# Patient Record
Sex: MALE | Race: WHITE | NOT HISPANIC OR LATINO | Employment: UNEMPLOYED | URBAN - METROPOLITAN AREA
[De-identification: names, ages, dates, MRNs, and addresses within clinical notes are randomized per-mention and may not be internally consistent; named-entity substitution may affect disease eponyms.]

---

## 2017-04-04 ENCOUNTER — TELEPHONE (OUTPATIENT)
Dept: INTERNAL MEDICINE | Facility: CLINIC | Age: 52
End: 2017-04-04

## 2017-04-04 ENCOUNTER — OFFICE VISIT (OUTPATIENT)
Dept: INTERNAL MEDICINE | Facility: CLINIC | Age: 52
End: 2017-04-04

## 2017-04-04 VITALS
RESPIRATION RATE: 20 BRPM | BODY MASS INDEX: 35.45 KG/M2 | DIASTOLIC BLOOD PRESSURE: 78 MMHG | TEMPERATURE: 97.2 F | HEART RATE: 80 BPM | SYSTOLIC BLOOD PRESSURE: 130 MMHG | WEIGHT: 206.5 LBS

## 2017-04-04 DIAGNOSIS — IMO0002 UNCONTROLLED TYPE 2 DIABETES MELLITUS WITH OTHER SPECIFIED COMPLICATION, WITH LONG-TERM CURRENT USE OF INSULIN: Chronic | ICD-10-CM

## 2017-04-04 DIAGNOSIS — I10 BENIGN ESSENTIAL HYPERTENSION: Chronic | ICD-10-CM

## 2017-04-04 DIAGNOSIS — K21.9 GASTROESOPHAGEAL REFLUX DISEASE WITHOUT ESOPHAGITIS: Chronic | ICD-10-CM

## 2017-04-04 DIAGNOSIS — Z11.59 NEED FOR HEPATITIS C SCREENING TEST: ICD-10-CM

## 2017-04-04 DIAGNOSIS — G62.9 PERIPHERAL POLYNEUROPATHY: Chronic | ICD-10-CM

## 2017-04-04 DIAGNOSIS — E78.5 HYPERLIPIDEMIA, UNSPECIFIED HYPERLIPIDEMIA TYPE: Primary | Chronic | ICD-10-CM

## 2017-04-04 LAB
ALBUMIN SERPL-MCNC: 4.2 G/DL (ref 3.2–4.8)
ALBUMIN/GLOB SERPL: 1.6 G/DL (ref 1.5–2.5)
ALP SERPL-CCNC: 88 U/L (ref 25–100)
ALT SERPL W P-5'-P-CCNC: 39 U/L (ref 7–40)
ANION GAP SERPL CALCULATED.3IONS-SCNC: 11 MMOL/L (ref 3–11)
ARTICHOKE IGE QN: 95 MG/DL (ref 0–130)
AST SERPL-CCNC: 37 U/L (ref 0–33)
BILIRUB SERPL-MCNC: 0.4 MG/DL (ref 0.3–1.2)
BUN BLD-MCNC: 14 MG/DL (ref 9–23)
BUN/CREAT SERPL: 14 (ref 7–25)
CALCIUM SPEC-SCNC: 9.9 MG/DL (ref 8.7–10.4)
CHLORIDE SERPL-SCNC: 93 MMOL/L (ref 99–109)
CHOLEST SERPL-MCNC: 148 MG/DL (ref 0–200)
CO2 SERPL-SCNC: 30 MMOL/L (ref 20–31)
CREAT BLD-MCNC: 1 MG/DL (ref 0.6–1.3)
DEPRECATED RDW RBC AUTO: 44.2 FL (ref 37–54)
ERYTHROCYTE [DISTWIDTH] IN BLOOD BY AUTOMATED COUNT: 14 % (ref 11.3–14.5)
EXPIRATION DATE: NORMAL
GFR SERPL CREATININE-BSD FRML MDRD: 78 ML/MIN/1.73
GLOBULIN UR ELPH-MCNC: 2.6 GM/DL
GLUCOSE BLD-MCNC: 414 MG/DL (ref 70–100)
HBA1C MFR BLD: 13.2 %
HCT VFR BLD AUTO: 46.2 % (ref 38.9–50.9)
HCV AB SER DONR QL: NORMAL
HDLC SERPL-MCNC: 39 MG/DL (ref 40–60)
HGB BLD-MCNC: 14.9 G/DL (ref 13.1–17.5)
Lab: NORMAL
MCH RBC QN AUTO: 28 PG (ref 27–31)
MCHC RBC AUTO-ENTMCNC: 32.3 G/DL (ref 32–36)
MCV RBC AUTO: 86.7 FL (ref 80–99)
PLATELET # BLD AUTO: 240 10*3/MM3 (ref 150–450)
PMV BLD AUTO: 12.2 FL (ref 6–12)
POTASSIUM BLD-SCNC: 4 MMOL/L (ref 3.5–5.5)
PROT SERPL-MCNC: 6.8 G/DL (ref 5.7–8.2)
RBC # BLD AUTO: 5.33 10*6/MM3 (ref 4.2–5.76)
SODIUM BLD-SCNC: 134 MMOL/L (ref 132–146)
TRIGL SERPL-MCNC: 215 MG/DL (ref 0–150)
WBC NRBC COR # BLD: 6.8 10*3/MM3 (ref 3.5–10.8)

## 2017-04-04 PROCEDURE — 99214 OFFICE O/P EST MOD 30 MIN: CPT | Performed by: INTERNAL MEDICINE

## 2017-04-04 PROCEDURE — 86803 HEPATITIS C AB TEST: CPT | Performed by: INTERNAL MEDICINE

## 2017-04-04 PROCEDURE — 85027 COMPLETE CBC AUTOMATED: CPT | Performed by: INTERNAL MEDICINE

## 2017-04-04 PROCEDURE — 80061 LIPID PANEL: CPT | Performed by: INTERNAL MEDICINE

## 2017-04-04 PROCEDURE — 80053 COMPREHEN METABOLIC PANEL: CPT | Performed by: INTERNAL MEDICINE

## 2017-04-04 PROCEDURE — 36415 COLL VENOUS BLD VENIPUNCTURE: CPT | Performed by: INTERNAL MEDICINE

## 2017-04-04 PROCEDURE — 83036 HEMOGLOBIN GLYCOSYLATED A1C: CPT | Performed by: INTERNAL MEDICINE

## 2017-04-04 RX ORDER — ALBUTEROL SULFATE 90 UG/1
1-2 AEROSOL, METERED RESPIRATORY (INHALATION) EVERY 6 HOURS PRN
Qty: 1 INHALER | Refills: 6 | Status: SHIPPED | OUTPATIENT
Start: 2017-04-04 | End: 2017-04-06 | Stop reason: RX

## 2017-04-04 RX ORDER — SIMVASTATIN 20 MG
20 TABLET ORAL DAILY
Qty: 30 TABLET | Refills: 5 | Status: SHIPPED | OUTPATIENT
Start: 2017-04-04 | End: 2021-01-22 | Stop reason: HOSPADM

## 2017-04-04 RX ORDER — METOPROLOL SUCCINATE 50 MG/1
50 TABLET, EXTENDED RELEASE ORAL DAILY
Qty: 30 TABLET | Refills: 5 | Status: SHIPPED | OUTPATIENT
Start: 2017-04-04

## 2017-04-04 RX ORDER — INSULIN LISPRO 100 [IU]/ML
INJECTION, SUSPENSION SUBCUTANEOUS
Qty: 15 PEN | Refills: 5 | Status: SHIPPED | OUTPATIENT
Start: 2017-04-04

## 2017-04-04 RX ORDER — OMEPRAZOLE 40 MG/1
40 CAPSULE, DELAYED RELEASE ORAL 2 TIMES DAILY
Qty: 60 CAPSULE | Refills: 5
Start: 2017-04-04 | End: 2017-10-30 | Stop reason: SDUPTHER

## 2017-04-04 RX ORDER — ROPINIROLE 0.25 MG/1
0.25 TABLET, FILM COATED ORAL NIGHTLY
Qty: 30 TABLET | Refills: 5 | Status: SHIPPED | OUTPATIENT
Start: 2017-04-04

## 2017-04-04 RX ORDER — ACETAMINOPHEN 160 MG
2000 TABLET,DISINTEGRATING ORAL DAILY
Qty: 90 CAPSULE | Refills: 2 | Status: SHIPPED | OUTPATIENT
Start: 2017-04-04

## 2017-04-04 RX ORDER — FLUTICASONE PROPIONATE 50 MCG
2 SPRAY, SUSPENSION (ML) NASAL DAILY
Qty: 1 EACH | Refills: 5 | Status: SHIPPED | OUTPATIENT
Start: 2017-04-04 | End: 2017-05-04

## 2017-04-06 RX ORDER — ALBUTEROL SULFATE 90 UG/1
2 AEROSOL, METERED RESPIRATORY (INHALATION) EVERY 4 HOURS PRN
Qty: 1 INHALER | Refills: 5 | Status: SHIPPED | OUTPATIENT
Start: 2017-04-06

## 2017-06-20 ENCOUNTER — TELEPHONE (OUTPATIENT)
Dept: INTERNAL MEDICINE | Facility: CLINIC | Age: 52
End: 2017-06-20

## 2017-07-05 ENCOUNTER — HOSPITAL ENCOUNTER (OUTPATIENT)
Dept: GENERAL RADIOLOGY | Facility: HOSPITAL | Age: 52
Discharge: HOME | End: 2017-07-05
Attending: INTERNAL MEDICINE | Admitting: INTERNAL MEDICINE

## 2017-07-05 ENCOUNTER — OFFICE VISIT (OUTPATIENT)
Dept: INTERNAL MEDICINE | Facility: CLINIC | Age: 52
End: 2017-07-05

## 2017-07-05 VITALS
WEIGHT: 204.8 LBS | DIASTOLIC BLOOD PRESSURE: 82 MMHG | RESPIRATION RATE: 16 BRPM | SYSTOLIC BLOOD PRESSURE: 122 MMHG | BODY MASS INDEX: 35.15 KG/M2 | HEART RATE: 68 BPM

## 2017-07-05 DIAGNOSIS — G89.29 CHRONIC LOW BACK PAIN, UNSPECIFIED BACK PAIN LATERALITY, WITH SCIATICA PRESENCE UNSPECIFIED: ICD-10-CM

## 2017-07-05 DIAGNOSIS — E78.5 HYPERLIPIDEMIA, UNSPECIFIED HYPERLIPIDEMIA TYPE: Chronic | ICD-10-CM

## 2017-07-05 DIAGNOSIS — K59.09 CHRONIC CONSTIPATION: ICD-10-CM

## 2017-07-05 DIAGNOSIS — M54.5 CHRONIC LOW BACK PAIN, UNSPECIFIED BACK PAIN LATERALITY, WITH SCIATICA PRESENCE UNSPECIFIED: ICD-10-CM

## 2017-07-05 DIAGNOSIS — K21.9 GASTROESOPHAGEAL REFLUX DISEASE WITHOUT ESOPHAGITIS: Chronic | ICD-10-CM

## 2017-07-05 DIAGNOSIS — G62.9 PERIPHERAL POLYNEUROPATHY: Chronic | ICD-10-CM

## 2017-07-05 DIAGNOSIS — G47.30 SLEEP APNEA, UNSPECIFIED TYPE: Chronic | ICD-10-CM

## 2017-07-05 DIAGNOSIS — I10 BENIGN ESSENTIAL HYPERTENSION: Primary | Chronic | ICD-10-CM

## 2017-07-05 DIAGNOSIS — IMO0002 UNCONTROLLED TYPE 2 DIABETES MELLITUS WITH OTHER SPECIFIED COMPLICATION, WITH LONG-TERM CURRENT USE OF INSULIN: Chronic | ICD-10-CM

## 2017-07-05 DIAGNOSIS — G25.81 RLS (RESTLESS LEGS SYNDROME): ICD-10-CM

## 2017-07-05 PROBLEM — Z11.59 NEED FOR HEPATITIS C SCREENING TEST: Status: RESOLVED | Noted: 2017-04-04 | Resolved: 2017-07-05

## 2017-07-05 PROBLEM — M54.50 CHRONIC LOW BACK PAIN: Status: ACTIVE | Noted: 2017-07-05

## 2017-07-05 LAB
ALBUMIN SERPL-MCNC: 3.9 G/DL (ref 3.2–4.8)
ALBUMIN/GLOB SERPL: 1.7 G/DL (ref 1.5–2.5)
ALP SERPL-CCNC: 72 U/L (ref 25–100)
ALT SERPL W P-5'-P-CCNC: 20 U/L (ref 7–40)
ANION GAP SERPL CALCULATED.3IONS-SCNC: 7 MMOL/L (ref 3–11)
AST SERPL-CCNC: 23 U/L (ref 0–33)
BILIRUB SERPL-MCNC: 0.4 MG/DL (ref 0.3–1.2)
BUN BLD-MCNC: 18 MG/DL (ref 9–23)
BUN/CREAT SERPL: 18 (ref 7–25)
CALCIUM SPEC-SCNC: 9.2 MG/DL (ref 8.7–10.4)
CHLORIDE SERPL-SCNC: 103 MMOL/L (ref 99–109)
CO2 SERPL-SCNC: 30 MMOL/L (ref 20–31)
CREAT BLD-MCNC: 1 MG/DL (ref 0.6–1.3)
DEPRECATED RDW RBC AUTO: 42.2 FL (ref 37–54)
ERYTHROCYTE [DISTWIDTH] IN BLOOD BY AUTOMATED COUNT: 13.6 % (ref 11.3–14.5)
EXPIRATION DATE: NORMAL
GFR SERPL CREATININE-BSD FRML MDRD: 78 ML/MIN/1.73
GLOBULIN UR ELPH-MCNC: 2.3 GM/DL
GLUCOSE BLD-MCNC: 234 MG/DL (ref 70–100)
HBA1C MFR BLD: 12 %
HCT VFR BLD AUTO: 44.8 % (ref 38.9–50.9)
HGB BLD-MCNC: 14.5 G/DL (ref 13.1–17.5)
Lab: NORMAL
MCH RBC QN AUTO: 27.6 PG (ref 27–31)
MCHC RBC AUTO-ENTMCNC: 32.4 G/DL (ref 32–36)
MCV RBC AUTO: 85.2 FL (ref 80–99)
PLATELET # BLD AUTO: 261 10*3/MM3 (ref 150–450)
PMV BLD AUTO: 11.6 FL (ref 6–12)
POTASSIUM BLD-SCNC: 4.5 MMOL/L (ref 3.5–5.5)
PROT SERPL-MCNC: 6.2 G/DL (ref 5.7–8.2)
RBC # BLD AUTO: 5.26 10*6/MM3 (ref 4.2–5.76)
SODIUM BLD-SCNC: 140 MMOL/L (ref 132–146)
WBC NRBC COR # BLD: 8.98 10*3/MM3 (ref 3.5–10.8)

## 2017-07-05 PROCEDURE — 83036 HEMOGLOBIN GLYCOSYLATED A1C: CPT | Performed by: INTERNAL MEDICINE

## 2017-07-05 PROCEDURE — 72100 X-RAY EXAM L-S SPINE 2/3 VWS: CPT

## 2017-07-05 PROCEDURE — 99214 OFFICE O/P EST MOD 30 MIN: CPT | Performed by: INTERNAL MEDICINE

## 2017-07-05 PROCEDURE — 80053 COMPREHEN METABOLIC PANEL: CPT | Performed by: INTERNAL MEDICINE

## 2017-07-05 PROCEDURE — 85027 COMPLETE CBC AUTOMATED: CPT | Performed by: INTERNAL MEDICINE

## 2017-07-05 RX ORDER — PREGABALIN 225 MG/1
CAPSULE ORAL
Refills: 5 | COMMUNITY
Start: 2017-07-02

## 2017-07-05 RX ORDER — LIDOCAINE 50 MG/G
OINTMENT TOPICAL
Refills: 0 | COMMUNITY
Start: 2017-05-04

## 2017-07-06 ENCOUNTER — TELEPHONE (OUTPATIENT)
Dept: INTERNAL MEDICINE | Facility: CLINIC | Age: 52
End: 2017-07-06

## 2017-07-11 ENCOUNTER — TELEPHONE (OUTPATIENT)
Dept: INTERNAL MEDICINE | Facility: CLINIC | Age: 52
End: 2017-07-11

## 2017-07-11 DIAGNOSIS — M54.50 CHRONIC LOW BACK PAIN WITHOUT SCIATICA, UNSPECIFIED BACK PAIN LATERALITY: Primary | Chronic | ICD-10-CM

## 2017-07-11 DIAGNOSIS — G89.29 CHRONIC LOW BACK PAIN WITHOUT SCIATICA, UNSPECIFIED BACK PAIN LATERALITY: Primary | Chronic | ICD-10-CM

## 2017-07-11 DIAGNOSIS — IMO0002 UNCONTROLLED TYPE 2 DIABETES MELLITUS WITH OTHER SPECIFIED COMPLICATION, WITH LONG-TERM CURRENT USE OF INSULIN: Chronic | ICD-10-CM

## 2017-07-13 ENCOUNTER — TELEPHONE (OUTPATIENT)
Dept: INTERNAL MEDICINE | Facility: CLINIC | Age: 52
End: 2017-07-13

## 2017-07-18 ENCOUNTER — TELEPHONE (OUTPATIENT)
Dept: INTERNAL MEDICINE | Facility: CLINIC | Age: 52
End: 2017-07-18

## 2017-07-26 ENCOUNTER — TELEPHONE (OUTPATIENT)
Dept: INTERNAL MEDICINE | Facility: CLINIC | Age: 52
End: 2017-07-26

## 2017-09-05 RX ORDER — OMEPRAZOLE 40 MG/1
CAPSULE, DELAYED RELEASE ORAL
Qty: 60 CAPSULE | Refills: 0 | Status: SHIPPED | OUTPATIENT
Start: 2017-09-05 | End: 2017-10-05 | Stop reason: SDUPTHER

## 2017-10-05 ENCOUNTER — OFFICE VISIT (OUTPATIENT)
Dept: INTERNAL MEDICINE | Facility: CLINIC | Age: 52
End: 2017-10-05

## 2017-10-05 VITALS
TEMPERATURE: 97.9 F | HEART RATE: 104 BPM | RESPIRATION RATE: 20 BRPM | SYSTOLIC BLOOD PRESSURE: 124 MMHG | BODY MASS INDEX: 34.5 KG/M2 | WEIGHT: 201 LBS | OXYGEN SATURATION: 95 % | DIASTOLIC BLOOD PRESSURE: 76 MMHG

## 2017-10-05 DIAGNOSIS — Z23 NEED FOR PROPHYLACTIC VACCINATION AGAINST STREPTOCOCCUS PNEUMONIAE (PNEUMOCOCCUS): ICD-10-CM

## 2017-10-05 DIAGNOSIS — E78.5 HYPERLIPIDEMIA, UNSPECIFIED HYPERLIPIDEMIA TYPE: Chronic | ICD-10-CM

## 2017-10-05 DIAGNOSIS — R00.2 PALPITATIONS: ICD-10-CM

## 2017-10-05 DIAGNOSIS — E11.65 UNCONTROLLED TYPE 2 DIABETES MELLITUS WITH DIABETIC NEPHROPATHY, UNSPECIFIED LONG TERM INSULIN USE STATUS: Primary | ICD-10-CM

## 2017-10-05 DIAGNOSIS — Z12.5 PROSTATE CANCER SCREENING: ICD-10-CM

## 2017-10-05 DIAGNOSIS — Z91.09 ENVIRONMENTAL ALLERGIES: ICD-10-CM

## 2017-10-05 DIAGNOSIS — E11.21 UNCONTROLLED TYPE 2 DIABETES MELLITUS WITH DIABETIC NEPHROPATHY, UNSPECIFIED LONG TERM INSULIN USE STATUS: Primary | ICD-10-CM

## 2017-10-05 DIAGNOSIS — IMO0002 UNCONTROLLED TYPE 2 DIABETES MELLITUS WITH OTHER SPECIFIED COMPLICATION, WITH LONG-TERM CURRENT USE OF INSULIN: Chronic | ICD-10-CM

## 2017-10-05 LAB
ALBUMIN SERPL-MCNC: 4.2 G/DL (ref 3.2–4.8)
ALBUMIN/GLOB SERPL: 1.6 G/DL (ref 1.5–2.5)
ALP SERPL-CCNC: 78 U/L (ref 25–100)
ALT SERPL W P-5'-P-CCNC: 33 U/L (ref 7–40)
ANION GAP SERPL CALCULATED.3IONS-SCNC: 10 MMOL/L (ref 3–11)
ARTICHOKE IGE QN: 78 MG/DL (ref 0–130)
AST SERPL-CCNC: 36 U/L (ref 0–33)
BILIRUB SERPL-MCNC: 0.6 MG/DL (ref 0.3–1.2)
BUN BLD-MCNC: 10 MG/DL (ref 9–23)
BUN/CREAT SERPL: 10 (ref 7–25)
CALCIUM SPEC-SCNC: 9.2 MG/DL (ref 8.7–10.4)
CHLORIDE SERPL-SCNC: 98 MMOL/L (ref 99–109)
CHOLEST SERPL-MCNC: 149 MG/DL (ref 0–200)
CO2 SERPL-SCNC: 27 MMOL/L (ref 20–31)
CREAT BLD-MCNC: 1 MG/DL (ref 0.6–1.3)
DEPRECATED RDW RBC AUTO: 42 FL (ref 37–54)
ERYTHROCYTE [DISTWIDTH] IN BLOOD BY AUTOMATED COUNT: 13.8 % (ref 11.3–14.5)
EXPIRATION DATE: NORMAL
GFR SERPL CREATININE-BSD FRML MDRD: 78 ML/MIN/1.73
GLOBULIN UR ELPH-MCNC: 2.6 GM/DL
GLUCOSE BLD-MCNC: 215 MG/DL (ref 70–100)
HBA1C MFR BLD: 13 %
HCT VFR BLD AUTO: 44.5 % (ref 38.9–50.9)
HDLC SERPL-MCNC: 43 MG/DL (ref 40–60)
HGB BLD-MCNC: 14.7 G/DL (ref 13.1–17.5)
Lab: NORMAL
MCH RBC QN AUTO: 27.9 PG (ref 27–31)
MCHC RBC AUTO-ENTMCNC: 33 G/DL (ref 32–36)
MCV RBC AUTO: 84.4 FL (ref 80–99)
PLATELET # BLD AUTO: 244 10*3/MM3 (ref 150–450)
PMV BLD AUTO: 12.4 FL (ref 6–12)
POTASSIUM BLD-SCNC: 4 MMOL/L (ref 3.5–5.5)
PROT SERPL-MCNC: 6.8 G/DL (ref 5.7–8.2)
PSA SERPL-MCNC: 0.23 NG/ML (ref 0–4)
RBC # BLD AUTO: 5.27 10*6/MM3 (ref 4.2–5.76)
SODIUM BLD-SCNC: 135 MMOL/L (ref 132–146)
T4 FREE SERPL-MCNC: 0.98 NG/DL (ref 0.89–1.76)
TRIGL SERPL-MCNC: 214 MG/DL (ref 0–150)
TSH SERPL DL<=0.05 MIU/L-ACNC: 0.72 MIU/ML (ref 0.35–5.35)
WBC NRBC COR # BLD: 13.33 10*3/MM3 (ref 3.5–10.8)

## 2017-10-05 PROCEDURE — 84439 ASSAY OF FREE THYROXINE: CPT | Performed by: PHYSICIAN ASSISTANT

## 2017-10-05 PROCEDURE — 80061 LIPID PANEL: CPT | Performed by: PHYSICIAN ASSISTANT

## 2017-10-05 PROCEDURE — 83036 HEMOGLOBIN GLYCOSYLATED A1C: CPT | Performed by: PHYSICIAN ASSISTANT

## 2017-10-05 PROCEDURE — 99214 OFFICE O/P EST MOD 30 MIN: CPT | Performed by: PHYSICIAN ASSISTANT

## 2017-10-05 PROCEDURE — 84153 ASSAY OF PSA TOTAL: CPT | Performed by: PHYSICIAN ASSISTANT

## 2017-10-05 PROCEDURE — 90732 PPSV23 VACC 2 YRS+ SUBQ/IM: CPT | Performed by: PHYSICIAN ASSISTANT

## 2017-10-05 PROCEDURE — 90471 IMMUNIZATION ADMIN: CPT | Performed by: PHYSICIAN ASSISTANT

## 2017-10-05 PROCEDURE — 90686 IIV4 VACC NO PRSV 0.5 ML IM: CPT | Performed by: PHYSICIAN ASSISTANT

## 2017-10-05 PROCEDURE — 80050 GENERAL HEALTH PANEL: CPT | Performed by: PHYSICIAN ASSISTANT

## 2017-10-05 PROCEDURE — 90472 IMMUNIZATION ADMIN EACH ADD: CPT | Performed by: PHYSICIAN ASSISTANT

## 2017-10-05 RX ORDER — BLOOD-GLUCOSE METER
1 KIT MISCELLANEOUS 3 TIMES DAILY
Qty: 1 EACH | Refills: 0 | Status: SHIPPED | OUTPATIENT
Start: 2017-10-05

## 2017-10-05 RX ORDER — FLUTICASONE PROPIONATE 50 MCG
2 SPRAY, SUSPENSION (ML) NASAL DAILY
Qty: 16 G | Refills: 2 | Status: SHIPPED | OUTPATIENT
Start: 2017-10-05

## 2017-10-05 RX ORDER — LAMOTRIGINE 200 MG/1
200 TABLET ORAL DAILY
COMMUNITY
Start: 2017-09-28 | End: 2021-01-22 | Stop reason: HOSPADM

## 2017-10-12 ENCOUNTER — TELEPHONE (OUTPATIENT)
Dept: INTERNAL MEDICINE | Facility: CLINIC | Age: 52
End: 2017-10-12

## 2017-10-18 ENCOUNTER — TELEPHONE (OUTPATIENT)
Dept: INTERNAL MEDICINE | Facility: CLINIC | Age: 52
End: 2017-10-18

## 2017-10-23 ENCOUNTER — TELEPHONE (OUTPATIENT)
Dept: INTERNAL MEDICINE | Facility: CLINIC | Age: 52
End: 2017-10-23

## 2017-10-30 RX ORDER — OMEPRAZOLE 40 MG/1
40 CAPSULE, DELAYED RELEASE ORAL 2 TIMES DAILY
Qty: 60 CAPSULE | Refills: 2
Start: 2017-10-30

## 2017-12-01 DIAGNOSIS — E11.21 UNCONTROLLED TYPE 2 DIABETES MELLITUS WITH DIABETIC NEPHROPATHY, UNSPECIFIED LONG TERM INSULIN USE STATUS: ICD-10-CM

## 2017-12-01 DIAGNOSIS — E11.65 UNCONTROLLED TYPE 2 DIABETES MELLITUS WITH DIABETIC NEPHROPATHY, UNSPECIFIED LONG TERM INSULIN USE STATUS: ICD-10-CM

## 2017-12-04 RX ORDER — BLOOD-GLUCOSE METER
KIT MISCELLANEOUS
Refills: 0 | OUTPATIENT
Start: 2017-12-04

## 2019-06-07 ENCOUNTER — HOSPITAL ENCOUNTER (EMERGENCY)
Facility: HOSPITAL | Age: 54
Discharge: HOME | End: 2019-06-07
Attending: EMERGENCY MEDICINE | Admitting: EMERGENCY MEDICINE

## 2019-06-07 ENCOUNTER — APPOINTMENT (OUTPATIENT)
Dept: GENERAL RADIOLOGY | Facility: HOSPITAL | Age: 54
End: 2019-06-07

## 2019-06-07 VITALS
SYSTOLIC BLOOD PRESSURE: 131 MMHG | OXYGEN SATURATION: 93 % | DIASTOLIC BLOOD PRESSURE: 80 MMHG | RESPIRATION RATE: 18 BRPM | BODY MASS INDEX: 37.56 KG/M2 | TEMPERATURE: 99 F | HEART RATE: 95 BPM | HEIGHT: 64 IN | WEIGHT: 220 LBS

## 2019-06-07 DIAGNOSIS — S93.402A SPRAIN OF LEFT ANKLE, UNSPECIFIED LIGAMENT, INITIAL ENCOUNTER: Primary | ICD-10-CM
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
## 2019-06-07 PROCEDURE — 99283 EMERGENCY DEPT VISIT LOW MDM: CPT

## 2019-06-07 PROCEDURE — 73610 X-RAY EXAM OF ANKLE: CPT

## 2020-11-04 RX ORDER — SODIUM, POTASSIUM,MAG SULFATES 17.5-3.13G
2 SOLUTION, RECONSTITUTED, ORAL ORAL TAKE AS DIRECTED
Qty: 354 ML | Refills: 0 | Status: SHIPPED | OUTPATIENT
Start: 2020-11-04 | End: 2021-01-10 | Stop reason: HOSPADM

## 2020-11-09 ENCOUNTER — APPOINTMENT (OUTPATIENT)
Dept: PREADMISSION TESTING | Facility: HOSPITAL | Age: 55
End: 2020-11-09

## 2021-01-05 ENCOUNTER — HOSPITAL ENCOUNTER (INPATIENT)
Facility: HOSPITAL | Age: 56
LOS: 5 days | Discharge: HOME | End: 2021-01-10
Attending: EMERGENCY MEDICINE | Admitting: HOSPITALIST

## 2021-01-05 ENCOUNTER — APPOINTMENT (OUTPATIENT)
Dept: CT IMAGING | Facility: HOSPITAL | Age: 56
End: 2021-01-05

## 2021-01-05 ENCOUNTER — APPOINTMENT (OUTPATIENT)
Dept: GENERAL RADIOLOGY | Facility: HOSPITAL | Age: 56
End: 2021-01-05

## 2021-01-05 DIAGNOSIS — E11.65 POORLY CONTROLLED DIABETES MELLITUS (HCC): ICD-10-CM

## 2021-01-05 DIAGNOSIS — K81.9 CHOLECYSTITIS: Primary | ICD-10-CM

## 2021-01-05 DIAGNOSIS — E80.6 HYPERBILIRUBINEMIA: ICD-10-CM

## 2021-01-05 DIAGNOSIS — E86.0 DEHYDRATION: ICD-10-CM

## 2021-01-05 DIAGNOSIS — K80.00 ACUTE CHOLECYSTITIS DUE TO BILIARY CALCULUS: ICD-10-CM

## 2021-01-05 DIAGNOSIS — Z90.49 STATUS POST CHOLECYSTECTOMY: ICD-10-CM

## 2021-01-05 PROBLEM — E11.10 DIABETIC KETOACIDOSIS ASSOCIATED WITH TYPE 2 DIABETES MELLITUS (HCC): Status: ACTIVE | Noted: 2021-01-05

## 2021-01-05 PROBLEM — S91.309A MULTIPLE OPEN WOUNDS OF FOOT: Status: ACTIVE | Noted: 2021-01-05

## 2021-01-05 PROBLEM — M54.9 CHRONIC BACK PAIN: Status: ACTIVE | Noted: 2017-07-05

## 2021-01-05 LAB
ALBUMIN SERPL-MCNC: 3.6 G/DL (ref 3.5–5.2)
ALBUMIN/GLOB SERPL: 0.9 G/DL
ALP SERPL-CCNC: 236 U/L (ref 39–117)
ALT SERPL W P-5'-P-CCNC: 77 U/L (ref 1–41)
AMYLASE SERPL-CCNC: 9 U/L (ref 28–100)
ANION GAP SERPL CALCULATED.3IONS-SCNC: 18 MMOL/L (ref 5–15)
AST SERPL-CCNC: 97 U/L (ref 1–40)
ATMOSPHERIC PRESS: ABNORMAL MM[HG]
B PARAPERT DNA SPEC QL NAA+PROBE: NOT DETECTED
B PERT DNA SPEC QL NAA+PROBE: NOT DETECTED
B-OH-BUTYR SERPL-SCNC: 3.05 MMOL/L (ref 0.02–0.27)
BASE EXCESS BLDV CALC-SCNC: 0.9 MMOL/L (ref -2–2)
BASOPHILS # BLD AUTO: 0.06 10*3/MM3 (ref 0–0.2)
BASOPHILS NFR BLD AUTO: 0.7 % (ref 0–1.5)
BDY SITE: ABNORMAL
BILIRUB SERPL-MCNC: 1.7 MG/DL (ref 0–1.2)
BODY TEMPERATURE: 37 C
BUN SERPL-MCNC: 17 MG/DL (ref 6–20)
BUN/CREAT SERPL: 18.7 (ref 7–25)
C PNEUM DNA NPH QL NAA+NON-PROBE: NOT DETECTED
CALCIUM SPEC-SCNC: 9.7 MG/DL (ref 8.6–10.5)
CHLORIDE SERPL-SCNC: 89 MMOL/L (ref 98–107)
CO2 BLDA-SCNC: 28.3 MMOL/L (ref 22–33)
CO2 SERPL-SCNC: 23 MMOL/L (ref 22–29)
COHGB MFR BLD: 1.3 %
CREAT SERPL-MCNC: 0.91 MG/DL (ref 0.76–1.27)
D-LACTATE SERPL-SCNC: 2.2 MMOL/L (ref 0.5–2)
D-LACTATE SERPL-SCNC: 2.3 MMOL/L (ref 0.5–2)
DEPRECATED RDW RBC AUTO: 42.2 FL (ref 37–54)
EOSINOPHIL # BLD AUTO: 0.07 10*3/MM3 (ref 0–0.4)
EOSINOPHIL NFR BLD AUTO: 0.8 % (ref 0.3–6.2)
EPAP: 0
ERYTHROCYTE [DISTWIDTH] IN BLOOD BY AUTOMATED COUNT: 13.5 % (ref 12.3–15.4)
FLUAV H1 2009 PAND RNA NPH QL NAA+PROBE: NOT DETECTED
FLUAV H1 HA GENE NPH QL NAA+PROBE: NOT DETECTED
FLUAV H3 RNA NPH QL NAA+PROBE: NOT DETECTED
FLUAV RNA RESP QL NAA+PROBE: NOT DETECTED
FLUAV SUBTYP SPEC NAA+PROBE: NOT DETECTED
FLUBV RNA ISLT QL NAA+PROBE: NOT DETECTED
FLUBV RNA RESP QL NAA+PROBE: NOT DETECTED
GFR SERPL CREATININE-BSD FRML MDRD: 86 ML/MIN/1.73
GLOBULIN UR ELPH-MCNC: 3.8 GM/DL
GLUCOSE BLDC GLUCOMTR-MCNC: 319 MG/DL (ref 70–130)
GLUCOSE BLDC GLUCOMTR-MCNC: 324 MG/DL (ref 70–130)
GLUCOSE BLDC GLUCOMTR-MCNC: 333 MG/DL (ref 70–130)
GLUCOSE BLDC GLUCOMTR-MCNC: 346 MG/DL (ref 70–130)
GLUCOSE BLDC GLUCOMTR-MCNC: 401 MG/DL (ref 70–130)
GLUCOSE BLDC GLUCOMTR-MCNC: 541 MG/DL (ref 70–130)
GLUCOSE SERPL-MCNC: 532 MG/DL (ref 65–99)
HADV DNA SPEC NAA+PROBE: NOT DETECTED
HBA1C MFR BLD: 13 % (ref 4.8–5.6)
HCO3 BLDV-SCNC: 26.9 MMOL/L (ref 22–28)
HCOV 229E RNA SPEC QL NAA+PROBE: NOT DETECTED
HCOV HKU1 RNA SPEC QL NAA+PROBE: NOT DETECTED
HCOV NL63 RNA SPEC QL NAA+PROBE: NOT DETECTED
HCOV OC43 RNA SPEC QL NAA+PROBE: NOT DETECTED
HCT VFR BLD AUTO: 46.2 % (ref 37.5–51)
HGB BLD-MCNC: 14.6 G/DL (ref 13–17.7)
HGB BLDA-MCNC: 15.3 G/DL (ref 13.5–17.5)
HMPV RNA NPH QL NAA+NON-PROBE: NOT DETECTED
HOLD SPECIMEN: NORMAL
HOLD SPECIMEN: NORMAL
HPIV1 RNA SPEC QL NAA+PROBE: NOT DETECTED
HPIV2 RNA SPEC QL NAA+PROBE: NOT DETECTED
HPIV3 RNA NPH QL NAA+PROBE: NOT DETECTED
HPIV4 P GENE NPH QL NAA+PROBE: NOT DETECTED
IMM GRANULOCYTES # BLD AUTO: 0.09 10*3/MM3 (ref 0–0.05)
IMM GRANULOCYTES NFR BLD AUTO: 1 % (ref 0–0.5)
INHALED O2 CONCENTRATION: 21 %
IPAP: 0
LACTATE HOLD SPECIMEN: NORMAL
LIPASE SERPL-CCNC: 364 U/L (ref 13–60)
LYMPHOCYTES # BLD AUTO: 0.55 10*3/MM3 (ref 0.7–3.1)
LYMPHOCYTES NFR BLD AUTO: 6.3 % (ref 19.6–45.3)
M PNEUMO IGG SER IA-ACNC: NOT DETECTED
MAGNESIUM SERPL-MCNC: 2 MG/DL (ref 1.6–2.6)
MCH RBC QN AUTO: 26.6 PG (ref 26.6–33)
MCHC RBC AUTO-ENTMCNC: 31.6 G/DL (ref 31.5–35.7)
MCV RBC AUTO: 84.3 FL (ref 79–97)
METHGB BLD QL: 0.4 %
MODALITY: ABNORMAL
MONOCYTES # BLD AUTO: 0.57 10*3/MM3 (ref 0.1–0.9)
MONOCYTES NFR BLD AUTO: 6.5 % (ref 5–12)
NEUTROPHILS NFR BLD AUTO: 7.46 10*3/MM3 (ref 1.7–7)
NEUTROPHILS NFR BLD AUTO: 84.7 % (ref 42.7–76)
NOTE: ABNORMAL
NRBC BLD AUTO-RTO: 0 /100 WBC (ref 0–0.2)
NT-PROBNP SERPL-MCNC: 128.2 PG/ML (ref 0–900)
OSMOLALITY SERPL: 305 MOSM/KG (ref 275–295)
OXYHGB MFR BLDV: 26 %
PAW @ PEAK INSP FLOW SETTING VENT: 0 CMH2O
PCO2 BLDV: 46.9 MM HG (ref 41–51)
PH BLDV: 7.37 PH UNITS
PHOSPHATE SERPL-MCNC: 1.8 MG/DL (ref 2.5–4.5)
PHOSPHATE SERPL-MCNC: 2.5 MG/DL (ref 2.5–4.5)
PLATELET # BLD AUTO: 191 10*3/MM3 (ref 140–450)
PMV BLD AUTO: 12.2 FL (ref 6–12)
PO2 BLDV: 16.9 MM HG (ref 27–53)
POTASSIUM SERPL-SCNC: 4.1 MMOL/L (ref 3.5–5.2)
PROCALCITONIN SERPL-MCNC: 7.37 NG/ML (ref 0–0.25)
PROT SERPL-MCNC: 7.4 G/DL (ref 6–8.5)
RBC # BLD AUTO: 5.48 10*6/MM3 (ref 4.14–5.8)
RHINOVIRUS RNA SPEC NAA+PROBE: NOT DETECTED
RSV RNA NPH QL NAA+NON-PROBE: NOT DETECTED
SARS-COV-2 RNA NPH QL NAA+NON-PROBE: NOT DETECTED
SARS-COV-2 RNA RESP QL NAA+PROBE: NOT DETECTED
SODIUM SERPL-SCNC: 130 MMOL/L (ref 136–145)
TOTAL RATE: 0 BREATHS/MINUTE
TRIGL SERPL-MCNC: 294 MG/DL (ref 0–150)
TROPONIN T SERPL-MCNC: <0.01 NG/ML (ref 0–0.03)
WBC # BLD AUTO: 8.8 10*3/MM3 (ref 3.4–10.8)
WHOLE BLOOD HOLD SPECIMEN: NORMAL
WHOLE BLOOD HOLD SPECIMEN: NORMAL

## 2021-01-05 PROCEDURE — 83735 ASSAY OF MAGNESIUM: CPT | Performed by: NURSE PRACTITIONER

## 2021-01-05 PROCEDURE — 82150 ASSAY OF AMYLASE: CPT | Performed by: NURSE PRACTITIONER

## 2021-01-05 PROCEDURE — 84100 ASSAY OF PHOSPHORUS: CPT | Performed by: NURSE PRACTITIONER

## 2021-01-05 PROCEDURE — 83735 ASSAY OF MAGNESIUM: CPT | Performed by: PHYSICIAN ASSISTANT

## 2021-01-05 PROCEDURE — 83930 ASSAY OF BLOOD OSMOLALITY: CPT | Performed by: PHYSICIAN ASSISTANT

## 2021-01-05 PROCEDURE — 83880 ASSAY OF NATRIURETIC PEPTIDE: CPT | Performed by: NURSE PRACTITIONER

## 2021-01-05 PROCEDURE — 99285 EMERGENCY DEPT VISIT HI MDM: CPT

## 2021-01-05 PROCEDURE — 80053 COMPREHEN METABOLIC PANEL: CPT | Performed by: EMERGENCY MEDICINE

## 2021-01-05 PROCEDURE — 84145 PROCALCITONIN (PCT): CPT | Performed by: NURSE PRACTITIONER

## 2021-01-05 PROCEDURE — 87636 SARSCOV2 & INF A&B AMP PRB: CPT | Performed by: NURSE PRACTITIONER

## 2021-01-05 PROCEDURE — 82805 BLOOD GASES W/O2 SATURATION: CPT

## 2021-01-05 PROCEDURE — 82150 ASSAY OF AMYLASE: CPT | Performed by: PHYSICIAN ASSISTANT

## 2021-01-05 PROCEDURE — 86900 BLOOD TYPING SEROLOGIC ABO: CPT

## 2021-01-05 PROCEDURE — 84478 ASSAY OF TRIGLYCERIDES: CPT | Performed by: PHYSICIAN ASSISTANT

## 2021-01-05 PROCEDURE — 80061 LIPID PANEL: CPT | Performed by: NURSE PRACTITIONER

## 2021-01-05 PROCEDURE — 84100 ASSAY OF PHOSPHORUS: CPT | Performed by: PHYSICIAN ASSISTANT

## 2021-01-05 PROCEDURE — 74177 CT ABD & PELVIS W/CONTRAST: CPT

## 2021-01-05 PROCEDURE — 71045 X-RAY EXAM CHEST 1 VIEW: CPT

## 2021-01-05 PROCEDURE — 25010000002 IOPAMIDOL 61 % SOLUTION: Performed by: EMERGENCY MEDICINE

## 2021-01-05 PROCEDURE — 83690 ASSAY OF LIPASE: CPT | Performed by: EMERGENCY MEDICINE

## 2021-01-05 PROCEDURE — 83690 ASSAY OF LIPASE: CPT | Performed by: NURSE PRACTITIONER

## 2021-01-05 PROCEDURE — 85025 COMPLETE CBC W/AUTO DIFF WBC: CPT | Performed by: EMERGENCY MEDICINE

## 2021-01-05 PROCEDURE — 82820 HEMOGLOBIN-OXYGEN AFFINITY: CPT

## 2021-01-05 PROCEDURE — 83036 HEMOGLOBIN GLYCOSYLATED A1C: CPT | Performed by: NURSE PRACTITIONER

## 2021-01-05 PROCEDURE — 99223 1ST HOSP IP/OBS HIGH 75: CPT | Performed by: INTERNAL MEDICINE

## 2021-01-05 PROCEDURE — 93005 ELECTROCARDIOGRAM TRACING: CPT | Performed by: EMERGENCY MEDICINE

## 2021-01-05 PROCEDURE — 86901 BLOOD TYPING SEROLOGIC RH(D): CPT

## 2021-01-05 PROCEDURE — 25010000002 PIPERACILLIN SOD-TAZOBACTAM PER 1 G: Performed by: PHYSICIAN ASSISTANT

## 2021-01-05 PROCEDURE — 83605 ASSAY OF LACTIC ACID: CPT | Performed by: NURSE PRACTITIONER

## 2021-01-05 PROCEDURE — 82010 KETONE BODYS QUAN: CPT | Performed by: PHYSICIAN ASSISTANT

## 2021-01-05 PROCEDURE — 84484 ASSAY OF TROPONIN QUANT: CPT | Performed by: EMERGENCY MEDICINE

## 2021-01-05 PROCEDURE — 83605 ASSAY OF LACTIC ACID: CPT | Performed by: PHYSICIAN ASSISTANT

## 2021-01-05 PROCEDURE — 82962 GLUCOSE BLOOD TEST: CPT

## 2021-01-05 PROCEDURE — 0202U NFCT DS 22 TRGT SARS-COV-2: CPT | Performed by: PHYSICIAN ASSISTANT

## 2021-01-05 RX ORDER — SODIUM CHLORIDE AND POTASSIUM CHLORIDE 150; 450 MG/100ML; MG/100ML
250 INJECTION, SOLUTION INTRAVENOUS CONTINUOUS PRN
Status: DISCONTINUED | OUTPATIENT
Start: 2021-01-05 | End: 2021-01-06

## 2021-01-05 RX ORDER — SODIUM CHLORIDE AND POTASSIUM CHLORIDE 300; 900 MG/100ML; MG/100ML
250 INJECTION, SOLUTION INTRAVENOUS CONTINUOUS PRN
Status: DISCONTINUED | OUTPATIENT
Start: 2021-01-05 | End: 2021-01-06

## 2021-01-05 RX ORDER — SODIUM CHLORIDE 450 MG/100ML
250 INJECTION, SOLUTION INTRAVENOUS CONTINUOUS PRN
Status: DISCONTINUED | OUTPATIENT
Start: 2021-01-05 | End: 2021-01-06

## 2021-01-05 RX ORDER — DEXTROSE, SODIUM CHLORIDE, AND POTASSIUM CHLORIDE 5; .45; .15 G/100ML; G/100ML; G/100ML
150 INJECTION INTRAVENOUS CONTINUOUS PRN
Status: DISCONTINUED | OUTPATIENT
Start: 2021-01-05 | End: 2021-01-06

## 2021-01-05 RX ORDER — DEXTROSE AND SODIUM CHLORIDE 5; .45 G/100ML; G/100ML
150 INJECTION, SOLUTION INTRAVENOUS CONTINUOUS PRN
Status: DISCONTINUED | OUTPATIENT
Start: 2021-01-05 | End: 2021-01-06

## 2021-01-05 RX ORDER — DEXTROSE MONOHYDRATE 25 G/50ML
12.5 INJECTION, SOLUTION INTRAVENOUS AS NEEDED
Status: DISCONTINUED | OUTPATIENT
Start: 2021-01-05 | End: 2021-01-06

## 2021-01-05 RX ORDER — SODIUM CHLORIDE 0.9 % (FLUSH) 0.9 %
3 SYRINGE (ML) INJECTION EVERY 12 HOURS SCHEDULED
Status: DISCONTINUED | OUTPATIENT
Start: 2021-01-05 | End: 2021-01-06 | Stop reason: SDUPTHER

## 2021-01-05 RX ORDER — DEXTROSE, SODIUM CHLORIDE, AND POTASSIUM CHLORIDE 5; .45; .3 G/100ML; G/100ML; G/100ML
150 INJECTION INTRAVENOUS CONTINUOUS PRN
Status: DISCONTINUED | OUTPATIENT
Start: 2021-01-05 | End: 2021-01-06

## 2021-01-05 RX ORDER — POTASSIUM CHLORIDE, DEXTROSE MONOHYDRATE AND SODIUM CHLORIDE 300; 5; 900 MG/100ML; G/100ML; MG/100ML
150 INJECTION, SOLUTION INTRAVENOUS CONTINUOUS PRN
Status: DISCONTINUED | OUTPATIENT
Start: 2021-01-05 | End: 2021-01-06

## 2021-01-05 RX ORDER — DEXTROSE AND SODIUM CHLORIDE 5; .9 G/100ML; G/100ML
150 INJECTION, SOLUTION INTRAVENOUS CONTINUOUS PRN
Status: DISCONTINUED | OUTPATIENT
Start: 2021-01-05 | End: 2021-01-06

## 2021-01-05 RX ORDER — SODIUM CHLORIDE 9 MG/ML
250 INJECTION, SOLUTION INTRAVENOUS CONTINUOUS PRN
Status: DISCONTINUED | OUTPATIENT
Start: 2021-01-05 | End: 2021-01-06

## 2021-01-05 RX ORDER — SODIUM CHLORIDE AND POTASSIUM CHLORIDE 150; 900 MG/100ML; MG/100ML
250 INJECTION, SOLUTION INTRAVENOUS CONTINUOUS PRN
Status: DISCONTINUED | OUTPATIENT
Start: 2021-01-05 | End: 2021-01-06

## 2021-01-05 RX ORDER — SODIUM CHLORIDE 0.9 % (FLUSH) 0.9 %
30 SYRINGE (ML) INJECTION ONCE AS NEEDED
Status: DISCONTINUED | OUTPATIENT
Start: 2021-01-05 | End: 2021-01-10 | Stop reason: HOSPADM

## 2021-01-05 RX ORDER — DEXTROSE, SODIUM CHLORIDE, AND POTASSIUM CHLORIDE 5; .9; .15 G/100ML; G/100ML; G/100ML
150 INJECTION INTRAVENOUS CONTINUOUS PRN
Status: DISCONTINUED | OUTPATIENT
Start: 2021-01-05 | End: 2021-01-06

## 2021-01-05 RX ORDER — SODIUM CHLORIDE 0.9 % (FLUSH) 0.9 %
10 SYRINGE (ML) INJECTION AS NEEDED
Status: DISCONTINUED | OUTPATIENT
Start: 2021-01-05 | End: 2021-01-06 | Stop reason: SDUPTHER

## 2021-01-05 RX ORDER — SODIUM CHLORIDE 0.9 % (FLUSH) 0.9 %
10 SYRINGE (ML) INJECTION ONCE AS NEEDED
Status: DISCONTINUED | OUTPATIENT
Start: 2021-01-05 | End: 2021-01-06 | Stop reason: SDUPTHER

## 2021-01-05 RX ORDER — SODIUM CHLORIDE 9 MG/ML
10 INJECTION, SOLUTION INTRAVENOUS CONTINUOUS PRN
Status: DISCONTINUED | OUTPATIENT
Start: 2021-01-05 | End: 2021-01-06

## 2021-01-05 RX ORDER — SODIUM CHLORIDE 0.9 % (FLUSH) 0.9 %
10 SYRINGE (ML) INJECTION AS NEEDED
Status: DISCONTINUED | OUTPATIENT
Start: 2021-01-05 | End: 2021-01-06

## 2021-01-05 RX ADMIN — SODIUM CHLORIDE 1000 ML: 9 INJECTION, SOLUTION INTRAVENOUS at 17:51

## 2021-01-05 RX ADMIN — INSULIN HUMAN 4 UNITS/HR: 1 INJECTION, SOLUTION INTRAVENOUS at 21:58

## 2021-01-05 RX ADMIN — TAZOBACTAM SODIUM AND PIPERACILLIN SODIUM 3.38 G: 375; 3 INJECTION, SOLUTION INTRAVENOUS at 21:32

## 2021-01-05 RX ADMIN — SODIUM CHLORIDE 1000 ML: 9 INJECTION, SOLUTION INTRAVENOUS at 21:57

## 2021-01-05 RX ADMIN — IOPAMIDOL 100 ML: 612 INJECTION, SOLUTION INTRAVENOUS at 19:25

## 2021-01-05 RX ADMIN — INSULIN HUMAN 4 UNITS/HR: 1 INJECTION, SOLUTION INTRAVENOUS at 18:38

## 2021-01-05 RX ADMIN — SODIUM CHLORIDE, PRESERVATIVE FREE 3 ML: 5 INJECTION INTRAVENOUS at 21:56

## 2021-01-05 RX ADMIN — POTASSIUM CHLORIDE, DEXTROSE MONOHYDRATE AND SODIUM CHLORIDE 150 ML/HR: 150; 5; 450 INJECTION, SOLUTION INTRAVENOUS at 21:56

## 2021-01-06 ENCOUNTER — ANESTHESIA EVENT (OUTPATIENT)
Dept: PERIOP | Facility: HOSPITAL | Age: 56
End: 2021-01-06

## 2021-01-06 ENCOUNTER — ANESTHESIA (OUTPATIENT)
Dept: PERIOP | Facility: HOSPITAL | Age: 56
End: 2021-01-06

## 2021-01-06 ENCOUNTER — APPOINTMENT (OUTPATIENT)
Dept: GENERAL RADIOLOGY | Facility: HOSPITAL | Age: 56
End: 2021-01-06

## 2021-01-06 ENCOUNTER — APPOINTMENT (OUTPATIENT)
Dept: MRI IMAGING | Facility: HOSPITAL | Age: 56
End: 2021-01-06

## 2021-01-06 LAB
ABO GROUP BLD: NORMAL
ABO GROUP BLD: NORMAL
ALBUMIN SERPL-MCNC: 2.7 G/DL (ref 3.5–5.2)
ALBUMIN/GLOB SERPL: 0.9 G/DL
ALP SERPL-CCNC: 206 U/L (ref 39–117)
ALT SERPL W P-5'-P-CCNC: 84 U/L (ref 1–41)
AMYLASE SERPL-CCNC: 26 U/L (ref 28–100)
ANION GAP SERPL CALCULATED.3IONS-SCNC: 12 MMOL/L (ref 5–15)
ANION GAP SERPL CALCULATED.3IONS-SCNC: 12 MMOL/L (ref 5–15)
ANION GAP SERPL CALCULATED.3IONS-SCNC: 9 MMOL/L (ref 5–15)
AST SERPL-CCNC: 134 U/L (ref 1–40)
BASOPHILS # BLD MANUAL: 0 10*3/MM3 (ref 0–0.2)
BASOPHILS NFR BLD AUTO: 0 % (ref 0–1.5)
BILIRUB SERPL-MCNC: 2.7 MG/DL (ref 0–1.2)
BLD GP AB SCN SERPL QL: NEGATIVE
BUN SERPL-MCNC: 11 MG/DL (ref 6–20)
BUN SERPL-MCNC: 12 MG/DL (ref 6–20)
BUN SERPL-MCNC: 13 MG/DL (ref 6–20)
BUN/CREAT SERPL: 14 (ref 7–25)
BUN/CREAT SERPL: 14.7 (ref 7–25)
BUN/CREAT SERPL: 16.3 (ref 7–25)
CALCIUM SPEC-SCNC: 7.2 MG/DL (ref 8.6–10.5)
CALCIUM SPEC-SCNC: 7.8 MG/DL (ref 8.6–10.5)
CALCIUM SPEC-SCNC: 7.9 MG/DL (ref 8.6–10.5)
CHLORIDE SERPL-SCNC: 95 MMOL/L (ref 98–107)
CHLORIDE SERPL-SCNC: 95 MMOL/L (ref 98–107)
CHLORIDE SERPL-SCNC: 98 MMOL/L (ref 98–107)
CHOLEST SERPL-MCNC: 123 MG/DL (ref 0–200)
CO2 SERPL-SCNC: 24 MMOL/L (ref 22–29)
CO2 SERPL-SCNC: 24 MMOL/L (ref 22–29)
CO2 SERPL-SCNC: 25 MMOL/L (ref 22–29)
CREAT SERPL-MCNC: 0.75 MG/DL (ref 0.76–1.27)
CREAT SERPL-MCNC: 0.8 MG/DL (ref 0.76–1.27)
CREAT SERPL-MCNC: 0.86 MG/DL (ref 0.76–1.27)
D-LACTATE SERPL-SCNC: 2.7 MMOL/L (ref 0.5–2)
D-LACTATE SERPL-SCNC: 2.9 MMOL/L (ref 0.5–2)
DEPRECATED RDW RBC AUTO: 42 FL (ref 37–54)
EOSINOPHIL # BLD MANUAL: 0 10*3/MM3 (ref 0–0.4)
EOSINOPHIL NFR BLD MANUAL: 0 % (ref 0.3–6.2)
ERYTHROCYTE [DISTWIDTH] IN BLOOD BY AUTOMATED COUNT: 13.8 % (ref 12.3–15.4)
GFR SERPL CREATININE-BSD FRML MDRD: 100 ML/MIN/1.73
GFR SERPL CREATININE-BSD FRML MDRD: 107 ML/MIN/1.73
GFR SERPL CREATININE-BSD FRML MDRD: 92 ML/MIN/1.73
GLOBULIN UR ELPH-MCNC: 2.9 GM/DL
GLUCOSE BLDC GLUCOMTR-MCNC: 153 MG/DL (ref 70–130)
GLUCOSE BLDC GLUCOMTR-MCNC: 168 MG/DL (ref 70–130)
GLUCOSE BLDC GLUCOMTR-MCNC: 180 MG/DL (ref 70–130)
GLUCOSE BLDC GLUCOMTR-MCNC: 187 MG/DL (ref 70–130)
GLUCOSE BLDC GLUCOMTR-MCNC: 193 MG/DL (ref 70–130)
GLUCOSE BLDC GLUCOMTR-MCNC: 207 MG/DL (ref 70–130)
GLUCOSE BLDC GLUCOMTR-MCNC: 221 MG/DL (ref 70–130)
GLUCOSE BLDC GLUCOMTR-MCNC: 233 MG/DL (ref 70–130)
GLUCOSE BLDC GLUCOMTR-MCNC: 247 MG/DL (ref 70–130)
GLUCOSE BLDC GLUCOMTR-MCNC: 260 MG/DL (ref 70–130)
GLUCOSE BLDC GLUCOMTR-MCNC: 268 MG/DL (ref 70–130)
GLUCOSE BLDC GLUCOMTR-MCNC: 276 MG/DL (ref 70–130)
GLUCOSE BLDC GLUCOMTR-MCNC: 421 MG/DL (ref 70–130)
GLUCOSE BLDC GLUCOMTR-MCNC: 428 MG/DL (ref 70–130)
GLUCOSE BLDC GLUCOMTR-MCNC: 446 MG/DL (ref 70–130)
GLUCOSE SERPL-MCNC: 162 MG/DL (ref 65–99)
GLUCOSE SERPL-MCNC: 321 MG/DL (ref 65–99)
GLUCOSE SERPL-MCNC: 339 MG/DL (ref 65–99)
HCT VFR BLD AUTO: 35.6 % (ref 37.5–51)
HDLC SERPL-MCNC: 28 MG/DL (ref 40–60)
HGB BLD-MCNC: 11.5 G/DL (ref 13–17.7)
INR PPP: 1.14 (ref 0.85–1.16)
LDLC SERPL CALC-MCNC: 64 MG/DL (ref 0–100)
LDLC/HDLC SERPL: 2.09 {RATIO}
LIPASE SERPL-CCNC: 978 U/L (ref 13–60)
LYMPHOCYTES # BLD MANUAL: 0.97 10*3/MM3 (ref 0.7–3.1)
LYMPHOCYTES NFR BLD MANUAL: 13 % (ref 5–12)
LYMPHOCYTES NFR BLD MANUAL: 7 % (ref 19.6–45.3)
MAGNESIUM SERPL-MCNC: 1.6 MG/DL (ref 1.6–2.6)
MAGNESIUM SERPL-MCNC: 1.6 MG/DL (ref 1.6–2.6)
MAGNESIUM SERPL-MCNC: 1.9 MG/DL (ref 1.6–2.6)
MCH RBC QN AUTO: 26.8 PG (ref 26.6–33)
MCHC RBC AUTO-ENTMCNC: 32.3 G/DL (ref 31.5–35.7)
MCV RBC AUTO: 83 FL (ref 79–97)
MONOCYTES # BLD AUTO: 1.8 10*3/MM3 (ref 0.1–0.9)
NEUTROPHILS # BLD AUTO: 11.06 10*3/MM3 (ref 1.7–7)
NEUTROPHILS NFR BLD MANUAL: 67 % (ref 42.7–76)
NEUTS BAND NFR BLD MANUAL: 13 % (ref 0–5)
PHOSPHATE SERPL-MCNC: 1.9 MG/DL (ref 2.5–4.5)
PHOSPHATE SERPL-MCNC: 2 MG/DL (ref 2.5–4.5)
PHOSPHATE SERPL-MCNC: 2 MG/DL (ref 2.5–4.5)
PLAT MORPH BLD: NORMAL
PLATELET # BLD AUTO: 164 10*3/MM3 (ref 140–450)
PMV BLD AUTO: 12.2 FL (ref 6–12)
POTASSIUM SERPL-SCNC: 3.6 MMOL/L (ref 3.5–5.2)
POTASSIUM SERPL-SCNC: 3.8 MMOL/L (ref 3.5–5.2)
POTASSIUM SERPL-SCNC: 3.9 MMOL/L (ref 3.5–5.2)
PROT SERPL-MCNC: 5.6 G/DL (ref 6–8.5)
PROTHROMBIN TIME: 14.3 SECONDS (ref 11.5–14)
RBC # BLD AUTO: 4.29 10*6/MM3 (ref 4.14–5.8)
RBC MORPH BLD: NORMAL
RH BLD: POSITIVE
RH BLD: POSITIVE
SCAN SLIDE: NORMAL
SODIUM SERPL-SCNC: 131 MMOL/L (ref 136–145)
SODIUM SERPL-SCNC: 131 MMOL/L (ref 136–145)
SODIUM SERPL-SCNC: 132 MMOL/L (ref 136–145)
T&S EXPIRATION DATE: NORMAL
TRIGL SERPL-MCNC: 182 MG/DL (ref 0–150)
TSH SERPL DL<=0.05 MIU/L-ACNC: 1.84 UIU/ML (ref 0.27–4.2)
VLDLC SERPL-MCNC: 31 MG/DL (ref 5–40)
WBC # BLD AUTO: 13.83 10*3/MM3 (ref 3.4–10.8)
WBC MORPH BLD: NORMAL

## 2021-01-06 PROCEDURE — 63710000001 INSULIN LISPRO (HUMAN) PER 5 UNITS: Performed by: NURSE PRACTITIONER

## 2021-01-06 PROCEDURE — 25010000002 HYDROMORPHONE PER 4 MG: Performed by: INTERNAL MEDICINE

## 2021-01-06 PROCEDURE — 25010000002 SUCCINYLCHOLINE PER 20 MG: Performed by: NURSE ANESTHETIST, CERTIFIED REGISTERED

## 2021-01-06 PROCEDURE — 70551 MRI BRAIN STEM W/O DYE: CPT

## 2021-01-06 PROCEDURE — 86901 BLOOD TYPING SEROLOGIC RH(D): CPT | Performed by: NURSE PRACTITIONER

## 2021-01-06 PROCEDURE — 25010000003 LIDOCAINE 1 % SOLUTION: Performed by: NURSE ANESTHETIST, CERTIFIED REGISTERED

## 2021-01-06 PROCEDURE — 25010000003 MAGNESIUM SULFATE 4 GM/100ML SOLUTION: Performed by: NURSE PRACTITIONER

## 2021-01-06 PROCEDURE — 25010000002 HYDROMORPHONE PER 4 MG: Performed by: SURGERY

## 2021-01-06 PROCEDURE — 25010000002 PIPERACILLIN SOD-TAZOBACTAM PER 1 G: Performed by: SURGERY

## 2021-01-06 PROCEDURE — 63710000001 INSULIN REGULAR HUMAN PER 5 UNITS: Performed by: NURSE ANESTHETIST, CERTIFIED REGISTERED

## 2021-01-06 PROCEDURE — 76000 FLUOROSCOPY <1 HR PHYS/QHP: CPT

## 2021-01-06 PROCEDURE — 0FT44ZZ RESECTION OF GALLBLADDER, PERCUTANEOUS ENDOSCOPIC APPROACH: ICD-10-PCS | Performed by: SURGERY

## 2021-01-06 PROCEDURE — 82962 GLUCOSE BLOOD TEST: CPT

## 2021-01-06 PROCEDURE — 80050 GENERAL HEALTH PANEL: CPT | Performed by: NURSE PRACTITIONER

## 2021-01-06 PROCEDURE — 83735 ASSAY OF MAGNESIUM: CPT | Performed by: NURSE PRACTITIONER

## 2021-01-06 PROCEDURE — 63710000001 INSULIN REGULAR HUMAN PER 5 UNITS: Performed by: ANESTHESIOLOGY

## 2021-01-06 PROCEDURE — BF101ZZ FLUOROSCOPY OF BILE DUCTS USING LOW OSMOLAR CONTRAST: ICD-10-PCS | Performed by: SURGERY

## 2021-01-06 PROCEDURE — 25010000002 PIPERACILLIN SOD-TAZOBACTAM PER 1 G: Performed by: NURSE PRACTITIONER

## 2021-01-06 PROCEDURE — 25010000002 HYDROMORPHONE PER 4 MG: Performed by: NURSE ANESTHETIST, CERTIFIED REGISTERED

## 2021-01-06 PROCEDURE — 63710000001 INSULIN DETEMIR PER 5 UNITS: Performed by: NURSE PRACTITIONER

## 2021-01-06 PROCEDURE — 85610 PROTHROMBIN TIME: CPT | Performed by: NURSE PRACTITIONER

## 2021-01-06 PROCEDURE — 25010000002 PROPOFOL 10 MG/ML EMULSION: Performed by: NURSE ANESTHETIST, CERTIFIED REGISTERED

## 2021-01-06 PROCEDURE — 99233 SBSQ HOSP IP/OBS HIGH 50: CPT | Performed by: INTERNAL MEDICINE

## 2021-01-06 PROCEDURE — 80048 BASIC METABOLIC PNL TOTAL CA: CPT | Performed by: NURSE PRACTITIONER

## 2021-01-06 PROCEDURE — 84100 ASSAY OF PHOSPHORUS: CPT | Performed by: NURSE PRACTITIONER

## 2021-01-06 PROCEDURE — 25010000003 POTASSIUM CHLORIDE PER 2 MEQ: Performed by: NURSE PRACTITIONER

## 2021-01-06 PROCEDURE — 74181 MRI ABDOMEN W/O CONTRAST: CPT

## 2021-01-06 PROCEDURE — 25010000002 ONDANSETRON PER 1 MG: Performed by: NURSE ANESTHETIST, CERTIFIED REGISTERED

## 2021-01-06 PROCEDURE — 88304 TISSUE EXAM BY PATHOLOGIST: CPT | Performed by: SURGERY

## 2021-01-06 PROCEDURE — 86900 BLOOD TYPING SEROLOGIC ABO: CPT | Performed by: NURSE PRACTITIONER

## 2021-01-06 PROCEDURE — 25010000002 FENTANYL CITRATE (PF) 100 MCG/2ML SOLUTION: Performed by: NURSE ANESTHETIST, CERTIFIED REGISTERED

## 2021-01-06 PROCEDURE — 86850 RBC ANTIBODY SCREEN: CPT | Performed by: NURSE PRACTITIONER

## 2021-01-06 PROCEDURE — 63710000001 INSULIN DETEMIR PER 5 UNITS: Performed by: INTERNAL MEDICINE

## 2021-01-06 PROCEDURE — 83605 ASSAY OF LACTIC ACID: CPT | Performed by: NURSE PRACTITIONER

## 2021-01-06 PROCEDURE — 25010000002 IOPAMIDOL 61 % SOLUTION: Performed by: SURGERY

## 2021-01-06 DEVICE — LIGACLIP 10-M/L, 10MM ENDOSCOPIC ROTATING MULTIPLE CLIP APPLIERS
Type: IMPLANTABLE DEVICE | Site: ABDOMEN | Status: FUNCTIONAL
Brand: LIGACLIP

## 2021-01-06 RX ORDER — HYDROMORPHONE HYDROCHLORIDE 1 MG/ML
0.5 INJECTION, SOLUTION INTRAMUSCULAR; INTRAVENOUS; SUBCUTANEOUS
Status: DISCONTINUED | OUTPATIENT
Start: 2021-01-06 | End: 2021-01-06 | Stop reason: HOSPADM

## 2021-01-06 RX ORDER — CHOLECALCIFEROL (VITAMIN D3) 125 MCG
5 CAPSULE ORAL NIGHTLY PRN
Status: DISCONTINUED | OUTPATIENT
Start: 2021-01-06 | End: 2021-01-10 | Stop reason: HOSPADM

## 2021-01-06 RX ORDER — SODIUM CHLORIDE 9 MG/ML
INJECTION, SOLUTION INTRAVENOUS AS NEEDED
Status: DISCONTINUED | OUTPATIENT
Start: 2021-01-06 | End: 2021-01-06 | Stop reason: HOSPADM

## 2021-01-06 RX ORDER — BUPIVACAINE HYDROCHLORIDE AND EPINEPHRINE 2.5; 5 MG/ML; UG/ML
INJECTION, SOLUTION EPIDURAL; INFILTRATION; INTRACAUDAL; PERINEURAL AS NEEDED
Status: DISCONTINUED | OUTPATIENT
Start: 2021-01-06 | End: 2021-01-06 | Stop reason: HOSPADM

## 2021-01-06 RX ORDER — FENTANYL CITRATE 50 UG/ML
50 INJECTION, SOLUTION INTRAMUSCULAR; INTRAVENOUS
Status: DISCONTINUED | OUTPATIENT
Start: 2021-01-06 | End: 2021-01-06 | Stop reason: HOSPADM

## 2021-01-06 RX ORDER — ROCURONIUM BROMIDE 10 MG/ML
INJECTION, SOLUTION INTRAVENOUS AS NEEDED
Status: DISCONTINUED | OUTPATIENT
Start: 2021-01-06 | End: 2021-01-06 | Stop reason: SURG

## 2021-01-06 RX ORDER — SODIUM CHLORIDE 9 MG/ML
9 INJECTION, SOLUTION INTRAVENOUS CONTINUOUS PRN
Status: DISCONTINUED | OUTPATIENT
Start: 2021-01-06 | End: 2021-01-10 | Stop reason: HOSPADM

## 2021-01-06 RX ORDER — MAGNESIUM SULFATE HEPTAHYDRATE 40 MG/ML
2 INJECTION, SOLUTION INTRAVENOUS AS NEEDED
Status: DISCONTINUED | OUTPATIENT
Start: 2021-01-06 | End: 2021-01-10 | Stop reason: HOSPADM

## 2021-01-06 RX ORDER — ONDANSETRON 2 MG/ML
4 INJECTION INTRAMUSCULAR; INTRAVENOUS EVERY 6 HOURS PRN
Status: DISCONTINUED | OUTPATIENT
Start: 2021-01-06 | End: 2021-01-10 | Stop reason: HOSPADM

## 2021-01-06 RX ORDER — MELATONIN
2000 DAILY
Status: DISCONTINUED | OUTPATIENT
Start: 2021-01-06 | End: 2021-01-10 | Stop reason: HOSPADM

## 2021-01-06 RX ORDER — ONDANSETRON 4 MG/1
4 TABLET, FILM COATED ORAL EVERY 6 HOURS PRN
Status: DISCONTINUED | OUTPATIENT
Start: 2021-01-06 | End: 2021-01-10 | Stop reason: HOSPADM

## 2021-01-06 RX ORDER — FENTANYL CITRATE 50 UG/ML
INJECTION, SOLUTION INTRAMUSCULAR; INTRAVENOUS AS NEEDED
Status: DISCONTINUED | OUTPATIENT
Start: 2021-01-06 | End: 2021-01-06 | Stop reason: SURG

## 2021-01-06 RX ORDER — NICOTINE POLACRILEX 4 MG
15 LOZENGE BUCCAL
Status: DISCONTINUED | OUTPATIENT
Start: 2021-01-06 | End: 2021-01-10 | Stop reason: HOSPADM

## 2021-01-06 RX ORDER — OXYCODONE HYDROCHLORIDE AND ACETAMINOPHEN 5; 325 MG/1; MG/1
2 TABLET ORAL EVERY 4 HOURS PRN
Status: DISCONTINUED | OUTPATIENT
Start: 2021-01-06 | End: 2021-01-10 | Stop reason: HOSPADM

## 2021-01-06 RX ORDER — SODIUM CHLORIDE 9 MG/ML
150 INJECTION, SOLUTION INTRAVENOUS CONTINUOUS
Status: DISCONTINUED | OUTPATIENT
Start: 2021-01-06 | End: 2021-01-06

## 2021-01-06 RX ORDER — PROMETHAZINE HYDROCHLORIDE 25 MG/1
25 SUPPOSITORY RECTAL ONCE AS NEEDED
Status: DISCONTINUED | OUTPATIENT
Start: 2021-01-06 | End: 2021-01-06 | Stop reason: HOSPADM

## 2021-01-06 RX ORDER — METOPROLOL SUCCINATE 50 MG/1
50 TABLET, EXTENDED RELEASE ORAL DAILY
Status: DISCONTINUED | OUTPATIENT
Start: 2021-01-06 | End: 2021-01-10 | Stop reason: HOSPADM

## 2021-01-06 RX ORDER — SODIUM CHLORIDE 0.9 % (FLUSH) 0.9 %
10 SYRINGE (ML) INJECTION EVERY 12 HOURS SCHEDULED
Status: DISCONTINUED | OUTPATIENT
Start: 2021-01-06 | End: 2021-01-10 | Stop reason: HOSPADM

## 2021-01-06 RX ORDER — LORAZEPAM 2 MG/ML
0.5 INJECTION INTRAMUSCULAR ONCE
Status: DISCONTINUED | OUTPATIENT
Start: 2021-01-06 | End: 2021-01-10 | Stop reason: HOSPADM

## 2021-01-06 RX ORDER — ROPINIROLE 0.5 MG/1
0.25 TABLET, FILM COATED ORAL NIGHTLY
Status: DISCONTINUED | OUTPATIENT
Start: 2021-01-06 | End: 2021-01-10 | Stop reason: HOSPADM

## 2021-01-06 RX ORDER — SODIUM CHLORIDE 0.9 % (FLUSH) 0.9 %
3 SYRINGE (ML) INJECTION EVERY 12 HOURS SCHEDULED
Status: DISCONTINUED | OUTPATIENT
Start: 2021-01-06 | End: 2021-01-06 | Stop reason: HOSPADM

## 2021-01-06 RX ORDER — HYDROCODONE BITARTRATE AND ACETAMINOPHEN 5; 325 MG/1; MG/1
1 TABLET ORAL ONCE AS NEEDED
Status: DISCONTINUED | OUTPATIENT
Start: 2021-01-06 | End: 2021-01-06 | Stop reason: HOSPADM

## 2021-01-06 RX ORDER — PROPOFOL 10 MG/ML
VIAL (ML) INTRAVENOUS CONTINUOUS PRN
Status: DISCONTINUED | OUTPATIENT
Start: 2021-01-06 | End: 2021-01-06 | Stop reason: SURG

## 2021-01-06 RX ORDER — PROMETHAZINE HYDROCHLORIDE 25 MG/1
25 TABLET ORAL ONCE AS NEEDED
Status: DISCONTINUED | OUTPATIENT
Start: 2021-01-06 | End: 2021-01-06 | Stop reason: HOSPADM

## 2021-01-06 RX ORDER — SODIUM CHLORIDE 0.9 % (FLUSH) 0.9 %
3-10 SYRINGE (ML) INJECTION AS NEEDED
Status: DISCONTINUED | OUTPATIENT
Start: 2021-01-06 | End: 2021-01-06 | Stop reason: HOSPADM

## 2021-01-06 RX ORDER — FLUTICASONE PROPIONATE 50 MCG
2 SPRAY, SUSPENSION (ML) NASAL DAILY
Status: DISCONTINUED | OUTPATIENT
Start: 2021-01-06 | End: 2021-01-10 | Stop reason: HOSPADM

## 2021-01-06 RX ORDER — DOCUSATE SODIUM 100 MG/1
100 CAPSULE, LIQUID FILLED ORAL 2 TIMES DAILY
Status: DISCONTINUED | OUTPATIENT
Start: 2021-01-06 | End: 2021-01-10 | Stop reason: HOSPADM

## 2021-01-06 RX ORDER — ONDANSETRON 2 MG/ML
4 INJECTION INTRAMUSCULAR; INTRAVENOUS ONCE AS NEEDED
Status: DISCONTINUED | OUTPATIENT
Start: 2021-01-06 | End: 2021-01-06 | Stop reason: HOSPADM

## 2021-01-06 RX ORDER — BISACODYL 10 MG
10 SUPPOSITORY, RECTAL RECTAL DAILY PRN
Status: DISCONTINUED | OUTPATIENT
Start: 2021-01-06 | End: 2021-01-10 | Stop reason: HOSPADM

## 2021-01-06 RX ORDER — MAGNESIUM SULFATE HEPTAHYDRATE 40 MG/ML
4 INJECTION, SOLUTION INTRAVENOUS AS NEEDED
Status: DISCONTINUED | OUTPATIENT
Start: 2021-01-06 | End: 2021-01-10 | Stop reason: HOSPADM

## 2021-01-06 RX ORDER — DROPERIDOL 2.5 MG/ML
0.62 INJECTION, SOLUTION INTRAMUSCULAR; INTRAVENOUS ONCE AS NEEDED
Status: DISCONTINUED | OUTPATIENT
Start: 2021-01-06 | End: 2021-01-06 | Stop reason: HOSPADM

## 2021-01-06 RX ORDER — PANTOPRAZOLE SODIUM 40 MG/1
40 TABLET, DELAYED RELEASE ORAL
Status: DISCONTINUED | OUTPATIENT
Start: 2021-01-06 | End: 2021-01-10 | Stop reason: HOSPADM

## 2021-01-06 RX ORDER — MAGNESIUM HYDROXIDE 1200 MG/15ML
LIQUID ORAL AS NEEDED
Status: DISCONTINUED | OUTPATIENT
Start: 2021-01-06 | End: 2021-01-06 | Stop reason: HOSPADM

## 2021-01-06 RX ORDER — IPRATROPIUM BROMIDE AND ALBUTEROL SULFATE 2.5; .5 MG/3ML; MG/3ML
3 SOLUTION RESPIRATORY (INHALATION) ONCE AS NEEDED
Status: DISCONTINUED | OUTPATIENT
Start: 2021-01-06 | End: 2021-01-06 | Stop reason: HOSPADM

## 2021-01-06 RX ORDER — AMOXICILLIN 250 MG
2 CAPSULE ORAL 2 TIMES DAILY
Status: DISCONTINUED | OUTPATIENT
Start: 2021-01-06 | End: 2021-01-10 | Stop reason: HOSPADM

## 2021-01-06 RX ORDER — LIDOCAINE HYDROCHLORIDE 10 MG/ML
INJECTION, SOLUTION INFILTRATION; PERINEURAL AS NEEDED
Status: DISCONTINUED | OUTPATIENT
Start: 2021-01-06 | End: 2021-01-06 | Stop reason: SURG

## 2021-01-06 RX ORDER — ALBUTEROL SULFATE 2.5 MG/3ML
2.5 SOLUTION RESPIRATORY (INHALATION) EVERY 4 HOURS PRN
Status: DISCONTINUED | OUTPATIENT
Start: 2021-01-06 | End: 2021-01-10 | Stop reason: HOSPADM

## 2021-01-06 RX ORDER — AMITRIPTYLINE HYDROCHLORIDE 50 MG/1
25 TABLET, FILM COATED ORAL NIGHTLY
Status: DISCONTINUED | OUTPATIENT
Start: 2021-01-06 | End: 2021-01-10 | Stop reason: HOSPADM

## 2021-01-06 RX ORDER — SODIUM CHLORIDE 0.9 % (FLUSH) 0.9 %
10 SYRINGE (ML) INJECTION AS NEEDED
Status: DISCONTINUED | OUTPATIENT
Start: 2021-01-06 | End: 2021-01-10 | Stop reason: HOSPADM

## 2021-01-06 RX ORDER — HYDROMORPHONE HYDROCHLORIDE 1 MG/ML
0.5 INJECTION, SOLUTION INTRAMUSCULAR; INTRAVENOUS; SUBCUTANEOUS
Status: DISCONTINUED | OUTPATIENT
Start: 2021-01-06 | End: 2021-01-10 | Stop reason: HOSPADM

## 2021-01-06 RX ORDER — DROPERIDOL 2.5 MG/ML
0.62 INJECTION, SOLUTION INTRAMUSCULAR; INTRAVENOUS AS NEEDED
Status: DISCONTINUED | OUTPATIENT
Start: 2021-01-06 | End: 2021-01-06 | Stop reason: HOSPADM

## 2021-01-06 RX ORDER — POLYETHYLENE GLYCOL 3350 17 G/17G
17 POWDER, FOR SOLUTION ORAL DAILY
Status: DISCONTINUED | OUTPATIENT
Start: 2021-01-06 | End: 2021-01-10 | Stop reason: HOSPADM

## 2021-01-06 RX ORDER — PROPOFOL 10 MG/ML
VIAL (ML) INTRAVENOUS AS NEEDED
Status: DISCONTINUED | OUTPATIENT
Start: 2021-01-06 | End: 2021-01-06 | Stop reason: SURG

## 2021-01-06 RX ORDER — ONDANSETRON 2 MG/ML
INJECTION INTRAMUSCULAR; INTRAVENOUS AS NEEDED
Status: DISCONTINUED | OUTPATIENT
Start: 2021-01-06 | End: 2021-01-06 | Stop reason: SURG

## 2021-01-06 RX ORDER — DEXTROSE MONOHYDRATE 25 G/50ML
25 INJECTION, SOLUTION INTRAVENOUS
Status: DISCONTINUED | OUTPATIENT
Start: 2021-01-06 | End: 2021-01-10 | Stop reason: HOSPADM

## 2021-01-06 RX ORDER — NALOXONE HCL 0.4 MG/ML
0.4 VIAL (ML) INJECTION AS NEEDED
Status: DISCONTINUED | OUTPATIENT
Start: 2021-01-06 | End: 2021-01-06 | Stop reason: HOSPADM

## 2021-01-06 RX ORDER — SUCCINYLCHOLINE CHLORIDE 20 MG/ML
INJECTION INTRAMUSCULAR; INTRAVENOUS AS NEEDED
Status: DISCONTINUED | OUTPATIENT
Start: 2021-01-06 | End: 2021-01-06 | Stop reason: SURG

## 2021-01-06 RX ADMIN — FENTANYL CITRATE 50 MCG: 50 INJECTION, SOLUTION INTRAMUSCULAR; INTRAVENOUS at 17:01

## 2021-01-06 RX ADMIN — DOCUSATE SODIUM 100 MG: 100 CAPSULE ORAL at 20:20

## 2021-01-06 RX ADMIN — MAGNESIUM SULFATE HEPTAHYDRATE 4 G: 40 INJECTION, SOLUTION INTRAVENOUS at 05:02

## 2021-01-06 RX ADMIN — FENTANYL CITRATE 50 MCG: 50 INJECTION, SOLUTION INTRAMUSCULAR; INTRAVENOUS at 16:35

## 2021-01-06 RX ADMIN — METOPROLOL SUCCINATE 50 MG: 50 TABLET, EXTENDED RELEASE ORAL at 10:57

## 2021-01-06 RX ADMIN — SUCCINYLCHOLINE CHLORIDE 120 MG: 20 INJECTION, SOLUTION INTRAMUSCULAR; INTRAVENOUS; PARENTERAL at 16:35

## 2021-01-06 RX ADMIN — SODIUM CHLORIDE, PRESERVATIVE FREE 10 ML: 5 INJECTION INTRAVENOUS at 01:30

## 2021-01-06 RX ADMIN — SODIUM CHLORIDE 150 ML/HR: 9 INJECTION, SOLUTION INTRAVENOUS at 04:58

## 2021-01-06 RX ADMIN — HYDROMORPHONE HYDROCHLORIDE 0.5 MG: 1 INJECTION, SOLUTION INTRAMUSCULAR; INTRAVENOUS; SUBCUTANEOUS at 20:20

## 2021-01-06 RX ADMIN — POTASSIUM CHLORIDE AND SODIUM CHLORIDE 250 ML/HR: 450; 150 INJECTION, SOLUTION INTRAVENOUS at 01:19

## 2021-01-06 RX ADMIN — Medication 2000 UNITS: at 10:57

## 2021-01-06 RX ADMIN — INSULIN DETEMIR 5 UNITS: 100 INJECTION, SOLUTION SUBCUTANEOUS at 05:07

## 2021-01-06 RX ADMIN — ROCURONIUM BROMIDE 10 MG: 10 INJECTION INTRAVENOUS at 16:35

## 2021-01-06 RX ADMIN — Medication 5 MG: at 20:20

## 2021-01-06 RX ADMIN — AMITRIPTYLINE HYDROCHLORIDE 25 MG: 50 TABLET, FILM COATED ORAL at 01:07

## 2021-01-06 RX ADMIN — TAZOBACTAM SODIUM AND PIPERACILLIN SODIUM 3.38 G: 375; 3 INJECTION, SOLUTION INTRAVENOUS at 04:57

## 2021-01-06 RX ADMIN — HYDROMORPHONE HYDROCHLORIDE 0.5 MG: 1 INJECTION, SOLUTION INTRAMUSCULAR; INTRAVENOUS; SUBCUTANEOUS at 17:59

## 2021-01-06 RX ADMIN — INSULIN DETEMIR 10 UNITS: 100 INJECTION, SOLUTION SUBCUTANEOUS at 22:29

## 2021-01-06 RX ADMIN — OXYCODONE AND ACETAMINOPHEN 2 TABLET: 5; 325 TABLET ORAL at 20:20

## 2021-01-06 RX ADMIN — INSULIN HUMAN 5 UNITS: 100 INJECTION, SOLUTION PARENTERAL at 15:18

## 2021-01-06 RX ADMIN — ROPINIROLE HYDROCHLORIDE 0.25 MG: 0.5 TABLET, FILM COATED ORAL at 22:28

## 2021-01-06 RX ADMIN — INSULIN LISPRO 4 UNITS: 100 INJECTION, SOLUTION INTRAVENOUS; SUBCUTANEOUS at 06:44

## 2021-01-06 RX ADMIN — ROCURONIUM BROMIDE 30 MG: 10 INJECTION INTRAVENOUS at 16:44

## 2021-01-06 RX ADMIN — POTASSIUM PHOSPHATE, MONOBASIC AND POTASSIUM PHOSPHATE, DIBASIC 15 MMOL: 224; 236 INJECTION, SOLUTION, CONCENTRATE INTRAVENOUS at 13:33

## 2021-01-06 RX ADMIN — HYDROMORPHONE HYDROCHLORIDE 0.5 MG: 1 INJECTION, SOLUTION INTRAMUSCULAR; INTRAVENOUS; SUBCUTANEOUS at 18:09

## 2021-01-06 RX ADMIN — SODIUM CHLORIDE 1000 ML: 9 INJECTION, SOLUTION INTRAVENOUS at 01:08

## 2021-01-06 RX ADMIN — AMITRIPTYLINE HYDROCHLORIDE 25 MG: 50 TABLET, FILM COATED ORAL at 22:29

## 2021-01-06 RX ADMIN — PROPOFOL 25 MCG/KG/MIN: 10 INJECTION, EMULSION INTRAVENOUS at 16:48

## 2021-01-06 RX ADMIN — TAZOBACTAM SODIUM AND PIPERACILLIN SODIUM 3.38 G: 375; 3 INJECTION, SOLUTION INTRAVENOUS at 13:33

## 2021-01-06 RX ADMIN — ONDANSETRON 4 MG: 2 INJECTION INTRAMUSCULAR; INTRAVENOUS at 17:21

## 2021-01-06 RX ADMIN — LIDOCAINE HYDROCHLORIDE 50 MG: 10 INJECTION, SOLUTION INFILTRATION; PERINEURAL at 16:35

## 2021-01-06 RX ADMIN — ROPINIROLE HYDROCHLORIDE 0.25 MG: 0.5 TABLET, FILM COATED ORAL at 01:07

## 2021-01-06 RX ADMIN — INSULIN HUMAN 5 UNITS: 100 INJECTION, SOLUTION PARENTERAL at 16:42

## 2021-01-06 RX ADMIN — SODIUM CHLORIDE: 9 INJECTION, SOLUTION INTRAVENOUS at 16:29

## 2021-01-06 RX ADMIN — TAZOBACTAM SODIUM AND PIPERACILLIN SODIUM 3.38 G: 375; 3 INJECTION, SOLUTION INTRAVENOUS at 20:20

## 2021-01-06 RX ADMIN — SENNOSIDES AND DOCUSATE SODIUM 2 TABLET: 8.6; 5 TABLET ORAL at 20:20

## 2021-01-06 RX ADMIN — HYDROMORPHONE HYDROCHLORIDE 0.5 MG: 1 INJECTION, SOLUTION INTRAMUSCULAR; INTRAVENOUS; SUBCUTANEOUS at 01:33

## 2021-01-06 RX ADMIN — SUGAMMADEX 500 MG: 100 INJECTION, SOLUTION INTRAVENOUS at 17:29

## 2021-01-06 RX ADMIN — INSULIN HUMAN 5 UNITS: 100 INJECTION, SOLUTION PARENTERAL at 17:16

## 2021-01-06 RX ADMIN — PROPOFOL 150 MG: 10 INJECTION, EMULSION INTRAVENOUS at 16:35

## 2021-01-06 RX ADMIN — PANTOPRAZOLE SODIUM 40 MG: 40 TABLET, DELAYED RELEASE ORAL at 10:57

## 2021-01-07 LAB
ALBUMIN SERPL-MCNC: 2.3 G/DL (ref 3.5–5.2)
ALBUMIN/GLOB SERPL: 0.8 G/DL
ALP SERPL-CCNC: 268 U/L (ref 39–117)
ALT SERPL W P-5'-P-CCNC: 87 U/L (ref 1–41)
ANION GAP SERPL CALCULATED.3IONS-SCNC: 12 MMOL/L (ref 5–15)
AST SERPL-CCNC: 98 U/L (ref 1–40)
BASOPHILS # BLD AUTO: 0.06 10*3/MM3 (ref 0–0.2)
BASOPHILS NFR BLD AUTO: 0.5 % (ref 0–1.5)
BILIRUB SERPL-MCNC: 3.8 MG/DL (ref 0–1.2)
BUN SERPL-MCNC: 18 MG/DL (ref 6–20)
BUN/CREAT SERPL: 23.7 (ref 7–25)
CALCIUM SPEC-SCNC: 7.4 MG/DL (ref 8.6–10.5)
CHLORIDE SERPL-SCNC: 94 MMOL/L (ref 98–107)
CO2 SERPL-SCNC: 19 MMOL/L (ref 22–29)
CREAT SERPL-MCNC: 0.76 MG/DL (ref 0.76–1.27)
DEPRECATED RDW RBC AUTO: 45 FL (ref 37–54)
EOSINOPHIL # BLD AUTO: 0.01 10*3/MM3 (ref 0–0.4)
EOSINOPHIL NFR BLD AUTO: 0.1 % (ref 0.3–6.2)
ERYTHROCYTE [DISTWIDTH] IN BLOOD BY AUTOMATED COUNT: 14.6 % (ref 12.3–15.4)
GFR SERPL CREATININE-BSD FRML MDRD: 106 ML/MIN/1.73
GLOBULIN UR ELPH-MCNC: 3 GM/DL
GLUCOSE BLDC GLUCOMTR-MCNC: 173 MG/DL (ref 70–130)
GLUCOSE BLDC GLUCOMTR-MCNC: 176 MG/DL (ref 70–130)
GLUCOSE BLDC GLUCOMTR-MCNC: 180 MG/DL (ref 70–130)
GLUCOSE BLDC GLUCOMTR-MCNC: 185 MG/DL (ref 70–130)
GLUCOSE BLDC GLUCOMTR-MCNC: 193 MG/DL (ref 70–130)
GLUCOSE BLDC GLUCOMTR-MCNC: 229 MG/DL (ref 70–130)
GLUCOSE BLDC GLUCOMTR-MCNC: 45 MG/DL (ref 70–130)
GLUCOSE SERPL-MCNC: 197 MG/DL (ref 65–99)
HCT VFR BLD AUTO: 36.6 % (ref 37.5–51)
HGB BLD-MCNC: 11.6 G/DL (ref 13–17.7)
IMM GRANULOCYTES # BLD AUTO: 0.12 10*3/MM3 (ref 0–0.05)
IMM GRANULOCYTES NFR BLD AUTO: 0.9 % (ref 0–0.5)
LYMPHOCYTES # BLD AUTO: 0.78 10*3/MM3 (ref 0.7–3.1)
LYMPHOCYTES NFR BLD AUTO: 6 % (ref 19.6–45.3)
MCH RBC QN AUTO: 26.7 PG (ref 26.6–33)
MCHC RBC AUTO-ENTMCNC: 31.7 G/DL (ref 31.5–35.7)
MCV RBC AUTO: 84.1 FL (ref 79–97)
MONOCYTES # BLD AUTO: 1.06 10*3/MM3 (ref 0.1–0.9)
MONOCYTES NFR BLD AUTO: 8.2 % (ref 5–12)
NEUTROPHILS NFR BLD AUTO: 10.89 10*3/MM3 (ref 1.7–7)
NEUTROPHILS NFR BLD AUTO: 84.3 % (ref 42.7–76)
NRBC BLD AUTO-RTO: 0 /100 WBC (ref 0–0.2)
OSMOLALITY SERPL: 273 MOSM/KG (ref 275–295)
OSMOLALITY UR: 883 MOSM/KG (ref 300–1100)
PHOSPHATE SERPL-MCNC: 1.7 MG/DL (ref 2.5–4.5)
PHOSPHATE SERPL-MCNC: 2.3 MG/DL (ref 2.5–4.5)
PLATELET # BLD AUTO: 165 10*3/MM3 (ref 140–450)
PMV BLD AUTO: 12.7 FL (ref 6–12)
POTASSIUM SERPL-SCNC: 3.6 MMOL/L (ref 3.5–5.2)
PROT SERPL-MCNC: 5.3 G/DL (ref 6–8.5)
RBC # BLD AUTO: 4.35 10*6/MM3 (ref 4.14–5.8)
SODIUM SERPL-SCNC: 125 MMOL/L (ref 136–145)
SODIUM UR-SCNC: <20 MMOL/L
WBC # BLD AUTO: 12.92 10*3/MM3 (ref 3.4–10.8)

## 2021-01-07 PROCEDURE — G0108 DIAB MANAGE TRN  PER INDIV: HCPCS

## 2021-01-07 PROCEDURE — 63710000001 INSULIN LISPRO (HUMAN) PER 5 UNITS: Performed by: INTERNAL MEDICINE

## 2021-01-07 PROCEDURE — 25010000002 PIPERACILLIN SOD-TAZOBACTAM PER 1 G: Performed by: SURGERY

## 2021-01-07 PROCEDURE — 82962 GLUCOSE BLOOD TEST: CPT

## 2021-01-07 PROCEDURE — 83935 ASSAY OF URINE OSMOLALITY: CPT | Performed by: INTERNAL MEDICINE

## 2021-01-07 PROCEDURE — 85025 COMPLETE CBC W/AUTO DIFF WBC: CPT | Performed by: INTERNAL MEDICINE

## 2021-01-07 PROCEDURE — 80053 COMPREHEN METABOLIC PANEL: CPT | Performed by: SURGERY

## 2021-01-07 PROCEDURE — 84300 ASSAY OF URINE SODIUM: CPT | Performed by: INTERNAL MEDICINE

## 2021-01-07 PROCEDURE — 63710000001 INSULIN DETEMIR PER 5 UNITS: Performed by: INTERNAL MEDICINE

## 2021-01-07 PROCEDURE — 99233 SBSQ HOSP IP/OBS HIGH 50: CPT | Performed by: INTERNAL MEDICINE

## 2021-01-07 PROCEDURE — 83930 ASSAY OF BLOOD OSMOLALITY: CPT | Performed by: INTERNAL MEDICINE

## 2021-01-07 PROCEDURE — 84100 ASSAY OF PHOSPHORUS: CPT | Performed by: INTERNAL MEDICINE

## 2021-01-07 RX ORDER — SODIUM CHLORIDE 9 MG/ML
100 INJECTION, SOLUTION INTRAVENOUS CONTINUOUS
Status: ACTIVE | OUTPATIENT
Start: 2021-01-07 | End: 2021-01-09

## 2021-01-07 RX ADMIN — INSULIN LISPRO 2 UNITS: 100 INJECTION, SOLUTION INTRAVENOUS; SUBCUTANEOUS at 17:50

## 2021-01-07 RX ADMIN — METOPROLOL SUCCINATE 50 MG: 50 TABLET, EXTENDED RELEASE ORAL at 08:51

## 2021-01-07 RX ADMIN — SENNOSIDES AND DOCUSATE SODIUM 2 TABLET: 8.6; 5 TABLET ORAL at 08:50

## 2021-01-07 RX ADMIN — AMITRIPTYLINE HYDROCHLORIDE 25 MG: 50 TABLET, FILM COATED ORAL at 20:18

## 2021-01-07 RX ADMIN — INSULIN LISPRO 2 UNITS: 100 INJECTION, SOLUTION INTRAVENOUS; SUBCUTANEOUS at 08:50

## 2021-01-07 RX ADMIN — OXYCODONE AND ACETAMINOPHEN 2 TABLET: 5; 325 TABLET ORAL at 17:50

## 2021-01-07 RX ADMIN — POLYETHYLENE GLYCOL 3350 17 G: 17 POWDER, FOR SOLUTION ORAL at 08:50

## 2021-01-07 RX ADMIN — POTASSIUM PHOSPHATE, MONOBASIC AND POTASSIUM PHOSPHATE, DIBASIC 15 MMOL: 224; 236 INJECTION, SOLUTION, CONCENTRATE INTRAVENOUS at 08:49

## 2021-01-07 RX ADMIN — SODIUM CHLORIDE, PRESERVATIVE FREE 10 ML: 5 INJECTION INTRAVENOUS at 08:50

## 2021-01-07 RX ADMIN — TAZOBACTAM SODIUM AND PIPERACILLIN SODIUM 3.38 G: 375; 3 INJECTION, SOLUTION INTRAVENOUS at 03:56

## 2021-01-07 RX ADMIN — OXYCODONE AND ACETAMINOPHEN 2 TABLET: 5; 325 TABLET ORAL at 01:18

## 2021-01-07 RX ADMIN — POTASSIUM PHOSPHATE, MONOBASIC AND POTASSIUM PHOSPHATE, DIBASIC 30 MMOL: 224; 236 INJECTION, SOLUTION, CONCENTRATE INTRAVENOUS at 20:18

## 2021-01-07 RX ADMIN — INSULIN DETEMIR 10 UNITS: 100 INJECTION, SOLUTION SUBCUTANEOUS at 20:42

## 2021-01-07 RX ADMIN — TAZOBACTAM SODIUM AND PIPERACILLIN SODIUM 3.38 G: 375; 3 INJECTION, SOLUTION INTRAVENOUS at 20:42

## 2021-01-07 RX ADMIN — TAZOBACTAM SODIUM AND PIPERACILLIN SODIUM 3.38 G: 375; 3 INJECTION, SOLUTION INTRAVENOUS at 12:28

## 2021-01-07 RX ADMIN — PANTOPRAZOLE SODIUM 40 MG: 40 TABLET, DELAYED RELEASE ORAL at 08:52

## 2021-01-07 RX ADMIN — Medication 5 MG: at 20:18

## 2021-01-07 RX ADMIN — DOCUSATE SODIUM 100 MG: 100 CAPSULE ORAL at 20:17

## 2021-01-07 RX ADMIN — INSULIN LISPRO 2 UNITS: 100 INJECTION, SOLUTION INTRAVENOUS; SUBCUTANEOUS at 12:28

## 2021-01-07 RX ADMIN — INSULIN DETEMIR 10 UNITS: 100 INJECTION, SOLUTION SUBCUTANEOUS at 09:50

## 2021-01-07 RX ADMIN — SENNOSIDES AND DOCUSATE SODIUM 2 TABLET: 8.6; 5 TABLET ORAL at 20:18

## 2021-01-07 RX ADMIN — Medication 2000 UNITS: at 08:49

## 2021-01-07 RX ADMIN — DOCUSATE SODIUM 100 MG: 100 CAPSULE ORAL at 08:51

## 2021-01-07 RX ADMIN — PANTOPRAZOLE SODIUM 40 MG: 40 TABLET, DELAYED RELEASE ORAL at 17:50

## 2021-01-07 RX ADMIN — ROPINIROLE HYDROCHLORIDE 0.25 MG: 0.5 TABLET, FILM COATED ORAL at 20:18

## 2021-01-07 RX ADMIN — SODIUM CHLORIDE 100 ML/HR: 9 INJECTION, SOLUTION INTRAVENOUS at 20:42

## 2021-01-07 RX ADMIN — FLUTICASONE PROPIONATE 2 SPRAY: 50 SPRAY, METERED NASAL at 12:29

## 2021-01-08 PROBLEM — E83.39 HYPOPHOSPHATEMIA: Status: ACTIVE | Noted: 2021-01-08

## 2021-01-08 LAB
ALBUMIN SERPL-MCNC: 2.3 G/DL (ref 3.5–5.2)
ALBUMIN/GLOB SERPL: 0.7 G/DL
ALP SERPL-CCNC: 473 U/L (ref 39–117)
ALT SERPL W P-5'-P-CCNC: 79 U/L (ref 1–41)
ANION GAP SERPL CALCULATED.3IONS-SCNC: 12 MMOL/L (ref 5–15)
AST SERPL-CCNC: 67 U/L (ref 1–40)
BILIRUB CONJ SERPL-MCNC: 6 MG/DL (ref 0–0.3)
BILIRUB INDIRECT SERPL-MCNC: 0.5 MG/DL
BILIRUB SERPL-MCNC: 6.1 MG/DL (ref 0–1.2)
BILIRUB SERPL-MCNC: 6.5 MG/DL (ref 0–1.2)
BUN SERPL-MCNC: 15 MG/DL (ref 6–20)
BUN/CREAT SERPL: 21.4 (ref 7–25)
CALCIUM SPEC-SCNC: 7.7 MG/DL (ref 8.6–10.5)
CHLORIDE SERPL-SCNC: 94 MMOL/L (ref 98–107)
CO2 SERPL-SCNC: 23 MMOL/L (ref 22–29)
CREAT SERPL-MCNC: 0.7 MG/DL (ref 0.76–1.27)
CYTO UR: NORMAL
DEPRECATED RDW RBC AUTO: 47 FL (ref 37–54)
ERYTHROCYTE [DISTWIDTH] IN BLOOD BY AUTOMATED COUNT: 14.6 % (ref 12.3–15.4)
GFR SERPL CREATININE-BSD FRML MDRD: 116 ML/MIN/1.73
GLOBULIN UR ELPH-MCNC: 3.5 GM/DL
GLUCOSE BLDC GLUCOMTR-MCNC: 112 MG/DL (ref 70–130)
GLUCOSE BLDC GLUCOMTR-MCNC: 143 MG/DL (ref 70–130)
GLUCOSE BLDC GLUCOMTR-MCNC: 153 MG/DL (ref 70–130)
GLUCOSE BLDC GLUCOMTR-MCNC: 189 MG/DL (ref 70–130)
GLUCOSE SERPL-MCNC: 163 MG/DL (ref 65–99)
HAV IGM SERPL QL IA: NORMAL
HBV CORE IGM SERPL QL IA: NORMAL
HBV SURFACE AG SERPL QL IA: NORMAL
HCT VFR BLD AUTO: 37.4 % (ref 37.5–51)
HCV AB SER DONR QL: NORMAL
HGB BLD-MCNC: 11.5 G/DL (ref 13–17.7)
LAB AP CASE REPORT: NORMAL
LAB AP CLINICAL INFORMATION: NORMAL
MAGNESIUM SERPL-MCNC: 2.4 MG/DL (ref 1.6–2.6)
MCH RBC QN AUTO: 26.8 PG (ref 26.6–33)
MCHC RBC AUTO-ENTMCNC: 30.7 G/DL (ref 31.5–35.7)
MCV RBC AUTO: 87.2 FL (ref 79–97)
PATH REPORT.FINAL DX SPEC: NORMAL
PATH REPORT.GROSS SPEC: NORMAL
PHOSPHATE SERPL-MCNC: 1.2 MG/DL (ref 2.5–4.5)
PLATELET # BLD AUTO: 185 10*3/MM3 (ref 140–450)
PMV BLD AUTO: 12.2 FL (ref 6–12)
POTASSIUM SERPL-SCNC: 3.3 MMOL/L (ref 3.5–5.2)
PROT SERPL-MCNC: 5.8 G/DL (ref 6–8.5)
RBC # BLD AUTO: 4.29 10*6/MM3 (ref 4.14–5.8)
SODIUM SERPL-SCNC: 129 MMOL/L (ref 136–145)
WBC # BLD AUTO: 12.15 10*3/MM3 (ref 3.4–10.8)

## 2021-01-08 PROCEDURE — 83735 ASSAY OF MAGNESIUM: CPT | Performed by: HOSPITALIST

## 2021-01-08 PROCEDURE — 94660 CPAP INITIATION&MGMT: CPT

## 2021-01-08 PROCEDURE — 63710000001 INSULIN DETEMIR PER 5 UNITS: Performed by: INTERNAL MEDICINE

## 2021-01-08 PROCEDURE — 85027 COMPLETE CBC AUTOMATED: CPT | Performed by: SURGERY

## 2021-01-08 PROCEDURE — 63710000001 INSULIN LISPRO (HUMAN) PER 5 UNITS: Performed by: INTERNAL MEDICINE

## 2021-01-08 PROCEDURE — 25010000002 HEPARIN (PORCINE) PER 1000 UNITS: Performed by: HOSPITALIST

## 2021-01-08 PROCEDURE — 80074 ACUTE HEPATITIS PANEL: CPT | Performed by: SURGERY

## 2021-01-08 PROCEDURE — 94799 UNLISTED PULMONARY SVC/PX: CPT

## 2021-01-08 PROCEDURE — 82247 BILIRUBIN TOTAL: CPT | Performed by: SURGERY

## 2021-01-08 PROCEDURE — 25010000002 PIPERACILLIN SOD-TAZOBACTAM PER 1 G: Performed by: SURGERY

## 2021-01-08 PROCEDURE — 82962 GLUCOSE BLOOD TEST: CPT

## 2021-01-08 PROCEDURE — 82248 BILIRUBIN DIRECT: CPT | Performed by: SURGERY

## 2021-01-08 PROCEDURE — 84100 ASSAY OF PHOSPHORUS: CPT | Performed by: SURGERY

## 2021-01-08 PROCEDURE — 80053 COMPREHEN METABOLIC PANEL: CPT | Performed by: SURGERY

## 2021-01-08 PROCEDURE — 5A09357 ASSISTANCE WITH RESPIRATORY VENTILATION, LESS THAN 24 CONSECUTIVE HOURS, CONTINUOUS POSITIVE AIRWAY PRESSURE: ICD-10-PCS | Performed by: HOSPITALIST

## 2021-01-08 PROCEDURE — 25010000002 ONDANSETRON PER 1 MG: Performed by: SURGERY

## 2021-01-08 PROCEDURE — 99232 SBSQ HOSP IP/OBS MODERATE 35: CPT | Performed by: HOSPITALIST

## 2021-01-08 RX ORDER — OXYCODONE HYDROCHLORIDE AND ACETAMINOPHEN 5; 325 MG/1; MG/1
2 TABLET ORAL EVERY 4 HOURS PRN
Qty: 17 TABLET | Refills: 0 | Status: SHIPPED | OUTPATIENT
Start: 2021-01-08

## 2021-01-08 RX ORDER — HEPARIN SODIUM 5000 [USP'U]/ML
5000 INJECTION, SOLUTION INTRAVENOUS; SUBCUTANEOUS EVERY 12 HOURS SCHEDULED
Status: DISCONTINUED | OUTPATIENT
Start: 2021-01-08 | End: 2021-01-10 | Stop reason: HOSPADM

## 2021-01-08 RX ORDER — POTASSIUM CHLORIDE 750 MG/1
40 CAPSULE, EXTENDED RELEASE ORAL AS NEEDED
Status: DISCONTINUED | OUTPATIENT
Start: 2021-01-08 | End: 2021-01-10 | Stop reason: HOSPADM

## 2021-01-08 RX ORDER — POTASSIUM CHLORIDE 1.5 G/1.77G
40 POWDER, FOR SOLUTION ORAL AS NEEDED
Status: DISCONTINUED | OUTPATIENT
Start: 2021-01-08 | End: 2021-01-10 | Stop reason: HOSPADM

## 2021-01-08 RX ORDER — DOCUSATE SODIUM 100 MG/1
100 CAPSULE, LIQUID FILLED ORAL 2 TIMES DAILY
Qty: 20 CAPSULE | Refills: 0 | Status: SHIPPED | OUTPATIENT
Start: 2021-01-08

## 2021-01-08 RX ADMIN — ROPINIROLE HYDROCHLORIDE 0.25 MG: 0.5 TABLET, FILM COATED ORAL at 20:48

## 2021-01-08 RX ADMIN — Medication 5 MG: at 20:48

## 2021-01-08 RX ADMIN — POTASSIUM PHOSPHATE, MONOBASIC AND POTASSIUM PHOSPHATE, DIBASIC 45 MMOL: 224; 236 INJECTION, SOLUTION, CONCENTRATE INTRAVENOUS at 14:56

## 2021-01-08 RX ADMIN — INSULIN DETEMIR 10 UNITS: 100 INJECTION, SOLUTION SUBCUTANEOUS at 20:49

## 2021-01-08 RX ADMIN — DOCUSATE SODIUM 100 MG: 100 CAPSULE ORAL at 20:49

## 2021-01-08 RX ADMIN — Medication 2000 UNITS: at 09:05

## 2021-01-08 RX ADMIN — INSULIN DETEMIR 10 UNITS: 100 INJECTION, SOLUTION SUBCUTANEOUS at 09:06

## 2021-01-08 RX ADMIN — SODIUM CHLORIDE, PRESERVATIVE FREE 10 ML: 5 INJECTION INTRAVENOUS at 09:16

## 2021-01-08 RX ADMIN — TAZOBACTAM SODIUM AND PIPERACILLIN SODIUM 3.38 G: 375; 3 INJECTION, SOLUTION INTRAVENOUS at 04:15

## 2021-01-08 RX ADMIN — INSULIN LISPRO 2 UNITS: 100 INJECTION, SOLUTION INTRAVENOUS; SUBCUTANEOUS at 12:57

## 2021-01-08 RX ADMIN — POLYETHYLENE GLYCOL 3350 17 G: 17 POWDER, FOR SOLUTION ORAL at 09:04

## 2021-01-08 RX ADMIN — SENNOSIDES AND DOCUSATE SODIUM 2 TABLET: 8.6; 5 TABLET ORAL at 09:04

## 2021-01-08 RX ADMIN — PANTOPRAZOLE SODIUM 40 MG: 40 TABLET, DELAYED RELEASE ORAL at 09:05

## 2021-01-08 RX ADMIN — TAZOBACTAM SODIUM AND PIPERACILLIN SODIUM 3.38 G: 375; 3 INJECTION, SOLUTION INTRAVENOUS at 20:59

## 2021-01-08 RX ADMIN — ONDANSETRON 4 MG: 2 INJECTION INTRAMUSCULAR; INTRAVENOUS at 04:14

## 2021-01-08 RX ADMIN — POTASSIUM CHLORIDE 40 MEQ: 10 CAPSULE, COATED, EXTENDED RELEASE ORAL at 20:49

## 2021-01-08 RX ADMIN — AMITRIPTYLINE HYDROCHLORIDE 25 MG: 50 TABLET, FILM COATED ORAL at 20:49

## 2021-01-08 RX ADMIN — HEPARIN SODIUM 5000 UNITS: 5000 INJECTION INTRAVENOUS; SUBCUTANEOUS at 20:59

## 2021-01-08 RX ADMIN — TAZOBACTAM SODIUM AND PIPERACILLIN SODIUM 3.38 G: 375; 3 INJECTION, SOLUTION INTRAVENOUS at 14:42

## 2021-01-08 RX ADMIN — POTASSIUM CHLORIDE 40 MEQ: 10 CAPSULE, COATED, EXTENDED RELEASE ORAL at 14:47

## 2021-01-08 RX ADMIN — OXYCODONE AND ACETAMINOPHEN 2 TABLET: 5; 325 TABLET ORAL at 11:03

## 2021-01-08 RX ADMIN — SENNOSIDES AND DOCUSATE SODIUM 2 TABLET: 8.6; 5 TABLET ORAL at 20:48

## 2021-01-08 RX ADMIN — DOCUSATE SODIUM 100 MG: 100 CAPSULE ORAL at 09:05

## 2021-01-08 RX ADMIN — OXYCODONE AND ACETAMINOPHEN 2 TABLET: 5; 325 TABLET ORAL at 20:48

## 2021-01-08 RX ADMIN — METOPROLOL SUCCINATE 50 MG: 50 TABLET, EXTENDED RELEASE ORAL at 09:05

## 2021-01-08 RX ADMIN — SODIUM CHLORIDE 100 ML/HR: 9 INJECTION, SOLUTION INTRAVENOUS at 06:34

## 2021-01-09 ENCOUNTER — APPOINTMENT (OUTPATIENT)
Dept: GENERAL RADIOLOGY | Facility: HOSPITAL | Age: 56
End: 2021-01-09

## 2021-01-09 ENCOUNTER — ANESTHESIA (OUTPATIENT)
Dept: GASTROENTEROLOGY | Facility: HOSPITAL | Age: 56
End: 2021-01-09

## 2021-01-09 ENCOUNTER — ANESTHESIA EVENT (OUTPATIENT)
Dept: GASTROENTEROLOGY | Facility: HOSPITAL | Age: 56
End: 2021-01-09

## 2021-01-09 PROBLEM — E80.6 HYPERBILIRUBINEMIA: Status: ACTIVE | Noted: 2021-01-05

## 2021-01-09 PROBLEM — Z90.49 STATUS POST CHOLECYSTECTOMY: Status: ACTIVE | Noted: 2021-01-05

## 2021-01-09 PROBLEM — E16.2 HYPOGLYCEMIA: Status: ACTIVE | Noted: 2021-01-09

## 2021-01-09 LAB
ALBUMIN SERPL-MCNC: 1.9 G/DL (ref 3.5–5.2)
ALBUMIN/GLOB SERPL: 0.5 G/DL
ALP SERPL-CCNC: 651 U/L (ref 39–117)
ALT SERPL W P-5'-P-CCNC: 68 U/L (ref 1–41)
ANION GAP SERPL CALCULATED.3IONS-SCNC: 8 MMOL/L (ref 5–15)
AST SERPL-CCNC: 78 U/L (ref 1–40)
BILIRUB SERPL-MCNC: 7 MG/DL (ref 0–1.2)
BUN SERPL-MCNC: 9 MG/DL (ref 6–20)
BUN/CREAT SERPL: 21.4 (ref 7–25)
CALCIUM SPEC-SCNC: 7.3 MG/DL (ref 8.6–10.5)
CHLORIDE SERPL-SCNC: 98 MMOL/L (ref 98–107)
CO2 SERPL-SCNC: 23 MMOL/L (ref 22–29)
CREAT SERPL-MCNC: 0.42 MG/DL (ref 0.76–1.27)
DEPRECATED RDW RBC AUTO: 44.5 FL (ref 37–54)
ERYTHROCYTE [DISTWIDTH] IN BLOOD BY AUTOMATED COUNT: 14.5 % (ref 12.3–15.4)
GFR SERPL CREATININE-BSD FRML MDRD: >150 ML/MIN/1.73
GLOBULIN UR ELPH-MCNC: 3.5 GM/DL
GLUCOSE BLDC GLUCOMTR-MCNC: 109 MG/DL (ref 70–130)
GLUCOSE BLDC GLUCOMTR-MCNC: 110 MG/DL (ref 70–130)
GLUCOSE BLDC GLUCOMTR-MCNC: 131 MG/DL (ref 70–130)
GLUCOSE BLDC GLUCOMTR-MCNC: 139 MG/DL (ref 70–130)
GLUCOSE BLDC GLUCOMTR-MCNC: 255 MG/DL (ref 70–130)
GLUCOSE BLDC GLUCOMTR-MCNC: 48 MG/DL (ref 70–130)
GLUCOSE SERPL-MCNC: 116 MG/DL (ref 65–99)
HCT VFR BLD AUTO: 35.7 % (ref 37.5–51)
HGB BLD-MCNC: 11.4 G/DL (ref 13–17.7)
MCH RBC QN AUTO: 27 PG (ref 26.6–33)
MCHC RBC AUTO-ENTMCNC: 31.9 G/DL (ref 31.5–35.7)
MCV RBC AUTO: 84.4 FL (ref 79–97)
PHOSPHATE SERPL-MCNC: 1.8 MG/DL (ref 2.5–4.5)
PHOSPHATE SERPL-MCNC: 2.5 MG/DL (ref 2.5–4.5)
PLATELET # BLD AUTO: 233 10*3/MM3 (ref 140–450)
PMV BLD AUTO: 11.9 FL (ref 6–12)
POTASSIUM SERPL-SCNC: 4.6 MMOL/L (ref 3.5–5.2)
POTASSIUM SERPL-SCNC: 4.8 MMOL/L (ref 3.5–5.2)
PROT SERPL-MCNC: 5.4 G/DL (ref 6–8.5)
QT INTERVAL: 318 MS
QTC INTERVAL: 451 MS
RBC # BLD AUTO: 4.23 10*6/MM3 (ref 4.14–5.8)
SODIUM SERPL-SCNC: 129 MMOL/L (ref 136–145)
WBC # BLD AUTO: 9.67 10*3/MM3 (ref 3.4–10.8)

## 2021-01-09 PROCEDURE — 84132 ASSAY OF SERUM POTASSIUM: CPT | Performed by: HOSPITALIST

## 2021-01-09 PROCEDURE — 74330 X-RAY BILE/PANC ENDOSCOPY: CPT

## 2021-01-09 PROCEDURE — 99232 SBSQ HOSP IP/OBS MODERATE 35: CPT | Performed by: HOSPITALIST

## 2021-01-09 PROCEDURE — 25010000002 PIPERACILLIN SOD-TAZOBACTAM PER 1 G: Performed by: SURGERY

## 2021-01-09 PROCEDURE — 25010000002 ONDANSETRON PER 1 MG: Performed by: NURSE ANESTHETIST, CERTIFIED REGISTERED

## 2021-01-09 PROCEDURE — 43262 ENDO CHOLANGIOPANCREATOGRAPH: CPT | Performed by: INTERNAL MEDICINE

## 2021-01-09 PROCEDURE — 85027 COMPLETE CBC AUTOMATED: CPT | Performed by: SURGERY

## 2021-01-09 PROCEDURE — 25010000002 PIPERACILLIN SOD-TAZOBACTAM PER 1 G: Performed by: INTERNAL MEDICINE

## 2021-01-09 PROCEDURE — 84100 ASSAY OF PHOSPHORUS: CPT | Performed by: HOSPITALIST

## 2021-01-09 PROCEDURE — 94799 UNLISTED PULMONARY SVC/PX: CPT

## 2021-01-09 PROCEDURE — 63710000001 INSULIN LISPRO (HUMAN) PER 5 UNITS: Performed by: NURSE ANESTHETIST, CERTIFIED REGISTERED

## 2021-01-09 PROCEDURE — 80053 COMPREHEN METABOLIC PANEL: CPT | Performed by: SURGERY

## 2021-01-09 PROCEDURE — 94660 CPAP INITIATION&MGMT: CPT

## 2021-01-09 PROCEDURE — 25010000002 DEXAMETHASONE PER 1 MG: Performed by: NURSE ANESTHETIST, CERTIFIED REGISTERED

## 2021-01-09 PROCEDURE — 43264 ERCP REMOVE DUCT CALCULI: CPT | Performed by: INTERNAL MEDICINE

## 2021-01-09 PROCEDURE — 25010000002 NEOSTIGMINE 10 MG/10ML SOLUTION: Performed by: NURSE ANESTHETIST, CERTIFIED REGISTERED

## 2021-01-09 PROCEDURE — 82962 GLUCOSE BLOOD TEST: CPT

## 2021-01-09 PROCEDURE — 0F798ZZ DILATION OF COMMON BILE DUCT, VIA NATURAL OR ARTIFICIAL OPENING ENDOSCOPIC: ICD-10-PCS | Performed by: INTERNAL MEDICINE

## 2021-01-09 PROCEDURE — 25010000002 FENTANYL CITRATE (PF) 100 MCG/2ML SOLUTION: Performed by: NURSE ANESTHETIST, CERTIFIED REGISTERED

## 2021-01-09 PROCEDURE — 25010000003 LIDOCAINE 1 % SOLUTION: Performed by: NURSE ANESTHETIST, CERTIFIED REGISTERED

## 2021-01-09 PROCEDURE — 63710000001 INSULIN LISPRO (HUMAN) PER 5 UNITS: Performed by: INTERNAL MEDICINE

## 2021-01-09 PROCEDURE — 25010000002 HEPARIN (PORCINE) PER 1000 UNITS: Performed by: INTERNAL MEDICINE

## 2021-01-09 PROCEDURE — 63710000001 INSULIN DETEMIR PER 5 UNITS: Performed by: INTERNAL MEDICINE

## 2021-01-09 PROCEDURE — 84100 ASSAY OF PHOSPHORUS: CPT | Performed by: INTERNAL MEDICINE

## 2021-01-09 PROCEDURE — 0FC98ZZ EXTIRPATION OF MATTER FROM COMMON BILE DUCT, VIA NATURAL OR ARTIFICIAL OPENING ENDOSCOPIC: ICD-10-PCS | Performed by: INTERNAL MEDICINE

## 2021-01-09 PROCEDURE — 99223 1ST HOSP IP/OBS HIGH 75: CPT | Performed by: NURSE PRACTITIONER

## 2021-01-09 PROCEDURE — 25010000002 PROPOFOL 10 MG/ML EMULSION: Performed by: NURSE ANESTHETIST, CERTIFIED REGISTERED

## 2021-01-09 PROCEDURE — C1769 GUIDE WIRE: HCPCS | Performed by: INTERNAL MEDICINE

## 2021-01-09 RX ORDER — LIDOCAINE HYDROCHLORIDE 10 MG/ML
INJECTION, SOLUTION INFILTRATION; PERINEURAL AS NEEDED
Status: DISCONTINUED | OUTPATIENT
Start: 2021-01-09 | End: 2021-01-09 | Stop reason: SURG

## 2021-01-09 RX ORDER — GLYCOPYRROLATE 0.2 MG/ML
INJECTION INTRAMUSCULAR; INTRAVENOUS AS NEEDED
Status: DISCONTINUED | OUTPATIENT
Start: 2021-01-09 | End: 2021-01-09 | Stop reason: SURG

## 2021-01-09 RX ORDER — ONDANSETRON 2 MG/ML
4 INJECTION INTRAMUSCULAR; INTRAVENOUS ONCE AS NEEDED
Status: DISCONTINUED | OUTPATIENT
Start: 2021-01-09 | End: 2021-01-09 | Stop reason: HOSPADM

## 2021-01-09 RX ORDER — PROMETHAZINE HYDROCHLORIDE 25 MG/1
25 SUPPOSITORY RECTAL ONCE AS NEEDED
Status: DISCONTINUED | OUTPATIENT
Start: 2021-01-09 | End: 2021-01-09 | Stop reason: HOSPADM

## 2021-01-09 RX ORDER — PROPOFOL 10 MG/ML
VIAL (ML) INTRAVENOUS AS NEEDED
Status: DISCONTINUED | OUTPATIENT
Start: 2021-01-09 | End: 2021-01-09 | Stop reason: SURG

## 2021-01-09 RX ORDER — PROMETHAZINE HYDROCHLORIDE 25 MG/1
25 TABLET ORAL ONCE AS NEEDED
Status: DISCONTINUED | OUTPATIENT
Start: 2021-01-09 | End: 2021-01-09 | Stop reason: HOSPADM

## 2021-01-09 RX ORDER — ROCURONIUM BROMIDE 10 MG/ML
INJECTION, SOLUTION INTRAVENOUS AS NEEDED
Status: DISCONTINUED | OUTPATIENT
Start: 2021-01-09 | End: 2021-01-09 | Stop reason: SURG

## 2021-01-09 RX ORDER — FENTANYL CITRATE 50 UG/ML
50 INJECTION, SOLUTION INTRAMUSCULAR; INTRAVENOUS
Status: DISCONTINUED | OUTPATIENT
Start: 2021-01-09 | End: 2021-01-09 | Stop reason: HOSPADM

## 2021-01-09 RX ORDER — NEOSTIGMINE METHYLSULFATE 1 MG/ML
INJECTION, SOLUTION INTRAVENOUS AS NEEDED
Status: DISCONTINUED | OUTPATIENT
Start: 2021-01-09 | End: 2021-01-09 | Stop reason: SURG

## 2021-01-09 RX ORDER — ONDANSETRON 2 MG/ML
INJECTION INTRAMUSCULAR; INTRAVENOUS AS NEEDED
Status: DISCONTINUED | OUTPATIENT
Start: 2021-01-09 | End: 2021-01-09 | Stop reason: SURG

## 2021-01-09 RX ORDER — SODIUM CHLORIDE, SODIUM LACTATE, POTASSIUM CHLORIDE, CALCIUM CHLORIDE 600; 310; 30; 20 MG/100ML; MG/100ML; MG/100ML; MG/100ML
30 INJECTION, SOLUTION INTRAVENOUS CONTINUOUS PRN
Status: CANCELLED | OUTPATIENT
Start: 2021-01-09

## 2021-01-09 RX ORDER — DEXAMETHASONE SODIUM PHOSPHATE 4 MG/ML
INJECTION, SOLUTION INTRA-ARTICULAR; INTRALESIONAL; INTRAMUSCULAR; INTRAVENOUS; SOFT TISSUE AS NEEDED
Status: DISCONTINUED | OUTPATIENT
Start: 2021-01-09 | End: 2021-01-09 | Stop reason: SURG

## 2021-01-09 RX ADMIN — SODIUM CHLORIDE, PRESERVATIVE FREE 10 ML: 5 INJECTION INTRAVENOUS at 20:49

## 2021-01-09 RX ADMIN — PANTOPRAZOLE SODIUM 40 MG: 40 TABLET, DELAYED RELEASE ORAL at 08:14

## 2021-01-09 RX ADMIN — SODIUM CHLORIDE 100 ML/HR: 9 INJECTION, SOLUTION INTRAVENOUS at 05:50

## 2021-01-09 RX ADMIN — FENTANYL CITRATE 50 MCG: 0.05 INJECTION, SOLUTION INTRAMUSCULAR; INTRAVENOUS at 13:49

## 2021-01-09 RX ADMIN — FENTANYL CITRATE 50 MCG: 0.05 INJECTION, SOLUTION INTRAMUSCULAR; INTRAVENOUS at 14:10

## 2021-01-09 RX ADMIN — ONDANSETRON 4 MG: 2 INJECTION INTRAMUSCULAR; INTRAVENOUS at 12:26

## 2021-01-09 RX ADMIN — SENNOSIDES AND DOCUSATE SODIUM 2 TABLET: 8.6; 5 TABLET ORAL at 08:14

## 2021-01-09 RX ADMIN — LIDOCAINE HYDROCHLORIDE 50 MG: 10 INJECTION, SOLUTION INFILTRATION; PERINEURAL at 12:14

## 2021-01-09 RX ADMIN — NEOSTIGMINE 5 MG: 1 INJECTION INTRAVENOUS at 13:20

## 2021-01-09 RX ADMIN — DOCUSATE SODIUM 100 MG: 100 CAPSULE ORAL at 08:14

## 2021-01-09 RX ADMIN — ROCURONIUM BROMIDE 30 MG: 10 INJECTION INTRAVENOUS at 12:14

## 2021-01-09 RX ADMIN — SODIUM PHOSPHATE, MONOBASIC, MONOHYDRATE 15 MMOL: 276; 142 INJECTION, SOLUTION INTRAVENOUS at 10:33

## 2021-01-09 RX ADMIN — SODIUM CHLORIDE, PRESERVATIVE FREE 10 ML: 5 INJECTION INTRAVENOUS at 08:18

## 2021-01-09 RX ADMIN — Medication 5 MG: at 20:51

## 2021-01-09 RX ADMIN — SENNOSIDES AND DOCUSATE SODIUM 2 TABLET: 8.6; 5 TABLET ORAL at 20:49

## 2021-01-09 RX ADMIN — OXYCODONE AND ACETAMINOPHEN 2 TABLET: 5; 325 TABLET ORAL at 20:49

## 2021-01-09 RX ADMIN — GLYCOPYRROLATE 0.8 MG: 0.2 INJECTION INTRAMUSCULAR; INTRAVENOUS at 13:20

## 2021-01-09 RX ADMIN — METOPROLOL SUCCINATE 50 MG: 50 TABLET, EXTENDED RELEASE ORAL at 08:14

## 2021-01-09 RX ADMIN — TAZOBACTAM SODIUM AND PIPERACILLIN SODIUM 3.38 G: 375; 3 INJECTION, SOLUTION INTRAVENOUS at 19:56

## 2021-01-09 RX ADMIN — PANTOPRAZOLE SODIUM 40 MG: 40 TABLET, DELAYED RELEASE ORAL at 17:41

## 2021-01-09 RX ADMIN — TAZOBACTAM SODIUM AND PIPERACILLIN SODIUM 3.38 G: 375; 3 INJECTION, SOLUTION INTRAVENOUS at 03:14

## 2021-01-09 RX ADMIN — TAZOBACTAM SODIUM AND PIPERACILLIN SODIUM 3.38 G: 375; 3 INJECTION, SOLUTION INTRAVENOUS at 14:30

## 2021-01-09 RX ADMIN — POLYETHYLENE GLYCOL 3350 17 G: 17 POWDER, FOR SOLUTION ORAL at 08:14

## 2021-01-09 RX ADMIN — FLUTICASONE PROPIONATE 2 SPRAY: 50 SPRAY, METERED NASAL at 08:14

## 2021-01-09 RX ADMIN — EPHEDRINE SULFATE 10 MG: 50 INJECTION, SOLUTION INTRAVENOUS at 12:23

## 2021-01-09 RX ADMIN — OXYCODONE AND ACETAMINOPHEN 2 TABLET: 5; 325 TABLET ORAL at 08:14

## 2021-01-09 RX ADMIN — SODIUM CHLORIDE: 9 INJECTION, SOLUTION INTRAVENOUS at 12:09

## 2021-01-09 RX ADMIN — AMITRIPTYLINE HYDROCHLORIDE 25 MG: 50 TABLET, FILM COATED ORAL at 20:50

## 2021-01-09 RX ADMIN — PROPOFOL 150 MG: 10 INJECTION, EMULSION INTRAVENOUS at 12:14

## 2021-01-09 RX ADMIN — SODIUM CHLORIDE 100 ML/HR: 9 INJECTION, SOLUTION INTRAVENOUS at 18:28

## 2021-01-09 RX ADMIN — DOCUSATE SODIUM 100 MG: 100 CAPSULE ORAL at 20:50

## 2021-01-09 RX ADMIN — DEXAMETHASONE SODIUM PHOSPHATE 8 MG: 4 INJECTION, SOLUTION INTRAMUSCULAR; INTRAVENOUS at 12:26

## 2021-01-09 RX ADMIN — Medication 2000 UNITS: at 08:14

## 2021-01-09 RX ADMIN — HEPARIN SODIUM 5000 UNITS: 5000 INJECTION INTRAVENOUS; SUBCUTANEOUS at 20:50

## 2021-01-09 RX ADMIN — ROCURONIUM BROMIDE 10 MG: 10 INJECTION INTRAVENOUS at 13:10

## 2021-01-09 RX ADMIN — INSULIN DETEMIR 10 UNITS: 100 INJECTION, SOLUTION SUBCUTANEOUS at 10:33

## 2021-01-09 RX ADMIN — SODIUM CHLORIDE 1000 ML: 9 INJECTION, SOLUTION INTRAVENOUS at 21:44

## 2021-01-09 RX ADMIN — INSULIN LISPRO 4 UNITS: 100 INJECTION, SOLUTION INTRAVENOUS; SUBCUTANEOUS at 20:50

## 2021-01-09 RX ADMIN — ROPINIROLE HYDROCHLORIDE 0.25 MG: 0.5 TABLET, FILM COATED ORAL at 20:50

## 2021-01-10 VITALS
OXYGEN SATURATION: 96 % | HEIGHT: 64 IN | BODY MASS INDEX: 32.44 KG/M2 | RESPIRATION RATE: 18 BRPM | HEART RATE: 85 BPM | WEIGHT: 190 LBS | TEMPERATURE: 97.9 F | SYSTOLIC BLOOD PRESSURE: 142 MMHG | DIASTOLIC BLOOD PRESSURE: 85 MMHG

## 2021-01-10 LAB
ALBUMIN SERPL-MCNC: 2 G/DL (ref 3.5–5.2)
ALBUMIN/GLOB SERPL: 0.7 G/DL
ALP SERPL-CCNC: 762 U/L (ref 39–117)
ALT SERPL W P-5'-P-CCNC: 63 U/L (ref 1–41)
ANION GAP SERPL CALCULATED.3IONS-SCNC: 10 MMOL/L (ref 5–15)
AST SERPL-CCNC: 67 U/L (ref 1–40)
BILIRUB CONJ SERPL-MCNC: 6 MG/DL (ref 0–0.3)
BILIRUB SERPL-MCNC: 6.5 MG/DL (ref 0–1.2)
BUN SERPL-MCNC: 10 MG/DL (ref 6–20)
BUN/CREAT SERPL: 15.6 (ref 7–25)
CALCIUM SPEC-SCNC: 7.1 MG/DL (ref 8.6–10.5)
CHLORIDE SERPL-SCNC: 99 MMOL/L (ref 98–107)
CO2 SERPL-SCNC: 23 MMOL/L (ref 22–29)
CREAT SERPL-MCNC: 0.64 MG/DL (ref 0.76–1.27)
D-LACTATE SERPL-SCNC: 0.8 MMOL/L (ref 0.5–2)
DEPRECATED RDW RBC AUTO: 45.8 FL (ref 37–54)
ERYTHROCYTE [DISTWIDTH] IN BLOOD BY AUTOMATED COUNT: 14.5 % (ref 12.3–15.4)
GFR SERPL CREATININE-BSD FRML MDRD: 129 ML/MIN/1.73
GLOBULIN UR ELPH-MCNC: 2.8 GM/DL
GLUCOSE BLDC GLUCOMTR-MCNC: 181 MG/DL (ref 70–130)
GLUCOSE BLDC GLUCOMTR-MCNC: 184 MG/DL (ref 70–130)
GLUCOSE BLDC GLUCOMTR-MCNC: 213 MG/DL (ref 70–130)
GLUCOSE SERPL-MCNC: 206 MG/DL (ref 65–99)
HCT VFR BLD AUTO: 35.4 % (ref 37.5–51)
HGB BLD-MCNC: 11 G/DL (ref 13–17.7)
LIPASE SERPL-CCNC: 13 U/L (ref 13–60)
MCH RBC QN AUTO: 26.9 PG (ref 26.6–33)
MCHC RBC AUTO-ENTMCNC: 31.1 G/DL (ref 31.5–35.7)
MCV RBC AUTO: 86.6 FL (ref 79–97)
PHOSPHATE SERPL-MCNC: 2.4 MG/DL (ref 2.5–4.5)
PLATELET # BLD AUTO: 287 10*3/MM3 (ref 140–450)
PMV BLD AUTO: 11.6 FL (ref 6–12)
POTASSIUM SERPL-SCNC: 5.6 MMOL/L (ref 3.5–5.2)
PROT SERPL-MCNC: 4.8 G/DL (ref 6–8.5)
RBC # BLD AUTO: 4.09 10*6/MM3 (ref 4.14–5.8)
SODIUM SERPL-SCNC: 132 MMOL/L (ref 136–145)
WBC # BLD AUTO: 11.13 10*3/MM3 (ref 3.4–10.8)

## 2021-01-10 PROCEDURE — 25010000002 HEPARIN (PORCINE) PER 1000 UNITS: Performed by: INTERNAL MEDICINE

## 2021-01-10 PROCEDURE — 82248 BILIRUBIN DIRECT: CPT | Performed by: INTERNAL MEDICINE

## 2021-01-10 PROCEDURE — 84100 ASSAY OF PHOSPHORUS: CPT | Performed by: INTERNAL MEDICINE

## 2021-01-10 PROCEDURE — 63710000001 INSULIN LISPRO (HUMAN) PER 5 UNITS: Performed by: INTERNAL MEDICINE

## 2021-01-10 PROCEDURE — 83605 ASSAY OF LACTIC ACID: CPT | Performed by: HOSPITALIST

## 2021-01-10 PROCEDURE — 85027 COMPLETE CBC AUTOMATED: CPT | Performed by: INTERNAL MEDICINE

## 2021-01-10 PROCEDURE — 83690 ASSAY OF LIPASE: CPT | Performed by: INTERNAL MEDICINE

## 2021-01-10 PROCEDURE — 82962 GLUCOSE BLOOD TEST: CPT

## 2021-01-10 PROCEDURE — 99239 HOSP IP/OBS DSCHRG MGMT >30: CPT | Performed by: HOSPITALIST

## 2021-01-10 PROCEDURE — 99232 SBSQ HOSP IP/OBS MODERATE 35: CPT | Performed by: INTERNAL MEDICINE

## 2021-01-10 PROCEDURE — 25010000002 PIPERACILLIN SOD-TAZOBACTAM PER 1 G: Performed by: INTERNAL MEDICINE

## 2021-01-10 PROCEDURE — 80053 COMPREHEN METABOLIC PANEL: CPT | Performed by: INTERNAL MEDICINE

## 2021-01-10 RX ORDER — OXYCODONE HYDROCHLORIDE AND ACETAMINOPHEN 5; 325 MG/1; MG/1
2 TABLET ORAL EVERY 4 HOURS PRN
Qty: 12 TABLET | Refills: 0 | Status: SHIPPED | OUTPATIENT
Start: 2021-01-10 | End: 2021-01-22 | Stop reason: HOSPADM

## 2021-01-10 RX ORDER — DOCUSATE SODIUM 100 MG/1
100 CAPSULE, LIQUID FILLED ORAL 2 TIMES DAILY
Qty: 20 CAPSULE | Refills: 0 | Status: ON HOLD | OUTPATIENT
Start: 2021-01-10 | End: 2021-01-20 | Stop reason: SDUPTHER

## 2021-01-10 RX ADMIN — HEPARIN SODIUM 5000 UNITS: 5000 INJECTION INTRAVENOUS; SUBCUTANEOUS at 09:13

## 2021-01-10 RX ADMIN — Medication 2000 UNITS: at 09:13

## 2021-01-10 RX ADMIN — SODIUM PHOSPHATE, MONOBASIC, MONOHYDRATE 15 MMOL: 276; 142 INJECTION, SOLUTION INTRAVENOUS at 12:11

## 2021-01-10 RX ADMIN — SODIUM CHLORIDE, PRESERVATIVE FREE 10 ML: 5 INJECTION INTRAVENOUS at 09:14

## 2021-01-10 RX ADMIN — METOPROLOL SUCCINATE 50 MG: 50 TABLET, EXTENDED RELEASE ORAL at 09:13

## 2021-01-10 RX ADMIN — INSULIN LISPRO 2 UNITS: 100 INJECTION, SOLUTION INTRAVENOUS; SUBCUTANEOUS at 12:10

## 2021-01-10 RX ADMIN — PANTOPRAZOLE SODIUM 40 MG: 40 TABLET, DELAYED RELEASE ORAL at 09:13

## 2021-01-10 RX ADMIN — DOCUSATE SODIUM 100 MG: 100 CAPSULE ORAL at 09:13

## 2021-01-10 RX ADMIN — TAZOBACTAM SODIUM AND PIPERACILLIN SODIUM 3.38 G: 375; 3 INJECTION, SOLUTION INTRAVENOUS at 12:10

## 2021-01-10 RX ADMIN — FLUTICASONE PROPIONATE 2 SPRAY: 50 SPRAY, METERED NASAL at 09:13

## 2021-01-10 RX ADMIN — SENNOSIDES AND DOCUSATE SODIUM 2 TABLET: 8.6; 5 TABLET ORAL at 09:13

## 2021-01-10 RX ADMIN — POLYETHYLENE GLYCOL 3350 17 G: 17 POWDER, FOR SOLUTION ORAL at 09:13

## 2021-01-10 RX ADMIN — INSULIN LISPRO 2 UNITS: 100 INJECTION, SOLUTION INTRAVENOUS; SUBCUTANEOUS at 09:14

## 2021-01-10 RX ADMIN — SODIUM ZIRCONIUM CYCLOSILICATE 10 G: 10 POWDER, FOR SUSPENSION ORAL at 17:54

## 2021-01-10 RX ADMIN — OXYCODONE AND ACETAMINOPHEN 2 TABLET: 5; 325 TABLET ORAL at 09:13

## 2021-01-10 RX ADMIN — TAZOBACTAM SODIUM AND PIPERACILLIN SODIUM 3.38 G: 375; 3 INJECTION, SOLUTION INTRAVENOUS at 02:57

## 2021-01-11 ENCOUNTER — READMISSION MANAGEMENT (OUTPATIENT)
Dept: CALL CENTER | Facility: HOSPITAL | Age: 56
End: 2021-01-11

## 2021-01-13 ENCOUNTER — READMISSION MANAGEMENT (OUTPATIENT)
Dept: CALL CENTER | Facility: HOSPITAL | Age: 56
End: 2021-01-13

## 2021-01-20 ENCOUNTER — HOSPITAL ENCOUNTER (OUTPATIENT)
Facility: HOSPITAL | Age: 56
Setting detail: OBSERVATION
Discharge: HOME | End: 2021-01-22
Attending: EMERGENCY MEDICINE | Admitting: INTERNAL MEDICINE

## 2021-01-20 ENCOUNTER — APPOINTMENT (OUTPATIENT)
Dept: ULTRASOUND IMAGING | Facility: HOSPITAL | Age: 56
End: 2021-01-20

## 2021-01-20 ENCOUNTER — TRANSCRIBE ORDERS (OUTPATIENT)
Dept: DIABETES SERVICES | Facility: HOSPITAL | Age: 56
End: 2021-01-20

## 2021-01-20 ENCOUNTER — READMISSION MANAGEMENT (OUTPATIENT)
Dept: CALL CENTER | Facility: HOSPITAL | Age: 56
End: 2021-01-20

## 2021-01-20 DIAGNOSIS — E11.8 TYPE 2 DIABETES MELLITUS WITH UNSPECIFIED COMPLICATIONS (HCC): Primary | ICD-10-CM

## 2021-01-20 DIAGNOSIS — R17 JAUNDICE: Primary | ICD-10-CM

## 2021-01-20 DIAGNOSIS — E80.6 HYPERBILIRUBINEMIA: ICD-10-CM

## 2021-01-20 DIAGNOSIS — R73.9 HYPERGLYCEMIA: ICD-10-CM

## 2021-01-20 DIAGNOSIS — R17 SCLERAL ICTERUS: ICD-10-CM

## 2021-01-20 LAB
ALBUMIN SERPL-MCNC: 2.9 G/DL (ref 3.5–5.2)
ALBUMIN/GLOB SERPL: 0.8 G/DL
ALP SERPL-CCNC: 1112 U/L (ref 39–117)
ALT SERPL W P-5'-P-CCNC: 107 U/L (ref 1–41)
AMPHET+METHAMPHET UR QL: NEGATIVE
AMPHETAMINES UR QL: NEGATIVE
ANION GAP SERPL CALCULATED.3IONS-SCNC: 12 MMOL/L (ref 5–15)
APAP SERPL-MCNC: <5 MCG/ML (ref 0–30)
AST SERPL-CCNC: 81 U/L (ref 1–40)
BARBITURATES UR QL SCN: NEGATIVE
BASOPHILS # BLD AUTO: 0.17 10*3/MM3 (ref 0–0.2)
BASOPHILS NFR BLD AUTO: 2 % (ref 0–1.5)
BENZODIAZ UR QL SCN: NEGATIVE
BILIRUB SERPL-MCNC: 2.7 MG/DL (ref 0–1.2)
BILIRUB UR QL STRIP: NEGATIVE
BUN SERPL-MCNC: 8 MG/DL (ref 6–20)
BUN/CREAT SERPL: 12.9 (ref 7–25)
BUPRENORPHINE SERPL-MCNC: NEGATIVE NG/ML
CALCIUM SPEC-SCNC: 8.8 MG/DL (ref 8.6–10.5)
CANNABINOIDS SERPL QL: NEGATIVE
CHLORIDE SERPL-SCNC: 95 MMOL/L (ref 98–107)
CLARITY UR: CLEAR
CO2 SERPL-SCNC: 25 MMOL/L (ref 22–29)
COCAINE UR QL: NEGATIVE
COLOR UR: ABNORMAL
CREAT SERPL-MCNC: 0.62 MG/DL (ref 0.76–1.27)
DEPRECATED RDW RBC AUTO: 53.4 FL (ref 37–54)
EOSINOPHIL # BLD AUTO: 0.33 10*3/MM3 (ref 0–0.4)
EOSINOPHIL NFR BLD AUTO: 3.8 % (ref 0.3–6.2)
ERYTHROCYTE [DISTWIDTH] IN BLOOD BY AUTOMATED COUNT: 15.9 % (ref 12.3–15.4)
ETHANOL BLD-MCNC: <10 MG/DL (ref 0–10)
GFR SERPL CREATININE-BSD FRML MDRD: 134 ML/MIN/1.73
GLOBULIN UR ELPH-MCNC: 3.6 GM/DL
GLUCOSE BLDC GLUCOMTR-MCNC: 287 MG/DL (ref 70–130)
GLUCOSE BLDC GLUCOMTR-MCNC: 349 MG/DL (ref 70–130)
GLUCOSE SERPL-MCNC: 280 MG/DL (ref 65–99)
GLUCOSE UR STRIP-MCNC: ABNORMAL MG/DL
HCT VFR BLD AUTO: 38.1 % (ref 37.5–51)
HGB BLD-MCNC: 11.5 G/DL (ref 13–17.7)
HGB UR QL STRIP.AUTO: NEGATIVE
IMM GRANULOCYTES # BLD AUTO: 0.13 10*3/MM3 (ref 0–0.05)
IMM GRANULOCYTES NFR BLD AUTO: 1.5 % (ref 0–0.5)
INR PPP: 0.96 (ref 0.85–1.16)
KETONES UR QL STRIP: NEGATIVE
LEUKOCYTE ESTERASE UR QL STRIP.AUTO: NEGATIVE
LIPASE SERPL-CCNC: 24 U/L (ref 13–60)
LYMPHOCYTES # BLD AUTO: 2 10*3/MM3 (ref 0.7–3.1)
LYMPHOCYTES NFR BLD AUTO: 23 % (ref 19.6–45.3)
MCH RBC QN AUTO: 28 PG (ref 26.6–33)
MCHC RBC AUTO-ENTMCNC: 30.2 G/DL (ref 31.5–35.7)
MCV RBC AUTO: 92.7 FL (ref 79–97)
METHADONE UR QL SCN: NEGATIVE
MONOCYTES # BLD AUTO: 0.91 10*3/MM3 (ref 0.1–0.9)
MONOCYTES NFR BLD AUTO: 10.5 % (ref 5–12)
NEUTROPHILS NFR BLD AUTO: 5.15 10*3/MM3 (ref 1.7–7)
NEUTROPHILS NFR BLD AUTO: 59.2 % (ref 42.7–76)
NITRITE UR QL STRIP: NEGATIVE
NRBC BLD AUTO-RTO: 0 /100 WBC (ref 0–0.2)
OPIATES UR QL: NEGATIVE
OXYCODONE UR QL SCN: POSITIVE
PCP UR QL SCN: NEGATIVE
PH UR STRIP.AUTO: 5.5 [PH] (ref 5–8)
PLATELET # BLD AUTO: 457 10*3/MM3 (ref 140–450)
PMV BLD AUTO: 11.3 FL (ref 6–12)
POTASSIUM SERPL-SCNC: 4.2 MMOL/L (ref 3.5–5.2)
PROPOXYPH UR QL: NEGATIVE
PROT SERPL-MCNC: 6.5 G/DL (ref 6–8.5)
PROT UR QL STRIP: NEGATIVE
PROTHROMBIN TIME: 12.4 SECONDS (ref 11.5–14)
RBC # BLD AUTO: 4.11 10*6/MM3 (ref 4.14–5.8)
SALICYLATES SERPL-MCNC: <0.3 MG/DL
SARS-COV-2 RNA RESP QL NAA+PROBE: NOT DETECTED
SODIUM SERPL-SCNC: 132 MMOL/L (ref 136–145)
SP GR UR STRIP: 1.03 (ref 1–1.03)
TRICYCLICS UR QL SCN: POSITIVE
UROBILINOGEN UR QL STRIP: ABNORMAL
WBC # BLD AUTO: 8.69 10*3/MM3 (ref 3.4–10.8)

## 2021-01-20 PROCEDURE — 99220 PR INITIAL OBSERVATION CARE/DAY 70 MINUTES: CPT | Performed by: INTERNAL MEDICINE

## 2021-01-20 PROCEDURE — 80143 DRUG ASSAY ACETAMINOPHEN: CPT | Performed by: EMERGENCY MEDICINE

## 2021-01-20 PROCEDURE — 82077 ASSAY SPEC XCP UR&BREATH IA: CPT | Performed by: EMERGENCY MEDICINE

## 2021-01-20 PROCEDURE — 80179 DRUG ASSAY SALICYLATE: CPT | Performed by: EMERGENCY MEDICINE

## 2021-01-20 PROCEDURE — 63710000001 INSULIN LISPRO (HUMAN) PER 5 UNITS: Performed by: INTERNAL MEDICINE

## 2021-01-20 PROCEDURE — 63710000001 INSULIN DETEMIR PER 5 UNITS: Performed by: INTERNAL MEDICINE

## 2021-01-20 PROCEDURE — G0378 HOSPITAL OBSERVATION PER HR: HCPCS

## 2021-01-20 PROCEDURE — 80053 COMPREHEN METABOLIC PANEL: CPT | Performed by: EMERGENCY MEDICINE

## 2021-01-20 PROCEDURE — 83690 ASSAY OF LIPASE: CPT | Performed by: EMERGENCY MEDICINE

## 2021-01-20 PROCEDURE — 99214 OFFICE O/P EST MOD 30 MIN: CPT | Performed by: INTERNAL MEDICINE

## 2021-01-20 PROCEDURE — 80307 DRUG TEST PRSMV CHEM ANLYZR: CPT | Performed by: EMERGENCY MEDICINE

## 2021-01-20 PROCEDURE — 85025 COMPLETE CBC W/AUTO DIFF WBC: CPT | Performed by: EMERGENCY MEDICINE

## 2021-01-20 PROCEDURE — 99284 EMERGENCY DEPT VISIT MOD MDM: CPT

## 2021-01-20 PROCEDURE — 85610 PROTHROMBIN TIME: CPT | Performed by: EMERGENCY MEDICINE

## 2021-01-20 PROCEDURE — 82962 GLUCOSE BLOOD TEST: CPT

## 2021-01-20 PROCEDURE — C9803 HOPD COVID-19 SPEC COLLECT: HCPCS

## 2021-01-20 PROCEDURE — U0003 INFECTIOUS AGENT DETECTION BY NUCLEIC ACID (DNA OR RNA); SEVERE ACUTE RESPIRATORY SYNDROME CORONAVIRUS 2 (SARS-COV-2) (CORONAVIRUS DISEASE [COVID-19]), AMPLIFIED PROBE TECHNIQUE, MAKING USE OF HIGH THROUGHPUT TECHNOLOGIES AS DESCRIBED BY CMS-2020-01-R: HCPCS | Performed by: EMERGENCY MEDICINE

## 2021-01-20 PROCEDURE — 80306 DRUG TEST PRSMV INSTRMNT: CPT | Performed by: EMERGENCY MEDICINE

## 2021-01-20 PROCEDURE — 82977 ASSAY OF GGT: CPT | Performed by: INTERNAL MEDICINE

## 2021-01-20 PROCEDURE — 76705 ECHO EXAM OF ABDOMEN: CPT

## 2021-01-20 PROCEDURE — 81003 URINALYSIS AUTO W/O SCOPE: CPT | Performed by: EMERGENCY MEDICINE

## 2021-01-20 RX ORDER — DOCUSATE SODIUM 100 MG/1
100 CAPSULE, LIQUID FILLED ORAL 2 TIMES DAILY
Status: DISCONTINUED | OUTPATIENT
Start: 2021-01-20 | End: 2021-01-22 | Stop reason: HOSPADM

## 2021-01-20 RX ORDER — ROPINIROLE 0.5 MG/1
0.25 TABLET, FILM COATED ORAL NIGHTLY
Status: DISCONTINUED | OUTPATIENT
Start: 2021-01-20 | End: 2021-01-22 | Stop reason: HOSPADM

## 2021-01-20 RX ORDER — SODIUM CHLORIDE 0.9 % (FLUSH) 0.9 %
10 SYRINGE (ML) INJECTION AS NEEDED
Status: DISCONTINUED | OUTPATIENT
Start: 2021-01-20 | End: 2021-01-22 | Stop reason: HOSPADM

## 2021-01-20 RX ORDER — NICOTINE POLACRILEX 4 MG
15 LOZENGE BUCCAL
Status: DISCONTINUED | OUTPATIENT
Start: 2021-01-20 | End: 2021-01-22 | Stop reason: HOSPADM

## 2021-01-20 RX ORDER — DEXTROSE MONOHYDRATE 25 G/50ML
25 INJECTION, SOLUTION INTRAVENOUS
Status: DISCONTINUED | OUTPATIENT
Start: 2021-01-20 | End: 2021-01-22 | Stop reason: HOSPADM

## 2021-01-20 RX ORDER — SODIUM CHLORIDE 0.9 % (FLUSH) 0.9 %
10 SYRINGE (ML) INJECTION EVERY 12 HOURS SCHEDULED
Status: DISCONTINUED | OUTPATIENT
Start: 2021-01-20 | End: 2021-01-22 | Stop reason: HOSPADM

## 2021-01-20 RX ORDER — HYDROCODONE BITARTRATE AND ACETAMINOPHEN 5; 325 MG/1; MG/1
1 TABLET ORAL EVERY 12 HOURS PRN
Status: DISCONTINUED | OUTPATIENT
Start: 2021-01-20 | End: 2021-01-22 | Stop reason: HOSPADM

## 2021-01-20 RX ORDER — METOPROLOL SUCCINATE 50 MG/1
50 TABLET, EXTENDED RELEASE ORAL DAILY
Status: DISCONTINUED | OUTPATIENT
Start: 2021-01-21 | End: 2021-01-22 | Stop reason: HOSPADM

## 2021-01-20 RX ORDER — CYCLOBENZAPRINE HCL 10 MG
5 TABLET ORAL 3 TIMES DAILY PRN
Status: DISCONTINUED | OUTPATIENT
Start: 2021-01-20 | End: 2021-01-22 | Stop reason: HOSPADM

## 2021-01-20 RX ORDER — AMITRIPTYLINE HYDROCHLORIDE 25 MG/1
25 TABLET, FILM COATED ORAL NIGHTLY
Status: DISCONTINUED | OUTPATIENT
Start: 2021-01-20 | End: 2021-01-22 | Stop reason: HOSPADM

## 2021-01-20 RX ORDER — PREGABALIN 75 MG/1
225 CAPSULE ORAL EVERY 12 HOURS SCHEDULED
Status: DISCONTINUED | OUTPATIENT
Start: 2021-01-20 | End: 2021-01-22 | Stop reason: HOSPADM

## 2021-01-20 RX ORDER — MELATONIN
2000 DAILY
Status: DISCONTINUED | OUTPATIENT
Start: 2021-01-21 | End: 2021-01-22 | Stop reason: HOSPADM

## 2021-01-20 RX ORDER — PANTOPRAZOLE SODIUM 40 MG/1
40 TABLET, DELAYED RELEASE ORAL EVERY MORNING
Status: DISCONTINUED | OUTPATIENT
Start: 2021-01-21 | End: 2021-01-22 | Stop reason: HOSPADM

## 2021-01-20 RX ADMIN — PREGABALIN 225 MG: 75 CAPSULE ORAL at 20:36

## 2021-01-20 RX ADMIN — INSULIN LISPRO 10 UNITS: 100 INJECTION, SOLUTION INTRAVENOUS; SUBCUTANEOUS at 17:56

## 2021-01-20 RX ADMIN — DOCUSATE SODIUM 100 MG: 100 CAPSULE ORAL at 20:36

## 2021-01-20 RX ADMIN — SODIUM CHLORIDE, PRESERVATIVE FREE 10 ML: 5 INJECTION INTRAVENOUS at 20:37

## 2021-01-20 RX ADMIN — ROPINIROLE HYDROCHLORIDE 0.25 MG: 0.5 TABLET, FILM COATED ORAL at 20:37

## 2021-01-20 RX ADMIN — AMITRIPTYLINE HYDROCHLORIDE 25 MG: 25 TABLET, FILM COATED ORAL at 20:37

## 2021-01-20 RX ADMIN — INSULIN DETEMIR 50 UNITS: 100 INJECTION, SOLUTION SUBCUTANEOUS at 20:41

## 2021-01-20 RX ADMIN — SODIUM CHLORIDE 500 ML: 9 INJECTION, SOLUTION INTRAVENOUS at 12:34

## 2021-01-20 RX ADMIN — INSULIN LISPRO 8 UNITS: 100 INJECTION, SOLUTION INTRAVENOUS; SUBCUTANEOUS at 20:37

## 2021-01-21 ENCOUNTER — APPOINTMENT (OUTPATIENT)
Dept: CT IMAGING | Facility: HOSPITAL | Age: 56
End: 2021-01-21

## 2021-01-21 PROBLEM — E87.1 HYPONATREMIA: Status: ACTIVE | Noted: 2021-01-21

## 2021-01-21 PROBLEM — K75.89 CHOLESTATIC HEPATITIS: Status: ACTIVE | Noted: 2021-01-21

## 2021-01-21 LAB
ALBUMIN SERPL-MCNC: 3 G/DL (ref 3.5–5.2)
ALBUMIN/GLOB SERPL: 0.9 G/DL
ALP SERPL-CCNC: 1061 U/L (ref 39–117)
ALT SERPL W P-5'-P-CCNC: 88 U/L (ref 1–41)
ANION GAP SERPL CALCULATED.3IONS-SCNC: 8 MMOL/L (ref 5–15)
AST SERPL-CCNC: 65 U/L (ref 1–40)
BILIRUB SERPL-MCNC: 2.6 MG/DL (ref 0–1.2)
BUN SERPL-MCNC: 6 MG/DL (ref 6–20)
BUN/CREAT SERPL: 9.8 (ref 7–25)
CALCIUM SPEC-SCNC: 8.5 MG/DL (ref 8.6–10.5)
CHLORIDE SERPL-SCNC: 97 MMOL/L (ref 98–107)
CO2 SERPL-SCNC: 29 MMOL/L (ref 22–29)
CREAT SERPL-MCNC: 0.61 MG/DL (ref 0.76–1.27)
GFR SERPL CREATININE-BSD FRML MDRD: 136 ML/MIN/1.73
GGT SERPL-CCNC: 1093 U/L (ref 8–61)
GLOBULIN UR ELPH-MCNC: 3.2 GM/DL
GLUCOSE BLDC GLUCOMTR-MCNC: 126 MG/DL (ref 70–130)
GLUCOSE BLDC GLUCOMTR-MCNC: 151 MG/DL (ref 70–130)
GLUCOSE BLDC GLUCOMTR-MCNC: 190 MG/DL (ref 70–130)
GLUCOSE BLDC GLUCOMTR-MCNC: 88 MG/DL (ref 70–130)
GLUCOSE SERPL-MCNC: 93 MG/DL (ref 65–99)
POTASSIUM SERPL-SCNC: 4 MMOL/L (ref 3.5–5.2)
PROT SERPL-MCNC: 6.2 G/DL (ref 6–8.5)
SODIUM SERPL-SCNC: 134 MMOL/L (ref 136–145)

## 2021-01-21 PROCEDURE — 87070 CULTURE OTHR SPECIMN AEROBIC: CPT | Performed by: INTERNAL MEDICINE

## 2021-01-21 PROCEDURE — 82962 GLUCOSE BLOOD TEST: CPT

## 2021-01-21 PROCEDURE — 83516 IMMUNOASSAY NONANTIBODY: CPT | Performed by: INTERNAL MEDICINE

## 2021-01-21 PROCEDURE — 99152 MOD SED SAME PHYS/QHP 5/>YRS: CPT

## 2021-01-21 PROCEDURE — 87102 FUNGUS ISOLATION CULTURE: CPT | Performed by: INTERNAL MEDICINE

## 2021-01-21 PROCEDURE — 25010000002 MIDAZOLAM PER 1 MG: Performed by: RADIOLOGY

## 2021-01-21 PROCEDURE — 87206 SMEAR FLUORESCENT/ACID STAI: CPT | Performed by: INTERNAL MEDICINE

## 2021-01-21 PROCEDURE — 25010000002 FENTANYL CITRATE (PF) 100 MCG/2ML SOLUTION: Performed by: RADIOLOGY

## 2021-01-21 PROCEDURE — 63710000001 INSULIN LISPRO (HUMAN) PER 5 UNITS: Performed by: INTERNAL MEDICINE

## 2021-01-21 PROCEDURE — 87116 MYCOBACTERIA CULTURE: CPT | Performed by: INTERNAL MEDICINE

## 2021-01-21 PROCEDURE — 88307 TISSUE EXAM BY PATHOLOGIST: CPT | Performed by: INTERNAL MEDICINE

## 2021-01-21 PROCEDURE — 77012 CT SCAN FOR NEEDLE BIOPSY: CPT

## 2021-01-21 PROCEDURE — 25010000003 LIDOCAINE 1 % SOLUTION: Performed by: RADIOLOGY

## 2021-01-21 PROCEDURE — 87176 TISSUE HOMOGENIZATION CULTR: CPT | Performed by: INTERNAL MEDICINE

## 2021-01-21 PROCEDURE — 87075 CULTR BACTERIA EXCEPT BLOOD: CPT | Performed by: INTERNAL MEDICINE

## 2021-01-21 PROCEDURE — 86038 ANTINUCLEAR ANTIBODIES: CPT | Performed by: INTERNAL MEDICINE

## 2021-01-21 PROCEDURE — 80053 COMPREHEN METABOLIC PANEL: CPT | Performed by: INTERNAL MEDICINE

## 2021-01-21 PROCEDURE — G0378 HOSPITAL OBSERVATION PER HR: HCPCS

## 2021-01-21 PROCEDURE — 87205 SMEAR GRAM STAIN: CPT | Performed by: INTERNAL MEDICINE

## 2021-01-21 PROCEDURE — 84165 PROTEIN E-PHORESIS SERUM: CPT | Performed by: INTERNAL MEDICINE

## 2021-01-21 PROCEDURE — 63710000001 INSULIN DETEMIR PER 5 UNITS: Performed by: INTERNAL MEDICINE

## 2021-01-21 PROCEDURE — 99225 PR SBSQ OBSERVATION CARE/DAY 25 MINUTES: CPT | Performed by: FAMILY MEDICINE

## 2021-01-21 PROCEDURE — 82103 ALPHA-1-ANTITRYPSIN TOTAL: CPT | Performed by: INTERNAL MEDICINE

## 2021-01-21 PROCEDURE — 82104 ALPHA-1-ANTITRYPSIN PHENO: CPT | Performed by: INTERNAL MEDICINE

## 2021-01-21 PROCEDURE — 88313 SPECIAL STAINS GROUP 2: CPT | Performed by: INTERNAL MEDICINE

## 2021-01-21 PROCEDURE — 86376 MICROSOMAL ANTIBODY EACH: CPT | Performed by: INTERNAL MEDICINE

## 2021-01-21 RX ORDER — MIDAZOLAM HYDROCHLORIDE 1 MG/ML
INJECTION INTRAMUSCULAR; INTRAVENOUS
Status: COMPLETED | OUTPATIENT
Start: 2021-01-21 | End: 2021-01-21

## 2021-01-21 RX ORDER — SODIUM CHLORIDE 9 MG/ML
50 INJECTION, SOLUTION INTRAVENOUS CONTINUOUS
Status: DISCONTINUED | OUTPATIENT
Start: 2021-01-21 | End: 2021-01-22 | Stop reason: HOSPADM

## 2021-01-21 RX ORDER — LIDOCAINE HYDROCHLORIDE 10 MG/ML
20 INJECTION, SOLUTION INFILTRATION; PERINEURAL ONCE
Status: COMPLETED | OUTPATIENT
Start: 2021-01-21 | End: 2021-01-21

## 2021-01-21 RX ORDER — FENTANYL CITRATE 50 UG/ML
INJECTION, SOLUTION INTRAMUSCULAR; INTRAVENOUS
Status: COMPLETED | OUTPATIENT
Start: 2021-01-21 | End: 2021-01-21

## 2021-01-21 RX ORDER — FENTANYL CITRATE 50 UG/ML
INJECTION, SOLUTION INTRAMUSCULAR; INTRAVENOUS
Status: DISPENSED
Start: 2021-01-21 | End: 2021-01-21

## 2021-01-21 RX ORDER — MIDAZOLAM HYDROCHLORIDE 1 MG/ML
INJECTION INTRAMUSCULAR; INTRAVENOUS
Status: DISPENSED
Start: 2021-01-21 | End: 2021-01-21

## 2021-01-21 RX ADMIN — Medication 2000 UNITS: at 09:28

## 2021-01-21 RX ADMIN — METOPROLOL SUCCINATE 50 MG: 50 TABLET, EXTENDED RELEASE ORAL at 07:47

## 2021-01-21 RX ADMIN — SODIUM CHLORIDE, PRESERVATIVE FREE 10 ML: 5 INJECTION INTRAVENOUS at 20:46

## 2021-01-21 RX ADMIN — PREGABALIN 225 MG: 75 CAPSULE ORAL at 09:28

## 2021-01-21 RX ADMIN — FENTANYL CITRATE 50 MCG: 50 INJECTION, SOLUTION INTRAMUSCULAR; INTRAVENOUS at 08:43

## 2021-01-21 RX ADMIN — MIDAZOLAM HYDROCHLORIDE 1 MG: 1 INJECTION, SOLUTION INTRAMUSCULAR; INTRAVENOUS at 08:44

## 2021-01-21 RX ADMIN — INSULIN DETEMIR 50 UNITS: 100 INJECTION, SOLUTION SUBCUTANEOUS at 20:46

## 2021-01-21 RX ADMIN — INSULIN LISPRO 3 UNITS: 100 INJECTION, SOLUTION INTRAVENOUS; SUBCUTANEOUS at 16:53

## 2021-01-21 RX ADMIN — LIDOCAINE HYDROCHLORIDE 20 ML: 10 INJECTION, SOLUTION INFILTRATION; PERINEURAL at 08:50

## 2021-01-21 RX ADMIN — HYDROCODONE BITARTRATE AND ACETAMINOPHEN 1 TABLET: 5; 325 TABLET ORAL at 07:47

## 2021-01-21 RX ADMIN — FENTANYL CITRATE 50 MCG: 50 INJECTION, SOLUTION INTRAMUSCULAR; INTRAVENOUS at 08:35

## 2021-01-21 RX ADMIN — SODIUM CHLORIDE 50 ML/HR: 9 INJECTION, SOLUTION INTRAVENOUS at 10:07

## 2021-01-21 RX ADMIN — DOCUSATE SODIUM 100 MG: 100 CAPSULE ORAL at 20:45

## 2021-01-21 RX ADMIN — PREGABALIN 225 MG: 75 CAPSULE ORAL at 20:45

## 2021-01-21 RX ADMIN — ROPINIROLE HYDROCHLORIDE 0.25 MG: 0.5 TABLET, FILM COATED ORAL at 20:46

## 2021-01-21 RX ADMIN — AMITRIPTYLINE HYDROCHLORIDE 25 MG: 25 TABLET, FILM COATED ORAL at 20:45

## 2021-01-21 RX ADMIN — MIDAZOLAM HYDROCHLORIDE 2 MG: 1 INJECTION, SOLUTION INTRAMUSCULAR; INTRAVENOUS at 08:35

## 2021-01-21 RX ADMIN — PANTOPRAZOLE SODIUM 40 MG: 40 TABLET, DELAYED RELEASE ORAL at 06:01

## 2021-01-21 RX ADMIN — DOCUSATE SODIUM 100 MG: 100 CAPSULE ORAL at 09:28

## 2021-01-22 VITALS
BODY MASS INDEX: 32.27 KG/M2 | HEIGHT: 64 IN | OXYGEN SATURATION: 91 % | DIASTOLIC BLOOD PRESSURE: 90 MMHG | RESPIRATION RATE: 18 BRPM | HEART RATE: 87 BPM | WEIGHT: 189 LBS | TEMPERATURE: 97.9 F | SYSTOLIC BLOOD PRESSURE: 152 MMHG

## 2021-01-22 LAB
ACTIN IGG SERPL-ACNC: 9 UNITS (ref 0–19)
ALBUMIN SERPL ELPH-MCNC: 2.7 G/DL (ref 2.9–4.4)
ALBUMIN SERPL-MCNC: 2.8 G/DL (ref 3.5–5.2)
ALBUMIN/GLOB SERPL: 0.8 G/DL
ALBUMIN/GLOB SERPL: 0.8 {RATIO} (ref 0.7–1.7)
ALP SERPL-CCNC: 965 U/L (ref 39–117)
ALPHA1 GLOB SERPL ELPH-MCNC: 0.4 G/DL (ref 0–0.4)
ALPHA2 GLOB SERPL ELPH-MCNC: 1 G/DL (ref 0.4–1)
ALT SERPL W P-5'-P-CCNC: 71 U/L (ref 1–41)
ANA SER QL: NEGATIVE
ANION GAP SERPL CALCULATED.3IONS-SCNC: 9 MMOL/L (ref 5–15)
AST SERPL-CCNC: 51 U/L (ref 1–40)
B-GLOBULIN SERPL ELPH-MCNC: 1.2 G/DL (ref 0.7–1.3)
BASOPHILS # BLD AUTO: 0.14 10*3/MM3 (ref 0–0.2)
BASOPHILS NFR BLD AUTO: 1.9 % (ref 0–1.5)
BILIRUB SERPL-MCNC: 2.3 MG/DL (ref 0–1.2)
BUN SERPL-MCNC: 8 MG/DL (ref 6–20)
BUN/CREAT SERPL: 11.9 (ref 7–25)
CALCIUM SPEC-SCNC: 8.5 MG/DL (ref 8.6–10.5)
CHLORIDE SERPL-SCNC: 98 MMOL/L (ref 98–107)
CO2 SERPL-SCNC: 26 MMOL/L (ref 22–29)
CREAT SERPL-MCNC: 0.67 MG/DL (ref 0.76–1.27)
CYTO UR: NORMAL
DEPRECATED RDW RBC AUTO: 54 FL (ref 37–54)
EOSINOPHIL # BLD AUTO: 0.31 10*3/MM3 (ref 0–0.4)
EOSINOPHIL NFR BLD AUTO: 4.3 % (ref 0.3–6.2)
ERYTHROCYTE [DISTWIDTH] IN BLOOD BY AUTOMATED COUNT: 15.9 % (ref 12.3–15.4)
FERRITIN SERPL-MCNC: 330.3 NG/ML (ref 30–400)
FOLATE SERPL-MCNC: 14.1 NG/ML (ref 4.78–24.2)
GAMMA GLOB SERPL ELPH-MCNC: 0.7 G/DL (ref 0.4–1.8)
GFR SERPL CREATININE-BSD FRML MDRD: 122 ML/MIN/1.73
GLOBULIN SER CALC-MCNC: 3.3 G/DL (ref 2.2–3.9)
GLOBULIN UR ELPH-MCNC: 3.4 GM/DL
GLUCOSE BLDC GLUCOMTR-MCNC: 147 MG/DL (ref 70–130)
GLUCOSE BLDC GLUCOMTR-MCNC: 179 MG/DL (ref 70–130)
GLUCOSE BLDC GLUCOMTR-MCNC: 224 MG/DL (ref 70–130)
GLUCOSE SERPL-MCNC: 249 MG/DL (ref 65–99)
HCT VFR BLD AUTO: 36.1 % (ref 37.5–51)
HGB BLD-MCNC: 11 G/DL (ref 13–17.7)
IMM GRANULOCYTES # BLD AUTO: 0.07 10*3/MM3 (ref 0–0.05)
IMM GRANULOCYTES NFR BLD AUTO: 1 % (ref 0–0.5)
IRON 24H UR-MRATE: 49 MCG/DL (ref 59–158)
IRON SATN MFR SERPL: 17 % (ref 20–50)
LAB AP CASE REPORT: NORMAL
LAB AP CLINICAL INFORMATION: NORMAL
LAB AP DIAGNOSIS COMMENT: NORMAL
LABORATORY COMMENT REPORT: ABNORMAL
LKM-1 AB SER-ACNC: 1.4 UNITS (ref 0–20)
LYMPHOCYTES # BLD AUTO: 2.37 10*3/MM3 (ref 0.7–3.1)
LYMPHOCYTES NFR BLD AUTO: 32.5 % (ref 19.6–45.3)
M PROTEIN SERPL ELPH-MCNC: ABNORMAL G/DL
MCH RBC QN AUTO: 28.6 PG (ref 26.6–33)
MCHC RBC AUTO-ENTMCNC: 30.5 G/DL (ref 31.5–35.7)
MCV RBC AUTO: 94 FL (ref 79–97)
MITOCHONDRIA M2 IGG SER-ACNC: <20 UNITS (ref 0–20)
MONOCYTES # BLD AUTO: 0.83 10*3/MM3 (ref 0.1–0.9)
MONOCYTES NFR BLD AUTO: 11.4 % (ref 5–12)
NEUTROPHILS NFR BLD AUTO: 3.57 10*3/MM3 (ref 1.7–7)
NEUTROPHILS NFR BLD AUTO: 48.9 % (ref 42.7–76)
NRBC BLD AUTO-RTO: 0 /100 WBC (ref 0–0.2)
PATH REPORT.FINAL DX SPEC: NORMAL
PATH REPORT.GROSS SPEC: NORMAL
PLATELET # BLD AUTO: 465 10*3/MM3 (ref 140–450)
PMV BLD AUTO: 11.2 FL (ref 6–12)
POTASSIUM SERPL-SCNC: 4.2 MMOL/L (ref 3.5–5.2)
PROT PATTERN SERPL ELPH-IMP: ABNORMAL
PROT SERPL-MCNC: 6 G/DL (ref 6–8.5)
PROT SERPL-MCNC: 6.2 G/DL (ref 6–8.5)
RBC # BLD AUTO: 3.84 10*6/MM3 (ref 4.14–5.8)
RETICS # AUTO: 0.16 10*6/MM3 (ref 0.02–0.13)
RETICS/RBC NFR AUTO: 4.12 % (ref 0.7–1.9)
SODIUM SERPL-SCNC: 133 MMOL/L (ref 136–145)
TIBC SERPL-MCNC: 297 MCG/DL (ref 298–536)
TRANSFERRIN SERPL-MCNC: 199 MG/DL (ref 200–360)
VIT B12 BLD-MCNC: 736 PG/ML (ref 211–946)
WBC # BLD AUTO: 7.29 10*3/MM3 (ref 3.4–10.8)

## 2021-01-22 PROCEDURE — 82962 GLUCOSE BLOOD TEST: CPT

## 2021-01-22 PROCEDURE — 80053 COMPREHEN METABOLIC PANEL: CPT | Performed by: FAMILY MEDICINE

## 2021-01-22 PROCEDURE — 99217 PR OBSERVATION CARE DISCHARGE MANAGEMENT: CPT | Performed by: NURSE PRACTITIONER

## 2021-01-22 PROCEDURE — 85045 AUTOMATED RETICULOCYTE COUNT: CPT | Performed by: INTERNAL MEDICINE

## 2021-01-22 PROCEDURE — 84466 ASSAY OF TRANSFERRIN: CPT | Performed by: INTERNAL MEDICINE

## 2021-01-22 PROCEDURE — G0378 HOSPITAL OBSERVATION PER HR: HCPCS

## 2021-01-22 PROCEDURE — 82728 ASSAY OF FERRITIN: CPT | Performed by: INTERNAL MEDICINE

## 2021-01-22 PROCEDURE — 85025 COMPLETE CBC W/AUTO DIFF WBC: CPT | Performed by: FAMILY MEDICINE

## 2021-01-22 PROCEDURE — 83540 ASSAY OF IRON: CPT | Performed by: INTERNAL MEDICINE

## 2021-01-22 PROCEDURE — 82746 ASSAY OF FOLIC ACID SERUM: CPT | Performed by: INTERNAL MEDICINE

## 2021-01-22 PROCEDURE — 82607 VITAMIN B-12: CPT | Performed by: INTERNAL MEDICINE

## 2021-01-22 PROCEDURE — 63710000001 INSULIN LISPRO (HUMAN) PER 5 UNITS: Performed by: INTERNAL MEDICINE

## 2021-01-22 RX ADMIN — Medication 2000 UNITS: at 08:43

## 2021-01-22 RX ADMIN — INSULIN LISPRO 10 UNITS: 100 INJECTION, SOLUTION INTRAVENOUS; SUBCUTANEOUS at 12:14

## 2021-01-22 RX ADMIN — PREGABALIN 225 MG: 75 CAPSULE ORAL at 08:43

## 2021-01-22 RX ADMIN — INSULIN LISPRO 5 UNITS: 100 INJECTION, SOLUTION INTRAVENOUS; SUBCUTANEOUS at 08:43

## 2021-01-22 RX ADMIN — SODIUM CHLORIDE 50 ML/HR: 9 INJECTION, SOLUTION INTRAVENOUS at 06:17

## 2021-01-22 RX ADMIN — INSULIN LISPRO 10 UNITS: 100 INJECTION, SOLUTION INTRAVENOUS; SUBCUTANEOUS at 08:44

## 2021-01-22 RX ADMIN — PANTOPRAZOLE SODIUM 40 MG: 40 TABLET, DELAYED RELEASE ORAL at 06:15

## 2021-01-22 RX ADMIN — METOPROLOL SUCCINATE 50 MG: 50 TABLET, EXTENDED RELEASE ORAL at 08:43

## 2021-01-22 RX ADMIN — INSULIN LISPRO 3 UNITS: 100 INJECTION, SOLUTION INTRAVENOUS; SUBCUTANEOUS at 12:14

## 2021-01-22 RX ADMIN — INSULIN LISPRO 10 UNITS: 100 INJECTION, SOLUTION INTRAVENOUS; SUBCUTANEOUS at 17:11

## 2021-01-23 ENCOUNTER — READMISSION MANAGEMENT (OUTPATIENT)
Dept: CALL CENTER | Facility: HOSPITAL | Age: 56
End: 2021-01-23

## 2021-01-24 DIAGNOSIS — K83.1 CHOLESTASIS: Primary | ICD-10-CM

## 2021-01-24 LAB
BACTERIA SPEC AEROBE CULT: NORMAL
GRAM STN SPEC: NORMAL

## 2021-01-25 ENCOUNTER — READMISSION MANAGEMENT (OUTPATIENT)
Dept: CALL CENTER | Facility: HOSPITAL | Age: 56
End: 2021-01-25

## 2021-01-26 ENCOUNTER — READMISSION MANAGEMENT (OUTPATIENT)
Dept: CALL CENTER | Facility: HOSPITAL | Age: 56
End: 2021-01-26

## 2021-01-26 LAB
A1AT PHENOTYP SERPL IFE: ABNORMAL
A1AT SERPL-MCNC: 204 MG/DL (ref 101–187)
BACTERIA SPEC ANAEROBE CULT: NORMAL

## 2021-01-27 ENCOUNTER — READMISSION MANAGEMENT (OUTPATIENT)
Dept: CALL CENTER | Facility: HOSPITAL | Age: 56
End: 2021-01-27

## 2021-02-02 ENCOUNTER — READMISSION MANAGEMENT (OUTPATIENT)
Dept: CALL CENTER | Facility: HOSPITAL | Age: 56
End: 2021-02-02

## 2021-02-10 ENCOUNTER — HOSPITAL ENCOUNTER (OUTPATIENT)
Dept: DIABETES SERVICES | Facility: HOSPITAL | Age: 56
Setting detail: RECURRING SERIES
Discharge: HOME | End: 2021-02-10

## 2021-02-10 ENCOUNTER — READMISSION MANAGEMENT (OUTPATIENT)
Dept: CALL CENTER | Facility: HOSPITAL | Age: 56
End: 2021-02-10

## 2021-02-10 PROCEDURE — G0108 DIAB MANAGE TRN  PER INDIV: HCPCS

## 2021-02-17 ENCOUNTER — TELEPHONE (OUTPATIENT)
Dept: GASTROENTEROLOGY | Facility: CLINIC | Age: 56
End: 2021-02-17

## 2021-02-17 ENCOUNTER — READMISSION MANAGEMENT (OUTPATIENT)
Dept: CALL CENTER | Facility: HOSPITAL | Age: 56
End: 2021-02-17

## 2021-02-18 ENCOUNTER — EPISODE CHANGES (OUTPATIENT)
Dept: CASE MANAGEMENT | Facility: OTHER | Age: 56
End: 2021-02-18

## 2021-02-24 ENCOUNTER — HOSPITAL ENCOUNTER (OUTPATIENT)
Dept: DIABETES SERVICES | Facility: HOSPITAL | Age: 56
Setting detail: RECURRING SERIES
Discharge: HOME | End: 2021-02-24

## 2021-02-24 PROCEDURE — G0108 DIAB MANAGE TRN  PER INDIV: HCPCS

## 2021-03-04 LAB
FUNGUS WND CULT: NORMAL
MYCOBACTERIUM SPEC CULT: NORMAL
NIGHT BLUE STAIN TISS: NORMAL

## 2024-08-01 ENCOUNTER — APPOINTMENT (OUTPATIENT)
Dept: MRI IMAGING | Facility: HOSPITAL | Age: 59
DRG: 623 | End: 2024-08-01
Payer: COMMERCIAL

## 2024-08-01 ENCOUNTER — APPOINTMENT (OUTPATIENT)
Dept: GENERAL RADIOLOGY | Facility: HOSPITAL | Age: 59
DRG: 623 | End: 2024-08-01
Payer: COMMERCIAL

## 2024-08-01 ENCOUNTER — HOSPITAL ENCOUNTER (INPATIENT)
Facility: HOSPITAL | Age: 59
LOS: 5 days | Discharge: HOME HEALTH | DRG: 623 | End: 2024-08-06
Attending: EMERGENCY MEDICINE | Admitting: PEDIATRICS
Payer: COMMERCIAL

## 2024-08-01 DIAGNOSIS — M54.16 COMPRESSION OF LUMBAR NERVE ROOT: ICD-10-CM

## 2024-08-01 DIAGNOSIS — Z86.39 HISTORY OF DIABETES MELLITUS: ICD-10-CM

## 2024-08-01 DIAGNOSIS — E16.2 HYPOGLYCEMIA: ICD-10-CM

## 2024-08-01 DIAGNOSIS — F17.290 CIGAR SMOKER: Chronic | ICD-10-CM

## 2024-08-01 DIAGNOSIS — E83.39 HYPOPHOSPHATEMIA: ICD-10-CM

## 2024-08-01 DIAGNOSIS — S91.309A MULTIPLE OPEN WOUNDS OF FOOT: ICD-10-CM

## 2024-08-01 DIAGNOSIS — T14.8XXA WOUND INFECTION: ICD-10-CM

## 2024-08-01 DIAGNOSIS — K59.09 CHRONIC CONSTIPATION: ICD-10-CM

## 2024-08-01 DIAGNOSIS — R17 JAUNDICE: ICD-10-CM

## 2024-08-01 DIAGNOSIS — E66.9 OBESITY (BMI 30.0-34.9): ICD-10-CM

## 2024-08-01 DIAGNOSIS — R33.9 URINARY RETENTION: ICD-10-CM

## 2024-08-01 DIAGNOSIS — R73.9 HYPERGLYCEMIA: Primary | ICD-10-CM

## 2024-08-01 DIAGNOSIS — K75.89 CHOLESTATIC HEPATITIS: ICD-10-CM

## 2024-08-01 DIAGNOSIS — Z90.49 STATUS POST CHOLECYSTECTOMY: ICD-10-CM

## 2024-08-01 DIAGNOSIS — L03.90 CELLULITIS: ICD-10-CM

## 2024-08-01 DIAGNOSIS — E55.9 VITAMIN D DEFICIENCY: ICD-10-CM

## 2024-08-01 DIAGNOSIS — E80.6 HYPERBILIRUBINEMIA: ICD-10-CM

## 2024-08-01 DIAGNOSIS — K81.9 CHOLECYSTITIS: ICD-10-CM

## 2024-08-01 DIAGNOSIS — E87.1 HYPONATREMIA: ICD-10-CM

## 2024-08-01 DIAGNOSIS — L08.9 WOUND INFECTION: ICD-10-CM

## 2024-08-01 DIAGNOSIS — G62.9 PERIPHERAL POLYNEUROPATHY: Chronic | ICD-10-CM

## 2024-08-01 DIAGNOSIS — E11.65 TYPE 2 DIABETES MELLITUS WITH HYPERGLYCEMIA, WITH LONG-TERM CURRENT USE OF INSULIN: ICD-10-CM

## 2024-08-01 DIAGNOSIS — G25.81 RLS (RESTLESS LEGS SYNDROME): ICD-10-CM

## 2024-08-01 DIAGNOSIS — Z79.4 TYPE 2 DIABETES MELLITUS WITH HYPERGLYCEMIA, WITH LONG-TERM CURRENT USE OF INSULIN: ICD-10-CM

## 2024-08-01 DIAGNOSIS — S91.302A OPEN WOUND OF HEEL, LEFT, INITIAL ENCOUNTER: ICD-10-CM

## 2024-08-01 PROBLEM — N39.0 UTI (URINARY TRACT INFECTION): Status: ACTIVE | Noted: 2024-08-01

## 2024-08-01 LAB
ALBUMIN SERPL-MCNC: 3.1 G/DL (ref 3.5–5.2)
ALBUMIN/GLOB SERPL: 1.1 G/DL
ALP SERPL-CCNC: 195 U/L (ref 39–117)
ALT SERPL W P-5'-P-CCNC: 8 U/L (ref 1–41)
ANION GAP SERPL CALCULATED.3IONS-SCNC: 15 MMOL/L (ref 5–15)
AST SERPL-CCNC: 17 U/L (ref 1–40)
ATMOSPHERIC PRESS: ABNORMAL MM[HG]
BACTERIA UR QL AUTO: ABNORMAL /HPF
BASE EXCESS BLDV CALC-SCNC: 2.5 MMOL/L (ref -2–2)
BASOPHILS # BLD AUTO: 0.08 10*3/MM3 (ref 0–0.2)
BASOPHILS NFR BLD AUTO: 0.8 % (ref 0–1.5)
BDY SITE: ABNORMAL
BILIRUB SERPL-MCNC: 0.2 MG/DL (ref 0–1.2)
BILIRUB UR QL STRIP: NEGATIVE
BODY TEMPERATURE: 37
BUN SERPL-MCNC: 12 MG/DL (ref 8–23)
BUN/CREAT SERPL: 11.5 (ref 7–25)
CALCIUM SPEC-SCNC: 8.1 MG/DL (ref 8.6–10.5)
CHLORIDE SERPL-SCNC: 82 MMOL/L (ref 98–107)
CLARITY UR: CLEAR
CO2 BLDA-SCNC: 29.7 MMOL/L (ref 22–33)
CO2 SERPL-SCNC: 27 MMOL/L (ref 22–29)
COHGB MFR BLD: 1.5 %
COLOR UR: YELLOW
CREAT SERPL-MCNC: 1.04 MG/DL (ref 0.76–1.27)
CRP SERPL-MCNC: 3.08 MG/DL (ref 0–0.5)
D-LACTATE SERPL-SCNC: 2 MMOL/L (ref 0.5–2)
D-LACTATE SERPL-SCNC: 2.2 MMOL/L (ref 0.5–2)
DEPRECATED RDW RBC AUTO: 39.1 FL (ref 37–54)
EGFRCR SERPLBLD CKD-EPI 2021: 82.2 ML/MIN/1.73
EOSINOPHIL # BLD AUTO: 0.15 10*3/MM3 (ref 0–0.4)
EOSINOPHIL NFR BLD AUTO: 1.4 % (ref 0.3–6.2)
EPAP: 0
ERYTHROCYTE [DISTWIDTH] IN BLOOD BY AUTOMATED COUNT: 12.9 % (ref 12.3–15.4)
ERYTHROCYTE [SEDIMENTATION RATE] IN BLOOD: 84 MM/HR (ref 0–20)
GLOBULIN UR ELPH-MCNC: 2.9 GM/DL
GLUCOSE BLDC GLUCOMTR-MCNC: 296 MG/DL (ref 70–130)
GLUCOSE BLDC GLUCOMTR-MCNC: 370 MG/DL (ref 70–130)
GLUCOSE BLDC GLUCOMTR-MCNC: >599 MG/DL (ref 70–130)
GLUCOSE BLDC GLUCOMTR-MCNC: >599 MG/DL (ref 70–130)
GLUCOSE SERPL-MCNC: 680 MG/DL (ref 65–99)
GLUCOSE UR STRIP-MCNC: ABNORMAL MG/DL
HBA1C MFR BLD: 15.8 % (ref 4.8–5.6)
HCO3 BLDV-SCNC: 28.2 MMOL/L (ref 22–28)
HCT VFR BLD AUTO: 39 % (ref 37.5–51)
HGB BLD-MCNC: 12.4 G/DL (ref 13–17.7)
HGB BLDA-MCNC: 12.9 G/DL (ref 13.5–17.5)
HGB UR QL STRIP.AUTO: ABNORMAL
HYALINE CASTS UR QL AUTO: ABNORMAL /LPF
IMM GRANULOCYTES # BLD AUTO: 0.28 10*3/MM3 (ref 0–0.05)
IMM GRANULOCYTES NFR BLD AUTO: 2.7 % (ref 0–0.5)
INHALED O2 CONCENTRATION: 21 %
IPAP: 0
KETONES UR QL STRIP: ABNORMAL
LEUKOCYTE ESTERASE UR QL STRIP.AUTO: NEGATIVE
LYMPHOCYTES # BLD AUTO: 1.51 10*3/MM3 (ref 0.7–3.1)
LYMPHOCYTES NFR BLD AUTO: 14.3 % (ref 19.6–45.3)
MCH RBC QN AUTO: 26.3 PG (ref 26.6–33)
MCHC RBC AUTO-ENTMCNC: 31.8 G/DL (ref 31.5–35.7)
MCV RBC AUTO: 82.8 FL (ref 79–97)
METHGB BLD QL: 0.4 %
MODALITY: ABNORMAL
MONOCYTES # BLD AUTO: 0.72 10*3/MM3 (ref 0.1–0.9)
MONOCYTES NFR BLD AUTO: 6.8 % (ref 5–12)
NEUTROPHILS NFR BLD AUTO: 7.82 10*3/MM3 (ref 1.7–7)
NEUTROPHILS NFR BLD AUTO: 74 % (ref 42.7–76)
NITRITE UR QL STRIP: NEGATIVE
NRBC BLD AUTO-RTO: 0 /100 WBC (ref 0–0.2)
OXYHGB MFR BLDV: 51.7 %
PAW @ PEAK INSP FLOW SETTING VENT: 0 CMH2O
PCO2 BLDV: 47.4 MM HG (ref 41–51)
PH BLDV: 7.38 PH UNITS (ref 7.31–7.41)
PH UR STRIP.AUTO: 5.5 [PH] (ref 5–8)
PLATELET # BLD AUTO: 315 10*3/MM3 (ref 140–450)
PMV BLD AUTO: 10.6 FL (ref 6–12)
PO2 BLDV: 29.2 MM HG (ref 27–53)
POTASSIUM SERPL-SCNC: 4.5 MMOL/L (ref 3.5–5.2)
PROCALCITONIN SERPL-MCNC: 0.14 NG/ML (ref 0–0.25)
PROT SERPL-MCNC: 6 G/DL (ref 6–8.5)
PROT UR QL STRIP: NEGATIVE
RBC # BLD AUTO: 4.71 10*6/MM3 (ref 4.14–5.8)
RBC # UR STRIP: ABNORMAL /HPF
REF LAB TEST METHOD: ABNORMAL
SODIUM SERPL-SCNC: 124 MMOL/L (ref 136–145)
SP GR UR STRIP: 1.04 (ref 1–1.03)
SQUAMOUS #/AREA URNS HPF: ABNORMAL /HPF
TOTAL RATE: 0 BREATHS/MINUTE
UROBILINOGEN UR QL STRIP: ABNORMAL
WBC # UR STRIP: ABNORMAL /HPF
WBC NRBC COR # BLD AUTO: 10.56 10*3/MM3 (ref 3.4–10.8)

## 2024-08-01 PROCEDURE — 87040 BLOOD CULTURE FOR BACTERIA: CPT | Performed by: EMERGENCY MEDICINE

## 2024-08-01 PROCEDURE — 72100 X-RAY EXAM L-S SPINE 2/3 VWS: CPT

## 2024-08-01 PROCEDURE — 82948 REAGENT STRIP/BLOOD GLUCOSE: CPT

## 2024-08-01 PROCEDURE — 25010000002 MEROPENEM PER 100 MG: Performed by: INTERNAL MEDICINE

## 2024-08-01 PROCEDURE — 63710000001 INSULIN GLARGINE PER 5 UNITS: Performed by: INTERNAL MEDICINE

## 2024-08-01 PROCEDURE — 83036 HEMOGLOBIN GLYCOSYLATED A1C: CPT | Performed by: INTERNAL MEDICINE

## 2024-08-01 PROCEDURE — 25010000002 VANCOMYCIN HCL IN NACL 1.75-0.9 GM/500ML-% SOLUTION: Performed by: EMERGENCY MEDICINE

## 2024-08-01 PROCEDURE — 25810000003 SODIUM CHLORIDE 0.9 % SOLUTION: Performed by: EMERGENCY MEDICINE

## 2024-08-01 PROCEDURE — 86140 C-REACTIVE PROTEIN: CPT | Performed by: EMERGENCY MEDICINE

## 2024-08-01 PROCEDURE — 63710000001 INSULIN REGULAR HUMAN PER 5 UNITS: Performed by: EMERGENCY MEDICINE

## 2024-08-01 PROCEDURE — 84145 PROCALCITONIN (PCT): CPT | Performed by: EMERGENCY MEDICINE

## 2024-08-01 PROCEDURE — 99223 1ST HOSP IP/OBS HIGH 75: CPT | Performed by: INTERNAL MEDICINE

## 2024-08-01 PROCEDURE — 63710000001 INSULIN LISPRO (HUMAN) PER 5 UNITS: Performed by: INTERNAL MEDICINE

## 2024-08-01 PROCEDURE — 85025 COMPLETE CBC W/AUTO DIFF WBC: CPT | Performed by: EMERGENCY MEDICINE

## 2024-08-01 PROCEDURE — 36415 COLL VENOUS BLD VENIPUNCTURE: CPT

## 2024-08-01 PROCEDURE — 25010000002 ENOXAPARIN PER 10 MG: Performed by: INTERNAL MEDICINE

## 2024-08-01 PROCEDURE — 73502 X-RAY EXAM HIP UNI 2-3 VIEWS: CPT

## 2024-08-01 PROCEDURE — 99285 EMERGENCY DEPT VISIT HI MDM: CPT

## 2024-08-01 PROCEDURE — P9612 CATHETERIZE FOR URINE SPEC: HCPCS

## 2024-08-01 PROCEDURE — 80053 COMPREHEN METABOLIC PANEL: CPT | Performed by: EMERGENCY MEDICINE

## 2024-08-01 PROCEDURE — 73718 MRI LOWER EXTREMITY W/O DYE: CPT

## 2024-08-01 PROCEDURE — 85652 RBC SED RATE AUTOMATED: CPT | Performed by: EMERGENCY MEDICINE

## 2024-08-01 PROCEDURE — 81001 URINALYSIS AUTO W/SCOPE: CPT | Performed by: EMERGENCY MEDICINE

## 2024-08-01 PROCEDURE — 83605 ASSAY OF LACTIC ACID: CPT | Performed by: EMERGENCY MEDICINE

## 2024-08-01 PROCEDURE — 82805 BLOOD GASES W/O2 SATURATION: CPT

## 2024-08-01 RX ORDER — BISACODYL 10 MG
10 SUPPOSITORY, RECTAL RECTAL DAILY PRN
Status: DISCONTINUED | OUTPATIENT
Start: 2024-08-01 | End: 2024-08-06 | Stop reason: HOSPADM

## 2024-08-01 RX ORDER — INSULIN LISPRO 100 [IU]/ML
2-7 INJECTION, SOLUTION INTRAVENOUS; SUBCUTANEOUS
Status: DISCONTINUED | OUTPATIENT
Start: 2024-08-01 | End: 2024-08-06 | Stop reason: HOSPADM

## 2024-08-01 RX ORDER — SODIUM CHLORIDE 0.9 % (FLUSH) 0.9 %
10 SYRINGE (ML) INJECTION AS NEEDED
Status: DISCONTINUED | OUTPATIENT
Start: 2024-08-01 | End: 2024-08-06 | Stop reason: HOSPADM

## 2024-08-01 RX ORDER — ENOXAPARIN SODIUM 100 MG/ML
40 INJECTION SUBCUTANEOUS NIGHTLY
Status: DISCONTINUED | OUTPATIENT
Start: 2024-08-01 | End: 2024-08-06 | Stop reason: HOSPADM

## 2024-08-01 RX ORDER — PANTOPRAZOLE SODIUM 40 MG/1
40 TABLET, DELAYED RELEASE ORAL
Status: DISCONTINUED | OUTPATIENT
Start: 2024-08-02 | End: 2024-08-06 | Stop reason: HOSPADM

## 2024-08-01 RX ORDER — TAMSULOSIN HYDROCHLORIDE 0.4 MG/1
0.4 CAPSULE ORAL NIGHTLY
Status: DISCONTINUED | OUTPATIENT
Start: 2024-08-01 | End: 2024-08-06 | Stop reason: HOSPADM

## 2024-08-01 RX ORDER — IBUPROFEN 600 MG/1
1 TABLET ORAL
Status: DISCONTINUED | OUTPATIENT
Start: 2024-08-01 | End: 2024-08-06 | Stop reason: HOSPADM

## 2024-08-01 RX ORDER — VANCOMYCIN 1.75 GRAM/500 ML IN 0.9 % SODIUM CHLORIDE INTRAVENOUS
20 ONCE
Status: COMPLETED | OUTPATIENT
Start: 2024-08-01 | End: 2024-08-01

## 2024-08-01 RX ORDER — ACETAMINOPHEN 325 MG/1
650 TABLET ORAL EVERY 4 HOURS PRN
Status: DISCONTINUED | OUTPATIENT
Start: 2024-08-01 | End: 2024-08-06 | Stop reason: HOSPADM

## 2024-08-01 RX ORDER — ACETAMINOPHEN 160 MG/5ML
650 SOLUTION ORAL EVERY 4 HOURS PRN
Status: DISCONTINUED | OUTPATIENT
Start: 2024-08-01 | End: 2024-08-06 | Stop reason: HOSPADM

## 2024-08-01 RX ORDER — NICOTINE POLACRILEX 4 MG
15 LOZENGE BUCCAL
Status: DISCONTINUED | OUTPATIENT
Start: 2024-08-01 | End: 2024-08-06 | Stop reason: HOSPADM

## 2024-08-01 RX ORDER — ONDANSETRON 2 MG/ML
4 INJECTION INTRAMUSCULAR; INTRAVENOUS EVERY 6 HOURS PRN
Status: DISCONTINUED | OUTPATIENT
Start: 2024-08-01 | End: 2024-08-06 | Stop reason: HOSPADM

## 2024-08-01 RX ORDER — DEXTROSE MONOHYDRATE 25 G/50ML
25 INJECTION, SOLUTION INTRAVENOUS
Status: DISCONTINUED | OUTPATIENT
Start: 2024-08-01 | End: 2024-08-06 | Stop reason: HOSPADM

## 2024-08-01 RX ORDER — ROPINIROLE 0.5 MG/1
0.25 TABLET, FILM COATED ORAL NIGHTLY
Status: DISCONTINUED | OUTPATIENT
Start: 2024-08-01 | End: 2024-08-06 | Stop reason: HOSPADM

## 2024-08-01 RX ORDER — ACETAMINOPHEN 650 MG/1
650 SUPPOSITORY RECTAL EVERY 4 HOURS PRN
Status: DISCONTINUED | OUTPATIENT
Start: 2024-08-01 | End: 2024-08-06 | Stop reason: HOSPADM

## 2024-08-01 RX ORDER — AMOXICILLIN 250 MG
2 CAPSULE ORAL 2 TIMES DAILY PRN
Status: DISCONTINUED | OUTPATIENT
Start: 2024-08-01 | End: 2024-08-06 | Stop reason: HOSPADM

## 2024-08-01 RX ORDER — ONDANSETRON 4 MG/1
4 TABLET, ORALLY DISINTEGRATING ORAL EVERY 6 HOURS PRN
Status: DISCONTINUED | OUTPATIENT
Start: 2024-08-01 | End: 2024-08-06 | Stop reason: HOSPADM

## 2024-08-01 RX ORDER — ASPIRIN 81 MG/1
81 TABLET ORAL DAILY
Status: DISCONTINUED | OUTPATIENT
Start: 2024-08-02 | End: 2024-08-06 | Stop reason: HOSPADM

## 2024-08-01 RX ORDER — BISACODYL 5 MG/1
5 TABLET, DELAYED RELEASE ORAL DAILY PRN
Status: DISCONTINUED | OUTPATIENT
Start: 2024-08-01 | End: 2024-08-06 | Stop reason: HOSPADM

## 2024-08-01 RX ORDER — FINASTERIDE 5 MG/1
5 TABLET, FILM COATED ORAL NIGHTLY
Status: DISCONTINUED | OUTPATIENT
Start: 2024-08-01 | End: 2024-08-06 | Stop reason: HOSPADM

## 2024-08-01 RX ORDER — SODIUM CHLORIDE 9 MG/ML
75 INJECTION, SOLUTION INTRAVENOUS CONTINUOUS
Status: ACTIVE | OUTPATIENT
Start: 2024-08-01 | End: 2024-08-03

## 2024-08-01 RX ORDER — HYDROCODONE BITARTRATE AND ACETAMINOPHEN 5; 325 MG/1; MG/1
1 TABLET ORAL ONCE
Status: COMPLETED | OUTPATIENT
Start: 2024-08-01 | End: 2024-08-01

## 2024-08-01 RX ORDER — METOPROLOL SUCCINATE 50 MG/1
50 TABLET, EXTENDED RELEASE ORAL DAILY
Status: DISCONTINUED | OUTPATIENT
Start: 2024-08-02 | End: 2024-08-06 | Stop reason: HOSPADM

## 2024-08-01 RX ORDER — AMITRIPTYLINE HYDROCHLORIDE 25 MG/1
25 TABLET, FILM COATED ORAL NIGHTLY
Status: DISCONTINUED | OUTPATIENT
Start: 2024-08-01 | End: 2024-08-06 | Stop reason: HOSPADM

## 2024-08-01 RX ORDER — PREGABALIN 100 MG/1
100 CAPSULE ORAL DAILY
Status: DISCONTINUED | OUTPATIENT
Start: 2024-08-02 | End: 2024-08-06 | Stop reason: HOSPADM

## 2024-08-01 RX ORDER — LACTULOSE 10 G/15ML
30 SOLUTION ORAL ONCE
Status: COMPLETED | OUTPATIENT
Start: 2024-08-01 | End: 2024-08-01

## 2024-08-01 RX ORDER — OXYCODONE HYDROCHLORIDE AND ACETAMINOPHEN 5; 325 MG/1; MG/1
2 TABLET ORAL EVERY 4 HOURS PRN
Status: DISCONTINUED | OUTPATIENT
Start: 2024-08-01 | End: 2024-08-06 | Stop reason: HOSPADM

## 2024-08-01 RX ORDER — SODIUM CHLORIDE 9 MG/ML
40 INJECTION, SOLUTION INTRAVENOUS AS NEEDED
Status: DISCONTINUED | OUTPATIENT
Start: 2024-08-01 | End: 2024-08-06 | Stop reason: HOSPADM

## 2024-08-01 RX ORDER — POTASSIUM CHLORIDE 1.5 G/1.58G
40 POWDER, FOR SOLUTION ORAL ONCE
Status: COMPLETED | OUTPATIENT
Start: 2024-08-01 | End: 2024-08-01

## 2024-08-01 RX ORDER — ALBUTEROL SULFATE 90 UG/1
2 AEROSOL, METERED RESPIRATORY (INHALATION) EVERY 4 HOURS PRN
Status: DISCONTINUED | OUTPATIENT
Start: 2024-08-01 | End: 2024-08-06 | Stop reason: HOSPADM

## 2024-08-01 RX ORDER — POLYETHYLENE GLYCOL 3350 17 G/17G
17 POWDER, FOR SOLUTION ORAL DAILY PRN
Status: DISCONTINUED | OUTPATIENT
Start: 2024-08-01 | End: 2024-08-06 | Stop reason: HOSPADM

## 2024-08-01 RX ORDER — SODIUM CHLORIDE 0.9 % (FLUSH) 0.9 %
10 SYRINGE (ML) INJECTION EVERY 12 HOURS SCHEDULED
Status: DISCONTINUED | OUTPATIENT
Start: 2024-08-01 | End: 2024-08-06 | Stop reason: HOSPADM

## 2024-08-01 RX ORDER — BISACODYL 5 MG/1
20 TABLET, DELAYED RELEASE ORAL ONCE
Status: COMPLETED | OUTPATIENT
Start: 2024-08-01 | End: 2024-08-01

## 2024-08-01 RX ORDER — FLUTICASONE PROPIONATE 50 MCG
2 SPRAY, SUSPENSION (ML) NASAL DAILY
Status: DISCONTINUED | OUTPATIENT
Start: 2024-08-02 | End: 2024-08-06 | Stop reason: HOSPADM

## 2024-08-01 RX ADMIN — ROPINIROLE 0.25 MG: 0.5 TABLET, FILM COATED ORAL at 20:40

## 2024-08-01 RX ADMIN — INSULIN LISPRO 4 UNITS: 100 INJECTION, SOLUTION INTRAVENOUS; SUBCUTANEOUS at 20:39

## 2024-08-01 RX ADMIN — POTASSIUM CHLORIDE FOR ORAL SOLUTION 40 MEQ: 1.5 POWDER, FOR SOLUTION ORAL at 13:52

## 2024-08-01 RX ADMIN — INSULIN HUMAN 10 UNITS: 100 INJECTION, SOLUTION PARENTERAL at 13:49

## 2024-08-01 RX ADMIN — TAMSULOSIN HYDROCHLORIDE 0.4 MG: 0.4 CAPSULE ORAL at 20:35

## 2024-08-01 RX ADMIN — SENNOSIDES AND DOCUSATE SODIUM 2 TABLET: 50; 8.6 TABLET ORAL at 18:33

## 2024-08-01 RX ADMIN — SODIUM CHLORIDE 1000 ML: 9 INJECTION, SOLUTION INTRAVENOUS at 14:39

## 2024-08-01 RX ADMIN — ENOXAPARIN SODIUM 40 MG: 100 INJECTION SUBCUTANEOUS at 20:35

## 2024-08-01 RX ADMIN — INSULIN GLARGINE 25 UNITS: 100 INJECTION, SOLUTION SUBCUTANEOUS at 18:33

## 2024-08-01 RX ADMIN — LACTULOSE 30 G: 20 SOLUTION ORAL at 20:36

## 2024-08-01 RX ADMIN — HYDROCODONE BITARTRATE AND ACETAMINOPHEN 1 TABLET: 5; 325 TABLET ORAL at 12:59

## 2024-08-01 RX ADMIN — FINASTERIDE 5 MG: 5 TABLET, FILM COATED ORAL at 20:36

## 2024-08-01 RX ADMIN — Medication 1750 MG: at 21:51

## 2024-08-01 RX ADMIN — INSULIN LISPRO 6 UNITS: 100 INJECTION, SOLUTION INTRAVENOUS; SUBCUTANEOUS at 18:33

## 2024-08-01 RX ADMIN — BISACODYL 20 MG: 5 TABLET, COATED ORAL at 20:36

## 2024-08-01 RX ADMIN — AMITRIPTYLINE HYDROCHLORIDE 25 MG: 25 TABLET, FILM COATED ORAL at 20:35

## 2024-08-01 RX ADMIN — POLYETHYLENE GLYCOL 3350 17 G: 17 POWDER, FOR SOLUTION ORAL at 18:33

## 2024-08-01 RX ADMIN — SODIUM CHLORIDE 1000 ML: 9 INJECTION, SOLUTION INTRAVENOUS at 13:50

## 2024-08-01 RX ADMIN — BISACODYL 10 MG: 10 SUPPOSITORY RECTAL at 23:13

## 2024-08-01 RX ADMIN — MEROPENEM 1000 MG: 1 INJECTION, POWDER, FOR SOLUTION INTRAVENOUS at 18:34

## 2024-08-02 LAB
ALBUMIN SERPL-MCNC: 2.7 G/DL (ref 3.5–5.2)
ALBUMIN/GLOB SERPL: 0.8 G/DL
ALP SERPL-CCNC: 110 U/L (ref 39–117)
ALT SERPL W P-5'-P-CCNC: 6 U/L (ref 1–41)
ANION GAP SERPL CALCULATED.3IONS-SCNC: 11 MMOL/L (ref 5–15)
AST SERPL-CCNC: 13 U/L (ref 1–40)
BASOPHILS # BLD AUTO: 0.09 10*3/MM3 (ref 0–0.2)
BASOPHILS NFR BLD AUTO: 0.7 % (ref 0–1.5)
BILIRUB SERPL-MCNC: 0.2 MG/DL (ref 0–1.2)
BUN SERPL-MCNC: 7 MG/DL (ref 8–23)
BUN/CREAT SERPL: 11.5 (ref 7–25)
CALCIUM SPEC-SCNC: 8.1 MG/DL (ref 8.6–10.5)
CHLORIDE SERPL-SCNC: 98 MMOL/L (ref 98–107)
CK SERPL-CCNC: 75 U/L (ref 20–200)
CO2 SERPL-SCNC: 24 MMOL/L (ref 22–29)
CREAT SERPL-MCNC: 0.61 MG/DL (ref 0.76–1.27)
DEPRECATED RDW RBC AUTO: 36.7 FL (ref 37–54)
EGFRCR SERPLBLD CKD-EPI 2021: 110 ML/MIN/1.73
EOSINOPHIL # BLD AUTO: 0.12 10*3/MM3 (ref 0–0.4)
EOSINOPHIL NFR BLD AUTO: 1 % (ref 0.3–6.2)
ERYTHROCYTE [DISTWIDTH] IN BLOOD BY AUTOMATED COUNT: 12.9 % (ref 12.3–15.4)
GLOBULIN UR ELPH-MCNC: 3.3 GM/DL
GLUCOSE BLDC GLUCOMTR-MCNC: 243 MG/DL (ref 70–130)
GLUCOSE BLDC GLUCOMTR-MCNC: 274 MG/DL (ref 70–130)
GLUCOSE BLDC GLUCOMTR-MCNC: 316 MG/DL (ref 70–130)
GLUCOSE BLDC GLUCOMTR-MCNC: 387 MG/DL (ref 70–130)
GLUCOSE SERPL-MCNC: 243 MG/DL (ref 65–99)
HCT VFR BLD AUTO: 35.9 % (ref 37.5–51)
HGB BLD-MCNC: 12 G/DL (ref 13–17.7)
IMM GRANULOCYTES # BLD AUTO: 0.14 10*3/MM3 (ref 0–0.05)
IMM GRANULOCYTES NFR BLD AUTO: 1.1 % (ref 0–0.5)
LYMPHOCYTES # BLD AUTO: 2.16 10*3/MM3 (ref 0.7–3.1)
LYMPHOCYTES NFR BLD AUTO: 17.4 % (ref 19.6–45.3)
MAGNESIUM SERPL-MCNC: 1.6 MG/DL (ref 1.6–2.4)
MCH RBC QN AUTO: 26.7 PG (ref 26.6–33)
MCHC RBC AUTO-ENTMCNC: 33.4 G/DL (ref 31.5–35.7)
MCV RBC AUTO: 79.8 FL (ref 79–97)
MONOCYTES # BLD AUTO: 0.88 10*3/MM3 (ref 0.1–0.9)
MONOCYTES NFR BLD AUTO: 7.1 % (ref 5–12)
NEUTROPHILS NFR BLD AUTO: 72.7 % (ref 42.7–76)
NEUTROPHILS NFR BLD AUTO: 9.02 10*3/MM3 (ref 1.7–7)
NRBC BLD AUTO-RTO: 0 /100 WBC (ref 0–0.2)
PHOSPHATE SERPL-MCNC: 2.8 MG/DL (ref 2.5–4.5)
PLATELET # BLD AUTO: 296 10*3/MM3 (ref 140–450)
PMV BLD AUTO: 10.5 FL (ref 6–12)
POTASSIUM SERPL-SCNC: 3.8 MMOL/L (ref 3.5–5.2)
PROT SERPL-MCNC: 6 G/DL (ref 6–8.5)
RBC # BLD AUTO: 4.5 10*6/MM3 (ref 4.14–5.8)
SODIUM SERPL-SCNC: 133 MMOL/L (ref 136–145)
VANCOMYCIN SERPL-MCNC: 14.4 MCG/ML (ref 5–40)
WBC NRBC COR # BLD AUTO: 12.41 10*3/MM3 (ref 3.4–10.8)

## 2024-08-02 PROCEDURE — 97165 OT EVAL LOW COMPLEX 30 MIN: CPT

## 2024-08-02 PROCEDURE — 80202 ASSAY OF VANCOMYCIN: CPT

## 2024-08-02 PROCEDURE — 25010000002 VANCOMYCIN 1 G RECONSTITUTED SOLUTION 1 EACH VIAL

## 2024-08-02 PROCEDURE — 25010000002 ENOXAPARIN PER 10 MG: Performed by: INTERNAL MEDICINE

## 2024-08-02 PROCEDURE — 83735 ASSAY OF MAGNESIUM: CPT | Performed by: INTERNAL MEDICINE

## 2024-08-02 PROCEDURE — 63710000001 INSULIN LISPRO (HUMAN) PER 5 UNITS: Performed by: INTERNAL MEDICINE

## 2024-08-02 PROCEDURE — 99232 SBSQ HOSP IP/OBS MODERATE 35: CPT | Performed by: STUDENT IN AN ORGANIZED HEALTH CARE EDUCATION/TRAINING PROGRAM

## 2024-08-02 PROCEDURE — 82948 REAGENT STRIP/BLOOD GLUCOSE: CPT

## 2024-08-02 PROCEDURE — 63710000001 INSULIN GLARGINE PER 5 UNITS: Performed by: INTERNAL MEDICINE

## 2024-08-02 PROCEDURE — 84100 ASSAY OF PHOSPHORUS: CPT | Performed by: INTERNAL MEDICINE

## 2024-08-02 PROCEDURE — 25010000002 MEROPENEM PER 100 MG: Performed by: INTERNAL MEDICINE

## 2024-08-02 PROCEDURE — 85025 COMPLETE CBC W/AUTO DIFF WBC: CPT | Performed by: INTERNAL MEDICINE

## 2024-08-02 PROCEDURE — 25010000002 DAPTOMYCIN PER 1 MG: Performed by: INTERNAL MEDICINE

## 2024-08-02 PROCEDURE — 82550 ASSAY OF CK (CPK): CPT | Performed by: INTERNAL MEDICINE

## 2024-08-02 PROCEDURE — 97161 PT EVAL LOW COMPLEX 20 MIN: CPT

## 2024-08-02 PROCEDURE — 25810000003 SODIUM CHLORIDE 0.9 % SOLUTION: Performed by: INTERNAL MEDICINE

## 2024-08-02 PROCEDURE — 25810000003 SODIUM CHLORIDE 0.9 % SOLUTION 250 ML FLEX CONT

## 2024-08-02 PROCEDURE — 80053 COMPREHEN METABOLIC PANEL: CPT | Performed by: INTERNAL MEDICINE

## 2024-08-02 RX ADMIN — OXYCODONE HYDROCHLORIDE AND ACETAMINOPHEN 2 TABLET: 5; 325 TABLET ORAL at 20:07

## 2024-08-02 RX ADMIN — PANTOPRAZOLE SODIUM 40 MG: 40 TABLET, DELAYED RELEASE ORAL at 06:04

## 2024-08-02 RX ADMIN — PREGABALIN 100 MG: 100 CAPSULE ORAL at 09:03

## 2024-08-02 RX ADMIN — Medication 10 ML: at 09:00

## 2024-08-02 RX ADMIN — INSULIN LISPRO 5 UNITS: 100 INJECTION, SOLUTION INTRAVENOUS; SUBCUTANEOUS at 20:15

## 2024-08-02 RX ADMIN — COLLAGENASE SANTYL 1 APPLICATION: 250 OINTMENT TOPICAL at 17:41

## 2024-08-02 RX ADMIN — ENOXAPARIN SODIUM 40 MG: 100 INJECTION SUBCUTANEOUS at 20:06

## 2024-08-02 RX ADMIN — INSULIN GLARGINE 25 UNITS: 100 INJECTION, SOLUTION SUBCUTANEOUS at 20:06

## 2024-08-02 RX ADMIN — METOPROLOL SUCCINATE 50 MG: 25 TABLET, EXTENDED RELEASE ORAL at 09:03

## 2024-08-02 RX ADMIN — INSULIN GLARGINE 25 UNITS: 100 INJECTION, SOLUTION SUBCUTANEOUS at 09:01

## 2024-08-02 RX ADMIN — SODIUM CHLORIDE 75 ML/HR: 9 INJECTION, SOLUTION INTRAVENOUS at 17:41

## 2024-08-02 RX ADMIN — INSULIN LISPRO 3 UNITS: 100 INJECTION, SOLUTION INTRAVENOUS; SUBCUTANEOUS at 09:02

## 2024-08-02 RX ADMIN — Medication 10 ML: at 20:08

## 2024-08-02 RX ADMIN — ROPINIROLE 0.25 MG: 0.5 TABLET, FILM COATED ORAL at 20:07

## 2024-08-02 RX ADMIN — FINASTERIDE 5 MG: 5 TABLET, FILM COATED ORAL at 20:15

## 2024-08-02 RX ADMIN — MEROPENEM 1000 MG: 1 INJECTION, POWDER, FOR SOLUTION INTRAVENOUS at 04:37

## 2024-08-02 RX ADMIN — ASPIRIN 81 MG: 81 TABLET, COATED ORAL at 09:04

## 2024-08-02 RX ADMIN — INSULIN LISPRO 6 UNITS: 100 INJECTION, SOLUTION INTRAVENOUS; SUBCUTANEOUS at 17:41

## 2024-08-02 RX ADMIN — AMITRIPTYLINE HYDROCHLORIDE 25 MG: 25 TABLET, FILM COATED ORAL at 20:07

## 2024-08-02 RX ADMIN — SODIUM CHLORIDE 75 ML/HR: 9 INJECTION, SOLUTION INTRAVENOUS at 01:00

## 2024-08-02 RX ADMIN — OXYCODONE HYDROCHLORIDE AND ACETAMINOPHEN 2 TABLET: 5; 325 TABLET ORAL at 01:47

## 2024-08-02 RX ADMIN — MEROPENEM 1000 MG: 1 INJECTION, POWDER, FOR SOLUTION INTRAVENOUS at 20:08

## 2024-08-02 RX ADMIN — VANCOMYCIN HYDROCHLORIDE 1000 MG: 1 INJECTION, POWDER, LYOPHILIZED, FOR SOLUTION INTRAVENOUS at 09:00

## 2024-08-02 RX ADMIN — TAMSULOSIN HYDROCHLORIDE 0.4 MG: 0.4 CAPSULE ORAL at 20:07

## 2024-08-02 RX ADMIN — DAPTOMYCIN 300 MG: 500 INJECTION, POWDER, LYOPHILIZED, FOR SOLUTION INTRAVENOUS at 11:09

## 2024-08-02 RX ADMIN — MEROPENEM 1000 MG: 1 INJECTION, POWDER, FOR SOLUTION INTRAVENOUS at 12:53

## 2024-08-02 RX ADMIN — INSULIN LISPRO 4 UNITS: 100 INJECTION, SOLUTION INTRAVENOUS; SUBCUTANEOUS at 11:57

## 2024-08-02 RX ADMIN — POLYETHYLENE GLYCOL 3350 17 G: 17 POWDER, FOR SOLUTION ORAL at 12:54

## 2024-08-02 RX ADMIN — FLUTICASONE PROPIONATE 2 SPRAY: 50 SPRAY, METERED NASAL at 09:04

## 2024-08-02 RX ADMIN — SENNOSIDES AND DOCUSATE SODIUM 2 TABLET: 50; 8.6 TABLET ORAL at 12:54

## 2024-08-03 ENCOUNTER — ANESTHESIA (OUTPATIENT)
Dept: PERIOP | Facility: HOSPITAL | Age: 59
DRG: 623 | End: 2024-08-03
Payer: COMMERCIAL

## 2024-08-03 ENCOUNTER — ANESTHESIA EVENT (OUTPATIENT)
Dept: PERIOP | Facility: HOSPITAL | Age: 59
DRG: 623 | End: 2024-08-03
Payer: COMMERCIAL

## 2024-08-03 LAB
ANION GAP SERPL CALCULATED.3IONS-SCNC: 8 MMOL/L (ref 5–15)
BASOPHILS # BLD AUTO: 0.06 10*3/MM3 (ref 0–0.2)
BASOPHILS NFR BLD AUTO: 0.7 % (ref 0–1.5)
BUN SERPL-MCNC: 7 MG/DL (ref 8–23)
BUN/CREAT SERPL: 10.1 (ref 7–25)
CALCIUM SPEC-SCNC: 7.7 MG/DL (ref 8.6–10.5)
CHLORIDE SERPL-SCNC: 101 MMOL/L (ref 98–107)
CO2 SERPL-SCNC: 27 MMOL/L (ref 22–29)
CREAT SERPL-MCNC: 0.69 MG/DL (ref 0.76–1.27)
DEPRECATED RDW RBC AUTO: 40 FL (ref 37–54)
EGFRCR SERPLBLD CKD-EPI 2021: 105.9 ML/MIN/1.73
EOSINOPHIL # BLD AUTO: 0.24 10*3/MM3 (ref 0–0.4)
EOSINOPHIL NFR BLD AUTO: 2.8 % (ref 0.3–6.2)
ERYTHROCYTE [DISTWIDTH] IN BLOOD BY AUTOMATED COUNT: 13.3 % (ref 12.3–15.4)
GLUCOSE BLDC GLUCOMTR-MCNC: 263 MG/DL (ref 70–130)
GLUCOSE BLDC GLUCOMTR-MCNC: 298 MG/DL (ref 70–130)
GLUCOSE BLDC GLUCOMTR-MCNC: 357 MG/DL (ref 70–130)
GLUCOSE BLDC GLUCOMTR-MCNC: 425 MG/DL (ref 70–130)
GLUCOSE BLDC GLUCOMTR-MCNC: 474 MG/DL (ref 70–130)
GLUCOSE BLDC GLUCOMTR-MCNC: 474 MG/DL (ref 70–130)
GLUCOSE BLDC GLUCOMTR-MCNC: 501 MG/DL (ref 70–130)
GLUCOSE SERPL-MCNC: 296 MG/DL (ref 65–99)
HCT VFR BLD AUTO: 35.9 % (ref 37.5–51)
HGB BLD-MCNC: 11.4 G/DL (ref 13–17.7)
IMM GRANULOCYTES # BLD AUTO: 0.13 10*3/MM3 (ref 0–0.05)
IMM GRANULOCYTES NFR BLD AUTO: 1.5 % (ref 0–0.5)
LYMPHOCYTES # BLD AUTO: 1.51 10*3/MM3 (ref 0.7–3.1)
LYMPHOCYTES NFR BLD AUTO: 17.8 % (ref 19.6–45.3)
MCH RBC QN AUTO: 26.2 PG (ref 26.6–33)
MCHC RBC AUTO-ENTMCNC: 31.8 G/DL (ref 31.5–35.7)
MCV RBC AUTO: 82.5 FL (ref 79–97)
MONOCYTES # BLD AUTO: 0.46 10*3/MM3 (ref 0.1–0.9)
MONOCYTES NFR BLD AUTO: 5.4 % (ref 5–12)
NEUTROPHILS NFR BLD AUTO: 6.08 10*3/MM3 (ref 1.7–7)
NEUTROPHILS NFR BLD AUTO: 71.8 % (ref 42.7–76)
NRBC BLD AUTO-RTO: 0 /100 WBC (ref 0–0.2)
PLATELET # BLD AUTO: 281 10*3/MM3 (ref 140–450)
PMV BLD AUTO: 10.5 FL (ref 6–12)
POTASSIUM SERPL-SCNC: 3.8 MMOL/L (ref 3.5–5.2)
RBC # BLD AUTO: 4.35 10*6/MM3 (ref 4.14–5.8)
SODIUM SERPL-SCNC: 136 MMOL/L (ref 136–145)
WBC NRBC COR # BLD AUTO: 8.48 10*3/MM3 (ref 3.4–10.8)

## 2024-08-03 PROCEDURE — 87102 FUNGUS ISOLATION CULTURE: CPT | Performed by: ORTHOPAEDIC SURGERY

## 2024-08-03 PROCEDURE — 25010000002 ENOXAPARIN PER 10 MG: Performed by: ORTHOPAEDIC SURGERY

## 2024-08-03 PROCEDURE — 25010000002 FENTANYL CITRATE (PF) 100 MCG/2ML SOLUTION

## 2024-08-03 PROCEDURE — 87116 MYCOBACTERIA CULTURE: CPT | Performed by: ORTHOPAEDIC SURGERY

## 2024-08-03 PROCEDURE — 25010000002 MEROPENEM PER 100 MG: Performed by: INTERNAL MEDICINE

## 2024-08-03 PROCEDURE — 25010000002 DAPTOMYCIN PER 1 MG: Performed by: ORTHOPAEDIC SURGERY

## 2024-08-03 PROCEDURE — 87070 CULTURE OTHR SPECIMN AEROBIC: CPT | Performed by: PEDIATRICS

## 2024-08-03 PROCEDURE — 25010000002 DEXAMETHASONE PER 1 MG

## 2024-08-03 PROCEDURE — 99232 SBSQ HOSP IP/OBS MODERATE 35: CPT | Performed by: PEDIATRICS

## 2024-08-03 PROCEDURE — 25010000002 MEROPENEM PER 100 MG: Performed by: ORTHOPAEDIC SURGERY

## 2024-08-03 PROCEDURE — 0JBR0ZZ EXCISION OF LEFT FOOT SUBCUTANEOUS TISSUE AND FASCIA, OPEN APPROACH: ICD-10-PCS | Performed by: ORTHOPAEDIC SURGERY

## 2024-08-03 PROCEDURE — 85025 COMPLETE CBC W/AUTO DIFF WBC: CPT | Performed by: STUDENT IN AN ORGANIZED HEALTH CARE EDUCATION/TRAINING PROGRAM

## 2024-08-03 PROCEDURE — 25810000003 SODIUM CHLORIDE 0.9 % SOLUTION

## 2024-08-03 PROCEDURE — 87205 SMEAR GRAM STAIN: CPT | Performed by: PEDIATRICS

## 2024-08-03 PROCEDURE — 63710000001 INSULIN GLARGINE PER 5 UNITS: Performed by: ORTHOPAEDIC SURGERY

## 2024-08-03 PROCEDURE — 63710000001 INSULIN LISPRO (HUMAN) PER 5 UNITS: Performed by: ORTHOPAEDIC SURGERY

## 2024-08-03 PROCEDURE — 63710000001 INSULIN LISPRO (HUMAN) PER 5 UNITS: Performed by: PEDIATRICS

## 2024-08-03 PROCEDURE — 25010000002 ONDANSETRON PER 1 MG

## 2024-08-03 PROCEDURE — 25010000002 PHENYLEPHRINE 10 MG/ML SOLUTION

## 2024-08-03 PROCEDURE — 80048 BASIC METABOLIC PNL TOTAL CA: CPT

## 2024-08-03 PROCEDURE — 25010000002 PROPOFOL 10 MG/ML EMULSION

## 2024-08-03 PROCEDURE — 87206 SMEAR FLUORESCENT/ACID STAI: CPT | Performed by: ORTHOPAEDIC SURGERY

## 2024-08-03 PROCEDURE — 87075 CULTR BACTERIA EXCEPT BLOOD: CPT | Performed by: ORTHOPAEDIC SURGERY

## 2024-08-03 PROCEDURE — 82948 REAGENT STRIP/BLOOD GLUCOSE: CPT

## 2024-08-03 RX ORDER — PHENYLEPHRINE HYDROCHLORIDE 10 MG/ML
INJECTION INTRAVENOUS AS NEEDED
Status: DISCONTINUED | OUTPATIENT
Start: 2024-08-03 | End: 2024-08-03 | Stop reason: SURG

## 2024-08-03 RX ORDER — HYDRALAZINE HYDROCHLORIDE 20 MG/ML
5 INJECTION INTRAMUSCULAR; INTRAVENOUS
Status: CANCELLED | OUTPATIENT
Start: 2024-08-03

## 2024-08-03 RX ORDER — FENTANYL CITRATE 50 UG/ML
INJECTION, SOLUTION INTRAMUSCULAR; INTRAVENOUS AS NEEDED
Status: DISCONTINUED | OUTPATIENT
Start: 2024-08-03 | End: 2024-08-03 | Stop reason: SURG

## 2024-08-03 RX ORDER — LABETALOL HYDROCHLORIDE 5 MG/ML
5 INJECTION, SOLUTION INTRAVENOUS
Status: CANCELLED | OUTPATIENT
Start: 2024-08-03

## 2024-08-03 RX ORDER — LIDOCAINE HYDROCHLORIDE 10 MG/ML
INJECTION, SOLUTION EPIDURAL; INFILTRATION; INTRACAUDAL; PERINEURAL AS NEEDED
Status: DISCONTINUED | OUTPATIENT
Start: 2024-08-03 | End: 2024-08-03 | Stop reason: SURG

## 2024-08-03 RX ORDER — MEPERIDINE HYDROCHLORIDE 25 MG/ML
12.5 INJECTION INTRAMUSCULAR; INTRAVENOUS; SUBCUTANEOUS
Status: CANCELLED | OUTPATIENT
Start: 2024-08-03 | End: 2024-08-04

## 2024-08-03 RX ORDER — ONDANSETRON 2 MG/ML
INJECTION INTRAMUSCULAR; INTRAVENOUS AS NEEDED
Status: DISCONTINUED | OUTPATIENT
Start: 2024-08-03 | End: 2024-08-03 | Stop reason: SURG

## 2024-08-03 RX ORDER — PROPOFOL 10 MG/ML
VIAL (ML) INTRAVENOUS AS NEEDED
Status: DISCONTINUED | OUTPATIENT
Start: 2024-08-03 | End: 2024-08-03 | Stop reason: SURG

## 2024-08-03 RX ORDER — IPRATROPIUM BROMIDE AND ALBUTEROL SULFATE 2.5; .5 MG/3ML; MG/3ML
3 SOLUTION RESPIRATORY (INHALATION) ONCE AS NEEDED
Status: CANCELLED | OUTPATIENT
Start: 2024-08-03

## 2024-08-03 RX ORDER — PROMETHAZINE HYDROCHLORIDE 25 MG/1
25 SUPPOSITORY RECTAL ONCE AS NEEDED
Status: CANCELLED | OUTPATIENT
Start: 2024-08-03

## 2024-08-03 RX ORDER — MAGNESIUM HYDROXIDE 1200 MG/15ML
LIQUID ORAL AS NEEDED
Status: DISCONTINUED | OUTPATIENT
Start: 2024-08-03 | End: 2024-08-03 | Stop reason: HOSPADM

## 2024-08-03 RX ORDER — INSULIN LISPRO 100 [IU]/ML
10 INJECTION, SOLUTION INTRAVENOUS; SUBCUTANEOUS ONCE
Status: COMPLETED | OUTPATIENT
Start: 2024-08-03 | End: 2024-08-03

## 2024-08-03 RX ORDER — DEXAMETHASONE SODIUM PHOSPHATE 4 MG/ML
INJECTION, SOLUTION INTRA-ARTICULAR; INTRALESIONAL; INTRAMUSCULAR; INTRAVENOUS; SOFT TISSUE AS NEEDED
Status: DISCONTINUED | OUTPATIENT
Start: 2024-08-03 | End: 2024-08-03 | Stop reason: SURG

## 2024-08-03 RX ORDER — FENTANYL CITRATE 50 UG/ML
50 INJECTION, SOLUTION INTRAMUSCULAR; INTRAVENOUS
Status: CANCELLED | OUTPATIENT
Start: 2024-08-03

## 2024-08-03 RX ORDER — ONDANSETRON 2 MG/ML
4 INJECTION INTRAMUSCULAR; INTRAVENOUS ONCE AS NEEDED
Status: CANCELLED | OUTPATIENT
Start: 2024-08-03

## 2024-08-03 RX ORDER — DROPERIDOL 2.5 MG/ML
0.62 INJECTION, SOLUTION INTRAMUSCULAR; INTRAVENOUS ONCE AS NEEDED
Status: CANCELLED | OUTPATIENT
Start: 2024-08-03

## 2024-08-03 RX ORDER — PROMETHAZINE HYDROCHLORIDE 25 MG/1
25 TABLET ORAL ONCE AS NEEDED
Status: CANCELLED | OUTPATIENT
Start: 2024-08-03

## 2024-08-03 RX ORDER — HYDROMORPHONE HYDROCHLORIDE 1 MG/ML
0.5 INJECTION, SOLUTION INTRAMUSCULAR; INTRAVENOUS; SUBCUTANEOUS
Status: CANCELLED | OUTPATIENT
Start: 2024-08-03

## 2024-08-03 RX ORDER — SODIUM CHLORIDE 0.9 % (FLUSH) 0.9 %
3 SYRINGE (ML) INJECTION EVERY 12 HOURS SCHEDULED
Status: CANCELLED | OUTPATIENT
Start: 2024-08-03

## 2024-08-03 RX ORDER — SODIUM CHLORIDE 0.9 % (FLUSH) 0.9 %
3-10 SYRINGE (ML) INJECTION AS NEEDED
Status: CANCELLED | OUTPATIENT
Start: 2024-08-03

## 2024-08-03 RX ORDER — EPHEDRINE SULFATE 50 MG/ML
INJECTION INTRAVENOUS AS NEEDED
Status: DISCONTINUED | OUTPATIENT
Start: 2024-08-03 | End: 2024-08-03 | Stop reason: SURG

## 2024-08-03 RX ORDER — NALOXONE HCL 0.4 MG/ML
0.4 VIAL (ML) INJECTION AS NEEDED
Status: CANCELLED | OUTPATIENT
Start: 2024-08-03

## 2024-08-03 RX ORDER — SODIUM CHLORIDE 9 MG/ML
INJECTION, SOLUTION INTRAVENOUS CONTINUOUS PRN
Status: DISCONTINUED | OUTPATIENT
Start: 2024-08-03 | End: 2024-08-03 | Stop reason: SURG

## 2024-08-03 RX ORDER — SODIUM CHLORIDE 9 MG/ML
40 INJECTION, SOLUTION INTRAVENOUS AS NEEDED
Status: CANCELLED | OUTPATIENT
Start: 2024-08-03

## 2024-08-03 RX ORDER — HYDROCODONE BITARTRATE AND ACETAMINOPHEN 5; 325 MG/1; MG/1
1 TABLET ORAL ONCE AS NEEDED
Status: CANCELLED | OUTPATIENT
Start: 2024-08-03 | End: 2024-08-13

## 2024-08-03 RX ORDER — DROPERIDOL 2.5 MG/ML
0.62 INJECTION, SOLUTION INTRAMUSCULAR; INTRAVENOUS
Status: CANCELLED | OUTPATIENT
Start: 2024-08-03

## 2024-08-03 RX ADMIN — MEROPENEM 1000 MG: 1 INJECTION, POWDER, FOR SOLUTION INTRAVENOUS at 20:12

## 2024-08-03 RX ADMIN — ENOXAPARIN SODIUM 40 MG: 100 INJECTION SUBCUTANEOUS at 20:13

## 2024-08-03 RX ADMIN — PHENYLEPHRINE HYDROCHLORIDE 80 MCG: 10 INJECTION, SOLUTION INTRAVENOUS at 08:25

## 2024-08-03 RX ADMIN — INSULIN LISPRO 4 UNITS: 100 INJECTION, SOLUTION INTRAVENOUS; SUBCUTANEOUS at 09:53

## 2024-08-03 RX ADMIN — FENTANYL CITRATE 50 MCG: 50 INJECTION, SOLUTION INTRAMUSCULAR; INTRAVENOUS at 08:20

## 2024-08-03 RX ADMIN — BISACODYL 5 MG: 5 TABLET, COATED ORAL at 22:34

## 2024-08-03 RX ADMIN — EPHEDRINE SULFATE 10 MG: 50 INJECTION INTRAVENOUS at 08:14

## 2024-08-03 RX ADMIN — SODIUM CHLORIDE: 9 INJECTION, SOLUTION INTRAVENOUS at 08:00

## 2024-08-03 RX ADMIN — MEROPENEM 1000 MG: 1 INJECTION, POWDER, FOR SOLUTION INTRAVENOUS at 14:02

## 2024-08-03 RX ADMIN — INSULIN LISPRO 7 UNITS: 100 INJECTION, SOLUTION INTRAVENOUS; SUBCUTANEOUS at 16:35

## 2024-08-03 RX ADMIN — INSULIN GLARGINE 30 UNITS: 100 INJECTION, SOLUTION SUBCUTANEOUS at 20:14

## 2024-08-03 RX ADMIN — INSULIN GLARGINE 30 UNITS: 100 INJECTION, SOLUTION SUBCUTANEOUS at 09:00

## 2024-08-03 RX ADMIN — INSULIN LISPRO 7 UNITS: 100 INJECTION, SOLUTION INTRAVENOUS; SUBCUTANEOUS at 20:13

## 2024-08-03 RX ADMIN — EPHEDRINE SULFATE 5 MG: 50 INJECTION INTRAVENOUS at 08:11

## 2024-08-03 RX ADMIN — ROPINIROLE 0.25 MG: 0.5 TABLET, FILM COATED ORAL at 20:14

## 2024-08-03 RX ADMIN — PANTOPRAZOLE SODIUM 40 MG: 40 TABLET, DELAYED RELEASE ORAL at 09:52

## 2024-08-03 RX ADMIN — LIDOCAINE HYDROCHLORIDE 50 MG: 10 SOLUTION INTRAVENOUS at 08:08

## 2024-08-03 RX ADMIN — INSULIN LISPRO 4 UNITS: 100 INJECTION, SOLUTION INTRAVENOUS; SUBCUTANEOUS at 11:22

## 2024-08-03 RX ADMIN — OXYCODONE HYDROCHLORIDE AND ACETAMINOPHEN 2 TABLET: 5; 325 TABLET ORAL at 14:14

## 2024-08-03 RX ADMIN — ONDANSETRON 4 MG: 2 INJECTION INTRAMUSCULAR; INTRAVENOUS at 08:13

## 2024-08-03 RX ADMIN — AMITRIPTYLINE HYDROCHLORIDE 25 MG: 25 TABLET, FILM COATED ORAL at 20:13

## 2024-08-03 RX ADMIN — ASPIRIN 81 MG: 81 TABLET, COATED ORAL at 09:00

## 2024-08-03 RX ADMIN — FENTANYL CITRATE 50 MCG: 50 INJECTION, SOLUTION INTRAMUSCULAR; INTRAVENOUS at 08:25

## 2024-08-03 RX ADMIN — MEROPENEM 1000 MG: 1 INJECTION, POWDER, FOR SOLUTION INTRAVENOUS at 05:51

## 2024-08-03 RX ADMIN — METOPROLOL SUCCINATE 50 MG: 25 TABLET, EXTENDED RELEASE ORAL at 09:52

## 2024-08-03 RX ADMIN — DAPTOMYCIN 300 MG: 500 INJECTION, POWDER, LYOPHILIZED, FOR SOLUTION INTRAVENOUS at 11:22

## 2024-08-03 RX ADMIN — PHENYLEPHRINE HYDROCHLORIDE 80 MCG: 10 INJECTION, SOLUTION INTRAVENOUS at 08:17

## 2024-08-03 RX ADMIN — OXYCODONE HYDROCHLORIDE AND ACETAMINOPHEN 2 TABLET: 5; 325 TABLET ORAL at 23:15

## 2024-08-03 RX ADMIN — FINASTERIDE 5 MG: 5 TABLET, FILM COATED ORAL at 21:21

## 2024-08-03 RX ADMIN — PROPOFOL 200 MG: 10 INJECTION, EMULSION INTRAVENOUS at 08:08

## 2024-08-03 RX ADMIN — INSULIN LISPRO 10 UNITS: 100 INJECTION, SOLUTION INTRAVENOUS; SUBCUTANEOUS at 18:32

## 2024-08-03 RX ADMIN — Medication 10 ML: at 20:13

## 2024-08-03 RX ADMIN — PREGABALIN 100 MG: 100 CAPSULE ORAL at 09:52

## 2024-08-03 RX ADMIN — INSULIN LISPRO 10 UNITS: 100 INJECTION, SOLUTION INTRAVENOUS; SUBCUTANEOUS at 16:39

## 2024-08-03 RX ADMIN — TAMSULOSIN HYDROCHLORIDE 0.4 MG: 0.4 CAPSULE ORAL at 20:13

## 2024-08-03 RX ADMIN — DEXAMETHASONE SODIUM PHOSPHATE 4 MG: 4 INJECTION INTRA-ARTICULAR; INTRALESIONAL; INTRAMUSCULAR; INTRAVENOUS; SOFT TISSUE at 08:13

## 2024-08-04 LAB
GLUCOSE BLDC GLUCOMTR-MCNC: 184 MG/DL (ref 70–130)
GLUCOSE BLDC GLUCOMTR-MCNC: 220 MG/DL (ref 70–130)
GLUCOSE BLDC GLUCOMTR-MCNC: 266 MG/DL (ref 70–130)
GLUCOSE BLDC GLUCOMTR-MCNC: 276 MG/DL (ref 70–130)
GLUCOSE BLDC GLUCOMTR-MCNC: 316 MG/DL (ref 70–130)

## 2024-08-04 PROCEDURE — 63710000001 INSULIN LISPRO PROTAMINE-INSULIN LISPRO (75-25) 100 UNIT/ML SUSPENSION 10 ML VIAL: Performed by: PEDIATRICS

## 2024-08-04 PROCEDURE — 63710000001 INSULIN GLARGINE PER 5 UNITS: Performed by: PEDIATRICS

## 2024-08-04 PROCEDURE — 82948 REAGENT STRIP/BLOOD GLUCOSE: CPT

## 2024-08-04 PROCEDURE — 25010000002 MEROPENEM PER 100 MG: Performed by: ORTHOPAEDIC SURGERY

## 2024-08-04 PROCEDURE — 25010000002 DAPTOMYCIN PER 1 MG: Performed by: ORTHOPAEDIC SURGERY

## 2024-08-04 PROCEDURE — 63710000001 INSULIN LISPRO (HUMAN) PER 5 UNITS: Performed by: ORTHOPAEDIC SURGERY

## 2024-08-04 PROCEDURE — 99232 SBSQ HOSP IP/OBS MODERATE 35: CPT | Performed by: PEDIATRICS

## 2024-08-04 PROCEDURE — 25010000002 ENOXAPARIN PER 10 MG: Performed by: ORTHOPAEDIC SURGERY

## 2024-08-04 RX ADMIN — INSULIN LISPRO 50 UNITS: 100 INJECTION, SUSPENSION SUBCUTANEOUS at 17:51

## 2024-08-04 RX ADMIN — INSULIN GLARGINE 40 UNITS: 100 INJECTION, SOLUTION SUBCUTANEOUS at 22:02

## 2024-08-04 RX ADMIN — INSULIN LISPRO 4 UNITS: 100 INJECTION, SOLUTION INTRAVENOUS; SUBCUTANEOUS at 11:52

## 2024-08-04 RX ADMIN — ENOXAPARIN SODIUM 40 MG: 100 INJECTION SUBCUTANEOUS at 22:02

## 2024-08-04 RX ADMIN — OXYCODONE HYDROCHLORIDE AND ACETAMINOPHEN 2 TABLET: 5; 325 TABLET ORAL at 12:02

## 2024-08-04 RX ADMIN — MEROPENEM 1000 MG: 1 INJECTION, POWDER, FOR SOLUTION INTRAVENOUS at 22:03

## 2024-08-04 RX ADMIN — INSULIN LISPRO 3 UNITS: 100 INJECTION, SOLUTION INTRAVENOUS; SUBCUTANEOUS at 09:47

## 2024-08-04 RX ADMIN — FINASTERIDE 5 MG: 5 TABLET, FILM COATED ORAL at 22:01

## 2024-08-04 RX ADMIN — TAMSULOSIN HYDROCHLORIDE 0.4 MG: 0.4 CAPSULE ORAL at 22:01

## 2024-08-04 RX ADMIN — MEROPENEM 1000 MG: 1 INJECTION, POWDER, FOR SOLUTION INTRAVENOUS at 05:51

## 2024-08-04 RX ADMIN — ASPIRIN 81 MG: 81 TABLET, COATED ORAL at 09:47

## 2024-08-04 RX ADMIN — DAPTOMYCIN 300 MG: 500 INJECTION, POWDER, LYOPHILIZED, FOR SOLUTION INTRAVENOUS at 11:52

## 2024-08-04 RX ADMIN — PREGABALIN 100 MG: 100 CAPSULE ORAL at 09:47

## 2024-08-04 RX ADMIN — INSULIN LISPRO 4 UNITS: 100 INJECTION, SOLUTION INTRAVENOUS; SUBCUTANEOUS at 22:03

## 2024-08-04 RX ADMIN — METOPROLOL SUCCINATE 50 MG: 25 TABLET, EXTENDED RELEASE ORAL at 09:47

## 2024-08-04 RX ADMIN — INSULIN GLARGINE 40 UNITS: 100 INJECTION, SOLUTION SUBCUTANEOUS at 09:47

## 2024-08-04 RX ADMIN — ROPINIROLE 0.25 MG: 0.5 TABLET, FILM COATED ORAL at 22:01

## 2024-08-04 RX ADMIN — MEROPENEM 1000 MG: 1 INJECTION, POWDER, FOR SOLUTION INTRAVENOUS at 14:20

## 2024-08-04 RX ADMIN — PANTOPRAZOLE SODIUM 40 MG: 40 TABLET, DELAYED RELEASE ORAL at 05:51

## 2024-08-04 RX ADMIN — FLUTICASONE PROPIONATE 2 SPRAY: 50 SPRAY, METERED NASAL at 09:47

## 2024-08-04 RX ADMIN — Medication 10 ML: at 22:04

## 2024-08-04 RX ADMIN — INSULIN LISPRO 5 UNITS: 100 INJECTION, SOLUTION INTRAVENOUS; SUBCUTANEOUS at 17:51

## 2024-08-04 RX ADMIN — AMITRIPTYLINE HYDROCHLORIDE 25 MG: 25 TABLET, FILM COATED ORAL at 22:01

## 2024-08-05 LAB
ALBUMIN SERPL-MCNC: 2.9 G/DL (ref 3.5–5.2)
ALBUMIN/GLOB SERPL: 1 G/DL
ALP SERPL-CCNC: 102 U/L (ref 39–117)
ALT SERPL W P-5'-P-CCNC: 8 U/L (ref 1–41)
ANION GAP SERPL CALCULATED.3IONS-SCNC: 11 MMOL/L (ref 5–15)
AST SERPL-CCNC: 20 U/L (ref 1–40)
BILIRUB SERPL-MCNC: 0.2 MG/DL (ref 0–1.2)
BUN SERPL-MCNC: 13 MG/DL (ref 8–23)
BUN/CREAT SERPL: 14.8 (ref 7–25)
CALCIUM SPEC-SCNC: 8.6 MG/DL (ref 8.6–10.5)
CHLORIDE SERPL-SCNC: 98 MMOL/L (ref 98–107)
CK SERPL-CCNC: 47 U/L (ref 20–200)
CO2 SERPL-SCNC: 27 MMOL/L (ref 22–29)
CREAT SERPL-MCNC: 0.88 MG/DL (ref 0.76–1.27)
DEPRECATED RDW RBC AUTO: 40 FL (ref 37–54)
EGFRCR SERPLBLD CKD-EPI 2021: 98.4 ML/MIN/1.73
ERYTHROCYTE [DISTWIDTH] IN BLOOD BY AUTOMATED COUNT: 13.3 % (ref 12.3–15.4)
GLOBULIN UR ELPH-MCNC: 3 GM/DL
GLUCOSE BLDC GLUCOMTR-MCNC: 126 MG/DL (ref 70–130)
GLUCOSE BLDC GLUCOMTR-MCNC: 145 MG/DL (ref 70–130)
GLUCOSE BLDC GLUCOMTR-MCNC: 164 MG/DL (ref 70–130)
GLUCOSE SERPL-MCNC: 92 MG/DL (ref 65–99)
HCT VFR BLD AUTO: 37.4 % (ref 37.5–51)
HGB BLD-MCNC: 11.9 G/DL (ref 13–17.7)
MCH RBC QN AUTO: 26.4 PG (ref 26.6–33)
MCHC RBC AUTO-ENTMCNC: 31.8 G/DL (ref 31.5–35.7)
MCV RBC AUTO: 82.9 FL (ref 79–97)
PLATELET # BLD AUTO: 319 10*3/MM3 (ref 140–450)
PMV BLD AUTO: 10.8 FL (ref 6–12)
POTASSIUM SERPL-SCNC: 4.4 MMOL/L (ref 3.5–5.2)
PROT SERPL-MCNC: 5.9 G/DL (ref 6–8.5)
RBC # BLD AUTO: 4.51 10*6/MM3 (ref 4.14–5.8)
SODIUM SERPL-SCNC: 136 MMOL/L (ref 136–145)
WBC NRBC COR # BLD AUTO: 9.9 10*3/MM3 (ref 3.4–10.8)

## 2024-08-05 PROCEDURE — 97164 PT RE-EVAL EST PLAN CARE: CPT

## 2024-08-05 PROCEDURE — 97605 NEG PRS WND THER DME<=50SQCM: CPT

## 2024-08-05 PROCEDURE — 25010000002 ENOXAPARIN PER 10 MG: Performed by: ORTHOPAEDIC SURGERY

## 2024-08-05 PROCEDURE — 63710000001 INSULIN LISPRO (HUMAN) PER 5 UNITS: Performed by: ORTHOPAEDIC SURGERY

## 2024-08-05 PROCEDURE — 80053 COMPREHEN METABOLIC PANEL: CPT | Performed by: INTERNAL MEDICINE

## 2024-08-05 PROCEDURE — 25010000002 MEROPENEM PER 100 MG: Performed by: ORTHOPAEDIC SURGERY

## 2024-08-05 PROCEDURE — 85027 COMPLETE CBC AUTOMATED: CPT | Performed by: INTERNAL MEDICINE

## 2024-08-05 PROCEDURE — 97530 THERAPEUTIC ACTIVITIES: CPT

## 2024-08-05 PROCEDURE — 82550 ASSAY OF CK (CPK): CPT | Performed by: INTERNAL MEDICINE

## 2024-08-05 PROCEDURE — 99232 SBSQ HOSP IP/OBS MODERATE 35: CPT | Performed by: PEDIATRICS

## 2024-08-05 PROCEDURE — 25010000002 DAPTOMYCIN PER 1 MG: Performed by: ORTHOPAEDIC SURGERY

## 2024-08-05 PROCEDURE — 82948 REAGENT STRIP/BLOOD GLUCOSE: CPT

## 2024-08-05 RX ADMIN — TAMSULOSIN HYDROCHLORIDE 0.4 MG: 0.4 CAPSULE ORAL at 20:31

## 2024-08-05 RX ADMIN — AMITRIPTYLINE HYDROCHLORIDE 25 MG: 25 TABLET, FILM COATED ORAL at 20:31

## 2024-08-05 RX ADMIN — ENOXAPARIN SODIUM 40 MG: 100 INJECTION SUBCUTANEOUS at 20:30

## 2024-08-05 RX ADMIN — Medication 10 ML: at 08:23

## 2024-08-05 RX ADMIN — INSULIN LISPRO 50 UNITS: 100 INJECTION, SUSPENSION SUBCUTANEOUS at 10:45

## 2024-08-05 RX ADMIN — PANTOPRAZOLE SODIUM 40 MG: 40 TABLET, DELAYED RELEASE ORAL at 05:14

## 2024-08-05 RX ADMIN — FINASTERIDE 5 MG: 5 TABLET, FILM COATED ORAL at 21:21

## 2024-08-05 RX ADMIN — Medication 10 ML: at 20:31

## 2024-08-05 RX ADMIN — MEROPENEM 1000 MG: 1 INJECTION, POWDER, FOR SOLUTION INTRAVENOUS at 13:14

## 2024-08-05 RX ADMIN — MEROPENEM 1000 MG: 1 INJECTION, POWDER, FOR SOLUTION INTRAVENOUS at 05:13

## 2024-08-05 RX ADMIN — OXYCODONE HYDROCHLORIDE AND ACETAMINOPHEN 2 TABLET: 5; 325 TABLET ORAL at 21:21

## 2024-08-05 RX ADMIN — ROPINIROLE 0.25 MG: 0.5 TABLET, FILM COATED ORAL at 20:31

## 2024-08-05 RX ADMIN — METOPROLOL SUCCINATE 50 MG: 25 TABLET, EXTENDED RELEASE ORAL at 08:19

## 2024-08-05 RX ADMIN — PREGABALIN 100 MG: 100 CAPSULE ORAL at 08:19

## 2024-08-05 RX ADMIN — MEROPENEM 1000 MG: 1 INJECTION, POWDER, FOR SOLUTION INTRAVENOUS at 20:31

## 2024-08-05 RX ADMIN — INSULIN LISPRO 50 UNITS: 100 INJECTION, SUSPENSION SUBCUTANEOUS at 17:01

## 2024-08-05 RX ADMIN — FLUTICASONE PROPIONATE 2 SPRAY: 50 SPRAY, METERED NASAL at 08:20

## 2024-08-05 RX ADMIN — INSULIN LISPRO 2 UNITS: 100 INJECTION, SOLUTION INTRAVENOUS; SUBCUTANEOUS at 17:01

## 2024-08-05 RX ADMIN — ASPIRIN 81 MG: 81 TABLET, COATED ORAL at 08:19

## 2024-08-05 RX ADMIN — DAPTOMYCIN 300 MG: 500 INJECTION, POWDER, LYOPHILIZED, FOR SOLUTION INTRAVENOUS at 12:17

## 2024-08-06 ENCOUNTER — HOME HEALTH ADMISSION (OUTPATIENT)
Dept: HOME HEALTH SERVICES | Facility: HOME HEALTHCARE | Age: 59
End: 2024-08-06
Payer: COMMERCIAL

## 2024-08-06 ENCOUNTER — READMISSION MANAGEMENT (OUTPATIENT)
Dept: CALL CENTER | Facility: HOSPITAL | Age: 59
End: 2024-08-06
Payer: COMMERCIAL

## 2024-08-06 VITALS
HEIGHT: 64 IN | OXYGEN SATURATION: 95 % | DIASTOLIC BLOOD PRESSURE: 94 MMHG | TEMPERATURE: 97.8 F | SYSTOLIC BLOOD PRESSURE: 128 MMHG | RESPIRATION RATE: 16 BRPM | WEIGHT: 190 LBS | BODY MASS INDEX: 32.44 KG/M2 | HEART RATE: 103 BPM

## 2024-08-06 PROBLEM — E80.6 HYPERBILIRUBINEMIA: Status: RESOLVED | Noted: 2021-01-05 | Resolved: 2024-08-06

## 2024-08-06 PROBLEM — E83.39 HYPOPHOSPHATEMIA: Status: RESOLVED | Noted: 2021-01-08 | Resolved: 2024-08-06

## 2024-08-06 PROBLEM — E16.2 HYPOGLYCEMIA: Status: RESOLVED | Noted: 2021-01-09 | Resolved: 2024-08-06

## 2024-08-06 PROBLEM — E11.628 DIABETIC INFECTION OF LEFT FOOT: Status: ACTIVE | Noted: 2024-08-06

## 2024-08-06 PROBLEM — K81.9 CHOLECYSTITIS: Status: RESOLVED | Noted: 2021-01-05 | Resolved: 2024-08-06

## 2024-08-06 PROBLEM — L08.9 DIABETIC INFECTION OF LEFT FOOT: Status: ACTIVE | Noted: 2024-08-06

## 2024-08-06 PROBLEM — E87.1 HYPONATREMIA: Status: RESOLVED | Noted: 2021-01-21 | Resolved: 2024-08-06

## 2024-08-06 PROBLEM — R17 JAUNDICE: Status: RESOLVED | Noted: 2021-01-20 | Resolved: 2024-08-06

## 2024-08-06 LAB
ANION GAP SERPL CALCULATED.3IONS-SCNC: 7 MMOL/L (ref 5–15)
BACTERIA SPEC AEROBE CULT: NORMAL
BACTERIA SPEC AEROBE CULT: NORMAL
BASOPHILS # BLD AUTO: 0.11 10*3/MM3 (ref 0–0.2)
BASOPHILS NFR BLD AUTO: 0.9 % (ref 0–1.5)
BUN SERPL-MCNC: 14 MG/DL (ref 8–23)
BUN/CREAT SERPL: 16.3 (ref 7–25)
CALCIUM SPEC-SCNC: 8.2 MG/DL (ref 8.6–10.5)
CHLORIDE SERPL-SCNC: 100 MMOL/L (ref 98–107)
CO2 SERPL-SCNC: 29 MMOL/L (ref 22–29)
CREAT SERPL-MCNC: 0.86 MG/DL (ref 0.76–1.27)
DEPRECATED RDW RBC AUTO: 40.9 FL (ref 37–54)
EGFRCR SERPLBLD CKD-EPI 2021: 99.1 ML/MIN/1.73
EOSINOPHIL # BLD AUTO: 0.11 10*3/MM3 (ref 0–0.4)
EOSINOPHIL NFR BLD AUTO: 0.9 % (ref 0.3–6.2)
ERYTHROCYTE [DISTWIDTH] IN BLOOD BY AUTOMATED COUNT: 13.6 % (ref 12.3–15.4)
GLUCOSE BLDC GLUCOMTR-MCNC: 150 MG/DL (ref 70–130)
GLUCOSE BLDC GLUCOMTR-MCNC: 171 MG/DL (ref 70–130)
GLUCOSE SERPL-MCNC: 172 MG/DL (ref 65–99)
HCT VFR BLD AUTO: 34.5 % (ref 37.5–51)
HGB BLD-MCNC: 11 G/DL (ref 13–17.7)
IMM GRANULOCYTES # BLD AUTO: 0.19 10*3/MM3 (ref 0–0.05)
IMM GRANULOCYTES NFR BLD AUTO: 1.6 % (ref 0–0.5)
LYMPHOCYTES # BLD AUTO: 2.71 10*3/MM3 (ref 0.7–3.1)
LYMPHOCYTES NFR BLD AUTO: 22.9 % (ref 19.6–45.3)
MCH RBC QN AUTO: 26.3 PG (ref 26.6–33)
MCHC RBC AUTO-ENTMCNC: 31.9 G/DL (ref 31.5–35.7)
MCV RBC AUTO: 82.5 FL (ref 79–97)
MONOCYTES # BLD AUTO: 1 10*3/MM3 (ref 0.1–0.9)
MONOCYTES NFR BLD AUTO: 8.4 % (ref 5–12)
NEUTROPHILS NFR BLD AUTO: 65.3 % (ref 42.7–76)
NEUTROPHILS NFR BLD AUTO: 7.72 10*3/MM3 (ref 1.7–7)
NRBC BLD AUTO-RTO: 0 /100 WBC (ref 0–0.2)
PLATELET # BLD AUTO: 318 10*3/MM3 (ref 140–450)
PMV BLD AUTO: 10.6 FL (ref 6–12)
POTASSIUM SERPL-SCNC: 4.5 MMOL/L (ref 3.5–5.2)
RBC # BLD AUTO: 4.18 10*6/MM3 (ref 4.14–5.8)
SODIUM SERPL-SCNC: 136 MMOL/L (ref 136–145)
WBC NRBC COR # BLD AUTO: 11.84 10*3/MM3 (ref 3.4–10.8)

## 2024-08-06 PROCEDURE — 82948 REAGENT STRIP/BLOOD GLUCOSE: CPT

## 2024-08-06 PROCEDURE — 97597 DBRDMT OPN WND 1ST 20 CM/<: CPT

## 2024-08-06 PROCEDURE — 99239 HOSP IP/OBS DSCHRG MGMT >30: CPT | Performed by: PEDIATRICS

## 2024-08-06 PROCEDURE — 80048 BASIC METABOLIC PNL TOTAL CA: CPT | Performed by: PEDIATRICS

## 2024-08-06 PROCEDURE — 63710000001 INSULIN LISPRO (HUMAN) PER 5 UNITS: Performed by: ORTHOPAEDIC SURGERY

## 2024-08-06 PROCEDURE — 85025 COMPLETE CBC W/AUTO DIFF WBC: CPT | Performed by: PEDIATRICS

## 2024-08-06 PROCEDURE — 97605 NEG PRS WND THER DME<=50SQCM: CPT

## 2024-08-06 PROCEDURE — 25010000002 MEROPENEM PER 100 MG: Performed by: ORTHOPAEDIC SURGERY

## 2024-08-06 PROCEDURE — 25010000002 DAPTOMYCIN PER 1 MG: Performed by: ORTHOPAEDIC SURGERY

## 2024-08-06 RX ORDER — LANCETS 30 GAUGE
1 EACH MISCELLANEOUS 3 TIMES DAILY
Qty: 100 EACH | Refills: 0 | Status: SHIPPED | OUTPATIENT
Start: 2024-08-06

## 2024-08-06 RX ORDER — DIPHENHYDRAMINE HYDROCHLORIDE 25 MG/1
1 CAPSULE, LIQUID FILLED ORAL 3 TIMES DAILY
Qty: 1 EACH | Refills: 0 | Status: SHIPPED | OUTPATIENT
Start: 2024-08-06

## 2024-08-06 RX ORDER — AMITRIPTYLINE HYDROCHLORIDE 25 MG/1
25 TABLET, FILM COATED ORAL 2 TIMES DAILY
Start: 2024-08-06

## 2024-08-06 RX ORDER — PEN NEEDLE, DIABETIC 30 GX5/16"
1 NEEDLE, DISPOSABLE MISCELLANEOUS 2 TIMES DAILY
Qty: 100 EACH | Refills: 0 | Status: SHIPPED | OUTPATIENT
Start: 2024-08-06

## 2024-08-06 RX ORDER — FINASTERIDE 5 MG/1
5 TABLET, FILM COATED ORAL NIGHTLY
Qty: 30 TABLET | Refills: 0 | Status: SHIPPED | OUTPATIENT
Start: 2024-08-06

## 2024-08-06 RX ORDER — PREGABALIN 225 MG/1
225 CAPSULE ORAL 2 TIMES DAILY
Qty: 60 CAPSULE | Refills: 0 | Status: SHIPPED | OUTPATIENT
Start: 2024-08-06

## 2024-08-06 RX ORDER — TAMSULOSIN HYDROCHLORIDE 0.4 MG/1
0.4 CAPSULE ORAL NIGHTLY
Qty: 30 CAPSULE | Refills: 0 | Status: SHIPPED | OUTPATIENT
Start: 2024-08-06

## 2024-08-06 RX ORDER — AMOXICILLIN AND CLAVULANATE POTASSIUM 875; 125 MG/1; MG/1
1 TABLET, FILM COATED ORAL 2 TIMES DAILY
Qty: 28 TABLET | Refills: 0 | Status: SHIPPED | OUTPATIENT
Start: 2024-08-06 | End: 2024-08-20

## 2024-08-06 RX ORDER — ROPINIROLE 0.25 MG/1
0.25 TABLET, FILM COATED ORAL NIGHTLY
Qty: 30 TABLET | Refills: 0 | Status: SHIPPED | OUTPATIENT
Start: 2024-08-06

## 2024-08-06 RX ORDER — INSULIN ASPART 100 [IU]/ML
60 INJECTION, SUSPENSION SUBCUTANEOUS
Qty: 15 ML | Refills: 0 | Status: SHIPPED | OUTPATIENT
Start: 2024-08-06

## 2024-08-06 RX ORDER — OXYCODONE HYDROCHLORIDE AND ACETAMINOPHEN 5; 325 MG/1; MG/1
2 TABLET ORAL EVERY 4 HOURS PRN
Qty: 12 TABLET | Refills: 0 | Status: SHIPPED | OUTPATIENT
Start: 2024-08-06

## 2024-08-06 RX ORDER — DOXYCYCLINE HYCLATE 100 MG/1
100 CAPSULE ORAL 2 TIMES DAILY
Qty: 28 CAPSULE | Refills: 0 | Status: SHIPPED | OUTPATIENT
Start: 2024-08-06 | End: 2024-08-20

## 2024-08-06 RX ORDER — FLURBIPROFEN SODIUM 0.3 MG/ML
1 SOLUTION/ DROPS OPHTHALMIC 2 TIMES DAILY
Qty: 200 EACH | Refills: 12 | Status: SHIPPED | OUTPATIENT
Start: 2024-08-06

## 2024-08-06 RX ADMIN — INSULIN LISPRO 2 UNITS: 100 INJECTION, SOLUTION INTRAVENOUS; SUBCUTANEOUS at 12:10

## 2024-08-06 RX ADMIN — PREGABALIN 100 MG: 100 CAPSULE ORAL at 08:55

## 2024-08-06 RX ADMIN — PANTOPRAZOLE SODIUM 40 MG: 40 TABLET, DELAYED RELEASE ORAL at 05:35

## 2024-08-06 RX ADMIN — FLUTICASONE PROPIONATE 2 SPRAY: 50 SPRAY, METERED NASAL at 08:55

## 2024-08-06 RX ADMIN — ASPIRIN 81 MG: 81 TABLET, COATED ORAL at 08:55

## 2024-08-06 RX ADMIN — INSULIN LISPRO 2 UNITS: 100 INJECTION, SOLUTION INTRAVENOUS; SUBCUTANEOUS at 08:54

## 2024-08-06 RX ADMIN — INSULIN LISPRO 50 UNITS: 100 INJECTION, SUSPENSION SUBCUTANEOUS at 09:03

## 2024-08-06 RX ADMIN — METOPROLOL SUCCINATE 50 MG: 25 TABLET, EXTENDED RELEASE ORAL at 08:55

## 2024-08-06 RX ADMIN — MEROPENEM 1000 MG: 1 INJECTION, POWDER, FOR SOLUTION INTRAVENOUS at 05:34

## 2024-08-06 RX ADMIN — Medication 10 ML: at 08:56

## 2024-08-07 LAB
BACTERIA SPEC AEROBE CULT: NORMAL
GRAM STN SPEC: NORMAL

## 2024-08-08 LAB — BACTERIA SPEC ANAEROBE CULT: NORMAL

## 2024-08-09 ENCOUNTER — READMISSION MANAGEMENT (OUTPATIENT)
Dept: CALL CENTER | Facility: HOSPITAL | Age: 59
End: 2024-08-09
Payer: COMMERCIAL

## 2024-08-10 LAB
FUNGUS WND CULT: NORMAL
MYCOBACTERIUM SPEC CULT: NORMAL
NIGHT BLUE STAIN TISS: NORMAL

## 2024-08-13 ENCOUNTER — READMISSION MANAGEMENT (OUTPATIENT)
Dept: CALL CENTER | Facility: HOSPITAL | Age: 59
End: 2024-08-13
Payer: COMMERCIAL

## 2024-08-17 LAB
FUNGUS WND CULT: NORMAL
MYCOBACTERIUM SPEC CULT: NORMAL
NIGHT BLUE STAIN TISS: NORMAL

## 2024-08-24 LAB
FUNGUS WND CULT: NORMAL
MYCOBACTERIUM SPEC CULT: NORMAL
NIGHT BLUE STAIN TISS: NORMAL

## 2024-08-27 ENCOUNTER — READMISSION MANAGEMENT (OUTPATIENT)
Dept: CALL CENTER | Facility: HOSPITAL | Age: 59
End: 2024-08-27
Payer: COMMERCIAL

## 2024-08-28 ENCOUNTER — TRANSCRIBE ORDERS (OUTPATIENT)
Dept: HOME HEALTH SERVICES | Facility: HOME HEALTHCARE | Age: 59
End: 2024-08-28
Payer: COMMERCIAL

## 2024-08-28 ENCOUNTER — HOME HEALTH ADMISSION (OUTPATIENT)
Dept: HOME HEALTH SERVICES | Facility: HOME HEALTHCARE | Age: 59
End: 2024-08-28
Payer: COMMERCIAL

## 2024-08-28 DIAGNOSIS — M86.9 DIABETIC FOOT ULCER WITH OSTEOMYELITIS: Primary | ICD-10-CM

## 2024-08-28 DIAGNOSIS — E11.621 DIABETIC FOOT ULCER WITH OSTEOMYELITIS: Primary | ICD-10-CM

## 2024-08-28 DIAGNOSIS — E11.69 DIABETIC FOOT ULCER WITH OSTEOMYELITIS: Primary | ICD-10-CM

## 2024-08-28 DIAGNOSIS — L97.509 DIABETIC FOOT ULCER WITH OSTEOMYELITIS: Primary | ICD-10-CM

## 2024-08-31 LAB
FUNGUS WND CULT: NORMAL
MYCOBACTERIUM SPEC CULT: NORMAL
NIGHT BLUE STAIN TISS: NORMAL

## 2024-09-03 PROBLEM — R07.2 PRECORDIAL PAIN: Status: ACTIVE | Noted: 2024-09-03

## 2024-09-03 PROBLEM — Z72.0 TOBACCO USE: Status: ACTIVE | Noted: 2024-09-03

## 2024-09-07 LAB
FUNGUS WND CULT: NORMAL
MYCOBACTERIUM SPEC CULT: NORMAL
NIGHT BLUE STAIN TISS: NORMAL

## 2024-09-14 LAB
FUNGUS WND CULT: NORMAL
MYCOBACTERIUM SPEC CULT: NORMAL
NIGHT BLUE STAIN TISS: NORMAL

## 2024-09-25 ENCOUNTER — TRANSCRIBE ORDERS (OUTPATIENT)
Dept: PHYSICAL THERAPY | Facility: HOSPITAL | Age: 59
End: 2024-09-25
Payer: COMMERCIAL

## 2024-09-25 DIAGNOSIS — E11.621 DIABETIC ULCER OF LEFT HEEL ASSOCIATED WITH TYPE 2 DIABETES MELLITUS, WITH FAT LAYER EXPOSED: Primary | ICD-10-CM

## 2024-09-25 DIAGNOSIS — L97.422 DIABETIC ULCER OF LEFT HEEL ASSOCIATED WITH TYPE 2 DIABETES MELLITUS, WITH FAT LAYER EXPOSED: Primary | ICD-10-CM

## 2024-11-15 ENCOUNTER — HOSPITAL ENCOUNTER (INPATIENT)
Facility: HOSPITAL | Age: 59
LOS: 20 days | Discharge: NURSING, REHAB OR LONG-TERM CARE | End: 2024-12-05
Attending: EMERGENCY MEDICINE | Admitting: HOSPITALIST
Payer: COMMERCIAL

## 2024-11-15 ENCOUNTER — APPOINTMENT (OUTPATIENT)
Dept: GENERAL RADIOLOGY | Facility: HOSPITAL | Age: 59
End: 2024-11-15
Payer: COMMERCIAL

## 2024-11-15 ENCOUNTER — APPOINTMENT (OUTPATIENT)
Dept: CT IMAGING | Facility: HOSPITAL | Age: 59
End: 2024-11-15
Payer: COMMERCIAL

## 2024-11-15 DIAGNOSIS — Z91.09 ENVIRONMENTAL ALLERGIES: ICD-10-CM

## 2024-11-15 DIAGNOSIS — Z89.512 S/P BKA (BELOW KNEE AMPUTATION) UNILATERAL, LEFT: Primary | ICD-10-CM

## 2024-11-15 DIAGNOSIS — S91.302A NON HEALING LEFT HEEL WOUND: ICD-10-CM

## 2024-11-15 DIAGNOSIS — E11.10 DIABETIC KETOACIDOSIS WITHOUT COMA ASSOCIATED WITH TYPE 2 DIABETES MELLITUS: ICD-10-CM

## 2024-11-15 DIAGNOSIS — R13.12 OROPHARYNGEAL DYSPHAGIA: ICD-10-CM

## 2024-11-15 DIAGNOSIS — M86.172 ACUTE OSTEOMYELITIS OF LEFT FOOT: ICD-10-CM

## 2024-11-15 LAB
ALBUMIN SERPL-MCNC: 3.4 G/DL (ref 3.5–5.2)
ALBUMIN/GLOB SERPL: 1 G/DL
ALP SERPL-CCNC: 124 U/L (ref 39–117)
ALT SERPL W P-5'-P-CCNC: 14 U/L (ref 1–41)
AMPHET+METHAMPHET UR QL: NEGATIVE
AMPHETAMINES UR QL: NEGATIVE
ANION GAP SERPL CALCULATED.3IONS-SCNC: 22 MMOL/L (ref 5–15)
ANION GAP SERPL CALCULATED.3IONS-SCNC: 8 MMOL/L (ref 5–15)
ARTERIAL PATENCY WRIST A: ABNORMAL
AST SERPL-CCNC: 13 U/L (ref 1–40)
ATMOSPHERIC PRESS: ABNORMAL MM[HG]
B-OH-BUTYR SERPL-SCNC: 4.29 MMOL/L (ref 0.02–0.27)
BACTERIA UR QL AUTO: ABNORMAL /HPF
BARBITURATES UR QL SCN: NEGATIVE
BASE EXCESS BLDA CALC-SCNC: -6.4 MMOL/L (ref 0–2)
BASOPHILS # BLD AUTO: 0.09 10*3/MM3 (ref 0–0.2)
BASOPHILS NFR BLD AUTO: 0.7 % (ref 0–1.5)
BDY SITE: ABNORMAL
BENZODIAZ UR QL SCN: NEGATIVE
BILIRUB SERPL-MCNC: 0.4 MG/DL (ref 0–1.2)
BILIRUB UR QL STRIP: NEGATIVE
BODY TEMPERATURE: 37
BUN BLDA-MCNC: 26 MG/DL (ref 8–26)
BUN SERPL-MCNC: 17 MG/DL (ref 8–23)
BUN SERPL-MCNC: 17 MG/DL (ref 8–23)
BUN/CREAT SERPL: 19.5 (ref 7–25)
BUN/CREAT SERPL: 20.2 (ref 7–25)
BUPRENORPHINE SERPL-MCNC: NEGATIVE NG/ML
CA-I BLDA-SCNC: 1.22 MMOL/L (ref 1.2–1.32)
CALCIUM SPEC-SCNC: 8.8 MG/DL (ref 8.6–10.5)
CALCIUM SPEC-SCNC: 8.8 MG/DL (ref 8.6–10.5)
CANNABINOIDS SERPL QL: NEGATIVE
CHLORIDE BLDA-SCNC: 91 MMOL/L (ref 98–109)
CHLORIDE SERPL-SCNC: 103 MMOL/L (ref 98–107)
CHLORIDE SERPL-SCNC: 90 MMOL/L (ref 98–107)
CLARITY UR: CLEAR
CO2 BLDA-SCNC: 20 MMOL/L (ref 22–33)
CO2 BLDA-SCNC: 23 MMOL/L (ref 24–29)
CO2 SERPL-SCNC: 17 MMOL/L (ref 22–29)
CO2 SERPL-SCNC: 25 MMOL/L (ref 22–29)
COCAINE UR QL: NEGATIVE
COHGB MFR BLD: 1.5 % (ref 0–2)
COLOR UR: YELLOW
CREAT BLDA-MCNC: 0.8 MG/DL (ref 0.6–1.3)
CREAT SERPL-MCNC: 0.84 MG/DL (ref 0.76–1.27)
CREAT SERPL-MCNC: 0.87 MG/DL (ref 0.76–1.27)
D-LACTATE SERPL-SCNC: 1.9 MMOL/L (ref 0.5–2)
D-LACTATE SERPL-SCNC: 3.6 MMOL/L (ref 0.5–2)
DEPRECATED RDW RBC AUTO: 41.7 FL (ref 37–54)
EGFRCR SERPLBLD CKD-EPI 2021: 100.7 ML/MIN/1.73
EGFRCR SERPLBLD CKD-EPI 2021: 98.2 ML/MIN/1.73
EGFRCR SERPLBLD CKD-EPI 2021: 99.2 ML/MIN/1.73
EOSINOPHIL # BLD AUTO: 0.01 10*3/MM3 (ref 0–0.4)
EOSINOPHIL NFR BLD AUTO: 0.1 % (ref 0.3–6.2)
EPAP: 0
ERYTHROCYTE [DISTWIDTH] IN BLOOD BY AUTOMATED COUNT: 14.6 % (ref 12.3–15.4)
FENTANYL UR-MCNC: NEGATIVE NG/ML
GEN 5 1HR TROPONIN T REFLEX: 44 NG/L
GLOBULIN UR ELPH-MCNC: 3.3 GM/DL
GLUCOSE BLDC GLUCOMTR-MCNC: 140 MG/DL (ref 70–130)
GLUCOSE BLDC GLUCOMTR-MCNC: 142 MG/DL (ref 70–130)
GLUCOSE BLDC GLUCOMTR-MCNC: 156 MG/DL (ref 70–130)
GLUCOSE BLDC GLUCOMTR-MCNC: 173 MG/DL (ref 70–130)
GLUCOSE BLDC GLUCOMTR-MCNC: 183 MG/DL (ref 70–130)
GLUCOSE BLDC GLUCOMTR-MCNC: 222 MG/DL (ref 70–130)
GLUCOSE BLDC GLUCOMTR-MCNC: 245 MG/DL (ref 70–130)
GLUCOSE BLDC GLUCOMTR-MCNC: 272 MG/DL (ref 70–130)
GLUCOSE BLDC GLUCOMTR-MCNC: 299 MG/DL (ref 70–130)
GLUCOSE BLDC GLUCOMTR-MCNC: 342 MG/DL (ref 70–130)
GLUCOSE BLDC GLUCOMTR-MCNC: 434 MG/DL (ref 70–130)
GLUCOSE BLDC GLUCOMTR-MCNC: 563 MG/DL (ref 70–130)
GLUCOSE BLDC GLUCOMTR-MCNC: >599 MG/DL (ref 70–130)
GLUCOSE BLDC GLUCOMTR-MCNC: >599 MG/DL (ref 70–130)
GLUCOSE SERPL-MCNC: 183 MG/DL (ref 65–99)
GLUCOSE SERPL-MCNC: 567 MG/DL (ref 65–99)
GLUCOSE UR STRIP-MCNC: ABNORMAL MG/DL
HBA1C MFR BLD: 14.1 % (ref 4.8–5.6)
HCO3 BLDA-SCNC: 18.9 MMOL/L (ref 20–26)
HCT VFR BLD AUTO: 37.6 % (ref 37.5–51)
HCT VFR BLD CALC: 38.3 % (ref 38–51)
HCT VFR BLDA CALC: 40 % (ref 38–51)
HGB BLD-MCNC: 12.3 G/DL (ref 13–17.7)
HGB BLDA-MCNC: 12.5 G/DL (ref 13.5–17.5)
HGB BLDA-MCNC: 13.6 G/DL (ref 12–17)
HGB UR QL STRIP.AUTO: ABNORMAL
HOLD SPECIMEN: NORMAL
HOLD SPECIMEN: NORMAL
HYALINE CASTS UR QL AUTO: ABNORMAL /LPF
IMM GRANULOCYTES # BLD AUTO: 0.12 10*3/MM3 (ref 0–0.05)
IMM GRANULOCYTES NFR BLD AUTO: 0.9 % (ref 0–0.5)
INHALED O2 CONCENTRATION: 21 %
IPAP: 0
KETONES UR QL STRIP: ABNORMAL
LEUKOCYTE ESTERASE UR QL STRIP.AUTO: NEGATIVE
LYMPHOCYTES # BLD AUTO: 1.18 10*3/MM3 (ref 0.7–3.1)
LYMPHOCYTES NFR BLD AUTO: 9.1 % (ref 19.6–45.3)
MAGNESIUM SERPL-MCNC: 1.8 MG/DL (ref 1.6–2.4)
MAGNESIUM SERPL-MCNC: 1.8 MG/DL (ref 1.6–2.4)
MCH RBC QN AUTO: 25.7 PG (ref 26.6–33)
MCHC RBC AUTO-ENTMCNC: 32.7 G/DL (ref 31.5–35.7)
MCV RBC AUTO: 78.7 FL (ref 79–97)
METHADONE UR QL SCN: NEGATIVE
METHGB BLD QL: 0.3 % (ref 0–1.5)
MODALITY: ABNORMAL
MONOCYTES # BLD AUTO: 0.41 10*3/MM3 (ref 0.1–0.9)
MONOCYTES NFR BLD AUTO: 3.1 % (ref 5–12)
NEUTROPHILS NFR BLD AUTO: 11.22 10*3/MM3 (ref 1.7–7)
NEUTROPHILS NFR BLD AUTO: 86.1 % (ref 42.7–76)
NITRITE UR QL STRIP: NEGATIVE
NRBC BLD AUTO-RTO: 0 /100 WBC (ref 0–0.2)
OPIATES UR QL: NEGATIVE
OSMOLALITY SERPL: 317 MOSM/KG (ref 275–295)
OXYCODONE UR QL SCN: NEGATIVE
OXYHGB MFR BLDV: 95.3 % (ref 94–99)
PAW @ PEAK INSP FLOW SETTING VENT: 0 CMH2O
PCO2 BLDA: 36 MM HG (ref 35–45)
PCO2 TEMP ADJ BLD: 36 MM HG (ref 35–48)
PCP UR QL SCN: NEGATIVE
PH BLDA: 7.33 PH UNITS (ref 7.35–7.45)
PH UR STRIP.AUTO: 5.5 [PH] (ref 5–8)
PH, TEMP CORRECTED: 7.33 PH UNITS
PHOSPHATE SERPL-MCNC: 2.6 MG/DL (ref 2.5–4.5)
PHOSPHATE SERPL-MCNC: 5.2 MG/DL (ref 2.5–4.5)
PLATELET # BLD AUTO: 321 10*3/MM3 (ref 140–450)
PMV BLD AUTO: 10.7 FL (ref 6–12)
PO2 BLDA: 88.9 MM HG (ref 83–108)
PO2 TEMP ADJ BLD: 88.9 MM HG (ref 83–108)
POTASSIUM BLDA-SCNC: 5.2 MMOL/L (ref 3.5–4.9)
POTASSIUM SERPL-SCNC: 4.5 MMOL/L (ref 3.5–5.2)
POTASSIUM SERPL-SCNC: 4.5 MMOL/L (ref 3.5–5.2)
PROT SERPL-MCNC: 6.7 G/DL (ref 6–8.5)
PROT UR QL STRIP: ABNORMAL
QT INTERVAL: 318 MS
QTC INTERVAL: 447 MS
RBC # BLD AUTO: 4.78 10*6/MM3 (ref 4.14–5.8)
RBC # UR STRIP: ABNORMAL /HPF
REF LAB TEST METHOD: ABNORMAL
SODIUM BLD-SCNC: 127 MMOL/L (ref 138–146)
SODIUM SERPL-SCNC: 129 MMOL/L (ref 136–145)
SODIUM SERPL-SCNC: 136 MMOL/L (ref 136–145)
SP GR UR STRIP: 1.04 (ref 1–1.03)
SQUAMOUS #/AREA URNS HPF: ABNORMAL /HPF
TOTAL RATE: 0 BREATHS/MINUTE
TRICYCLICS UR QL SCN: NEGATIVE
TROPONIN T DELTA: 12 NG/L
TROPONIN T SERPL HS-MCNC: 32 NG/L
UROBILINOGEN UR QL STRIP: ABNORMAL
WBC # UR STRIP: ABNORMAL /HPF
WBC NRBC COR # BLD AUTO: 13.03 10*3/MM3 (ref 3.4–10.8)
WHOLE BLOOD HOLD COAG: NORMAL
WHOLE BLOOD HOLD SPECIMEN: NORMAL

## 2024-11-15 PROCEDURE — 83605 ASSAY OF LACTIC ACID: CPT | Performed by: EMERGENCY MEDICINE

## 2024-11-15 PROCEDURE — 71045 X-RAY EXAM CHEST 1 VIEW: CPT

## 2024-11-15 PROCEDURE — 85025 COMPLETE CBC W/AUTO DIFF WBC: CPT | Performed by: EMERGENCY MEDICINE

## 2024-11-15 PROCEDURE — 84484 ASSAY OF TROPONIN QUANT: CPT | Performed by: EMERGENCY MEDICINE

## 2024-11-15 PROCEDURE — 83050 HGB METHEMOGLOBIN QUAN: CPT

## 2024-11-15 PROCEDURE — 74018 RADEX ABDOMEN 1 VIEW: CPT

## 2024-11-15 PROCEDURE — 82010 KETONE BODYS QUAN: CPT | Performed by: EMERGENCY MEDICINE

## 2024-11-15 PROCEDURE — 81001 URINALYSIS AUTO W/SCOPE: CPT | Performed by: EMERGENCY MEDICINE

## 2024-11-15 PROCEDURE — 83930 ASSAY OF BLOOD OSMOLALITY: CPT | Performed by: EMERGENCY MEDICINE

## 2024-11-15 PROCEDURE — 93005 ELECTROCARDIOGRAM TRACING: CPT | Performed by: EMERGENCY MEDICINE

## 2024-11-15 PROCEDURE — 85014 HEMATOCRIT: CPT

## 2024-11-15 PROCEDURE — 25010000002 SODIUM CHLORIDE 0.9 % WITH KCL 20 MEQ 20-0.9 MEQ/L-% SOLUTION: Performed by: STUDENT IN AN ORGANIZED HEALTH CARE EDUCATION/TRAINING PROGRAM

## 2024-11-15 PROCEDURE — 83735 ASSAY OF MAGNESIUM: CPT | Performed by: STUDENT IN AN ORGANIZED HEALTH CARE EDUCATION/TRAINING PROGRAM

## 2024-11-15 PROCEDURE — 84100 ASSAY OF PHOSPHORUS: CPT | Performed by: STUDENT IN AN ORGANIZED HEALTH CARE EDUCATION/TRAINING PROGRAM

## 2024-11-15 PROCEDURE — 63710000001 INSULIN REGULAR HUMAN PER 5 UNITS: Performed by: EMERGENCY MEDICINE

## 2024-11-15 PROCEDURE — 80053 COMPREHEN METABOLIC PANEL: CPT | Performed by: EMERGENCY MEDICINE

## 2024-11-15 PROCEDURE — 25010000002 ENOXAPARIN PER 10 MG: Performed by: STUDENT IN AN ORGANIZED HEALTH CARE EDUCATION/TRAINING PROGRAM

## 2024-11-15 PROCEDURE — 36415 COLL VENOUS BLD VENIPUNCTURE: CPT

## 2024-11-15 PROCEDURE — 82375 ASSAY CARBOXYHB QUANT: CPT

## 2024-11-15 PROCEDURE — 36600 WITHDRAWAL OF ARTERIAL BLOOD: CPT

## 2024-11-15 PROCEDURE — 80047 BASIC METABLC PNL IONIZED CA: CPT

## 2024-11-15 PROCEDURE — 99285 EMERGENCY DEPT VISIT HI MDM: CPT

## 2024-11-15 PROCEDURE — 80307 DRUG TEST PRSMV CHEM ANLYZR: CPT | Performed by: STUDENT IN AN ORGANIZED HEALTH CARE EDUCATION/TRAINING PROGRAM

## 2024-11-15 PROCEDURE — 84100 ASSAY OF PHOSPHORUS: CPT | Performed by: EMERGENCY MEDICINE

## 2024-11-15 PROCEDURE — 70450 CT HEAD/BRAIN W/O DYE: CPT

## 2024-11-15 PROCEDURE — 82805 BLOOD GASES W/O2 SATURATION: CPT

## 2024-11-15 PROCEDURE — 82948 REAGENT STRIP/BLOOD GLUCOSE: CPT

## 2024-11-15 PROCEDURE — 84484 ASSAY OF TROPONIN QUANT: CPT | Performed by: STUDENT IN AN ORGANIZED HEALTH CARE EDUCATION/TRAINING PROGRAM

## 2024-11-15 PROCEDURE — 83735 ASSAY OF MAGNESIUM: CPT | Performed by: EMERGENCY MEDICINE

## 2024-11-15 PROCEDURE — 25810000003 SODIUM CHLORIDE 0.9 % SOLUTION: Performed by: EMERGENCY MEDICINE

## 2024-11-15 PROCEDURE — 99222 1ST HOSP IP/OBS MODERATE 55: CPT | Performed by: STUDENT IN AN ORGANIZED HEALTH CARE EDUCATION/TRAINING PROGRAM

## 2024-11-15 PROCEDURE — 83036 HEMOGLOBIN GLYCOSYLATED A1C: CPT | Performed by: EMERGENCY MEDICINE

## 2024-11-15 RX ORDER — ROPINIROLE 0.5 MG/1
0.25 TABLET, FILM COATED ORAL NIGHTLY
Status: DISCONTINUED | OUTPATIENT
Start: 2024-11-15 | End: 2024-12-05 | Stop reason: HOSPADM

## 2024-11-15 RX ORDER — ACETAMINOPHEN 160 MG/5ML
650 SOLUTION ORAL EVERY 4 HOURS PRN
Status: DISCONTINUED | OUTPATIENT
Start: 2024-11-15 | End: 2024-12-03

## 2024-11-15 RX ORDER — CYCLOBENZAPRINE HCL 5 MG
5 TABLET ORAL 3 TIMES DAILY PRN
COMMUNITY

## 2024-11-15 RX ORDER — BISACODYL 5 MG/1
5 TABLET, DELAYED RELEASE ORAL DAILY PRN
Status: DISCONTINUED | OUTPATIENT
Start: 2024-11-15 | End: 2024-12-05 | Stop reason: HOSPADM

## 2024-11-15 RX ORDER — SIMVASTATIN 20 MG
1 TABLET ORAL DAILY
COMMUNITY

## 2024-11-15 RX ORDER — POLYETHYLENE GLYCOL 3350 17 G/17G
17 POWDER, FOR SOLUTION ORAL DAILY PRN
Status: DISCONTINUED | OUTPATIENT
Start: 2024-11-15 | End: 2024-12-05 | Stop reason: HOSPADM

## 2024-11-15 RX ORDER — SODIUM CHLORIDE 0.9 % (FLUSH) 0.9 %
10 SYRINGE (ML) INJECTION AS NEEDED
Status: DISCONTINUED | OUTPATIENT
Start: 2024-11-15 | End: 2024-12-05 | Stop reason: HOSPADM

## 2024-11-15 RX ORDER — LEVOCETIRIZINE DIHYDROCHLORIDE 5 MG/1
1 TABLET, FILM COATED ORAL DAILY
COMMUNITY

## 2024-11-15 RX ORDER — FINASTERIDE 5 MG/1
5 TABLET, FILM COATED ORAL NIGHTLY
Status: DISCONTINUED | OUTPATIENT
Start: 2024-11-15 | End: 2024-12-05 | Stop reason: HOSPADM

## 2024-11-15 RX ORDER — DEXTROSE MONOHYDRATE, SODIUM CHLORIDE, AND POTASSIUM CHLORIDE 50; 2.98; 4.5 G/1000ML; G/1000ML; G/1000ML
125 INJECTION, SOLUTION INTRAVENOUS CONTINUOUS PRN
Status: DISCONTINUED | OUTPATIENT
Start: 2024-11-15 | End: 2024-11-16

## 2024-11-15 RX ORDER — SODIUM CHLORIDE 9 MG/ML
200 INJECTION, SOLUTION INTRAVENOUS CONTINUOUS PRN
Status: DISCONTINUED | OUTPATIENT
Start: 2024-11-15 | End: 2024-11-16

## 2024-11-15 RX ORDER — NICOTINE POLACRILEX 4 MG
15 LOZENGE BUCCAL
Status: DISCONTINUED | OUTPATIENT
Start: 2024-11-15 | End: 2024-11-16 | Stop reason: SDUPTHER

## 2024-11-15 RX ORDER — AMOXICILLIN 250 MG
2 CAPSULE ORAL 2 TIMES DAILY PRN
Status: DISCONTINUED | OUTPATIENT
Start: 2024-11-15 | End: 2024-12-05 | Stop reason: HOSPADM

## 2024-11-15 RX ORDER — DEXTROSE MONOHYDRATE AND SODIUM CHLORIDE 5; .9 G/100ML; G/100ML
125 INJECTION, SOLUTION INTRAVENOUS CONTINUOUS PRN
Status: DISCONTINUED | OUTPATIENT
Start: 2024-11-15 | End: 2024-11-16

## 2024-11-15 RX ORDER — SODIUM CHLORIDE AND POTASSIUM CHLORIDE 150; 450 MG/100ML; MG/100ML
200 INJECTION, SOLUTION INTRAVENOUS CONTINUOUS PRN
Status: DISCONTINUED | OUTPATIENT
Start: 2024-11-15 | End: 2024-11-16

## 2024-11-15 RX ORDER — BISACODYL 10 MG
10 SUPPOSITORY, RECTAL RECTAL DAILY PRN
Status: DISCONTINUED | OUTPATIENT
Start: 2024-11-15 | End: 2024-12-05 | Stop reason: HOSPADM

## 2024-11-15 RX ORDER — SODIUM CHLORIDE 9 MG/ML
40 INJECTION, SOLUTION INTRAVENOUS AS NEEDED
Status: DISCONTINUED | OUTPATIENT
Start: 2024-11-15 | End: 2024-11-21 | Stop reason: SDUPTHER

## 2024-11-15 RX ORDER — DEXTROSE MONOHYDRATE 25 G/50ML
10-50 INJECTION, SOLUTION INTRAVENOUS
Status: DISCONTINUED | OUTPATIENT
Start: 2024-11-15 | End: 2024-11-16 | Stop reason: SDUPTHER

## 2024-11-15 RX ORDER — SODIUM CHLORIDE 9 MG/ML
40 INJECTION, SOLUTION INTRAVENOUS AS NEEDED
Status: DISCONTINUED | OUTPATIENT
Start: 2024-11-15 | End: 2024-12-05 | Stop reason: HOSPADM

## 2024-11-15 RX ORDER — SODIUM CHLORIDE AND POTASSIUM CHLORIDE 300; 900 MG/100ML; MG/100ML
200 INJECTION, SOLUTION INTRAVENOUS CONTINUOUS PRN
Status: DISCONTINUED | OUTPATIENT
Start: 2024-11-15 | End: 2024-11-16

## 2024-11-15 RX ORDER — ASPIRIN 81 MG/1
81 TABLET ORAL DAILY
Status: DISCONTINUED | OUTPATIENT
Start: 2024-11-15 | End: 2024-12-05 | Stop reason: HOSPADM

## 2024-11-15 RX ORDER — SODIUM CHLORIDE 450 MG/100ML
200 INJECTION, SOLUTION INTRAVENOUS CONTINUOUS PRN
Status: DISCONTINUED | OUTPATIENT
Start: 2024-11-15 | End: 2024-11-16

## 2024-11-15 RX ORDER — DEXTROSE MONOHYDRATE AND SODIUM CHLORIDE 5; .45 G/100ML; G/100ML
125 INJECTION, SOLUTION INTRAVENOUS CONTINUOUS PRN
Status: DISCONTINUED | OUTPATIENT
Start: 2024-11-15 | End: 2024-11-16

## 2024-11-15 RX ORDER — NITROGLYCERIN 0.4 MG/1
0.4 TABLET SUBLINGUAL
Status: DISCONTINUED | OUTPATIENT
Start: 2024-11-15 | End: 2024-12-05 | Stop reason: HOSPADM

## 2024-11-15 RX ORDER — DEXTROSE MONOHYDRATE, SODIUM CHLORIDE, AND POTASSIUM CHLORIDE 50; 1.49; 9 G/1000ML; G/1000ML; G/1000ML
125 INJECTION, SOLUTION INTRAVENOUS CONTINUOUS PRN
Status: DISCONTINUED | OUTPATIENT
Start: 2024-11-15 | End: 2024-11-16

## 2024-11-15 RX ORDER — METOPROLOL SUCCINATE 50 MG/1
50 TABLET, EXTENDED RELEASE ORAL DAILY
Status: DISCONTINUED | OUTPATIENT
Start: 2024-11-15 | End: 2024-12-05 | Stop reason: HOSPADM

## 2024-11-15 RX ORDER — SODIUM CHLORIDE 0.9 % (FLUSH) 0.9 %
10 SYRINGE (ML) INJECTION EVERY 12 HOURS SCHEDULED
Status: DISCONTINUED | OUTPATIENT
Start: 2024-11-15 | End: 2024-12-05 | Stop reason: HOSPADM

## 2024-11-15 RX ORDER — ACETAMINOPHEN 325 MG/1
650 TABLET ORAL EVERY 4 HOURS PRN
Status: DISCONTINUED | OUTPATIENT
Start: 2024-11-15 | End: 2024-12-05 | Stop reason: HOSPADM

## 2024-11-15 RX ORDER — ACETAMINOPHEN 650 MG/1
650 SUPPOSITORY RECTAL EVERY 4 HOURS PRN
Status: DISCONTINUED | OUTPATIENT
Start: 2024-11-15 | End: 2024-12-03

## 2024-11-15 RX ORDER — DEXTROSE MONOHYDRATE, SODIUM CHLORIDE, AND POTASSIUM CHLORIDE 50; 1.49; 4.5 G/1000ML; G/1000ML; G/1000ML
125 INJECTION, SOLUTION INTRAVENOUS CONTINUOUS PRN
Status: DISCONTINUED | OUTPATIENT
Start: 2024-11-15 | End: 2024-11-16

## 2024-11-15 RX ORDER — SODIUM CHLORIDE 0.9 % (FLUSH) 0.9 %
10 SYRINGE (ML) INJECTION AS NEEDED
Status: DISCONTINUED | OUTPATIENT
Start: 2024-11-15 | End: 2024-11-21 | Stop reason: SDUPTHER

## 2024-11-15 RX ORDER — TAMSULOSIN HYDROCHLORIDE 0.4 MG/1
0.4 CAPSULE ORAL NIGHTLY
Status: DISCONTINUED | OUTPATIENT
Start: 2024-11-15 | End: 2024-11-18

## 2024-11-15 RX ORDER — ENOXAPARIN SODIUM 100 MG/ML
40 INJECTION SUBCUTANEOUS DAILY
Status: DISCONTINUED | OUTPATIENT
Start: 2024-11-15 | End: 2024-12-05 | Stop reason: HOSPADM

## 2024-11-15 RX ORDER — SODIUM CHLORIDE 0.9 % (FLUSH) 0.9 %
10 SYRINGE (ML) INJECTION EVERY 12 HOURS SCHEDULED
Status: DISCONTINUED | OUTPATIENT
Start: 2024-11-15 | End: 2024-11-26

## 2024-11-15 RX ORDER — DULOXETIN HYDROCHLORIDE 60 MG/1
60 CAPSULE, DELAYED RELEASE ORAL DAILY
COMMUNITY

## 2024-11-15 RX ORDER — IBUPROFEN 600 MG/1
1 TABLET ORAL
Status: DISCONTINUED | OUTPATIENT
Start: 2024-11-15 | End: 2024-11-16 | Stop reason: SDUPTHER

## 2024-11-15 RX ORDER — SODIUM CHLORIDE AND POTASSIUM CHLORIDE 150; 900 MG/100ML; MG/100ML
200 INJECTION, SOLUTION INTRAVENOUS CONTINUOUS PRN
Status: DISCONTINUED | OUTPATIENT
Start: 2024-11-15 | End: 2024-11-16

## 2024-11-15 RX ADMIN — INSULIN HUMAN 15 UNITS: 100 INJECTION, SOLUTION PARENTERAL at 10:22

## 2024-11-15 RX ADMIN — INSULIN HUMAN 2.7 UNITS/HR: 1 INJECTION, SOLUTION INTRAVENOUS at 16:06

## 2024-11-15 RX ADMIN — Medication 10 ML: at 22:19

## 2024-11-15 RX ADMIN — SODIUM CHLORIDE 1000 ML: 9 INJECTION, SOLUTION INTRAVENOUS at 10:22

## 2024-11-15 RX ADMIN — ACETAMINOPHEN 650 MG: 650 SUPPOSITORY RECTAL at 23:27

## 2024-11-15 RX ADMIN — Medication 10 ML: at 13:40

## 2024-11-15 RX ADMIN — DEXTROSE, SODIUM CHLORIDE, AND POTASSIUM CHLORIDE 125 ML/HR: 5; .9; .15 INJECTION INTRAVENOUS at 19:03

## 2024-11-15 RX ADMIN — POTASSIUM CHLORIDE AND SODIUM CHLORIDE 200 ML/HR: 900; 150 INJECTION, SOLUTION INTRAVENOUS at 16:04

## 2024-11-15 RX ADMIN — SODIUM CHLORIDE 1000 ML: 9 INJECTION, SOLUTION INTRAVENOUS at 11:09

## 2024-11-15 RX ADMIN — Medication 10 ML: at 13:41

## 2024-11-15 RX ADMIN — INSULIN HUMAN 2.4 UNITS/HR: 1 INJECTION, SOLUTION INTRAVENOUS at 15:03

## 2024-11-15 RX ADMIN — ENOXAPARIN SODIUM 40 MG: 100 INJECTION SUBCUTANEOUS at 13:39

## 2024-11-15 NOTE — NURSING NOTE
Wound, Ostomy and Continence (WOC) Note    Reason for WOC Consultation: Left heel and right plantar surface of foot    Patient confused, nonverbal.  Family/support person not present.     Patient seen by WOC in August 2024 for same wound.  Of most concern is the left heel wound which I was able to directly probe to bone.    Skin/Wound Assessment:     Wound Type: Pressure Injury Stage 4  Location: Left heel  Measurements: 3.5 x 4.5x0.9 cm  Wound Bed: exposed structure, red, and slough -able to probe bone through small opening at the proximal aspect of the wound.  Wound Edges: Irregular and Open  Periwound Skin: intact , scar tissue along achilles   Drainage Characteristics/Odor: orange and yellow  Drainage Amount: scant  Pain: No   Care provided: The wound was cleansed with ph balanced wipes. Therahoney sheet applied. Covered with Allevyn dressing.  Image:       Wound Type: Diabetic Ulcer - chronic  Location: right plantar surface  Measurements: decreased in size compared to August photo  Wound Bed: granulating and red  Wound Edges: Irregular and Open  Periwound Skin: callus, scab  Drainage Characteristics/Odor: none  Drainage Amount: none  Pain: No   Care provided: The wound was cleansed with ph balanced cleanser. Therahoney sheet applied. Allevyn heel dressing  Image:     Summary and Recommendation(s):     Left heel pressure injury worse when compared to August photo.  Now able to probe to bone.  MRI has been ordered by MD.  2.   Ulceration on the plantar surface of the right foot improving when compared to previous photo in August.  3.   Will treat both wounds with Thera honey sheet.  Will adjust orders pending outcome of left foot MRI.   -Patient is a high risk for pressure injury development and worsening of his current wounds.  Please apply waffle overlay mattress and elevate patient's heels as best possible including the use of Allevyn foam dressings.  Turn patient per policy  4.    "Refer to wound care instructions in the \"Orders\" tab  5.   Specialty support surface in place: Foam Mattress with Waffle Overlay         Pressure Injury Prevention Protocol (initiate for a Fabian Scale Score of <18):     Most recent Fabian Scale score:  Sensory Perception: 2-->very limited  Moisture: 3-->occasionally moist  Activity: 2-->chairfast  Mobility: 2-->very limited  Nutrition: 3-->adequate  Friction and Shear: 2-->potential problem  Fabian Score: 14 (11/15/24 1324)       -Apply Allevyn foam dressing to sacrum/coccyx and heels.     -Turn q 2 hr. using an offloading foam wedge/pillow.    -Apply moisture barrier cream to bottom BID & PRN, if incontinent.      Thank you for consulting the WOC Nurse.  WOC Team will continue to follow.  Please re consult if the wound(s) worsens.     Chris Dhaliwal RN, BSN, CCRN, CWOCN  Wound, Ostomy and Continence (WOC) Department  [facility]          "

## 2024-11-15 NOTE — PLAN OF CARE
Goal Outcome Evaluation:              Outcome Evaluation: Patient on RA. VSS. Disoriented x4. Keeps gaze to right side with occasional movements elsewhere- provider aware. Pt on insulin drip going at 3.2 units/hr currently. patient currently in bed resting with call light in reach.

## 2024-11-15 NOTE — H&P
"    [facility] Medicine Services  HISTORY AND PHYSICAL    Patient Name: Fracisco Mullen  : 1965  MRN: [id number]  Primary Care Physician: Brandy Roberson  Date of admission: 11/15/2024      Subjective   Subjective     Chief Complaint:  Fatigue, altered mental status    HPI:  Fracisco Mullen is a 59 y.o. male with a past medical history of hypertension, type 2 diabetes with insulin use, diabetic neuropathy, chronic diabetic foot wounds who presents via EMS from home for altered mental status and fatigue.    History limited as patient is unable to provide much history and no family or friends present at bedside.  Per chart review, wife called ambulance as patient was \"sluggish \"today.  On exam today, patient is oriented to name, date of birth, and location, but disoriented to situation.  Patient denies any acute complaints concerns at this time.  Patient states he takes insulin at home but is uncertain of his regimen.  Patient states that his wife helps him with his medications at home.    Lab work in the ED notable for pH of 7.328, glucose of 567, anion gap metabolic acidosis, lactate of 3.6, beta hydroxybutyrate of 4.29, leukocytosis, A1c of 14.1.  Patient received IV fluids in the ED.  Medicine was consulted for further management.    Of note, patient's last admission was in 2024 where he was found to have left foot cellulitis.  MRI at that time was obtained that was not consistent with osteomyelitis of the left ankle.  Patient received antibiotics at that time, discharged with outpatient follow-up.  Patient was also noted to have an A1c greater than 15 during that hospitalization.  Home medication includes NovoLog 70/30 60 units twice daily.  Uncertain of medication compliance. Patient denies alcohol, tobacco, or drug use.     Personal History     Past Medical History:   Diagnosis Date    Acute renal failure     Hx of    Obesity     Hx of    Sleep apnea     Type 2 " diabetes mellitus            Past Surgical History:   Procedure Laterality Date    BACK SURGERY      lower back    CHOLECYSTECTOMY WITH INTRAOPERATIVE CHOLANGIOGRAM N/A 1/6/2021    Procedure: CHOLECYSTECTOMY LAPAROSCOPIC INTRAOPERATIVE CHOLANGIOGRAM;  Surgeon: Juventino Hooker MD;  Location: [facility] OR;  Service: General;  Laterality: N/A;    ERCP N/A 1/9/2021    Procedure: ENDOSCOPIC RETROGRADE CHOLANGIOPANCREATOGRAPHY;  Surgeon: Jeremy Dowell MD;  Location: [facility] ENDOSCOPY;  Service: Gastroenterology;  Laterality: N/A;  with sphiincterotomy and balloon sweep with 9mm-12mm balloon    INCISION AND DRAINAGE FOOT Left 8/3/2024    Procedure: HEAL IRRIGATION AND DEBRIDEMENT LEFT;  Surgeon: Dru Gan Jr., MD;  Location:  [facility] OR;  Service: Orthopedics;  Laterality: Left;    PLACEMENT OF WOUND VAC Left 8/3/2024    Procedure: PLACEMENT OF WOUND VAC;  Surgeon: Dru Gan Jr., MD;  Location:  [facility] OR;  Service: Orthopedics;  Laterality: Left;       Family History: family history includes Cancer in his brother, maternal grandmother, mother, and another family member; Diabetes in an other family member; Heart attack in an other family member; Heart disease in his brother and father; Hyperlipidemia in his mother and another family member; Hypertension in his mother and another family member; Obesity in an other family member.     Social History:  reports that he has quit smoking. His smoking use included cigars. He has never used smokeless tobacco. He reports that he does not drink alcohol and does not use drugs.  Social History     Social History Narrative    Not on file       Medications:  Available home medication information reviewed.  Insulin Pen Needle, OneTouch Delica Plus [id number], OneTouch Verio Flex System, Vitamin D3, albuterol sulfate HFA, amitriptyline, aspirin, docusate sodium, finasteride, fluticasone, glucose blood, insulin aspart prot & aspart, metFORMIN, metoprolol succinate XL,  naloxone, omeprazole, oxyCODONE-acetaminophen, pregabalin, rOPINIRole, and tamsulosin    Allergies   Allergen Reactions    Sertraline Hcl Hives     Zoloft       Objective   Objective     Vital Signs:   Temp:  [98.9 °F (37.2 °C)] 98.9 °F (37.2 °C)  Heart Rate:  [118-125] 125  Resp:  [20] 20  BP: (115-143)/() 143/104       Physical Exam  Constitutional:       General: He is not in acute distress.  Eyes:      General: No scleral icterus.     Extraocular Movements: Extraocular movements intact.   Cardiovascular:      Rate and Rhythm: Tachycardia present.      Pulses: Normal pulses.   Pulmonary:      Effort: Pulmonary effort is normal. No respiratory distress.   Abdominal:      General: There is no distension.      Palpations: Abdomen is soft.      Tenderness: There is no abdominal tenderness. There is no guarding or rebound.   Musculoskeletal:      Right lower leg: No edema.      Left lower leg: No edema.   Skin:     General: Skin is warm.      Findings: Bruising present.      Comments: Left heel ulcer and right plantar foot ulcer   Neurological:      Mental Status: He is alert.      Comments: Moves all 4 extremities spontaneously   Psychiatric:      Comments: Oriented to name, date of birth, location, disoriented to situation            Result Review:  I have personally reviewed the results from the time of this admission to 11/15/2024 12:35 EST and agree with these findings:  [x]  Laboratory list / accordion  [x]  Microbiology  [x]  Radiology  []  EKG/Telemetry   []  Cardiology/Vascular   []  Pathology  [x]  Old records  []  Other:  Most notable findings include: Elevated lactate, anion gap metabolic acidosis, elevated glucose, elevated ketones        LAB RESULTS:      Lab 11/15/24  0948 11/15/24  0942   WBC  --  13.03*   HEMOGLOBIN  --  12.3*   HEMOGLOBIN, POC 13.6  --    HEMATOCRIT  --  37.6   HEMATOCRIT POC 40  --    PLATELETS  --  321   NEUTROS ABS  --  11.22*   IMMATURE GRANS (ABS)  --  0.12*   LYMPHS ABS   --  1.18   MONOS ABS  --  0.41   EOS ABS  --  0.01   MCV  --  78.7*   LACTATE  --  3.6*         Lab 11/15/24  1107 11/15/24  0948 11/15/24  0942   SODIUM 129*  --   --    POTASSIUM 4.5  --   --    CHLORIDE 90*  --   --    CO2 17.0*  --   --    ANION GAP 22.0*  --   --    BUN 17  --   --    CREATININE 0.87 0.80  --    EGFR 98.2 100.7  --    GLUCOSE 567*  --   --    CALCIUM 8.8  --   --    MAGNESIUM 1.8  --   --    PHOSPHORUS 5.2*  --   --    HEMOGLOBIN A1C  --   --  14.10*         Lab 11/15/24  1107   TOTAL PROTEIN 6.7   ALBUMIN 3.4*   GLOBULIN 3.3   ALT (SGPT) 14   AST (SGOT) 13   BILIRUBIN 0.4   ALK PHOS 124*         Lab 11/15/24  1107   HSTROP T 32*                 Lab 11/15/24  0954   PH, ARTERIAL 7.328*   PCO2, ARTERIAL 36.0   PO2 ART 88.9   FIO2 21   HCO3 ART 18.9*   BASE EXCESS ART -6.4*   CARBOXYHEMOGLOBIN 1.5     UA          8/1/2024    14:37 11/15/2024    11:42   Urinalysis   Squamous Epithelial Cells, UA 0-2  None Seen    Specific Gravity, UA 1.038  1.036    Ketones, UA 15 mg/dL (1+)  40 mg/dL (2+)    Blood, UA Trace  Small (1+)    Leukocytes, UA Negative  Negative    Nitrite, UA Negative  Negative    RBC, UA 0-2  3-5    WBC, UA 3-5  0-2    Bacteria, UA Trace  None Seen        Microbiology Results (last 10 days)       ** No results found for the last 240 hours. **            XR Chest 1 View    Result Date: 11/15/2024  XR CHEST 1 VW Date of Exam: 11/15/2024 9:31 AM EST Indication: Weak/Dizzy/AMS triage protocol Comparison: Chest radiograph 1/5/2021. Findings: Cardiomediastinal silhouette is unchanged. No focal consolidation or overt pulmonary edema. No pleural effusion or pneumothorax. Osseous structures are unchanged.     Impression: Impression: No evidence of acute cardiopulmonary disease. Electronically Signed: Jerad Mata MD  11/15/2024 9:48 AM EST  Workstation ID: [id number]         Assessment & Plan   Assessment & Plan       DKA (diabetic ketoacidosis)    Fracisco A Lady is a 59 y.o. male with a past  medical history of hypertension, type 2 diabetes with insulin use, diabetic neuropathy, chronic diabetic foot wounds who presents via EMS from home for altered mental status and fatigue.  Lab work consistent with diabetic ketoacidosis.  Obtaining MRI for left heel ulcer.    DKA in setting of uncontrolled IDDM c/b diabetic neuropathy and chronic foot wounds   -Home insulin includes NovoLog 70/30 60 units twice daily.  Uncertain of medication compliance  -pH 7.328, blood glucose 567, lactate 3.6, beta hydroxybutyrate 4.29, anion gap metabolic acidosis, A1c 14.1  -UA/CXR w/o signs of infection   -Received 2 L of IV fluids in the ED  PLAN  -DKA protocol including insulin drip, IV fluids, and electrolyte replacement per protocol  -DM educator consulted  -Holding home Lyrica while n.p.o.  -q4hr BMP, Mag, Phos     Chronic bilateral foot wounds  -Patient with chronic left heel wound and right plantar foot wound  -Admitted August 2024 for left foot cellulitis and discharged on oral antibiotics.  -MRI left foot obtained in August 2024 with no definite findings of osteomyelitis  -Repeat MRI left foot ordered   -Wound consulted  -Consider surgery/ID pending MRI results     Acute toxic metabolic encephalopathy  -CT head without acute abnormality  -UDS negative  -Likely secondary to DKA  -Continue to monitor    HTN  -Home medications include metoprolol succinate 50 mg daily    BPH  -Home medications include finasteride 5 mg nightly and tamsulosin 4 mg nightly    Mood disorder  -Home medication includes amitriptyline 25 mg twice daily    RLS  -Home medication includes ropinirole 0.25 mg nightly    VTE Prophylaxis:  Pharmacologic VTE prophylaxis orders are signed & held.            CODE STATUS:    Code Status and Medical Interventions: CPR (Attempt to Resuscitate); Full Support; Full code per last hosptial admission. Patient disoriented on exam with no family present at bedside. Only contact information is friend.   Ordered at:  11/15/24 1218     Level Of Support Discussed With:    Patient     Code Status (Patient has no pulse and is not breathing):    CPR (Attempt to Resuscitate)     Medical Interventions (Patient has pulse or is breathing):    Full Support     Comments:    Full code per last hosptial admission. Patient disoriented on exam with no family present at bedside. Only contact information is friend.       Expected Discharge   Expected discharge date/ time has not been documented.     Edmond Espinoza, DO  11/15/24

## 2024-11-15 NOTE — ED NOTES
Fracisco Mullen    Nursing Report ED to Floor:  Mental status: alert and oriented x4  Ambulatory status: unknown, has not ambulated here  Oxygen Therapy:  room air  Cardiac Rhythm: sinus tach  Admitted from: ED  Safety Concerns:  falls risk  Social Issues: n/a  ED Room #:  20    ED Nurse Phone Extension - [phone] or may call [phone].      HPI:   Chief Complaint   Patient presents with    Weakness - Generalized       Past Medical History:  Past Medical History:   Diagnosis Date    Acute renal failure     Hx of    Obesity     Hx of    Sleep apnea     Type 2 diabetes mellitus         Past Surgical History:  Past Surgical History:   Procedure Laterality Date    BACK SURGERY      lower back    CHOLECYSTECTOMY WITH INTRAOPERATIVE CHOLANGIOGRAM N/A 1/6/2021    Procedure: CHOLECYSTECTOMY LAPAROSCOPIC INTRAOPERATIVE CHOLANGIOGRAM;  Surgeon: Juventino Hooker MD;  Location: [facility] OR;  Service: General;  Laterality: N/A;    ERCP N/A 1/9/2021    Procedure: ENDOSCOPIC RETROGRADE CHOLANGIOPANCREATOGRAPHY;  Surgeon: Jeremy Dowell MD;  Location: [facility] ENDOSCOPY;  Service: Gastroenterology;  Laterality: N/A;  with sphiincterotomy and balloon sweep with 9mm-12mm balloon    INCISION AND DRAINAGE FOOT Left 8/3/2024    Procedure: HEAL IRRIGATION AND DEBRIDEMENT LEFT;  Surgeon: Dru Gan Jr., MD;  Location:  [facility] OR;  Service: Orthopedics;  Laterality: Left;    PLACEMENT OF WOUND VAC Left 8/3/2024    Procedure: PLACEMENT OF WOUND VAC;  Surgeon: Dru Gan Jr., MD;  Location:  [facility] OR;  Service: Orthopedics;  Laterality: Left;        Admitting Doctor:   Edmond Espinoza DO    Consulting Provider(s):  Consults       No orders found from 10/17/2024 to 11/16/2024.             Admitting Diagnosis:   The encounter diagnosis was Diabetic ketoacidosis without coma associated with type 2 diabetes mellitus.    Most Recent Vitals:   Vitals:    11/15/24 0922 11/15/24 1030 11/15/24 1115 11/15/24 1200   BP: 115/68 (!) 143/104     BP  "Location: Right arm      Patient Position: Lying      Pulse: 118 (!) 122 (!) 123 (!) 125   Resp: 20      Temp: 98.9 °F (37.2 °C)      TempSrc: Oral      SpO2: 97% 92% 96%    Weight: 85.7 kg (189 lb)      Height: 162.6 cm (64\")          Active LDAs/IV Access:   Lines, Drains & Airways       Active LDAs       Name Placement date Placement time Site Days    Peripheral IV 11/15/24 0921 Left;Posterior Hand 11/15/24  0921  Hand  less than 1    Peripheral IV 11/15/24 0940 Anterior;Right Forearm 11/15/24  0940  Forearm  less than 1                    Labs (abnormal labs have a star):   Labs Reviewed   COMPREHENSIVE METABOLIC PANEL - Abnormal; Notable for the following components:       Result Value    Glucose 567 (*)     Sodium 129 (*)     Chloride 90 (*)     CO2 17.0 (*)     Albumin 3.4 (*)     Alkaline Phosphatase 124 (*)     Anion Gap 22.0 (*)     All other components within normal limits    Narrative:     GFR Normal >60  Chronic Kidney Disease <60  Kidney Failure <15     CBC WITH AUTO DIFFERENTIAL - Abnormal; Notable for the following components:    WBC 13.03 (*)     Hemoglobin 12.3 (*)     MCV 78.7 (*)     MCH 25.7 (*)     Neutrophil % 86.1 (*)     Lymphocyte % 9.1 (*)     Monocyte % 3.1 (*)     Eosinophil % 0.1 (*)     Immature Grans % 0.9 (*)     Neutrophils, Absolute 11.22 (*)     Immature Grans, Absolute 0.12 (*)     All other components within normal limits   TROPONIN - Abnormal; Notable for the following components:    HS Troponin T 32 (*)     All other components within normal limits    Narrative:     High Sensitive Troponin T Reference Range:  <14.0 ng/L- Negative Female for AMI  <22.0 ng/L- Negative Male for AMI  >=14 - Abnormal Female indicating possible myocardial injury.  >=22 - Abnormal Male indicating possible myocardial injury.   Clinicians would have to utilize clinical acumen, EKG, Troponin, and serial changes to determine if it is an Acute Myocardial Infarction or myocardial injury due to an " underlying chronic condition.        LACTIC ACID, PLASMA - Abnormal; Notable for the following components:    Lactate 3.6 (*)     All other components within normal limits   PHOSPHORUS - Abnormal; Notable for the following components:    Phosphorus 5.2 (*)     All other components within normal limits   OSMOLALITY, SERUM - Abnormal; Notable for the following components:    Osmolality 317 (*)     All other components within normal limits   HEMOGLOBIN A1C - Abnormal; Notable for the following components:    Hemoglobin A1C 14.10 (*)     All other components within normal limits    Narrative:     Hemoglobin A1C Ranges:    Increased Risk for Diabetes  5.7% to 6.4%  Diabetes                     >= 6.5%  Diabetic Goal                < 7.0%   BLOOD GAS, ARTERIAL W/CO-OXIMETRY - Abnormal; Notable for the following components:    pH, Arterial 7.328 (*)     HCO3, Arterial 18.9 (*)     Base Excess, Arterial -6.4 (*)     Hemoglobin, Blood Gas 12.5 (*)     CO2 Content 20.0 (*)     All other components within normal limits   POCT CHEM 8 - Abnormal; Notable for the following components:    Glucose >599 (*)     Sodium 127 (*)     POC Potassium 5.2 (*)     Chloride 91 (*)     Total CO2 23 (*)     All other components within normal limits   POCT GLUCOSE FINGERSTICK - Abnormal; Notable for the following components:    Glucose 563 (*)     All other components within normal limits   MAGNESIUM - Normal   RAINBOW DRAW    Narrative:     The following orders were created for panel order [place] Draw.  Procedure                               Abnormality         Status                     ---------                               -----------         ------                     Green Top (Gel)[[id number]]                                  Final result               Lavender Top[[id number]]                                     Final result               Gold Top - [facility][[id number]]                                                              Rangel  Top[[id number]]                                         Final result               Light Blue Top[[id number]]                                   Final result                 Please view results for these tests on the individual orders.   BLOOD GAS, ARTERIAL   URINALYSIS W/ MICROSCOPIC IF INDICATED (NO CULTURE)   BETA HYDROXYBUTYRATE QUANTITATIVE   LACTIC ACID, REFLEX   HIGH SENSITIVITIY TROPONIN T 2HR   BASIC METABOLIC PANEL   MAGNESIUM   PHOSPHORUS   URINALYSIS, MICROSCOPIC ONLY   POCT GLUCOSE FINGERSTICK   CBC AND DIFFERENTIAL    Narrative:     The following orders were created for panel order CBC & Differential.  Procedure                               Abnormality         Status                     ---------                               -----------         ------                     CBC Auto Differential[[id number]]        Abnormal            Final result                 Please view results for these tests on the individual orders.   GREEN TOP   LAVENDER TOP   GRAY TOP   LIGHT BLUE TOP   KETONE BODIES SERUM    Narrative:     The following orders were created for panel order Ketone Bodies, Serum (Not performed at [place]).  Procedure                               Abnormality         Status                     ---------                               -----------         ------                     Beta Hydroxybutyrate Arvind...[[id number]]                      In process                   Please view results for these tests on the individual orders.       Meds Given in ED:   Medications   sodium chloride 0.9 % flush 10 mL (has no administration in time range)   sodium chloride 0.9 % infusion (has no administration in time range)   sodium chloride 0.9 % bolus 1,000 mL (0 mL Intravenous Stopped 11/15/24 1100)   insulin regular (humuLIN R,novoLIN R) injection 15 Units (15 Units Subcutaneous Given 11/15/24 1022)   sodium chloride 0.9 % bolus 1,000 mL (0 mL Intravenous Stopped 11/15/24 1206)     sodium chloride, 200 mL/hr          Last NIH score:                                                          Dysphagia screening results:        Waterflow Coma Scale:  No data recorded     CIWA:        Restraint Type:            Isolation Status:  No active isolations

## 2024-11-15 NOTE — CASE MANAGEMENT/SOCIAL WORK
Discharge Planning Assessment  [facility]     Patient Name: Fracisco Mullen  MRN: [id number]  Today's Date: 11/15/2024    Admit Date: 11/15/2024    Plan: Home   Discharge Needs Assessment       Row Name 11/15/24 1245       Living Environment    People in Home significant other;child(anai), dependent    Current Living Arrangements home    Primary Care Provided by self    Provides Primary Care For no one    Family Caregiver if Needed significant other    Family Caregiver Names Yael Cosme, girlfriend [phone]    Quality of Family Relationships helpful;involved;supportive    Able to Return to Prior Arrangements yes       Transition Planning    Patient/Family Anticipates Transition to home with family;home with help/services;inpatient rehabilitation facility    Patient/Family Anticipated Services at Transition        Discharge Needs Assessment    Equipment Currently Used at Home wheelchair  Dexcom    Concerns to be Addressed denies needs/concerns at this time    Discharge Coordination/Progress Lives in a house in [place] with his girlfriend, normally independent with ADLs.  Has a wheelchair and dexcom.  No history of home health. No advanced directives.                   Discharge Plan       Row Name 11/15/24 8862       Plan    Plan Home    Patient/Family in Agreement with Plan yes    Plan Comments I spoke with Mr Mullen's girlfriend Yael over the phone.  Mr Mullen resides in a house in [place] with Yael and their daughter, and is normally independent with ADLs.  He uses a wheelchair and a dexcom.  He has not had home health in the past.  He does not have an advanced directive.  His insurance is [facility], and there have not been any issues or lapses in coverage.  His insurance does help with prescription coverage.  His PCP is Brandy Roberson, and he gets his prescriptions filled at [facility] off of [address].  At this time there are no discharge needs.    Final  Discharge Disposition Code 01 - home or self-care                  Continued Care and Services - Admitted Since 11/15/2024    No active coordination exists for this encounter.          Demographic Summary    No documentation.                  Functional Status       Row Name 11/15/24 1243       Functional Status    Usual Activity Tolerance fair    Current Activity Tolerance fair       Functional Status, IADL    Medications assistive equipment and person    Meal Preparation assistive equipment and person    Housekeeping assistive equipment and person    Laundry assistive equipment and person    Shopping assistive equipment and person                   Psychosocial    No documentation.                  Abuse/Neglect    No documentation.                  Legal    No documentation.                  Substance Abuse    No documentation.                  Patient Forms    No documentation.                     Galilea Montemayor RN

## 2024-11-15 NOTE — ED PROVIDER NOTES
Subjective   History of Present Illness  Mr. Mullen presents by ambulance from home with decreased mental status.  His wife called EMS and advised that he was slow to respond.  They discovered his fingerstick blood sugar was too high to read.  He tells me he feels okay and denies any recent illnesses.  He admits he has not been compliant with his CPAP.  He tells me he has been taking his insulin.  Denies vomiting, fever, abdominal pain, or recent illness.  Was admitted here in August with hyperglycemia associated with cellulitis of his left foot.  He tells me his foot continues to improve.      Review of Systems    Past Medical History:   Diagnosis Date    Acute renal failure     Hx of    Obesity     Hx of    Sleep apnea     Type 2 diabetes mellitus        Allergies   Allergen Reactions    Sertraline Hcl Hives     Zoloft       Past Surgical History:   Procedure Laterality Date    BACK SURGERY      lower back    CHOLECYSTECTOMY WITH INTRAOPERATIVE CHOLANGIOGRAM N/A 1/6/2021    Procedure: CHOLECYSTECTOMY LAPAROSCOPIC INTRAOPERATIVE CHOLANGIOGRAM;  Surgeon: Juventino Hooker MD;  Location:  [facility];  Service: General;  Laterality: N/A;    ERCP N/A 1/9/2021    Procedure: ENDOSCOPIC RETROGRADE CHOLANGIOPANCREATOGRAPHY;  Surgeon: Jeremy Dowell MD;  Location:  [facility];  Service: Gastroenterology;  Laterality: N/A;  with sphiincterotomy and balloon sweep with 9mm-12mm balloon    INCISION AND DRAINAGE FOOT Left 8/3/2024    Procedure: HEAL IRRIGATION AND DEBRIDEMENT LEFT;  Surgeon: Dru Gan Jr., MD;  Location:  [facility];  Service: Orthopedics;  Laterality: Left;    PLACEMENT OF WOUND VAC Left 8/3/2024    Procedure: PLACEMENT OF WOUND VAC;  Surgeon: Dru Gan Jr., MD;  Location:  [facility];  Service: Orthopedics;  Laterality: Left;       Family History   Problem Relation Age of Onset    Diabetes Other     Hypertension Other     Cancer Other         Pancreatic cancer-first cousin    Heart attack Other          MI    Obesity Other     Hyperlipidemia Other         High blood cholesterol    Hyperlipidemia Mother     Hypertension Mother     Cancer Mother         lung cancer    Heart disease Father         MI at 73 yo    Cancer Brother         esophageal cancer with mets    Heart disease Brother         several MIs earlies in 30s    Cancer Maternal Grandmother        Social History     Socioeconomic History    Marital status:    Tobacco Use    Smoking status: Former     Types: Cigars    Smokeless tobacco: Never    Tobacco comments:     3 per month. Former cig smoker   Substance and Sexual Activity    Alcohol use: No     Comment: Past drank about a pint per month for 2-3 years    Drug use: No    Sexual activity: Defer           Objective   Physical Exam  Vitals and nursing note reviewed.   Constitutional:       General: He is not in acute distress.     Appearance: Normal appearance.      Comments: He is awake, he avoids eye contact, he answers some direct questions regarding his immediate symptoms but does not provide much history.  He is in no distress   HENT:      Head: Normocephalic and atraumatic.      Nose: Nose normal. No congestion or rhinorrhea.      Mouth/Throat:      Mouth: Mucous membranes are dry.   Eyes:      General: No scleral icterus.     Conjunctiva/sclera: Conjunctivae normal.   Neck:      Comments: No JVD   Cardiovascular:      Rate and Rhythm: Regular rhythm. Tachycardia present.      Heart sounds: No murmur heard.     No friction rub.   Pulmonary:      Effort: Pulmonary effort is normal.      Breath sounds: Normal breath sounds. No wheezing or rales.   Abdominal:      General: Bowel sounds are normal.      Palpations: Abdomen is soft.      Tenderness: There is no abdominal tenderness. There is no guarding or rebound.   Musculoskeletal:         General: No tenderness.      Cervical back: Normal range of motion and neck supple.      Right lower leg: No edema.      Left lower leg: No edema.    Skin:     General: Skin is warm and dry.      Coloration: Skin is not pale.      Findings: No erythema.      Comments: He has a chronic appearing ulcer over the left heel.  No significant erythema or drainage, does not appear infected.  He is wearing a wet sock over that foot.  He is missing his second toe on the right foot and has mud encrusted between the toes of his right foot.   Neurological:      General: No focal deficit present.      Mental Status: He is alert. He is disoriented.      Motor: No weakness.      Coordination: Coordination normal.   Psychiatric:         Mood and Affect: Mood normal.         Behavior: Behavior normal.         Procedures           ED Course  ED Course as of 11/15/24 1413   Fri Nov 15, 2024   0944 Ordered a liter of fluids and 15 units regular insulin. [DT]   1057 Has completed a liter of fluids.  Blood sugar too high to read.  Having difficulty getting labs, CMP is awaiting redraw.  Will order a second liter [DT]   1126 Fingerstick is 563. [DT]      ED Course User Index  [DT] Nic Hewitt MD                                                       Medical Decision Making  Please see course notes.  I ordered and interpreted labs.  Gave multiple rounds of IV fluids as well as insulin.  Reviewed prior records and had multiple reevaluations.    Problems Addressed:  Diabetic ketoacidosis without coma associated with type 2 diabetes mellitus: complicated acute illness or injury that poses a threat to life or bodily functions    Amount and/or Complexity of Data Reviewed  External Data Reviewed: notes.  Labs: ordered. Decision-making details documented in ED Course.  Radiology: ordered. Decision-making details documented in ED Course.  ECG/medicine tests: ordered. Decision-making details documented in ED Course.    Risk  OTC drugs.  Prescription drug management.  Decision regarding hospitalization.        Final diagnoses:   Diabetic ketoacidosis without coma associated with type 2  diabetes mellitus       ED Disposition  ED Disposition       ED Disposition   Decision to Admit    Condition   --    Comment   Level of Care: Telemetry [5]   Diagnosis: DKA (diabetic ketoacidosis) [151885]   Certification: I Certify That Inpatient Hospital Services Are Medically Necessary For Greater Than 2 Midnights                 No follow-up provider specified.       Medication List      No changes were made to your prescriptions during this visit.            Nic Hewitt MD  11/15/24 3959

## 2024-11-15 NOTE — CONSULTS
Diabetes Education    Patient Name:  Fracisco Mullen  YOB: 1965  MRN: [id number]  Admit Date:  11/15/2024      Diabetes Education consult noted. Chart review reveals pt was seen by outpatient education 2021, and during hospital stay in August 2024, at which time his A1c was 15.8. Noted current A1c 14.1. Home medications per PTA med list include novolog 70/30 60u BID and metformin 500 mg BID.     Attempted to see pt this afternoon, he was being attended to by staff. RN states pt disoriented and inappropriate for ed at this time, education deferred. We will continue to follow and attempt as appropriate.    Call [phone] with interval needs.     Thank you for this consult.      Electronically signed by:  So Camacho RN, [facility]  11/15/24 13:44 EST

## 2024-11-16 ENCOUNTER — APPOINTMENT (OUTPATIENT)
Dept: GENERAL RADIOLOGY | Facility: HOSPITAL | Age: 59
End: 2024-11-16
Payer: COMMERCIAL

## 2024-11-16 LAB
ANION GAP SERPL CALCULATED.3IONS-SCNC: 10 MMOL/L (ref 5–15)
ANION GAP SERPL CALCULATED.3IONS-SCNC: 8 MMOL/L (ref 5–15)
ANION GAP SERPL CALCULATED.3IONS-SCNC: 8 MMOL/L (ref 5–15)
BASOPHILS # BLD AUTO: 0.08 10*3/MM3 (ref 0–0.2)
BASOPHILS NFR BLD AUTO: 0.7 % (ref 0–1.5)
BUN SERPL-MCNC: 12 MG/DL (ref 8–23)
BUN SERPL-MCNC: 15 MG/DL (ref 8–23)
BUN SERPL-MCNC: 16 MG/DL (ref 8–23)
BUN/CREAT SERPL: 18.8 (ref 7–25)
BUN/CREAT SERPL: 20.3 (ref 7–25)
BUN/CREAT SERPL: 22.2 (ref 7–25)
CALCIUM SPEC-SCNC: 8 MG/DL (ref 8.6–10.5)
CALCIUM SPEC-SCNC: 8 MG/DL (ref 8.6–10.5)
CALCIUM SPEC-SCNC: 8.4 MG/DL (ref 8.6–10.5)
CHLORIDE SERPL-SCNC: 104 MMOL/L (ref 98–107)
CHLORIDE SERPL-SCNC: 105 MMOL/L (ref 98–107)
CHLORIDE SERPL-SCNC: 109 MMOL/L (ref 98–107)
CO2 SERPL-SCNC: 22 MMOL/L (ref 22–29)
CO2 SERPL-SCNC: 24 MMOL/L (ref 22–29)
CO2 SERPL-SCNC: 24 MMOL/L (ref 22–29)
CREAT SERPL-MCNC: 0.64 MG/DL (ref 0.76–1.27)
CREAT SERPL-MCNC: 0.72 MG/DL (ref 0.76–1.27)
CREAT SERPL-MCNC: 0.74 MG/DL (ref 0.76–1.27)
DEPRECATED RDW RBC AUTO: 44.5 FL (ref 37–54)
EGFRCR SERPLBLD CKD-EPI 2021: 103.1 ML/MIN/1.73
EGFRCR SERPLBLD CKD-EPI 2021: 103.9 ML/MIN/1.73
EGFRCR SERPLBLD CKD-EPI 2021: 107.7 ML/MIN/1.73
EOSINOPHIL # BLD AUTO: 0.07 10*3/MM3 (ref 0–0.4)
EOSINOPHIL NFR BLD AUTO: 0.6 % (ref 0.3–6.2)
ERYTHROCYTE [DISTWIDTH] IN BLOOD BY AUTOMATED COUNT: 15.6 % (ref 12.3–15.4)
GLUCOSE BLDC GLUCOMTR-MCNC: 110 MG/DL (ref 70–130)
GLUCOSE BLDC GLUCOMTR-MCNC: 116 MG/DL (ref 70–130)
GLUCOSE BLDC GLUCOMTR-MCNC: 126 MG/DL (ref 70–130)
GLUCOSE BLDC GLUCOMTR-MCNC: 135 MG/DL (ref 70–130)
GLUCOSE BLDC GLUCOMTR-MCNC: 140 MG/DL (ref 70–130)
GLUCOSE BLDC GLUCOMTR-MCNC: 153 MG/DL (ref 70–130)
GLUCOSE BLDC GLUCOMTR-MCNC: 155 MG/DL (ref 70–130)
GLUCOSE BLDC GLUCOMTR-MCNC: 161 MG/DL (ref 70–130)
GLUCOSE BLDC GLUCOMTR-MCNC: 168 MG/DL (ref 70–130)
GLUCOSE BLDC GLUCOMTR-MCNC: 184 MG/DL (ref 70–130)
GLUCOSE BLDC GLUCOMTR-MCNC: 201 MG/DL (ref 70–130)
GLUCOSE BLDC GLUCOMTR-MCNC: 234 MG/DL (ref 70–130)
GLUCOSE BLDC GLUCOMTR-MCNC: 279 MG/DL (ref 70–130)
GLUCOSE BLDC GLUCOMTR-MCNC: 351 MG/DL (ref 70–130)
GLUCOSE SERPL-MCNC: 120 MG/DL (ref 65–99)
GLUCOSE SERPL-MCNC: 154 MG/DL (ref 65–99)
GLUCOSE SERPL-MCNC: 171 MG/DL (ref 65–99)
HCT VFR BLD AUTO: 34.1 % (ref 37.5–51)
HGB BLD-MCNC: 11.2 G/DL (ref 13–17.7)
IMM GRANULOCYTES # BLD AUTO: 0.07 10*3/MM3 (ref 0–0.05)
IMM GRANULOCYTES NFR BLD AUTO: 0.6 % (ref 0–0.5)
LYMPHOCYTES # BLD AUTO: 3.19 10*3/MM3 (ref 0.7–3.1)
LYMPHOCYTES NFR BLD AUTO: 26.7 % (ref 19.6–45.3)
MAGNESIUM SERPL-MCNC: 1.6 MG/DL (ref 1.6–2.4)
MAGNESIUM SERPL-MCNC: 1.8 MG/DL (ref 1.6–2.4)
MAGNESIUM SERPL-MCNC: 1.9 MG/DL (ref 1.6–2.4)
MAGNESIUM SERPL-MCNC: 2 MG/DL (ref 1.6–2.4)
MCH RBC QN AUTO: 26.3 PG (ref 26.6–33)
MCHC RBC AUTO-ENTMCNC: 32.8 G/DL (ref 31.5–35.7)
MCV RBC AUTO: 80 FL (ref 79–97)
MONOCYTES # BLD AUTO: 1.22 10*3/MM3 (ref 0.1–0.9)
MONOCYTES NFR BLD AUTO: 10.2 % (ref 5–12)
NEUTROPHILS NFR BLD AUTO: 61.2 % (ref 42.7–76)
NEUTROPHILS NFR BLD AUTO: 7.3 10*3/MM3 (ref 1.7–7)
NRBC BLD AUTO-RTO: 0 /100 WBC (ref 0–0.2)
PHOSPHATE SERPL-MCNC: 2.4 MG/DL (ref 2.5–4.5)
PHOSPHATE SERPL-MCNC: 2.5 MG/DL (ref 2.5–4.5)
PHOSPHATE SERPL-MCNC: 2.7 MG/DL (ref 2.5–4.5)
PHOSPHATE SERPL-MCNC: 3.2 MG/DL (ref 2.5–4.5)
PLATELET # BLD AUTO: 283 10*3/MM3 (ref 140–450)
PMV BLD AUTO: 10.8 FL (ref 6–12)
POTASSIUM SERPL-SCNC: 3.7 MMOL/L (ref 3.5–5.2)
POTASSIUM SERPL-SCNC: 3.8 MMOL/L (ref 3.5–5.2)
POTASSIUM SERPL-SCNC: 4.1 MMOL/L (ref 3.5–5.2)
RBC # BLD AUTO: 4.26 10*6/MM3 (ref 4.14–5.8)
SODIUM SERPL-SCNC: 134 MMOL/L (ref 136–145)
SODIUM SERPL-SCNC: 137 MMOL/L (ref 136–145)
SODIUM SERPL-SCNC: 143 MMOL/L (ref 136–145)
WBC NRBC COR # BLD AUTO: 11.93 10*3/MM3 (ref 3.4–10.8)

## 2024-11-16 PROCEDURE — 63710000001 INSULIN GLARGINE PER 5 UNITS: Performed by: HOSPITALIST

## 2024-11-16 PROCEDURE — 85025 COMPLETE CBC W/AUTO DIFF WBC: CPT | Performed by: STUDENT IN AN ORGANIZED HEALTH CARE EDUCATION/TRAINING PROGRAM

## 2024-11-16 PROCEDURE — 92610 EVALUATE SWALLOWING FUNCTION: CPT

## 2024-11-16 PROCEDURE — 82948 REAGENT STRIP/BLOOD GLUCOSE: CPT

## 2024-11-16 PROCEDURE — 25010000002 ENOXAPARIN PER 10 MG: Performed by: STUDENT IN AN ORGANIZED HEALTH CARE EDUCATION/TRAINING PROGRAM

## 2024-11-16 PROCEDURE — 92611 MOTION FLUOROSCOPY/SWALLOW: CPT

## 2024-11-16 PROCEDURE — 99232 SBSQ HOSP IP/OBS MODERATE 35: CPT | Performed by: HOSPITALIST

## 2024-11-16 PROCEDURE — 83735 ASSAY OF MAGNESIUM: CPT | Performed by: STUDENT IN AN ORGANIZED HEALTH CARE EDUCATION/TRAINING PROGRAM

## 2024-11-16 PROCEDURE — 84100 ASSAY OF PHOSPHORUS: CPT | Performed by: STUDENT IN AN ORGANIZED HEALTH CARE EDUCATION/TRAINING PROGRAM

## 2024-11-16 PROCEDURE — 74230 X-RAY XM SWLNG FUNCJ C+: CPT

## 2024-11-16 PROCEDURE — 63710000001 INSULIN LISPRO (HUMAN) PER 5 UNITS: Performed by: HOSPITALIST

## 2024-11-16 PROCEDURE — 80048 BASIC METABOLIC PNL TOTAL CA: CPT | Performed by: STUDENT IN AN ORGANIZED HEALTH CARE EDUCATION/TRAINING PROGRAM

## 2024-11-16 RX ORDER — NICOTINE POLACRILEX 4 MG
15 LOZENGE BUCCAL
Status: DISCONTINUED | OUTPATIENT
Start: 2024-11-16 | End: 2024-12-05 | Stop reason: HOSPADM

## 2024-11-16 RX ORDER — IBUPROFEN 600 MG/1
1 TABLET ORAL
Status: DISCONTINUED | OUTPATIENT
Start: 2024-11-16 | End: 2024-11-16

## 2024-11-16 RX ORDER — NICOTINE POLACRILEX 4 MG
15 LOZENGE BUCCAL
Status: DISCONTINUED | OUTPATIENT
Start: 2024-11-16 | End: 2024-11-16

## 2024-11-16 RX ORDER — INSULIN LISPRO 100 [IU]/ML
2-9 INJECTION, SOLUTION INTRAVENOUS; SUBCUTANEOUS
Status: DISCONTINUED | OUTPATIENT
Start: 2024-11-17 | End: 2024-12-05 | Stop reason: HOSPADM

## 2024-11-16 RX ORDER — INSULIN LISPRO 100 [IU]/ML
1-200 INJECTION, SOLUTION INTRAVENOUS; SUBCUTANEOUS
Status: DISCONTINUED | OUTPATIENT
Start: 2024-11-16 | End: 2024-11-16

## 2024-11-16 RX ORDER — INSULIN LISPRO 100 [IU]/ML
2-9 INJECTION, SOLUTION INTRAVENOUS; SUBCUTANEOUS
Status: DISCONTINUED | OUTPATIENT
Start: 2024-11-16 | End: 2024-11-16

## 2024-11-16 RX ORDER — INSULIN LISPRO 100 [IU]/ML
1-200 INJECTION, SOLUTION INTRAVENOUS; SUBCUTANEOUS EVERY 6 HOURS SCHEDULED
Status: DISCONTINUED | OUTPATIENT
Start: 2024-11-16 | End: 2024-11-16

## 2024-11-16 RX ORDER — PREGABALIN 75 MG/1
225 CAPSULE ORAL 2 TIMES DAILY
Status: DISCONTINUED | OUTPATIENT
Start: 2024-11-16 | End: 2024-12-05 | Stop reason: HOSPADM

## 2024-11-16 RX ORDER — DEXTROSE MONOHYDRATE 25 G/50ML
10-50 INJECTION, SOLUTION INTRAVENOUS
Status: DISCONTINUED | OUTPATIENT
Start: 2024-11-16 | End: 2024-11-16

## 2024-11-16 RX ORDER — DEXTROSE MONOHYDRATE 25 G/50ML
25 INJECTION, SOLUTION INTRAVENOUS
Status: DISCONTINUED | OUTPATIENT
Start: 2024-11-16 | End: 2024-12-05 | Stop reason: HOSPADM

## 2024-11-16 RX ORDER — INSULIN LISPRO 100 [IU]/ML
5 INJECTION, SOLUTION INTRAVENOUS; SUBCUTANEOUS
Status: DISCONTINUED | OUTPATIENT
Start: 2024-11-17 | End: 2024-11-17

## 2024-11-16 RX ORDER — DEXTROSE MONOHYDRATE 25 G/50ML
25 INJECTION, SOLUTION INTRAVENOUS
Status: DISCONTINUED | OUTPATIENT
Start: 2024-11-16 | End: 2024-11-16

## 2024-11-16 RX ORDER — INSULIN LISPRO 100 [IU]/ML
1-200 INJECTION, SOLUTION INTRAVENOUS; SUBCUTANEOUS AS NEEDED
Status: DISCONTINUED | OUTPATIENT
Start: 2024-11-16 | End: 2024-11-16

## 2024-11-16 RX ORDER — INSULIN LISPRO 100 [IU]/ML
2-9 INJECTION, SOLUTION INTRAVENOUS; SUBCUTANEOUS
Status: DISCONTINUED | OUTPATIENT
Start: 2024-11-17 | End: 2024-11-16

## 2024-11-16 RX ORDER — IBUPROFEN 600 MG/1
1 TABLET ORAL
Status: DISCONTINUED | OUTPATIENT
Start: 2024-11-16 | End: 2024-12-05 | Stop reason: HOSPADM

## 2024-11-16 RX ADMIN — AMITRIPTYLINE HYDROCHLORIDE 25 MG: 25 TABLET, FILM COATED ORAL at 22:24

## 2024-11-16 RX ADMIN — ENOXAPARIN SODIUM 40 MG: 100 INJECTION SUBCUTANEOUS at 08:19

## 2024-11-16 RX ADMIN — DEXTROSE, SODIUM CHLORIDE, AND POTASSIUM CHLORIDE 125 ML/HR: 5; .9; .15 INJECTION INTRAVENOUS at 12:07

## 2024-11-16 RX ADMIN — TAMSULOSIN HYDROCHLORIDE 0.4 MG: 0.4 CAPSULE ORAL at 22:23

## 2024-11-16 RX ADMIN — INSULIN GLARGINE 12 UNITS: 100 INJECTION, SOLUTION SUBCUTANEOUS at 11:02

## 2024-11-16 RX ADMIN — BARIUM SULFATE 20 ML: 400 PASTE ORAL at 11:36

## 2024-11-16 RX ADMIN — Medication 10 ML: at 22:24

## 2024-11-16 RX ADMIN — INSULIN HUMAN 3.4 UNITS/HR: 1 INJECTION, SOLUTION INTRAVENOUS at 07:50

## 2024-11-16 RX ADMIN — POTASSIUM CHLORIDE, DEXTROSE MONOHYDRATE AND SODIUM CHLORIDE 125 ML/HR: 150; 5; 450 INJECTION, SOLUTION INTRAVENOUS at 00:05

## 2024-11-16 RX ADMIN — ROPINIROLE 0.25 MG: 0.5 TABLET, FILM COATED ORAL at 22:24

## 2024-11-16 RX ADMIN — FINASTERIDE 5 MG: 5 TABLET, FILM COATED ORAL at 22:24

## 2024-11-16 RX ADMIN — INSULIN LISPRO 4 UNITS: 100 INJECTION, SOLUTION INTRAVENOUS; SUBCUTANEOUS at 23:32

## 2024-11-16 RX ADMIN — PREGABALIN 225 MG: 75 CAPSULE ORAL at 23:33

## 2024-11-16 RX ADMIN — INSULIN LISPRO 8 UNITS: 100 INJECTION, SOLUTION INTRAVENOUS; SUBCUTANEOUS at 17:36

## 2024-11-16 RX ADMIN — BARIUM SULFATE 50 ML: 400 SUSPENSION ORAL at 11:36

## 2024-11-16 RX ADMIN — BARIUM SULFATE 100 ML: 0.81 POWDER, FOR SUSPENSION ORAL at 11:36

## 2024-11-16 NOTE — PLAN OF CARE
Problem: Adult Inpatient Plan of Care  Goal: Plan of Care Review  Outcome: Progressing  Goal: Patient-Specific Goal (Individualized)  Outcome: Progressing  Goal: Absence of Hospital-Acquired Illness or Injury  Outcome: Progressing  Intervention: Identify and Manage Fall Risk  Recent Flowsheet Documentation  Taken 11/16/2024 0600 by Mary Mauricio RN  Safety Promotion/Fall Prevention: safety round/check completed  Taken 11/16/2024 0400 by Mary Mauricio RN  Safety Promotion/Fall Prevention: safety round/check completed  Taken 11/16/2024 0200 by Mary Mauricio RN  Safety Promotion/Fall Prevention: safety round/check completed  Taken 11/16/2024 0000 by Mary Mauricio RN  Safety Promotion/Fall Prevention: safety round/check completed  Taken 11/15/2024 2200 by Mary Mauricio RN  Safety Promotion/Fall Prevention: safety round/check completed  Taken 11/15/2024 2000 by Mary Mauricio RN  Safety Promotion/Fall Prevention:   activity supervised   fall prevention program maintained   muscle strengthening facilitated   nonskid shoes/slippers when out of bed   room organization consistent   safety round/check completed  Intervention: Prevent Skin Injury  Recent Flowsheet Documentation  Taken 11/16/2024 0600 by Mary Mauricio RN  Body Position: position changed independently  Taken 11/16/2024 0200 by Mary Mauricio RN  Body Position: position changed independently  Taken 11/16/2024 0000 by Mary Mauricio RN  Body Position: position changed independently  Taken 11/15/2024 2200 by Mary Mauricio RN  Body Position: position changed independently  Taken 11/15/2024 2000 by Mary Mauricio RN  Body Position: position changed independently  Skin Protection:   incontinence pads utilized   silicone foam dressing in place  Intervention: Prevent and Manage VTE (Venous Thromboembolism) Risk  Recent Flowsheet Documentation  Taken 11/15/2024 2000 by Mary Mauricio RN  VTE Prevention/Management: (MAR) other  (see comments)  Goal: Optimal Comfort and Wellbeing  Outcome: Progressing  Goal: Readiness for Transition of Care  Outcome: Progressing     Problem: Skin Injury Risk Increased  Goal: Skin Health and Integrity  Outcome: Progressing  Intervention: Optimize Skin Protection  Recent Flowsheet Documentation  Taken 11/16/2024 0600 by Mary Mauricio RN  Head of Bed (HOB) Positioning: HOB elevated  Taken 11/16/2024 0200 by Mary Mauricio RN  Head of Bed (HOB) Positioning: HOB elevated  Taken 11/16/2024 0000 by Mary Mauricio RN  Head of Bed (HOB) Positioning: HOB elevated  Taken 11/15/2024 2200 by Mary Mauricio RN  Head of Bed (HOB) Positioning: HOB elevated  Taken 11/15/2024 2000 by Mary Mauricio RN  Pressure Reduction Techniques:   frequent weight shift encouraged   weight shift assistance provided  Head of Bed (HOB) Positioning: HOB elevated  Pressure Reduction Devices:   specialty bed utilized   foam padding utilized  Skin Protection:   incontinence pads utilized   silicone foam dressing in place     Problem: Comorbidity Management  Goal: Blood Pressure in Desired Range  Outcome: Progressing   Goal Outcome Evaluation:

## 2024-11-16 NOTE — MBS/VFSS/FEES
Acute Care - Speech Language Pathology   Swallow Re-Evaluation [facility]  Modified Barium Swallow Study (MBS)       Patient Name: Fracisco Mullen  : 1965  MRN: [id number]  Today's Date: 2024               Admit Date: 11/15/2024    Visit Dx:     ICD-10-CM ICD-9-CM   1. Diabetic ketoacidosis without coma associated with type 2 diabetes mellitus  E11.10 250.12   2. Oropharyngeal dysphagia  R13.12 787.22     Patient Active Problem List   Diagnosis    HTN (hypertension)    Type 2 diabetes mellitus, with long-term current use of insulin    Diabetic retinopathy    Obesity (BMI 30.0-34.9)    Peripheral neuropathy    Asthma    Causalgia of lower limb    Chronic constipation    Compression of lumbar nerve root    GERD (gastroesophageal reflux disease)    Hyperlipidemia    IBS (irritable bowel syndrome)    RLS (restless legs syndrome)    Sleep apnea    Vitamin D deficiency    Cigar smoker    Chronic back pain    Diabetic ketoacidosis associated with type 2 diabetes mellitus    Multiple open wounds of foot    Status post cholecystectomy    Cholestatic hepatitis    Diabetic infection of left foot    Precordial pain    Tobacco use    DKA (diabetic ketoacidosis)     Past Medical History:   Diagnosis Date    Acute renal failure     Hx of    Obesity     Hx of    Sleep apnea     Type 2 diabetes mellitus      Past Surgical History:   Procedure Laterality Date    BACK SURGERY      lower back    CHOLECYSTECTOMY WITH INTRAOPERATIVE CHOLANGIOGRAM N/A 2021    Procedure: CHOLECYSTECTOMY LAPAROSCOPIC INTRAOPERATIVE CHOLANGIOGRAM;  Surgeon: Juventino Hooker MD;  Location: [facility] OR;  Service: General;  Laterality: N/A;    ERCP N/A 2021    Procedure: ENDOSCOPIC RETROGRADE CHOLANGIOPANCREATOGRAPHY;  Surgeon: Jeremy Dowell MD;  Location: [facility] ENDOSCOPY;  Service: Gastroenterology;  Laterality: N/A;  with sphiincterotomy and balloon sweep with 9mm-12mm balloon    INCISION AND DRAINAGE FOOT Left 8/3/2024    Procedure:  HEAL IRRIGATION AND DEBRIDEMENT LEFT;  Surgeon: Dru Gan Jr., MD;  Location:  [facility] OR;  Service: Orthopedics;  Laterality: Left;    PLACEMENT OF WOUND VAC Left 8/3/2024    Procedure: PLACEMENT OF WOUND VAC;  Surgeon: Dru Gan Jr., MD;  Location:  [facility] OR;  Service: Orthopedics;  Laterality: Left;       SLP Recommendation and Plan  SLP Swallowing Diagnosis: mild, oral dysphagia, pharyngeal dysphagia (11/16/24 1123)  SLP Diet Recommendation: soft to chew textures, ground, thin liquids, other (see comments) (no straws) (11/16/24 1123)  Recommended Precautions and Strategies: upright posture during/after eating, small bites of food and sips of liquid, general aspiration precautions (11/16/24 1123)  SLP Rec. for Method of Medication Administration: meds whole, with puree, as tolerated (11/16/24 1123)     Monitor for Signs of Aspiration: yes, notify SLP if any concerns (11/16/24 1123)  Swallow Criteria for Skilled Therapeutic Interventions Met: demonstrates skilled criteria (11/16/24 1123)  Anticipated Discharge Disposition (SLP): home (11/16/24 1123)  Rehab Potential/Prognosis, Swallowing: good, to achieve stated therapy goals (11/16/24 1123)  Therapy Frequency (Swallow): 5 days per week (11/16/24 1123)  Predicted Duration Therapy Intervention (Days): 1 week (11/16/24 1123)  Oral Care Recommendations: Oral Care BID/PRN, Toothbrush (11/16/24 1123)                                        Progress: improving      SWALLOW EVALUATION (Last 72 Hours)       SLP Adult Swallow Evaluation       Row Name 11/16/24 1123 11/16/24 0905                Rehab Evaluation    Document Type re-evaluation  -MM evaluation  -MM       Subjective Information no complaints  -MM no complaints  -MM       Patient Observations alert;cooperative  -MM alert;cooperative;agree to therapy  -MM       Patient/Family/Caregiver Comments/Observations no family present  -MM no family present  -MM       Patient Effort good  -MM good  -MM        Oral Care -- swabbed with antiseptic solution  -MM          General Information    Patient Profile Reviewed yes  -MM yes  -MM       Pertinent History Of Current Problem See initial eval  -MM Pt is a 59 year old male who presented to the facility with c/o AMS. Pt diagnosed with DKA and metabolic encephalopathy.  -MM       Current Method of Nutrition NPO  -MM NPO  -MM       Precautions/Limitations, Vision WFL;for purposes of eval  -MM WFL;for purposes of eval  -MM       Precautions/Limitations, Hearing WFL;for purposes of eval  -MM WFL;for purposes of eval  -MM       Prior Level of Function-Communication unknown  -MM unknown  -MM       Prior Level of Function-Swallowing soft to chew;other (see comments)  per pt report  -MM soft to chew;other (see comments)  per pt report  -MM       Plans/Goals Discussed with patient;agreed upon  -MM patient;agreed upon  -MM       Barriers to Rehab none identified  -MM none identified  -MM       Patient's Goals for Discharge return home  -MM return home  -MM          Pain    Pretreatment Pain Rating 0/10 - no pain  -MM 0/10 - no pain  -MM       Posttreatment Pain Rating 0/10 - no pain  -MM 0/10 - no pain  -MM          Oral Motor Structure and Function    Dentition Assessment -- edentulous, does not have dentures  -MM       Secretion Management -- WNL/WFL  -MM       Mucosal Quality -- moist, healthy  -MM          Oral Musculature and Cranial Nerve Assessment    Oral Motor General Assessment -- WFL  -MM          General Eating/Swallowing Observations    Respiratory Support Currently in Use -- room air  -MM       Eating/Swallowing Skills -- fed by SLP;self-fed  -MM       Positioning During Eating -- upright in bed  -MM       Utensils Used -- spoon;straw;cup  -MM       Consistencies Trialed -- ice chips;thin liquids;nectar/syrup-thick liquids;pureed;soft to chew textures;chopped  -MM          Clinical Swallow Eval    Oral Prep Phase -- impaired  -MM       Oral Transit -- WFL  -MM        Oral Residue -- WFL  -MM       Pharyngeal Phase -- suspected pharyngeal impairment  -MM       Esophageal Phase -- unremarkable  -MM          Oral Prep Concerns    Oral Prep Concerns -- prolonged mastication;inefficient mastication  -MM       Prolonged Mastication -- mechanical soft  -MM       Inefficient Mastication -- mechanical soft  -MM          Pharyngeal Phase Concerns    Pharyngeal Phase Concerns -- cough  -MM       Cough -- thin  -MM       Pharyngeal Phase Concerns, Comment -- Pt with single swallows and +hyolaryngeal elevation. Pt demonstrated occasional cough following thin via rapid, consecutive straw sips. Pt with no overt s/sx aspiration with nectar or thin via small sips.  -MM          MBS/VFSS    Utensils Used spoon;cup;straw  -MM --       Consistencies Trialed thin liquids;nectar/syrup-thick liquids;pureed;soft to chew textures;chopped  -MM --          MBS/VFSS Interpretation    Oral Prep Phase impaired oral phase of swallowing  -MM --       Oral Transit Phase impaired  -MM --       Oral Residue WFL  -MM --          Oral Preparatory Phase    Oral Preparatory Phase prolonged manipulation  -MM --       Prolonged Manipulation mechanical soft  -MM --          Oral Transit Phase    Impaired Oral Transit Phase delayed initiation of bolus transit;premature spillage of liquids into pharynx  -MM --       Delayed Initiation of bolus transit all consistencies tested  -MM --       Premature Spillage of Liquids into Pharynx all consistencies tested  -MM --       Oral Transit Phase, Comment Pt with premature spillage of all consistencies to vallecular or pyriform sinuses prior to swallow initiation.  -MM --          Initiation of Pharyngeal Swallow    Initiation of Pharyngeal Swallow bolus in pyriform sinuses  -MM --       Pharyngeal Phase impaired pharyngeal phase of swallowing  -MM --       Penetration During the Swallow thin liquids  -MM --       Aspiration During the Swallow thin liquids  -MM --       Response  to Aspiration Yes  -MM --       Responded to aspiration with effective;cough  -MM --       Rosenbek's Scale thin:;6--->level 6  -MM --       Pharyngeal Residue no significant pharyngeal residue noted  -MM --       Attempted Compensatory Maneuvers bolus size  -MM --       Response to Attempted Compensatory Maneuvers prevented penetration  -MM --       Pharyngeal Phase, Comment Patient presents with mild oropharyngeal dysphagia characterized by reduced mastication, reduced bolus manipulation, and premature spillage with all consistencies. Pt with trace aspiration with thin liquids via large, consecutive straw sip only and no airway compromise with small cup sips.  -MM --          SLP Evaluation Clinical Impression    SLP Swallowing Diagnosis mild;oral dysphagia;pharyngeal dysphagia  -MM mild;oral dysphagia;suspected pharyngeal dysphagia  -MM       Functional Impact risk of aspiration/pneumonia;risk of malnutrition;risk of dehydration  -MM risk of aspiration/pneumonia;risk of malnutrition  -MM       Rehab Potential/Prognosis, Swallowing good, to achieve stated therapy goals  -MM good, to achieve stated therapy goals  -MM       Swallow Criteria for Skilled Therapeutic Interventions Met demonstrates skilled criteria  -MM demonstrates skilled criteria  -MM          Recommendations    Therapy Frequency (Swallow) 5 days per week  -MM 5 days per week  -MM       Predicted Duration Therapy Intervention (Days) 1 week  -MM 1 week  -MM       SLP Diet Recommendation soft to chew textures;ground;thin liquids;other (see comments)  no straws  -MM other (see comments)  sips of thin liquids, further recommendations pending MBS  -MM       Recommended Diagnostics -- VFSS (MBS)  -MM       Recommended Precautions and Strategies upright posture during/after eating;small bites of food and sips of liquid;general aspiration precautions  -MM upright posture during/after eating  -MM       Oral Care Recommendations Oral Care BID/PRN;Toothbrush   -MM Oral Care BID/PRN;Toothbrush  -MM       SLP Rec. for Method of Medication Administration meds whole;with puree;as tolerated  -MM meds whole;with puree;as tolerated  -MM       Monitor for Signs of Aspiration yes;notify SLP if any concerns  -MM yes;notify SLP if any concerns  -MM       Anticipated Discharge Disposition (SLP) home  -MM home  -MM                 User Key  (r) = Recorded By, (t) = Taken By, (c) = Cosigned By      Initials Name Effective Dates    MM Isabel Monae MS CCC-SLP 08/30/24 -                     EDUCATION  The patient has been educated in the following areas:   Dysphagia (Swallowing Impairment) Modified Diet Instruction.        SLP GOALS       Row Name 11/16/24 1123             (LTG) Patient will demonstrate functional swallow for    Diet Texture (Demonstrate functional swallow) soft to chew (chopped) textures  -MM      Liquid viscosity (Demonstrate functional swallow) thin liquids  -MM      [place] (Demonstrate functional swallow) independently (over 90% accuracy)  -MM      Time Frame (Demonstrate functional swallow) 1 week  -MM      Progress/Outcomes (Demonstrate functional swallow) new goal  -MM         (STG) Patient will tolerate trials of    Consistencies Trialed (Tolerate trials) soft to chew (ground) textures;thin liquids  -MM      Desired Outcome (Tolerate trials) without signs/symptoms of aspiration;with adequate oral prep/transit/clearance  -MM      Caroline (Tolerate trials) independently (over 90% accuracy)  -MM      Time Frame (Tolerate trials) 1 week  -MM      Progress/Outcomes (Tolerate trials) new goal  -MM         (STG) Patient will tolerate therapeutic trials of    Consistencies Trialed (Tolerate therapeutic trials) soft to chew (chopped) textures;thin liquids  -MM      Desired Outcome (Tolerate therapeutic trials) without signs/symptoms of aspiration;with adequate oral prep/transit/clearance  -MM      Caroline (Tolerate therapeutic trials) independently (over  90% accuracy)  -MM      Time Frame (Tolerate therapeutic trials) 1 week  -MM      Progress/Outcomes (Tolerate therapeutic trials) new goal  -MM         (STG) Pharyngeal Strengthening Exercise Goal 1 (SLP)    Activity (Pharyngeal Strengthening Goal 1, SLP) increase timing;increase anterior movement of the hyolaryngeal complex;increase squeeze/positive pressure generation  -MM      Increase Timing prepping - 3 second prep or suck swallow or 3-step swallow  -MM      Increase Anterior Movement of the Hyolaryngeal Complex chin tuck against resistance (CTAR)  -MM      Increase Squeeze/Positive Pressure Generation hard effortful swallow  -MM      [place]/Accuracy (Pharyngeal Strengthening Goal 1, SLP) with minimal cues (75-90% accuracy)  -MM      Time Frame (Pharyngeal Strengthening Goal 1, SLP) 1 week  -MM      Progress/Outcomes (Pharyngeal Strengthening Goal 1, SLP) new goal  -MM                User Key  (r) = Recorded By, (t) = Taken By, (c) = Cosigned By      Initials Name Provider Type    Isabel Zaldivar MS CCC-SLP Speech and Language Pathologist                         Time Calculation:    Time Calculation- SLP       Row Name 11/16/24 1222 11/16/24 1015          Time Calculation- SLP    SLP Start Time 1123  -MM 0905  -MM     SLP Received On 11/16/24  -MM 11/16/24  -MM        Untimed Charges    [id number] Eval Oral Pharyng Swallow Minutes -- 54  -MM     60509-[employer] Fluoro Eval Swallow Minutes 71  -MM --        Total Minutes    Untimed Charges Total Minutes 71  -MM 54  -MM      Total Minutes 71  -MM 54  -MM               User Key  (r) = Recorded By, (t) = Taken By, (c) = Cosigned By      Initials Name Provider Type    Isabel Zaldivar MS CCC-SLP Speech and Language Pathologist                    Therapy Charges for Today       Code Description Service Date Service Provider Modifiers Qty    03860212428 HC ST EVAL ORAL PHARYNG SWALLOW 4 11/16/2024 Isabel Monae MS CCC-SLP GN 1    [id number] HC ST MOTION  FLUORO EVAL SWALLOW 5 11/16/2024 Isabel Monae, MS CCC-SLP GN 1                 Isabel Monae, MS CCC-SLP  11/16/2024

## 2024-11-16 NOTE — PLAN OF CARE
Goal Outcome Evaluation:  Plan of Care Reviewed With: patient           Outcome Evaluation: VSS, patient remains on RA. Patient oriented to self and place. Patient transitioned off insulin drip, now getting subq insuline. Patient resting in bed, call light in reach.

## 2024-11-16 NOTE — PLAN OF CARE
Goal Outcome Evaluation:  Plan of Care Reviewed With: patient        Progress: improving       Anticipated Discharge Disposition (SLP): home          SLP Swallowing Diagnosis: mild, oral dysphagia, pharyngeal dysphagia (11/16/24 7871)

## 2024-11-16 NOTE — PROGRESS NOTES
[facility] Medicine Services  PROGRESS NOTE    Patient Name: Fracisco Mullen  : 1965  MRN: [id number]    Date of Admission: 11/15/2024  Primary Care Physician: Brandy Roberson    Subjective   Subjective     CC:  F/U DKA    HPI:  Seen this morning. No complaints this morning. Off insulin drip.      Objective   Objective     Vital Signs:   Temp:  [97.2 °F (36.2 °C)-100.4 °F (38 °C)] 98 °F (36.7 °C)  Heart Rate:  [] 95  Resp:  [18] 18  BP: (132-148)/(77-85) 135/82     Physical Exam:  Gen-no acute distress  HENT-NCAT, mucous membranes moist  CV-RRR, S1 S2 normal, no m/r/g  Resp-CTAB, no wheezes or rales  Abd-soft, NT, ND, +BS  Ext-no edema  Neuro-A&Ox3, some situational confusion but overall able to describe recent medical history/events, no focal deficits  Skin-left heel ulcer in dressing  Psych-appropriate mood      Results Reviewed:  LAB RESULTS:      Lab 24  0343 11/15/24  1532 11/15/24  1107 11/15/24  0948 11/15/24  0942   WBC 11.93*  --   --   --  13.03*   HEMOGLOBIN 11.2*  --   --   --  12.3*   HEMOGLOBIN, POC  --   --   --  13.6  --    HEMATOCRIT 34.1*  --   --   --  37.6   HEMATOCRIT POC  --   --   --  40  --    PLATELETS 283  --   --   --  321   NEUTROS ABS 7.30*  --   --   --  11.22*   IMMATURE GRANS (ABS) 0.07*  --   --   --  0.12*   LYMPHS ABS 3.19*  --   --   --  1.18   MONOS ABS 1.22*  --   --   --  0.41   EOS ABS 0.07  --   --   --  0.01   MCV 80.0  --   --   --  78.7*   LACTATE  --  1.9  --   --  3.6*   HSTROP T  --  44* 32*  --   --          Lab 24  1342 24  0846 24  0343 24  0045 11/15/24  1943 11/15/24  1107 11/15/24  0948 11/15/24  0942   SODIUM  --  134* 143 137 136 129*  --   --    POTASSIUM  --  3.7 4.1 3.8 4.5 4.5  --   --    CHLORIDE  --  104 109* 105 103 90*  --   --    CO2  --  22.0 24.0 24.0 25.0 17.0*  --   --    ANION GAP  --  8.0 10.0 8.0 8.0 22.0*  --   --    BUN  --  12 16 15 17 17  --   --    CREATININE  --   0.64* 0.72* 0.74* 0.84 0.87   < >  --    EGFR  --  107.7 103.9 103.1 99.2 98.2   < >  --    GLUCOSE  --  171* 120* 154* 183* 567*  --   --    CALCIUM  --  8.0* 8.4* 8.0* 8.8 8.8  --   --    MAGNESIUM 1.6 2.0 1.9 1.8 1.8 1.8   < >  --    PHOSPHORUS 2.5 2.4* 3.2 2.7 2.6 5.2*   < >  --    HEMOGLOBIN A1C  --   --   --   --   --   --   --  14.10*    < > = values in this interval not displayed.         Lab 11/15/24  1107   TOTAL PROTEIN 6.7   ALBUMIN 3.4*   GLOBULIN 3.3   ALT (SGPT) 14   AST (SGOT) 13   BILIRUBIN 0.4   ALK PHOS 124*         Lab 11/15/24  1532 11/15/24  1107   HSTROP T 44* 32*                 Lab 11/15/24  0954   PH, ARTERIAL 7.328*   PCO2, ARTERIAL 36.0   PO2 ART 88.9   FIO2 21   HCO3 ART 18.9*   BASE EXCESS ART -6.4*   CARBOXYHEMOGLOBIN 1.5     Brief Urine Lab Results  (Last result in the past 365 days)        Color   Clarity   Blood   Leuk Est   Nitrite   Protein   CREAT   Urine HCG        11/15/24 1142 Yellow   Clear   Small (1+)   Negative   Negative   100 mg/dL (2+)                   Microbiology Results Abnormal       None            FL Video Swallow With Speech Single Contrast    Result Date: 11/16/2024  FL VIDEO SWALLOW W SPEECH SINGLE-CONTRAST Date of Exam: 11/16/2024 11:18 AM EST Indication: dysphagia.   Comparison: None available. Technique:   The speech pathologist administered food and/or liquid mixed with barium to the patient with cine/video imaging.  Imaging assistance was provided to the speech pathologist and an image was saved. Fluoroscopic Time: 2 minutes 12 seconds Number of Images: 8 cine loops Findings: The patient was evaluated in the seated lateral position while taking a variety of consistencies by mouth under the direction of speech pathology. No aspiration was observed. Please see the speech pathologist's procedure report for full details and recommendations.     Impression: Impression: Fluoroscopy provided during modified barium swallow. Electronically Signed: Endy Shaw MD   11/16/2024 1:27 PM EST  Workstation ID: [id number]    XR Abdomen KUB    Result Date: 11/15/2024  XR ABDOMEN KUB Date of Exam: 11/15/2024 2:47 PM EST Indication: MRI clearance Comparison: None available. Findings: There is a nonobstructing bowel gas pattern. Postsurgical changes are noted from fusion at the lumbosacral junction. No metal devices to suggest contraindication for MRI     Impression: Impression: Unremarkable radiograph abdomen Electronically Signed: Kirby Donnelly MD  11/15/2024 3:14 PM EST  Workstation ID: [id number]    CT Head Without Contrast    Result Date: 11/15/2024  CT HEAD WO CONTRAST Date of Exam: 11/15/2024 2:21 PM EST Indication: Altered mental status. Comparison: MRI of the brain without contrast from 1/6/2021 Technique: Axial CT images were obtained of the head without contrast administration.  Automated exposure control and iterative construction methods were used. Findings: There is generalized parenchymal volume loss with sulcal widening and ventricular prominence, not significantly changed since the 2021 MR. There is no CT evidence of acute hemorrhage, infarct, mass, mass effect, or midline shift. The gray-white matter differentiation is preserved. . The sulci and ventricles are symmetric.  No extraaxial fluid collections. The globes and orbital contents are  normal and symmetric. The mastoid air cells and visualized paranasal sinuses are clear. No skull fractures or suspicious calvarial lesions.     Impression: Impression: No acute intracranial abnormality. There is generalized atrophy and ventricular prominence similar to the 2021 study. Electronically Signed: Tony Putnam DO  11/15/2024 2:37 PM EST  Workstation ID: [id number]    XR Chest 1 View    Result Date: 11/15/2024  XR CHEST 1 VW Date of Exam: 11/15/2024 9:31 AM EST Indication: Weak/Dizzy/AMS triage protocol Comparison: Chest radiograph 1/5/2021. Findings: Cardiomediastinal silhouette is unchanged. No focal consolidation or overt  pulmonary edema. No pleural effusion or pneumothorax. Osseous structures are unchanged.     Impression: Impression: No evidence of acute cardiopulmonary disease. Electronically Signed: Jerad Mata MD  11/15/2024 9:48 AM EST  Workstation ID: [id number]         Current medications:  Scheduled Meds:amitriptyline, 25 mg, Oral, BID  aspirin, 81 mg, Oral, Daily  enoxaparin, 40 mg, Subcutaneous, Daily  finasteride, 5 mg, Oral, Nightly  insulin glargine, 1-200 Units, Subcutaneous, Nightly - [employer]  insulin lispro, 1-200 Units, Subcutaneous, 4x Daily With Meals & Nightly  metoprolol succinate XL, 50 mg, Oral, Daily  pregabalin, 225 mg, Oral, BID  rOPINIRole, 0.25 mg, Oral, Nightly  sodium chloride, 10 mL, Intravenous, Q12H  sodium chloride, 10 mL, Intravenous, Q12H  tamsulosin, 0.4 mg, Oral, Nightly      Continuous Infusions:   PRN Meds:.  acetaminophen **OR** acetaminophen **OR** acetaminophen    senna-docusate sodium **AND** polyethylene glycol **AND** bisacodyl **AND** bisacodyl    Calcium Replacement - Follow Nurse / BPA Driven Protocol    dextrose    dextrose    glucagon (human recombinant)    insulin lispro    Magnesium Standard Dose Replacement - Follow Nurse / BPA Driven Protocol    nitroglycerin    Phosphorus Replacement - Follow Nurse / BPA Driven Protocol    Potassium Replacement - Follow Nurse / BPA Driven Protocol    sodium chloride    sodium chloride    sodium chloride    sodium chloride    sodium chloride    Assessment & Plan   Assessment & Plan     Active Hospital Problems    Diagnosis  POA    **DKA (diabetic ketoacidosis) [E11.10]  Yes      Resolved Hospital Problems   No resolved problems to display.        Brief Hospital Course to date:  Fracisco Mullen is a 59 y.o. male with past medical history of hypertension, type 2 diabetes with insulin use, diabetic neuropathy, and chronic diabetic foot wounds who presents via EMS from home due to altered mental status and fatigue. Lab work consistent with  diabetic ketoacidosis.     This patient's problems and plans were partially entered by my partner and updated as appropriate by me 11/16/24. Copied text in this note has been reviewed and is accurate as of today's date.      DKA in setting of uncontrolled IDDM complicated by diabetic neuropathy and chronic foot wounds   --Uncertain of medication compliance  --pH 7.328, blood glucose 567, lactate 3.6, beta hydroxybutyrate 4.29, anion gap metabolic acidosis, HbA1c 14.1%  --UA/CXR without signs of infection   --s/p DKA protocol including insulin drip, IV fluids, and electrolyte replacement per protocol  --Anion gap now closed, transition to basal-bolus regimen per [employer]  --DM educator consulted     Chronic bilateral foot wounds  --Patient with chronic left heel wound and right plantar foot wound  --Admitted August 2024 for left foot cellulitis and discharged on oral antibiotics and with home wound vac following debridement, MRI left foot obtained at that time with no definite findings of osteomyelitis (was seen by ID/Dr. Dominique and Orthopedics/Dr. Gan)  --Repeat MRI left foot ordered   --Wound care consulted  --Consider surgery/ID consults pending MRI results     Low grade fever  --Temp 100.4 last night  --Mild leukocytosis, improved from admission  --CXR and UA negative  --Await MRI left foot  --Defer ATBx for now unless fever spikes or other signs of infection     Acute toxic metabolic encephalopathy  --CT head without acute abnormality  --UDS negative  --Likely secondary to DKA  --Improved this morning     HTN  --Continue home Metoprolol     BPH  --Continue home Flomax, Proscar     Mood disorder  --Continue home Amitriptyline      RLS  --Continue home Requip    Expected Discharge Location and Transportation: home  Expected Discharge   Expected Discharge Date: 11/18/2024; Expected Discharge Time:      VTE Prophylaxis:  Pharmacologic VTE prophylaxis orders are present.         AM-PAC 6 Clicks Score (PT):  10 (11/16/24 0800)    CODE STATUS:   Code Status and Medical Interventions: CPR (Attempt to Resuscitate); Full Support; Full code per last hosptial admission. Patient disoriented on exam with no family present at bedside. Only contact information is friend.   Ordered at: 11/15/24 1218     Level Of Support Discussed With:    Patient     Code Status (Patient has no pulse and is not breathing):    CPR (Attempt to Resuscitate)     Medical Interventions (Patient has pulse or is breathing):    Full Support     Comments:    Full code per last hosptial admission. Patient disoriented on exam with no family present at bedside. Only contact information is friend.       Lisa Vallejo MD  11/16/24

## 2024-11-16 NOTE — PLAN OF CARE
Goal Outcome Evaluation:  Plan of Care Reviewed With: patient        Progress:  (eval)       Anticipated Discharge Disposition (SLP): home          SLP Swallowing Diagnosis: mild, oral dysphagia, suspected pharyngeal dysphagia (11/16/24 6210)

## 2024-11-16 NOTE — THERAPY EVALUATION
Acute Care - Speech Language Pathology   Swallow Initial Evaluation [facility]  Clinical Swallow Evaluation       Patient Name: Fracisco Mullen  : 1965  MRN: [id number]  Today's Date: 2024               Admit Date: 11/15/2024    Visit Dx:     ICD-10-CM ICD-9-CM   1. Diabetic ketoacidosis without coma associated with type 2 diabetes mellitus  E11.10 250.12   2. Dysphagia, unspecified type  R13.10 787.20     Patient Active Problem List   Diagnosis    HTN (hypertension)    Type 2 diabetes mellitus, with long-term current use of insulin    Diabetic retinopathy    Obesity (BMI 30.0-34.9)    Peripheral neuropathy    Asthma    Causalgia of lower limb    Chronic constipation    Compression of lumbar nerve root    GERD (gastroesophageal reflux disease)    Hyperlipidemia    IBS (irritable bowel syndrome)    RLS (restless legs syndrome)    Sleep apnea    Vitamin D deficiency    Cigar smoker    Chronic back pain    Diabetic ketoacidosis associated with type 2 diabetes mellitus    Multiple open wounds of foot    Status post cholecystectomy    Cholestatic hepatitis    Diabetic infection of left foot    Precordial pain    Tobacco use    DKA (diabetic ketoacidosis)     Past Medical History:   Diagnosis Date    Acute renal failure     Hx of    Obesity     Hx of    Sleep apnea     Type 2 diabetes mellitus      Past Surgical History:   Procedure Laterality Date    BACK SURGERY      lower back    CHOLECYSTECTOMY WITH INTRAOPERATIVE CHOLANGIOGRAM N/A 2021    Procedure: CHOLECYSTECTOMY LAPAROSCOPIC INTRAOPERATIVE CHOLANGIOGRAM;  Surgeon: Juventino Hooker MD;  Location: [facility] OR;  Service: General;  Laterality: N/A;    ERCP N/A 2021    Procedure: ENDOSCOPIC RETROGRADE CHOLANGIOPANCREATOGRAPHY;  Surgeon: Jeremy Dowell MD;  Location: [facility] ENDOSCOPY;  Service: Gastroenterology;  Laterality: N/A;  with sphiincterotomy and balloon sweep with 9mm-12mm balloon    INCISION AND DRAINAGE FOOT Left 8/3/2024     Procedure: HEAL IRRIGATION AND DEBRIDEMENT LEFT;  Surgeon: Dru Gna Jr., MD;  Location:  [facility] OR;  Service: Orthopedics;  Laterality: Left;    PLACEMENT OF WOUND VAC Left 8/3/2024    Procedure: PLACEMENT OF WOUND VAC;  Surgeon: Dru Gan Jr., MD;  Location:  [facility] OR;  Service: Orthopedics;  Laterality: Left;       SLP Recommendation and Plan  SLP Swallowing Diagnosis: mild, oral dysphagia, suspected pharyngeal dysphagia (11/16/24 0905)  SLP Diet Recommendation: other (see comments) (sips of thin liquids, further recommendations pending [facility] [place] [facility]) (11/16/24 0905)  Recommended Precautions and Strategies: upright posture during/after eating (11/16/24 0905)  SLP Rec. for Method of Medication Administration: meds whole, with puree, as tolerated (11/16/24 0905)     Monitor for Signs of Aspiration: yes, notify SLP if any concerns (11/16/24 0905)  Recommended Diagnostics: VFSS ([facility] [place] [facility]) (11/16/24 0905)  Swallow Criteria for Skilled Therapeutic Interventions Met: demonstrates skilled criteria (11/16/24 0905)  Anticipated Discharge Disposition (SLP): home (11/16/24 0905)  Rehab Potential/Prognosis, Swallowing: good, to achieve stated therapy goals (11/16/24 0905)  Therapy Frequency (Swallow): 5 days per week (11/16/24 0905)  Predicted Duration Therapy Intervention (Days): 1 week (11/16/24 0905)  Oral Care Recommendations: Oral Care BID/PRN, Toothbrush (11/16/24 0905)                                        Progress:  (eval)      SWALLOW EVALUATION (Last 72 Hours)       SLP Adult Swallow Evaluation       Row Name 11/16/24 0905                   Rehab Evaluation    Document Type evaluation  -MM        Subjective Information no complaints  -MM        Patient Observations alert;cooperative;agree to therapy  -MM        Patient/Family/Caregiver Comments/Observations no family present  -MM        Patient Effort good  -MM        Oral Care swabbed with antiseptic solution  -MM           General Information    Patient Profile  Reviewed yes  -MM        Pertinent History Of Current Problem Pt is a 59 year old male who presented to the facility with c/o AMS. Pt diagnosed with DKA and metabolic encephalopathy.  -MM        Current Method of Nutrition NPO  -MM        Precautions/Limitations, Vision WFL;for purposes of eval  -MM        Precautions/Limitations, Hearing WFL;for purposes of eval  -MM        Prior Level of Function-Communication unknown  -MM        Prior Level of Function-Swallowing soft to chew;other (see comments)  per pt report  -MM        Plans/Goals Discussed with patient;agreed upon  -MM        Barriers to Rehab none identified  -MM        Patient's Goals for Discharge return home  -MM           Pain    Pretreatment Pain Rating 0/10 - no pain  -MM        Posttreatment Pain Rating 0/10 - no pain  -MM           Oral Motor Structure and Function    Dentition Assessment edentulous, does not have dentures  -MM        Secretion Management WNL/WFL  -MM        Mucosal Quality moist, healthy  -MM           Oral Musculature and Cranial Nerve Assessment    Oral Motor General Assessment WFL  -MM           General Eating/Swallowing Observations    Respiratory Support Currently in Use room air  -MM        Eating/Swallowing Skills fed by SLP;self-fed  -MM        Positioning During Eating upright in bed  -MM        Utensils Used spoon;straw;cup  -MM        Consistencies Trialed ice chips;thin liquids;nectar/syrup-thick liquids;pureed;soft to chew textures;chopped  -MM           Clinical Swallow Eval    Oral Prep Phase impaired  -MM        Oral Transit WFL  -MM        Oral Residue WFL  -MM        Pharyngeal Phase suspected pharyngeal impairment  -MM        Esophageal Phase unremarkable  -MM           Oral Prep Concerns    Oral Prep Concerns prolonged mastication;inefficient mastication  -MM        Prolonged Mastication mechanical soft  -MM        Inefficient Mastication mechanical soft  -MM           Pharyngeal Phase Concerns    Pharyngeal  Phase Concerns cough  -MM        Cough thin  -MM        Pharyngeal Phase Concerns, Comment Pt with single swallows and +hyolaryngeal elevation. Pt demonstrated occasional cough following thin via rapid, consecutive straw sips. Pt with no overt s/sx aspiration with nectar or thin via small sips.  -MM           SLP Evaluation Clinical Impression    SLP Swallowing Diagnosis mild;oral dysphagia;suspected pharyngeal dysphagia  -MM        Functional Impact risk of aspiration/pneumonia;risk of malnutrition  -MM        Rehab Potential/Prognosis, Swallowing good, to achieve stated therapy goals  -MM        Swallow Criteria for Skilled Therapeutic Interventions Met demonstrates skilled criteria  -MM           Recommendations    Therapy Frequency (Swallow) 5 days per week  -MM        Predicted Duration Therapy Intervention (Days) 1 week  -MM        SLP Diet Recommendation other (see comments)  sips of thin liquids, further recommendations pending MBS  -MM        Recommended Diagnostics VFSS (MBS)  -MM        Recommended Precautions and Strategies upright posture during/after eating  -MM        Oral Care Recommendations Oral Care BID/PRN;Toothbrush  -MM        SLP Rec. for Method of Medication Administration meds whole;with puree;as tolerated  -MM        Monitor for Signs of Aspiration yes;notify SLP if any concerns  -MM        Anticipated Discharge Disposition (SLP) home  -MM                  User Key  (r) = Recorded By, (t) = Taken By, (c) = Cosigned By      Initials Name Effective Dates    MM Isabel Monae, MS CCC-SLP 08/30/24 -                     EDUCATION  The patient has been educated in the following areas:   Dysphagia (Swallowing Impairment).                Time Calculation:    Time Calculation- SLP       Row Name 11/16/24 1015             Time Calculation- SLP    SLP Start Time 0905  -MM      SLP Received On 11/16/24  -MM         Untimed Charges    [id number] Eval Oral Pharyng Swallow Minutes 54  -MM         Total  Minutes    Untimed Charges Total Minutes 54  -MM       Total Minutes 54  -MM                User Key  (r) = Recorded By, (t) = Taken By, (c) = Cosigned By      Initials Name Provider Type    Isabel Zaldivar MS CCC-SLP Speech and Language Pathologist                    Therapy Charges for Today       Code Description Service Date Service Provider Modifiers Qty    [id number]  ST EVAL ORAL PHARYNG SWALLOW 4 11/16/2024 Isabel Monae MS CCC-SLP GN 1                 Isabel Monae MS CCC-JULIO CÉSAR  11/16/2024

## 2024-11-17 LAB
ANION GAP SERPL CALCULATED.3IONS-SCNC: 11 MMOL/L (ref 5–15)
BUN SERPL-MCNC: 13 MG/DL (ref 8–23)
BUN/CREAT SERPL: 14.6 (ref 7–25)
CALCIUM SPEC-SCNC: 8.1 MG/DL (ref 8.6–10.5)
CHLORIDE SERPL-SCNC: 98 MMOL/L (ref 98–107)
CO2 SERPL-SCNC: 23 MMOL/L (ref 22–29)
CREAT SERPL-MCNC: 0.89 MG/DL (ref 0.76–1.27)
DEPRECATED RDW RBC AUTO: 44.2 FL (ref 37–54)
EGFRCR SERPLBLD CKD-EPI 2021: 97.5 ML/MIN/1.73
ERYTHROCYTE [DISTWIDTH] IN BLOOD BY AUTOMATED COUNT: 15.3 % (ref 12.3–15.4)
GLUCOSE BLDC GLUCOMTR-MCNC: 183 MG/DL (ref 70–130)
GLUCOSE BLDC GLUCOMTR-MCNC: 309 MG/DL (ref 70–130)
GLUCOSE BLDC GLUCOMTR-MCNC: 324 MG/DL (ref 70–130)
GLUCOSE BLDC GLUCOMTR-MCNC: 83 MG/DL (ref 70–130)
GLUCOSE SERPL-MCNC: 324 MG/DL (ref 65–99)
HCT VFR BLD AUTO: 33.7 % (ref 37.5–51)
HGB BLD-MCNC: 10.9 G/DL (ref 13–17.7)
MCH RBC QN AUTO: 25.6 PG (ref 26.6–33)
MCHC RBC AUTO-ENTMCNC: 32.3 G/DL (ref 31.5–35.7)
MCV RBC AUTO: 79.1 FL (ref 79–97)
PLATELET # BLD AUTO: 266 10*3/MM3 (ref 140–450)
PMV BLD AUTO: 10.8 FL (ref 6–12)
POTASSIUM SERPL-SCNC: 4.3 MMOL/L (ref 3.5–5.2)
RBC # BLD AUTO: 4.26 10*6/MM3 (ref 4.14–5.8)
SODIUM SERPL-SCNC: 132 MMOL/L (ref 136–145)
WBC NRBC COR # BLD AUTO: 9.52 10*3/MM3 (ref 3.4–10.8)

## 2024-11-17 PROCEDURE — 85027 COMPLETE CBC AUTOMATED: CPT | Performed by: HOSPITALIST

## 2024-11-17 PROCEDURE — 63710000001 INSULIN GLARGINE PER 5 UNITS: Performed by: HOSPITALIST

## 2024-11-17 PROCEDURE — 97530 THERAPEUTIC ACTIVITIES: CPT

## 2024-11-17 PROCEDURE — 99232 SBSQ HOSP IP/OBS MODERATE 35: CPT | Performed by: HOSPITALIST

## 2024-11-17 PROCEDURE — 63710000001 INSULIN LISPRO (HUMAN) PER 5 UNITS: Performed by: INTERNAL MEDICINE

## 2024-11-17 PROCEDURE — 97166 OT EVAL MOD COMPLEX 45 MIN: CPT

## 2024-11-17 PROCEDURE — 80048 BASIC METABOLIC PNL TOTAL CA: CPT | Performed by: HOSPITALIST

## 2024-11-17 PROCEDURE — 25810000003 SODIUM CHLORIDE 0.9 % SOLUTION: Performed by: NURSE PRACTITIONER

## 2024-11-17 PROCEDURE — 63710000001 INSULIN GLARGINE PER 5 UNITS: Performed by: INTERNAL MEDICINE

## 2024-11-17 PROCEDURE — 97162 PT EVAL MOD COMPLEX 30 MIN: CPT

## 2024-11-17 PROCEDURE — 97110 THERAPEUTIC EXERCISES: CPT

## 2024-11-17 PROCEDURE — 82948 REAGENT STRIP/BLOOD GLUCOSE: CPT

## 2024-11-17 PROCEDURE — 63710000001 INSULIN LISPRO (HUMAN) PER 5 UNITS: Performed by: HOSPITALIST

## 2024-11-17 PROCEDURE — 25010000002 ENOXAPARIN PER 10 MG: Performed by: STUDENT IN AN ORGANIZED HEALTH CARE EDUCATION/TRAINING PROGRAM

## 2024-11-17 RX ORDER — INSULIN LISPRO 100 [IU]/ML
7 INJECTION, SOLUTION INTRAVENOUS; SUBCUTANEOUS
Status: DISCONTINUED | OUTPATIENT
Start: 2024-11-17 | End: 2024-11-20

## 2024-11-17 RX ADMIN — Medication 10 ML: at 08:09

## 2024-11-17 RX ADMIN — INSULIN LISPRO 5 UNITS: 100 INJECTION, SOLUTION INTRAVENOUS; SUBCUTANEOUS at 08:08

## 2024-11-17 RX ADMIN — ENOXAPARIN SODIUM 40 MG: 100 INJECTION SUBCUTANEOUS at 08:07

## 2024-11-17 RX ADMIN — INSULIN LISPRO 2 UNITS: 100 INJECTION, SOLUTION INTRAVENOUS; SUBCUTANEOUS at 22:02

## 2024-11-17 RX ADMIN — Medication 10 ML: at 20:39

## 2024-11-17 RX ADMIN — ROPINIROLE 0.25 MG: 0.5 TABLET, FILM COATED ORAL at 20:37

## 2024-11-17 RX ADMIN — FINASTERIDE 5 MG: 5 TABLET, FILM COATED ORAL at 20:38

## 2024-11-17 RX ADMIN — INSULIN LISPRO 7 UNITS: 100 INJECTION, SOLUTION INTRAVENOUS; SUBCUTANEOUS at 11:50

## 2024-11-17 RX ADMIN — INSULIN LISPRO 5 UNITS: 100 INJECTION, SOLUTION INTRAVENOUS; SUBCUTANEOUS at 11:51

## 2024-11-17 RX ADMIN — PREGABALIN 225 MG: 75 CAPSULE ORAL at 08:07

## 2024-11-17 RX ADMIN — ASPIRIN 81 MG: 81 TABLET, COATED ORAL at 08:07

## 2024-11-17 RX ADMIN — SODIUM CHLORIDE 250 ML: 9 INJECTION, SOLUTION INTRAVENOUS at 04:23

## 2024-11-17 RX ADMIN — TAMSULOSIN HYDROCHLORIDE 0.4 MG: 0.4 CAPSULE ORAL at 20:38

## 2024-11-17 RX ADMIN — INSULIN LISPRO 7 UNITS: 100 INJECTION, SOLUTION INTRAVENOUS; SUBCUTANEOUS at 08:08

## 2024-11-17 RX ADMIN — AMITRIPTYLINE HYDROCHLORIDE 25 MG: 25 TABLET, FILM COATED ORAL at 20:37

## 2024-11-17 RX ADMIN — METOPROLOL SUCCINATE 50 MG: 50 TABLET, EXTENDED RELEASE ORAL at 08:07

## 2024-11-17 RX ADMIN — PREGABALIN 225 MG: 75 CAPSULE ORAL at 20:37

## 2024-11-17 RX ADMIN — INSULIN GLARGINE 15 UNITS: 100 INJECTION, SOLUTION SUBCUTANEOUS at 08:08

## 2024-11-17 RX ADMIN — INSULIN GLARGINE 10 UNITS: 100 INJECTION, SOLUTION SUBCUTANEOUS at 22:01

## 2024-11-17 RX ADMIN — AMITRIPTYLINE HYDROCHLORIDE 25 MG: 25 TABLET, FILM COATED ORAL at 08:07

## 2024-11-17 NOTE — PLAN OF CARE
Goal Outcome Evaluation:  Plan of Care Reviewed With: patient           Outcome Evaluation: VSS, patient remains on room air. Uneventful shift. Patient resting in bed, call light in reach. No complaints at this time.

## 2024-11-17 NOTE — PLAN OF CARE
Goal Outcome Evaluation:  Plan of Care Reviewed With: patient        Progress: no change  Outcome Evaluation: OT evaluation completed. The pt presents below his functional baseline with generalized weakness, decreased activity tolerance, and balance deficits. Pt performed supine to sit transfer with modA x1, requiring a consistent modA to maintain static sitting balance due to a posterior lean. Pt performed STS x1 from the EOB with maxA x1, BUE support. Pt demo'd a signficant posterior lean in standing, unable to safely attempt SPT to chair. Recommend a d/c to SNF for best outcome.    Anticipated Discharge Disposition (OT): skilled nursing facility

## 2024-11-17 NOTE — PLAN OF CARE
Problem: Adult Inpatient Plan of Care  Goal: Plan of Care Review  Outcome: Progressing  Goal: Patient-Specific Goal (Individualized)  Outcome: Progressing  Goal: Absence of Hospital-Acquired Illness or Injury  Outcome: Progressing  Intervention: Identify and Manage Fall Risk  Recent Flowsheet Documentation  Taken 11/17/2024 0200 by Mary Mauricio RN  Safety Promotion/Fall Prevention: safety round/check completed  Taken 11/17/2024 0000 by Mary Mauricio RN  Safety Promotion/Fall Prevention:   activity supervised   nonskid shoes/slippers when out of bed   room organization consistent   safety round/check completed   assistive device/personal items within reach  Taken 11/16/2024 2200 by Mary Mauricio RN  Safety Promotion/Fall Prevention:   activity supervised   nonskid shoes/slippers when out of bed   room organization consistent   safety round/check completed  Taken 11/16/2024 2000 by Mary Mauricio RN  Safety Promotion/Fall Prevention:   activity supervised   lighting adjusted   nonskid shoes/slippers when out of bed   room organization consistent   safety round/check completed  Intervention: Prevent Skin Injury  Recent Flowsheet Documentation  Taken 11/17/2024 0200 by Mary Mauricio RN  Body Position: position changed independently  Taken 11/17/2024 0000 by Mary Mauricio RN  Body Position: position changed independently  Taken 11/16/2024 2200 by Mary Mauricio RN  Body Position: position maintained  Taken 11/16/2024 2000 by Mary Mauricio RN  Body Position: position changed independently  Skin Protection:   incontinence pads utilized   silicone foam dressing in place  Intervention: Prevent and Manage VTE (Venous Thromboembolism) Risk  Recent Flowsheet Documentation  Taken 11/16/2024 2000 by Mary Mauricio RN  VTE Prevention/Management: (mar) other (see comments)  Goal: Optimal Comfort and Wellbeing  Outcome: Progressing  Goal: Readiness for Transition of Care  Outcome: Progressing      Problem: Skin Injury Risk Increased  Goal: Skin Health and Integrity  Outcome: Progressing  Intervention: Optimize Skin Protection  Recent Flowsheet Documentation  Taken 11/17/2024 0200 by Mary Mauricio RN  Head of Bed (HOB) Positioning: HOB elevated  Taken 11/17/2024 0000 by Mary Mauricio RN  Head of Bed ([facility]) Positioning: HOB elevated  Taken 11/16/2024 2200 by Mary Mauricio RN  Head of Bed ([facility]) Positioning: HOB elevated  Taken 11/16/2024 2000 by Mary Mauricio RN  Pressure Reduction Techniques:   frequent weight shift encouraged   weight shift assistance provided  Head of Bed (HOB) Positioning: HOB elevated  Pressure Reduction Devices:   foam padding utilized   pressure-redistributing mattress utilized  Skin Protection:   incontinence pads utilized   silicone foam dressing in place     Problem: Comorbidity Management  Goal: Blood Pressure in Desired Range  Outcome: Progressing     Problem: Fall Injury Risk  Goal: Absence of Fall and Fall-Related Injury  Outcome: Progressing  Intervention: Promote Injury-Free Environment  Recent Flowsheet Documentation  Taken 11/17/2024 0200 by Mary Mauricio RN  Safety Promotion/Fall Prevention: safety round/check completed  Taken 11/17/2024 0000 by Mary Mauricio RN  Safety Promotion/Fall Prevention:   activity supervised   nonskid shoes/slippers when out of bed   room organization consistent   safety round/check completed   assistive device/personal items within reach  Taken 11/16/2024 2200 by Mary Mauricio RN  Safety Promotion/Fall Prevention:   activity supervised   nonskid shoes/slippers when out of bed   room organization consistent   safety round/check completed  Taken 11/16/2024 2000 by Mary Mauricio RN  Safety Promotion/Fall Prevention:   activity supervised   lighting adjusted   nonskid shoes/slippers when out of bed   room organization consistent   safety round/check completed   Goal Outcome Evaluation:

## 2024-11-17 NOTE — THERAPY EVALUATION
Patient Name: Fracisco Mullen  : 1965    MRN: [id number]                              Today's Date: 2024       Admit Date: 11/15/2024    Visit Dx:     ICD-10-CM ICD-9-CM   1. Diabetic ketoacidosis without coma associated with type 2 diabetes mellitus  E11.10 250.12   2. Oropharyngeal dysphagia  R13.12 787.22     Patient Active Problem List   Diagnosis    HTN (hypertension)    Type 2 diabetes mellitus, with long-term current use of insulin    Diabetic retinopathy    Obesity (BMI 30.0-34.9)    Peripheral neuropathy    Asthma    Causalgia of lower limb    Chronic constipation    Compression of lumbar nerve root    GERD (gastroesophageal reflux disease)    Hyperlipidemia    IBS (irritable bowel syndrome)    RLS (restless legs syndrome)    Sleep apnea    Vitamin D deficiency    Cigar smoker    Chronic back pain    Diabetic ketoacidosis associated with type 2 diabetes mellitus    Multiple open wounds of foot    Status post cholecystectomy    Cholestatic hepatitis    Diabetic infection of left foot    Precordial pain    Tobacco use    DKA (diabetic ketoacidosis)     Past Medical History:   Diagnosis Date    Acute renal failure     Hx of    Obesity     Hx of    Sleep apnea     Type 2 diabetes mellitus      Past Surgical History:   Procedure Laterality Date    BACK SURGERY      lower back    CHOLECYSTECTOMY WITH INTRAOPERATIVE CHOLANGIOGRAM N/A 2021    Procedure: CHOLECYSTECTOMY LAPAROSCOPIC INTRAOPERATIVE CHOLANGIOGRAM;  Surgeon: Juventino Hooker MD;  Location: [facility] OR;  Service: General;  Laterality: N/A;    ERCP N/A 2021    Procedure: ENDOSCOPIC RETROGRADE CHOLANGIOPANCREATOGRAPHY;  Surgeon: Jeremy Dowell MD;  Location: [facility] ENDOSCOPY;  Service: Gastroenterology;  Laterality: N/A;  with sphiincterotomy and balloon sweep with 9mm-12mm balloon    INCISION AND DRAINAGE FOOT Left 8/3/2024    Procedure: HEAL IRRIGATION AND DEBRIDEMENT LEFT;  Surgeon: Dru Gan Jr., MD;  Location: [facility] OR;   Service: Orthopedics;  Laterality: Left;    PLACEMENT OF WOUND VAC Left 8/3/2024    Procedure: PLACEMENT OF WOUND VAC;  Surgeon: Dru Gan Jr., MD;  Location: [facility];  Service: Orthopedics;  Laterality: Left;      General Information       Row Name 11/17/24 1349          OT Time and Intention    Document Type evaluation  -KF     Mode of Treatment occupational therapy;individual therapy  -KF       Row Name 11/17/24 1340          General Information    Patient Profile Reviewed yes  -KF     Prior Level of Function mod assist:;transfer;w/c or scooter;bed mobility;ADL's  Pt states his wife assists him in SPT to WC. x1 recent fall out of bed.  -KF     Existing Precautions/Restrictions fall  -KF     Barriers to Rehab medically complex;previous functional deficit  -KF       Row Name 11/17/24 134          Living Environment    People in Home spouse;child(anai), adult  -KF       Row Name 11/17/24 1343          Home Main Entrance    Number of Stairs, Main Entrance none  -KF       Row Name 11/17/24 1349          Stairs Within Home, Primary    Number of Stairs, Within Home, Primary none  -KF       Row Name 11/17/24 1344          Cognition    Orientation Status (Cognition) oriented to;person;place;disoriented to;time  -KF       Row Name 11/17/24 1346          Safety Issues/Impairments Affecting Functional Mobility    Safety Issues Affecting Function (Mobility) awareness of need for assistance;insight into deficits/self-awareness;judgment;problem-solving;safety precaution awareness;safety precautions follow-through/compliance;sequencing abilities  -KF     Impairments Affecting Function (Mobility) balance;endurance/activity tolerance;postural/trunk control;strength;sensation/sensory awareness  -KF               User Key  (r) = Recorded By, (t) = Taken By, (c) = Cosigned By      Initials Name Provider Type    KF Jamilah Meyers OT Occupational Therapist                     Mobility/ADL's       Row Name 11/17/24 1077           Bed Mobility    Bed Mobility supine-sit;sit-supine  -     Supine-Sit [place] (Bed Mobility) moderate assist (50% patient effort);1 person assist;verbal cues;nonverbal cues (demo/gesture)  -KF     Sit-Supine [place] (Bed Mobility) moderate assist (50% patient effort);2 person assist;verbal cues;nonverbal cues (demo/gesture)  -     Assistive Device (Bed Mobility) bed rails;head of bed elevated;repositioning sheet  -     Comment, (Bed Mobility) Increased time and effort needed. Pt requiring a consistent modA to maintain static sitting balance due to a posterior lean. Pt unable to correct balance deficits max cueing.  -       Row Name 11/17/24 1350          Transfers    Transfers sit-stand transfer;stand-sit transfer;bed-chair transfer  -       Row Name 11/17/24 1350          Bed-Chair Transfer    Comment, (Bed-Chair Transfer) Pt would benefit from being moved to a lift room. RN notified.  -       Row Name 11/17/24 1350          Sit-Stand Transfer    Sit-Stand [place] (Transfers) maximum assist (25% patient effort);1 person assist;verbal cues;nonverbal cues (demo/gesture)  -     Assistive Device (Sit-Stand Transfers) other (see comments)  BUE support  -     Comment, (Sit-Stand Transfer) STS x1 from the EOB with maxA x1, BUE support. Pt demo's a significant posterior lean upon standing. Deferred additional mobility due to safety concerns.  -       Row Name 11/17/24 1350          Stand-Sit Transfer    Stand-Sit [place] (Transfers) maximum assist (25% patient effort);1 person assist;verbal cues;nonverbal cues (demo/gesture)  -     Assistive Device (Stand-Sit Transfers) other (see comments)  BUE support  -       Row Name 11/17/24 1350          Functional Mobility    Functional Mobility- Ind. Level unable to perform  -       Row Name 11/17/24 1350          Activities of Daily Living    BADL Assessment/Intervention upper body dressing;lower body dressing  -       Row Name  11/17/24 1350          Upper Body Dressing Assessment/Training    [place] Level (Upper Body Dressing) doff;don;pajama/robe;moderate assist (50% patient effort)  -KF     Position (Upper Body Dressing) supine  -KF       Row Name 11/17/24 [facility]          Lower Body Dressing Assessment/Training    [place] Level (Lower Body Dressing) don;socks;dependent (less than 25% patient effort)  -KF     Position (Lower Body Dressing) supine  -KF               User Key  (r) = Recorded By, (t) = Taken By, (c) = Cosigned By      Initials Name Provider Type    KF Jamilah Meyers OT Occupational Therapist                   Obj/Interventions       Row Name 11/17/24 135          Sensory Assessment (Somatosensory)    Sensory Assessment (Somatosensory) UE sensation intact  -KF     Sensory Assessment BLE neuropathy  -KF       Row Name 11/17/24 [facility]          Range of Motion Comprehensive    General Range of Motion bilateral upper extremity ROM WFL  -       Row Name 11/17/24 [facility]          Strength Comprehensive (MMT)    General Manual Muscle Testing (MMT) Assessment upper extremity strength deficits identified  -KF     Comment, General Manual Muscle Testing (MMT) Assessment BUE grossly 3+/5  -KF       Row Name 11/17/24 [facility]          Balance    Balance Assessment sitting static balance;sitting dynamic balance;sit to stand dynamic balance;standing static balance  -KF     Static Sitting Balance moderate assist;1-person assist  -KF     Dynamic Sitting Balance maximum assist;1-person assist  -KF     Position, Sitting Balance supported;sitting edge of bed  -KF     Sit to Stand Dynamic Balance maximum assist;1-person assist;verbal cues;non-verbal cues (demo/gesture)  -KF     Static Standing Balance maximum assist;1-person assist  -KF     Position/Device Used, Standing Balance supported  -KF     Balance Interventions sitting;standing;sit to stand;supported;static;dynamic;occupation based/functional task  -KF     Comment, Balance  significant posterior lean present in sitting and standing  -KF               User Key  (r) = Recorded By, (t) = Taken By, (c) = Cosigned By      Initials Name Provider Type    Jamilah Love OT Occupational Therapist                   Goals/Plan       Row Name 11/17/24 8543          Bed Mobility Goal 1 (OT)    Activity/Assistive Device (Bed Mobility Goal 1, OT) sit to supine/supine to sit  -KF     [place] Level/Cues Needed (Bed Mobility Goal 1, OT) standby assist  -KF     Time Frame (Bed Mobility Goal 1, OT) short term goal (STG);5 days  -KF     Progress/Outcomes (Bed Mobility Goal 1, OT) goal ongoing  -KF       Row Name 11/17/24 0002          Transfer Goal 1 (OT)    Activity/Assistive Device (Transfer Goal 1, OT) bed-to-chair/chair-to-bed;commode, bedside without drop arms;wheelchair transfer  -KF     [place] Level/Cues Needed (Transfer Goal 1, OT) minimum assist (75% or more patient effort)  -KF     Time Frame (Transfer Goal 1, OT) long term goal (LTG);10 days  -KF     Progress/Outcome (Transfer Goal 1, OT) goal ongoing  -KF       Row Name 11/17/24 3152          Toileting Goal 1 (OT)    Activity/Device (Toileting Goal 1, OT) adjust/manage clothing;perform perineal hygiene  -KF     [place] Level/Cues Needed (Toileting Goal 1, OT) standby assist  -KF     Time Frame (Toileting Goal 1, OT) long term goal (LTG);10 days  -KF     Progress/Outcome (Toileting Goal 1, OT) goal ongoing  -KF       Row Name 11/17/24 9711          Grooming Goal 1 (OT)    Activity/Device (Grooming Goal 1, OT) hair care;oral care;wash face, hands  -KF     [place] (Grooming Goal 1, OT) set-up required  -KF     Time Frame (Grooming Goal 1, OT) long term goal (LTG);10 days  -KF     Strategies/Barriers (Grooming Goal 1, OT) unsupported sitting  -KF     Progress/Outcome (Grooming Goal 1, OT) goal ongoing  -KF       Row Name 11/17/24 7403          Therapy Assessment/Plan (OT)    Planned Therapy Interventions (OT) activity  tolerance training;adaptive equipment training;BADL retraining;functional balance retraining;occupation/activity based interventions;patient/caregiver education/training;ROM/therapeutic exercise;strengthening exercise;transfer/mobility retraining  -KF               User Key  (r) = Recorded By, (t) = Taken By, (c) = Cosigned By      Initials Name Provider Type    Jamilah Love OT Occupational Therapist                   Clinical Impression       Row Name 11/17/24 [facility]          Pain Assessment    Pretreatment Pain Rating 0/10 - no pain  -KF     Posttreatment Pain Rating 0/10 - no pain  -KF       Row Name 11/17/24 [facility]          Plan of Care Review    Plan of Care Reviewed With patient  -KF     Progress no change  -KF     Outcome Evaluation OT evaluation completed. The pt presents below his functional baseline with generalized weakness, decreased activity tolerance, and balance deficits. Pt performed supine to sit transfer with modA x1, requiring a consistent modA to maintain static sitting balance due to a posterior lean. Pt performed STS x1 from the EOB with maxA x1, BUE support. Pt demo'd a signficant posterior lean in standing, unable to safely attempt SPT to chair. Recommend a d/c to SNF for best outcome.  -       Row Name 11/17/24 [facility]          Therapy Assessment/Plan (OT)    Patient/Family Therapy Goal Statement (OT) Restore PLOF  -KF     Rehab Potential (OT) fair  -KF     Criteria for Skilled Therapeutic Interventions Met (OT) yes;skilled treatment is necessary  -KF     Therapy Frequency (OT) daily  -KF     Predicted Duration of Therapy Intervention (OT) 10 days  -       Row Name 11/17/24 1357          Therapy Plan Review/Discharge Plan (OT)    Anticipated Discharge Disposition (OT) skilled nursing facility  -       Row Name 11/17/24 1351          Vital Signs    Pre Systolic BP Rehab 111  -KF     Pre Treatment Diastolic BP 80  -KF     Pretreatment Heart Rate (beats/min) 90  -KF     Pre SpO2 (%) 94   -KF     O2 Delivery Pre Treatment room air  -KF     Pre Patient Position Supine  -KF     Intra Patient Position Standing  -KF     Post Patient Position Supine  -KF       Row Name 11/17/24 1353          Positioning and Restraints    Pre-Treatment Position in bed  -KF     Post Treatment Position bed  -KF     In Bed notified nsg;supine;call light within reach;encouraged to call for assist;exit alarm on  -KF               User Key  (r) = Recorded By, (t) = Taken By, (c) = Cosigned By      Initials Name Provider Type    Jamilah Love OT Occupational Therapist                   Outcome Measures       Row Name 11/17/24 1356          How much help from another is currently needed...    Putting on and taking off regular lower body clothing? 1  -KF     Bathing (including washing, rinsing, and drying) 2  -KF     Toileting (which includes using toilet bed pan or urinal) 2  -KF     Putting on and taking off regular upper body clothing 3  -KF     Taking care of personal grooming (such as brushing teeth) 3  -KF     Eating meals 3  -KF     AM-PAC 6 Clicks Score (OT) 14  -KF       Row Name 11/17/24 0800          How much help from another person do you currently need...    Turning from your back to your side while in flat bed without using bedrails? 3  -MM     Moving from lying on back to sitting on the side of a flat bed without bedrails? 3  -MM     Moving to and from a bed to a chair (including a wheelchair)? 3  -MM     Standing up from a chair using your arms (e.g., wheelchair, bedside chair)? 2  -MM     Climbing 3-5 steps with a railing? 1  -MM     To walk in hospital room? 1  -MM     AM-PAC 6 Clicks Score (PT) 13  -MM       Row Name 11/17/24 1356          Functional Assessment    Outcome Measure Options AM-PAC 6 Clicks Daily Activity (OT)  -KF               User Key  (r) = Recorded By, (t) = Taken By, (c) = Cosigned By      Initials Name Provider Type    Jamilah Love OT Occupational Therapist    Zoraida Alejandre  JAIR, RN Registered Nurse                    Occupational Therapy Education       Title: PT OT SLP Therapies (In Progress)       Topic: Occupational Therapy (In Progress)       Point: ADL training (In Progress)       Description:   Instruct learner(s) on proper safety adaptation and remediation techniques during self care or transfers.   Instruct in proper use of assistive devices.                  Learning Progress Summary            Patient Acceptance, E,TB, NR by  at 11/17/2024 1308                      Point: Home exercise program (Not Started)       Description:   Instruct learner(s) on appropriate technique for monitoring, assisting and/or progressing therapeutic exercises/activities.                  Learner Progress:  Not documented in this visit.              Point: Precautions (In Progress)       Description:   Instruct learner(s) on prescribed precautions during self-care and functional transfers.                  Learning Progress Summary            Patient Acceptance, E,TB, NR by  at 11/17/2024 1308                      Point: Body mechanics (In Progress)       Description:   Instruct learner(s) on proper positioning and spine alignment during self-care, functional mobility activities and/or exercises.                  Learning Progress Summary            Patient Acceptance, E,TB, NR by  at 11/17/2024 1308                                      User Key       Initials Effective Dates Name Provider Type Discipline     08/09/23 -  Jamilah Meyers OT Occupational Therapist OT                  OT Recommendation and Plan  Planned Therapy Interventions (OT): activity tolerance training, adaptive equipment training, BADL retraining, functional balance retraining, occupation/activity based interventions, patient/caregiver education/training, ROM/therapeutic exercise, strengthening exercise, transfer/mobility retraining  Therapy Frequency (OT): daily  Plan of Care Review  Plan of Care Reviewed With:  patient  Progress: no change  Outcome Evaluation: OT evaluation completed. The pt presents below his functional baseline with generalized weakness, decreased activity tolerance, and balance deficits. Pt performed supine to sit transfer with modA x1, requiring a consistent modA to maintain static sitting balance due to a posterior lean. Pt performed STS x1 from the EOB with maxA x1, BUE support. Pt demo'd a signficant posterior lean in standing, unable to safely attempt SPT to chair. Recommend a d/c to SNF for best outcome.     Time Calculation:   Evaluation Complexity (OT)  Review Occupational Profile/Medical/Therapy History Complexity: expanded/moderate complexity  Assessment, Occupational Performance/Identification of Deficit Complexity: 3-5 performance deficits  Clinical Decision Making Complexity (OT): detailed assessment/moderate complexity  Overall Complexity of Evaluation (OT): moderate complexity     Time Calculation- OT       Row Name 11/17/24 1357             Time Calculation- OT    OT Start Time 1308  -KF      OT Received On 11/17/24  -KF      OT Goal Re-Cert Due Date 11/27/24  -KF         Untimed Charges    OT Eval/Re-eval Minutes 55  -KF         Total Minutes    Untimed Charges Total Minutes 55  -KF       Total Minutes 55  -KF                User Key  (r) = Recorded By, (t) = Taken By, (c) = Cosigned By      Initials Name Provider Type    KF Jamilah Meyers OT Occupational Therapist                  Therapy Charges for Today       Code Description Service Date Service Provider Modifiers Qty    [id number] HC OT EVAL MOD COMPLEXITY 4 11/17/2024 Jamilah Meyers OT GO 1                 Jamilah Meyers OT  11/17/2024

## 2024-11-17 NOTE — PROGRESS NOTES
[facility] Medicine Services  PROGRESS NOTE    Patient Name: Fracisco Mullen  : 1965  MRN: [id number]    Date of Admission: 11/15/2024  Primary Care Physician: Brandy Roberson    Subjective   Subjective     CC:  F/U DKA    HPI:  Seen this morning. No major complaints. MRI has not been done yet.       Objective   Objective     Vital Signs:   Temp:  [98 °F (36.7 °C)-98.3 °F (36.8 °C)] 98.2 °F (36.8 °C)  Heart Rate:  [] 87  Resp:  [16-18] 16  BP: ()/(58-92) 97/63     Physical Exam:  Gen-no acute distress  HENT-NCAT, mucous membranes moist  CV-RRR, S1 S2 normal, no m/r/g  Resp-CTAB, no wheezes or rales  Abd-soft, NT, ND, +BS  Ext-no edema  Neuro-A&Ox3, some situational confusion but overall able to describe recent medical history/events, no focal deficits  Skin-left heel ulcer in dressing  Psych-appropriate mood  No change from 24      Results Reviewed:  LAB RESULTS:      Lab 24  0629 24  0343 11/15/24  1532 11/15/24  1107 11/15/24  0948 11/15/24  0942   WBC 9.52 11.93*  --   --   --  13.03*   HEMOGLOBIN 10.9* 11.2*  --   --   --  12.3*   HEMOGLOBIN, POC  --   --   --   --  13.6  --    HEMATOCRIT 33.7* 34.1*  --   --   --  37.6   HEMATOCRIT POC  --   --   --   --  40  --    PLATELETS 266 283  --   --   --  321   NEUTROS ABS  --  7.30*  --   --   --  11.22*   IMMATURE GRANS (ABS)  --  0.07*  --   --   --  0.12*   LYMPHS ABS  --  3.19*  --   --   --  1.18   MONOS ABS  --  1.22*  --   --   --  0.41   EOS ABS  --  0.07  --   --   --  0.01   MCV 79.1 80.0  --   --   --  78.7*   LACTATE  --   --  1.9  --   --  3.6*   HSTROP T  --   --  44* 32*  --   --          Lab 24  0629 24  1342 24  0846 24  0343 24  0045 11/15/24  1943 11/15/24  0948 11/15/24  0942   SODIUM 132*  --  134* 143 137 136   < >  --    POTASSIUM 4.3  --  3.7 4.1 3.8 4.5   < >  --    CHLORIDE 98  --  104 109* 105 103   < >  --    CO2 23.0  --  22.0 24.0 24.0  25.0   < >  --    ANION GAP 11.0  --  8.0 10.0 8.0 8.0   < >  --    BUN 13  --  12 16 15 17   < >  --    CREATININE 0.89  --  0.64* 0.72* 0.74* 0.84   < >  --    EGFR 97.5  --  107.7 103.9 103.1 99.2   < >  --    GLUCOSE 324*  --  171* 120* 154* 183*   < >  --    CALCIUM 8.1*  --  8.0* 8.4* 8.0* 8.8   < >  --    MAGNESIUM  --  1.6 2.0 1.9 1.8 1.8   < >  --    PHOSPHORUS  --  2.5 2.4* 3.2 2.7 2.6   < >  --    HEMOGLOBIN A1C  --   --   --   --   --   --   --  14.10*    < > = values in this interval not displayed.         Lab 11/15/24  1107   TOTAL PROTEIN 6.7   ALBUMIN 3.4*   GLOBULIN 3.3   ALT (SGPT) 14   AST (SGOT) 13   BILIRUBIN 0.4   ALK PHOS 124*         Lab 11/15/24  1532 11/15/24  1107   HSTROP T 44* 32*                 Lab 11/15/24  0954   PH, ARTERIAL 7.328*   PCO2, ARTERIAL 36.0   PO2 ART 88.9   FIO2 21   HCO3 ART 18.9*   BASE EXCESS ART -6.4*   CARBOXYHEMOGLOBIN 1.5     Brief Urine Lab Results  (Last result in the past 365 days)        Color   Clarity   Blood   Leuk Est   Nitrite   Protein   CREAT   Urine HCG        11/15/24 1142 Yellow   Clear   Small (1+)   Negative   Negative   100 mg/dL (2+)                   Microbiology Results Abnormal       None            FL Video Swallow With Speech Single Contrast    Result Date: 11/16/2024  FL VIDEO SWALLOW W SPEECH SINGLE-CONTRAST Date of Exam: 11/16/2024 11:18 AM EST Indication: dysphagia.   Comparison: None available. Technique:   The speech pathologist administered food and/or liquid mixed with barium to the patient with cine/video imaging.  Imaging assistance was provided to the speech pathologist and an image was saved. Fluoroscopic Time: 2 minutes 12 seconds Number of Images: 8 cine loops Findings: The patient was evaluated in the seated lateral position while taking a variety of consistencies by mouth under the direction of speech pathology. No aspiration was observed. Please see the speech pathologist's procedure report for full details and  recommendations.     Impression: Impression: Fluoroscopy provided during modified barium swallow. Electronically Signed: Endy Shaw MD  11/16/2024 1:27 PM EST  Workstation ID: [id number]    XR Abdomen KUB    Result Date: 11/15/2024  XR ABDOMEN KUB Date of Exam: 11/15/2024 2:47 PM EST Indication: MRI clearance Comparison: None available. Findings: There is a nonobstructing bowel gas pattern. Postsurgical changes are noted from fusion at the lumbosacral junction. No metal devices to suggest contraindication for MRI     Impression: Impression: Unremarkable radiograph abdomen Electronically Signed: Kirby Donnelly MD  11/15/2024 3:14 PM EST  Workstation ID: [id number]    CT Head Without Contrast    Result Date: 11/15/2024  CT HEAD WO CONTRAST Date of Exam: 11/15/2024 2:21 PM EST Indication: Altered mental status. Comparison: MRI of the brain without contrast from 1/6/2021 Technique: Axial CT images were obtained of the head without contrast administration.  Automated exposure control and iterative construction methods were used. Findings: There is generalized parenchymal volume loss with sulcal widening and ventricular prominence, not significantly changed since the 2021 MR. There is no CT evidence of acute hemorrhage, infarct, mass, mass effect, or midline shift. The gray-white matter differentiation is preserved. . The sulci and ventricles are symmetric.  No extraaxial fluid collections. The globes and orbital contents are  normal and symmetric. The mastoid air cells and visualized paranasal sinuses are clear. No skull fractures or suspicious calvarial lesions.     Impression: Impression: No acute intracranial abnormality. There is generalized atrophy and ventricular prominence similar to the 2021 study. Electronically Signed: Tony Putnam DO  11/15/2024 2:37 PM EST  Workstation ID: [id number]         Current medications:  Scheduled Meds:amitriptyline, 25 mg, Oral, BID  aspirin, 81 mg, Oral, Daily  enoxaparin, 40 mg,  Subcutaneous, Daily  finasteride, 5 mg, Oral, Nightly  insulin glargine, 15 Units, Subcutaneous, Q12H  insulin lispro, 2-9 Units, Subcutaneous, 4x Daily AC & at Bedtime  Insulin Lispro, 7 Units, Subcutaneous, TID With Meals  metoprolol succinate XL, 50 mg, Oral, Daily  pregabalin, 225 mg, Oral, BID  rOPINIRole, 0.25 mg, Oral, Nightly  sodium chloride, 10 mL, Intravenous, Q12H  sodium chloride, 10 mL, Intravenous, Q12H  tamsulosin, 0.4 mg, Oral, Nightly      Continuous Infusions:   PRN Meds:.  acetaminophen **OR** acetaminophen **OR** acetaminophen    senna-docusate sodium **AND** polyethylene glycol **AND** bisacodyl **AND** bisacodyl    Calcium Replacement - Follow Nurse / BPA Driven Protocol    dextrose    dextrose    glucagon (human recombinant)    Magnesium Standard Dose Replacement - Follow Nurse / BPA Driven Protocol    nitroglycerin    Phosphorus Replacement - Follow Nurse / BPA Driven Protocol    Potassium Replacement - Follow Nurse / BPA Driven Protocol    sodium chloride    sodium chloride    sodium chloride    sodium chloride    sodium chloride    Assessment & Plan   Assessment & Plan     Active Hospital Problems    Diagnosis  POA    **DKA (diabetic ketoacidosis) [E11.10]  Yes      Resolved Hospital Problems   No resolved problems to display.        Brief Hospital Course to date:  Fracisco Mullen is a 59 y.o. male with past medical history of hypertension, type 2 diabetes with insulin use, diabetic neuropathy, and chronic diabetic foot wounds who presents via EMS from home due to altered mental status and fatigue. Lab work consistent with diabetic ketoacidosis.     This patient's problems and plans were partially entered by my partner and updated as appropriate by me 11/17/24. Copied text in this note has been reviewed and is accurate as of today's date.      DKA in setting of uncontrolled IDDM complicated by diabetic neuropathy and chronic foot wounds   --Uncertain of medication compliance  --pH 7.328,  blood glucose 567, lactate 3.6, beta hydroxybutyrate 4.29, anion gap metabolic acidosis, HbA1c 14.1%  --UA/CXR without signs of infection   --s/p DKA protocol including insulin drip, IV fluids, and electrolyte replacement per protocol  --Anion gap now closed, transitioned to basal-bolus regimen now  --Adjust as needed based on finger sticks  --DM educator consulted     Chronic bilateral foot wounds  --Patient with chronic left heel wound and right plantar foot wound  --Admitted August 2024 for left foot cellulitis and discharged on oral antibiotics and with home wound vac following debridement, MRI left foot obtained at that time with no definite findings of osteomyelitis (was seen by ID/Dr. Dominique and Orthopedics/Dr. Gan)  --Repeat MRI left foot ordered and pending  --Wound care consulted  --Consider surgery/ID consults pending MRI results     Low grade fever  --Temp 100.4 on 11/15/24 evening  --Mild leukocytosis, improved from admission  --CXR and UA negative  --Await MRI left foot  --Defer ATBx for now unless fever spikes or other signs of infection     Acute toxic metabolic encephalopathy  --CT head without acute abnormality  --UDS negative  --Likely secondary to DKA  --Improved now     HTN  --Continue home Metoprolol     BPH  --Continue home Flomax, Proscar     Mood disorder  --Continue home Amitriptyline      RLS  --Continue home Requip    Expected Discharge Location and Transportation: home  Expected Discharge   Expected Discharge Date: 11/19/2024; Expected Discharge Time:      VTE Prophylaxis:  Pharmacologic VTE prophylaxis orders are present.         AM-PAC 6 Clicks Score (PT): 13 (11/17/24 0800)    CODE STATUS:   Code Status and Medical Interventions: CPR (Attempt to Resuscitate); Full Support; Full code per last hosptial admission. Patient disoriented on exam with no family present at bedside. Only contact information is friend.   Ordered at: 11/15/24 1218     Level Of Support Discussed With:     Patient     Code Status (Patient has no pulse and is not breathing):    CPR (Attempt to Resuscitate)     Medical Interventions (Patient has pulse or is breathing):    Full Support     Comments:    Full code per last hosptial admission. Patient disoriented on exam with no family present at bedside. Only contact information is friend.       Lisa Vallejo MD  11/17/24

## 2024-11-18 ENCOUNTER — APPOINTMENT (OUTPATIENT)
Dept: MRI IMAGING | Facility: HOSPITAL | Age: 59
End: 2024-11-18
Payer: COMMERCIAL

## 2024-11-18 PROBLEM — S91.302A NON HEALING LEFT HEEL WOUND: Status: ACTIVE | Noted: 2024-11-18

## 2024-11-18 LAB
ANION GAP SERPL CALCULATED.3IONS-SCNC: 11 MMOL/L (ref 5–15)
BUN SERPL-MCNC: 22 MG/DL (ref 8–23)
BUN/CREAT SERPL: 25 (ref 7–25)
CALCIUM SPEC-SCNC: 8.2 MG/DL (ref 8.6–10.5)
CHLORIDE SERPL-SCNC: 99 MMOL/L (ref 98–107)
CO2 SERPL-SCNC: 25 MMOL/L (ref 22–29)
CREAT SERPL-MCNC: 0.88 MG/DL (ref 0.76–1.27)
DEPRECATED RDW RBC AUTO: 45 FL (ref 37–54)
EGFRCR SERPLBLD CKD-EPI 2021: 97.8 ML/MIN/1.73
ERYTHROCYTE [DISTWIDTH] IN BLOOD BY AUTOMATED COUNT: 15.3 % (ref 12.3–15.4)
GLUCOSE BLDC GLUCOMTR-MCNC: 114 MG/DL (ref 70–130)
GLUCOSE BLDC GLUCOMTR-MCNC: 129 MG/DL (ref 70–130)
GLUCOSE BLDC GLUCOMTR-MCNC: 196 MG/DL (ref 70–130)
GLUCOSE BLDC GLUCOMTR-MCNC: 241 MG/DL (ref 70–130)
GLUCOSE SERPL-MCNC: 270 MG/DL (ref 65–99)
HCT VFR BLD AUTO: 35.4 % (ref 37.5–51)
HGB BLD-MCNC: 11.2 G/DL (ref 13–17.7)
MCH RBC QN AUTO: 25.5 PG (ref 26.6–33)
MCHC RBC AUTO-ENTMCNC: 31.6 G/DL (ref 31.5–35.7)
MCV RBC AUTO: 80.6 FL (ref 79–97)
PLATELET # BLD AUTO: 242 10*3/MM3 (ref 140–450)
PMV BLD AUTO: 10.7 FL (ref 6–12)
POTASSIUM SERPL-SCNC: 4 MMOL/L (ref 3.5–5.2)
RBC # BLD AUTO: 4.39 10*6/MM3 (ref 4.14–5.8)
SODIUM SERPL-SCNC: 135 MMOL/L (ref 136–145)
WBC NRBC COR # BLD AUTO: 6.74 10*3/MM3 (ref 3.4–10.8)

## 2024-11-18 PROCEDURE — 82948 REAGENT STRIP/BLOOD GLUCOSE: CPT

## 2024-11-18 PROCEDURE — 85027 COMPLETE CBC AUTOMATED: CPT | Performed by: HOSPITALIST

## 2024-11-18 PROCEDURE — 92526 ORAL FUNCTION THERAPY: CPT

## 2024-11-18 PROCEDURE — 99232 SBSQ HOSP IP/OBS MODERATE 35: CPT | Performed by: HOSPITALIST

## 2024-11-18 PROCEDURE — 63710000001 INSULIN GLARGINE PER 5 UNITS: Performed by: HOSPITALIST

## 2024-11-18 PROCEDURE — 73718 MRI LOWER EXTREMITY W/O DYE: CPT

## 2024-11-18 PROCEDURE — 80048 BASIC METABOLIC PNL TOTAL CA: CPT | Performed by: HOSPITALIST

## 2024-11-18 PROCEDURE — 25010000002 ENOXAPARIN PER 10 MG: Performed by: STUDENT IN AN ORGANIZED HEALTH CARE EDUCATION/TRAINING PROGRAM

## 2024-11-18 PROCEDURE — 63710000001 INSULIN LISPRO (HUMAN) PER 5 UNITS: Performed by: HOSPITALIST

## 2024-11-18 RX ORDER — CALCIUM CARBONATE 500 MG/1
2 TABLET, CHEWABLE ORAL 3 TIMES DAILY PRN
Status: DISCONTINUED | OUTPATIENT
Start: 2024-11-18 | End: 2024-12-05 | Stop reason: HOSPADM

## 2024-11-18 RX ORDER — TAMSULOSIN HYDROCHLORIDE 0.4 MG/1
0.8 CAPSULE ORAL NIGHTLY
Status: DISCONTINUED | OUTPATIENT
Start: 2024-11-18 | End: 2024-12-05 | Stop reason: HOSPADM

## 2024-11-18 RX ADMIN — Medication 10 ML: at 20:39

## 2024-11-18 RX ADMIN — INSULIN GLARGINE 15 UNITS: 100 INJECTION, SOLUTION SUBCUTANEOUS at 08:19

## 2024-11-18 RX ADMIN — ASPIRIN 81 MG: 81 TABLET, COATED ORAL at 08:18

## 2024-11-18 RX ADMIN — PREGABALIN 225 MG: 75 CAPSULE ORAL at 08:18

## 2024-11-18 RX ADMIN — FINASTERIDE 5 MG: 5 TABLET, FILM COATED ORAL at 20:17

## 2024-11-18 RX ADMIN — INSULIN LISPRO 2 UNITS: 100 INJECTION, SOLUTION INTRAVENOUS; SUBCUTANEOUS at 11:33

## 2024-11-18 RX ADMIN — AMITRIPTYLINE HYDROCHLORIDE 25 MG: 25 TABLET, FILM COATED ORAL at 08:18

## 2024-11-18 RX ADMIN — CALCIUM CARBONATE (ANTACID) CHEW TAB 500 MG 2 TABLET: 500 CHEW TAB at 08:18

## 2024-11-18 RX ADMIN — INSULIN LISPRO 4 UNITS: 100 INJECTION, SOLUTION INTRAVENOUS; SUBCUTANEOUS at 08:18

## 2024-11-18 RX ADMIN — METOPROLOL SUCCINATE 50 MG: 50 TABLET, EXTENDED RELEASE ORAL at 08:18

## 2024-11-18 RX ADMIN — Medication 10 ML: at 08:19

## 2024-11-18 RX ADMIN — CALCIUM CARBONATE (ANTACID) CHEW TAB 500 MG 2 TABLET: 500 CHEW TAB at 22:00

## 2024-11-18 RX ADMIN — ROPINIROLE 0.25 MG: 0.5 TABLET, FILM COATED ORAL at 20:18

## 2024-11-18 RX ADMIN — INSULIN LISPRO 7 UNITS: 100 INJECTION, SOLUTION INTRAVENOUS; SUBCUTANEOUS at 08:19

## 2024-11-18 RX ADMIN — PREGABALIN 225 MG: 75 CAPSULE ORAL at 20:17

## 2024-11-18 RX ADMIN — INSULIN GLARGINE 10 UNITS: 100 INJECTION, SOLUTION SUBCUTANEOUS at 21:41

## 2024-11-18 RX ADMIN — INSULIN LISPRO 7 UNITS: 100 INJECTION, SOLUTION INTRAVENOUS; SUBCUTANEOUS at 11:33

## 2024-11-18 RX ADMIN — ENOXAPARIN SODIUM 40 MG: 100 INJECTION SUBCUTANEOUS at 08:18

## 2024-11-18 RX ADMIN — TAMSULOSIN HYDROCHLORIDE 0.8 MG: 0.4 CAPSULE ORAL at 20:17

## 2024-11-18 RX ADMIN — AMITRIPTYLINE HYDROCHLORIDE 25 MG: 25 TABLET, FILM COATED ORAL at 20:18

## 2024-11-18 RX ADMIN — ACETAMINOPHEN 650 MG: 325 TABLET ORAL at 11:33

## 2024-11-18 NOTE — PROGRESS NOTES
"          Clinical Nutrition Assessment     Patient Name: Fracisco Mullen  YOB: 1965  MRN: [id number]  Date of Encounter: 11/18/24 16:44 EST  Admission date: 11/15/2024  Reason for Visit: Identified at risk by screening criteria, MST score 2+    Assessment   Nutrition Assessment   Admission Diagnosis:  DKA (diabetic ketoacidosis) [E11.10]    Problem List:    DKA (diabetic ketoacidosis)    Non healing left heel wound      PMH:   He  has a past medical history of Acute renal failure, Obesity, Sleep apnea, and Type 2 diabetes mellitus.    PSH:  He  has a past surgical history that includes Back surgery; cholecystectomy with intraoperative cholangiogram (N/A, 1/6/2021); ERCP (N/A, 1/9/2021); Incision and drainage foot (Left, 8/3/2024); and PLACEMENT OF WOUND VAC (Left, 8/3/2024).    Applicable Nutrition History:   (11/18) SLP Diet Recommendation: soft to chew textures, ground, thin liquids, other (see comments) (no straw).      Anthropometrics     Height: Height: 162.6 cm (64\")  Last Filed Weight: Weight: 72.3 kg (159 lb 6.3 oz) (11/18/24 0321)  Method: Weight Method: Bed scale  BMI: BMI (Calculated): 27.3    UBW:     Weight       Weight (kg) Weight (lbs) Weight Method Visit Report   8/1/2024 86.183 kg  190 lb  Stated     11/15/2024 85.73 kg  189 lb  Stated      72.303 kg  159 lb 6.4 oz  Bed scale     11/18/2024 72.3 kg  159 lb 6.3 oz  Bed scale       Weight change: weight loss of 23 lbs (12.1%) over 3 month(s)    Significant?  Yes    Bed weight at time of visit: 167lb     Nutrition Focused Physical Exam    Date: 11/18    Patient meets criteria for malnutrition diagnosis, see MSA note.     Subjective   Reported/Observed/Food/Nutrition Related History:   11/18  Alert and oriented, able to contribute to intake and weight history.  Reports gradual weight loss over the past 3 months. He feels his appetite has decline a little. He thinks he feels a little down which has affected his appetite.  Has been disabled " "for about two years when \"his leg gave out\", he is not sure if he has a stroke.  He feels he is compliant with his insulin.  He does not know what insulin he takes, his wife is the one who knows the dose and frequency. He is not sure why his A1C is so high.      Current Nutrition Prescription   PO: Diet: Diabetic; Consistent Carbohydrate; No Straw; Texture: Soft to Chew (NDD 3); Soft to Chew: Ground Meat; Fluid Consistency: Thin (IDDSI 0)  Oral Nutrition Supplement:   Intake: 2 Days: 71% x 6 meals     Assessment & Plan   Nutrition Diagnosis   Date: 11/18  Updated:  Problem Malnutrition, acute severe   Etiology Intake < needs; ? Poor BG control    Signs/Symptoms <75% of EEN x>7d, significant weight loss of 12.1% x 3, moderate muscle wasting, and moderate subcutaneous fat loss   Status: New    Goal / Objectives:   Nutrition to support treatment and Continue positive trend      Nutrition Intervention      Follow treatment progress, Care plan reviewed, Interview for preferences    Nutrition POC:  Meets criteria for MSA/Severe/Acute. Showing significant weight loss related to poor BG control.    Patient showing good intake during admission. ONS not warranted at this time  ? DM education prior to discharge (in patient vs outpatient)      Monitoring/Evaluation:   Per protocol, I&O, PO intake, Pertinent labs, Symptoms    Lynda Pisano RD  Time Spent: 30min    "

## 2024-11-18 NOTE — NURSING NOTE
Patient did not void overnight, night shift nurse bladder scanned the patient for approx 600mL. An attempt to straight cath the patient was unsuccessful. This RN bladder scanned the patient for 632 mL this AM. Patient refused the allow RN to straight cath, patient is noted to be alert and oriented and understanding of the risks associated with not allowing the RN to straight cath. MD notified.

## 2024-11-18 NOTE — PLAN OF CARE
Goal Outcome Evaluation:  Plan of Care Reviewed With: patient        Progress: improving  Outcome Evaluation: PT eval completed. Presents w/ ESBL Kleb (contact), DKA, asthma, recent L Heel I&D (8-3-24), amp R 2nd toe 7/'23 & R foot I&D 9/'23 (wife to bring kush. offload shoes), chr. LBP/L hip pain, w/c dep. (non-amb), decr LE strength/endurance & impaired funct mobil below baseline. Rolled L w/ min A, to R w/ mod A using bedrail, scooted to HOB in L sidely using L bedrail & max A w/ draw sheet, & performed TE in sup & sidely,but decl.EOB or SPT d/t L Hip pain 9/10 w/ mvmt. Noted desat 93% on RA, HR 96, pale appearance (low HGB 10.9), & signif. fatigue. Will attempt SPT when offload shoes avail., & prog to w/c mobs.Recommend SNF at d/c.    Anticipated Discharge Disposition (PT): skilled nursing facility

## 2024-11-18 NOTE — THERAPY TREATMENT NOTE
Acute Care - Speech Language Pathology   Swallow Treatment Note [facility]     Patient Name: Fracisco Mullen  : 1965  MRN: [id number]  Today's Date: 2024               Admit Date: 11/15/2024    Visit Dx:     ICD-10-CM ICD-9-CM   1. Diabetic ketoacidosis without coma associated with type 2 diabetes mellitus  E11.10 250.12   2. Oropharyngeal dysphagia  R13.12 787.22     Patient Active Problem List   Diagnosis    HTN (hypertension)    Type 2 diabetes mellitus, with long-term current use of insulin    Diabetic retinopathy    Obesity (BMI 30.0-34.9)    Peripheral neuropathy    Asthma    Causalgia of lower limb    Chronic constipation    Compression of lumbar nerve root    GERD (gastroesophageal reflux disease)    Hyperlipidemia    IBS (irritable bowel syndrome)    RLS (restless legs syndrome)    Sleep apnea    Vitamin D deficiency    Cigar smoker    Chronic back pain    Diabetic ketoacidosis associated with type 2 diabetes mellitus    Multiple open wounds of foot    Status post cholecystectomy    Cholestatic hepatitis    Diabetic infection of left foot    Precordial pain    Tobacco use    DKA (diabetic ketoacidosis)    Non healing left heel wound     Past Medical History:   Diagnosis Date    Acute renal failure     Hx of    Obesity     Hx of    Sleep apnea     Type 2 diabetes mellitus      Past Surgical History:   Procedure Laterality Date    BACK SURGERY      lower back    CHOLECYSTECTOMY WITH INTRAOPERATIVE CHOLANGIOGRAM N/A 2021    Procedure: CHOLECYSTECTOMY LAPAROSCOPIC INTRAOPERATIVE CHOLANGIOGRAM;  Surgeon: Juventino Hooker MD;  Location: [facility] OR;  Service: General;  Laterality: N/A;    ERCP N/A 2021    Procedure: ENDOSCOPIC RETROGRADE CHOLANGIOPANCREATOGRAPHY;  Surgeon: Jeremy Dowell MD;  Location: [facility] ENDOSCOPY;  Service: Gastroenterology;  Laterality: N/A;  with sphiincterotomy and balloon sweep with 9mm-12mm balloon    INCISION AND DRAINAGE FOOT Left 8/3/2024    Procedure: HEAL  IRRIGATION AND DEBRIDEMENT LEFT;  Surgeon: Dru Gan Jr., MD;  Location:  [facility] OR;  Service: Orthopedics;  Laterality: Left;    PLACEMENT OF WOUND VAC Left 8/3/2024    Procedure: PLACEMENT OF WOUND VAC;  Surgeon: Dru Gan Jr., MD;  Location:  [facility] OR;  Service: Orthopedics;  Laterality: Left;       SLP Recommendation and Plan     SLP Diet Recommendation: soft to chew textures, ground, thin liquids, other (see comments) (no straw) (11/18/24 1435)  Recommended Precautions and Strategies: upright posture during/after eating, small bites of food and sips of liquid, general aspiration precautions, no straw (11/18/24 1435)  SLP Rec. for Method of Medication Administration: meds whole, with puree, as tolerated (11/18/24 1435)     Monitor for Signs of Aspiration: yes, notify SLP if any concerns (11/18/24 1435)        Anticipated Discharge Disposition (SLP): home (11/18/24 1435)     Therapy Frequency (Swallow): 5 days per week (11/18/24 1435)  Predicted Duration Therapy Intervention (Days): 1 week (11/18/24 1435)  Oral Care Recommendations: Oral Care BID/PRN, Toothbrush (11/18/24 1435)        Daily Summary of Progress (SLP): progress toward functional goals as expected (11/18/24 1435)               Treatment Assessment (SLP): toleration of diet, no clinical signs of, pharyngeal dysphagia (11/18/24 1435)  Treatment Assessment Comments (SLP): No overt s/s of aspiration w/ thin liquid trials. Reviewed MBS results & recommendations w/ pt, pt able to demonstrate understanding. Also reviewed & completed all exercises, provided handout and encouraged independent practice. SLP will cont to f/u for tx as appropriate (11/18/24 1435)  Plan for Continued Treatment (SLP): continue treatment per plan of care (11/18/24 1435)                SWALLOW EVALUATION (Last 72 Hours)       SLP Adult Swallow Evaluation       Row Name 11/18/24 1435 11/16/24 1121 11/16/24 0905             Rehab Evaluation    Document Type therapy  note (daily note)  - re-evaluation  - evaluation  -MM      Subjective Information -- no complaints  -MM no complaints  -MM      Patient Observations alert;cooperative  - alert;cooperative  - alert;cooperative;agree to therapy  -      Patient/Family/Caregiver Comments/Observations No family present  - no family present  -MM no family present  -MM      Patient Effort good  - good  -MM good  -MM      Symptoms Noted During/After Treatment none  - -- --      Oral Care -- -- swabbed with antiseptic solution  -MM         General Information    Patient Profile Reviewed -- yes  -MM yes  -MM      Pertinent History Of Current Problem -- See initial eval  -MM Pt is a 59 year old male who presented to the facility with c/o AMS. Pt diagnosed with DKA and metabolic encephalopathy.  -MM      Current Method of Nutrition -- NPO  -MM NPO  -MM      Precautions/Limitations, Vision -- WFL;for purposes of eval  -MM WFL;for purposes of eval  -MM      Precautions/Limitations, Hearing -- WFL;for purposes of eval  -MM WFL;for purposes of eval  -MM      Prior Level of Function-Communication -- unknown  -MM unknown  -MM      Prior Level of Function-Swallowing -- soft to chew;other (see comments)  per pt report  -MM soft to chew;other (see comments)  per pt report  -MM      Plans/Goals Discussed with -- patient;agreed upon  -MM patient;agreed upon  -      Barriers to Rehab -- none identified  -MM none identified  -MM      Patient's Goals for Discharge -- return home  -MM return home  -MM         Pain    Pretreatment Pain Rating -- 0/10 - no pain  -MM 0/10 - no pain  -MM      Posttreatment Pain Rating -- 0/10 - no pain  -MM 0/10 - no pain  -MM      Additional Documentation Pain Scale: FACES Pre/Post-Treatment (Group)  - -- --         Pain Scale: FACES Pre/Post-Treatment    Pain: FACES Scale, Pretreatment 0-->no hurt  - -- --      Posttreatment Pain Rating 0-->no hurt  - -- --         Oral Motor Structure and Function     Dentition Assessment -- -- edentulous, does not have dentures  -MM      Secretion Management -- -- WNL/WFL  -MM      Mucosal Quality -- -- moist, healthy  -MM         Oral Musculature and Cranial Nerve Assessment    Oral Motor General Assessment -- -- WFL  -MM         General Eating/Swallowing Observations    Respiratory Support Currently in Use -- -- room air  -MM      Eating/Swallowing Skills -- -- fed by SLP;self-fed  -MM      Positioning During Eating -- -- upright in bed  -MM      Utensils Used -- -- spoon;straw;cup  -MM      Consistencies Trialed -- -- ice chips;thin liquids;nectar/syrup-thick liquids;pureed;soft to chew textures;chopped  -MM         Clinical Swallow Eval    Oral Prep Phase -- -- impaired  -MM      Oral Transit -- -- WFL  -MM      Oral Residue -- -- WFL  -MM      Pharyngeal Phase -- -- suspected pharyngeal impairment  -MM      Esophageal Phase -- -- unremarkable  -MM         Oral Prep Concerns    Oral Prep Concerns -- -- prolonged mastication;inefficient mastication  -MM      Prolonged Mastication -- -- mechanical soft  -MM      Inefficient Mastication -- -- mechanical soft  -MM         Pharyngeal Phase Concerns    Pharyngeal Phase Concerns -- -- cough  -MM      Cough -- -- thin  -MM      Pharyngeal Phase Concerns, Comment -- -- Pt with single swallows and +hyolaryngeal elevation. Pt demonstrated occasional cough following thin via rapid, consecutive straw sips. Pt with no overt s/sx aspiration with nectar or thin via small sips.  -MM         MBS/VFSS    Utensils Used -- spoon;cup;straw  -MM --      Consistencies Trialed -- thin liquids;nectar/syrup-thick liquids;pureed;soft to chew textures;chopped  -MM --         MBS/VFSS Interpretation    Oral Prep Phase -- impaired oral phase of swallowing  -MM --      Oral Transit Phase -- impaired  -MM --      Oral Residue -- WFL  -MM --         Oral Preparatory Phase    Oral Preparatory Phase -- prolonged manipulation  -MM --      Prolonged Manipulation  -- mechanical soft  -MM --         Oral Transit Phase    Impaired Oral Transit Phase -- delayed initiation of bolus transit;premature spillage of liquids into pharynx  -MM --      Delayed Initiation of bolus transit -- all consistencies tested  -MM --      Premature Spillage of Liquids into Pharynx -- all consistencies tested  -MM --      Oral Transit Phase, Comment -- Pt with premature spillage of all consistencies to vallecular or pyriform sinuses prior to swallow initiation.  -MM --         Initiation of Pharyngeal Swallow    Initiation of Pharyngeal Swallow -- bolus in pyriform sinuses  -MM --      Pharyngeal Phase -- impaired pharyngeal phase of swallowing  -MM --      Penetration During the Swallow -- thin liquids  -MM --      Aspiration During the Swallow -- thin liquids  -MM --      Response to Aspiration -- Yes  -MM --      Responded to aspiration with -- effective;cough  -MM --      Rosenbek's Scale -- thin:;6--->level 6  -MM --      Pharyngeal Residue -- no significant pharyngeal residue noted  -MM --      Attempted Compensatory Maneuvers -- bolus size  -MM --      Response to Attempted Compensatory Maneuvers -- prevented penetration  -MM --      Pharyngeal Phase, Comment -- Patient presents with mild oropharyngeal dysphagia characterized by reduced mastication, reduced bolus manipulation, and premature spillage with all consistencies. Pt with trace aspiration with thin liquids via large, consecutive straw sip only and no airway compromise with small cup sips.  -MM --         SLP Evaluation Clinical Impression    SLP Swallowing Diagnosis -- mild;oral dysphagia;pharyngeal dysphagia  -MM mild;oral dysphagia;suspected pharyngeal dysphagia  -MM      Functional Impact -- risk of aspiration/pneumonia;risk of malnutrition;risk of dehydration  -MM risk of aspiration/pneumonia;risk of malnutrition  -MM      Rehab Potential/Prognosis, Swallowing -- good, to achieve stated therapy goals  -MM good, to achieve stated  therapy goals  -      Swallow Criteria for Skilled Therapeutic Interventions Met -- demonstrates skilled criteria  -MM demonstrates skilled criteria  -         SLP Treatment Clinical Impressions    Treatment Assessment (SLP) toleration of diet;no clinical signs of;pharyngeal dysphagia  - -- --      Treatment Assessment Comments (SLP) No overt s/s of aspiration w/ thin liquid trials. Reviewed [facility] results & recommendations w/ pt, pt able to demonstrate understanding. Also reviewed & completed all exercises, provided handout and encouraged independent practice. SLP will cont to f/u for tx as appropriate  - -- --      Daily Summary of Progress (SLP) progress toward functional goals as expected  - -- --      Plan for Continued Treatment (SLP) continue treatment per plan of care  - -- --      Care Plan Review care plan/treatment goals reviewed  - -- --         Recommendations    Therapy Frequency (Swallow) 5 days per week  - 5 days per week  -MM 5 days per week  -MM      Predicted Duration Therapy Intervention (Days) 1 week  - 1 week  -MM 1 week  -MM      SLP Diet Recommendation soft to chew textures;ground;thin liquids;other (see comments)  no straw  - soft to chew textures;ground;thin liquids;other (see comments)  no straws  -MM other (see comments)  sips of thin liquids, further recommendations pending [facility]  -MM      Recommended Diagnostics -- -- VFSS ([facility])  -      Recommended Precautions and Strategies upright posture during/after eating;small bites of food and sips of liquid;general aspiration precautions;no straw  - upright posture during/after eating;small bites of food and sips of liquid;general aspiration precautions  - upright posture during/after eating  -      Oral Care Recommendations Oral Care BID/PRN;Toothbrush  - Oral Care BID/PRN;Toothbrush  - Oral Care BID/PRN;Toothbrush  -MM      SLP Rec. for Method of Medication Administration meds whole;with puree;as tolerated  - meds  whole;with puree;as tolerated  -MM meds whole;with puree;as tolerated  -MM      Monitor for Signs of Aspiration yes;notify SLP if any concerns  - yes;notify SLP if any concerns  -MM yes;notify SLP if any concerns  -      Anticipated Discharge Disposition (SLP) home  - home  -MM home  -MM                User Key  (r) = Recorded By, (t) = Taken By, (c) = Cosigned By      Initials Name Effective Dates     Ely Brooks, MS [facility]-SLP 05/12/23 -     MM Isabel Monae MS CCC-SLP 08/30/24 -                     EDUCATION  The patient has been educated in the following areas:   Dysphagia (Swallowing Impairment) Oral Care/Hydration Modified Diet Instruction.        SLP GOALS       Row Name 11/18/24 1435 11/16/24 1123          (LTG) Patient will demonstrate functional swallow for    Diet Texture (Demonstrate functional swallow) soft to chew (chopped) textures  - soft to chew (chopped) textures  -     Liquid viscosity (Demonstrate functional swallow) thin liquids  - thin liquids  -     [place] (Demonstrate functional swallow) independently (over 90% accuracy)  - independently (over 90% accuracy)  -     Time Frame (Demonstrate functional swallow) 1 week  - 1 week  -MM     Progress/Outcomes (Demonstrate functional swallow) continuing progress toward goal  - new goal  -MM        (STG) Patient will tolerate trials of    Consistencies Trialed (Tolerate trials) soft to chew (ground) textures;thin liquids  - soft to chew (ground) textures;thin liquids  -MM     Desired Outcome (Tolerate trials) without signs/symptoms of aspiration;with adequate oral prep/transit/clearance  - without signs/symptoms of aspiration;with adequate oral prep/transit/clearance  -     [place] (Tolerate trials) independently (over 90% accuracy)  - independently (over 90% accuracy)  -MM     Time Frame (Tolerate trials) 1 week  - 1 week  -MM     Progress/Outcomes (Tolerate trials) new goal  - new goal  -      Comment (Tolerate trials) No overt s/s of aspiration w/ thin liquid trials, solids not trialed this date  - --        (STG) Patient will tolerate therapeutic trials of    Consistencies Trialed (Tolerate therapeutic trials) soft to chew (chopped) textures;thin liquids  - soft to chew (chopped) textures;thin liquids  -MM     Desired Outcome (Tolerate therapeutic trials) without signs/symptoms of aspiration;with adequate oral prep/transit/clearance  - without signs/symptoms of aspiration;with adequate oral prep/transit/clearance  -     [place] (Tolerate therapeutic trials) independently (over 90% accuracy)  - independently (over 90% accuracy)  -MM     Time Frame (Tolerate therapeutic trials) 1 week  - 1 week  -MM     Progress/Outcomes (Tolerate therapeutic trials) continuing progress toward goal  - new goal  -MM        (STG) Pharyngeal Strengthening Exercise Goal 1 (SLP)    Activity (Pharyngeal Strengthening Goal 1, SLP) increase timing;increase anterior movement of the hyolaryngeal complex;increase squeeze/positive pressure generation  - increase timing;increase anterior movement of the hyolaryngeal complex;increase squeeze/positive pressure generation  -     Increase Timing prepping - 3 second prep or suck swallow or 3-step swallow  - prepping - 3 second prep or suck swallow or 3-step swallow  -MM     Increase Anterior Movement of the Hyolaryngeal Complex chin tuck against resistance (CTAR)  - chin tuck against resistance (CTAR)  -MM     Increase Squeeze/Positive Pressure Generation hard effortful swallow  - hard effortful swallow  -MM     [place]/Accuracy (Pharyngeal Strengthening Goal 1, SLP) with minimal cues (75-90% accuracy)  - with minimal cues (75-90% accuracy)  -MM     Time Frame (Pharyngeal Strengthening Goal 1, SLP) 1 week  - 1 week  -MM     Progress/Outcomes (Pharyngeal Strengthening Goal 1, SLP) continuing progress toward goal  - new goal  -MM     Comment  (Pharyngeal Strengthening Goal 1, SLP) Reviewed/completed all exercises, provided handout and encouraged independent practice  - --               User Key  (r) = Recorded By, (t) = Taken By, (c) = Cosigned By      Initials Name Provider Type    Ely Cassidy MS CCC-SLP Speech and Language Pathologist    Isabel Zaldivar MS CCC-SLP Speech and Language Pathologist                         Time Calculation:    Time Calculation- SLP       Row Name 11/18/24 1554             Time Calculation- SLP    SLP Start Time 1435  -      SLP Received On 11/18/24  -         Untimed Charges    [id number] Treatment Swallow Minutes 50  -MH         Total Minutes    Untimed Charges Total Minutes 50  -MH       Total Minutes 50  -MH                User Key  (r) = Recorded By, (t) = Taken By, (c) = Cosigned By      Initials Name Provider Type    Ely Cassidy MS CCC-SLP Speech and Language Pathologist                    Therapy Charges for Today       Code Description Service Date Service Provider Modifiers Qty    [id number] HC ST TREATMENT SWALLOW 3 11/18/2024 Ely Brooks MS CCC-SLP GN 1                 Ely Brooks MS CCC-JULIO CÉSAR  11/18/2024

## 2024-11-18 NOTE — CASE MANAGEMENT/SOCIAL WORK
Continued Stay Note   [facility]     Patient Name: Fracisco Mullen  MRN: [id number]  Today's Date: 11/18/2024    Admit Date: 11/15/2024    Plan: Home vs Rehab   Discharge Plan       Row Name 11/18/24 1218       Plan    Plan Home vs Rehab    Patient/Family in Agreement with Plan yes    Plan Comments Spoke with patient at bedside to discuss discharge plans. Patient stated that he would be interested in rehab at discharge. Emailed therapy group to see if they could work with patient again in hopes of getting improved PT/OT notes. Once updated notes in Epic, will make referral with [facility]. CM will continue to follow and assist with discharge planning.    Final Discharge Disposition Code 01 - home or self-care                   Discharge Codes    No documentation.                 Expected Discharge Date and Time       Expected Discharge Date Expected Discharge Time    Nov 19, 2024               Kirby Riggs RN

## 2024-11-18 NOTE — PAYOR COMM NOTE
"ID: [id number]   : 1965    DKA (diabetic ketoacidosis) [E11.10]   Geovanna Ku MD (NPI: [id number])     SANDRA Boyer, RN  Utilization Review  Phone [phone]  Fax [phone]    [place] [address]       Fracisco Patterson \"Bill\" (59 y.o. Male)       Date of Birth   1965    Social Security Number       Address   [address] [address]    Home Phone   [phone]    MRN   [id number]       Latter day   None    Marital Status                               Admission Date   11/15/24    Admission Type   Emergency    Admitting Provider   Lisa Vallejo MD    Attending Provider   Lisa Vallejo MD    Department, Room/Bed   [facility] 4H, S474/1       Discharge Date       Discharge Disposition       Discharge Destination                                 Attending Provider: Lisa Vallejo MD    Allergies: Sertraline Hcl    Isolation: Contact   Infection: ESBL Klebsiella (24)   Code Status: CPR    Ht: 162.6 cm (64\")   Wt: 72.3 kg (159 lb 6.3 oz)    Admission Cmt: None   Principal Problem: DKA (diabetic ketoacidosis) [E11.10]                   Active Insurance as of 11/15/2024       Primary Coverage       Payor Plan Insurance Group Employer/Plan Group    [employer] [place]ICAID        Payor Plan Address Payor Plan Phone Number Payor Plan Fax Number Effective Dates    [address] [address] [phone]  4/10/2016 - None Entered    [facility] [id number]         Subscriber Name Subscriber Birth Date Member ID       FRACISCO PATTERSON 1965 [id number]                     Emergency Contacts        (Rel.) Home Phone Work Phone Mobile Phone    CosmeYael (Partner) -- -- [phone]              [place]: NPI [id number] Tax ID [id number]  Insurance Information                  [facility] MEDICAID Phone: [phone]    Subscriber: Fracisco Patterson JAIR Subscriber#: [id number]    " "Group#: -- Precert#: --    Authorization#: -- Effective Date: --             History & Physical        Edmond Espinoza DO at 11/15/24 1219              [facility] Medicine Services  HISTORY AND PHYSICAL    Patient Name: Fracisco Mullen  : 1965  MRN: [id number]  Primary Care Physician: Brandy Roberson  Date of admission: 11/15/2024      Subjective  Subjective     Chief Complaint:  Fatigue, altered mental status    HPI:  Fracisco Mullen is a 59 y.o. male with a past medical history of hypertension, type 2 diabetes with insulin use, diabetic neuropathy, chronic diabetic foot wounds who presents via EMS from home for altered mental status and fatigue.    History limited as patient is unable to provide much history and no family or friends present at bedside.  Per chart review, wife called ambulance as patient was \"sluggish \"today.  On exam today, patient is oriented to name, date of birth, and location, but disoriented to situation.  Patient denies any acute complaints concerns at this time.  Patient states he takes insulin at home but is uncertain of his regimen.  Patient states that his wife helps him with his medications at home.    Lab work in the ED notable for pH of 7.328, glucose of 567, anion gap metabolic acidosis, lactate of 3.6, beta hydroxybutyrate of 4.29, leukocytosis, A1c of 14.1.  Patient received IV fluids in the ED.  Medicine was consulted for further management.    Of note, patient's last admission was in 2024 where he was found to have left foot cellulitis.  MRI at that time was obtained that was not consistent with osteomyelitis of the left ankle.  Patient received antibiotics at that time, discharged with outpatient follow-up.  Patient was also noted to have an A1c greater than 15 during that hospitalization.  Home medication includes NovoLog 70/30 60 units twice daily.  Uncertain of medication compliance. Patient denies alcohol, tobacco, or drug use. "     Personal History     Past Medical History:   Diagnosis Date    Acute renal failure     Hx of    Obesity     Hx of    Sleep apnea     Type 2 diabetes mellitus            Past Surgical History:   Procedure Laterality Date    BACK SURGERY      lower back    CHOLECYSTECTOMY WITH INTRAOPERATIVE CHOLANGIOGRAM N/A 1/6/2021    Procedure: CHOLECYSTECTOMY LAPAROSCOPIC INTRAOPERATIVE CHOLANGIOGRAM;  Surgeon: Juventino Hooker MD;  Location: [facility] OR;  Service: General;  Laterality: N/A;    ERCP N/A 1/9/2021    Procedure: ENDOSCOPIC RETROGRADE CHOLANGIOPANCREATOGRAPHY;  Surgeon: Jeremy Dowlel MD;  Location: [facility] ENDOSCOPY;  Service: Gastroenterology;  Laterality: N/A;  with sphiincterotomy and balloon sweep with 9mm-12mm balloon    INCISION AND DRAINAGE FOOT Left 8/3/2024    Procedure: HEAL IRRIGATION AND DEBRIDEMENT LEFT;  Surgeon: Dru Gan Jr., MD;  Location:  [facility] OR;  Service: Orthopedics;  Laterality: Left;    PLACEMENT OF WOUND VAC Left 8/3/2024    Procedure: PLACEMENT OF WOUND VAC;  Surgeon: Dru Gan Jr., MD;  Location:  [facility] OR;  Service: Orthopedics;  Laterality: Left;       Family History: family history includes Cancer in his brother, maternal grandmother, mother, and another family member; Diabetes in an other family member; Heart attack in an other family member; Heart disease in his brother and father; Hyperlipidemia in his mother and another family member; Hypertension in his mother and another family member; Obesity in an other family member.     Social History:  reports that he has quit smoking. His smoking use included cigars. He has never used smokeless tobacco. He reports that he does not drink alcohol and does not use drugs.  Social History     Social History Narrative    Not on file       Medications:  Available home medication information reviewed.  Insulin Pen Needle, OneTouch Delica Plus [id number], OneTouch Verio Flex System, Vitamin D3, albuterol sulfate HFA,  amitriptyline, aspirin, docusate sodium, finasteride, fluticasone, glucose blood, insulin aspart prot & aspart, metFORMIN, metoprolol succinate XL, naloxone, omeprazole, oxyCODONE-acetaminophen, pregabalin, rOPINIRole, and tamsulosin    Allergies   Allergen Reactions    Sertraline Hcl Hives     Zoloft       Objective  Objective     Vital Signs:   Temp:  [98.9 °F (37.2 °C)] 98.9 °F (37.2 °C)  Heart Rate:  [118-125] 125  Resp:  [20] 20  BP: (115-143)/() 143/104       Physical Exam  Constitutional:       General: He is not in acute distress.  Eyes:      General: No scleral icterus.     Extraocular Movements: Extraocular movements intact.   Cardiovascular:      Rate and Rhythm: Tachycardia present.      Pulses: Normal pulses.   Pulmonary:      Effort: Pulmonary effort is normal. No respiratory distress.   Abdominal:      General: There is no distension.      Palpations: Abdomen is soft.      Tenderness: There is no abdominal tenderness. There is no guarding or rebound.   Musculoskeletal:      Right lower leg: No edema.      Left lower leg: No edema.   Skin:     General: Skin is warm.      Findings: Bruising present.      Comments: Left heel ulcer and right plantar foot ulcer   Neurological:      Mental Status: He is alert.      Comments: Moves all 4 extremities spontaneously   Psychiatric:      Comments: Oriented to name, date of birth, location, disoriented to situation            Result Review:  I have personally reviewed the results from the time of this admission to 11/15/2024 12:35 EST and agree with these findings:  [x]  Laboratory list / accordion  [x]  Microbiology  [x]  Radiology  []  EKG/Telemetry   []  Cardiology/Vascular   []  Pathology  [x]  Old records  []  Other:  Most notable findings include: Elevated lactate, anion gap metabolic acidosis, elevated glucose, elevated ketones        LAB RESULTS:      Lab 11/15/24  0948 11/15/24  0942   WBC  --  13.03*   HEMOGLOBIN  --  12.3*   HEMOGLOBIN, POC 13.6   --    HEMATOCRIT  --  37.6   HEMATOCRIT POC 40  --    PLATELETS  --  321   NEUTROS ABS  --  11.22*   IMMATURE GRANS (ABS)  --  0.12*   LYMPHS ABS  --  1.18   MONOS ABS  --  0.41   EOS ABS  --  0.01   MCV  --  78.7*   LACTATE  --  3.6*         Lab 11/15/24  1107 11/15/24  0948 11/15/24  0942   SODIUM 129*  --   --    POTASSIUM 4.5  --   --    CHLORIDE 90*  --   --    CO2 17.0*  --   --    ANION GAP 22.0*  --   --    BUN 17  --   --    CREATININE 0.87 0.80  --    EGFR 98.2 100.7  --    GLUCOSE 567*  --   --    CALCIUM 8.8  --   --    MAGNESIUM 1.8  --   --    PHOSPHORUS 5.2*  --   --    HEMOGLOBIN A1C  --   --  14.10*         Lab 11/15/24  1107   TOTAL PROTEIN 6.7   ALBUMIN 3.4*   GLOBULIN 3.3   ALT (SGPT) 14   AST (SGOT) 13   BILIRUBIN 0.4   ALK PHOS 124*         Lab 11/15/24  1107   HSTROP T 32*                 Lab 11/15/24  0954   PH, ARTERIAL 7.328*   PCO2, ARTERIAL 36.0   PO2 ART 88.9   FIO2 21   HCO3 ART 18.9*   BASE EXCESS ART -6.4*   CARBOXYHEMOGLOBIN 1.5     UA          8/1/2024    14:37 11/15/2024    11:42   Urinalysis   Squamous Epithelial Cells, UA 0-2  None Seen    Specific Gravity, UA 1.038  1.036    Ketones, UA 15 mg/dL (1+)  40 mg/dL (2+)    Blood, UA Trace  Small (1+)    Leukocytes, UA Negative  Negative    Nitrite, UA Negative  Negative    RBC, UA 0-2  3-5    WBC, UA 3-5  0-2    Bacteria, UA Trace  None Seen        Microbiology Results (last 10 days)       ** No results found for the last 240 hours. **            XR Chest 1 View    Result Date: 11/15/2024  XR CHEST 1 VW Date of Exam: 11/15/2024 9:31 AM EST Indication: Weak/Dizzy/AMS triage protocol Comparison: Chest radiograph 1/5/2021. Findings: Cardiomediastinal silhouette is unchanged. No focal consolidation or overt pulmonary edema. No pleural effusion or pneumothorax. Osseous structures are unchanged.     Impression: Impression: No evidence of acute cardiopulmonary disease. Electronically Signed: Jerad Mata MD  11/15/2024 9:48 AM EST   Workstation ID: [id number]         Assessment & Plan  Assessment & Plan       DKA (diabetic ketoacidosis)    Fracisco ADAM Lady is a 59 y.o. male with a past medical history of hypertension, type 2 diabetes with insulin use, diabetic neuropathy, chronic diabetic foot wounds who presents via EMS from home for altered mental status and fatigue.  Lab work consistent with diabetic ketoacidosis.  Obtaining MRI for left heel ulcer.    DKA in setting of uncontrolled IDDM c/b diabetic neuropathy and chronic foot wounds   -Home insulin includes NovoLog 70/30 60 units twice daily.  Uncertain of medication compliance  -pH 7.328, blood glucose 567, lactate 3.6, beta hydroxybutyrate 4.29, anion gap metabolic acidosis, A1c 14.1  -UA/CXR w/o signs of infection   -Received 2 L of IV fluids in the ED  PLAN  -DKA protocol including insulin drip, IV fluids, and electrolyte replacement per protocol  -DM educator consulted  -Holding home Lyrica while n.p.o.  -q4hr BMP, Mag, Phos     Chronic bilateral foot wounds  -Patient with chronic left heel wound and right plantar foot wound  -Admitted August 2024 for left foot cellulitis and discharged on oral antibiotics.  -MRI left foot obtained in August 2024 with no definite findings of osteomyelitis  -Repeat MRI left foot ordered   -Wound consulted  -Consider surgery/ID pending MRI results     Acute toxic metabolic encephalopathy  -CT head without acute abnormality  -UDS negative  -Likely secondary to DKA  -Continue to monitor    HTN  -Home medications include metoprolol succinate 50 mg daily    BPH  -Home medications include finasteride 5 mg nightly and tamsulosin 4 mg nightly    Mood disorder  -Home medication includes amitriptyline 25 mg twice daily    RLS  -Home medication includes ropinirole 0.25 mg nightly    VTE Prophylaxis:  Pharmacologic VTE prophylaxis orders are signed & held.            CODE STATUS:    Code Status and Medical Interventions: CPR (Attempt to Resuscitate); Full Support;  Full code per last hosptial admission. Patient disoriented on exam with no family present at bedside. Only contact information is friend.   Ordered at: 11/15/24 1218     Level Of Support Discussed With:    Patient     Code Status (Patient has no pulse and is not breathing):    CPR (Attempt to Resuscitate)     Medical Interventions (Patient has pulse or is breathing):    Full Support     Comments:    Full code per last hosptial admission. Patient disoriented on exam with no family present at bedside. Only contact information is friend.       Expected Discharge   Expected discharge date/ time has not been documented.     Edmond Espinoza DO  11/15/24      Electronically signed by Edmond Espinoza DO at 11/15/24 1522          Emergency Department Notes        Belén Parker, RN at 11/15/24 1210           Fracisco Mullen    Nursing Report ED to Floor:  Mental status: alert and oriented x4  Ambulatory status: unknown, has not ambulated here  Oxygen Therapy:  room air  Cardiac Rhythm: sinus tach  Admitted from: ED  Safety Concerns:  falls risk  Social Issues: n/a  ED Room #:  20    ED Nurse Phone Extension - [phone] or may call [phone].      HPI:   Chief Complaint   Patient presents with    Weakness - Generalized       Past Medical History:  Past Medical History:   Diagnosis Date    Acute renal failure     Hx of    Obesity     Hx of    Sleep apnea     Type 2 diabetes mellitus         Past Surgical History:  Past Surgical History:   Procedure Laterality Date    BACK SURGERY      lower back    CHOLECYSTECTOMY WITH INTRAOPERATIVE CHOLANGIOGRAM N/A 1/6/2021    Procedure: CHOLECYSTECTOMY LAPAROSCOPIC INTRAOPERATIVE CHOLANGIOGRAM;  Surgeon: Juventino Hooker MD;  Location: [facility] OR;  Service: General;  Laterality: N/A;    ERCP N/A 1/9/2021    Procedure: ENDOSCOPIC RETROGRADE CHOLANGIOPANCREATOGRAPHY;  Surgeon: Jeremy Dowell MD;  Location: [facility] ENDOSCOPY;  Service: Gastroenterology;  Laterality: N/A;  with sphiincterotomy and balloon sweep  "with 9mm-12mm balloon    INCISION AND DRAINAGE FOOT Left 8/3/2024    Procedure: HEAL IRRIGATION AND DEBRIDEMENT LEFT;  Surgeon: Dru Gan Jr., MD;  Location: [facility] OR;  Service: Orthopedics;  Laterality: Left;    PLACEMENT OF WOUND VAC Left 8/3/2024    Procedure: PLACEMENT OF WOUND VAC;  Surgeon: Dru Gan Jr., MD;  Location: [facility] OR;  Service: Orthopedics;  Laterality: Left;        Admitting Doctor:   Edmond Espinoza DO    Consulting Provider(s):  Consults       No orders found from 10/17/2024 to 11/16/2024.             Admitting Diagnosis:   The encounter diagnosis was Diabetic ketoacidosis without coma associated with type 2 diabetes mellitus.    Most Recent Vitals:   Vitals:    11/15/24 0922 11/15/24 1030 11/15/24 1115 11/15/24 1200   BP: 115/68 (!) 143/104     BP Location: Right arm      Patient Position: Lying      Pulse: 118 (!) 122 (!) 123 (!) 125   Resp: 20      Temp: 98.9 °F (37.2 °C)      TempSrc: Oral      SpO2: 97% 92% 96%    Weight: 85.7 kg (189 lb)      Height: 162.6 cm (64\")          Active LDAs/IV Access:   Lines, Drains & Airways       Active LDAs       Name Placement date Placement time Site Days    Peripheral IV 11/15/24 0921 Left;Posterior Hand 11/15/24  0921  Hand  less than 1    Peripheral IV 11/15/24 0940 Anterior;Right Forearm 11/15/24  0940  Forearm  less than 1                    Labs (abnormal labs have a star):   Labs Reviewed   COMPREHENSIVE METABOLIC PANEL - Abnormal; Notable for the following components:       Result Value    Glucose 567 (*)     Sodium 129 (*)     Chloride 90 (*)     CO2 17.0 (*)     Albumin 3.4 (*)     Alkaline Phosphatase 124 (*)     Anion Gap 22.0 (*)     All other components within normal limits    Narrative:     GFR Normal >60  Chronic Kidney Disease <60  Kidney Failure <15     CBC WITH AUTO DIFFERENTIAL - Abnormal; Notable for the following components:    WBC 13.03 (*)     Hemoglobin 12.3 (*)     MCV 78.7 (*)     MCH 25.7 (*)     Neutrophil % 86.1 " (*)     Lymphocyte % 9.1 (*)     Monocyte % 3.1 (*)     Eosinophil % 0.1 (*)     Immature Grans % 0.9 (*)     Neutrophils, Absolute 11.22 (*)     Immature Grans, Absolute 0.12 (*)     All other components within normal limits   TROPONIN - Abnormal; Notable for the following components:    HS Troponin T 32 (*)     All other components within normal limits    Narrative:     High Sensitive Troponin T Reference Range:  <14.0 ng/L- Negative Female for AMI  <22.0 ng/L- Negative Male for AMI  >=14 - Abnormal Female indicating possible myocardial injury.  >=22 - Abnormal Male indicating possible myocardial injury.   Clinicians would have to utilize clinical acumen, EKG, Troponin, and serial changes to determine if it is an Acute Myocardial Infarction or myocardial injury due to an underlying chronic condition.        LACTIC ACID, PLASMA - Abnormal; Notable for the following components:    Lactate 3.6 (*)     All other components within normal limits   PHOSPHORUS - Abnormal; Notable for the following components:    Phosphorus 5.2 (*)     All other components within normal limits   OSMOLALITY, SERUM - Abnormal; Notable for the following components:    Osmolality 317 (*)     All other components within normal limits   HEMOGLOBIN A1C - Abnormal; Notable for the following components:    Hemoglobin A1C 14.10 (*)     All other components within normal limits    Narrative:     Hemoglobin A1C Ranges:    Increased Risk for Diabetes  5.7% to 6.4%  Diabetes                     >= 6.5%  Diabetic Goal                < 7.0%   BLOOD GAS, ARTERIAL W/CO-OXIMETRY - Abnormal; Notable for the following components:    pH, Arterial 7.328 (*)     HCO3, Arterial 18.9 (*)     Base Excess, Arterial -6.4 (*)     Hemoglobin, Blood Gas 12.5 (*)     CO2 Content 20.0 (*)     All other components within normal limits   POCT CHEM 8 - Abnormal; Notable for the following components:    Glucose >599 (*)     Sodium 127 (*)     POC Potassium 5.2 (*)     Chloride  91 (*)     Total CO2 23 (*)     All other components within normal limits   POCT GLUCOSE FINGERSTICK - Abnormal; Notable for the following components:    Glucose 563 (*)     All other components within normal limits   MAGNESIUM - Normal   RAINBOW DRAW    Narrative:     The following orders were created for panel order [place] Draw.  Procedure                               Abnormality         Status                     ---------                               -----------         ------                     Green Top (Gel)[[id number]]                                  Final result               Lavender Top[[id number]]                                     Final result               Gold Top - SST[[id number]]                                                              Multani Top[[id number]]                                         Final result               Light Blue Top[[id number]]                                   Final result                 Please view results for these tests on the individual orders.   BLOOD GAS, ARTERIAL   URINALYSIS W/ MICROSCOPIC IF INDICATED (NO CULTURE)   BETA HYDROXYBUTYRATE QUANTITATIVE   LACTIC ACID, REFLEX   HIGH SENSITIVITIY TROPONIN T 2HR   BASIC METABOLIC PANEL   MAGNESIUM   PHOSPHORUS   URINALYSIS, MICROSCOPIC ONLY   POCT GLUCOSE FINGERSTICK   CBC AND DIFFERENTIAL    Narrative:     The following orders were created for panel order CBC & Differential.  Procedure                               Abnormality         Status                     ---------                               -----------         ------                     CBC Auto Differential[[id number]]        Abnormal            Final result                 Please view results for these tests on the individual orders.   GREEN TOP   LAVENDER TOP   GRAY TOP   LIGHT BLUE TOP   KETONE BODIES SERUM    Narrative:     The following orders were created for panel order Ketone Bodies, Serum (Not performed at [place]).  Procedure                                Abnormality         Status                     ---------                               -----------         ------                     Beta Hydroxybutyrate Arvind...[[id number]]                      In process                   Please view results for these tests on the individual orders.       Meds Given in ED:   Medications   sodium chloride 0.9 % flush 10 mL (has no administration in time range)   sodium chloride 0.9 % infusion (has no administration in time range)   sodium chloride 0.9 % bolus 1,000 mL (0 mL Intravenous Stopped 11/15/24 1100)   insulin regular (humuLIN R,novoLIN R) injection 15 Units (15 Units Subcutaneous Given 11/15/24 1022)   sodium chloride 0.9 % bolus 1,000 mL (0 mL Intravenous Stopped 11/15/24 1206)     sodium chloride, 200 mL/hr         Last NIH score:                                                          Dysphagia screening results:        Gerard Coma Scale:  No data recorded     CIWA:        Restraint Type:            Isolation Status:  No active isolations          Electronically signed by Belén Parker RN at 11/15/24 1211       Nic Hewitt MD at 11/15/24 0941          Subjective   History of Present Illness  Mr. Mullen presents by ambulance from home with decreased mental status.  His wife called EMS and advised that he was slow to respond.  They discovered his fingerstick blood sugar was too high to read.  He tells me he feels okay and denies any recent illnesses.  He admits he has not been compliant with his CPAP.  He tells me he has been taking his insulin.  Denies vomiting, fever, abdominal pain, or recent illness.  Was admitted here in August with hyperglycemia associated with cellulitis of his left foot.  He tells me his foot continues to improve.      Review of Systems    Past Medical History:   Diagnosis Date    Acute renal failure     Hx of    Obesity     Hx of    Sleep apnea     Type 2 diabetes mellitus        Allergies   Allergen Reactions    Sertraline Hcl Hives      Zoloft       Past Surgical History:   Procedure Laterality Date    BACK SURGERY      lower back    CHOLECYSTECTOMY WITH INTRAOPERATIVE CHOLANGIOGRAM N/A 1/6/2021    Procedure: CHOLECYSTECTOMY LAPAROSCOPIC INTRAOPERATIVE CHOLANGIOGRAM;  Surgeon: Juventino Hooker MD;  Location:  [facility] OR;  Service: General;  Laterality: N/A;    ERCP N/A 1/9/2021    Procedure: ENDOSCOPIC RETROGRADE CHOLANGIOPANCREATOGRAPHY;  Surgeon: Jeremy Dowell MD;  Location:  [facility]COPY;  Service: Gastroenterology;  Laterality: N/A;  with sphiincterotomy and balloon sweep with 9mm-12mm balloon    INCISION AND DRAINAGE FOOT Left 8/3/2024    Procedure: HEAL IRRIGATION AND DEBRIDEMENT LEFT;  Surgeon: Dru Gan Jr., MD;  Location:  [facility] OR;  Service: Orthopedics;  Laterality: Left;    PLACEMENT OF WOUND VAC Left 8/3/2024    Procedure: PLACEMENT OF WOUND VAC;  Surgeon: Dru Gan Jr., MD;  Location:  [facility] OR;  Service: Orthopedics;  Laterality: Left;       Family History   Problem Relation Age of Onset    Diabetes Other     Hypertension Other     Cancer Other         Pancreatic cancer-first cousin    Heart attack Other         MI    Obesity Other     Hyperlipidemia Other         High blood cholesterol    Hyperlipidemia Mother     Hypertension Mother     Cancer Mother         lung cancer    Heart disease Father         MI at 75 yo    Cancer Brother         esophageal cancer with mets    Heart disease Brother         several MIs earlies in 30s    Cancer Maternal Grandmother        Social History     Socioeconomic History    Marital status:    Tobacco Use    Smoking status: Former     Types: Cigars    Smokeless tobacco: Never    Tobacco comments:     3 per month. Former cig smoker   Substance and Sexual Activity    Alcohol use: No     Comment: Past drank about a pint per month for 2-3 years    Drug use: No    Sexual activity: Defer           Objective   Physical Exam  Vitals and nursing note reviewed.    Constitutional:       General: He is not in acute distress.     Appearance: Normal appearance.      Comments: He is awake, he avoids eye contact, he answers some direct questions regarding his immediate symptoms but does not provide much history.  He is in no distress   HENT:      Head: Normocephalic and atraumatic.      Nose: Nose normal. No congestion or rhinorrhea.      Mouth/Throat:      Mouth: Mucous membranes are dry.   Eyes:      General: No scleral icterus.     Conjunctiva/sclera: Conjunctivae normal.   Neck:      Comments: No JVD   Cardiovascular:      Rate and Rhythm: Regular rhythm. Tachycardia present.      Heart sounds: No murmur heard.     No friction rub.   Pulmonary:      Effort: Pulmonary effort is normal.      Breath sounds: Normal breath sounds. No wheezing or rales.   Abdominal:      General: Bowel sounds are normal.      Palpations: Abdomen is soft.      Tenderness: There is no abdominal tenderness. There is no guarding or rebound.   Musculoskeletal:         General: No tenderness.      Cervical back: Normal range of motion and neck supple.      Right lower leg: No edema.      Left lower leg: No edema.   Skin:     General: Skin is warm and dry.      Coloration: Skin is not pale.      Findings: No erythema.      Comments: He has a chronic appearing ulcer over the left heel.  No significant erythema or drainage, does not appear infected.  He is wearing a wet sock over that foot.  He is missing his second toe on the right foot and has mud encrusted between the toes of his right foot.   Neurological:      General: No focal deficit present.      Mental Status: He is alert. He is disoriented.      Motor: No weakness.      Coordination: Coordination normal.   Psychiatric:         Mood and Affect: Mood normal.         Behavior: Behavior normal.         Procedures          ED Course  ED Course as of 11/15/24 1413   Fri Nov 15, 2024   0944 Ordered a liter of fluids and 15 units regular insulin. [DT]    1057 Has completed a liter of fluids.  Blood sugar too high to read.  Having difficulty getting labs, CMP is awaiting redraw.  Will order a second liter [DT]   1126 Fingerstick is 563. [DT]      ED Course User Index  [DT] Nic Hewitt MD                                                       Medical Decision Making  Please see course notes.  I ordered and interpreted labs.  Gave multiple rounds of IV fluids as well as insulin.  Reviewed prior records and had multiple reevaluations.    Problems Addressed:  Diabetic ketoacidosis without coma associated with type 2 diabetes mellitus: complicated acute illness or injury that poses a threat to life or bodily functions    Amount and/or Complexity of Data Reviewed  External Data Reviewed: notes.  Labs: ordered. Decision-making details documented in ED Course.  Radiology: ordered. Decision-making details documented in ED Course.  ECG/medicine tests: ordered. Decision-making details documented in ED Course.    Risk  OTC drugs.  Prescription drug management.  Decision regarding hospitalization.        Final diagnoses:   Diabetic ketoacidosis without coma associated with type 2 diabetes mellitus       ED Disposition  ED Disposition       ED Disposition   Decision to Admit    Condition   --    Comment   Level of Care: Telemetry [5]   Diagnosis: DKA (diabetic ketoacidosis) [911059]   Certification: I Certify That Inpatient Hospital Services Are Medically Necessary For Greater Than 2 Midnights                 No follow-up provider specified.       Medication List      No changes were made to your prescriptions during this visit.            Nic Hewitt MD  11/15/24 1413      Electronically signed by Nic Hewitt MD at 11/15/24 1413       Lab Results (all)       Procedure Component Value Units Date/Time    Basic Metabolic Panel [[id number]]  (Abnormal) Collected: 11/18/24 0608    Specimen: Blood Updated: 11/18/24 0712     Glucose 270 mg/dL      BUN 22 mg/dL       Creatinine 0.88 mg/dL      Sodium 135 mmol/L      Potassium 4.0 mmol/L      Chloride 99 mmol/L      CO2 25.0 mmol/L      Calcium 8.2 mg/dL      BUN/Creatinine Ratio 25.0     Anion Gap 11.0 mmol/L      eGFR 97.8 mL/min/1.73     Narrative:      GFR Normal >60  Chronic Kidney Disease <60  Kidney Failure <15      POC Glucose Once [[id number]]  (Abnormal) Collected: 11/18/24 0703    Specimen: Blood Updated: 11/18/24 0704     Glucose 241 mg/dL     CBC (No Diff) [[id number]]  (Abnormal) Collected: 11/18/24 0608    Specimen: Blood Updated: 11/18/24 0638     WBC 6.74 10*3/mm3      RBC 4.39 10*6/mm3      Hemoglobin 11.2 g/dL      Hematocrit 35.4 %      MCV 80.6 fL      MCH 25.5 pg      MCHC 31.6 g/dL      RDW 15.3 %      RDW-SD 45.0 fl      MPV 10.7 fL      Platelets 242 10*3/mm3     POC Glucose Once [[id number]]  (Abnormal) Collected: 11/17/24 2034    Specimen: Blood Updated: 11/17/24 2036     Glucose 183 mg/dL     POC Glucose Once [[id number]]  (Normal) Collected: 11/17/24 1614    Specimen: Blood Updated: 11/17/24 1618     Glucose 83 mg/dL     POC Glucose Once [[id number]]  (Abnormal) Collected: 11/17/24 1133    Specimen: Blood Updated: 11/17/24 1136     Glucose 324 mg/dL     Basic Metabolic Panel [[id number]]  (Abnormal) Collected: 11/17/24 0629    Specimen: Blood Updated: 11/17/24 0724     Glucose 324 mg/dL      BUN 13 mg/dL      Creatinine 0.89 mg/dL      Sodium 132 mmol/L      Potassium 4.3 mmol/L      Chloride 98 mmol/L      CO2 23.0 mmol/L      Calcium 8.1 mg/dL      BUN/Creatinine Ratio 14.6     Anion Gap 11.0 mmol/L      eGFR 97.5 mL/min/1.73     Narrative:      GFR Normal >60  Chronic Kidney Disease <60  Kidney Failure <15      POC Glucose Once [[id number]]  (Abnormal) Collected: 11/17/24 0706    Specimen: Blood Updated: 11/17/24 0708     Glucose 309 mg/dL     CBC (No Diff) [[id number]]  (Abnormal) Collected: 11/17/24 0629    Specimen: Blood Updated: 11/17/24 0705     WBC 9.52 10*3/mm3      RBC 4.26 10*6/mm3       Hemoglobin 10.9 g/dL      Hematocrit 33.7 %      MCV 79.1 fL      MCH 25.6 pg      MCHC 32.3 g/dL      RDW 15.3 %      RDW-SD 44.2 fl      MPV 10.8 fL      Platelets 266 10*3/mm3     POC Glucose Once [[id number]]  (Abnormal) Collected: 11/16/24 2308    Specimen: Blood Updated: 11/16/24 2309     Glucose 234 mg/dL     POC Glucose Once [[id number]]  (Abnormal) Collected: 11/16/24 2026    Specimen: Blood Updated: 11/16/24 2028     Glucose 279 mg/dL     POC Glucose Once [[id number]]  (Abnormal) Collected: 11/16/24 1713    Specimen: Blood Updated: 11/16/24 1714     Glucose 351 mg/dL     Magnesium [[id number]]  (Normal) Collected: 11/16/24 1342    Specimen: Blood Updated: 11/16/24 1436     Magnesium 1.6 mg/dL     Phosphorus [[id number]]  (Normal) Collected: 11/16/24 1342    Specimen: Blood Updated: 11/16/24 1436     Phosphorus 2.5 mg/dL     POC Glucose Once [[id number]]  (Abnormal) Collected: 11/16/24 1147    Specimen: Blood Updated: 11/16/24 1147     Glucose 140 mg/dL     POC Glucose Once [[id number]]  (Normal) Collected: 11/16/24 1031    Specimen: Blood Updated: 11/16/24 1032     Glucose 126 mg/dL     Phosphorus [[id number]]  (Abnormal) Collected: 11/16/24 0846    Specimen: Blood Updated: 11/16/24 0935     Phosphorus 2.4 mg/dL     Magnesium [[id number]]  (Normal) Collected: 11/16/24 0846    Specimen: Blood Updated: 11/16/24 0928     Magnesium 2.0 mg/dL     Basic Metabolic Panel [[id number]]  (Abnormal) Collected: 11/16/24 0846    Specimen: Blood Updated: 11/16/24 0928     Glucose 171 mg/dL      BUN 12 mg/dL      Creatinine 0.64 mg/dL      Sodium 134 mmol/L      Potassium 3.7 mmol/L      Chloride 104 mmol/L      CO2 22.0 mmol/L      Calcium 8.0 mg/dL      BUN/Creatinine Ratio 18.8     Anion Gap 8.0 mmol/L      eGFR 107.7 mL/min/1.73     Narrative:      GFR Normal >60  Chronic Kidney Disease <60  Kidney Failure <15      POC Glucose Once [[id number]]  (Abnormal) Collected: 11/16/24 0849    Specimen: Blood Updated: 11/16/24 0850      Glucose 161 mg/dL     POC Glucose Once [[id number]]  (Abnormal) Collected: 11/16/24 0714    Specimen: Blood Updated: 11/16/24 0716     Glucose 201 mg/dL     POC Glucose Once [[id number]]  (Abnormal) Collected: 11/16/24 0640    Specimen: Blood Updated: 11/16/24 0644     Glucose 184 mg/dL     POC Glucose Once [[id number]]  (Abnormal) Collected: 11/16/24 0542    Specimen: Blood Updated: 11/16/24 0552     Glucose 153 mg/dL     Basic Metabolic Panel [[id number]]  (Abnormal) Collected: 11/16/24 0343    Specimen: Blood Updated: 11/16/24 0531     Glucose 120 mg/dL      BUN 16 mg/dL      Creatinine 0.72 mg/dL      Sodium 143 mmol/L      Potassium 4.1 mmol/L      Chloride 109 mmol/L      CO2 24.0 mmol/L      Calcium 8.4 mg/dL      BUN/Creatinine Ratio 22.2     Anion Gap 10.0 mmol/L      eGFR 103.9 mL/min/1.73     Narrative:      GFR Normal >60  Chronic Kidney Disease <60  Kidney Failure <15      Magnesium [[id number]]  (Normal) Collected: 11/16/24 0343    Specimen: Blood Updated: 11/16/24 0531     Magnesium 1.9 mg/dL     Phosphorus [[id number]]  (Normal) Collected: 11/16/24 0343    Specimen: Blood Updated: 11/16/24 0531     Phosphorus 3.2 mg/dL     CBC Auto Differential [[id number]]  (Abnormal) Collected: 11/16/24 0343    Specimen: Blood Updated: 11/16/24 0519     WBC 11.93 10*3/mm3      RBC 4.26 10*6/mm3      Hemoglobin 11.2 g/dL      Hematocrit 34.1 %      MCV 80.0 fL      MCH 26.3 pg      MCHC 32.8 g/dL      RDW 15.6 %      RDW-SD 44.5 fl      MPV 10.8 fL      Platelets 283 10*3/mm3      Neutrophil % 61.2 %      Lymphocyte % 26.7 %      Monocyte % 10.2 %      Eosinophil % 0.6 %      Basophil % 0.7 %      Immature Grans % 0.6 %      Neutrophils, Absolute 7.30 10*3/mm3      Lymphocytes, Absolute 3.19 10*3/mm3      Monocytes, Absolute 1.22 10*3/mm3      Eosinophils, Absolute 0.07 10*3/mm3      Basophils, Absolute 0.08 10*3/mm3      Immature Grans, Absolute 0.07 10*3/mm3      nRBC 0.0 /100 WBC     POC Glucose Once [[id number]]   (Normal) Collected: 11/16/24 0438    Specimen: Blood Updated: 11/16/24 0442     Glucose 116 mg/dL     POC Glucose Once [[id number]]  (Normal) Collected: 11/16/24 0418    Specimen: Blood Updated: 11/16/24 0428     Glucose 110 mg/dL     POC Glucose Once [[id number]]  (Abnormal) Collected: 11/16/24 0212    Specimen: Blood Updated: 11/16/24 0217     Glucose 155 mg/dL     Basic Metabolic Panel [[id number]]  (Abnormal) Collected: 11/16/24 0045    Specimen: Blood Updated: 11/16/24 0148     Glucose 154 mg/dL      BUN 15 mg/dL      Creatinine 0.74 mg/dL      Sodium 137 mmol/L      Potassium 3.8 mmol/L      Chloride 105 mmol/L      CO2 24.0 mmol/L      Calcium 8.0 mg/dL      BUN/Creatinine Ratio 20.3     Anion Gap 8.0 mmol/L      eGFR 103.1 mL/min/1.73     Narrative:      GFR Normal >60  Chronic Kidney Disease <60  Kidney Failure <15      Magnesium [[id number]]  (Normal) Collected: 11/16/24 0045    Specimen: Blood Updated: 11/16/24 0148     Magnesium 1.8 mg/dL     Phosphorus [[id number]]  (Normal) Collected: 11/16/24 0045    Specimen: Blood Updated: 11/16/24 0148     Phosphorus 2.7 mg/dL     POC Glucose Once [[id number]]  (Abnormal) Collected: 11/16/24 0105    Specimen: Blood Updated: 11/16/24 0106     Glucose 168 mg/dL     POC Glucose Once [[id number]]  (Abnormal) Collected: 11/16/24 0011    Specimen: Blood Updated: 11/16/24 0022     Glucose 135 mg/dL     POC Glucose Once [[id number]]  (Abnormal) Collected: 11/15/24 2309    Specimen: Blood Updated: 11/15/24 2311     Glucose 140 mg/dL     POC Glucose Once [[id number]]  (Abnormal) Collected: 11/15/24 2216    Specimen: Blood Updated: 11/15/24 2218     Glucose 142 mg/dL     POC Glucose Once [[id number]]  (Abnormal) Collected: 11/15/24 2104    Specimen: Blood Updated: 11/15/24 2110     Glucose 156 mg/dL     Phosphorus [[id number]]  (Normal) Collected: 11/15/24 1943    Specimen: Blood Updated: 11/15/24 2019     Phosphorus 2.6 mg/dL     Basic Metabolic Panel [[id number]]  (Abnormal) Collected:  11/15/24 1943    Specimen: Blood Updated: 11/15/24 2019     Glucose 183 mg/dL      BUN 17 mg/dL      Creatinine 0.84 mg/dL      Sodium 136 mmol/L      Potassium 4.5 mmol/L      Chloride 103 mmol/L      CO2 25.0 mmol/L      Calcium 8.8 mg/dL      BUN/Creatinine Ratio 20.2     Anion Gap 8.0 mmol/L      eGFR 99.2 mL/min/1.73     Narrative:      GFR Normal >60  Chronic Kidney Disease <60  Kidney Failure <15      Magnesium [[id number]]  (Normal) Collected: 11/15/24 1943    Specimen: Blood Updated: 11/15/24 2019     Magnesium 1.8 mg/dL     POC Glucose Once [[id number]]  (Abnormal) Collected: 11/15/24 2002    Specimen: Blood Updated: 11/15/24 2008     Glucose 173 mg/dL     POC Glucose Once [[id number]]  (Abnormal) Collected: 11/15/24 1902    Specimen: Blood Updated: 11/15/24 1903     Glucose 183 mg/dL     POC Glucose Once [[id number]]  (Abnormal) Collected: 11/15/24 1813    Specimen: Blood Updated: 11/15/24 1814     Glucose 222 mg/dL     POC Glucose Once [[id number]]  (Abnormal) Collected: 11/15/24 1702    Specimen: Blood Updated: 11/15/24 1703     Glucose 245 mg/dL     High Sensitivity Troponin T 2Hr [[id number]]  (Abnormal) Collected: 11/15/24 1532    Specimen: Blood Updated: 11/15/24 1616     HS Troponin T 44 ng/L      Troponin T Delta 12 ng/L     Narrative:      High Sensitive Troponin T Reference Range:  <14.0 ng/L- Negative Female for AMI  <22.0 ng/L- Negative Male for AMI  >=14 - Abnormal Female indicating possible myocardial injury.  >=22 - Abnormal Male indicating possible myocardial injury.   Clinicians would have to utilize clinical acumen, EKG, Troponin, and serial changes to determine if it is an Acute Myocardial Infarction or myocardial injury due to an underlying chronic condition.         STAT Lactic Acid, Reflex [[id number]]  (Normal) Collected: 11/15/24 1532    Specimen: Blood Updated: 11/15/24 1559     Lactate 1.9 mmol/L      Comment: Falsely depressed results may occur on samples drawn from patients receiving  N-Acetylcysteine (NAC) or Metamizole.       POC Glucose Once [[id number]]  (Abnormal) Collected: 11/15/24 1557    Specimen: Blood Updated: 11/15/24 1559     Glucose 272 mg/dL     POC Glucose Once [[id number]]  (Abnormal) Collected: 11/15/24 1459    Specimen: Blood Updated: 11/15/24 1506     Glucose 299 mg/dL     POC Glucose Once [[id number]]  (Abnormal) Collected: 11/15/24 0928    Specimen: Blood Updated: 11/15/24 1501     Glucose >599 mg/dL     Fentanyl, Urine - Urine, Clean Catch [[id number]]  (Normal) Collected: 11/15/24 1142    Specimen: Urine, Clean Catch Updated: 11/15/24 1407     Fentanyl, Urine Negative    Narrative:      Negative Threshold:      Fentanyl 5 ng/mL     The normal value for the drug tested is negative. This report includes final unconfirmed screening results to be used for medical treatment purposes only. Unconfirmed results must not be used for non-medical purposes such as employment or legal testing. Clinical consideration should be applied to any drug of abuse test, particularly when unconfirmed results are used.           Urine Drug Screen - Urine, Clean Catch [[id number]]  (Normal) Collected: 11/15/24 1142    Specimen: Urine, Clean Catch Updated: 11/15/24 1359     THC, Screen, Urine Negative     Phencyclidine (PCP), Urine Negative     Cocaine Screen, Urine Negative     Methamphetamine, Ur Negative     Opiate Screen Negative     Amphetamine Screen, Urine Negative     Benzodiazepine Screen, Urine Negative     Tricyclic Antidepressants Screen Negative     Methadone Screen, Urine Negative     Barbiturates Screen, Urine Negative     Oxycodone Screen, Urine Negative     Buprenorphine, Screen, Urine Negative    Narrative:      Cutoff For Drugs Screened:    Amphetamines               500 ng/ml  Barbiturates               200 ng/ml  Benzodiazepines            150 ng/ml  Cocaine                    150 ng/ml  Methadone                  200 ng/ml  Opiates                    100 ng/ml  Phencyclidine                25 ng/ml  THC                         50 ng/ml  Methamphetamine            500 ng/ml  Tricyclic Antidepressants  300 ng/ml  Oxycodone                  100 ng/ml  Buprenorphine               10 ng/ml    The normal value for all drugs tested is negative. This report includes unconfirmed screening results, with the cutoff values listed, to be used for medical treatment purposes only.  Unconfirmed results must not be used for non-medical purposes such as employment or legal testing.  Clinical consideration should be applied to any drug of abuse test, particularly when unconfirmed results are used.      POC Glucose Once [[id number]]  (Abnormal) Collected: 11/15/24 1317    Specimen: Blood Updated: 11/15/24 1319     Glucose 342 mg/dL     POC Glucose Once [[id number]]  (Abnormal) Collected: 11/15/24 1235    Specimen: Blood Updated: 11/15/24 1236     Glucose 434 mg/dL     Ketone Bodies, Serum (Not performed at [facility]) [[id number]]  (Abnormal) Collected: 11/15/24 1107    Specimen: Blood Updated: 11/15/24 1216    Narrative:      The following orders were created for panel order Ketone Bodies, Serum (Not performed at [facility]).  Procedure                               Abnormality         Status                     ---------                               -----------         ------                     Beta Hydroxybutyrate Arvind...[[id number]]  Abnormal            Final result                 Please view results for these tests on the individual orders.    Beta Hydroxybutyrate Quantitative [[id number]]  (Abnormal) Collected: 11/15/24 1107    Specimen: Blood Updated: 11/15/24 1216     Beta-Hydroxybutyrate Quant 4.294 mmol/L     Narrative:      In the assessment of possible diabetic ketoacidosis, the test should be interpreted along with other clinical and laboratory findings.  A level greater than 1 mmol/L should require further evaluation and levels of more than 3 mmol/L require immediate medical review.    Urinalysis With  Microscopic If Indicated (No Culture) - Urine, Clean Catch [[id number]]  (Abnormal) Collected: 11/15/24 1142    Specimen: Urine, Clean Catch Updated: 11/15/24 1214     Color, UA Yellow     Appearance, UA Clear     pH, UA 5.5     Specific Gravity, UA 1.036     Glucose, UA >=1000 mg/dL (3+)     Ketones, UA 40 mg/dL (2+)     Bilirubin, UA Negative     Blood, UA Small (1+)     Protein,  mg/dL (2+)     Leuk Esterase, UA Negative     Nitrite, UA Negative     Urobilinogen, UA 0.2 E.U./dL    Urinalysis, Microscopic Only - Urine, Clean Catch [[id number]]  (Abnormal) Collected: 11/15/24 1142    Specimen: Urine, Clean Catch Updated: 11/15/24 1214     RBC, UA 3-5 /HPF      WBC, UA 0-2 /HPF      Bacteria, UA None Seen /HPF      Squamous Epithelial Cells, UA None Seen /HPF      Hyaline Casts, UA None Seen /LPF      Methodology Automated Microscopy    Comprehensive Metabolic Panel [[id number]]  (Abnormal) Collected: 11/15/24 1107    Specimen: Blood Updated: 11/15/24 1205     Glucose 567 mg/dL      BUN 17 mg/dL      Creatinine 0.87 mg/dL      Sodium 129 mmol/L      Potassium 4.5 mmol/L      Chloride 90 mmol/L      CO2 17.0 mmol/L      Calcium 8.8 mg/dL      Total Protein 6.7 g/dL      Albumin 3.4 g/dL      ALT (SGPT) 14 U/L      AST (SGOT) 13 U/L      Alkaline Phosphatase 124 U/L      Total Bilirubin 0.4 mg/dL      Globulin 3.3 gm/dL      Comment: Calculated Result        A/G Ratio 1.0 g/dL      BUN/Creatinine Ratio 19.5     Anion Gap 22.0 mmol/L      eGFR 98.2 mL/min/1.73     Narrative:      GFR Normal >60  Chronic Kidney Disease <60  Kidney Failure <15      Magnesium [[id number]]  (Normal) Collected: 11/15/24 1107    Specimen: Blood Updated: 11/15/24 1203     Magnesium 1.8 mg/dL     Phosphorus [[id number]]  (Abnormal) Collected: 11/15/24 1107    Specimen: Blood Updated: 11/15/24 1203     Phosphorus 5.2 mg/dL     High Sensitivity Troponin T [[id number]]  (Abnormal) Collected: 11/15/24 1101    Specimen: Blood Updated: 11/15/24 6811      HS Troponin T 32 ng/L     Narrative:      High Sensitive Troponin T Reference Range:  <14.0 ng/L- Negative Female for AMI  <22.0 ng/L- Negative Male for AMI  >=14 - Abnormal Female indicating possible myocardial injury.  >=22 - Abnormal Male indicating possible myocardial injury.   Clinicians would have to utilize clinical acumen, EKG, Troponin, and serial changes to determine if it is an Acute Myocardial Infarction or myocardial injury due to an underlying chronic condition.         POC Glucose Once [[id number]]  (Abnormal) Collected: 11/15/24 1109    Specimen: Blood Updated: 11/15/24 1110     Glucose 563 mg/dL     Lactic Acid, Plasma [[id number]]  (Abnormal) Collected: 11/15/24 0942    Specimen: Blood Updated: 11/15/24 1039     Lactate 3.6 mmol/L      Comment: Falsely depressed results may occur on samples drawn from patients receiving N-Acetylcysteine (NAC) or Metamizole.       Osmolality, Serum [[id number]]  (Abnormal) Collected: 11/15/24 0942    Specimen: Blood Updated: 11/15/24 1036     Osmolality 317 mOsm/kg     Hemoglobin A1c [[id number]]  (Abnormal) Collected: 11/15/24 0942    Specimen: Blood Updated: 11/15/24 1021     Hemoglobin A1C 14.10 %     Narrative:      Hemoglobin A1C Ranges:    Increased Risk for Diabetes  5.7% to 6.4%  Diabetes                     >= 6.5%  Diabetic Goal                < 7.0%    POC CHEM 8 [[id number]]  (Abnormal) Collected: 11/15/24 0948    Specimen: Blood Updated: 11/15/24 1019     Glucose >599 mg/dL      BUN 26 mg/dL      Creatinine 0.80 mg/dL      Sodium 127 mmol/L      POC Potassium 5.2 mmol/L      Chloride 91 mmol/L      Total CO2 23 mmol/L      Hemoglobin 13.6 g/dL      Comment: Serial Number: [id number]:  [id number]        Hematocrit 40 %      Ionized Calcium 1.22 mmol/L      eGFR 100.7 mL/min/1.73     [facility] Draw [[id number]] Collected: 11/15/24 0942    Specimen: Blood Updated: 11/15/24 1000    Narrative:      The following orders were created for panel order [facility]  Draw.  Procedure                               Abnormality         Status                     ---------                               -----------         ------                     Green Top (Gel)[[id number]]                                  Final result               Lavender Top[[id number]]                                     Final result               Gold Top - SST[[id number]]                                                              Multani Top[[id number]]                                         Final result               Light Blue Top[[id number]]                                   Final result                 Please view results for these tests on the individual orders.    Green Top (Gel) [[id number]] Collected: 11/15/24 0942    Specimen: Blood Updated: 11/15/24 1000     Extra Tube Hold for add-ons.     Comment: Auto resulted.       Lavender Top [[id number]] Collected: 11/15/24 0942    Specimen: Blood Updated: 11/15/24 1000     Extra Tube hold for add-on     Comment: Auto resulted       Multani Top [[id number]] Collected: 11/15/24 0942    Specimen: Blood Updated: 11/15/24 1000     Extra Tube Hold for add-ons.     Comment: Auto resulted.       Light Blue Top [[id number]] Collected: 11/15/24 0942    Specimen: Blood Updated: 11/15/24 1000     Extra Tube Hold for add-ons.     Comment: Auto resulted       CBC & Differential [[id number]]  (Abnormal) Collected: 11/15/24 0942    Specimen: Blood Updated: 11/15/24 0955    Narrative:      The following orders were created for panel order CBC & Differential.  Procedure                               Abnormality         Status                     ---------                               -----------         ------                     CBC Auto Differential[[id number]]        Abnormal            Final result                 Please view results for these tests on the individual orders.    CBC Auto Differential [[id number]]  (Abnormal) Collected: 11/15/24 0942    Specimen: Blood Updated:  11/15/24 0955     WBC 13.03 10*3/mm3      RBC 4.78 10*6/mm3      Hemoglobin 12.3 g/dL      Hematocrit 37.6 %      MCV 78.7 fL      MCH 25.7 pg      MCHC 32.7 g/dL      RDW 14.6 %      RDW-SD 41.7 fl      MPV 10.7 fL      Platelets 321 10*3/mm3      Neutrophil % 86.1 %      Lymphocyte % 9.1 %      Monocyte % 3.1 %      Eosinophil % 0.1 %      Basophil % 0.7 %      Immature Grans % 0.9 %      Neutrophils, Absolute 11.22 10*3/mm3      Lymphocytes, Absolute 1.18 10*3/mm3      Monocytes, Absolute 0.41 10*3/mm3      Eosinophils, Absolute 0.01 10*3/mm3      Basophils, Absolute 0.09 10*3/mm3      Immature Grans, Absolute 0.12 10*3/mm3      nRBC 0.0 /100 WBC     Blood Gas, Arterial With Co-Ox [[id number]]  (Abnormal) Collected: 11/15/24 0954    Specimen: Arterial Blood Updated: 11/15/24 0954     Site Left Brachial     Roberto's Test N/A     pH, Arterial 7.328 pH units      Comment: 84 Value below reference range        pCO2, Arterial 36.0 mm Hg      pO2, Arterial 88.9 mm Hg      HCO3, Arterial 18.9 mmol/L      Base Excess, Arterial -6.4 mmol/L      Hemoglobin, Blood Gas 12.5 g/dL      Comment: 84 Value below reference range        Hematocrit, Blood Gas 38.3 %      Oxyhemoglobin 95.3 %      Methemoglobin 0.30 %      Carboxyhemoglobin 1.5 %      CO2 Content 20.0 mmol/L      Temperature 37.0     Barometric Pressure for Blood Gas --     Comment: N/A        Modality Room Air     FIO2 21 %      Rate 0 Breaths/minute      PIP 0 cmH2O      Comment: Meter: [id number]     :  [id number]        IPAP 0     EPAP 0     pH, Temp Corrected 7.328 pH Units      pCO2, Temperature Corrected 36.0 mm Hg      pO2, Temperature Corrected 88.9 mm Hg           Imaging Results (All)       Procedure Component Value Units Date/Time    FL Video Swallow With Speech Single Contrast [[id number]] Collected: 11/16/24 1325     Updated: 11/16/24 1330    Narrative:      FL VIDEO SWALLOW W SPEECH SINGLE-CONTRAST    Date of Exam: 11/16/2024 11:18 AM  EST    Indication: dysphagia.       Comparison: None available.    Technique:   The speech pathologist administered food and/or liquid mixed with barium to the patient with cine/video imaging.  Imaging assistance was provided to the speech pathologist and an image was saved.    Fluoroscopic Time: 2 minutes 12 seconds    Number of Images: 8 cine loops    Findings:  The patient was evaluated in the seated lateral position while taking a variety of consistencies by mouth under the direction of speech pathology. No aspiration was observed. Please see the speech pathologist's procedure report for full details and   recommendations.      Impression:      Impression:  Fluoroscopy provided during modified barium swallow.      Electronically Signed: Endy Shaw MD    11/16/2024 1:27 PM EST    Workstation ID: [id number]    XR Abdomen KUB [[id number]] Collected: 11/15/24 1511     Updated: 11/15/24 1517    Narrative:      XR ABDOMEN KUB    Date of Exam: 11/15/2024 2:47 PM EST    Indication: MRI clearance    Comparison: None available.    Findings:  There is a nonobstructing bowel gas pattern. Postsurgical changes are noted from fusion at the lumbosacral junction. No metal devices to suggest contraindication for MRI      Impression:      Impression:  Unremarkable radiograph abdomen      Electronically Signed: Kirby Donnelly MD    11/15/2024 3:14 PM EST    Workstation ID: [id number]    CT Head Without Contrast [[id number]] Collected: 11/15/24 1430     Updated: 11/15/24 1440    Narrative:      CT HEAD WO CONTRAST    Date of Exam: 11/15/2024 2:21 PM EST    Indication: Altered mental status.    Comparison: MRI of the brain without contrast from 1/6/2021    Technique: Axial CT images were obtained of the head without contrast administration.  Automated exposure control and iterative construction methods were used.      Findings:  There is generalized parenchymal volume loss with sulcal widening and ventricular prominence, not  significantly changed since the  MR.  There is no CT evidence of acute hemorrhage, infarct, mass, mass effect, or midline shift. The gray-white matter differentiation is preserved. . The sulci and ventricles are symmetric.  No extraaxial fluid collections. The globes and orbital contents are   normal and symmetric. The mastoid air cells and visualized paranasal sinuses are clear. No skull fractures or suspicious calvarial lesions.       Impression:      Impression:  No acute intracranial abnormality. There is generalized atrophy and ventricular prominence similar to the  study.        Electronically Signed: Tony Putnam DO    11/15/2024 2:37 PM EST    Workstation ID: [id number]    XR Chest 1 View [[id number]] Collected: 11/15/24 0947     Updated: 11/15/24 0951    Narrative:      XR CHEST 1 VW    Date of Exam: 11/15/2024 9:31 AM EST    Indication: Weak/Dizzy/AMS triage protocol    Comparison: Chest radiograph 2021.    Findings:  Cardiomediastinal silhouette is unchanged. No focal consolidation or overt pulmonary edema. No pleural effusion or pneumothorax. Osseous structures are unchanged.      Impression:      Impression:  No evidence of acute cardiopulmonary disease.         Electronically Signed: Jerad Mata MD    11/15/2024 9:48 AM EST    Workstation ID: [id number]             Physician Progress Notes (all)        Lisa Vallejo MD at [phone]              [facility]  PROGRESS NOTE    Patient Name: Fracisco Mullen  : 1965  MRN: [id number]    Date of Admission: 11/15/2024  Primary Care Physician: Brandy Roberson    Subjective   Subjective     CC:  F/U DKA    HPI:  Seen this morning. No major complaints. MRI has not been done yet.       Objective   Objective     Vital Signs:   Temp:  [98 °F (36.7 °C)-98.3 °F (36.8 °C)] 98.2 °F (36.8 °C)  Heart Rate:  [] 87  Resp:  [16-18] 16  BP: ()/(58-92) 97/63     Physical Exam:  Gen-no acute  distress  HENT-NCAT, mucous membranes moist  CV-RRR, S1 S2 normal, no m/r/g  Resp-CTAB, no wheezes or rales  Abd-soft, NT, ND, +BS  Ext-no edema  Neuro-A&Ox3, some situational confusion but overall able to describe recent medical history/events, no focal deficits  Skin-left heel ulcer in dressing  Psych-appropriate mood  No change from 11/16/24      Results Reviewed:  LAB RESULTS:      Lab 11/17/24  0629 11/16/24  0343 11/15/24  1532 11/15/24  1107 11/15/24  0948 11/15/24  0942   WBC 9.52 11.93*  --   --   --  13.03*   HEMOGLOBIN 10.9* 11.2*  --   --   --  12.3*   HEMOGLOBIN, POC  --   --   --   --  13.6  --    HEMATOCRIT 33.7* 34.1*  --   --   --  37.6   HEMATOCRIT POC  --   --   --   --  40  --    PLATELETS 266 283  --   --   --  321   NEUTROS ABS  --  7.30*  --   --   --  11.22*   IMMATURE GRANS (ABS)  --  0.07*  --   --   --  0.12*   LYMPHS ABS  --  3.19*  --   --   --  1.18   MONOS ABS  --  1.22*  --   --   --  0.41   EOS ABS  --  0.07  --   --   --  0.01   MCV 79.1 80.0  --   --   --  78.7*   LACTATE  --   --  1.9  --   --  3.6*   HSTROP T  --   --  44* 32*  --   --          Lab 11/17/24  0629 11/16/24  1342 11/16/24  0846 11/16/24  0343 11/16/24  0045 11/15/24  1943 11/15/24  0948 11/15/24  0942   SODIUM 132*  --  134* 143 137 136   < >  --    POTASSIUM 4.3  --  3.7 4.1 3.8 4.5   < >  --    CHLORIDE 98  --  104 109* 105 103   < >  --    CO2 23.0  --  22.0 24.0 24.0 25.0   < >  --    ANION GAP 11.0  --  8.0 10.0 8.0 8.0   < >  --    BUN 13  --  12 16 15 17   < >  --    CREATININE 0.89  --  0.64* 0.72* 0.74* 0.84   < >  --    EGFR 97.5  --  107.7 103.9 103.1 99.2   < >  --    GLUCOSE 324*  --  171* 120* 154* 183*   < >  --    CALCIUM 8.1*  --  8.0* 8.4* 8.0* 8.8   < >  --    MAGNESIUM  --  1.6 2.0 1.9 1.8 1.8   < >  --    PHOSPHORUS  --  2.5 2.4* 3.2 2.7 2.6   < >  --    HEMOGLOBIN A1C  --   --   --   --   --   --   --  14.10*    < > = values in this interval not displayed.         Lab 11/15/24  1107   TOTAL  PROTEIN 6.7   ALBUMIN 3.4*   GLOBULIN 3.3   ALT (SGPT) 14   AST (SGOT) 13   BILIRUBIN 0.4   ALK PHOS 124*         Lab 11/15/24  1532 11/15/24  1107   HSTROP T 44* 32*                 Lab 11/15/24  0954   PH, ARTERIAL 7.328*   PCO2, ARTERIAL 36.0   PO2 ART 88.9   FIO2 21   HCO3 ART 18.9*   BASE EXCESS ART -6.4*   CARBOXYHEMOGLOBIN 1.5     Brief Urine Lab Results  (Last result in the past 365 days)        Color   Clarity   Blood   Leuk Est   Nitrite   Protein   CREAT   Urine HCG        11/15/24 1142 Yellow   Clear   Small (1+)   Negative   Negative   100 mg/dL (2+)                   Microbiology Results Abnormal       None            FL Video Swallow With Speech Single Contrast    Result Date: 11/16/2024  FL VIDEO SWALLOW W SPEECH SINGLE-CONTRAST Date of Exam: 11/16/2024 11:18 AM EST Indication: dysphagia.   Comparison: None available. Technique:   The speech pathologist administered food and/or liquid mixed with barium to the patient with cine/video imaging.  Imaging assistance was provided to the speech pathologist and an image was saved. Fluoroscopic Time: 2 minutes 12 seconds Number of Images: 8 cine loops Findings: The patient was evaluated in the seated lateral position while taking a variety of consistencies by mouth under the direction of speech pathology. No aspiration was observed. Please see the speech pathologist's procedure report for full details and recommendations.     Impression: Impression: Fluoroscopy provided during modified barium swallow. Electronically Signed: Endy Shaw MD  11/16/2024 1:27 PM EST  Workstation ID: [id number]    XR Abdomen KUB    Result Date: 11/15/2024  XR ABDOMEN KUB Date of Exam: 11/15/2024 2:47 PM EST Indication: MRI clearance Comparison: None available. Findings: There is a nonobstructing bowel gas pattern. Postsurgical changes are noted from fusion at the lumbosacral junction. No metal devices to suggest contraindication for MRI     Impression: Impression: Unremarkable  radiograph abdomen Electronically Signed: Kirby Donnelly MD  11/15/2024 3:14 PM EST  Workstation ID: [id number]    CT Head Without Contrast    Result Date: 11/15/2024  CT HEAD WO CONTRAST Date of Exam: 11/15/2024 2:21 PM EST Indication: Altered mental status. Comparison: MRI of the brain without contrast from 1/6/2021 Technique: Axial CT images were obtained of the head without contrast administration.  Automated exposure control and iterative construction methods were used. Findings: There is generalized parenchymal volume loss with sulcal widening and ventricular prominence, not significantly changed since the 2021 MR. There is no CT evidence of acute hemorrhage, infarct, mass, mass effect, or midline shift. The gray-white matter differentiation is preserved. . The sulci and ventricles are symmetric.  No extraaxial fluid collections. The globes and orbital contents are  normal and symmetric. The mastoid air cells and visualized paranasal sinuses are clear. No skull fractures or suspicious calvarial lesions.     Impression: Impression: No acute intracranial abnormality. There is generalized atrophy and ventricular prominence similar to the 2021 study. Electronically Signed: Tony Putnam DO  11/15/2024 2:37 PM EST  Workstation ID: [id number]         Current medications:  Scheduled Meds:amitriptyline, 25 mg, Oral, BID  aspirin, 81 mg, Oral, Daily  enoxaparin, 40 mg, Subcutaneous, Daily  finasteride, 5 mg, Oral, Nightly  insulin glargine, 15 Units, Subcutaneous, Q12H  insulin lispro, 2-9 Units, Subcutaneous, 4x Daily AC & at Bedtime  Insulin Lispro, 7 Units, Subcutaneous, TID With Meals  metoprolol succinate XL, 50 mg, Oral, Daily  pregabalin, 225 mg, Oral, BID  rOPINIRole, 0.25 mg, Oral, Nightly  sodium chloride, 10 mL, Intravenous, Q12H  sodium chloride, 10 mL, Intravenous, Q12H  tamsulosin, 0.4 mg, Oral, Nightly      Continuous Infusions:   PRN Meds:.  acetaminophen **OR** acetaminophen **OR** acetaminophen     senna-docusate sodium **AND** polyethylene glycol **AND** bisacodyl **AND** bisacodyl    Calcium Replacement - Follow Nurse / BPA Driven Protocol    dextrose    dextrose    glucagon (human recombinant)    Magnesium Standard Dose Replacement - Follow Nurse / BPA Driven Protocol    nitroglycerin    Phosphorus Replacement - Follow Nurse / BPA Driven Protocol    Potassium Replacement - Follow Nurse / BPA Driven Protocol    sodium chloride    sodium chloride    sodium chloride    sodium chloride    sodium chloride    Assessment & Plan   Assessment & Plan     Active Hospital Problems    Diagnosis  POA    **DKA (diabetic ketoacidosis) [E11.10]  Yes      Resolved Hospital Problems   No resolved problems to display.        Brief Hospital Course to date:  Fracisco ADAM Lady is a 59 y.o. male with past medical history of hypertension, type 2 diabetes with insulin use, diabetic neuropathy, and chronic diabetic foot wounds who presents via EMS from home due to altered mental status and fatigue. Lab work consistent with diabetic ketoacidosis.     This patient's problems and plans were partially entered by my partner and updated as appropriate by me 11/17/24. Copied text in this note has been reviewed and is accurate as of today's date.      DKA in setting of uncontrolled IDDM complicated by diabetic neuropathy and chronic foot wounds   --Uncertain of medication compliance  --pH 7.328, blood glucose 567, lactate 3.6, beta hydroxybutyrate 4.29, anion gap metabolic acidosis, HbA1c 14.1%  --UA/CXR without signs of infection   --s/p DKA protocol including insulin drip, IV fluids, and electrolyte replacement per protocol  --Anion gap now closed, transitioned to basal-bolus regimen now  --Adjust as needed based on finger sticks  --DM educator consulted     Chronic bilateral foot wounds  --Patient with chronic left heel wound and right plantar foot wound  --Admitted August 2024 for left foot cellulitis and discharged on oral antibiotics and  with home wound vac following debridement, MRI left foot obtained at that time with no definite findings of osteomyelitis (was seen by ID/Dr. Dominique and Orthopedics/Dr. Gan)  --Repeat MRI left foot ordered and pending  --Wound care consulted  --Consider surgery/ID consults pending MRI results     Low grade fever  --Temp 100.4 on 11/15/24 evening  --Mild leukocytosis, improved from admission  --CXR and UA negative  --Await MRI left foot  --Defer ATBx for now unless fever spikes or other signs of infection     Acute toxic metabolic encephalopathy  --CT head without acute abnormality  --UDS negative  --Likely secondary to DKA  --Improved now     HTN  --Continue home Metoprolol     BPH  --Continue home Flomax, Proscar     Mood disorder  --Continue home Amitriptyline      RLS  --Continue home Requip    Expected Discharge Location and Transportation: home  Expected Discharge   Expected Discharge Date: 11/19/2024; Expected Discharge Time:      VTE Prophylaxis:  Pharmacologic VTE prophylaxis orders are present.         AM-PAC 6 Clicks Score (PT): 13 (11/17/24 0800)    CODE STATUS:   Code Status and Medical Interventions: CPR (Attempt to Resuscitate); Full Support; Full code per last hosptial admission. Patient disoriented on exam with no family present at bedside. Only contact information is friend.   Ordered at: 11/15/24 1218     Level Of Support Discussed With:    Patient     Code Status (Patient has no pulse and is not breathing):    CPR (Attempt to Resuscitate)     Medical Interventions (Patient has pulse or is breathing):    Full Support     Comments:    Full code per last hosptial admission. Patient disoriented on exam with no family present at bedside. Only contact information is friend.       Lisa Vallejo MD  11/17/24        Electronically signed by Lisa Vallejo MD at 11/17/24 1302       Lisa Vallejo MD at 11/16/24 9843              [facility] Medicine Services  PROGRESS  NOTE    Patient Name: Fracisco Mullen  : 1965  MRN: [id number]    Date of Admission: 11/15/2024  Primary Care Physician: Brandy Roberson    Subjective   Subjective     CC:  F/U DKA    HPI:  Seen this morning. No complaints this morning. Off insulin drip.      Objective   Objective     Vital Signs:   Temp:  [97.2 °F (36.2 °C)-100.4 °F (38 °C)] 98 °F (36.7 °C)  Heart Rate:  [] 95  Resp:  [18] 18  BP: (132-148)/(77-85) 135/82     Physical Exam:  Gen-no acute distress  HENT-NCAT, mucous membranes moist  CV-RRR, S1 S2 normal, no m/r/g  Resp-CTAB, no wheezes or rales  Abd-soft, NT, ND, +BS  Ext-no edema  Neuro-A&Ox3, some situational confusion but overall able to describe recent medical history/events, no focal deficits  Skin-left heel ulcer in dressing  Psych-appropriate mood      Results Reviewed:  LAB RESULTS:      Lab 24  0343 11/15/24  1532 11/15/24  1107 11/15/24  0948 11/15/24  0942   WBC 11.93*  --   --   --  13.03*   HEMOGLOBIN 11.2*  --   --   --  12.3*   HEMOGLOBIN, POC  --   --   --  13.6  --    HEMATOCRIT 34.1*  --   --   --  37.6   HEMATOCRIT POC  --   --   --  40  --    PLATELETS 283  --   --   --  321   NEUTROS ABS 7.30*  --   --   --  11.22*   IMMATURE GRANS (ABS) 0.07*  --   --   --  0.12*   LYMPHS ABS 3.19*  --   --   --  1.18   MONOS ABS 1.22*  --   --   --  0.41   EOS ABS 0.07  --   --   --  0.01   MCV 80.0  --   --   --  78.7*   LACTATE  --  1.9  --   --  3.6*   HSTROP T  --  44* 32*  --   --          Lab 24  1342 24  0846 24  0343 24  0045 11/15/24  1943 11/15/24  1107 11/15/24  0948 11/15/24  0942   SODIUM  --  134* 143 137 136 129*  --   --    POTASSIUM  --  3.7 4.1 3.8 4.5 4.5  --   --    CHLORIDE  --  104 109* 105 103 90*  --   --    CO2  --  22.0 24.0 24.0 25.0 17.0*  --   --    ANION GAP  --  8.0 10.0 8.0 8.0 22.0*  --   --    BUN  --  12 16 15 17 17  --   --    CREATININE  --  0.64* 0.72* 0.74* 0.84 0.87   < >  --    EGFR  --  107.7 103.9  103.1 99.2 98.2   < >  --    GLUCOSE  --  171* 120* 154* 183* 567*  --   --    CALCIUM  --  8.0* 8.4* 8.0* 8.8 8.8  --   --    MAGNESIUM 1.6 2.0 1.9 1.8 1.8 1.8   < >  --    PHOSPHORUS 2.5 2.4* 3.2 2.7 2.6 5.2*   < >  --    HEMOGLOBIN A1C  --   --   --   --   --   --   --  14.10*    < > = values in this interval not displayed.         Lab 11/15/24  1107   TOTAL PROTEIN 6.7   ALBUMIN 3.4*   GLOBULIN 3.3   ALT (SGPT) 14   AST (SGOT) 13   BILIRUBIN 0.4   ALK PHOS 124*         Lab 11/15/24  1532 11/15/24  1107   HSTROP T 44* 32*                 Lab 11/15/24  0954   PH, ARTERIAL 7.328*   PCO2, ARTERIAL 36.0   PO2 ART 88.9   FIO2 21   HCO3 ART 18.9*   BASE EXCESS ART -6.4*   CARBOXYHEMOGLOBIN 1.5     Brief Urine Lab Results  (Last result in the past 365 days)        Color   Clarity   Blood   Leuk Est   Nitrite   Protein   CREAT   Urine HCG        11/15/24 1142 Yellow   Clear   Small (1+)   Negative   Negative   100 mg/dL (2+)                   Microbiology Results Abnormal       None            FL Video Swallow With Speech Single Contrast    Result Date: 11/16/2024  FL VIDEO SWALLOW W SPEECH SINGLE-CONTRAST Date of Exam: 11/16/2024 11:18 AM EST Indication: dysphagia.   Comparison: None available. Technique:   The speech pathologist administered food and/or liquid mixed with barium to the patient with cine/video imaging.  Imaging assistance was provided to the speech pathologist and an image was saved. Fluoroscopic Time: 2 minutes 12 seconds Number of Images: 8 cine loops Findings: The patient was evaluated in the seated lateral position while taking a variety of consistencies by mouth under the direction of speech pathology. No aspiration was observed. Please see the speech pathologist's procedure report for full details and recommendations.     Impression: Impression: Fluoroscopy provided during modified barium swallow. Electronically Signed: Endy Shaw MD  11/16/2024 1:27 PM EST  Workstation ID: [id number]    XR Abdomen  KUB    Result Date: 11/15/2024  XR ABDOMEN KUB Date of Exam: 11/15/2024 2:47 PM EST Indication: MRI clearance Comparison: None available. Findings: There is a nonobstructing bowel gas pattern. Postsurgical changes are noted from fusion at the lumbosacral junction. No metal devices to suggest contraindication for MRI     Impression: Impression: Unremarkable radiograph abdomen Electronically Signed: Kirby Donnelly MD  11/15/2024 3:14 PM EST  Workstation ID: [id number]    CT Head Without Contrast    Result Date: 11/15/2024  CT HEAD WO CONTRAST Date of Exam: 11/15/2024 2:21 PM EST Indication: Altered mental status. Comparison: MRI of the brain without contrast from 1/6/2021 Technique: Axial CT images were obtained of the head without contrast administration.  Automated exposure control and iterative construction methods were used. Findings: There is generalized parenchymal volume loss with sulcal widening and ventricular prominence, not significantly changed since the 2021 MR. There is no CT evidence of acute hemorrhage, infarct, mass, mass effect, or midline shift. The gray-white matter differentiation is preserved. . The sulci and ventricles are symmetric.  No extraaxial fluid collections. The globes and orbital contents are  normal and symmetric. The mastoid air cells and visualized paranasal sinuses are clear. No skull fractures or suspicious calvarial lesions.     Impression: Impression: No acute intracranial abnormality. There is generalized atrophy and ventricular prominence similar to the 2021 study. Electronically Signed: Tony Putnam DO  11/15/2024 2:37 PM EST  Workstation ID: [id number]    XR Chest 1 View    Result Date: 11/15/2024  XR CHEST 1 VW Date of Exam: 11/15/2024 9:31 AM EST Indication: Weak/Dizzy/AMS triage protocol Comparison: Chest radiograph 1/5/2021. Findings: Cardiomediastinal silhouette is unchanged. No focal consolidation or overt pulmonary edema. No pleural effusion or pneumothorax. Osseous  structures are unchanged.     Impression: Impression: No evidence of acute cardiopulmonary disease. Electronically Signed: Jerad Mata MD  11/15/2024 9:48 AM EST  Workstation ID: [id number]         Current medications:  Scheduled Meds:amitriptyline, 25 mg, Oral, BID  aspirin, 81 mg, Oral, Daily  enoxaparin, 40 mg, Subcutaneous, Daily  finasteride, 5 mg, Oral, Nightly  insulin glargine, 1-200 Units, Subcutaneous, Nightly - [employer]  insulin lispro, 1-200 Units, Subcutaneous, 4x Daily With Meals & Nightly  metoprolol succinate XL, 50 mg, Oral, Daily  pregabalin, 225 mg, Oral, BID  rOPINIRole, 0.25 mg, Oral, Nightly  sodium chloride, 10 mL, Intravenous, Q12H  sodium chloride, 10 mL, Intravenous, Q12H  tamsulosin, 0.4 mg, Oral, Nightly      Continuous Infusions:   PRN Meds:.  acetaminophen **OR** acetaminophen **OR** acetaminophen    senna-docusate sodium **AND** polyethylene glycol **AND** bisacodyl **AND** bisacodyl    Calcium Replacement - Follow Nurse / BPA Driven Protocol    dextrose    dextrose    glucagon (human recombinant)    insulin lispro    Magnesium Standard Dose Replacement - Follow Nurse / BPA Driven Protocol    nitroglycerin    Phosphorus Replacement - Follow Nurse / BPA Driven Protocol    Potassium Replacement - Follow Nurse / BPA Driven Protocol    sodium chloride    sodium chloride    sodium chloride    sodium chloride    sodium chloride    Assessment & Plan   Assessment & Plan     Active Hospital Problems    Diagnosis  POA    **DKA (diabetic ketoacidosis) [E11.10]  Yes      Resolved Hospital Problems   No resolved problems to display.        Brief Hospital Course to date:  Fracisco ADAM Lady is a 59 y.o. male with past medical history of hypertension, type 2 diabetes with insulin use, diabetic neuropathy, and chronic diabetic foot wounds who presents via EMS from home due to altered mental status and fatigue. Lab work consistent with diabetic ketoacidosis.     This patient's problems and plans were  partially entered by my partner and updated as appropriate by me 11/16/24. Copied text in this note has been reviewed and is accurate as of today's date.      DKA in setting of uncontrolled IDDM complicated by diabetic neuropathy and chronic foot wounds   --Uncertain of medication compliance  --pH 7.328, blood glucose 567, lactate 3.6, beta hydroxybutyrate 4.29, anion gap metabolic acidosis, HbA1c 14.1%  --UA/CXR without signs of infection   --s/p DKA protocol including insulin drip, IV fluids, and electrolyte replacement per protocol  --Anion gap now closed, transition to basal-bolus regimen per [employer]  --DM educator consulted     Chronic bilateral foot wounds  --Patient with chronic left heel wound and right plantar foot wound  --Admitted August 2024 for left foot cellulitis and discharged on oral antibiotics and with home wound vac following debridement, MRI left foot obtained at that time with no definite findings of osteomyelitis (was seen by ID/Dr. Dominique and Orthopedics/Dr. Gan)  --Repeat MRI left foot ordered   --Wound care consulted  --Consider surgery/ID consults pending MRI results     Low grade fever  --Temp 100.4 last night  --Mild leukocytosis, improved from admission  --CXR and UA negative  --Await MRI left foot  --Defer ATBx for now unless fever spikes or other signs of infection     Acute toxic metabolic encephalopathy  --CT head without acute abnormality  --UDS negative  --Likely secondary to DKA  --Improved this morning     HTN  --Continue home Metoprolol     BPH  --Continue home Flomax, Proscar     Mood disorder  --Continue home Amitriptyline      RLS  --Continue home Requip    Expected Discharge Location and Transportation: home  Expected Discharge   Expected Discharge Date: 11/18/2024; Expected Discharge Time:      VTE Prophylaxis:  Pharmacologic VTE prophylaxis orders are present.         AM-PAC 6 Clicks Score (PT): 10 (11/16/24 0800)    CODE STATUS:   Code Status and Medical  Interventions: CPR (Attempt to Resuscitate); Full Support; Full code per last hosptial admission. Patient disoriented on exam with no family present at bedside. Only contact information is friend.   Ordered at: 11/15/24 1218     Level Of Support Discussed With:    Patient     Code Status (Patient has no pulse and is not breathing):    CPR (Attempt to Resuscitate)     Medical Interventions (Patient has pulse or is breathing):    Full Support     Comments:    Full code per last hosptial admission. Patient disoriented on exam with no family present at bedside. Only contact information is friend.       Lisa Vallejo MD  11/16/24        Electronically signed by Lisa Vallejo MD at 11/16/24 6802       Consult Notes (all)    No notes of this type exist for this encounter.

## 2024-11-18 NOTE — THERAPY EVALUATION
Patient Name: Fracisco Mullen  : 1965    MRN: [id number]                              Today's Date: 2024       Admit Date: 11/15/2024    Visit Dx:     ICD-10-CM ICD-9-CM   1. Diabetic ketoacidosis without coma associated with type 2 diabetes mellitus  E11.10 250.12   2. Oropharyngeal dysphagia  R13.12 787.22     Patient Active Problem List   Diagnosis    HTN (hypertension)    Type 2 diabetes mellitus, with long-term current use of insulin    Diabetic retinopathy    Obesity (BMI 30.0-34.9)    Peripheral neuropathy    Asthma    Causalgia of lower limb    Chronic constipation    Compression of lumbar nerve root    GERD (gastroesophageal reflux disease)    Hyperlipidemia    IBS (irritable bowel syndrome)    RLS (restless legs syndrome)    Sleep apnea    Vitamin D deficiency    Cigar smoker    Chronic back pain    Diabetic ketoacidosis associated with type 2 diabetes mellitus    Multiple open wounds of foot    Status post cholecystectomy    Cholestatic hepatitis    Diabetic infection of left foot    Precordial pain    Tobacco use    DKA (diabetic ketoacidosis)     Past Medical History:   Diagnosis Date    Acute renal failure     Hx of    Obesity     Hx of    Sleep apnea     Type 2 diabetes mellitus      Past Surgical History:   Procedure Laterality Date    BACK SURGERY      lower back    CHOLECYSTECTOMY WITH INTRAOPERATIVE CHOLANGIOGRAM N/A 2021    Procedure: CHOLECYSTECTOMY LAPAROSCOPIC INTRAOPERATIVE CHOLANGIOGRAM;  Surgeon: Juventino Hooker MD;  Location: [facility] OR;  Service: General;  Laterality: N/A;    ERCP N/A 2021    Procedure: ENDOSCOPIC RETROGRADE CHOLANGIOPANCREATOGRAPHY;  Surgeon: Jeremy Dowell MD;  Location: [facility] ENDOSCOPY;  Service: Gastroenterology;  Laterality: N/A;  with sphiincterotomy and balloon sweep with 9mm-12mm balloon    INCISION AND DRAINAGE FOOT Left 8/3/2024    Procedure: HEAL IRRIGATION AND DEBRIDEMENT LEFT;  Surgeon: Dru Gan Jr., MD;  Location: [facility] OR;   Service: Orthopedics;  Laterality: Left;    PLACEMENT OF WOUND VAC Left 8/3/2024    Procedure: PLACEMENT OF WOUND VAC;  Surgeon: Dru Gan Jr., MD;  Location: [facility];  Service: Orthopedics;  Laterality: Left;      General Information       Row Name 11/17/24 1808          Physical Therapy Time and Intention    Document Type evaluation  -DM     Mode of Treatment physical therapy  -DM       Row Name 11/17/24 1808          General Information    Patient Profile Reviewed yes  -DM     Prior Level of Function mod assist:;transfer;w/c or scooter;bed mobility;ADL's;dependent:;home management;cooking;cleaning;driving;shopping;yard work  wife assists w/ SPT bed to w/c; indep w/c mob.;recent fall OOB;owns BP cuff;wife does HM  -DM     Existing Precautions/Restrictions fall;other (see comments)  ESBL Kleb (contact);DKA;Asthma;recent L heel I & D (8-3-24);amp R 2nd toe 7/'23 & I&D R foot 8/'23: has kush. offload shoes (wife to bring);non-amb (w/c dep.);L hip pain & chr LBP (L.nerve root comp.);  -DM     Barriers to Rehab medically complex;previous functional deficit  -DM       Row Name 11/17/24 1808          Living Environment    People in Home significant other;child(anai), adult  lives w/ s.o. & kids  -DM       Row Name 11/17/24 1808          Home Main Entrance    Number of Stairs, Main Entrance none  -DM       Row Name 11/17/24 1808          Stairs Within Home, Primary    Stairs, Within Home, Primary 1-st.  -DM     Number of Stairs, Within Home, Primary none  -DM       Row Name 11/17/24 1808          Cognition    Orientation Status (Cognition) oriented to;person;place;verbal cues/prompts needed for orientation  forgetting correct day  -DM       Row Name 11/17/24 1808          Safety Issues/Impairments Affecting Functional Mobility    Safety Issues Affecting Function (Mobility) awareness of need for assistance;insight into deficits/self-awareness;safety precaution awareness;safety precautions  follow-through/compliance;sequencing abilities  delayed processing;rolls opp.direction of cues;scoots down in bed vs to HOB  -DM     Impairments Affecting Function (Mobility) endurance/activity tolerance;pain;postural/trunk control;sensation/sensory awareness;strength  -DM               User Key  (r) = Recorded By, (t) = Taken By, (c) = Cosigned By      Initials Name Provider Type    DM Jacklyn Gtz, PT Physical Therapist                   Mobility       Row Name 11/17/24 1808          Bed Mobility    Bed Mobility rolling left;rolling right;scooting/bridging  -DM     Rolling Left [place] (Bed Mobility) verbal cues;nonverbal cues (demo/gesture);minimum assist (75% patient effort)  -DM     Rolling Right [place] (Bed Mobility) verbal cues;nonverbal cues (demo/gesture);moderate assist (50% patient effort)  c/o L UE weaker than RUE, & diffic reaching/grasping R bedrail w/ L hand  -DM     Scooting/Bridging [place] (Bed Mobility) verbal cues;nonverbal cues (demo/gesture);maximum assist (25% patient effort)  scooted to HOB x 3 in L sidelying, via grasping L bedrail, w/ max A using draw sheet;stating chr LBP relieved  -DM     Supine-Sit [place] (Bed Mobility) unable to assess  decl. d/t c/o L hip pain incr to 9/10 w/ rolling & scooting  -DM     Assistive Device (Bed Mobility) bed rails;head of bed elevated;repositioning sheet  -DM     Comment, (Bed Mobility) per nsg, pt was OOB yest,via lift,& performed STS x 1 today @ EOB w/ max A of OT,but had signif post lean & ret.to sup;Pt decl. EOB this session d/t L hip pain 9/10 w/ mvmt;states kush. offload shoes at home(will ask wife/s.o. to bring 11-18); instructed in PLB exer; decl. pain med request  -DM       Row Name 11/17/24 1808          Transfers    Comment, (Transfers) decl. d/t severe L Hip pain, & offload shoes not avail.  -DM       Row Name 11/17/24 1808          Bed-Chair Transfer    Bed-Chair [place] (Transfers) unable to assess  -DM        Row Name 11/17/24 1808          Sit-Stand Transfer    Sit-Stand [place] (Transfers) unable to assess  -DM       Row Name 11/17/24 1808          Gait/Stairs (Locomotion)    [place] Level (Gait) unable to assess  non-amb.  -DM               User Key  (r) = Recorded By, (t) = Taken By, (c) = Cosigned By      Initials Name Provider Type    DM Jacklyn Gtz, PT Physical Therapist                   Obj/Interventions       Row Name 11/17/24 1806          Range of Motion Comprehensive    General Range of Motion lower extremity range of motion deficits identified  -DM     Comment, General Range of Motion L hip beach. 40-50% d/t severe pain; R hip beach. 25-30% d/t incr LBP; L knee lacks 20 deg full ext.  -DM       Row Name 11/17/24 1806          Strength Comprehensive (MMT)    General Manual Muscle Testing (MMT) Assessment lower extremity strength deficits identified  -DM     Comment, General Manual Muscle Testing (MMT) Assessment LLE grossly 2 to 3-/5; RLE grossly 3- to 3+/5  -DM       Row Name 11/17/24 1806          Motor Skills    Therapeutic Exercise shoulder;hip;knee;ankle  pt states poor reading skills, therefore will need illustrated HEP  -DM       Row Name 11/17/24 1806          Shoulder (Therapeutic Exercise)    Shoulder (Therapeutic Exercise) AROM (active range of motion)  -DM     Shoulder AROM (Therapeutic Exercise) bilateral;flexion;extension;aBduction;aDduction;external rotation;internal rotation;horizontal aBduction/aDduction;supine;10 repetitions;other (see comments)  biceps curls; L Sh. lags during F/E, abd/add  -DM       Row Name 11/17/24 1806          Hip (Therapeutic Exercise)    Hip (Therapeutic Exercise) AROM (active range of motion);AAROM (active assistive range of motion);isometric exercises  -DM     Hip AROM (Therapeutic Exercise) right;flexion;extension;aBduction;aDduction;external rotation;internal rotation;sidelying;supine;10 repetitions  also BKFO in sup & R hip/knee F/E in sidely  -DM      Hip AAROM (Therapeutic Exercise) left;flexion;extension;aBduction;aDduction;external rotation;internal rotation;sidelying;supine;10 repetitions  Min A for abd/add & rot; mod A for sidely L hip & knee F/E( PT supp.under leg)  -DM     Hip Isometrics (Therapeutic Exercise) bilateral;gluteal sets;supine;10 repetitions  -DM       Row Name 11/17/24 1806          Knee (Therapeutic Exercise)    Knee (Therapeutic Exercise) AROM (active range of motion);AAROM (active assistive range of motion);isometric exercises  -DM     Knee AROM (Therapeutic Exercise) right;flexion;extension;SAQ (short arc quad);LAQ (long arc quad);heel slides;sidelying;supine;10 repetitions  -DM     Knee AAROM (Therapeutic Exercise) left;flexion;extension;sidelying;supine;10 repetitions  min A for sup. SAQ; Mod A for sup.H.sl & sidely LAQ  -DM     Knee Isometrics (Therapeutic Exercise) bilateral;quad sets;supine;10 repetitions  -DM       Row Name 11/17/24 1806          Ankle (Therapeutic Exercise)    Ankle (Therapeutic Exercise) AROM (active range of motion);AAROM (active assistive range of motion)  -DM     Ankle AROM (Therapeutic Exercise) bilateral;dorsiflexion;plantarflexion;supine;10 repetitions  -DM     Ankle AAROM (Therapeutic Exercise) bilateral;supine;10 repetitions;other (see comments)  AC (Mod A for L,min A for R)  -DM               User Key  (r) = Recorded By, (t) = Taken By, (c) = Cosigned By      Initials Name Provider Type    DM Jacklyn Gtz, PT Physical Therapist                   Goals/Plan       Row Name 11/17/24 1806          Bed Mobility Goal 1 (PT)    Activity/Assistive Device (Bed Mobility Goal 1, PT) sit to supine/supine to sit  -DM     [place] Level/Cues Needed (Bed Mobility Goal 1, PT) moderate assist (50-74% patient effort)  -DM     Time Frame (Bed Mobility Goal 1, PT) short term goal (STG);5 days  -DM       Row Name 11/17/24 1806          Transfer Goal 1 (PT)    Activity/Assistive Device (Transfer Goal 1, PT)  sit-to-stand/stand-to-sit;bed-to-chair/chair-to-bed;wheelchair transfer  -DM     [place] Level/Cues Needed (Transfer Goal 1, PT) maximum assist (25-49% patient effort)  -DM     Time Frame (Transfer Goal 1, PT) long term goal (LTG);10 days  -DM       Row Name 11/17/24 1806          Balance Goal 1 (PT)    Activity/Assistive Device (Balance Goal) sitting dynamic balance  -DM     [place] Level/Cues Needed (Balance Goal 1, PT) minimum assist (75% or more patient effort)  -DM     Time Frame (Balance Goal 1, PT) long-term goal (LTG);1 week  -DM       Row Name 11/17/24 1806          Problem Specific Goal 1 (PT)    Problem Specific Goal 1 (PT) Propels w/c > 50 ft w/ min. A  -DM     Time Frame (Problem Specific Goal 1, PT) long-term goal (LTG);1 week  -DM       Row Name 11/17/24 1806          Patient Education Goal (PT)    Activity (Patient Education Goal, PT) HEP exer  -DM     [place]/Cues/Accuracy (Memory Goal 2, PT) demonstrates adequately  -DM     Time Frame (Patient Education Goal, PT) long term goal (LTG);10 days  -DM       Row Name 11/17/24 1806          Therapy Assessment/Plan (PT)    Planned Therapy Interventions (PT) balance training;bed mobility training;home exercise program;patient/family education;strengthening;transfer training;wheelchair management/propulsion training  -DM               User Key  (r) = Recorded By, (t) = Taken By, (c) = Cosigned By      Initials Name Provider Type    DM Jacklyn Gtz, PT Physical Therapist                   Clinical Impression       Row Name 11/17/24 1808          Pain    Pretreatment Pain Rating 8/10  -DM     Posttreatment Pain Rating 9/10  -DM     Pain Location hip  -DM     Pain Side/Orientation left  -DM     Pain Management Interventions positioning techniques utilized;exercise or physical activity utilized  -DM     Response to Pain Interventions activity participation with tolerable pain  -DM       Row Name 11/17/24 1808          Plan of Care Review     Plan of Care Reviewed With patient  -DM     Progress improving  -DM     Outcome Evaluation PT eval completed. Presents w/ ESBL Kleb (contact), DKA, asthma, recent L Heel I&D (8-3-24), amp R 2nd toe 7/'23 & R foot I&D 9/'23 (wife to bring kush. offload shoes), chr. LBP/L hip pain, w/c dep. (non-amb), decr LE strength/endurance & impaired funct mobil below baseline. Rolled L w/ min A, to R w/ mod A using bedrail, scooted to HOB in L sidely using L bedrail & max A w/ draw sheet, & performed TE in sup & sidely,but decl.EOB or SPT d/t L Hip pain 9/10 w/ mvmt. Noted desat 93% on RA, HR 96, pale appearance (low HGB 10.9), & signif. fatigue. Will attempt SPT when offload shoes avail., & prog to w/c mobs.Recommend SNF at d/c.  -DM       Row Name 11/17/24 1808          Therapy Assessment/Plan (PT)    Patient/Family Therapy Goals Statement (PT) improved funct mobil  -DM     Rehab Potential (PT) good  -DM     Criteria for Skilled Interventions Met (PT) yes;meets criteria;skilled treatment is necessary  -DM     Therapy Frequency (PT) daily  -DM     Predicted Duration of Therapy Intervention (PT) 10 days  -DM       Row Name 11/17/24 1808          Vital Signs    Pre Systolic BP Rehab 105  -DM     Pre Treatment Diastolic BP 67  -DM     Post Systolic BP Rehab 91  -DM     Post Treatment Diastolic BP 63  -DM     Pretreatment Heart Rate (beats/min) 96  -DM     Intratreatment Heart Rate (beats/min) 97  -DM     Posttreatment Heart Rate (beats/min) 88  -DM     Pre SpO2 (%) 95  -DM     O2 Delivery Pre Treatment room air  -DM     Intra SpO2 (%) 93  -DM     O2 Delivery Intra Treatment room air  -DM     Post SpO2 (%) 98  -DM     O2 Delivery Post Treatment room air  -DM     Pre Patient Position Supine  -DM     Intra Patient Position Side Lying  -DM     Post Patient Position Supine  -DM       Row Name 11/17/24 1808          Positioning and Restraints    Pre-Treatment Position in bed  -DM     Post Treatment Position bed  -DM     In Bed notified  nsg;supine;call light within reach;encouraged to call for assist;exit alarm on;heels elevated  -DM               User Key  (r) = Recorded By, (t) = Taken By, (c) = Cosigned By      Initials Name Provider Type    Jacklyn Bustamante, PT Physical Therapist                   Outcome Measures       Row Name 11/17/24 1808 11/17/24 0800       How much help from another person do you currently need...    Turning from your back to your side while in flat bed without using bedrails? 2  -DM 3  -MM    Moving from lying on back to sitting on the side of a flat bed without bedrails? 2  -DM 3  -MM    Moving to and from a bed to a chair (including a wheelchair)? 2  -DM 3  -MM    Standing up from a chair using your arms (e.g., wheelchair, bedside chair)? 2  -DM 2  -MM    Climbing 3-5 steps with a railing? 1  -DM 1  -MM    To walk in hospital room? 1  -DM 1  -MM    AM-PAC 6 Clicks Score (PT) 10  -DM 13  -MM    Highest Level of Mobility Goal 4 --> Transfer to chair/commode  -DM 4 --> Transfer to chair/commode  -MM      Row Name 11/17/24 1808 11/17/24 1356       Functional Assessment    Outcome Measure Options AM-PAC 6 Clicks Basic Mobility (PT)  -DM AM-PAC 6 Clicks Daily Activity (OT)  -KF              User Key  (r) = Recorded By, (t) = Taken By, (c) = Cosigned By      Initials Name Provider Type    Jacklyn Bustamante, PT Physical Therapist    Jamilah Love, OT Occupational Therapist    Zoraida Alejandre, RN Registered Nurse                                 Physical Therapy Education       Title: PT OT SLP Therapies (In Progress)       Topic: Physical Therapy (In Progress)       Point: Mobility training (In Progress)       Learning Progress Summary            Patient Acceptance, E,D, NR by DM at 11/17/2024 1806                      Point: Home exercise program (In Progress)       Learning Progress Summary            Patient Acceptance, E,D, NR by DM at 11/17/2024 1806                      Point: Body mechanics (In Progress)        Learning Progress Summary            Patient Acceptance, E,D, NR by DM at 11/17/2024 1806                      Point: Precautions (In Progress)       Learning Progress Summary            Patient Acceptance, E,D, NR by DM at 11/17/2024 1806                                      User Key       Initials Effective Dates Name Provider Type Discipline    DM 02/03/23 -  Jacklyn Gtz, PT Physical Therapist PT                  PT Recommendation and Plan  Planned Therapy Interventions (PT): balance training, bed mobility training, home exercise program, patient/family education, strengthening, transfer training, wheelchair management/propulsion training  Progress: improving  Outcome Evaluation: PT eval completed. Presents w/ ESBL Kleb (contact), DKA, asthma, recent L Heel I&D (8-3-24), amp R 2nd toe 7/'23 & R foot I&D 9/'23 (wife to bring kush. offload shoes), chr. LBP/L hip pain, w/c dep. (non-amb), decr LE strength/endurance & impaired funct mobil below baseline. Rolled L w/ min A, to R w/ mod A using bedrail, scooted to HOB in L sidely using L bedrail & max A w/ draw sheet, & performed TE in sup & sidely,but decl.EOB or SPT d/t L Hip pain 9/10 w/ mvmt. Noted desat 93% on RA, HR 96, pale appearance (low HGB 10.9), & signif. fatigue. Will attempt SPT when offload shoes avail., & prog to w/c mobs.Recommend SNF at d/c.     Time Calculation:   PT Evaluation Complexity  History, PT Evaluation Complexity: 3 or more personal factors and/or comorbidities  Examination of Body Systems (PT Eval Complexity): total of 3 or more elements  Clinical Presentation (PT Evaluation Complexity): evolving  Clinical Decision Making (PT Evaluation Complexity): moderate complexity  Overall Complexity (PT Evaluation Complexity): moderate complexity     PT Charges       Row Name 11/17/24 1906             Time Calculation    Start Time 1808  -DM      PT Received On 11/17/24  -DM      PT Goal Re-Cert Due Date 11/27/24  -DM         Timed Charges     26289 - PT Therapeutic Exercise Minutes 19  -DM      73240 - PT Therapeutic Activity Minutes 10  -DM         Untimed Charges    PT Eval/Re-eval Minutes 30  -DM         Total Minutes    Timed Charges Total Minutes 29  -DM      Untimed Charges Total Minutes 30  -DM       Total Minutes 59  -DM                User Key  (r) = Recorded By, (t) = Taken By, (c) = Cosigned By      Initials Name Provider Type    DM Jacklyn Gtz, PT Physical Therapist                  Therapy Charges for Today       Code Description Service Date Service Provider Modifiers Qty    99962938769 HC PT THER PROC EA 15 MIN 11/17/2024 Jacklyn Gtz, PT GP 1    10747086658 HC PT THERAPEUTIC ACT EA 15 MIN 11/17/2024 Jacklyn Gtz, PT GP 1    41495019901 HC PT EVAL MOD COMPLEXITY 2 11/17/2024 Jacklyn Gtz, PT GP 1            PT G-Codes  Outcome Measure Options: AM-PAC 6 Clicks Basic Mobility (PT)  AM-PAC 6 Clicks Score (PT): 10  AM-PAC 6 Clicks Score (OT): 14  PT Discharge Summary  Anticipated Discharge Disposition (PT): skilled nursing facility    Jacklyn Gtz, PT  11/17/2024

## 2024-11-18 NOTE — PLAN OF CARE
Goal Outcome Evaluation:                   Anticipated Discharge Disposition (SLP): home             Treatment Assessment (SLP): toleration of diet, no clinical signs of, pharyngeal dysphagia (11/18/24 6525)

## 2024-11-18 NOTE — PROGRESS NOTES
[facility] Medicine Services  PROGRESS NOTE    Patient Name: Fracisco Mullen  : 1965  MRN: [id number]    Date of Admission: 11/15/2024  Primary Care Physician: Brandy Roberson    Subjective   Subjective     CC:  F/U DKA    HPI:  Seen this morning. He is interested in rehab.       Objective   Objective     Vital Signs:   Temp:  [97.8 °F (36.6 °C)-98.7 °F (37.1 °C)] 97.9 °F (36.6 °C)  Heart Rate:  [] 91  Resp:  [16-17] 16  BP: ()/(63-88) 120/74     Physical Exam:  Gen-no acute distress  HENT-NCAT, mucous membranes moist  CV-RRR, S1 S2 normal, no m/r/g  Resp-CTAB, no wheezes or rales  Abd-soft, NT, ND, +BS  Ext-no edema  Neuro-A&Ox3, no focal deficits  Skin-left heel ulcer in dressing  Psych-appropriate mood      Results Reviewed:  LAB RESULTS:      Lab 24  0608 24  0629 24  0343 11/15/24  1532 11/15/24  1107 11/15/24  0948 11/15/24  0942   WBC 6.74 9.52 11.93*  --   --   --  13.03*   HEMOGLOBIN 11.2* 10.9* 11.2*  --   --   --  12.3*   HEMOGLOBIN, POC  --   --   --   --   --  13.6  --    HEMATOCRIT 35.4* 33.7* 34.1*  --   --   --  37.6   HEMATOCRIT POC  --   --   --   --   --  40  --    PLATELETS 242 266 283  --   --   --  321   NEUTROS ABS  --   --  7.30*  --   --   --  11.22*   IMMATURE GRANS (ABS)  --   --  0.07*  --   --   --  0.12*   LYMPHS ABS  --   --  3.19*  --   --   --  1.18   MONOS ABS  --   --  1.22*  --   --   --  0.41   EOS ABS  --   --  0.07  --   --   --  0.01   MCV 80.6 79.1 80.0  --   --   --  78.7*   LACTATE  --   --   --  1.9  --   --  3.6*   HSTROP T  --   --   --  44* 32*  --   --          Lab 24  0608 24  0629 24  1342 24  0846 24  0343 24  0045 11/15/24  1943 11/15/24  0948 11/15/24  0942   SODIUM 135* 132*  --  134* 143 137 136   < >  --    POTASSIUM 4.0 4.3  --  3.7 4.1 3.8 4.5   < >  --    CHLORIDE 99 98  --  104 109* 105 103   < >  --    CO2 25.0 23.0  --  22.0 24.0 24.0 25.0   < >   --    ANION GAP 11.0 11.0  --  8.0 10.0 8.0 8.0   < >  --    BUN 22 13  --  12 16 15 17   < >  --    CREATININE 0.88 0.89  --  0.64* 0.72* 0.74* 0.84   < >  --    EGFR 97.8 97.5  --  107.7 103.9 103.1 99.2   < >  --    GLUCOSE 270* 324*  --  171* 120* 154* 183*   < >  --    CALCIUM 8.2* 8.1*  --  8.0* 8.4* 8.0* 8.8   < >  --    MAGNESIUM  --   --  1.6 2.0 1.9 1.8 1.8   < >  --    PHOSPHORUS  --   --  2.5 2.4* 3.2 2.7 2.6   < >  --    HEMOGLOBIN A1C  --   --   --   --   --   --   --   --  14.10*    < > = values in this interval not displayed.         Lab 11/15/24  1107   TOTAL PROTEIN 6.7   ALBUMIN 3.4*   GLOBULIN 3.3   ALT (SGPT) 14   AST (SGOT) 13   BILIRUBIN 0.4   ALK PHOS 124*         Lab 11/15/24  1532 11/15/24  1107   HSTROP T 44* 32*                 Lab 11/15/24  0954   PH, ARTERIAL 7.328*   PCO2, ARTERIAL 36.0   PO2 ART 88.9   FIO2 21   HCO3 ART 18.9*   BASE EXCESS ART -6.4*   CARBOXYHEMOGLOBIN 1.5     Brief Urine Lab Results  (Last result in the past 365 days)        Color   Clarity   Blood   Leuk Est   Nitrite   Protein   CREAT   Urine HCG        11/15/24 1142 Yellow   Clear   Small (1+)   Negative   Negative   100 mg/dL (2+)                   Microbiology Results Abnormal       None            No radiology results from the last 24 hrs        Current medications:  Scheduled Meds:amitriptyline, 25 mg, Oral, BID  aspirin, 81 mg, Oral, Daily  enoxaparin, 40 mg, Subcutaneous, Daily  finasteride, 5 mg, Oral, Nightly  insulin glargine, 10 Units, Subcutaneous, Nightly  insulin glargine, 15 Units, Subcutaneous, Daily  insulin lispro, 2-9 Units, Subcutaneous, 4x Daily AC & at Bedtime  Insulin Lispro, 7 Units, Subcutaneous, TID With Meals  metoprolol succinate XL, 50 mg, Oral, Daily  pregabalin, 225 mg, Oral, BID  rOPINIRole, 0.25 mg, Oral, Nightly  sodium chloride, 10 mL, Intravenous, Q12H  sodium chloride, 10 mL, Intravenous, Q12H  tamsulosin, 0.8 mg, Oral, Nightly      Continuous Infusions:   PRN Meds:.   acetaminophen **OR** acetaminophen **OR** acetaminophen    senna-docusate sodium **AND** polyethylene glycol **AND** bisacodyl **AND** bisacodyl    calcium carbonate    Calcium Replacement - Follow Nurse / BPA Driven Protocol    dextrose    dextrose    glucagon (human recombinant)    Magnesium Standard Dose Replacement - Follow Nurse / BPA Driven Protocol    nitroglycerin    Phosphorus Replacement - Follow Nurse / BPA Driven Protocol    Potassium Replacement - Follow Nurse / BPA Driven Protocol    sodium chloride    sodium chloride    sodium chloride    sodium chloride    sodium chloride    Assessment & Plan   Assessment & Plan     Active Hospital Problems    Diagnosis  POA    **DKA (diabetic ketoacidosis) [E11.10]  Yes    Non healing left heel wound [S91.302A]  Yes      Resolved Hospital Problems   No resolved problems to display.        Brief Hospital Course to date:  Fracisco ADAM Lady is a 59 y.o. male with past medical history of hypertension, type 2 diabetes with insulin use, diabetic neuropathy, and chronic diabetic foot wounds who presents via EMS from home due to altered mental status and fatigue. Lab work consistent with diabetic ketoacidosis.     This patient's problems and plans were partially entered by my partner and updated as appropriate by me 11/18/24. Copied text in this note has been reviewed and is accurate as of today's date.      DKA in setting of uncontrolled IDDM complicated by diabetic neuropathy and chronic foot wounds   --Uncertain of medication compliance  --pH 7.328, blood glucose 567, lactate 3.6, beta hydroxybutyrate 4.29, anion gap metabolic acidosis, HbA1c 14.1%  --UA/CXR without signs of infection   --s/p DKA protocol including insulin drip, IV fluids, and electrolyte replacement per protocol  --Anion gap now closed, transitioned to basal-bolus regimen now  --Adjust as needed based on finger sticks  --DM educator consulted     Chronic bilateral foot wounds  --Patient with chronic left  heel wound and right plantar foot wound  --Admitted August 2024 for left foot cellulitis and discharged on oral antibiotics and with home wound vac following debridement, MRI left foot obtained at that time with no definite findings of osteomyelitis (was seen by ID/Dr. Dominique and Orthopedics/Dr. Gan)  --Repeat MRI left foot pending  --Wound care consulted  --Consider surgery/ID consults pending MRI results     Low grade fever  --Temp 100.4 on 11/15/24 evening  --Mild leukocytosis, improved from admission  --CXR and UA negative  --Await MRI left foot  --Defer ATBx for now unless fever spikes or other signs of infection     Acute toxic metabolic encephalopathy, improved  --CT head without acute abnormality  --UDS negative  --Likely secondary to DKA  --Improved now     HTN  --Continue home Metoprolol     BPH  Urinary retention  --Continue home Flomax, Proscar  --He is having some retention and at times refusing I/O cath, will continue to monitor - increase Flomax today     Mood disorder  --Continue home Amitriptyline      RLS  --Continue home Requip    Expected Discharge Location and Transportation: rehab  Expected Discharge   Expected Discharge Date: 11/20/2024; Expected Discharge Time:      VTE Prophylaxis:  Pharmacologic VTE prophylaxis orders are present.         AM-PAC 6 Clicks Score (PT): 10 (11/18/24 0800)    CODE STATUS:   Code Status and Medical Interventions: CPR (Attempt to Resuscitate); Full Support; Full code per last hosptial admission. Patient disoriented on exam with no family present at bedside. Only contact information is friend.   Ordered at: 11/15/24 1218     Level Of Support Discussed With:    Patient     Code Status (Patient has no pulse and is not breathing):    CPR (Attempt to Resuscitate)     Medical Interventions (Patient has pulse or is breathing):    Full Support     Comments:    Full code per last hosptial admission. Patient disoriented on exam with no family present at bedside. Only  contact information is friend.       Lisa Vallejo MD  11/18/24

## 2024-11-18 NOTE — NURSING NOTE
"Patient has yet to void. RN bladder scanned patient for 969 mL. RN told patient that it is advisable to allow for straight cath at this point. Patient still refused. RN went over risks of not straight cathing. Patient verbalized understanding and stated, \"my body will tell me when I need to go.\" MD notified. MD suggests if patient does allow for straight cath to just anchor the ledesma, and made changes to MAR.   "

## 2024-11-18 NOTE — PROGRESS NOTES
Malnutrition Severity Assessment    Patient Name:  Fracisco Mullen  YOB: 1965  MRN: [id number]  Admit Date:  11/15/2024    Patient meets criteria for : Severe Malnutrition    Comments:  Based on patient recent wt loss and poor PO intake, patient meets criteria for severe malnutrition r/t acute illness/injury.  Nutrition Focused Physical Exam completed to determine fat and muscle wasting.  MD please attest and include in pt diagnosis as you deem appropriate.        Malnutrition Severity Assessment  Malnutrition Type: Acute Disease or Injury - Related Malnutrition  Malnutrition Type (Last 8 Hours)       Malnutrition Severity Assessment       Row Name 11/18/24 1706       Malnutrition Severity Assessment    Malnutrition Type Acute Disease or Injury - Related Malnutrition      Row Name 11/18/24 1706       Insufficient Energy Intake     Insufficient Energy Intake Findings Severe    Insufficient Energy Intake  < or equal to 50% of est. energy requirement for > or equal to 5d)      Row Name 11/18/24 1706       Unintentional Weight Loss     Unintentional Weight Loss Findings Severe    Unintentional Weight Loss  Weight loss of 7.5% in three months      Row Name 11/18/24 1706       Muscle Loss    Loss of Muscle Mass Findings Moderate    [place] Region Moderate - slight depression    Clavicle Bone Region Moderate - some protrusion in females, visible in males    Acromion Bone Region Moderate - acromion may slightly protrude    Scapular Bone Region Moderate - mild depression, bones may show slightly    Dorsal Hand Region Moderate - slight depression    Patellar Region Moderate - patella more prominent, less muscle definition around patella    Anterior Thigh Region Moderate - mild depression on inner thigh    Posterior Calf Region Moderate - some roundness, slight firmness      Row Name 11/18/24 1706       Fat Loss    Subcutaneous Fat Loss Findings Moderate    Orbital Region  Moderate -  somewhat hollowness, slightly  dark circles    Upper Arm Region Moderate - some fat tissue, not ample    Thoracic & Lumbar Region Moderate - ribs visible with mild depressions, iliac crest somewhat prominent      Row Name 11/18/24 1708       Criteria Met (Must meet criteria for severity in at least 2 of these categories: M Wasting, Fat Loss, Fluid, Secondary Signs, Wt. Status, Intake)    Patient meets criteria for  Severe Malnutrition                    Electronically signed by:  Lynda Pisano RD  11/18/24 17:06 EST

## 2024-11-19 ENCOUNTER — APPOINTMENT (OUTPATIENT)
Dept: CARDIOLOGY | Facility: HOSPITAL | Age: 59
End: 2024-11-19
Payer: COMMERCIAL

## 2024-11-19 PROBLEM — E43 SEVERE PROTEIN-CALORIE MALNUTRITION: Status: ACTIVE | Noted: 2024-11-19

## 2024-11-19 LAB
ANION GAP SERPL CALCULATED.3IONS-SCNC: 10 MMOL/L (ref 5–15)
BH CV LOWER ARTERIAL LEFT ABI RATIO: 1.4
BH CV LOWER ARTERIAL LEFT DORSALIS PEDIS SYS MAX: 157
BH CV LOWER ARTERIAL LEFT GREAT TOE SYS MAX: 132
BH CV LOWER ARTERIAL LEFT POST TIBIAL SYS MAX: 168
BH CV LOWER ARTERIAL LEFT TBI RATIO: 1.1
BH CV LOWER ARTERIAL RIGHT ABI RATIO: 1.4
BH CV LOWER ARTERIAL RIGHT DORSALIS PEDIS SYS MAX: 156
BH CV LOWER ARTERIAL RIGHT GREAT TOE SYS MAX: NORMAL
BH CV LOWER ARTERIAL RIGHT POST TIBIAL SYS MAX: 170
BH CV LOWER ARTERIAL RIGHT TBI RATIO: NORMAL
BUN SERPL-MCNC: 20 MG/DL (ref 8–23)
BUN/CREAT SERPL: 20 (ref 7–25)
CALCIUM SPEC-SCNC: 8.8 MG/DL (ref 8.6–10.5)
CHLORIDE SERPL-SCNC: 97 MMOL/L (ref 98–107)
CK SERPL-CCNC: 71 U/L (ref 20–200)
CO2 SERPL-SCNC: 27 MMOL/L (ref 22–29)
CREAT SERPL-MCNC: 1 MG/DL (ref 0.76–1.27)
DEPRECATED RDW RBC AUTO: 46.2 FL (ref 37–54)
EGFRCR SERPLBLD CKD-EPI 2021: 85.6 ML/MIN/1.73
ERYTHROCYTE [DISTWIDTH] IN BLOOD BY AUTOMATED COUNT: 15.3 % (ref 12.3–15.4)
GLUCOSE BLDC GLUCOMTR-MCNC: 100 MG/DL (ref 70–130)
GLUCOSE BLDC GLUCOMTR-MCNC: 270 MG/DL (ref 70–130)
GLUCOSE BLDC GLUCOMTR-MCNC: 293 MG/DL (ref 70–130)
GLUCOSE BLDC GLUCOMTR-MCNC: 82 MG/DL (ref 70–130)
GLUCOSE SERPL-MCNC: 279 MG/DL (ref 65–99)
HCT VFR BLD AUTO: 38.6 % (ref 37.5–51)
HGB BLD-MCNC: 12 G/DL (ref 13–17.7)
MCH RBC QN AUTO: 25.8 PG (ref 26.6–33)
MCHC RBC AUTO-ENTMCNC: 31.1 G/DL (ref 31.5–35.7)
MCV RBC AUTO: 82.8 FL (ref 79–97)
PLATELET # BLD AUTO: 276 10*3/MM3 (ref 140–450)
PMV BLD AUTO: 10.5 FL (ref 6–12)
POTASSIUM SERPL-SCNC: 3.9 MMOL/L (ref 3.5–5.2)
RBC # BLD AUTO: 4.66 10*6/MM3 (ref 4.14–5.8)
SODIUM SERPL-SCNC: 134 MMOL/L (ref 136–145)
UPPER ARTERIAL LEFT ARM BRACHIAL SYS MAX: NORMAL
UPPER ARTERIAL RIGHT ARM BRACHIAL SYS MAX: 115
WBC NRBC COR # BLD AUTO: 9.33 10*3/MM3 (ref 3.4–10.8)

## 2024-11-19 PROCEDURE — 93923 UPR/LXTR ART STDY 3+ LVLS: CPT

## 2024-11-19 PROCEDURE — 97535 SELF CARE MNGMENT TRAINING: CPT

## 2024-11-19 PROCEDURE — 25010000002 ENOXAPARIN PER 10 MG: Performed by: STUDENT IN AN ORGANIZED HEALTH CARE EDUCATION/TRAINING PROGRAM

## 2024-11-19 PROCEDURE — 82948 REAGENT STRIP/BLOOD GLUCOSE: CPT

## 2024-11-19 PROCEDURE — 63710000001 INSULIN LISPRO (HUMAN) PER 5 UNITS: Performed by: HOSPITALIST

## 2024-11-19 PROCEDURE — 25010000002 DAPTOMYCIN PER 1 MG: Performed by: INTERNAL MEDICINE

## 2024-11-19 PROCEDURE — 97530 THERAPEUTIC ACTIVITIES: CPT

## 2024-11-19 PROCEDURE — 25810000003 SODIUM CHLORIDE 0.9 % SOLUTION: Performed by: INTERNAL MEDICINE

## 2024-11-19 PROCEDURE — 80048 BASIC METABOLIC PNL TOTAL CA: CPT | Performed by: HOSPITALIST

## 2024-11-19 PROCEDURE — 93923 UPR/LXTR ART STDY 3+ LVLS: CPT | Performed by: INTERNAL MEDICINE

## 2024-11-19 PROCEDURE — 97110 THERAPEUTIC EXERCISES: CPT

## 2024-11-19 PROCEDURE — 99232 SBSQ HOSP IP/OBS MODERATE 35: CPT | Performed by: INTERNAL MEDICINE

## 2024-11-19 PROCEDURE — 25010000002 ERTAPENEM PER 500 MG: Performed by: INTERNAL MEDICINE

## 2024-11-19 PROCEDURE — 82550 ASSAY OF CK (CPK): CPT | Performed by: INTERNAL MEDICINE

## 2024-11-19 PROCEDURE — 63710000001 INSULIN GLARGINE PER 5 UNITS: Performed by: HOSPITALIST

## 2024-11-19 PROCEDURE — 92526 ORAL FUNCTION THERAPY: CPT

## 2024-11-19 PROCEDURE — 85027 COMPLETE CBC AUTOMATED: CPT | Performed by: HOSPITALIST

## 2024-11-19 RX ADMIN — INSULIN LISPRO 6 UNITS: 100 INJECTION, SOLUTION INTRAVENOUS; SUBCUTANEOUS at 12:26

## 2024-11-19 RX ADMIN — ACETAMINOPHEN 650 MG: 325 TABLET ORAL at 11:12

## 2024-11-19 RX ADMIN — Medication 10 ML: at 21:16

## 2024-11-19 RX ADMIN — INSULIN GLARGINE 15 UNITS: 100 INJECTION, SOLUTION SUBCUTANEOUS at 08:40

## 2024-11-19 RX ADMIN — FINASTERIDE 5 MG: 5 TABLET, FILM COATED ORAL at 21:11

## 2024-11-19 RX ADMIN — TAMSULOSIN HYDROCHLORIDE 0.8 MG: 0.4 CAPSULE ORAL at 21:11

## 2024-11-19 RX ADMIN — AMITRIPTYLINE HYDROCHLORIDE 25 MG: 25 TABLET, FILM COATED ORAL at 21:11

## 2024-11-19 RX ADMIN — INSULIN LISPRO 7 UNITS: 100 INJECTION, SOLUTION INTRAVENOUS; SUBCUTANEOUS at 12:26

## 2024-11-19 RX ADMIN — INSULIN LISPRO 4 UNITS: 100 INJECTION, SOLUTION INTRAVENOUS; SUBCUTANEOUS at 08:39

## 2024-11-19 RX ADMIN — ERTAPENEM 1000 MG: 1 INJECTION INTRAMUSCULAR; INTRAVENOUS at 08:40

## 2024-11-19 RX ADMIN — METOPROLOL SUCCINATE 50 MG: 50 TABLET, EXTENDED RELEASE ORAL at 08:40

## 2024-11-19 RX ADMIN — CALCIUM CARBONATE (ANTACID) CHEW TAB 500 MG 2 TABLET: 500 CHEW TAB at 16:46

## 2024-11-19 RX ADMIN — INSULIN LISPRO 7 UNITS: 100 INJECTION, SOLUTION INTRAVENOUS; SUBCUTANEOUS at 17:45

## 2024-11-19 RX ADMIN — Medication 10 ML: at 08:41

## 2024-11-19 RX ADMIN — CALCIUM CARBONATE (ANTACID) CHEW TAB 500 MG 2 TABLET: 500 CHEW TAB at 21:19

## 2024-11-19 RX ADMIN — DAPTOMYCIN 450 MG: 500 INJECTION, POWDER, LYOPHILIZED, FOR SOLUTION INTRAVENOUS at 11:12

## 2024-11-19 RX ADMIN — ENOXAPARIN SODIUM 40 MG: 100 INJECTION SUBCUTANEOUS at 08:40

## 2024-11-19 RX ADMIN — INSULIN LISPRO 7 UNITS: 100 INJECTION, SOLUTION INTRAVENOUS; SUBCUTANEOUS at 08:39

## 2024-11-19 RX ADMIN — AMITRIPTYLINE HYDROCHLORIDE 25 MG: 25 TABLET, FILM COATED ORAL at 08:40

## 2024-11-19 RX ADMIN — PREGABALIN 225 MG: 75 CAPSULE ORAL at 08:40

## 2024-11-19 RX ADMIN — ASPIRIN 81 MG: 81 TABLET, COATED ORAL at 08:40

## 2024-11-19 RX ADMIN — ROPINIROLE 0.25 MG: 0.5 TABLET, FILM COATED ORAL at 21:12

## 2024-11-19 RX ADMIN — PREGABALIN 225 MG: 75 CAPSULE ORAL at 21:11

## 2024-11-19 RX ADMIN — SODIUM CHLORIDE 500 ML: 9 INJECTION, SOLUTION INTRAVENOUS at 15:07

## 2024-11-19 RX ADMIN — CALCIUM CARBONATE (ANTACID) CHEW TAB 500 MG 2 TABLET: 500 CHEW TAB at 07:04

## 2024-11-19 NOTE — THERAPY TREATMENT NOTE
Patient Name: Fracisco Mullen  : 1965    MRN: [id number]                              Today's Date: 2024       Admit Date: 11/15/2024    Visit Dx:     ICD-10-CM ICD-9-CM   1. Diabetic ketoacidosis without coma associated with type 2 diabetes mellitus  E11.10 250.12   2. Oropharyngeal dysphagia  R13.12 787.22   3. Non healing left heel wound  S91.302A 892.1   4. Acute osteomyelitis of left foot  M86.172 730.07     Patient Active Problem List   Diagnosis    HTN (hypertension)    Type 2 diabetes mellitus, with long-term current use of insulin    Diabetic retinopathy    Obesity (BMI 30.0-34.9)    Peripheral neuropathy    Asthma    Causalgia of lower limb    Chronic constipation    Compression of lumbar nerve root    GERD (gastroesophageal reflux disease)    Hyperlipidemia    IBS (irritable bowel syndrome)    RLS (restless legs syndrome)    Sleep apnea    Vitamin D deficiency    Cigar smoker    Chronic back pain    Diabetic ketoacidosis associated with type 2 diabetes mellitus    Multiple open wounds of foot    Status post cholecystectomy    Cholestatic hepatitis    Diabetic infection of left foot    Precordial pain    Tobacco use    DKA (diabetic ketoacidosis)    Non healing left heel wound    Severe protein-calorie malnutrition     Past Medical History:   Diagnosis Date    Acute renal failure     Hx of    Obesity     Hx of    Sleep apnea     Type 2 diabetes mellitus      Past Surgical History:   Procedure Laterality Date    BACK SURGERY      lower back    CHOLECYSTECTOMY WITH INTRAOPERATIVE CHOLANGIOGRAM N/A 2021    Procedure: CHOLECYSTECTOMY LAPAROSCOPIC INTRAOPERATIVE CHOLANGIOGRAM;  Surgeon: Juventino Hooker MD;  Location: [facility] OR;  Service: General;  Laterality: N/A;    ERCP N/A 2021    Procedure: ENDOSCOPIC RETROGRADE CHOLANGIOPANCREATOGRAPHY;  Surgeon: Jeremy Dowell MD;  Location: [facility] ENDOSCOPY;  Service: Gastroenterology;  Laterality: N/A;  with sphiincterotomy and balloon sweep  with 9mm-12mm balloon    INCISION AND DRAINAGE FOOT Left 8/3/2024    Procedure: HEAL IRRIGATION AND DEBRIDEMENT LEFT;  Surgeon: Dru Gan Jr., MD;  Location: [facility] OR;  Service: Orthopedics;  Laterality: Left;    PLACEMENT OF WOUND VAC Left 8/3/2024    Procedure: PLACEMENT OF WOUND VAC;  Surgeon: Dru Gan Jr., MD;  Location: [facility] OR;  Service: Orthopedics;  Laterality: Left;      General Information       Row Name 11/19/24 5850          Physical Therapy Time and Intention    Document Type therapy note (daily note)  -     Mode of Treatment physical therapy  -       Row Name 11/19/24 8420          General Information    Patient Profile Reviewed yes  -BA     Existing Precautions/Restrictions fall;orthostatic hypotension;other (see comments)   L heel wound; R plantarsurface foot wound; awaiting clarification on WB status of BLE (kept pt NWB BLE this session until receive clarification); pt anticipates L BKA this week; reported wearing B offloading shoes at recent baseline  -     Barriers to Rehab medically complex;previous functional deficit  -       Row Name 11/19/24 4177          Cognition    Orientation Status (Cognition) oriented to;person;place;situation;verbal cues/prompts needed for orientation;time;other (see comments)  Reported it was year 2004; oriented to month.  -       Row Name 11/19/24 1609          Safety Issues/Impairments Affecting Functional Mobility    Safety Issues Affecting Function (Mobility) awareness of need for assistance;insight into deficits/self-awareness;safety precaution awareness;safety precautions follow-through/compliance;sequencing abilities  -     Impairments Affecting Function (Mobility) balance;coordination;endurance/activity tolerance;motor planning;pain;postural/trunk control;sensation/sensory awareness;strength  -               User Key  (r) = Recorded By, (t) = Taken By, (c) = Cosigned By      Initials Name Provider Type     Galilea Burkett,  PT Physical Therapist                   Mobility       Row Name 11/19/24 1757          Bed Mobility    Bed Mobility supine-sit;sit-supine;scooting/bridging  -     Scooting/Bridging [place] (Bed Mobility) dependent (less than 25% patient effort);2 person assist;verbal cues;nonverbal cues (demo/gesture)  -     Supine-Sit [place] (Bed Mobility) moderate assist (50% patient effort);2 person assist;verbal cues;nonverbal cues (demo/gesture)  -     Sit-Supine [place] (Bed Mobility) moderate assist (50% patient effort);2 person assist;verbal cues;nonverbal cues (demo/gesture)  -     Assistive Device (Bed Mobility) bed rails;head of bed elevated;repositioning sheet  -     Comment, (Bed Mobility) Increased time and effort.  VCs/TCs for sequencing and hand placement.  Exhibited L lateral and posterior lean with sitting EOB.  Reported dizziness upon sitting EOB; BP decreased from supine 110/74 to sitting EOB 73/59.  Performed seated BLE ther ex and seated balance activities in an attempt to improve BP.  BP taken again each at ~5 min intervals and with mild improvement to 83/66 and 80/52.  Reported continued feeling of lightheadedness throughout.  -       Row Name 11/19/24 1757          Bed-Chair Transfer    Comment, (Bed-Chair Transfer) Deferred d/t pt not currently in mechanical lift room and kept NWB BLE this session until receive further clarification on WB'ing status.  Pt would benefit from being moved to mechanical lift room for safety and progression with transfers; RN notified.  -       Row Name 11/19/24 1757          Sit-Stand Transfer    Comment, (Sit-Stand Transfer) Deferred d/t kept pt NWB BLE until receive further clarification on WB'ing status from MDs.  MDs contacted and awaiting response.  RN and CM also notified.  -RIP               User Key  (r) = Recorded By, (t) = Taken By, (c) = Cosigned By      Initials Name Provider Type    Galilea Mckeon, PT Physical Therapist                    Obj/Interventions       Row Name 11/19/24 1804          Motor Skills    Therapeutic Exercise hip;knee;ankle  -       Row Name 11/19/24 1804          Hip (Therapeutic Exercise)    Hip (Therapeutic Exercise) strengthening exercise  -     Hip Strengthening (Therapeutic Exercise) bilateral;aBduction;aDduction;supine;10 repetitions;other (see comments)  with AAROM  -       Row Name 11/19/24 1804          Knee (Therapeutic Exercise)    Knee (Therapeutic Exercise) strengthening exercise  -     Knee Strengthening (Therapeutic Exercise) bilateral;SLR (straight leg raise);supine;10 repetitions;other (see comments)  [facility]  -       Row Name 11/19/24 1804          Ankle (Therapeutic Exercise)    Ankle (Therapeutic Exercise) AROM (active range of motion)  -     Ankle AROM (Therapeutic Exercise) bilateral;dorsiflexion;plantarflexion;sitting;supine;10 repetitions;2 sets  -       Row Name 11/19/24 1804          Balance    Balance Assessment sitting static balance;sitting dynamic balance  -     Static Sitting Balance moderate assist;standby assist;verbal cues;non-verbal cues (demo/gesture);other (see comments)  varying levels of assist  -     Dynamic Sitting Balance moderate assist;2-person assist;verbal cues  -     Position, Sitting Balance unsupported;sitting edge of bed  -     Balance Interventions sitting;static;dynamic;occupation based/functional task;weight shifting activity  -     Comment, Balance Periods of increased unsteadiness with sitting EOB with L lateral and posterior lean with fluctuating modA to short bouts of close SBA for safety.  Intermittent postural swaying throughout.  Bouts of improvement following VCs/TCs for upright posture, midline orientation, and visual recognition in mirror, but unable to sustain for long periods of time d/t increased fatigue.  Performed seated lateral weight shifts x 5 reps to each side with Areli in preparation for transfers; had difficulty with  full L lateral lean d/t reporting increased L hip pain.  -BA               User Key  (r) = Recorded By, (t) = Taken By, (c) = Cosigned By      Initials Name Provider Type    Galilea Mckeon, PT Physical Therapist                   Goals/Plan    No documentation.                  Clinical Impression       Row Name 11/19/24 1811          Pain    Pretreatment Pain Rating 9/10  -BA     Posttreatment Pain Rating 9/10  -BA     Pain Location foot;back;hip  -BA     Pain Side/Orientation left;bilateral  -     Pain Management Interventions activity modification encouraged;exercise or physical activity utilized;positioning techniques utilized;nursing notified  -     Response to Pain Interventions activity participation with tolerable pain  -BA     Pre/Posttreatment Pain Comment Initially reported c/o pain in L foot.  With participation in mobility, then reported c/o back and B hip pain, R>L.  Appeared to attempt to offload R hip while sitting EOB d/t L lateral lean.  However when attempting full lateral weight shift onto L side while working on seated balanace activities, exhibited decreased toleration and would report increased pain in L hip.  At end of session, reported pain in center of back.  RN notified and managing.  -BA       Row Name 11/19/24 1811          Plan of Care Review    Plan of Care Reviewed With patient  -BA     Progress no change  -     Outcome Evaluation Con to require increased assist for all fxl mobility.  Supine to sit and return to supine with modAx2.  Participated in seated balance activities with fluctuating modA to close SBA d/t primarily L lateral and posterior lean.  Limited today by orthostatic hypotension with sitting EOB to as low as 73/59 and kept pt NWB BLE this session d/t awaiting clarification from MDs on BLE WB'ing status.  Con to present below baseline with decreased balance, weakness, pain, and decreased functional endurance limiting indep with mobility and ability to safely  navigate home environment.  Will con to benefit from skilled IP PT to improve deficits and promote return to PLOF.  Rec SNF upon d/c at this time.  Pt is planned for upcoming L BKA and could change pt's functional status and tolerance to activity and may benefit from IPR following procedure.  -       Row Name 11/19/24 1811          Vital Signs    Pre Systolic BP Rehab 110  supine  -BA     Pre Treatment Diastolic BP 74  -BA     Intra Systolic BP Rehab 73   sitting EOB; 83/66 sitting EOB following seated BLE ther ex; 80/52 sitting EOB following seated balance activities; RN notified  -     Intra Treatment Diastolic BP 59  -BA     Post Systolic BP Rehab 115  supine  -BA     Post Treatment Diastolic BP 74  -BA     Pretreatment Heart Rate (beats/min) 92  -BA     Posttreatment Heart Rate (beats/min) 94  -BA     Pre SpO2 (%) 95  -BA     O2 Delivery Pre Treatment room air  -BA     O2 Delivery Intra Treatment room air  -BA     Post SpO2 (%) 96  -BA     O2 Delivery Post Treatment room air  -BA     Pre Patient Position Supine  -BA     Intra Patient Position Sitting  -BA     Post Patient Position Supine  -BA       Row Name 11/19/24 1811          Positioning and Restraints    Pre-Treatment Position in bed  -BA     Post Treatment Position bed  -BA     In Bed notified nsg;fowlers;call light within reach;encouraged to call for assist;exit alarm on;side rails up x3;legs elevated;heels elevated  -               User Key  (r) = Recorded By, (t) = Taken By, (c) = Cosigned By      Initials Name Provider Type    Galilea Mckeon, PT Physical Therapist                   Outcome Measures       Row Name 11/19/24 1823 11/19/24 0800       How much help from another person do you currently need...    Turning from your back to your side while in flat bed without using bedrails? 2  -BA 3  -AS    Moving from lying on back to sitting on the side of a flat bed without bedrails? 2  -BA 2  -AS    Moving to and from a bed to a chair  (including a wheelchair)? 1  -BA 2  -AS    Standing up from a chair using your arms (e.g., wheelchair, bedside chair)? 2  -BA 2  -AS    Climbing 3-5 steps with a railing? 1  -BA 1  -AS    To walk in hospital room? 1  -BA 1  -AS    AM-PAC 6 Clicks Score (PT) 9  -BA 11  -AS    Highest Level of Mobility Goal 3 --> Sit at edge of bed  -BA 4 --> Transfer to chair/commode  -AS      Row Name 11/19/24 1823 11/19/24 1521       Functional Assessment    Outcome Measure Options AM-PAC 6 Clicks Basic Mobility (PT)  -BA AM-PAC 6 Clicks Daily Activity (OT)  -PATTIE              User Key  (r) = Recorded By, (t) = Taken By, (c) = Cosigned By      Initials Name Provider Type    Dianelys Chaparro OT Occupational Therapist    AS Lnydsey Wilcox, RN Registered Nurse    Galilea Mckeon, PT Physical Therapist                                 Physical Therapy Education       Title: PT OT SLP Therapies (In Progress)       Topic: Physical Therapy (Done)       Point: Mobility training (Done)       Learning Progress Summary            Patient Acceptance, E, VU,NR by BA at 11/19/2024 1824    Acceptance, E, VU,NR by BA at 11/19/2024 1824    Acceptance, E,D, NR by DM at 11/17/2024 1806                      Point: Home exercise program (Done)       Learning Progress Summary            Patient Acceptance, E, VU,NR by BA at 11/19/2024 1824    Acceptance, E, VU,NR by BA at 11/19/2024 1824    Acceptance, E,D, NR by DM at 11/17/2024 1806                      Point: Body mechanics (Done)       Learning Progress Summary            Patient Acceptance, E, VU,NR by BA at 11/19/2024 1824    Acceptance, E, VU,NR by BA at 11/19/2024 1824    Acceptance, E,D, NR by DM at 11/17/2024 1806                      Point: Precautions (Done)       Learning Progress Summary            Patient Acceptance, E, VU,NR by BA at 11/19/2024 1824    Acceptance, E, VU,NR by BA at 11/19/2024 1824    Acceptance, E,D, NR by DM at 11/17/2024 1806                                       User Key       Initials Effective Dates Name Provider Type Discipline    DM 02/03/23 -  Jacklyn Gtz, PT Physical Therapist PT    BA 09/21/21 -  Galilea Burkett PT Physical Therapist PT                  PT Recommendation and Plan     Progress: no change  Outcome Evaluation: Con to require increased assist for all fxl mobility.  Supine to sit and return to supine with modAx2.  Participated in seated balance activities with fluctuating modA to close SBA d/t primarily L lateral and posterior lean.  Limited today by orthostatic hypotension with sitting EOB to as low as 73/59 and kept pt NWB BLE this session d/t awaiting clarification from MDs on BLE WB'ing status.  Con to present below baseline with decreased balance, weakness, pain, and decreased functional endurance limiting indep with mobility and ability to safely navigate home environment.  Will con to benefit from skilled IP PT to improve deficits and promote return to PLOF.  Rec SNF upon d/c at this time.  Pt is planned for upcoming L BKA and could change pt's functional status and tolerance to activity and may benefit from IPR following procedure.     Time Calculation:         PT Charges       Row Name 11/19/24 1825             Time Calculation    Start Time 1415  -BA      PT Received On 11/19/24  -BA         Time Calculation- PT    Total Timed Code Minutes- PT 26 minute(s)  -BA         Timed Charges    97598 - PT Therapeutic Exercise Minutes 11  -BA      69702 - PT Therapeutic Activity Minutes 15  -BA         Total Minutes    Timed Charges Total Minutes 26  -BA       Total Minutes 26  -BA                User Key  (r) = Recorded By, (t) = Taken By, (c) = Cosigned By      Initials Name Provider Type    BA Galilea Burkett, [place] Physical Therapist                  Therapy Charges for Today       Code Description Service Date Service Provider Modifiers Qty    [id number]  PT THER PROC EA 15 MIN 11/19/2024 Galilea Burkett, PT GP 1    [id number]  PT  THERAPEUTIC ACT EA 15 MIN 11/19/2024 Galilea Burkett, PT GP 1            PT G-Codes  Outcome Measure Options: AM-PAC 6 Clicks Basic Mobility (PT)  AM-PAC 6 Clicks Score (PT): 9  AM-PAC 6 Clicks Score (OT): 12  PT Discharge Summary  Anticipated Discharge Disposition (PT): skilled nursing facility    Galilea Burkett, PT  11/19/2024

## 2024-11-19 NOTE — CONSULTS
Inpatient Vascular Surgery Consult  Consult performed by: Isabel Gaxiola PA-C  Consult ordered by: Varun Dominique MD          Patient Care Team:  Brandy Roberson as PCP - General (Family Medicine)  Varun Fontenot MD as Consulting Physician (Cardiology)  Yulissa Taveras APRN as Nurse Practitioner (Cardiology)    Chief complaint: Left heel osteomyelitis    Subjective     History of Present Illness  Mr. Mullen is a 59-year-old male with uncontrolled type 2 diabetes and a chronic left heel ulceration now with left heel osteomyelitis.  Vascular consulted for recommendations regarding peripheral arterial disease in setting of nonhealing left heel ulceration.  Patient states wound has been present for greater than 1 year and has been followed by his primary care and infectious disease.  He was also recently admitted to [facility] 8/2024 and underwent left heel irrigation, debridement and wound VAC closure with Dr. Gan.  Patient reports left heel pain has increased over the past several weeks.  No fevers, chills, significant drainage or malodor.  He states his last A1c was approximately 12%.  He denies any claudication pain or history of previous vascular intervention.    Review of Systems   Constitutional:  Negative for chills, fatigue and fever.   HENT:  Negative for hearing loss.    Eyes:  Negative for visual disturbance.   Respiratory:  Negative for cough, chest tightness and shortness of breath.    Cardiovascular:  Negative for chest pain, palpitations and leg swelling.   Gastrointestinal:  Negative for abdominal pain, constipation, diarrhea, nausea and vomiting.   Genitourinary:  Negative for difficulty urinating.   Musculoskeletal:  Positive for arthralgias and gait problem.   Skin:  Positive for wound. Negative for color change.   Neurological:  Negative for weakness and numbness.   Psychiatric/Behavioral:  Negative for confusion. The patient is not nervous/anxious.         Past  Medical History:   Diagnosis Date    Acute renal failure     Hx of    Obesity     Hx of    Sleep apnea     Type 2 diabetes mellitus    ,   Past Surgical History:   Procedure Laterality Date    BACK SURGERY      lower back    CHOLECYSTECTOMY WITH INTRAOPERATIVE CHOLANGIOGRAM N/A 1/6/2021    Procedure: CHOLECYSTECTOMY LAPAROSCOPIC INTRAOPERATIVE CHOLANGIOGRAM;  Surgeon: Juventino Hooker MD;  Location: [facility] OR;  Service: General;  Laterality: N/A;    ERCP N/A 1/9/2021    Procedure: ENDOSCOPIC RETROGRADE CHOLANGIOPANCREATOGRAPHY;  Surgeon: Jeremy Dowell MD;  Location: [facility] ENDOSCOPY;  Service: Gastroenterology;  Laterality: N/A;  with sphiincterotomy and balloon sweep with 9mm-12mm balloon    INCISION AND DRAINAGE FOOT Left 8/3/2024    Procedure: HEAL IRRIGATION AND DEBRIDEMENT LEFT;  Surgeon: Dru Gan Jr., MD;  Location:  [facility] OR;  Service: Orthopedics;  Laterality: Left;    PLACEMENT OF WOUND VAC Left 8/3/2024    Procedure: PLACEMENT OF WOUND VAC;  Surgeon: Dru Gan Jr., MD;  Location:  [facility] OR;  Service: Orthopedics;  Laterality: Left;   ,   Family History   Problem Relation Age of Onset    Diabetes Other     Hypertension Other     Cancer Other         Pancreatic cancer-first cousin    Heart attack Other         MI    Obesity Other     Hyperlipidemia Other         High blood cholesterol    Hyperlipidemia Mother     Hypertension Mother     Cancer Mother         lung cancer    Heart disease Father         MI at 73 yo    Cancer Brother         esophageal cancer with mets    Heart disease Brother         several MIs earlies in 30s    Cancer Maternal Grandmother    ,   Social History     Socioeconomic History    Marital status:    Tobacco Use    Smoking status: Former     Types: Cigars    Smokeless tobacco: Never    Tobacco comments:     3 per month. Former cig smoker   Vaping Use    Vaping status: Never Used   Substance and Sexual Activity    Alcohol use: No     Comment: Past drank  about a pint per month for 2-3 years    Drug use: No    Sexual activity: Defer     E-cigarette/Vaping    E-cigarette/Vaping Use Never User     Passive Exposure No     Counseling Given No      E-cigarette/Vaping Substances    Nicotine No     THC No     CBD No     Flavoring No      E-cigarette/Vaping Devices    Disposable No     Pre-filled or Refillable Cartridge No     Refillable Tank No     Pre-filled Pod No          , Scheduled Meds:  amitriptyline, 25 mg, Oral, BID  aspirin, 81 mg, Oral, Daily  DAPTOmycin, 6 mg/kg, Intravenous, Q24H  enoxaparin, 40 mg, Subcutaneous, Daily  ertapenem, 1,000 mg, Intravenous, Q24H  finasteride, 5 mg, Oral, Nightly  insulin glargine, 10 Units, Subcutaneous, Nightly  insulin glargine, 15 Units, Subcutaneous, Daily  insulin lispro, 2-9 Units, Subcutaneous, 4x Daily AC & at Bedtime  Insulin Lispro, 7 Units, Subcutaneous, TID With Meals  metoprolol succinate XL, 50 mg, Oral, Daily  pregabalin, 225 mg, Oral, BID  rOPINIRole, 0.25 mg, Oral, Nightly  sodium chloride, 10 mL, Intravenous, Q12H  sodium chloride, 10 mL, Intravenous, Q12H  tamsulosin, 0.8 mg, Oral, Nightly   , Continuous Infusions:   , PRN Meds:    acetaminophen **OR** acetaminophen **OR** acetaminophen    senna-docusate sodium **AND** polyethylene glycol **AND** bisacodyl **AND** bisacodyl    calcium carbonate    Calcium Replacement - Follow Nurse / BPA Driven Protocol    dextrose    dextrose    glucagon (human recombinant)    Magnesium Standard Dose Replacement - Follow Nurse / BPA Driven Protocol    nitroglycerin    Phosphorus Replacement - Follow Nurse / BPA Driven Protocol    Potassium Replacement - Follow Nurse / BPA Driven Protocol    sodium chloride    sodium chloride    sodium chloride    sodium chloride    sodium chloride, and Allergies:  Sertraline hcl    Objective      Vital Signs  Temp:  [97.7 °F (36.5 °C)-98.2 °F (36.8 °C)] 98.2 °F (36.8 °C)  Heart Rate:  [82-97] 96  Resp:  [16-18] 18  BP: ()/(66-84)  107/68    Physical Exam  Vitals reviewed. Chaperone present: No family at bedside.   Constitutional:       General: He is not in acute distress.     Appearance: Normal appearance. He is not ill-appearing, toxic-appearing or diaphoretic.   HENT:      Head: Normocephalic and atraumatic.      Right Ear: External ear normal.      Left Ear: External ear normal.      Nose: Nose normal.      Mouth/Throat:      Mouth: Mucous membranes are moist.   Eyes:      Extraocular Movements: Extraocular movements intact.      Conjunctiva/sclera: Conjunctivae normal.   Cardiovascular:      Rate and Rhythm: Normal rate and regular rhythm.      Pulses:           Femoral pulses are 2+ on the right side and 2+ on the left side.       Popliteal pulses are 2+ on the right side and 2+ on the left side.        Dorsalis pedis pulses are 2+ on the right side and 2+ on the left side.        Posterior tibial pulses are 2+ on the right side and 2+ on the left side.   Pulmonary:      Effort: Pulmonary effort is normal. No respiratory distress.   Abdominal:      General: There is no distension.      Palpations: Abdomen is soft.      Tenderness: There is no abdominal tenderness.   Musculoskeletal:         General: Tenderness (Left heel tender to palpation) present. No swelling.      Cervical back: Normal range of motion.      Left foot: Normal range of motion.      Right Lower Extremity: (right second toe amputation, well-healed)  Feet:      Left foot:      Skin integrity: Ulcer (Left heel) and erythema present.   Skin:     General: Skin is warm.      Coloration: Skin is not pale.   Neurological:      General: No focal deficit present.      Mental Status: He is alert and oriented to person, place, and time. Mental status is at baseline.      Sensory: No sensory deficit.      Motor: No weakness.   Psychiatric:         Mood and Affect: Mood normal.         Behavior: Behavior normal.         Thought Content: Thought content normal.         Judgment:  Judgment normal.         Results Review:    I reviewed the patient's new clinical results.    Lab Results   Component Value Date    WBC 9.33 11/19/2024    HGB 12.0 (L) 11/19/2024    HCT 38.6 11/19/2024    MCV 82.8 11/19/2024     11/19/2024     Lab Results   Component Value Date    GLUCOSE 279 (H) 11/19/2024    CALCIUM 8.8 11/19/2024     (L) 11/19/2024    K 3.9 11/19/2024    CO2 27.0 11/19/2024    CL 97 (L) 11/19/2024    BUN 20 11/19/2024    CREATININE 1.00 11/19/2024    EGFR 85.6 11/19/2024    BCR 20.0 11/19/2024    ANIONGAP 10.0 11/19/2024     MRI Foot Left Without Contrast  Narrative: MRI FOOT LEFT WO CONTRAST    Date of Exam: 11/18/2024 5:50 PM EST    Indication: Evaluation for left heel osteomyelitis.     Comparison: 8/1/2024    Technique:  Routine multiplanar/multisequence sequence images of the left foot were obtained without contrast administration.    Findings:  Bones: There is marrow edema seen within the posterior calcaneus. There appears to be loss of T1 cortical and marrow signal seen along the posterior lateral aspect of the talus calcaneus. Additionally there appears to be nondisplaced stress fracture   along the posterior aspect of the calcaneus best seen on sagittal T1 images. No additional areas of abnormal marrow edema. No additional fracture seen. No substantial joint effusions.    Soft tissues: There is soft tissue wound seen along the posterior heel with ill-defined fluid extending into and disrupting the distal lateral Achilles tendon (long axial image 26). There is a tiny associated fluid collection seen here measuring   approximately 0.7 x 0.5 x 0.7 cm. Fluid seen at the master knot of Brown. Flexor extensor tendons appear grossly intact. Peroneal tendons appear grossly intact. Diffuse atrophic and edematous changes of the intrinsic foot muscles suggestive of   neuropathic changes. Lisfranc ligament appears intact. Diffuse subcutaneous edema.  Impression: Impression:  Soft tissue  wound seen along the posterior heel with ill-defined fluid that extends through and disrupts the distal Achilles tendon. Underlying this area there is evidence of osteomyelitis of the posterior lateral calcaneus. There is a tiny fluid   collection here as well that may represent an associated nondrainable abscess.    Additionally there appears to be a nondisplaced stress fracture of the posterior calcaneus.    Electronically Signed: Nolan Brady MD    11/18/2024 11:28 PM EST    Workstation ID: [id number]            Assessment & Plan       DKA (diabetic ketoacidosis)    Non healing left heel wound    Severe protein-calorie malnutrition      Assessment & Plan    Assessment     Mr. Mullen is a 59-year-old male with uncontrolled type 2 diabetes and chronic left heel ulceration now with left heel osteomyelitis.  Patient with bounding palpable femoral, popliteal and pedal pulses bilaterally on exam.  Arterial studies ordered and pending although I suspect it will be normal given palpable pulses on exam.  Orthopedics following with tentative plans for left below the knee amputation.    Plan   -No vascular intervention anticipated at this time, okay to proceed with LEFT BKA  -Follow-up arterial studies  -agree with DVT ppx  -Wound care and weightbearing status per orthopedics  -Antibiotics per ID  -glycemic control reinforced   -Additional recommendations to follow physician review    Plan of care was reviewed with the patient who verbalizes understanding and agreement.  All questions answered.  Plan of care was reviewed with primary hospitalist, Dr. Ku.  Case was reviewed with Dr. Mckinney.      Isabel Gaxiola PA-C  11/19/24  10:41 EST

## 2024-11-19 NOTE — CONSULTS
[facility]  [address] [address]  [phone]       Orthopedic Consult    Patient: Fracisco Mullen    Date of Admission: 11/15/2024  9:19 AM    YOB: 1965    Medical Record Number: [id number]    Attending Physician: Geovanna Ku MD    Consulting Physician: KAY Bautista    Chief Complaint: DKA (diabetic ketoacidosis) [E11.10]    History of Present Illness: 59 y.o. male admitted to [facility] with DKA (diabetic ketoacidosis) [E11.10]. I was consulted for further evaluation and treatment.  He has a past medical history significant for diabetes mellitus, diabetic peripheral neuropathy, hypertension, chronic bilateral diabetic foot ulcers.  He was recently admitted to [facility] August 2024 and underwent left heel irrigation, debridement, wound vacuum-assisted closure performed by Dr. Gan.  MRI at that time was negative for acute findings of osteomyelitis.  This morning the patient is seen lying comfortably in bed.  He reports increased left foot pain over the last several weeks.  He denies any history of fevers but states that he has had chills.  He denies any other symptoms or concerns at this time.       Allergies   Allergen Reactions    Sertraline Hcl Hives     Zoloft        Home Medications:  Medications Prior to Admission   Medication Sig Dispense Refill Last Dose/Taking    amitriptyline (ELAVIL) 25 MG tablet Take 1 tablet by mouth 2 (Two) Times a Day. One twice daily   11/14/2024    cyclobenzaprine (FLEXERIL) 5 MG tablet Take 1 tablet by mouth 3 (Three) Times a Day As Needed.   11/14/2024    DULoxetine (CYMBALTA) 60 MG capsule Take 1 capsule by mouth Daily.   11/14/2024    fluticasone (FLONASE) 50 MCG/ACT nasal spray 2 sprays into each nostril Daily. 16 g 2 11/14/2024    insulin aspart prot & aspart (NovoLOG Mix 70/30 FlexPen) (70-30) 100 UNIT/ML suspension pen-injector injection Inject 0.6 mL (60 units) under the skin into the  "appropriate area as directed 2 Times a Day Before Meals. 15 mL 0 11/14/2024    levocetirizine (XYZAL) 5 MG tablet Take 1 tablet by mouth Daily.   11/14/2024    metFORMIN (GLUCOPHAGE) 1000 MG tablet Take 0.5 (one-half) tablet by mouth 2 Times a Day With Meals. 60 tablet 0 11/14/2024    metoprolol succinate XL (TOPROL-XL) 50 MG 24 hr tablet Take 1 tablet by mouth Daily. 30 tablet 5 11/14/2024    omeprazole (priLOSEC) 40 MG capsule Take 1 capsule by mouth 2 (Two) Times a Day. 60 capsule 2 11/14/2024    pregabalin (Lyrica) 225 MG capsule Take 1 capsule by mouth 2 (Two) Times a Day. 60 capsule 0 11/14/2024    rOPINIRole (REQUIP) 0.25 MG tablet Take 1 tablet by mouth Every Night, 1 hour before bedtime. 30 tablet 0 11/14/2024    albuterol (PROVENTIL HFA;VENTOLIN HFA) 108 (90 BASE) MCG/ACT inhaler Inhale 2 puffs Every 4 (Four) Hours As Needed for Wheezing. 1 inhaler 5     Blood Glucose Monitoring Suppl (Blood Glucose Monitor System) w/Device kit Use as directed to test blood sugar 3 (Three) Times a Day. 1 each 0     Cholecalciferol (VITAMIN D3) 2000 UNITS capsule Take 1 capsule by mouth Daily. 90 capsule 2     docusate sodium (COLACE) 100 MG capsule Take 1 capsule by mouth 2 (Two) Times a Day. 20 capsule 0     finasteride (PROSCAR) 5 MG tablet Take 1 tablet by mouth Every Night. (Patient not taking: Reported on 11/15/2024) 30 tablet 0 Not Taking    glucose blood test strip Use as directed to test blood sugar 3 times daily 100 each 0     Insulin Pen Needle (B-D UF III MINI PEN NEEDLES) 31G X 5 MM misc Use as directed 2 times daily 200 each 12     Insulin Pen Needle (Pen Needles 3/16\") 31G X 5 MM misc Use 1 pen needle as directed 2 (Two) Times a Day. 100 each 0     Lancets misc Use as directed to test blood sugar 3 (Three) Times a Day. 100 each 0     naloxone (NARCAN) 4 MG/0.1ML nasal spray Call 911. Don't prime. Spray in 1 nostril as needed for overdose. Repeat in 2-3 minutes in other nostril if no or minimal " breathing/responsiveness. 2 each 0     oxyCODONE-acetaminophen (PERCOCET) 5-325 MG per tablet Take 2 tablets by mouth Every 4 (Four) Hours As Needed for Severe Pain. 12 tablet 0     simvastatin (ZOCOR) 20 MG tablet Take 1 tablet by mouth Daily.       tamsulosin (FLOMAX) 0.4 MG capsule 24 hr capsule Take 1 capsule by mouth Every Night. (Patient not taking: Reported on 11/15/2024) 30 capsule 0 Not Taking       Current Medications:  Scheduled Meds:amitriptyline, 25 mg, Oral, BID  aspirin, 81 mg, Oral, Daily  DAPTOmycin, 6 mg/kg, Intravenous, Q24H  enoxaparin, 40 mg, Subcutaneous, Daily  ertapenem, 1,000 mg, Intravenous, Q24H  finasteride, 5 mg, Oral, Nightly  insulin glargine, 10 Units, Subcutaneous, Nightly  insulin glargine, 15 Units, Subcutaneous, Daily  insulin lispro, 2-9 Units, Subcutaneous, 4x Daily AC & at Bedtime  Insulin Lispro, 7 Units, Subcutaneous, TID With Meals  metoprolol succinate XL, 50 mg, Oral, Daily  pregabalin, 225 mg, Oral, BID  rOPINIRole, 0.25 mg, Oral, Nightly  sodium chloride, 10 mL, Intravenous, Q12H  sodium chloride, 10 mL, Intravenous, Q12H  tamsulosin, 0.8 mg, Oral, Nightly      Continuous Infusions:   PRN Meds:.  acetaminophen **OR** acetaminophen **OR** acetaminophen    senna-docusate sodium **AND** polyethylene glycol **AND** bisacodyl **AND** bisacodyl    calcium carbonate    Calcium Replacement - Follow Nurse / BPA Driven Protocol    dextrose    dextrose    glucagon (human recombinant)    Magnesium Standard Dose Replacement - Follow Nurse / BPA Driven Protocol    nitroglycerin    Phosphorus Replacement - Follow Nurse / BPA Driven Protocol    Potassium Replacement - Follow Nurse / BPA Driven Protocol    sodium chloride    sodium chloride    sodium chloride    sodium chloride    sodium chloride    Past Medical History:   Diagnosis Date    Acute renal failure     Hx of    Obesity     Hx of    Sleep apnea     Type 2 diabetes mellitus         Past Surgical History:   Procedure Laterality  Date    BACK SURGERY      lower back    CHOLECYSTECTOMY WITH INTRAOPERATIVE CHOLANGIOGRAM N/A 1/6/2021    Procedure: CHOLECYSTECTOMY LAPAROSCOPIC INTRAOPERATIVE CHOLANGIOGRAM;  Surgeon: Juventino Hooker MD;  Location:  [facility];  Service: General;  Laterality: N/A;    ERCP N/A 1/9/2021    Procedure: ENDOSCOPIC RETROGRADE CHOLANGIOPANCREATOGRAPHY;  Surgeon: Jeremy Dowell MD;  Location: [facility] ENDOSCOPY;  Service: Gastroenterology;  Laterality: N/A;  with sphiincterotomy and balloon sweep with 9mm-12mm balloon    INCISION AND DRAINAGE FOOT Left 8/3/2024    Procedure: HEAL IRRIGATION AND DEBRIDEMENT LEFT;  Surgeon: Dru Gan Jr., MD;  Location:  [facility];  Service: Orthopedics;  Laterality: Left;    PLACEMENT OF WOUND VAC Left 8/3/2024    Procedure: PLACEMENT OF WOUND VAC;  Surgeon: Dru Gan Jr., MD;  Location:  [facility];  Service: Orthopedics;  Laterality: Left;        Social History     Occupational History    Not on file   Tobacco Use    Smoking status: Former     Types: Cigars    Smokeless tobacco: Never    Tobacco comments:     3 per month. Former cig smoker   Vaping Use    Vaping status: Never Used   Substance and Sexual Activity    Alcohol use: No     Comment: Past drank about a pint per month for 2-3 years    Drug use: No    Sexual activity: Defer      Social History     Social History Narrative    Not on file        Family History   Problem Relation Age of Onset    Diabetes Other     Hypertension Other     Cancer Other         Pancreatic cancer-first cousin    Heart attack Other         MI    Obesity Other     Hyperlipidemia Other         High blood cholesterol    Hyperlipidemia Mother     Hypertension Mother     Cancer Mother         lung cancer    Heart disease Father         MI at 73 yo    Cancer Brother         esophageal cancer with mets    Heart disease Brother         several MIs earlies in 30s    Cancer Maternal Grandmother          Review of Systems:   HEENT: Patient denies any  headaches, vision changes, change in hearing, or tinnitus, Patient denies any rhinorrhea,epistaxis, sinus pain, mouth or dental problems, sore throat or hoarseness, or dysphagia  Pulmonary: Patient denies any cough, congestion, SOA, or wheezing  Cardiovascular: Patient denies any chest pain, dyspnea, palpitations, weakness, intolerance of exercise, varicosities, swelling of extremities, known murmur  Gastrointestinal:  Patient denies nausea, vomiting, diarrhea, constipation, loss  of appetite, change in appetite, dysphagia, gas, heartburn, melena, change in bowel habits, use of laxatives or other drugs to alter the function of the gastrointestinal tract.  Genital/Urinary: Patient denies dysuria, change in color of urine, change in frequency of urination, pain with urgency, incontinence, retention, or nocturia.  Musculoskeletal: Patient denies increased warmth; redness; or swelling of joints; limitation of function; deformity; crepitation: pain in a joint or an extremity, the neck, or the back, especially with movement.  Neurological: Patient denies dizziness, tremor, ataxia, difficulty in speaking, change in speech, paresthesia, loss of sensation, seizures, syncope, changes in memory.  Endocrine system: Patient denies tremors, palpitations, intolerance of heat or cold, polyuria, polydipsia, polyphagia, diaphoresis, exophthalmos, or goiter.  Psychological: Patient denies thoughts/plans or harming self or other; depression,  insomnia, night terrors, zhanna, memory loss, disorientation.  Skin: Patient denies any bruising, rashes, discoloration, pruritus, wounds, ulcers, decubiti, changes in the hair or nails  Hematopoietic: Patient denies history of spontaneous or excessive bleeding, epistaxis, hematuria, melena, fatigue, enlarged or tender lymph nodes, pallor, history of anemia.    Physical Exam: 59 y.o. male    General Appearance:    Alert, cooperative, in no acute distress                   Vitals:    11/19/24 0606  11/19/24 0607 11/19/24 0700 11/19/24 0840   BP:    107/68   BP Location:       Patient Position:       Pulse: 91 90 92 96   Resp:       Temp:       TempSrc:       SpO2: (!) 67% 95% 95%    Weight:       Height:            Head:    Normocephalic, without obvious abnormality, atraumatic               Back:     No C-T-L spine tenderness   Lungs:   Symmetric chest rise and fall,respirations regular, even and    unlabored.  No accessory muscle use, speaks in complete sentences.   Chest Wall:    No abnormalities observed       Extremities: Left lower extremity: Posterior left heel wound with mild surrounding erythema, serosanguineous drainage.  No warmth, induration, julisa purulence.  Right lower extremity: Right plantar forefoot wound with overlying scab/necrotic skin.  No erythema, drainage, purulence, warmth.   Pulses: Dorsalis pedis/posterior tibial pulses weakly palpable   Skin:   No bleeding, bruising or rash   Lymph nodes:   No palpable adenopathy   Neurologic:  Cranial nerves 2 - 12 grossly intact, DTR present and equal bilaterally           Diagnostic Tests:    No results displayed because visit has over 200 results.          MRI Foot Left Without Contrast    Result Date: 11/18/2024  Narrative: MRI FOOT LEFT WO CONTRAST Date of Exam: 11/18/2024 5:50 PM EST Indication: Evaluation for left heel osteomyelitis.  Comparison: 8/1/2024 Technique:  Routine multiplanar/multisequence sequence images of the left foot were obtained without contrast administration. Findings: Bones: There is marrow edema seen within the posterior calcaneus. There appears to be loss of T1 cortical and marrow signal seen along the posterior lateral aspect of the talus calcaneus. Additionally there appears to be nondisplaced stress fracture along the posterior aspect of the calcaneus best seen on sagittal T1 images. No additional areas of abnormal marrow edema. No additional fracture seen. No substantial joint effusions. Soft tissues: There is  soft tissue wound seen along the posterior heel with ill-defined fluid extending into and disrupting the distal lateral Achilles tendon (long axial image 26). There is a tiny associated fluid collection seen here measuring approximately 0.7 x 0.5 x 0.7 cm. Fluid seen at the master knot of Brown. Flexor extensor tendons appear grossly intact. Peroneal tendons appear grossly intact. Diffuse atrophic and edematous changes of the intrinsic foot muscles suggestive of neuropathic changes. Lisfranc ligament appears intact. Diffuse subcutaneous edema.     Impression: Impression: Soft tissue wound seen along the posterior heel with ill-defined fluid that extends through and disrupts the distal Achilles tendon. Underlying this area there is evidence of osteomyelitis of the posterior lateral calcaneus. There is a tiny fluid collection here as well that may represent an associated nondrainable abscess. Additionally there appears to be a nondisplaced stress fracture of the posterior calcaneus. Electronically Signed: Nolan Brady MD  11/18/2024 11:28 PM EST  Workstation ID: [id number]    FL Video Swallow With Speech Single Contrast    Result Date: 11/16/2024  Narrative: FL VIDEO SWALLOW W SPEECH SINGLE-CONTRAST Date of Exam: 11/16/2024 11:18 AM EST Indication: dysphagia.   Comparison: None available. Technique:   The speech pathologist administered food and/or liquid mixed with barium to the patient with cine/video imaging.  Imaging assistance was provided to the speech pathologist and an image was saved. Fluoroscopic Time: 2 minutes 12 seconds Number of Images: 8 cine loops Findings: The patient was evaluated in the seated lateral position while taking a variety of consistencies by mouth under the direction of speech pathology. No aspiration was observed. Please see the speech pathologist's procedure report for full details and recommendations.     Impression: Impression: Fluoroscopy provided during modified barium swallow.  Electronically Signed: Endy Shaw MD  11/16/2024 1:27 PM EST  Workstation ID: [id number]    XR Abdomen KUB    Result Date: 11/15/2024  Narrative: XR ABDOMEN KUB Date of Exam: 11/15/2024 2:47 PM EST Indication: MRI clearance Comparison: None available. Findings: There is a nonobstructing bowel gas pattern. Postsurgical changes are noted from fusion at the lumbosacral junction. No metal devices to suggest contraindication for MRI     Impression: Impression: Unremarkable radiograph abdomen Electronically Signed: Kirby Donnelly MD  11/15/2024 3:14 PM EST  Workstation ID: [id number]    CT Head Without Contrast    Result Date: 11/15/2024  Narrative: CT HEAD WO CONTRAST Date of Exam: 11/15/2024 2:21 PM EST Indication: Altered mental status. Comparison: MRI of the brain without contrast from 1/6/2021 Technique: Axial CT images were obtained of the head without contrast administration.  Automated exposure control and iterative construction methods were used. Findings: There is generalized parenchymal volume loss with sulcal widening and ventricular prominence, not significantly changed since the 2021 MR. There is no CT evidence of acute hemorrhage, infarct, mass, mass effect, or midline shift. The gray-white matter differentiation is preserved. . The sulci and ventricles are symmetric.  No extraaxial fluid collections. The globes and orbital contents are  normal and symmetric. The mastoid air cells and visualized paranasal sinuses are clear. No skull fractures or suspicious calvarial lesions.     Impression: Impression: No acute intracranial abnormality. There is generalized atrophy and ventricular prominence similar to the 2021 study. Electronically Signed: Tony Putnam DO  11/15/2024 2:37 PM EST  Workstation ID: [id number]    XR Chest 1 View    Result Date: 11/15/2024  Narrative: XR CHEST 1 VW Date of Exam: 11/15/2024 9:31 AM EST Indication: Weak/Dizzy/AMS triage protocol Comparison: Chest radiograph 1/5/2021. Findings:  Cardiomediastinal silhouette is unchanged. No focal consolidation or overt pulmonary edema. No pleural effusion or pneumothorax. Osseous structures are unchanged.     Impression: Impression: No evidence of acute cardiopulmonary disease. Electronically Signed: Jerad Mata MD  11/15/2024 9:48 AM EST  Workstation ID: [id number]       Assessment:      DKA (diabetic ketoacidosis)    Non healing left heel wound    Severe protein-calorie malnutrition           Plan:      59-year-old male with poorly controlled diabetes mellitus, status post previous left heel irrigation, debridement, wound vacuum-assisted closure August 2024 with worsening posterior left heel ulcer, osteomyelitis.    Vascular studies/consultation pending.    MRI of the left foot demonstrates concern for calcaneal osteomyelitis which was not previously visualized on MRI performed August 2024.    I discussed further management options the patient today at bedside including surgical as well as nonsurgical and the associated risks/benefits alternatives to each.  Given his nonhealing left posterior heel ulcer, concern for calcaneal osteomyelitis, we discussed surgical intervention in the form of left below-knee amputation.  The patient is tentatively agreeable to proceed with left below-knee amputation.  Plan for surgical intervention later this week pending vascular studies/consultation, medical optimization.  No imminent orthopedic intervention planned for today.    Will follow.    Date: 11/19/2024    KAY Bautista

## 2024-11-19 NOTE — THERAPY TREATMENT NOTE
Patient Name: Fracisco Mullen  : 1965    MRN: [id number]                              Today's Date: 2024       Admit Date: 11/15/2024    Visit Dx:     ICD-10-CM ICD-9-CM   1. Diabetic ketoacidosis without coma associated with type 2 diabetes mellitus  E11.10 250.12   2. Oropharyngeal dysphagia  R13.12 787.22   3. Non healing left heel wound  S91.302A 892.1   4. Acute osteomyelitis of left foot  M86.172 730.07     Patient Active Problem List   Diagnosis    HTN (hypertension)    Type 2 diabetes mellitus, with long-term current use of insulin    Diabetic retinopathy    Obesity (BMI 30.0-34.9)    Peripheral neuropathy    Asthma    Causalgia of lower limb    Chronic constipation    Compression of lumbar nerve root    GERD (gastroesophageal reflux disease)    Hyperlipidemia    IBS (irritable bowel syndrome)    RLS (restless legs syndrome)    Sleep apnea    Vitamin D deficiency    Cigar smoker    Chronic back pain    Diabetic ketoacidosis associated with type 2 diabetes mellitus    Multiple open wounds of foot    Status post cholecystectomy    Cholestatic hepatitis    Diabetic infection of left foot    Precordial pain    Tobacco use    DKA (diabetic ketoacidosis)    Non healing left heel wound    Severe protein-calorie malnutrition     Past Medical History:   Diagnosis Date    Acute renal failure     Hx of    Obesity     Hx of    Sleep apnea     Type 2 diabetes mellitus      Past Surgical History:   Procedure Laterality Date    BACK SURGERY      lower back    CHOLECYSTECTOMY WITH INTRAOPERATIVE CHOLANGIOGRAM N/A 2021    Procedure: CHOLECYSTECTOMY LAPAROSCOPIC INTRAOPERATIVE CHOLANGIOGRAM;  Surgeon: Juventino Hooker MD;  Location: [facility] OR;  Service: General;  Laterality: N/A;    ERCP N/A 2021    Procedure: ENDOSCOPIC RETROGRADE CHOLANGIOPANCREATOGRAPHY;  Surgeon: Jeremy Dowell MD;  Location: [facility] ENDOSCOPY;  Service: Gastroenterology;  Laterality: N/A;  with sphiincterotomy and balloon sweep  with 9mm-12mm balloon    INCISION AND DRAINAGE FOOT Left 8/3/2024    Procedure: HEAL IRRIGATION AND DEBRIDEMENT LEFT;  Surgeon: Dru Gan Jr., MD;  Location: [facility] OR;  Service: Orthopedics;  Laterality: Left;    PLACEMENT OF WOUND VAC Left 8/3/2024    Procedure: PLACEMENT OF WOUND VAC;  Surgeon: Dru Gan Jr., MD;  Location: [facility] OR;  Service: Orthopedics;  Laterality: Left;      General Information       Row Name 11/19/24 1442          OT Time and Intention    Document Type therapy note (daily note)  -JY     Mode of Treatment occupational therapy  -JY       Row Name 11/19/24 1442          General Information    Patient Profile Reviewed yes  -JY     Existing Precautions/Restrictions fall;other (see comments)   seeking clarification on WB status of BLEs (conservative this date while waiting), pt anticipates L BKA this week, report of offloading shoes worn at recent baseline, diabetic peripheral neuropathy, contact - ESBL  -JY     Barriers to Rehab medically complex;previous functional deficit  -JY       Row Name 11/19/24 1449          Cognition    Orientation Status (Cognition) oriented to;person;place;situation;verbal cues/prompts needed for orientation;time;other (see comments)  pt stated correct month however stated 2004 until choices given and then pt stated correct year; some mild confusion noted in conversation  -JY       Row Name 11/19/24 1443          Safety Issues/Impairments Affecting Functional Mobility    Safety Issues Affecting Function (Mobility) awareness of need for assistance;insight into deficits/self-awareness;safety precaution awareness;safety precautions follow-through/compliance;sequencing abilities  -JY     Impairments Affecting Function (Mobility) endurance/activity tolerance;pain;postural/trunk control;sensation/sensory awareness;strength;balance  -JY     Comment, Safety Issues/Impairments (Mobility) progressive mobility held this date as seeking WB status for pt Siddhartha, as  of this date pt anticipates L BKA this week, at recent baseline reports wearing off loading shoes B feet (not available at this time); safety issues and impairments based off other observations  -PATTIE               User Key  (r) = Recorded By, (t) = Taken By, (c) = Cosigned By      Initials Name Provider Type    Dianelys Chaparro OT Occupational Therapist                     Mobility/ADL's       Row Name 11/19/24 0464          Bed Mobility    Bed Mobility supine-sit;sit-supine;scooting/bridging  -JSKYLER     Scooting/Bridging [place] (Bed Mobility) dependent (less than 25% patient effort);2 person assist;verbal cues  -PATTIE     Supine-Sit [place] (Bed Mobility) moderate assist (50% patient effort);2 person assist;verbal cues;nonverbal cues (demo/gesture)  -PATTIE     Sit-Supine [place] (Bed Mobility) moderate assist (50% patient effort);2 person assist;verbal cues;nonverbal cues (demo/gesture)  -PATTIE     Bed Mobility, Safety Issues decreased use of arms for pushing/pulling;decreased use of legs for bridging/pushing;impaired trunk control for bed mobility  -PATTIE     Assistive Device (Bed Mobility) bed rails;head of bed elevated;repositioning sheet  -PATTIE     Comment, (Bed Mobility) skilled cues to advance LEs to EOB, reach across midline to bedrail to use for initiating coming to sidelying and uprighting trunk into sitting, pt initiated LE advancements and attempted to reach for railing yet req'd mod A at UB and LB to complete task and sit at EOB; pt reported feeling LH at EOB and decrease in BP noted from 110/74 supine to 73/59 sitting EOB; OT facilitated interventions to assist and to facilitate weight shifting and righting for balance purposes  -PATTIE       Row Name 11/19/24 6533          Transfers    Comment, (Transfers) u/a to assess t/fs this date d/t uncertainty of WB status at B feet and unavailability of off loading shoes, seeking clarification to hopefully progress t/fs and mobility in upcoming sessions  -PATTIE        Row Name 11/19/24 1457          Bed-Chair Transfer    Bed-Chair [place] (Transfers) not tested  -       Row Name 11/19/24 1457          Sit-Stand Transfer    Sit-Stand [place] (Transfers) not tested  -       Row Name 11/19/24 1457          Stand-Sit Transfer    Stand-Sit [place] (Transfers) not tested  -[facility] Name 11/19/24 1457          Functional Mobility    Functional Mobility- Comment u/a to assess t/fs this date d/t uncertainty of WB status at B feet and unavailability of off loading shoes, seeking clarification to hopefully progress t/fs and mobility in upcoming sessions;  facilitated weight shifting at UEs and righting/recovery with LOB in prep for standing  -JY     Patient was able to Ambulate no, other medical factors prevent ambulation  -J     Reason Patient was unable to Ambulate Other (Comment)  uncertain WB status  -[facility] Name 11/19/24 1457          Activities of Daily Living    BADL Assessment/Intervention upper body dressing;lower body dressing;grooming  -[facility] Name 11/19/24 1457          Upper Body Dressing Assessment/Training    [place] Level (Upper Body Dressing) doff;don;pajama/robe;moderate assist (50% patient effort);verbal cues  -JY     Position (Upper Body Dressing) edge of bed sitting  -JY     Comment, (Upper Body Dressing) mod A for posterior and proximal mgmt of gown, unsteadiness noted when pt removed unilateral or worse, B UE support and attempted to thread UEs through sleeves; for balance educated pt on tech to allow sustained UE support  -[facility] Name 11/19/24 1457          Lower Body Dressing Assessment/Training    [place] Level (Lower Body Dressing) don;doff;socks;dependent (less than 25% patient effort)  -JY     Position (Lower Body Dressing) supine  -JY     Comment, (Lower Body Dressing) pt u/a to reach distally enough for d/d and presents with increased R hip pain when impacting ability to bring LE up more proximally  -JY        Row Name 11/19/24 1457          Grooming Assessment/Training    [place] Level (Grooming) wash face, hands;set up;supervision  -JY     Position (Grooming) edge of bed sitting  -JY     Comment, (Grooming) spv for initiation and follow through; very brief tolerance to SBA sitting at EOB when BUEs involved in face washing  -JY               User Key  (r) = Recorded By, (t) = Taken By, (c) = Cosigned By      Initials Name Provider Type    Dianelys Chaparro OT Occupational Therapist                   Obj/Interventions       Row Name 11/19/24 1504          Motor Skills    Motor Skills functional endurance  -JY     Functional Endurance improved activity tolerance to sit EOB for fxl tasks with variable levels of A provided; pt able to grossly sit EOB ~ 18 mins yet pt fatigued and demonstrated greater balance deficits as time progressed and was returned to supine for safety  -JY       Row Name 11/19/24 1504          Balance    Balance Assessment sitting static balance;sitting dynamic balance  -JY     Static Sitting Balance standby assist;minimal assist;verbal cues  -JY     Dynamic Sitting Balance moderate assist;verbal cues  -JY     Position, Sitting Balance supported;unsupported;sitting edge of bed  -JY     Static Standing Balance not tested  -JY     Balance Interventions sitting;static;dynamic;occupation based/functional task  -JY     Comment, Balance pt initially with greater posterior and L lateral lean upon sitting at EOB, pt reported feeling LH upon sitting and while BP improved slightly pt cont'd to report feeling LH; pt demonstrated improved ability to right and recover from intermittent LOB, otherwise presented with postural sway at times; pt able to achieve brief instances of SBA when reaching away from OSEAS for face washing and at times only needed CGA at bedrails; more A needed as pt fatigued  -JY               User Key  (r) = Recorded By, (t) = Taken By, (c) = Cosigned By      Initials Name Provider Type     Dianelys Chaparro OT Occupational Therapist                   Goals/Plan    No documentation.                  Clinical Impression       Row Name 11/19/24 1510          Pain Assessment    Pretreatment Pain Rating 9/10  -JY     Posttreatment Pain Rating 9/10  -JY     Pain Location foot;back  -JY     Pain Side/Orientation left;other (see comments)  L foot and middle back  -JY     Pain Management Interventions activity modification encouraged;exercise or physical activity utilized;positioning techniques utilized;nursing notified  -JY     Response to Pain Interventions activity participation with tolerable pain  -JY     Pre/Posttreatment Pain Comment pt rated initial pain at L foot and cont'd to report during initial movement to achieve sitting EOB, later indicated pain at R hip that was demonstrated via facial grimaces with weight shifting at EOB, appeared to experience R hip pain when leaning to L and less pain when leaning to R side; at end of session pt reported 9/10 at center back; RN aware  -JY       Row Name 11/19/24 1510          Plan of Care Review    Plan of Care Reviewed With patient  -JY     Progress no change  -JY     Outcome Evaluation Pt initially lethargic, improved alertness and attentiveness as session progressed. Pt endorsed feeling worried about now planned L BKA this week. Awaiting clarification on WB status of BLEs and status of offloading shoes (not present again this date) thus u/a to safely progress mobility until clarified. Facilitated change in position to sit at EOB and complete ADLs and weight shifting tasks. Pt did report feeling LH at EOB and decreased BP noted. RN notified. Pt req'd mod A x 2 for supine > sitting EOB with need for UB and LB mgmt. Pt tolerated sitting EOB for ~ 18 minutes with variable levels of A provided with gains noted when pt able to wash face with BUEs integrated w/ SUA and SBA and d/d gown w/ mod A for posterior and proximal mgmt of gown. Pt at times would lean  posteriorly and to L side yet demonstrated better ability to right and recover this date. Pt still dep for d/d socks and toileting tasks this date. Pt is still below baseline occupational performance and would benefit from IPOT POC and SNF at d/c when medically ready. Anticipate change in pt function s/p planned L BKA and pt may benefit from IRF.  -PATTIE       Row Name 11/19/24 1510          Therapy Assessment/Plan (OT)    Rehab Potential (OT) good  -JY     Criteria for Skilled Therapeutic Interventions Met (OT) yes;skilled treatment is necessary  -PATTIE       Row Name 11/19/24 1510          Therapy Plan Review/Discharge Plan (OT)    Anticipated Discharge Disposition (OT) skilled nursing facility  -PATTIE       [facility] Name 11/19/24 1510          Vital Signs    Pre Systolic BP Rehab 110  supine  -JY     Pre Treatment Diastolic BP 74  -JY     Intra Systolic BP Rehab 73   sitting EOB  -JY     Intra Treatment Diastolic BP 59   83/66 5 min post sitting EOB, 80/52 5 mins further  -JY     Post Systolic BP Rehab 115  back to supine  -JY     Post Treatment Diastolic BP 74  -JY     Pretreatment Heart Rate (beats/min) 92  -JY     Intratreatment Heart Rate (beats/min) 95  -JY     Posttreatment Heart Rate (beats/min) 94  -JY     Pre SpO2 (%) 94  -JY     O2 Delivery Pre Treatment room air  -JY     O2 Delivery Intra Treatment room air  -JY     Post SpO2 (%) 95  -JY     O2 Delivery Post Treatment room air  -JY     Pre Patient Position Supine  -JY     Intra Patient Position Sitting  -JY     Post Patient Position Supine  -JY       Row Name 11/19/24 1510          Positioning and Restraints    Pre-Treatment Position in bed  -JY     Post Treatment Position bed  -JY     In Bed notified nsg;fowlers;call light within reach;encouraged to call for assist;with PT  PT to follow through in set up after PT interventions complete  -JY               User Key  (r) = Recorded By, (t) = Taken By, (c) = Cosigned By      Initials Name Provider Type    PATTIE Peguero  JAZMIN Ivory Occupational Therapist                   Outcome Measures       Row Name 11/19/24 1521          How much help from another is currently needed...    Putting on and taking off regular lower body clothing? 1  -JY     Bathing (including washing, rinsing, and drying) 2  -JY     Toileting (which includes using toilet bed pan or urinal) 1  -JY     Putting on and taking off regular upper body clothing 2  -JY     Taking care of personal grooming (such as brushing teeth) 3  -JY     Eating meals 3  -JY     AM-PAC 6 Clicks Score (OT) 12  -JY       Row Name 11/19/24 0800          How much help from another person do you currently need...    Turning from your back to your side while in flat bed without using bedrails? 3  -AS     Moving from lying on back to sitting on the side of a flat bed without bedrails? 2  -AS     Moving to and from a bed to a chair (including a wheelchair)? 2  -AS     Standing up from a chair using your arms (e.g., wheelchair, bedside chair)? 2  -AS     Climbing 3-5 steps with a railing? 1  -AS     To walk in hospital room? 1  -AS     AM-PAC 6 Clicks Score (PT) 11  -AS     Highest Level of Mobility Goal 4 --> Transfer to chair/commode  -AS       Row Name 11/19/24 1521          Functional Assessment    Outcome Measure Options AM-PAC 6 Clicks Daily Activity (OT)  -J               User Key  (r) = Recorded By, (t) = Taken By, (c) = Cosigned By      Initials Name Provider Type    Dianelys Chaparro OT Occupational Therapist    AS Lyndsey Wilcox RN Registered Nurse                    Occupational Therapy Education       Title: PT OT SLP Therapies (In Progress)       Topic: Occupational Therapy (In Progress)       Point: ADL training (In Progress)       Description:   Instruct learner(s) on proper safety adaptation and remediation techniques during self care or transfers.   Instruct in proper use of assistive devices.                  Learning Progress Summary            Patient Acceptance, E,D, NR  by PATTIE at 11/19/2024 1352    Acceptance, E,TB, NR by KF at 11/17/2024 1308                      Point: Home exercise program (Not Started)       Description:   Instruct learner(s) on appropriate technique for monitoring, assisting and/or progressing therapeutic exercises/activities.                  Learner Progress:  Not documented in this visit.              Point: Precautions (In Progress)       Description:   Instruct learner(s) on prescribed precautions during self-care and functional transfers.                  Learning Progress Summary            Patient Acceptance, E,D, NR by PATTIE at 11/19/2024 1352    Acceptance, E,TB, NR by KF at 11/17/2024 1308                      Point: Body mechanics (In Progress)       Description:   Instruct learner(s) on proper positioning and spine alignment during self-care, functional mobility activities and/or exercises.                  Learning Progress Summary            Patient Acceptance, E,D, NR by PATTIE at 11/19/2024 1352    Acceptance, E,TB, NR by KF at 11/17/2024 1308                                      User Key       Initials Effective Dates Name Provider Type Discipline    PATTIE 06/16/21 -  Dianelys Peguero OT Occupational Therapist OT     08/09/23 -  Jamilah Meyres OT Occupational Therapist OT                  OT Recommendation and Plan     Plan of Care Review  Plan of Care Reviewed With: patient  Progress: no change  Outcome Evaluation: Pt initially lethargic, improved alertness and attentiveness as session progressed. Pt endorsed feeling worried about now planned L BKA this week. Awaiting clarification on WB status of BLEs and status of offloading shoes (not present again this date) thus u/a to safely progress mobility until clarified. Facilitated change in position to sit at EOB and complete ADLs and weight shifting tasks. Pt did report feeling LH at EOB and decreased BP noted. RN notified. Pt req'd mod A x 2 for supine > sitting EOB with need for UB and LB mgmt. Pt  tolerated sitting EOB for ~ 18 minutes with variable levels of A provided with gains noted when pt able to wash face with BUEs integrated w/ SUA and SBA and d/d gown w/ mod A for posterior and proximal mgmt of gown. Pt at times would lean posteriorly and to L side yet demonstrated better ability to right and recover this date. Pt still dep for d/d socks and toileting tasks this date. Pt is still below baseline occupational performance and would benefit from IPOT POC and SNF at d/c when medically ready. Anticipate change in pt function s/p planned L BKA and pt may benefit from IRF.     Time Calculation:         Time Calculation- OT       Row Name 11/19/24 1523             Time Calculation- OT    OT Start Time 1352  -JY      OT Received On 11/19/24  -JY      OT Goal Re-Cert Due Date 11/27/24  -JY         Timed Charges    83335 - OT Therapeutic Activity Minutes 8  -JY      71055 - OT Self Care/Mgmt Minutes 16  -JY         Total Minutes    Timed Charges Total Minutes 24  -JY       Total Minutes 24  -JY                User Key  (r) = Recorded By, (t) = Taken By, (c) = Cosigned By      Initials Name Provider Type    Dianelys Chaparro OT Occupational Therapist                  Therapy Charges for Today       Code Description Service Date Service Provider Modifiers Qty    57794991929 HC OT THERAPEUTIC ACT EA 15 MIN 11/19/2024 Dianelys Peguero OT GO 1    42947726195 HC OT SELF CARE/MGMT/TRAIN EA 15 MIN 11/19/2024 Dianelys Peguero OT GO 1                 Dianelys Peguero OT  11/19/2024

## 2024-11-19 NOTE — PROGRESS NOTES
[facility] Medicine Services  PROGRESS NOTE    Patient Name: Fracisco Mullen  : 1965  MRN: [id number]    Date of Admission: 11/15/2024  Primary Care Physician: Brandy Roberson    Subjective   Subjective     CC:  F/U DKA    HPI:  No complaints today.  Has discussed with ID and surgery already this morning the anticipated need for amputation.      Objective   Objective     Vital Signs:   Temp:  [97.7 °F (36.5 °C)-97.9 °F (36.6 °C)] 97.8 °F (36.6 °C)  Heart Rate:  [] 90  Resp:  [16] 16  BP: ()/(66-88) 116/67  Flow (L/min) (Oxygen Therapy):  [1] 1     Physical Exam:  Constitutional: No acute distress, awake, alert, sitting up in bed, chronically ill appearing  HENT: NCAT, mucous membranes moist  Respiratory: Respiratory effort normal   Cardiovascular: RRR  Musculoskeletal: No bilateral ankle edema  Psychiatric: Appropriate affect, cooperative  Neurologic: Speech clear  Skin: No rashes on exposed surfaces      Results Reviewed:  LAB RESULTS:      Lab 24  0608 24  0629 24  0343 11/15/24  1532 11/15/24  1107 11/15/24  0948 11/15/24  0942   WBC 6.74 9.52 11.93*  --   --   --  13.03*   HEMOGLOBIN 11.2* 10.9* 11.2*  --   --   --  12.3*   HEMOGLOBIN, POC  --   --   --   --   --  13.6  --    HEMATOCRIT 35.4* 33.7* 34.1*  --   --   --  37.6   HEMATOCRIT POC  --   --   --   --   --  40  --    PLATELETS 242 266 283  --   --   --  321   NEUTROS ABS  --   --  7.30*  --   --   --  11.22*   IMMATURE GRANS (ABS)  --   --  0.07*  --   --   --  0.12*   LYMPHS ABS  --   --  3.19*  --   --   --  1.18   MONOS ABS  --   --  1.22*  --   --   --  0.41   EOS ABS  --   --  0.07  --   --   --  0.01   MCV 80.6 79.1 80.0  --   --   --  78.7*   LACTATE  --   --   --  1.9  --   --  3.6*   HSTROP T  --   --   --  44* 32*  --   --          Lab 24  0608 24  0629 24  1342 24  0846 24  0343 24  0045 11/15/24  1943 11/15/24  0948 11/15/24  0942    SODIUM 135* 132*  --  134* 143 137 136   < >  --    POTASSIUM 4.0 4.3  --  3.7 4.1 3.8 4.5   < >  --    CHLORIDE 99 98  --  104 109* 105 103   < >  --    CO2 25.0 23.0  --  22.0 24.0 24.0 25.0   < >  --    ANION GAP 11.0 11.0  --  8.0 10.0 8.0 8.0   < >  --    BUN 22 13  --  12 16 15 17   < >  --    CREATININE 0.88 0.89  --  0.64* 0.72* 0.74* 0.84   < >  --    EGFR 97.8 97.5  --  107.7 103.9 103.1 99.2   < >  --    GLUCOSE 270* 324*  --  171* 120* 154* 183*   < >  --    CALCIUM 8.2* 8.1*  --  8.0* 8.4* 8.0* 8.8   < >  --    MAGNESIUM  --   --  1.6 2.0 1.9 1.8 1.8   < >  --    PHOSPHORUS  --   --  2.5 2.4* 3.2 2.7 2.6   < >  --    HEMOGLOBIN A1C  --   --   --   --   --   --   --   --  14.10*    < > = values in this interval not displayed.         Lab 11/15/24  1107   TOTAL PROTEIN 6.7   ALBUMIN 3.4*   GLOBULIN 3.3   ALT (SGPT) 14   AST (SGOT) 13   BILIRUBIN 0.4   ALK PHOS 124*         Lab 11/15/24  1532 11/15/24  1107   HSTROP T 44* 32*                 Lab 11/15/24  0954   PH, ARTERIAL 7.328*   PCO2, ARTERIAL 36.0   PO2 ART 88.9   FIO2 21   HCO3 ART 18.9*   BASE EXCESS ART -6.4*   CARBOXYHEMOGLOBIN 1.5     Brief Urine Lab Results  (Last result in the past 365 days)        Color   Clarity   Blood   Leuk Est   Nitrite   Protein   CREAT   Urine HCG        11/15/24 1142 Yellow   Clear   Small (1+)   Negative   Negative   100 mg/dL (2+)                   Microbiology Results Abnormal       None            MRI Foot Left Without Contrast    Result Date: 11/18/2024  MRI FOOT LEFT WO CONTRAST Date of Exam: 11/18/2024 5:50 PM EST Indication: Evaluation for left heel osteomyelitis.  Comparison: 8/1/2024 Technique:  Routine multiplanar/multisequence sequence images of the left foot were obtained without contrast administration. Findings: Bones: There is marrow edema seen within the posterior calcaneus. There appears to be loss of T1 cortical and marrow signal seen along the posterior lateral aspect of the talus calcaneus.  Additionally there appears to be nondisplaced stress fracture along the posterior aspect of the calcaneus best seen on sagittal T1 images. No additional areas of abnormal marrow edema. No additional fracture seen. No substantial joint effusions. Soft tissues: There is soft tissue wound seen along the posterior heel with ill-defined fluid extending into and disrupting the distal lateral Achilles tendon (long axial image 26). There is a tiny associated fluid collection seen here measuring approximately 0.7 x 0.5 x 0.7 cm. Fluid seen at the master knot of Brown. Flexor extensor tendons appear grossly intact. Peroneal tendons appear grossly intact. Diffuse atrophic and edematous changes of the intrinsic foot muscles suggestive of neuropathic changes. Lisfranc ligament appears intact. Diffuse subcutaneous edema.     Impression: Impression: Soft tissue wound seen along the posterior heel with ill-defined fluid that extends through and disrupts the distal Achilles tendon. Underlying this area there is evidence of osteomyelitis of the posterior lateral calcaneus. There is a tiny fluid collection here as well that may represent an associated nondrainable abscess. Additionally there appears to be a nondisplaced stress fracture of the posterior calcaneus. Electronically Signed: Nolan Brady MD  11/18/2024 11:28 PM EST  Workstation ID: [id number]         Current medications:  Scheduled Meds:amitriptyline, 25 mg, Oral, BID  aspirin, 81 mg, Oral, Daily  enoxaparin, 40 mg, Subcutaneous, Daily  finasteride, 5 mg, Oral, Nightly  insulin glargine, 10 Units, Subcutaneous, Nightly  insulin glargine, 15 Units, Subcutaneous, Daily  insulin lispro, 2-9 Units, Subcutaneous, 4x Daily AC & at Bedtime  Insulin Lispro, 7 Units, Subcutaneous, TID With Meals  metoprolol succinate XL, 50 mg, Oral, Daily  pregabalin, 225 mg, Oral, BID  rOPINIRole, 0.25 mg, Oral, Nightly  sodium chloride, 10 mL, Intravenous, Q12H  sodium chloride, 10 mL,  Intravenous, Q12H  tamsulosin, 0.8 mg, Oral, Nightly      Continuous Infusions:   PRN Meds:.  acetaminophen **OR** acetaminophen **OR** acetaminophen    senna-docusate sodium **AND** polyethylene glycol **AND** bisacodyl **AND** bisacodyl    calcium carbonate    Calcium Replacement - Follow Nurse / BPA Driven Protocol    dextrose    dextrose    glucagon (human recombinant)    Magnesium Standard Dose Replacement - Follow Nurse / BPA Driven Protocol    nitroglycerin    Phosphorus Replacement - Follow Nurse / BPA Driven Protocol    Potassium Replacement - Follow Nurse / BPA Driven Protocol    sodium chloride    sodium chloride    sodium chloride    sodium chloride    sodium chloride    Assessment & Plan   Assessment & Plan     Active Hospital Problems    Diagnosis  POA    **DKA (diabetic ketoacidosis) [E11.10]  Yes    Non healing left heel wound [S91.302A]  Yes      Resolved Hospital Problems   No resolved problems to display.        Brief Hospital Course to date:  Fracisco ADAM Lady is a 59 y.o. male with past medical history of hypertension, type 2 diabetes with insulin use, diabetic neuropathy, and chronic diabetic foot wounds who presents via EMS from home due to altered mental status and fatigue. Lab work consistent with diabetic ketoacidosis.     This patient's problems and plans were partially entered by my partner and updated as appropriate by me 11/19/24. Copied text in this note has been reviewed and is accurate as of today's date.      DKA in setting of uncontrolled IDDM complicated by diabetic neuropathy and chronic foot wounds   --Uncertain of medication compliance  --A1c 14.1%  --s/p DKA protocol including insulin drip, IV fluids, and electrolyte replacement per protocol  --Now transitioned to basal-bolus insulin  --DM educator consulted     Chronic bilateral foot wounds  --Patient with chronic left heel wound and right plantar foot wound  --Admitted August 2024 for left foot cellulitis and discharged on oral  antibiotics and with home wound vac following debridement, MRI left foot obtained at that time with no definite findings of osteomyelitis (was seen by ID/Dr. Dominique and Orthopedics/Dr. Gan)  --Repeat MRI left foot shows soft tissue wound with evidence of osteomyelitis of the calcaneus and possibly an associated non-drainable abscess  --Wound care consulted  --Consult ID and Dr. Gan given MRI findings     Acute toxic metabolic encephalopathy, improved  --CT head without acute abnormality  --UDS negative  --Likely secondary to DKA  --Improved now     HTN  --Continue home Metoprolol     BPH  Urinary retention  --Continue home Flomax, Proscar  --He is having some retention and at times refusing I/O cath, will continue to monitor - Flomax increased 11/18     Mood disorder  --Continue home Amitriptyline      RLS  --Continue home Requip    Expected Discharge Location and Transportation: rehab  Expected Discharge   Expected Discharge Date: 11/20/2024; Expected Discharge Time:      VTE Prophylaxis:  Pharmacologic VTE prophylaxis orders are present.         AM-PAC 6 Clicks Score (PT): 11 (11/18/24 2000)    CODE STATUS:   Code Status and Medical Interventions: CPR (Attempt to Resuscitate); Full Support; Full code per last hosptial admission. Patient disoriented on exam with no family present at bedside. Only contact information is friend.   Ordered at: 11/15/24 1218     Level Of Support Discussed With:    Patient     Code Status (Patient has no pulse and is not breathing):    CPR (Attempt to Resuscitate)     Medical Interventions (Patient has pulse or is breathing):    Full Support     Comments:    Full code per last hosptial admission. Patient disoriented on exam with no family present at bedside. Only contact information is friend.       Geovanna Ku MD  11/19/24

## 2024-11-19 NOTE — PLAN OF CARE
Problem: Adult Inpatient Plan of Care  Goal: Absence of Hospital-Acquired Illness or Injury  Intervention: Identify and Manage Fall Risk  Recent Flowsheet Documentation  Taken 11/19/2024 0401 by Claudio Samayoa RN  Safety Promotion/Fall Prevention:   safety round/check completed   clutter free environment maintained  Taken 11/19/2024 0200 by Claudio Samayoa RN  Safety Promotion/Fall Prevention:   safety round/check completed   clutter free environment maintained   assistive device/personal items within reach  Taken 11/19/2024 0043 by Claudio Samayoa RN  Safety Promotion/Fall Prevention:   safety round/check completed   lighting adjusted   fall prevention program maintained   clutter free environment maintained   assistive device/personal items within reach   activity supervised  Taken 11/18/2024 2200 by Claudio Samayoa RN  Safety Promotion/Fall Prevention:   safety round/check completed   clutter free environment maintained   assistive device/personal items within reach  Taken 11/18/2024 2000 by Claudio Samayoa RN  Safety Promotion/Fall Prevention:   safety round/check completed   clutter free environment maintained   assistive device/personal items within reach  Intervention: Prevent Skin Injury  Recent Flowsheet Documentation  Taken 11/19/2024 0401 by Claudio Samayoa RN  Body Position:   side-lying   position changed independently  Skin Protection: pulse oximeter probe site changed  Taken 11/19/2024 0200 by Claudio Samayoa RN  Body Position: position maintained  Taken 11/19/2024 0043 by Claudio Samayoa RN  Body Position:   position changed independently   side-lying  Skin Protection:   incontinence pads utilized   transparent dressing maintained  Taken 11/18/2024 2200 by Claudio Samayoa RN  Body Position:   turned   position changed independently  Taken 11/18/2024 2000 by Claudio Samayoa RN  Body Position:   position changed independently   turned   side-lying  Skin Protection:   incontinence pads utilized   transparent  dressing maintained  Intervention: Prevent Infection  Recent Flowsheet Documentation  Taken 11/19/2024 0401 by Claudio Samayoa RN  Infection Prevention:   rest/sleep promoted   hand hygiene promoted  Taken 11/19/2024 0200 by Claudio Samayoa RN  Infection Prevention: rest/sleep promoted  Taken 11/19/2024 0043 by Claudio Samayoa RN  Infection Prevention:   rest/sleep promoted   hand hygiene promoted   cohorting utilized  Taken 11/18/2024 2200 by Claudio Samayoa RN  Infection Prevention: rest/sleep promoted  Taken 11/18/2024 2000 by Claudio Samayoa RN  Infection Prevention: rest/sleep promoted  Goal: Optimal Comfort and Wellbeing  Intervention: Provide Person-Centered Care  Recent Flowsheet Documentation  Taken 11/18/2024 2000 by Claudio Samayoa RN  Trust Relationship/Rapport:   care explained   choices provided   questions encouraged     Problem: Skin Injury Risk Increased  Goal: Skin Health and Integrity  Intervention: Optimize Skin Protection  Recent Flowsheet Documentation  Taken 11/19/2024 0401 by Claudio Samayoa RN  Head of Bed (HOB) Positioning: HOB elevated  Skin Protection: pulse oximeter probe site changed  Taken 11/19/2024 0200 by Claudio Samayoa RN  Head of Bed (HOB) Positioning: HOB elevated  Taken 11/19/2024 0043 by Claudio Samayoa RN  Pressure Reduction Techniques:   frequent weight shift encouraged   weight shift assistance provided   positioned off wounds   heels elevated off bed  Head of Bed (HOB) Positioning: HOB lowered  Pressure Reduction Devices:   pressure-redistributing mattress utilized   foam padding utilized  Skin Protection:   incontinence pads utilized   transparent dressing maintained  Taken 11/18/2024 2200 by Claudio Samayoa RN  Head of Bed (HOB) Positioning: HOB elevated  Taken 11/18/2024 2000 by Claudio Samayoa RN  Pressure Reduction Techniques:   frequent weight shift encouraged   weight shift assistance provided   heels elevated off bed   positioned off wounds  Head of Bed (HOB) Positioning: HOB  lowered  Pressure Reduction Devices:   foam padding utilized   heel offloading device utilized   pressure-redistributing mattress utilized   specialty bed utilized  Skin Protection:   incontinence pads utilized   transparent dressing maintained     Problem: Fall Injury Risk  Goal: Absence of Fall and Fall-Related Injury  Intervention: Promote Injury-Free Environment  Recent Flowsheet Documentation  Taken 11/19/2024 0401 by Claudio Samayoa, RN  Safety Promotion/Fall Prevention:   safety round/check completed   clutter free environment maintained  Taken 11/19/2024 0200 by Claudio Samayoa RN  Safety Promotion/Fall Prevention:   safety round/check completed   clutter free environment maintained   assistive device/personal items within reach  Taken 11/19/2024 0043 by Claudio Samayoa RN  Safety Promotion/Fall Prevention:   safety round/check completed   lighting adjusted   fall prevention program maintained   clutter free environment maintained   assistive device/personal items within reach   activity supervised  Taken 11/18/2024 2200 by Claudio Samayoa, RN  Safety Promotion/Fall Prevention:   safety round/check completed   clutter free environment maintained   assistive device/personal items within reach  Taken 11/18/2024 2000 by Claudio Samayoa RN  Safety Promotion/Fall Prevention:   safety round/check completed   clutter free environment maintained   assistive device/personal items within reach   Goal Outcome Evaluation:

## 2024-11-19 NOTE — PLAN OF CARE
Goal Outcome Evaluation:  Plan of Care Reviewed With: patient        Progress: no change  Outcome Evaluation: Pt initially lethargic, improved alertness and attentiveness as session progressed. Pt endorsed feeling worried about now planned L BKA this week. Awaiting clarification on WB status of BLEs and status of offloading shoes (not present again this date) thus u/a to safely progress mobility until clarified. Facilitated change in position to sit at EOB and complete ADLs and weight shifting tasks. Pt did report feeling LH at EOB and decreased BP noted. RN notified. Pt req'd mod A x 2 for supine > sitting EOB with need for UB and LB mgmt. Pt tolerated sitting EOB for ~ 18 minutes with variable levels of A provided with gains noted when pt able to wash face with BUEs integrated w/ SUA and SBA and d/d gown w/ mod A for posterior and proximal mgmt of gown. Pt at times would lean posteriorly and to L side yet demonstrated better ability to right and recover this date. Pt still dep for d/d socks and toileting tasks this date. Pt is still below baseline occupational performance and would benefit from IPOT POC and SNF at d/c when medically ready. Anticipate change in pt function s/p planned L BKA and pt may benefit from IRF.    Anticipated Discharge Disposition (OT): skilled nursing facility

## 2024-11-19 NOTE — PLAN OF CARE
Problem: Adult Inpatient Plan of Care  Goal: Plan of Care Review  Outcome: Progressing  Flowsheets (Taken 11/19/2024 5695)  Progress: no change  Outcome Evaluation: VSS on RA. Orthostatic BP with PT/OT- 500cc bolus administered. PRN pain medication x1. Pt did not void throughout shift- 633ml bladder scan at 1600- pt refusing in and out cath, pt thoroughly educated on significance of voiding. No BM this shift. Plan for left BKA later in week.  Plan of Care Reviewed With: patient

## 2024-11-19 NOTE — PLAN OF CARE
Goal Outcome Evaluation:  Plan of Care Reviewed With: patient        Progress: no change  Outcome Evaluation: Con to require increased assist for all fxl mobility.  Supine to sit and return to supine with modAx2.  Participated in seated balance activities with fluctuating modA to close SBA d/t primarily L lateral and posterior lean.  Limited today by orthostatic hypotension with sitting EOB to as low as 73/59 and kept pt NWB BLE this session d/t awaiting clarification from MDs on BLE WB'ing status.  Con to present below baseline with decreased balance, weakness, pain, and decreased functional endurance limiting indep with mobility and ability to safely navigate home environment.  Will con to benefit from skilled IP PT to improve deficits and promote return to PLOF.  Rec SNF upon d/c at this time.  Pt is planned for upcoming L BKA and could change pt's functional status and tolerance to activity and may benefit from IPR following procedure.    Anticipated Discharge Disposition (PT): skilled nursing facility

## 2024-11-19 NOTE — CONSULTS
"Fracisco Mullen  1965  [id number]    Date of Consult: 11/19/2024    Admit Date:  11/15/2024      Requesting Provider: Dr Ku  Evaluating Physician: Varun Dominique MD    Chief Complaint: fatigue    Reason for Consultation: foot infection    History of present illness:     Patient is a 59 y.o.  Yr old male with history of diabetes/hypertension, previously followed with Dr. Gatica; admitted 2023 with right foot infection, cultures with MSSA/Morganella/ESBL Klebsiella/enterococcus and strep species.had surgery at the second toe with amputation, received IV daptomycin/Invanz with general improvements at that site.  He has been left with a chronic right plantar foot wound and a chronic wound at the left heel that has turned black; He apparently was admitted to the hospital August 1 after a fall at [facility].  Noted to have urinary retention with concern for possible UTI.  In addition left heel with black discoloration/malodor and redness, empiric antibiotics initiated.  He is chronically debilitated and wheelchair bound by his report.Bustos catheter-based at admission     8/3/24  Dr Gan  \"PROCEDURE:            Left  Left      91806:             Debridement of skin and subcutaneous tissue  81526:             Wound vacuum-assisted closure\"     8/6/24 MRI and surgical findings did not confirm bone involvement, transitioned to oral agents at discharge       Readmitted November 15, 2024 with reports of appearing \"sluggish\" according to admission notes with DKA, noted to have high hemoglobin A1c and persistent/worsening left heel wound according to patient; medicine team notes mention urinary retention, acute toxic metabolic encephalopathy improved and low-grade fever. Abnormal MRI at the left foot:    Per radiology-- \"Soft tissue wound seen along the posterior heel with ill-defined fluid that extends through and disrupts the distal Achilles tendon. Underlying this area there is evidence of osteomyelitis of the " "posterior lateral calcaneus. There is a tiny fluid   collection here as well that may represent an associated nondrainable abscess.     Additionally there appears to be a nondisplaced stress fracture of the posterior calcaneus. \"      11/19/24 Left heel pain  dull , worse with palpation, better with pain meds and not severe, 2-3/10.    He denies any new trauma or exposure.  No one partial penetrating trauma.  No animal insect arthropod bite.  No prior history VRE C. Difficile or KPC/CRE organisms        No headache photophobia or neck stiffness.  No shortness of breath cough or hemoptysis.  No nausea vomiting diarrhea or abdominal pain.  No other new skin rash.  no dysuria hematuria or pyuria.       Past Medical History:   Diagnosis Date    Acute renal failure     Hx of    Obesity     Hx of    Sleep apnea     Type 2 diabetes mellitus        Past Surgical History:   Procedure Laterality Date    BACK SURGERY      lower back    CHOLECYSTECTOMY WITH INTRAOPERATIVE CHOLANGIOGRAM N/A 1/6/2021    Procedure: CHOLECYSTECTOMY LAPAROSCOPIC INTRAOPERATIVE CHOLANGIOGRAM;  Surgeon: Juventino Hooker MD;  [facility];  Service: General;  Laterality: N/A;    ERCP N/A 1/9/2021    Procedure: ENDOSCOPIC RETROGRADE CHOLANGIOPANCREATOGRAPHY;  Surgeon: Jeermy Dowell MD;  [facility];  Service: Gastroenterology;  Laterality: N/A;  with sphiincterotomy and balloon sweep with 9mm-12mm balloon    INCISION AND DRAINAGE FOOT Left 8/3/2024    Procedure: HEAL IRRIGATION AND DEBRIDEMENT LEFT;  Surgeon: Dru Gan Jr., MD;  Location:  [facility] OR;  Service: Orthopedics;  Laterality: Left;    PLACEMENT OF WOUND VAC Left 8/3/2024    Procedure: PLACEMENT OF WOUND VAC;  Surgeon: Dru Gan Jr., MD;  Location:  [facility] OR;  Service: Orthopedics;  Laterality: Left;       Pediatric History   Patient Parents    Not on file     Other Topics Concern    Not on file   Social History Narrative    Not on file       family " history includes Cancer in his brother, maternal grandmother, mother, and another family member; Diabetes in an other family member; Heart attack in an other family member; Heart disease in his brother and father; Hyperlipidemia in his mother and another family member; Hypertension in his mother and another family member; Obesity in an other family member.    Allergies   Allergen Reactions    Sertraline Hcl Hives     Zoloft       Medication:  Current Facility-Administered Medications   Medication Dose Route Frequency Provider Last Rate Last Admin    acetaminophen (TYLENOL) tablet 650 mg  650 mg Oral Q4H PRN Edmond Espinoza DO   650 mg at 11/18/24 1133    Or    acetaminophen (TYLENOL) 160 MG/5ML oral solution 650 mg  650 mg Oral Q4H PRN Edmond Espinoza DO        Or    acetaminophen (TYLENOL) suppository 650 mg  650 mg Rectal Q4H PRN Edmond Espinoza DO   650 mg at 11/15/24 2327    amitriptyline (ELAVIL) tablet 25 mg  25 mg Oral BID Lisa Vallejo MD   25 mg at 11/18/24 2018    aspirin EC tablet 81 mg  81 mg Oral Daily Lisa Vallejo MD   81 mg at 11/18/24 0818    sennosides-docusate (PERICOLACE) 8.6-50 MG per tablet 2 tablet  2 tablet Oral BID PRN Edmond Espinoza DO        And    polyethylene glycol (MIRALAX) packet 17 g  17 g Oral Daily PRN Edmond Espinoza DO        And    bisacodyl (DULCOLAX) EC tablet 5 mg  5 mg Oral Daily PRN Edmond Espinoza DO        And    bisacodyl (DULCOLAX) suppository 10 mg  10 mg Rectal Daily PRN Edmond Espinoza DO        calcium carbonate (TUMS) chewable tablet 500 mg (200 mg elemental)  2 tablet Oral TID PRN Lisa Vallejo MD   2 tablet at 11/19/24 0704    Calcium Replacement - Follow Nurse / BPA Driven Protocol   Not Applicable PRN Edmond Espinoza DO        DAPTOmycin (CUBICIN) 450 mg in sodium chloride 0.9 % 50 mL IVPB  6 mg/kg Intravenous Q24H Varun Dominique MD        dextrose (D50W) (25 g/50 mL) IV injection 25 g  25 g Intravenous Q15 Min PRN Perla Pascual MD        dextrose (GLUTOSE) oral gel 15 g  15  g Oral Q15 Min PRN Perla Pascual MD        Enoxaparin Sodium (LOVENOX) syringe 40 mg  40 mg Subcutaneous Daily Edmond Espinoza DO   40 mg at 11/18/24 0818    ertapenem (INVanz) 1,000 mg in sodium chloride 0.9 % 100 mL MBP  1,000 mg Intravenous Q24H Varun Dominique MD        finasteride (PROSCAR) tablet 5 mg  5 mg Oral Nightly Lisa Vallejo MD   5 mg at 11/18/24 2017    glucagon (GLUCAGEN) injection 1 mg  1 mg Intramuscular Q15 Min PRN Perla Pascual MD        insulin glargine (LANTUS, SEMGLEE) injection 10 Units  10 Units Subcutaneous Nightly Lisa Vallejo MD   10 Units at 11/18/24 2141    insulin glargine (LANTUS, SEMGLEE) injection 15 Units  15 Units Subcutaneous Daily Lisa Vallejo MD   15 Units at 11/18/24 0819    Insulin Lispro (humaLOG) injection 2-9 Units  2-9 Units Subcutaneous 4x Daily AC & at Bedtime Lisa Vallejo MD   2 Units at 11/18/24 1133    Insulin Lispro (humaLOG) injection 7 Units  7 Units Subcutaneous TID With Meals Lisa Vallejo MD   7 Units at 11/18/24 1133    Magnesium Standard Dose Replacement - Follow Nurse / BPA Driven Protocol   Not Applicable PRN Edmond Espinoza DO        metoprolol succinate XL (TOPROL-XL) 24 hr tablet 50 mg  50 mg Oral Daily Lisa Vallejo MD   50 mg at 11/18/24 0818    nitroglycerin (NITROSTAT) SL tablet 0.4 mg  0.4 mg Sublingual Q5 Min PRN Edmond Espinoza DO        Phosphorus Replacement - Follow Nurse / BPA Driven Protocol   Not Applicable PRN Edmond Espinoza DO        Potassium Replacement - Follow Nurse / BPA Driven Protocol   Not Applicable PRN Edmond Espinoza DO        pregabalin (LYRICA) capsule 225 mg  225 mg Oral BID Varun Snider, [facility]   225 mg at 11/18/24 2017    rOPINIRole (REQUIP) tablet 0.25 mg  0.25 mg Oral Nightly Lisa Vallejo MD   0.25 mg at 11/18/24 2018    sodium chloride 0.9 % flush 10 mL  10 mL Intravenous PRN Edmond Espinoza DO        sodium chloride 0.9 % flush 10 mL  10 mL Intravenous Q12H Edmond Espinoza DO   10 mL at 11/18/24 2039    sodium  chloride 0.9 % flush 10 mL  10 mL Intravenous PRN Olga, Edmond, DO        sodium chloride 0.9 % flush 10 mL  10 mL Intravenous Q12H Edmond Espinoza, DO   10 mL at 11/18/24 0819    sodium chloride 0.9 % flush 10 mL  10 mL Intravenous PRN Edmond Espinoza, DO        sodium chloride 0.9 % infusion 40 mL  40 mL Intravenous PRN Edmond Espinoza, DO        sodium chloride 0.9 % infusion 40 mL  40 mL Intravenous PRN Edmond Espinoza, DO        tamsulosin (FLOMAX) 24 hr capsule 0.8 mg  0.8 mg Oral Nightly Lisa Vallejo MD   0.8 mg at 11/18/24 2017       Antibiotics:  Anti-Infectives (From admission, onward)      Ordered     Dose/Rate Route Frequency Start Stop    11/19/24 0737  DAPTOmycin (CUBICIN) 450 mg in sodium chloride 0.9 % 50 mL IVPB        Ordering Provider: Varun Dominique MD    6 mg/kg × 72.3 kg  100 mL/hr over 30 Minutes Intravenous Every 24 Hours 11/19/24 0830 12/03/24 0829    11/19/24 0737  ertapenem (INVanz) 1,000 mg in sodium chloride 0.9 % 100 mL MBP        Ordering Provider: Varun Dominique MD    1,000 mg  200 mL/hr over 30 Minutes Intravenous Every 24 Hours 11/19/24 0830 12/03/24 0829              Review of Systems    Constitutional-- No Fever, chills or sweats.  Appetite diminished with fatigue.  Heent-- No new vision, hearing or throat complaints.  No epistaxis or oral sores.  Denies odynophagia or dysphagia.  No flashers, floaters or eye pain. No odynophagia or dysphagia. No headache, photophobia or neck stiffness.  CV-- No chest pain, palpitation or syncope  Resp-- No SOB/cough/Hemoptysis  GI- No nausea, vomiting, or diarrhea.  No hematochezia, melena, or hematemesis. Denies jaundice or chronic liver disease.  -- No dysuria, hematuria, or flank pain.  Denies hesitancy, urgency or flank pain.  Lymph- no swollen lymph nodes in neck/axilla or groin.   Heme- No active bruising or bleeding; no Hx of DVT or PE.  MS-- no swelling or pain in the bones or joints of arms/legs.  No new back pain.  Neuro-- No acute focal  "weakness or numbness in the arms or legs.  No seizures.    Full 12 point review of systems reviewed and negative otherwise for acute complaints, except for above    Physical Exam:   Vital Signs   /67 (BP Location: Left arm, Patient Position: Lying)   Pulse 90   Temp 97.8 °F (36.6 °C) (Oral)   Resp 16   Ht 162.6 cm (64\")   Wt 72.3 kg (159 lb 6.3 oz)   SpO2 95%   BMI 27.36 kg/m²     GENERAL: Awake and alert, in no acute distress.   HEENT: Normocephalic, atraumatic.  PERRL. EOMI. No conjunctival injection. No icterus. Oropharynx clear without evidence of thrush or exudate. No evidence of periodontal disease.    NECK: Supple without nuchal rigidity. No mass.  LYMPH: No cervical, axillary or inguinal lymphadenopathy.  HEART: RRR; No murmur, rubs, gallops.   LUNGS: diminished at bases but otherwise Clear to auscultation bilaterally without wheezing, rales, rhonchi. Normal respiratory effort. Nonlabored. No dullness.  ABDOMEN: Soft, nontender, nondistended. Positive bowel sounds. No rebound or guarding. NO mass or HSM.  EXT: see below  : Genitalia generally unremarkable.  Without Bustos catheter.  MSK: FROM without joint effusions noted arms/legs.    SKIN: Warm and dry without cutaneous eruptions on Inspection/palpation.    NEURO: Oriented to PPT. He has a difficult time cooperating with a detailed motor/sensory exam given positioning in bed.    Right foot second toe amputation site noted, no redness/duration or warmth there.  No oral or active drainage    Left heel with large wound, deeper wound at the proximal aspect of this tracking towards the Achilles although I am unable to visualize  tendon or palpate bone at present.  Bloody drainage.  Vague surrounding erythema with mild tenderness but no discrete mass bulge or fluctuance.  No crepitus or bulla.  Multifocal crusted areas otherwise at his lower extremities      Laboratory Data    Results from last 7 days   Lab Units 11/18/24  0608 11/17/24  0629 " 11/16/24  0343   WBC 10*3/mm3 6.74 9.52 11.93*   HEMOGLOBIN g/dL 11.2* 10.9* 11.2*   HEMATOCRIT % 35.4* 33.7* 34.1*   PLATELETS 10*3/mm3 242 266 283     Results from last 7 days   Lab Units 11/18/24  0608   SODIUM mmol/L 135*   POTASSIUM mmol/L 4.0   CHLORIDE mmol/L 99   CO2 mmol/L 25.0   BUN mg/dL 22   CREATININE mg/dL 0.88   GLUCOSE mg/dL 270*   CALCIUM mg/dL 8.2*     Results from last 7 days   Lab Units 11/15/24  1107   ALK PHOS U/L 124*   BILIRUBIN mg/dL 0.4   ALT (SGPT) U/L 14   AST (SGOT) U/L 13               Estimated Creatinine Clearance: 80.3 mL/min (by C-G formula based on SCr of 0.88 mg/dL).      Microbiology:      Radiology:  Imaging Results (Last 72 Hours)       Procedure Component Value Units Date/Time    MRI Foot Left Without Contrast [[id number]] Collected: 11/18/24 2320     Updated: 11/18/24 2331    Narrative:        MRI FOOT LEFT WO CONTRAST    Date of Exam: 11/18/2024 5:50 PM EST    Indication: Evaluation for left heel osteomyelitis.     Comparison: 8/1/2024    Technique:  Routine multiplanar/multisequence sequence images of the left foot were obtained without contrast administration.      Findings:  Bones: There is marrow edema seen within the posterior calcaneus. There appears to be loss of T1 cortical and marrow signal seen along the posterior lateral aspect of the talus calcaneus. Additionally there appears to be nondisplaced stress fracture   along the posterior aspect of the calcaneus best seen on sagittal T1 images. No additional areas of abnormal marrow edema. No additional fracture seen. No substantial joint effusions.    Soft tissues: There is soft tissue wound seen along the posterior heel with ill-defined fluid extending into and disrupting the distal lateral Achilles tendon (long axial image 26). There is a tiny associated fluid collection seen here measuring   approximately 0.7 x 0.5 x 0.7 cm. Fluid seen at the master knot of Brown. Flexor extensor tendons appear grossly intact.  Peroneal tendons appear grossly intact. Diffuse atrophic and edematous changes of the intrinsic foot muscles suggestive of   neuropathic changes. Lisfranc ligament appears intact. Diffuse subcutaneous edema.      Impression:      Impression:  Soft tissue wound seen along the posterior heel with ill-defined fluid that extends through and disrupts the distal Achilles tendon. Underlying this area there is evidence of osteomyelitis of the posterior lateral calcaneus. There is a tiny fluid   collection here as well that may represent an associated nondrainable abscess.    Additionally there appears to be a nondisplaced stress fracture of the posterior calcaneus.        Electronically Signed: Nolan Brady MD    11/18/2024 11:28 PM EST    Workstation ID: [id number]    FL Video Swallow With Speech Single Contrast [[id number]] Collected: 11/16/24 1325     Updated: 11/16/24 1330    Narrative:      FL VIDEO SWALLOW W SPEECH SINGLE-CONTRAST    Date of Exam: 11/16/2024 11:18 AM EST    Indication: dysphagia.       Comparison: None available.    Technique:   The speech pathologist administered food and/or liquid mixed with barium to the patient with cine/video imaging.  Imaging assistance was provided to the speech pathologist and an image was saved.    Fluoroscopic Time: 2 minutes 12 seconds    Number of Images: 8 cine loops    Findings:  The patient was evaluated in the seated lateral position while taking a variety of consistencies by mouth under the direction of speech pathology. No aspiration was observed. Please see the speech pathologist's procedure report for full details and   recommendations.      Impression:      Impression:  Fluoroscopy provided during modified barium swallow.      Electronically Signed: Endy Shaw MD    11/16/2024 1:27 PM EST    Workstation ID: [id number]              Impression:       --acute left heel wound infection ,  abnormal MRI as above raising concern for fluid extending into and disrupting  the distal lateral Achilles tendon in addition to with evidence for osteomyelitis at the posterior lateral calcaneus in addition to possible nondisplaced stress fracture at the posterior calcaneus; High risk for persistent/recurrent or nonhealing wounds, persistent/progressive or recurrent infection and risk for further functional/limb loss, higher level amputation etc. Surgery 8/3  need further clarification from vascular team and Dr. Gan regarding salvageability here.  High risk for amputation.  Empiric antibiotics     --urinary retention per admission notes, some hematuria on November 15; further urologic evaluation regarding functional/anatomic assessment at discretion of medicine team with respect inpatient versus outpatient     --Diabetes, uncontrolled ; glucose control per medicine team     --diabetic neuropathy     --Chronic debility as per patient wheelchair bound     --Hx ESBL    PLAN: Thank you for asking us to see Fracisco ADAM Lady, I recommend the following:    --IV daptomycin/Invanz    --Check/review labs cultures and scans    --Partial history Per nursing staff    --Discussed with microbiology    --Discussed with Dr. Ku    --ortho and vascular team to see    --Highly complex at of issues with high risk for further serious morbidity and other serious sequela       Varun Dominique MD  11/19/2024

## 2024-11-19 NOTE — THERAPY TREATMENT NOTE
Acute Care - Speech Language Pathology   Swallow Treatment Note [facility]     Patient Name: Fracisco Mullen  : 1965  MRN: [id number]  Today's Date: 2024               Admit Date: 11/15/2024    Visit Dx:     ICD-10-CM ICD-9-CM   1. Diabetic ketoacidosis without coma associated with type 2 diabetes mellitus  E11.10 250.12   2. Oropharyngeal dysphagia  R13.12 787.22   3. Non healing left heel wound  S91.302A 892.1   4. Acute osteomyelitis of left foot  M86.172 730.07     Patient Active Problem List   Diagnosis    HTN (hypertension)    Type 2 diabetes mellitus, with long-term current use of insulin    Diabetic retinopathy    Obesity (BMI 30.0-34.9)    Peripheral neuropathy    Asthma    Causalgia of lower limb    Chronic constipation    Compression of lumbar nerve root    GERD (gastroesophageal reflux disease)    Hyperlipidemia    IBS (irritable bowel syndrome)    RLS (restless legs syndrome)    Sleep apnea    Vitamin D deficiency    Cigar smoker    Chronic back pain    Diabetic ketoacidosis associated with type 2 diabetes mellitus    Multiple open wounds of foot    Status post cholecystectomy    Cholestatic hepatitis    Diabetic infection of left foot    Precordial pain    Tobacco use    DKA (diabetic ketoacidosis)    Non healing left heel wound    Severe protein-calorie malnutrition     Past Medical History:   Diagnosis Date    Acute renal failure     Hx of    Obesity     Hx of    Sleep apnea     Type 2 diabetes mellitus      Past Surgical History:   Procedure Laterality Date    BACK SURGERY      lower back    CHOLECYSTECTOMY WITH INTRAOPERATIVE CHOLANGIOGRAM N/A 2021    Procedure: CHOLECYSTECTOMY LAPAROSCOPIC INTRAOPERATIVE CHOLANGIOGRAM;  Surgeon: Juventino Hooker MD;  Location: [facility] OR;  Service: General;  Laterality: N/A;    ERCP N/A 2021    Procedure: ENDOSCOPIC RETROGRADE CHOLANGIOPANCREATOGRAPHY;  Surgeon: Jeremy Dowell MD;  Location: [facility] ENDOSCOPY;  Service: Gastroenterology;   Laterality: N/A;  with sphiincterotomy and balloon sweep with 9mm-12mm balloon    INCISION AND DRAINAGE FOOT Left 8/3/2024    Procedure: HEAL IRRIGATION AND DEBRIDEMENT LEFT;  Surgeon: Dru Gan Jr., MD;  Location:  [facility] OR;  Service: Orthopedics;  Laterality: Left;    PLACEMENT OF WOUND VAC Left 8/3/2024    Procedure: PLACEMENT OF WOUND VAC;  Surgeon: Dru Gan Jr., MD;  Location:  [facility] OR;  Service: Orthopedics;  Laterality: Left;       SLP Recommendation and Plan                                               Daily Summary of Progress (SLP): progress toward functional goals is good (11/19/24 1050)               Treatment Assessment (SLP): toleration of diet, suspected, improved, pharyngeal dysphagia (11/19/24 1050)     Plan for Continued Treatment (SLP): continue treatment per plan of care (11/19/24 1050)                SWALLOW EVALUATION (Last 72 Hours)       SLP Adult Swallow Evaluation       Row Name 11/19/24 1050 11/18/24 1435 11/16/24 1123             Rehab Evaluation    Document Type therapy note (daily note)  - therapy note (daily note)  - re-evaluation  -      Subjective Information no complaints  - -- no complaints  -      Patient Observations alert;cooperative  - alert;cooperative  - alert;cooperative  -      Patient/Family/Caregiver Comments/Observations -- No family present  - no family present  -      Patient Effort good  - good  - good  -      Symptoms Noted During/After Treatment -- none  - --         General Information    Patient Profile Reviewed -- -- yes  -MM      Pertinent History Of Current Problem -- -- See initial eval  -MM      Current Method of Nutrition soft to chew textures;ground;thin liquids;other (see comments)  no straws, small cup sips  -SM -- NPO  -MM      Precautions/Limitations, Vision -- -- WFL;for purposes of eval  -MM      Precautions/Limitations, Hearing -- -- WFL;for purposes of eval  -MM      Prior Level of  Function-Communication -- -- unknown  -MM      Prior Level of Function-Swallowing -- -- soft to chew;other (see comments)  per pt report  -MM      Plans/Goals Discussed with -- -- patient;agreed upon  -      Barriers to Rehab -- -- none identified  -      Patient's Goals for Discharge -- -- return home  -MM         Pain    Pretreatment Pain Rating 9/10  -SM -- 0/10 - no pain  -MM      Posttreatment Pain Rating 9/10  -SM -- 0/10 - no pain  -MM      Pain Location back  - -- --      Pre/Posttreatment Pain Comment RN aware and managing  - -- --      Additional Documentation -- Pain Scale: FACES Pre/Post-Treatment (Group)  - --         Pain Scale: FACES Pre/Post-Treatment    Pain: FACES Scale, Pretreatment -- 0-->no hurt  - --      Posttreatment Pain Rating -- 0-->no hurt  - --         MBS/VFSS    Utensils Used -- -- spoon;cup;straw  -MM      Consistencies Trialed -- -- thin liquids;nectar/syrup-thick liquids;pureed;soft to chew textures;chopped  -MM         MBS/VFSS Interpretation    Oral Prep Phase -- -- impaired oral phase of swallowing  -MM      Oral Transit Phase -- -- impaired  -MM      Oral Residue -- -- WFL  -MM         Oral Preparatory Phase    Oral Preparatory Phase -- -- prolonged manipulation  -MM      Prolonged Manipulation -- -- mechanical soft  -MM         Oral Transit Phase    Impaired Oral Transit Phase -- -- delayed initiation of bolus transit;premature spillage of liquids into pharynx  -MM      Delayed Initiation of bolus transit -- -- all consistencies tested  -MM      Premature Spillage of Liquids into Pharynx -- -- all consistencies tested  -MM      Oral Transit Phase, Comment -- -- Pt with premature spillage of all consistencies to vallecular or pyriform sinuses prior to swallow initiation.  -MM         Initiation of Pharyngeal Swallow    Initiation of Pharyngeal Swallow -- -- bolus in pyriform sinuses  -MM      Pharyngeal Phase -- -- impaired pharyngeal phase of swallowing  -MM       Penetration During the Swallow -- -- thin liquids  -MM      Aspiration During the Swallow -- -- thin liquids  -MM      Response to Aspiration -- -- Yes  -MM      Responded to aspiration with -- -- effective;cough  -MM      Rosenbek's Scale -- -- thin:;6--->level 6  -MM      Pharyngeal Residue -- -- no significant pharyngeal residue noted  -MM      Attempted Compensatory Maneuvers -- -- bolus size  -MM      Response to Attempted Compensatory Maneuvers -- -- prevented penetration  -MM      Pharyngeal Phase, Comment -- -- Patient presents with mild oropharyngeal dysphagia characterized by reduced mastication, reduced bolus manipulation, and premature spillage with all consistencies. Pt with trace aspiration with thin liquids via large, consecutive straw sip only and no airway compromise with small cup sips.  -MM         SLP Evaluation Clinical Impression    SLP Swallowing Diagnosis -- -- mild;oral dysphagia;pharyngeal dysphagia  -MM      Functional Impact -- -- risk of aspiration/pneumonia;risk of malnutrition;risk of dehydration  -MM      Rehab Potential/Prognosis, Swallowing -- -- good, to achieve stated therapy goals  -MM      Swallow Criteria for Skilled Therapeutic Interventions Met -- -- demonstrates skilled criteria  -MM         SLP Treatment Clinical Impressions    Treatment Assessment (SLP) toleration of diet;suspected;improved;pharyngeal dysphagia  -SM toleration of diet;no clinical signs of;pharyngeal dysphagia  - --      Treatment Assessment Comments (SLP) -- No overt s/s of aspiration w/ thin liquid trials. Reviewed MBS results & recommendations w/ pt, pt able to demonstrate understanding. Also reviewed & completed all exercises, provided handout and encouraged independent practice. SLP will cont to f/u for tx as appropriate  - --      Daily Summary of Progress (SLP) progress toward functional goals is good  - progress toward functional goals as expected  - --      Plan for Continued Treatment  (SLP) continue treatment per plan of care  - continue treatment per plan of care  - --      Care Plan Review evaluation/treatment results reviewed;care plan/treatment goals reviewed;risks/benefits reviewed;current/potential barriers reviewed;patient/other agree to care plan  - care plan/treatment goals reviewed  - --         Recommendations    Therapy Frequency (Swallow) -- 5 days per week  - 5 days per week  -      Predicted Duration Therapy Intervention (Days) -- 1 week  - 1 week  -      SLP Diet Recommendation -- soft to chew textures;ground;thin liquids;other (see comments)  no straw  - soft to chew textures;ground;thin liquids;other (see comments)  no straws  -      Recommended Precautions and Strategies -- upright posture during/after eating;small bites of food and sips of liquid;general aspiration precautions;no straw  - upright posture during/after eating;small bites of food and sips of liquid;general aspiration precautions  -      Oral Care Recommendations -- Oral Care BID/PRN;Toothbrush  - Oral Care BID/PRN;Toothbrush  -      SLP Rec. for Method of Medication Administration -- meds whole;with puree;as tolerated  - meds whole;with puree;as tolerated  -      Monitor for Signs of Aspiration -- yes;notify SLP if any concerns  - yes;notify SLP if any concerns  -      Anticipated Discharge Disposition (SLP) -- home  -[facility] home  -                User Key  (r) = Recorded By, (t) = Taken By, (c) = Cosigned By      Initials Name Effective Dates     Kami Avilez, MS CCC-SLP 02/03/23 -      Ely Brooks, MS CCC-SLP 05/12/23 -      Isabel Monae MS CCC-SLP 08/30/24 -                     EDUCATION  The patient has been educated in the following areas:   Dysphagia (Swallowing Impairment) Modified Diet Instruction.        SLP GOALS       Row Name 11/19/24 1050 11/18/24 1435 11/16/24 1123       (LTG) Patient will demonstrate functional swallow for    Diet Texture  (Demonstrate functional swallow) soft to chew (chopped) textures  -SM soft to chew (chopped) textures  - soft to chew (chopped) textures  -MM    Liquid viscosity (Demonstrate functional swallow) thin liquids  -SM thin liquids  - thin liquids  -MM    [place] (Demonstrate functional swallow) independently (over 90% accuracy)  -SM independently (over 90% accuracy)  - independently (over 90% accuracy)  -MM    Time Frame (Demonstrate functional swallow) 1 week  -SM 1 week  - 1 week  -MM    Progress/Outcomes (Demonstrate functional swallow) good progress toward goal  -SM continuing progress toward goal  - new goal  -MM       (STG) Patient will tolerate trials of    Consistencies Trialed (Tolerate trials) thin liquids;soft to chew (chopped) textures  -SM soft to chew (ground) textures;thin liquids  - soft to chew (ground) textures;thin liquids  -MM    Desired Outcome (Tolerate trials) without signs/symptoms of aspiration;with adequate oral prep/transit/clearance  -SM without signs/symptoms of aspiration;with adequate oral prep/transit/clearance  - without signs/symptoms of aspiration;with adequate oral prep/transit/clearance  -MM    [place] (Tolerate trials) independently (over 90% accuracy)  - independently (over 90% accuracy)  - independently (over 90% accuracy)  -MM    Time Frame (Tolerate trials) 1 week  -SM 1 week  - 1 week  -MM    Progress/Outcomes (Tolerate trials) goal revised this date;good progress toward goal  -SM new goal  - new goal  -MM    Comment (Tolerate trials) Previous goal met. Advancing to soft/chopped texture. If pt wishes for furhter advancement, ok to advance to regular texture. No s/s aspiration  -SM No overt s/s of aspiration w/ thin liquid trials, solids not trialed this date  - --       (STG) Patient will tolerate therapeutic trials of    Consistencies Trialed (Tolerate therapeutic trials) thin liquids  via large, consecutive sips of thin liquids  -SM soft to  chew (chopped) textures;thin liquids  -MH soft to chew (chopped) textures;thin liquids  -MM    Desired Outcome (Tolerate therapeutic trials) without signs/symptoms of aspiration  -SM without signs/symptoms of aspiration;with adequate oral prep/transit/clearance  -MH without signs/symptoms of aspiration;with adequate oral prep/transit/clearance  -MM    [place] (Tolerate therapeutic trials) independently (over 90% accuracy)  -SM independently (over 90% accuracy)  -MH independently (over 90% accuracy)  -MM    Time Frame (Tolerate therapeutic trials) 1 week  -SM 1 week  -MH 1 week  -MM    Progress/Outcomes (Tolerate therapeutic trials) goal revised this date;good progress toward goal  -SM continuing progress toward goal  -MH new goal  -MM    Comment (Tolerate therapeutic trials) No s/s aspiration. Pt reports overall feeling stronger. Consider repeat MBS soon for ? dysphagia resolve.  -SM -- --       (STG) Pharyngeal Strengthening Exercise Goal 1 (SLP)    Activity (Pharyngeal Strengthening Goal 1, SLP) increase timing;increase anterior movement of the hyolaryngeal complex;increase squeeze/positive pressure generation  -SM increase timing;increase anterior movement of the hyolaryngeal complex;increase squeeze/positive pressure generation  -MH increase timing;increase anterior movement of the hyolaryngeal complex;increase squeeze/positive pressure generation  -MM    Increase Timing prepping - 3 second prep or suck swallow or 3-step swallow  -SM prepping - 3 second prep or suck swallow or 3-step swallow  -MH prepping - 3 second prep or suck swallow or 3-step swallow  -MM    Increase Anterior Movement of the Hyolaryngeal Complex chin tuck against resistance (CTAR)  -SM chin tuck against resistance (CTAR)  -MH chin tuck against resistance (CTAR)  -MM    Increase Squeeze/Positive Pressure Generation hard effortful swallow  -SM hard effortful swallow  -MH hard effortful swallow  -MM    [place]/Accuracy (Pharyngeal  Strengthening Goal 1, SLP) with minimal cues (75-90% accuracy)  -SM with minimal cues (75-90% accuracy)  -MH with minimal cues (75-90% accuracy)  -MM    Time Frame (Pharyngeal Strengthening Goal 1, SLP) 1 week  -SM 1 week  -MH 1 week  -MM    Progress/Outcomes (Pharyngeal Strengthening Goal 1, SLP) continuing progress toward goal  -SM continuing progress toward goal  -MH new goal  -MM    Comment (Pharyngeal Strengthening Goal 1, SLP) -- Reviewed/completed all exercises, provided handout and encouraged independent practice  - --              User Key  (r) = Recorded By, (t) = Taken By, (c) = Cosigned By      Initials Name Provider Type    Kami Mann MS CCC-SLP Speech and Language Pathologist    Ely Cassidy, MS CCC-SLP Speech and Language Pathologist    Isabel Zaldivar MS CCC-SLP Speech and Language Pathologist                         Time Calculation:    Time Calculation- SLP       Row Name 11/19/24 1120             Time Calculation- SLP    SLP Start Time 1050  -SM      SLP Received On 11/19/24  -SM         Untimed Charges    31069-YD Treatment Swallow Minutes 25  -SM         Total Minutes    Untimed Charges Total Minutes 25  -SM       Total Minutes 25  -SM                User Key  (r) = Recorded By, (t) = Taken By, (c) = Cosigned By      Initials Name Provider Type    Kami Mann MS CCC-SLP Speech and Language Pathologist                    Therapy Charges for Today       Code Description Service Date Service Provider Modifiers Qty    [id number]  ST TREATMENT SWALLOW 2 11/19/2024 Kami Avilez MS CCC-SLP GN 1                 Kami Avilez MS CCC-SLP  11/19/2024

## 2024-11-19 NOTE — PLAN OF CARE
Goal Outcome Evaluation:  Plan of Care Reviewed With: patient                              Treatment Assessment (SLP): toleration of diet, suspected, improved, pharyngeal dysphagia (11/19/24 1050)     Plan for Continued Treatment (SLP): continue treatment per plan of care (11/19/24 1050)

## 2024-11-19 NOTE — NURSING NOTE
Bladder scan greater than 891. Pt refusing straight cath at this time. Risks explained to pt. Pt stated understanding. Pt stated I will let you know when I need to pee.

## 2024-11-20 LAB
ANION GAP SERPL CALCULATED.3IONS-SCNC: 12 MMOL/L (ref 5–15)
BUN SERPL-MCNC: 35 MG/DL (ref 8–23)
BUN/CREAT SERPL: 35.7 (ref 7–25)
CALCIUM SPEC-SCNC: 8.4 MG/DL (ref 8.6–10.5)
CHLORIDE SERPL-SCNC: 97 MMOL/L (ref 98–107)
CO2 SERPL-SCNC: 24 MMOL/L (ref 22–29)
CREAT SERPL-MCNC: 0.98 MG/DL (ref 0.76–1.27)
DEPRECATED RDW RBC AUTO: 46.6 FL (ref 37–54)
EGFRCR SERPLBLD CKD-EPI 2021: 87.7 ML/MIN/1.73
ERYTHROCYTE [DISTWIDTH] IN BLOOD BY AUTOMATED COUNT: 15.4 % (ref 12.3–15.4)
GLUCOSE BLDC GLUCOMTR-MCNC: 143 MG/DL (ref 70–130)
GLUCOSE BLDC GLUCOMTR-MCNC: 217 MG/DL (ref 70–130)
GLUCOSE BLDC GLUCOMTR-MCNC: 380 MG/DL (ref 70–130)
GLUCOSE BLDC GLUCOMTR-MCNC: 382 MG/DL (ref 70–130)
GLUCOSE BLDC GLUCOMTR-MCNC: 73 MG/DL (ref 70–130)
GLUCOSE SERPL-MCNC: 433 MG/DL (ref 65–99)
HCT VFR BLD AUTO: 34.3 % (ref 37.5–51)
HGB BLD-MCNC: 10.5 G/DL (ref 13–17.7)
MCH RBC QN AUTO: 25.8 PG (ref 26.6–33)
MCHC RBC AUTO-ENTMCNC: 30.6 G/DL (ref 31.5–35.7)
MCV RBC AUTO: 84.3 FL (ref 79–97)
PLATELET # BLD AUTO: 262 10*3/MM3 (ref 140–450)
PMV BLD AUTO: 11 FL (ref 6–12)
POTASSIUM SERPL-SCNC: 4.4 MMOL/L (ref 3.5–5.2)
RBC # BLD AUTO: 4.07 10*6/MM3 (ref 4.14–5.8)
SODIUM SERPL-SCNC: 133 MMOL/L (ref 136–145)
WBC NRBC COR # BLD AUTO: 15.93 10*3/MM3 (ref 3.4–10.8)

## 2024-11-20 PROCEDURE — 63710000001 INSULIN LISPRO (HUMAN) PER 5 UNITS: Performed by: HOSPITALIST

## 2024-11-20 PROCEDURE — 80048 BASIC METABOLIC PNL TOTAL CA: CPT | Performed by: INTERNAL MEDICINE

## 2024-11-20 PROCEDURE — 25010000002 ONDANSETRON PER 1 MG: Performed by: NURSE PRACTITIONER

## 2024-11-20 PROCEDURE — 63710000001 INSULIN LISPRO (HUMAN) PER 5 UNITS

## 2024-11-20 PROCEDURE — 25010000002 ENOXAPARIN PER 10 MG: Performed by: STUDENT IN AN ORGANIZED HEALTH CARE EDUCATION/TRAINING PROGRAM

## 2024-11-20 PROCEDURE — 82948 REAGENT STRIP/BLOOD GLUCOSE: CPT

## 2024-11-20 PROCEDURE — 25010000002 DAPTOMYCIN PER 1 MG: Performed by: INTERNAL MEDICINE

## 2024-11-20 PROCEDURE — 25010000002 ERTAPENEM PER 500 MG: Performed by: INTERNAL MEDICINE

## 2024-11-20 PROCEDURE — 85027 COMPLETE CBC AUTOMATED: CPT | Performed by: INTERNAL MEDICINE

## 2024-11-20 PROCEDURE — 63710000001 INSULIN GLARGINE PER 5 UNITS: Performed by: HOSPITALIST

## 2024-11-20 PROCEDURE — 99232 SBSQ HOSP IP/OBS MODERATE 35: CPT

## 2024-11-20 RX ORDER — LIDOCAINE HYDROCHLORIDE 20 MG/ML
JELLY TOPICAL AS NEEDED
Status: DISCONTINUED | OUTPATIENT
Start: 2024-11-20 | End: 2024-12-05 | Stop reason: HOSPADM

## 2024-11-20 RX ORDER — ONDANSETRON 2 MG/ML
4 INJECTION INTRAMUSCULAR; INTRAVENOUS EVERY 6 HOURS PRN
Status: DISCONTINUED | OUTPATIENT
Start: 2024-11-20 | End: 2024-12-05 | Stop reason: HOSPADM

## 2024-11-20 RX ORDER — INSULIN LISPRO 100 [IU]/ML
10 INJECTION, SOLUTION INTRAVENOUS; SUBCUTANEOUS
Status: DISCONTINUED | OUTPATIENT
Start: 2024-11-20 | End: 2024-11-21

## 2024-11-20 RX ORDER — PANTOPRAZOLE SODIUM 40 MG/1
40 TABLET, DELAYED RELEASE ORAL
Status: DISCONTINUED | OUTPATIENT
Start: 2024-11-21 | End: 2024-11-26

## 2024-11-20 RX ADMIN — INSULIN GLARGINE 15 UNITS: 100 INJECTION, SOLUTION SUBCUTANEOUS at 10:12

## 2024-11-20 RX ADMIN — ONDANSETRON 4 MG: 2 INJECTION INTRAMUSCULAR; INTRAVENOUS at 00:55

## 2024-11-20 RX ADMIN — METOPROLOL SUCCINATE 50 MG: 50 TABLET, EXTENDED RELEASE ORAL at 10:15

## 2024-11-20 RX ADMIN — LIDOCAINE HYDROCHLORIDE: 20 JELLY TOPICAL at 19:48

## 2024-11-20 RX ADMIN — AMITRIPTYLINE HYDROCHLORIDE 25 MG: 25 TABLET, FILM COATED ORAL at 10:15

## 2024-11-20 RX ADMIN — DEXTROSE MONOHYDRATE 25 G: 25 INJECTION, SOLUTION INTRAVENOUS at 22:32

## 2024-11-20 RX ADMIN — Medication 10 ML: at 20:07

## 2024-11-20 RX ADMIN — INSULIN LISPRO 10 UNITS: 100 INJECTION, SOLUTION INTRAVENOUS; SUBCUTANEOUS at 17:36

## 2024-11-20 RX ADMIN — INSULIN LISPRO 8 UNITS: 100 INJECTION, SOLUTION INTRAVENOUS; SUBCUTANEOUS at 13:11

## 2024-11-20 RX ADMIN — CALCIUM CARBONATE (ANTACID) CHEW TAB 500 MG 2 TABLET: 500 CHEW TAB at 13:07

## 2024-11-20 RX ADMIN — ENOXAPARIN SODIUM 40 MG: 100 INJECTION SUBCUTANEOUS at 10:14

## 2024-11-20 RX ADMIN — PREGABALIN 225 MG: 75 CAPSULE ORAL at 10:15

## 2024-11-20 RX ADMIN — INSULIN LISPRO 7 UNITS: 100 INJECTION, SOLUTION INTRAVENOUS; SUBCUTANEOUS at 10:12

## 2024-11-20 RX ADMIN — Medication 10 ML: at 10:56

## 2024-11-20 RX ADMIN — DAPTOMYCIN 450 MG: 500 INJECTION, POWDER, LYOPHILIZED, FOR SOLUTION INTRAVENOUS at 13:11

## 2024-11-20 RX ADMIN — INSULIN LISPRO 7 UNITS: 100 INJECTION, SOLUTION INTRAVENOUS; SUBCUTANEOUS at 13:11

## 2024-11-20 RX ADMIN — ASPIRIN 81 MG: 81 TABLET, COATED ORAL at 10:15

## 2024-11-20 RX ADMIN — TAMSULOSIN HYDROCHLORIDE 0.8 MG: 0.4 CAPSULE ORAL at 20:07

## 2024-11-20 RX ADMIN — FINASTERIDE 5 MG: 5 TABLET, FILM COATED ORAL at 20:07

## 2024-11-20 RX ADMIN — ERTAPENEM 1000 MG: 1 INJECTION INTRAMUSCULAR; INTRAVENOUS at 10:14

## 2024-11-20 RX ADMIN — AMITRIPTYLINE HYDROCHLORIDE 25 MG: 25 TABLET, FILM COATED ORAL at 20:07

## 2024-11-20 RX ADMIN — Medication 10 ML: at 10:15

## 2024-11-20 RX ADMIN — INSULIN LISPRO 8 UNITS: 100 INJECTION, SOLUTION INTRAVENOUS; SUBCUTANEOUS at 10:56

## 2024-11-20 RX ADMIN — ROPINIROLE 0.25 MG: 0.5 TABLET, FILM COATED ORAL at 20:06

## 2024-11-20 NOTE — PROGRESS NOTES
[facility] Medicine Services  PROGRESS NOTE    Patient Name: Fracisco Mullen  : 1965  MRN: [id number]    Date of Admission: 11/15/2024  Primary Care Physician: Brandy Roberson    Subjective   Subjective     CC:  F/u DKA    HPI:  Patient reports he does not feel the urge to urinate despite bladder scans showing ~ 600mL of urine. At this time he is refusing catheterization however, he agrees to consider it.       Objective   Objective     Vital Signs:   Temp:  [97.7 °F (36.5 °C)-98.2 °F (36.8 °C)] 98.2 °F (36.8 °C)  Heart Rate:  [] 97  Resp:  [16-18] 18  BP: ()/(56-74) 128/72  Flow (L/min) (Oxygen Therapy):  [2] 2     Physical Exam:  Constitutional: Chronically-ill appearing male lying in bed in NAD  HENT: NCAT, mucous membranes moist  Respiratory: Clear to auscultation bilaterally, nonlabored respirations   Cardiovascular: RRR, no murmurs, rubs, or gallops, no bilateral ankle edema  Gastrointestinal: Positive bowel sounds, soft, nontender, distended  Musculoskeletal: ROLDAN spontaneously  Psychiatric: Appropriate affect, cooperative  Neurologic: Alert and oriented, speech clear  Skin: Bilateral foot wounds    Results Reviewed:  LAB RESULTS:      Lab 24  0550 24  0717 24  0608 24  0629 24  0343 11/15/24  1532 11/15/24  1107 11/15/24  0948 11/15/24  0942   WBC 15.93* 9.33 6.74 9.52 11.93*  --   --   --  13.03*   HEMOGLOBIN 10.5* 12.0* 11.2* 10.9* 11.2*  --   --   --  12.3*   HEMOGLOBIN, POC  --   --   --   --   --   --   --    < >  --    HEMATOCRIT 34.3* 38.6 35.4* 33.7* 34.1*  --   --   --  37.6   HEMATOCRIT POC  --   --   --   --   --   --   --    < >  --    PLATELETS 262 276 242 266 283  --   --   --  321   NEUTROS ABS  --   --   --   --  7.30*  --   --   --  11.22*   IMMATURE GRANS (ABS)  --   --   --   --  0.07*  --   --   --  0.12*   LYMPHS ABS  --   --   --   --  3.19*  --   --   --  1.18   MONOS ABS  --   --   --   --  1.22*  --    --   --  0.41   EOS ABS  --   --   --   --  0.07  --   --   --  0.01   MCV 84.3 82.8 80.6 79.1 80.0  --   --   --  78.7*   LACTATE  --   --   --   --   --  1.9  --   --  3.6*   HSTROP T  --   --   --   --   --  44* 32*  --   --     < > = values in this interval not displayed.         Lab 11/20/24  0550 11/19/24  0717 11/18/24  0608 11/17/24  0629 11/16/24  1342 11/16/24  0846 11/16/24  0343 11/16/24  0045 11/15/24  1943 11/15/24  0948 11/15/24  0942   SODIUM 133* 134* 135* 132*  --  134* 143 137 136   < >  --    POTASSIUM 4.4 3.9 4.0 4.3  --  3.7 4.1 3.8 4.5   < >  --    CHLORIDE 97* 97* 99 98  --  104 109* 105 103   < >  --    CO2 24.0 27.0 25.0 23.0  --  22.0 24.0 24.0 25.0   < >  --    ANION GAP 12.0 10.0 11.0 11.0  --  8.0 10.0 8.0 8.0   < >  --    BUN 35* 20 22 13  --  12 16 15 17   < >  --    CREATININE 0.98 1.00 0.88 0.89  --  0.64* 0.72* 0.74* 0.84   < >  --    EGFR 87.7 85.6 97.8 97.5  --  107.7 103.9 103.1 99.2   < >  --    GLUCOSE 433* 279* 270* 324*  --  171* 120* 154* 183*   < >  --    CALCIUM 8.4* 8.8 8.2* 8.1*  --  8.0* 8.4* 8.0* 8.8   < >  --    MAGNESIUM  --   --   --   --  1.6 2.0 1.9 1.8 1.8   < >  --    PHOSPHORUS  --   --   --   --  2.5 2.4* 3.2 2.7 2.6   < >  --    HEMOGLOBIN A1C  --   --   --   --   --   --   --   --   --   --  14.10*    < > = values in this interval not displayed.         Lab 11/15/24  1107   TOTAL PROTEIN 6.7   ALBUMIN 3.4*   GLOBULIN 3.3   ALT (SGPT) 14   AST (SGOT) 13   BILIRUBIN 0.4   ALK PHOS 124*         Lab 11/15/24  1532 11/15/24  1107   HSTROP T 44* 32*                 Lab 11/15/24  0954   PH, ARTERIAL 7.328*   PCO2, ARTERIAL 36.0   PO2 ART 88.9   FIO2 21   HCO3 ART 18.9*   BASE EXCESS ART -6.4*   CARBOXYHEMOGLOBIN 1.5     Brief Urine Lab Results  (Last result in the past 365 days)        Color   Clarity   Blood   Leuk Est   Nitrite   Protein   CREAT   Urine HCG        11/15/24 1142 Yellow   Clear   Small (1+)   Negative   Negative   100 mg/dL (2+)                    Microbiology Results Abnormal       None            Doppler Arterial Multi Level Lower Extremity - Bilateral CAR    Result Date: 11/19/2024    Right Conclusion: The right ANNY is normal. Unable to assess digital insufficiency.   Left Conclusion: The left ANNY is normal. Normal digital pressures.     MRI Foot Left Without Contrast    Result Date: 11/18/2024  MRI FOOT LEFT WO CONTRAST Date of Exam: 11/18/2024 5:50 PM EST Indication: Evaluation for left heel osteomyelitis.  Comparison: 8/1/2024 Technique:  Routine multiplanar/multisequence sequence images of the left foot were obtained without contrast administration. Findings: Bones: There is marrow edema seen within the posterior calcaneus. There appears to be loss of T1 cortical and marrow signal seen along the posterior lateral aspect of the talus calcaneus. Additionally there appears to be nondisplaced stress fracture along the posterior aspect of the calcaneus best seen on sagittal T1 images. No additional areas of abnormal marrow edema. No additional fracture seen. No substantial joint effusions. Soft tissues: There is soft tissue wound seen along the posterior heel with ill-defined fluid extending into and disrupting the distal lateral Achilles tendon (long axial image 26). There is a tiny associated fluid collection seen here measuring approximately 0.7 x 0.5 x 0.7 cm. Fluid seen at the master knot of Brown. Flexor extensor tendons appear grossly intact. Peroneal tendons appear grossly intact. Diffuse atrophic and edematous changes of the intrinsic foot muscles suggestive of neuropathic changes. Lisfranc ligament appears intact. Diffuse subcutaneous edema.     Impression: Impression: Soft tissue wound seen along the posterior heel with ill-defined fluid that extends through and disrupts the distal Achilles tendon. Underlying this area there is evidence of osteomyelitis of the posterior lateral calcaneus. There is a tiny fluid collection here as well that  may represent an associated nondrainable abscess. Additionally there appears to be a nondisplaced stress fracture of the posterior calcaneus. Electronically Signed: Nolan Brady MD  11/18/2024 11:28 PM EST  Workstation ID: [id number]         Current medications:  Scheduled Meds:amitriptyline, 25 mg, Oral, BID  aspirin, 81 mg, Oral, Daily  DAPTOmycin, 6 mg/kg, Intravenous, Q24H  enoxaparin, 40 mg, Subcutaneous, Daily  ertapenem, 1,000 mg, Intravenous, Q24H  finasteride, 5 mg, Oral, Nightly  insulin glargine, 10 Units, Subcutaneous, Nightly  [START ON 11/21/2024] insulin glargine, 20 Units, Subcutaneous, Daily  Insulin Lispro, 10 Units, Subcutaneous, TID With Meals  insulin lispro, 2-9 Units, Subcutaneous, 4x Daily AC & at Bedtime  metoprolol succinate XL, 50 mg, Oral, Daily  [START ON 11/21/2024] pantoprazole, 40 mg, Oral, Q AM  pregabalin, 225 mg, Oral, BID  rOPINIRole, 0.25 mg, Oral, Nightly  sodium chloride, 10 mL, Intravenous, Q12H  sodium chloride, 10 mL, Intravenous, Q12H  tamsulosin, 0.8 mg, Oral, Nightly      Continuous Infusions:   PRN Meds:.  acetaminophen **OR** acetaminophen **OR** acetaminophen    senna-docusate sodium **AND** polyethylene glycol **AND** bisacodyl **AND** bisacodyl    calcium carbonate    Calcium Replacement - Follow Nurse / BPA Driven Protocol    dextrose    dextrose    glucagon (human recombinant)    Lidocaine HCl gel    Magnesium Standard Dose Replacement - Follow Nurse / BPA Driven Protocol    nitroglycerin    ondansetron    Phosphorus Replacement - Follow Nurse / BPA Driven Protocol    Potassium Replacement - Follow Nurse / BPA Driven Protocol    sodium chloride    sodium chloride    sodium chloride    sodium chloride    sodium chloride    Assessment & Plan   Assessment & Plan     Active Hospital Problems    Diagnosis  POA    **DKA (diabetic ketoacidosis) [E11.10]  Yes    Severe protein-calorie malnutrition [E43]  Yes    Non healing left heel wound [S95.302A]  Yes      Resolved  Hospital Problems   No resolved problems to display.        Brief Hospital Course to date:  Fracisco Contrerasy is a 59 y.o. male with past medical history of hypertension, type 2 diabetes with insulin use, diabetic neuropathy, and chronic diabetic foot wounds who presents via EMS from home due to altered mental status and fatigue. Lab work consistent with diabetic ketoacidosis.     This patient's problems and plans were partially entered by my partner and updated as appropriate by me 11/20/24. Copied text in this note has been reviewed and is accurate as of today's date.      DKA in setting of uncontrolled IDDM complicated by diabetic neuropathy and chronic foot wounds   - Uncertain of medication compliance  - A1c 14.1%  - S/p DKA protocol including insulin drip, IV fluids, and electrolyte replacement per protocol  - Now transitioned to basal-bolus insulin, will likely need titration as needed  - DM educator consulted     Chronic bilateral foot wounds  - Patient with chronic left heel wound and right plantar foot wound  - Admitted August 2024 for left foot cellulitis and discharged on oral antibiotics and with home wound vac following debridement, MRI left foot obtained at that time with no definite findings of osteomyelitis (was seen by ID/Dr. Dominique and Orthopedics/Dr. Gan)  - Repeat MRI left foot shows soft tissue wound with evidence of osteomyelitis of the calcaneus and possibly an associated non-drainable abscess  - Wound care consulted  - Dr. Gan planning for left BKA on Friday, 11/22/24, no vascular intervention anticipated at this time    BPH  Urinary retention  - Continue home Flomax, Proscar  - He is having some retention, now considering allowing I/O cath, ordered lidocaine as needed to help with catheter discomfort, will continue to monitor - Flomax increased 11/18     Acute toxic metabolic encephalopathy, improved  - CT head without acute abnormality  - UDS negative  - Likely secondary to DKA      HTN  - Continue home metoprolol     Mood disorder  - Continue home Amitriptyline      RLS  - Continue home Requip    Expected Discharge Location and Transportation: Rehab  Expected Discharge   Expected Discharge Date: 11/25/2024; Expected Discharge Time:      VTE Prophylaxis:  Pharmacologic VTE prophylaxis orders are present.         AM-PAC 6 Clicks Score (PT): 6 (11/20/24 0800)    CODE STATUS:   Code Status and Medical Interventions: CPR (Attempt to Resuscitate); Full Support; Full code per last hosptial admission. Patient disoriented on exam with no family present at bedside. Only contact information is friend.   Ordered at: 11/15/24 1218     Level Of Support Discussed With:    Patient     Code Status (Patient has no pulse and is not breathing):    CPR (Attempt to Resuscitate)     Medical Interventions (Patient has pulse or is breathing):    Full Support     Comments:    Full code per last hosptial admission. Patient disoriented on exam with no family present at bedside. Only contact information is friend.       Karen Mandel PA-C  11/20/24

## 2024-11-20 NOTE — CONSULTS
"Fracisco Mullen  1965  [id number]    Evaluating Physician: Varun Dominique MD    Chief Complaint: fatigue    Reason for Consultation: foot infection    History of present illness:     Patient is a 59 y.o.  Yr old male with history of diabetes/hypertension, previously followed with Dr. Gatica; admitted 2023 with right foot infection, cultures with MSSA/Morganella/ESBL Klebsiella/enterococcus and strep species.had surgery at the second toe with amputation, received IV daptomycin/Invanz with general improvements at that site.  He has been left with a chronic right plantar foot wound and a chronic wound at the left heel that has turned black; He apparently was admitted to the hospital August 1 after a fall at [facility].  Noted to have urinary retention with concern for possible UTI.  In addition left heel with black discoloration/malodor and redness, empiric antibiotics initiated.  He is chronically debilitated and wheelchair bound by his report.Bustos catheter-based at admission     8/3/24  Dr Gan  \"PROCEDURE:            Left  Left      99732:             Debridement of skin and subcutaneous tissue  56310:             Wound vacuum-assisted closure\"     8/6/24 MRI and surgical findings did not confirm bone involvement, transitioned to oral agents at discharge       Readmitted November 15, 2024 with reports of appearing \"sluggish\" according to admission notes with DKA, noted to have high hemoglobin A1c and persistent/worsening left heel wound according to patient; medicine team notes mention urinary retention, acute toxic metabolic encephalopathy improved and low-grade fever. Abnormal MRI at the left foot:    Per radiology-- \"Soft tissue wound seen along the posterior heel with ill-defined fluid that extends through and disrupts the distal Achilles tendon. Underlying this area there is evidence of osteomyelitis of the posterior lateral calcaneus. There is a tiny fluid   collection here as well that may represent " "an associated nondrainable abscess.     Additionally there appears to be a nondisplaced stress fracture of the posterior calcaneus. \"      11/20/24 Dr Gan considering options;  vascular team has seen; Left heel pain  dull , worse with palpation, better with pain meds and not severe, 2-3/10.     No headache photophobia or neck stiffness.  No shortness of breath cough or hemoptysis.  No nausea vomiting diarrhea or abdominal pain.  No other new skin rash.  no dysuria hematuria or pyuria.       Past Medical History:   Diagnosis Date    Acute renal failure     Hx of    Obesity     Hx of    Sleep apnea     Type 2 diabetes mellitus        Past Surgical History:   Procedure Laterality Date    BACK SURGERY      lower back    CHOLECYSTECTOMY WITH INTRAOPERATIVE CHOLANGIOGRAM N/A 1/6/2021    Procedure: CHOLECYSTECTOMY LAPAROSCOPIC INTRAOPERATIVE CHOLANGIOGRAM;  Surgeon: Juventino Hooker MD;  Location:  [facility];  Service: General;  Laterality: N/A;    ERCP N/A 1/9/2021    Procedure: ENDOSCOPIC RETROGRADE CHOLANGIOPANCREATOGRAPHY;  Surgeon: Jeremy Dowell MD;  Location: [facility] ENDOSCOPY;  Service: Gastroenterology;  Laterality: N/A;  with sphiincterotomy and balloon sweep with 9mm-12mm balloon    INCISION AND DRAINAGE FOOT Left 8/3/2024    Procedure: HEAL IRRIGATION AND DEBRIDEMENT LEFT;  Surgeon: Dru Gan Jr., MD;  Location:  [facility] OR;  Service: Orthopedics;  Laterality: Left;    PLACEMENT OF WOUND VAC Left 8/3/2024    Procedure: PLACEMENT OF WOUND VAC;  Surgeon: Dru Gan Jr., MD;  Location:  [facility];  Service: Orthopedics;  Laterality: Left;       Pediatric History   Patient Parents    Not on file     Other Topics Concern    Not on file   Social History Narrative    Not on file       family history includes Cancer in his brother, maternal grandmother, mother, and another family member; Diabetes in an other family member; Heart attack in an other family member; Heart disease in his brother and " father; Hyperlipidemia in his mother and another family member; Hypertension in his mother and another family member; Obesity in an other family member.    Allergies   Allergen Reactions    Sertraline Hcl Hives     Zoloft       Medication:  Current Facility-Administered Medications   Medication Dose Route Frequency Provider Last Rate Last Admin    acetaminophen (TYLENOL) tablet 650 mg  650 mg Oral Q4H PRN Edmond Espinoza DO   650 mg at 11/19/24 1112    Or    acetaminophen (TYLENOL) 160 MG/5ML oral solution 650 mg  650 mg Oral Q4H PRN Edmond Espinoza DO        Or    acetaminophen (TYLENOL) suppository 650 mg  650 mg Rectal Q4H PRN Edmond Espinoza DO   650 mg at 11/15/24 2327    amitriptyline (ELAVIL) tablet 25 mg  25 mg Oral BID Lisa Vallejo MD   25 mg at 11/19/24 2111    aspirin EC tablet 81 mg  81 mg Oral Daily Lisa Vallejo MD   81 mg at 11/19/24 0840    sennosides-docusate (PERICOLACE) 8.6-50 MG per tablet 2 tablet  2 tablet Oral BID PRN Edmond Espinoza DO        And    polyethylene glycol (MIRALAX) packet 17 g  17 g Oral Daily PRN Edmond Espinoza DO        And    bisacodyl (DULCOLAX) EC tablet 5 mg  5 mg Oral Daily PRN Edmond Espinoza DO        And    bisacodyl (DULCOLAX) suppository 10 mg  10 mg Rectal Daily PRN Edmond Espinoza DO        calcium carbonate (TUMS) chewable tablet 500 mg (200 mg elemental)  2 tablet Oral TID PRN Lisa Vallejo MD   2 tablet at 11/19/24 2119    Calcium Replacement - Follow Nurse / BPA Driven Protocol   Not Applicable PRN Edmond Espinoza DO        DAPTOmycin (CUBICIN) 450 mg in sodium chloride 0.9 % 50 mL IVPB  6 mg/kg Intravenous Q24H Varun Dominique  mL/hr at 11/19/24 1112 450 mg at 11/19/24 1112    dextrose (D50W) (25 g/50 mL) IV injection 25 g  25 g Intravenous Q15 Min PRN Perla Pascual MD        dextrose (GLUTOSE) oral gel 15 g  15 g Oral Q15 Min PRN Perla Pascual MD        Enoxaparin Sodium (LOVENOX) syringe 40 mg  40 mg Subcutaneous Daily Edmond Espinoza DO   40 mg at 11/19/24 5392     ertapenem (INVanz) 1,000 mg in sodium chloride 0.9 % 100 mL MBP  1,000 mg Intravenous Q24H Varun Dominique  mL/hr at 11/19/24 0840 1,000 mg at 11/19/24 0840    finasteride (PROSCAR) tablet 5 mg  5 mg Oral Nightly Lisa Vallejo MD   5 mg at 11/19/24 2111    glucagon (GLUCAGEN) injection 1 mg  1 mg Intramuscular Q15 Min PRN Perla Pascual MD        insulin glargine (LANTUS, SEMGLEE) injection 10 Units  10 Units Subcutaneous Nightly Lisa Vallejo MD   10 Units at 11/18/24 2141    insulin glargine (LANTUS, SEMGLEE) injection 15 Units  15 Units Subcutaneous Daily Lisa Vallejo MD   15 Units at 11/19/24 0840    Insulin Lispro (humaLOG) injection 2-9 Units  2-9 Units Subcutaneous 4x Daily AC & at Bedtime Lisa Vallejo MD   6 Units at 11/19/24 1226    Insulin Lispro (humaLOG) injection 7 Units  7 Units Subcutaneous TID With Meals Lisa Vallejo MD   7 Units at 11/19/24 1745    Magnesium Standard Dose Replacement - Follow Nurse / BPA Driven Protocol   Not Applicable PRN Edmond Espinoza DO        metoprolol succinate XL (TOPROL-XL) 24 hr tablet 50 mg  50 mg Oral Daily Lisa Vallejo MD   50 mg at 11/19/24 0840    nitroglycerin (NITROSTAT) SL tablet 0.4 mg  0.4 mg Sublingual Q5 Min PRN Edmond Espinoza DO        ondansetron (ZOFRAN) injection 4 mg  4 mg Intravenous Q6H PRN Rupinder Sutherland APRN   4 mg at 11/20/24 0055    Phosphorus Replacement - Follow Nurse / BPA Driven Protocol   Not Applicable PRN Edmond Espinoza DO        Potassium Replacement - Follow Nurse / BPA Driven Protocol   Not Applicable PRN Edmond Espinoza DO        pregabalin (LYRICA) capsule 225 mg  225 mg Oral BID Varun Snider, formerly [facility]   225 mg at 11/19/24 2111    rOPINIRole (REQUIP) tablet 0.25 mg  0.25 mg Oral Nightly Lisa Vallejo MD   0.25 mg at 11/19/24 2112    sodium chloride 0.9 % flush 10 mL  10 mL Intravenous PRN Edmond Espinoza,         sodium chloride 0.9 % flush 10 mL  10 mL Intravenous Q12H Edmond Espinoza,    10 mL at 11/19/24 2116    sodium  chloride 0.9 % flush 10 mL  10 mL Intravenous PRN Olga, Edmond, DO        sodium chloride 0.9 % flush 10 mL  10 mL Intravenous Q12H Edmond Espinoza, DO   10 mL at 11/19/24 2116    sodium chloride 0.9 % flush 10 mL  10 mL Intravenous PRN Edmond Espinoza, DO        sodium chloride 0.9 % infusion 40 mL  40 mL Intravenous PRN Olga, Edmond, DO        sodium chloride 0.9 % infusion 40 mL  40 mL Intravenous PRN Reese Espinozace, DO        tamsulosin (FLOMAX) 24 hr capsule 0.8 mg  0.8 mg Oral Nightly Lisa Vallejo MD   0.8 mg at 11/19/24 2111       Antibiotics:  Anti-Infectives (From admission, onward)      Ordered     Dose/Rate Route Frequency Start Stop    11/19/24 0737  DAPTOmycin (CUBICIN) 450 mg in sodium chloride 0.9 % 50 mL IVPB        Ordering Provider: Varun Dominique MD    6 mg/kg × 72.3 kg  100 mL/hr over 30 Minutes Intravenous Every 24 Hours 11/19/24 0900 12/03/24 0859    11/19/24 0737  ertapenem (INVanz) 1,000 mg in sodium chloride 0.9 % 100 mL MBP        Ordering Provider: Varun Dominique MD    1,000 mg  200 mL/hr over 30 Minutes Intravenous Every 24 Hours 11/19/24 0900 12/03/24 0859              Review of Systems    11/20/24     Constitutional-- No Fever, chills or sweats.  Appetite diminished with fatigue.  Heent-- No new vision, hearing or throat complaints.  No epistaxis or oral sores.  Denies odynophagia or dysphagia.  No flashers, floaters or eye pain. No odynophagia or dysphagia. No headache, photophobia or neck stiffness.  CV-- No chest pain, palpitation or syncope  Resp-- No SOB/cough/Hemoptysis  GI- No nausea, vomiting, or diarrhea.  No hematochezia, melena, or hematemesis. Denies jaundice or chronic liver disease.  -- No dysuria, hematuria, or flank pain.  Denies hesitancy, urgency or flank pain.  Lymph- no swollen lymph nodes in neck/axilla or groin.   Heme- No active bruising or bleeding; no Hx of DVT or PE.  MS-- no swelling or pain in the bones or joints of arms/legs.  No new back pain.  Neuro-- No  "acute focal weakness or numbness in the arms or legs.  No seizures.    Full 12 point review of systems reviewed and negative otherwise for acute complaints, except for above    Physical Exam:   Vital Signs   /74 (BP Location: Left arm, Patient Position: Lying)   Pulse 103   Temp 98 °F (36.7 °C) (Oral)   Resp 16   Ht 162.6 cm (64\")   Wt 72.3 kg (159 lb 6.3 oz)   SpO2 92%   BMI 27.36 kg/m²     GENERAL: Awake and alert, in no acute distress.   HEENT: Normocephalic, atraumatic.  PERRL. EOMI. No conjunctival injection. No icterus. Oropharynx clear without evidence of thrush or exudate. No evidence of periodontal disease.    NECK: Supple without nuchal rigidity. No mass.  LYMPH: No cervical, axillary or inguinal lymphadenopathy.  HEART: RRR; No murmur, rubs, gallops.   LUNGS: diminished at bases but otherwise Clear to auscultation bilaterally without wheezing, rales, rhonchi. Normal respiratory effort. Nonlabored. No dullness.  ABDOMEN: Soft, nontender, nondistended. Positive bowel sounds. No rebound or guarding. NO mass or HSM.  EXT: see below  : Genitalia generally unremarkable.  Without Bustos catheter.  MSK: FROM without joint effusions noted arms/legs.    SKIN: Warm and dry without cutaneous eruptions on Inspection/palpation.    NEURO: Oriented to PPT. He has a difficult time cooperating with a detailed motor/sensory exam given positioning in bed.    Right foot second toe amputation site noted, no redness/duration or warmth there.  No oral or active drainage    Left heel with large wound, deeper wound at the proximal aspect of this tracking towards the Achilles although I am unable to visualize  tendon or palpate bone at present.  Bloody drainage.  Vague surrounding erythema with mild tenderness but no discrete mass bulge or fluctuance.  No crepitus or bulla.  Multifocal crusted areas otherwise at his lower extremities      Laboratory Data    Results from last 7 days   Lab Units 11/20/24  0550 " 11/19/24  0717 11/18/24  0608   WBC 10*3/mm3 15.93* 9.33 6.74   HEMOGLOBIN g/dL 10.5* 12.0* 11.2*   HEMATOCRIT % 34.3* 38.6 35.4*   PLATELETS 10*3/mm3 262 276 242     Results from last 7 days   Lab Units 11/20/24  0550   SODIUM mmol/L 133*   POTASSIUM mmol/L 4.4   CHLORIDE mmol/L 97*   CO2 mmol/L 24.0   BUN mg/dL 35*   CREATININE mg/dL 0.98   GLUCOSE mg/dL 433*   CALCIUM mg/dL 8.4*     Results from last 7 days   Lab Units 11/15/24  1107   ALK PHOS U/L 124*   BILIRUBIN mg/dL 0.4   ALT (SGPT) U/L 14   AST (SGOT) U/L 13               Estimated Creatinine Clearance: 72.1 mL/min (by C-G formula based on SCr of 0.98 mg/dL).      Microbiology:      Radiology:  Imaging Results (Last 72 Hours)       Procedure Component Value Units Date/Time    MRI Foot Left Without Contrast [[id number]] Collected: 11/18/24 2320     Updated: 11/18/24 2331    Narrative:        MRI FOOT LEFT WO CONTRAST    Date of Exam: 11/18/2024 5:50 PM EST    Indication: Evaluation for left heel osteomyelitis.     Comparison: 8/1/2024    Technique:  Routine multiplanar/multisequence sequence images of the left foot were obtained without contrast administration.      Findings:  Bones: There is marrow edema seen within the posterior calcaneus. There appears to be loss of T1 cortical and marrow signal seen along the posterior lateral aspect of the talus calcaneus. Additionally there appears to be nondisplaced stress fracture   along the posterior aspect of the calcaneus best seen on sagittal T1 images. No additional areas of abnormal marrow edema. No additional fracture seen. No substantial joint effusions.    Soft tissues: There is soft tissue wound seen along the posterior heel with ill-defined fluid extending into and disrupting the distal lateral Achilles tendon (long axial image 26). There is a tiny associated fluid collection seen here measuring   approximately 0.7 x 0.5 x 0.7 cm. Fluid seen at the master knot of Brown. Flexor extensor tendons appear  grossly intact. Peroneal tendons appear grossly intact. Diffuse atrophic and edematous changes of the intrinsic foot muscles suggestive of   neuropathic changes. Lisfranc ligament appears intact. Diffuse subcutaneous edema.      Impression:      Impression:  Soft tissue wound seen along the posterior heel with ill-defined fluid that extends through and disrupts the distal Achilles tendon. Underlying this area there is evidence of osteomyelitis of the posterior lateral calcaneus. There is a tiny fluid   collection here as well that may represent an associated nondrainable abscess.    Additionally there appears to be a nondisplaced stress fracture of the posterior calcaneus.        Electronically Signed: Nolan Brady MD    11/18/2024 11:28 PM EST    Workstation ID: [id number]              Impression:       --acute left heel wound infection ,  abnormal MRI as above raising concern for fluid extending into and disrupting the distal lateral Achilles tendon in addition to with evidence for osteomyelitis at the posterior lateral calcaneus in addition to possible nondisplaced stress fracture at the posterior calcaneus; High risk for persistent/recurrent or nonhealing wounds, persistent/progressive or recurrent infection and risk for further functional/limb loss, higher level amputation etc. Surgery 8/3;  High risk for amputation.  Empiric antibiotics for now     --urinary retention per admission notes, some hematuria on November 15; further urologic evaluation regarding functional/anatomic assessment at discretion of medicine team with respect inpatient versus outpatient     --Diabetes, uncontrolled ; glucose control per medicine team     --diabetic neuropathy     --Chronic debility as per patient wheelchair bound     --Hx ESBL    PLAN:     --IV daptomycin/Invanz    --Check/review labs cultures and scans    --Partial history Per nursing staff    --Discussed with microbiology    --Discussed with surgery team    --ortho  and vascular team to see    --Highly complex at of issues with high risk for further serious morbidity and other serious sequela     Copied text in this note has been reviewed and is accurate as of 11/20/24.     Varun Dominique MD  11/20/2024

## 2024-11-20 NOTE — PLAN OF CARE
Goal Outcome Evaluation:  Plan of Care Reviewed With: patient        Progress: no change  Outcome Evaluation: nurse practitioner notified due to vomiting brown bile and no relief with tums. Incontinent of stool several times last night requiring aid in clean up. again pt refuses catheteriztion for urine states he has not drank enough to void and will go on his own.

## 2024-11-20 NOTE — PROGRESS NOTES
Fracisco Mullen       LOS: 5 days   Patient Care Team:  Brandy Roberson as PCP - General (Family Medicine)  Varun Fontenot MD as Consulting Physician (Cardiology)  Yulissa Taveras APRN as Nurse Practitioner (Cardiology)    Chief Complaint:  left calcaneal osteomyelitis    Subjective     Interval History:     Resting comfortably in bed. Pain controlled. No acute events overnight    History taken from: patient    Review of Systems:   The following systems were reviewed and negative     Gen - No fevers, chills  CV - No chest pain, palpitations  Resp - No cough, dyspnea  GI - No N/V/D, abdominal pain    Objective     Vital Signs  Vital Signs (last 24 hours)         11/19 0700  11/20 0659 11/20 0700  11/20 0858   Most Recent      Temp (°F) 97.7 -  98.2       98 (36.7) 11/19 2042    Heart Rate 90 -  103       103 11/20 0530    Resp 16 -  18       16 11/19 2042    BP 73/59 -  115/74       115/74 11/19 1425    SpO2 (%) 92 -  96       92 11/20 0530    Flow (L/min) (Oxygen Therapy)   2       2 11/20 0609              Physical Exam:  No acute distress  Nonlabored respirations  Regular rate and rhythm  Abdomen nondistended  Left lower extremity:  Posterior left heel wound with mild surrounding erythema, serosanguineous drainage. No warmth, induration, julisa purulence.      Results Review:     I reviewed the patient's new clinical results.    Medication Review:   Hospital Medications (active)         Dose Frequency Start End    acetaminophen (TYLENOL) 160 MG/5ML oral solution 650 mg 650 mg Every 4 Hours PRN 11/15/2024 --    Admin Instructions: If given for fever, use fever parameter: fever greater than 100.4 °F  Based on patient request - if ordered for moderate or severe pain, provider allows for administration of a medication prescribed for a lower pain scale.    Do not exceed 4 grams of acetaminophen in a 24 hr period. Max dose of 2gm for AST/ALT greater than 120 units/L.    If given for pain, use the following  "pain scale:   Mild Pain = Pain Score of 1-3, CPOT 1-2  Moderate Pain = Pain Score of 4-6, CPOT 3-4  Severe Pain = Pain Score of 7-10, CPOT 5-8    Route: Oral    Linked Group 1: Placed in \"Or\" Linked Group        acetaminophen (TYLENOL) suppository 650 mg 650 mg Every 4 Hours PRN 11/15/2024 --    Admin Instructions: If given for fever, use fever parameter: fever greater than 100.4 °F  Based on patient request - if ordered for moderate or severe pain, provider allows for administration of a medication prescribed for a lower pain scale.    Do not exceed 4 grams of acetaminophen in a 24 hr period. Max dose of 2gm for AST/ALT greater than 120 units/L.    If given for pain, use the following pain scale:   Mild Pain = Pain Score of 1-3, CPOT 1-2  Moderate Pain = Pain Score of 4-6, CPOT 3-4  Severe Pain = Pain Score of 7-10, CPOT 5-8    Route: Rectal    Linked Group 1: Placed in \"Or\" Linked Group        acetaminophen (TYLENOL) tablet 650 mg 650 mg Every 4 Hours PRN 11/15/2024 --    Admin Instructions: If given for fever, use fever parameter: fever greater than 100.4 °F  Based on patient request - if ordered for moderate or severe pain, provider allows for administration of a medication prescribed for a lower pain scale.    Do not exceed 4 grams of acetaminophen in a 24 hr period. Max dose of 2gm for AST/ALT greater than 120 units/L.    If given for pain, use the following pain scale:   Mild Pain = Pain Score of 1-3, CPOT 1-2  Moderate Pain = Pain Score of 4-6, CPOT 3-4  Severe Pain = Pain Score of 7-10, CPOT 5-8    Route: Oral    Linked Group 1: Placed in \"Or\" Linked Group        amitriptyline (ELAVIL) tablet 25 mg 25 mg 2 Times Daily 11/15/2024 --    Route: Oral    aspirin EC tablet 81 mg 81 mg Daily 11/15/2024 --    Admin Instructions: Do not crush or chew the capsules or tablets. The drug may not work as designed if the capsule or tablet is crushed or chewed. Swallow whole.  Do not exceed 4 grams of aspirin in a 24 hr " "period.    If given for pain, use the following pain scale:   Mild Pain = Pain Score of 1-3, CPOT 1-2  Moderate Pain = Pain Score of 4-6, CPOT 3-4  Severe Pain = Pain Score of 7-10, CPOT 5-8    Route: Oral    bisacodyl (DULCOLAX) EC tablet 5 mg 5 mg Daily PRN 11/15/2024 --    Admin Instructions: Use if no bowel movement after 12 hours.  Swallow whole. Do not crush, split, or chew tablet.    Route: Oral    Linked Group 2: Placed in \"And\" Linked Group        bisacodyl (DULCOLAX) suppository 10 mg 10 mg Daily PRN 11/15/2024 --    Admin Instructions: Use if no bowel movement after 12 hours.  Hold for diarrhea    Route: Rectal    Linked Group 2: Placed in \"And\" Linked Group        calcium carbonate (TUMS) chewable tablet 500 mg (200 mg elemental) 2 tablet 3 Times Daily PRN 11/18/2024 --    Admin Instructions: One tablet contains 200 mg elemental calcium.  Take with food.    Route: Oral    Calcium Replacement - Follow Nurse / BPA Driven Protocol  As Needed 11/15/2024 --    Admin Instructions: Open Order & Select \"[facility] Electrolyte Replacement Protocol Algorithm\" to View Details    Route: Not Applicable    DAPTOmycin (CUBICIN) 450 mg in sodium chloride 0.9 % 50 mL IVPB 6 mg/kg × 72.3 kg Every 24 Hours 11/19/2024 12/3/2024    Admin Instructions: Caution: Look alike/sound alike drug alert.  Refrigerate. Do not shake.    Route: Intravenous    dextrose (D50W) (25 g/50 mL) IV injection 25 g 25 g Every 15 Minutes PRN 11/16/2024 --    Admin Instructions: Blood sugar less than 70; patient has IV access - Unresponsive, NPO or Unable To Safely Swallow    Route: Intravenous    dextrose (GLUTOSE) oral gel 15 g 15 g Every 15 Minutes PRN 11/16/2024 --    Admin Instructions: BS<70, Patient Alert, Is not NPO, Can safely swallow.    Route: Oral    Enoxaparin Sodium (LOVENOX) syringe 40 mg 40 mg Daily 11/15/2024 --    Admin Instructions: Give subcutaneous in abdomen only. Do not massage site after injection.    Route: Subcutaneous    " ertapenem (INVanz) 1,000 mg in sodium chloride 0.9 % 100 mL MBP 1,000 mg Every 24 Hours 11/19/2024 12/3/2024    Admin Instructions: Caution: Look alike/sound alike drug alert.    Route: Intravenous    finasteride (PROSCAR) tablet 5 mg 5 mg Nightly 11/15/2024 --    Admin Instructions: Group 2 (Pink) Hazardous Drug - Reproductive Risk Only - See Handling Guide    Route: Oral    glucagon (GLUCAGEN) injection 1 mg 1 mg Every 15 Minutes PRN 11/16/2024 --    Admin Instructions: Blood Glucose Less Than 70 - Patient Without IV Access - Unresponsive, NPO or Unable To Safely Swallow  Reconstitute powder for injection by adding 1 mL of -supplied sterile diluent or sterile water for injection to a vial containing 1 mg of the drug, to provide solutions containing 1 mg/mL. Shake vial gently to dissolve.    Route: Intramuscular    insulin glargine (LANTUS, SEMGLEE) injection 10 Units 10 Units Nightly 11/17/2024 --    Admin Instructions: Do not hold basal insulin without an order. Consider requesting a dose edit, if needed.      Route: Subcutaneous    insulin glargine (LANTUS, SEMGLEE) injection 15 Units 15 Units Daily 11/18/2024 --    Admin Instructions: Do not hold basal insulin without an order. Consider requesting a dose edit, if needed.      Route: Subcutaneous    Insulin Lispro (humaLOG) injection 2-9 Units 2-9 Units 4 Times Daily Before Meals & Nightly 11/17/2024 --    Admin Instructions: Correction Insulin - Moderate Dose (Total Insulin Dose 40-60 units/day, Average Weight Patient, Patient Taking Oral Hypoglycemic)    Blood Glucose 150-199 mg/dL - 2 units  Blood Glucose 200-249 mg/dL - 4 units  Blood Glucose 250-299 mg/dL - 6 units  Blood Glucose 300-349 mg/dL - 7 units  Blood Glucose 350-400 mg/dL - 8 units  Blood Glucose greater than 400 mg/dL - 9 units & Call Provider   Caution: Look alike/sound alike drug alert    Route: Subcutaneous    Insulin Lispro (humaLOG) injection 7 Units 7 Units 3 Times Daily With  "Meals 11/17/2024 --    Admin Instructions:  Solution should be clear. If cloudy, contact pharmacy for a new vial. Scheduled mealtime doses of this medication should be held if patient NPO.   Caution: Look alike/sound alike drug alert    Route: Subcutaneous    Magnesium Standard Dose Replacement - Follow Nurse / BPA Driven Protocol  As Needed 11/15/2024 --    Admin Instructions: Open Order & Select \"[facility] Electrolyte Replacement Protocol Algorithm\" to View Details    Route: Not Applicable    metoprolol succinate XL (TOPROL-XL) 24 hr tablet 50 mg 50 mg Daily 11/15/2024 --    Admin Instructions: Hold for SBP less than 100, DBP less than 60, or heart rate less than 50    Do not crush or chew the capsules or tablets. The drug may not work as designed if the capsule or tablet is crushed or chewed. Swallow whole.  Do not crush or chew.    Route: Oral    nitroglycerin (NITROSTAT) SL tablet 0.4 mg 0.4 mg Every 5 Minutes PRN 11/15/2024 --    Admin Instructions: If Pain Unrelieved After 3 Doses Notify MD  May administer up to 3 doses per episode.    Route: Sublingual    ondansetron (ZOFRAN) injection 4 mg 4 mg Every 6 Hours PRN 11/20/2024 --    Admin Instructions: \"If multiple N/V medications ordered, use in the following order: Ondansetron, Prochlorperazine, Promethazine. Use PO unless patient refuses or patient unable to swallow.\"    Route: Intravenous    Phosphorus Replacement - Follow Nurse / BPA Driven Protocol  As Needed 11/15/2024 --    Admin Instructions: Open Order & Select \"B[facility] Electrolyte Replacement Protocol Algorithm\" to View Details    Route: Not Applicable    polyethylene glycol (MIRALAX) packet 17 g 17 g Daily PRN 11/15/2024 --    Admin Instructions: Use if no bowel movement after 12 hours. Mix in 6-8 ounces of water.  Use 4-8 ounces of water, tea, or juice for each 17 gram dose.    Route: Oral    Linked Group 2: Placed in \"And\" Linked Group        Potassium Replacement - Follow Nurse / BPA Driven Protocol  As " "Needed 11/15/2024 --    Admin Instructions: Open Order & Select \"[facility] Electrolyte Replacement Protocol Algorithm\" to View Details    Route: Not Applicable    pregabalin (LYRICA) capsule 225 mg 225 mg 2 Times Daily 11/16/2024 --    Admin Instructions:     Route: Oral    rOPINIRole (REQUIP) tablet 0.25 mg 0.25 mg Nightly 11/15/2024 --    Admin Instructions: Caution: Look alike/sound alike drug alert    Route: Oral    sennosides-docusate (PERICOLACE) 8.6-50 MG per tablet 2 tablet 2 tablet 2 Times Daily PRN 11/15/2024 --    Admin Instructions: Start bowel management regimen if patient has not had a bowel movement after 12 hours.    Route: Oral    Linked Group 2: Placed in \"And\" Linked Group        sodium chloride 0.9 % flush 10 mL 10 mL As Needed 11/15/2024 --    Route: Intravenous    Cosign for Ordering: Accepted by Nic Hewitt MD on 11/15/2024  3:10 PM    sodium chloride 0.9 % flush 10 mL 10 mL Every 12 Hours Scheduled 11/15/2024 --    Route: Intravenous    sodium chloride 0.9 % flush 10 mL 10 mL As Needed 11/15/2024 --    Route: Intravenous    sodium chloride 0.9 % flush 10 mL 10 mL Every 12 Hours Scheduled 11/15/2024 --    Route: Intravenous    sodium chloride 0.9 % flush 10 mL 10 mL As Needed 11/15/2024 --    Route: Intravenous    sodium chloride 0.9 % infusion 40 mL 40 mL As Needed 11/15/2024 --    Admin Instructions: Following administration of an IV intermittent medication, flush line with 40mL NS at 100mL/hr.    Route: Intravenous    sodium chloride 0.9 % infusion 40 mL 40 mL As Needed 11/15/2024 --    Admin Instructions: Following administration of an IV intermittent medication, flush line with 40mL NS at 100mL/hr.    Route: Intravenous    tamsulosin (FLOMAX) 24 hr capsule 0.8 mg 0.8 mg Nightly 11/18/2024 --    Admin Instructions: Do not crush or chew the capsules or tablets. The drug may not work as designed if the capsule or tablet is crushed or chewed. Swallow whole. If patient unable to swallow whole, " contact pharmacy for alternative.    Route: Oral              Assessment & Plan   He is a 59-year-old male who has previously established care with Dr. Gan.  He underwent left heel irrigation and debridement with wound vacuum-assisted closure in August 2024.  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      DKA (diabetic ketoacidosis)    Non healing left heel wound    Severe protein-calorie malnutrition      We discussed the previously discussed surgical plan of left below-knee amputation.  Plan for surgery will be on Friday, 11/22/2024.  Patient verbalized understanding and agreement to the procedure.  Continue diet 11/20 and 11/21/2024.  Questions were answered.        Chris Barone PA-C  11/20/24  08:58 EST

## 2024-11-20 NOTE — PLAN OF CARE
Goal Outcome Evaluation:              Outcome Evaluation: Pts Vss on RA, sats >90%. Alert and oriented x4. Pt refused all meals but has been drinking fluids. Bladder scan volume of 823 at 1800. Pt refusing to be straight cath. C/o abd pain. abd rounded and firm to touch. RN educated patient on importance of emptying his bladder. Multiple attempts throughout the day were tried with refusal each time. MD aware. No vomtting this shift. Pt has active bowel sounds. BLE dressings changed. pt refuses to be turned. Tolerating iv abx/po meds well. Care ongoing.    Nutrition consult ordered.

## 2024-11-20 NOTE — CASE MANAGEMENT/SOCIAL WORK
Continued Stay Note  [facility]     Patient Name: Fracisco Mullen  MRN: [id number]  Today's Date: 11/20/2024    Admit Date: 11/15/2024    Plan: TBD   Discharge Plan       Row Name 11/20/24 1311       Plan    Plan TBD    Patient/Family in Agreement with Plan yes    Plan Comments Patient resting in bed. Discussed in MDR. Plan is for BKA this Friday. No discharge needs or concerns to address at this time. CM will continue to follow and assist with discharge planning.    Final Discharge Disposition Code 01 - home or self-care                   Discharge Codes    No documentation.                 Expected Discharge Date and Time       Expected Discharge Date Expected Discharge Time    Nov 21, 2024               Kirby Riggs RN

## 2024-11-21 LAB
ANION GAP SERPL CALCULATED.3IONS-SCNC: 7 MMOL/L (ref 5–15)
BASOPHILS # BLD AUTO: 0.08 10*3/MM3 (ref 0–0.2)
BASOPHILS NFR BLD AUTO: 0.7 % (ref 0–1.5)
BUN SERPL-MCNC: 31 MG/DL (ref 8–23)
BUN/CREAT SERPL: 36.5 (ref 7–25)
CALCIUM SPEC-SCNC: 9.3 MG/DL (ref 8.6–10.5)
CHLORIDE SERPL-SCNC: 97 MMOL/L (ref 98–107)
CO2 SERPL-SCNC: 29 MMOL/L (ref 22–29)
CREAT SERPL-MCNC: 0.85 MG/DL (ref 0.76–1.27)
DEPRECATED RDW RBC AUTO: 46.5 FL (ref 37–54)
EGFRCR SERPLBLD CKD-EPI 2021: 98.9 ML/MIN/1.73
EOSINOPHIL # BLD AUTO: 0.22 10*3/MM3 (ref 0–0.4)
EOSINOPHIL NFR BLD AUTO: 2 % (ref 0.3–6.2)
ERYTHROCYTE [DISTWIDTH] IN BLOOD BY AUTOMATED COUNT: 15.5 % (ref 12.3–15.4)
GLUCOSE BLDC GLUCOMTR-MCNC: 167 MG/DL (ref 70–130)
GLUCOSE BLDC GLUCOMTR-MCNC: 177 MG/DL (ref 70–130)
GLUCOSE BLDC GLUCOMTR-MCNC: 237 MG/DL (ref 70–130)
GLUCOSE BLDC GLUCOMTR-MCNC: 244 MG/DL (ref 70–130)
GLUCOSE BLDC GLUCOMTR-MCNC: 249 MG/DL (ref 70–130)
GLUCOSE BLDC GLUCOMTR-MCNC: 266 MG/DL (ref 70–130)
GLUCOSE SERPL-MCNC: 227 MG/DL (ref 65–99)
HCT VFR BLD AUTO: 30.1 % (ref 37.5–51)
HGB BLD-MCNC: 9.4 G/DL (ref 13–17.7)
IMM GRANULOCYTES # BLD AUTO: 0.06 10*3/MM3 (ref 0–0.05)
IMM GRANULOCYTES NFR BLD AUTO: 0.5 % (ref 0–0.5)
LYMPHOCYTES # BLD AUTO: 2.17 10*3/MM3 (ref 0.7–3.1)
LYMPHOCYTES NFR BLD AUTO: 19.5 % (ref 19.6–45.3)
MCH RBC QN AUTO: 25.9 PG (ref 26.6–33)
MCHC RBC AUTO-ENTMCNC: 31.2 G/DL (ref 31.5–35.7)
MCV RBC AUTO: 82.9 FL (ref 79–97)
MONOCYTES # BLD AUTO: 0.76 10*3/MM3 (ref 0.1–0.9)
MONOCYTES NFR BLD AUTO: 6.8 % (ref 5–12)
NEUTROPHILS NFR BLD AUTO: 7.82 10*3/MM3 (ref 1.7–7)
NEUTROPHILS NFR BLD AUTO: 70.5 % (ref 42.7–76)
NRBC BLD AUTO-RTO: 0 /100 WBC (ref 0–0.2)
PLATELET # BLD AUTO: 266 10*3/MM3 (ref 140–450)
PMV BLD AUTO: 11 FL (ref 6–12)
POTASSIUM SERPL-SCNC: 4 MMOL/L (ref 3.5–5.2)
RBC # BLD AUTO: 3.63 10*6/MM3 (ref 4.14–5.8)
SODIUM SERPL-SCNC: 133 MMOL/L (ref 136–145)
WBC NRBC COR # BLD AUTO: 11.11 10*3/MM3 (ref 3.4–10.8)

## 2024-11-21 PROCEDURE — 92526 ORAL FUNCTION THERAPY: CPT

## 2024-11-21 PROCEDURE — 63710000001 INSULIN LISPRO (HUMAN) PER 5 UNITS: Performed by: HOSPITALIST

## 2024-11-21 PROCEDURE — 63710000001 INSULIN GLARGINE PER 5 UNITS: Performed by: HOSPITALIST

## 2024-11-21 PROCEDURE — 25010000002 ERTAPENEM PER 500 MG: Performed by: INTERNAL MEDICINE

## 2024-11-21 PROCEDURE — 99232 SBSQ HOSP IP/OBS MODERATE 35: CPT

## 2024-11-21 PROCEDURE — 25010000002 DAPTOMYCIN PER 1 MG: Performed by: INTERNAL MEDICINE

## 2024-11-21 PROCEDURE — 25810000003 LACTATED RINGERS PER 1000 ML

## 2024-11-21 PROCEDURE — 63710000001 INSULIN GLARGINE PER 5 UNITS

## 2024-11-21 PROCEDURE — 25010000002 ENOXAPARIN PER 10 MG: Performed by: STUDENT IN AN ORGANIZED HEALTH CARE EDUCATION/TRAINING PROGRAM

## 2024-11-21 PROCEDURE — 80048 BASIC METABOLIC PNL TOTAL CA: CPT

## 2024-11-21 PROCEDURE — 82948 REAGENT STRIP/BLOOD GLUCOSE: CPT

## 2024-11-21 PROCEDURE — 85025 COMPLETE CBC W/AUTO DIFF WBC: CPT

## 2024-11-21 PROCEDURE — 63710000001 INSULIN LISPRO (HUMAN) PER 5 UNITS

## 2024-11-21 RX ORDER — SODIUM CHLORIDE, SODIUM LACTATE, POTASSIUM CHLORIDE, CALCIUM CHLORIDE 600; 310; 30; 20 MG/100ML; MG/100ML; MG/100ML; MG/100ML
50 INJECTION, SOLUTION INTRAVENOUS CONTINUOUS
Status: ACTIVE | OUTPATIENT
Start: 2024-11-21 | End: 2024-11-22

## 2024-11-21 RX ORDER — HYDROCODONE BITARTRATE AND ACETAMINOPHEN 5; 325 MG/1; MG/1
1 TABLET ORAL EVERY 6 HOURS PRN
Status: DISCONTINUED | OUTPATIENT
Start: 2024-11-21 | End: 2024-11-25

## 2024-11-21 RX ORDER — INSULIN LISPRO 100 [IU]/ML
12 INJECTION, SOLUTION INTRAVENOUS; SUBCUTANEOUS
Status: DISCONTINUED | OUTPATIENT
Start: 2024-11-21 | End: 2024-11-28

## 2024-11-21 RX ADMIN — ENOXAPARIN SODIUM 40 MG: 100 INJECTION SUBCUTANEOUS at 08:59

## 2024-11-21 RX ADMIN — INSULIN GLARGINE 10 UNITS: 100 INJECTION, SOLUTION SUBCUTANEOUS at 21:08

## 2024-11-21 RX ADMIN — DAPTOMYCIN 450 MG: 500 INJECTION, POWDER, LYOPHILIZED, FOR SOLUTION INTRAVENOUS at 10:00

## 2024-11-21 RX ADMIN — ROPINIROLE 0.25 MG: 0.5 TABLET, FILM COATED ORAL at 21:07

## 2024-11-21 RX ADMIN — Medication 10 ML: at 21:08

## 2024-11-21 RX ADMIN — HYDROCODONE BITARTRATE AND ACETAMINOPHEN 1 TABLET: 5; 325 TABLET ORAL at 18:43

## 2024-11-21 RX ADMIN — METOPROLOL SUCCINATE 50 MG: 50 TABLET, EXTENDED RELEASE ORAL at 08:58

## 2024-11-21 RX ADMIN — INSULIN LISPRO 4 UNITS: 100 INJECTION, SOLUTION INTRAVENOUS; SUBCUTANEOUS at 18:40

## 2024-11-21 RX ADMIN — Medication 10 ML: at 11:24

## 2024-11-21 RX ADMIN — INSULIN LISPRO 12 UNITS: 100 INJECTION, SOLUTION INTRAVENOUS; SUBCUTANEOUS at 18:40

## 2024-11-21 RX ADMIN — FINASTERIDE 5 MG: 5 TABLET, FILM COATED ORAL at 21:07

## 2024-11-21 RX ADMIN — AMITRIPTYLINE HYDROCHLORIDE 25 MG: 25 TABLET, FILM COATED ORAL at 21:07

## 2024-11-21 RX ADMIN — PREGABALIN 225 MG: 75 CAPSULE ORAL at 08:57

## 2024-11-21 RX ADMIN — PREGABALIN 225 MG: 75 CAPSULE ORAL at 21:07

## 2024-11-21 RX ADMIN — ASPIRIN 81 MG: 81 TABLET, COATED ORAL at 08:57

## 2024-11-21 RX ADMIN — INSULIN LISPRO 4 UNITS: 100 INJECTION, SOLUTION INTRAVENOUS; SUBCUTANEOUS at 12:00

## 2024-11-21 RX ADMIN — INSULIN LISPRO 6 UNITS: 100 INJECTION, SOLUTION INTRAVENOUS; SUBCUTANEOUS at 09:00

## 2024-11-21 RX ADMIN — AMITRIPTYLINE HYDROCHLORIDE 25 MG: 25 TABLET, FILM COATED ORAL at 08:58

## 2024-11-21 RX ADMIN — CALCIUM CARBONATE (ANTACID) CHEW TAB 500 MG 2 TABLET: 500 CHEW TAB at 21:07

## 2024-11-21 RX ADMIN — SODIUM CHLORIDE, SODIUM LACTATE, POTASSIUM CHLORIDE, CALCIUM CHLORIDE 50 ML/HR: 20; 30; 600; 310 INJECTION, SOLUTION INTRAVENOUS at 11:22

## 2024-11-21 RX ADMIN — INSULIN LISPRO 4 UNITS: 100 INJECTION, SOLUTION INTRAVENOUS; SUBCUTANEOUS at 21:08

## 2024-11-21 RX ADMIN — INSULIN LISPRO 10 UNITS: 100 INJECTION, SOLUTION INTRAVENOUS; SUBCUTANEOUS at 09:00

## 2024-11-21 RX ADMIN — PANTOPRAZOLE SODIUM 40 MG: 40 TABLET, DELAYED RELEASE ORAL at 05:43

## 2024-11-21 RX ADMIN — TAMSULOSIN HYDROCHLORIDE 0.8 MG: 0.4 CAPSULE ORAL at 21:07

## 2024-11-21 RX ADMIN — Medication 10 ML: at 09:00

## 2024-11-21 RX ADMIN — ERTAPENEM 1000 MG: 1 INJECTION INTRAMUSCULAR; INTRAVENOUS at 08:59

## 2024-11-21 RX ADMIN — INSULIN GLARGINE 15 UNITS: 100 INJECTION, SOLUTION SUBCUTANEOUS at 08:59

## 2024-11-21 NOTE — PROGRESS NOTES
"Fracisco Mullen  1965  [id number]    Evaluating Physician: Varun Dominique MD    Chief Complaint: fatigue    Reason for Consultation: foot infection    History of present illness:     Patient is a 59 y.o.  Yr old male with history of diabetes/hypertension, previously followed with Dr. Gatica; admitted 2023 with right foot infection, cultures with MSSA/Morganella/ESBL Klebsiella/enterococcus and strep species.had surgery at the second toe with amputation, received IV daptomycin/Invanz with general improvements at that site.  He has been left with a chronic right plantar foot wound and a chronic wound at the left heel that has turned black; He apparently was admitted to the hospital August 1 after a fall at [facility].  Noted to have urinary retention with concern for possible UTI.  In addition left heel with black discoloration/malodor and redness, empiric antibiotics initiated.  He is chronically debilitated and wheelchair bound by his report.Bustos catheter-based at admission     8/3/24  Dr Gan  \"PROCEDURE:            Left  Left      51544:             Debridement of skin and subcutaneous tissue  41403:             Wound vacuum-assisted closure\"     8/6/24 MRI and surgical findings did not confirm bone involvement, transitioned to oral agents at discharge       Readmitted November 15, 2024 with reports of appearing \"sluggish\" according to admission notes with DKA, noted to have high hemoglobin A1c and persistent/worsening left heel wound according to patient; medicine team notes mention urinary retention, acute toxic metabolic encephalopathy improved and low-grade fever. Abnormal MRI at the left foot:    Per radiology-- \"Soft tissue wound seen along the posterior heel with ill-defined fluid that extends through and disrupts the distal Achilles tendon. Underlying this area there is evidence of osteomyelitis of the posterior lateral calcaneus. There is a tiny fluid   collection here as well that may represent " "an associated nondrainable abscess.     Additionally there appears to be a nondisplaced stress fracture of the posterior calcaneus. \"      11/21/24 sleepy; Dr Gan considering options;  vascular team has seen; Left heel pain  dull , worse with palpation, better with pain meds and not severe, 2-3/10.     No headache photophobia or neck stiffness.  No shortness of breath cough or hemoptysis.  No nausea vomiting diarrhea or abdominal pain.  No other new skin rash.  no dysuria hematuria or pyuria.       Past Medical History:   Diagnosis Date    Acute renal failure     Hx of    Obesity     Hx of    Sleep apnea     Type 2 diabetes mellitus        Past Surgical History:   Procedure Laterality Date    BACK SURGERY      lower back    CHOLECYSTECTOMY WITH INTRAOPERATIVE CHOLANGIOGRAM N/A 1/6/2021    Procedure: CHOLECYSTECTOMY LAPAROSCOPIC INTRAOPERATIVE CHOLANGIOGRAM;  Surgeon: Juventino Hooker MD;  Location:  [facility];  Service: General;  Laterality: N/A;    ERCP N/A 1/9/2021    Procedure: ENDOSCOPIC RETROGRADE CHOLANGIOPANCREATOGRAPHY;  Surgeon: Jeremy Dowell MD;  Location: [facility] END[facility]COPY;  Service: Gastroenterology;  Laterality: N/A;  with sphiincterotomy and balloon sweep with 9mm-12mm balloon    INCISION AND DRAINAGE FOOT Left 8/3/2024    Procedure: HEAL IRRIGATION AND DEBRIDEMENT LEFT;  Surgeon: Dru Gan Jr., MD;  Location:  [place] [facility];  Service: Orthopedics;  Laterality: Left;    PLACEMENT OF WOUND VAC Left 8/3/2024    Procedure: PLACEMENT OF WOUND VAC;  Surgeon: Dru Gan Jr., MD;  Location:  [facility];  Service: Orthopedics;  Laterality: Left;       Pediatric History   Patient Parents    Not on file     Other Topics Concern    Not on file   Social History Narrative    Not on file       family history includes Cancer in his brother, maternal grandmother, mother, and another family member; Diabetes in an other family member; Heart attack in an other family member; Heart disease in his brother " and father; Hyperlipidemia in his mother and another family member; Hypertension in his mother and another family member; Obesity in an other family member.    Allergies   Allergen Reactions    Sertraline Hcl Hives     Zoloft       Medication:  Current Facility-Administered Medications   Medication Dose Route Frequency Provider Last Rate Last Admin    acetaminophen (TYLENOL) tablet 650 mg  650 mg Oral Q4H PRN Edmond Espinoza DO   650 mg at 11/19/24 1112    Or    acetaminophen (TYLENOL) 160 MG/5ML oral solution 650 mg  650 mg Oral Q4H PRN Edmond Espinoza DO        Or    acetaminophen (TYLENOL) suppository 650 mg  650 mg Rectal Q4H PRN Edmond Espinoza DO   650 mg at 11/15/24 2327    amitriptyline (ELAVIL) tablet 25 mg  25 mg Oral BID Lisa Vallejo MD   25 mg at 11/20/24 2007    aspirin EC tablet 81 mg  81 mg Oral Daily Lisa Vallejo MD   81 mg at 11/20/24 1015    sennosides-docusate (PERICOLACE) 8.6-50 MG per tablet 2 tablet  2 tablet Oral BID PRN Edmond Espinoza DO        And    polyethylene glycol (MIRALAX) packet 17 g  17 g Oral Daily PRN Edmond Espinoza DO        And    bisacodyl (DULCOLAX) EC tablet 5 mg  5 mg Oral Daily PRN Edmond Espinoza DO        And    bisacodyl (DULCOLAX) suppository 10 mg  10 mg Rectal Daily PRN Edmond Espinoza DO        calcium carbonate (TUMS) chewable tablet 500 mg (200 mg elemental)  2 tablet Oral TID PRN Lisa Vallejo MD   2 tablet at 11/20/24 1307    Calcium Replacement - Follow Nurse / BPA Driven Protocol   Not Applicable PRN Edmond Espinoza DO        DAPTOmycin (CUBICIN) 450 mg in sodium chloride 0.9 % 50 mL IVPB  6 mg/kg Intravenous Q24H Varun Dominique  mL/hr at 11/20/24 1311 450 mg at 11/20/24 1311    dextrose (D50W) (25 g/50 mL) IV injection 25 g  25 g Intravenous Q15 Min PRN Perla Pascual MD   25 g at 11/20/24 2232    dextrose (GLUTOSE) oral gel 15 g  15 g Oral Q15 Min PRN Perla Pascual, MD        Enoxaparin Sodium (LOVENOX) syringe 40 mg  40 mg Subcutaneous Daily Edmond Espinoza DO   40 mg  at 11/20/24 1014    ertapenem (INVanz) 1,000 mg in sodium chloride 0.9 % 100 mL MBP  1,000 mg Intravenous Q24H Varun Dominique  mL/hr at 11/20/24 1014 1,000 mg at 11/20/24 1014    finasteride (PROSCAR) tablet 5 mg  5 mg Oral Nightly Lisa Vallejo MD   5 mg at 11/20/24 2007    glucagon (GLUCAGEN) injection 1 mg  1 mg Intramuscular Q15 Min PRN Perla Pascual MD        insulin glargine (LANTUS, SEMGLEE) injection 10 Units  10 Units Subcutaneous Nightly Lisa Vallejo MD   10 Units at 11/18/24 2141    insulin glargine (LANTUS, SEMGLEE) injection 15 Units  15 Units Subcutaneous Daily Karen Mandel PA-C        Insulin Lispro (humaLOG) injection 10 Units  10 Units Subcutaneous TID With Meals Karen Mandel PA-C   10 Units at 11/20/24 1736    Insulin Lispro (humaLOG) injection 2-9 Units  2-9 Units Subcutaneous 4x Daily AC & at Bedtime Lisa Vallejo MD   8 Units at 11/20/24 1311    Lidocaine HCl gel (XYLOCAINE) urethral/mucosal syringe   Urethral PRN Karen Mandel PA-C   Given at 11/20/24 1948    Magnesium Standard Dose Replacement - Follow Nurse / BPA Driven Protocol   Not Applicable PRN Edmond Espinoza DO        metoprolol succinate XL (TOPROL-XL) 24 hr tablet 50 mg  50 mg Oral Daily Lisa Vallejo MD   50 mg at 11/20/24 1015    nitroglycerin (NITROSTAT) SL tablet 0.4 mg  0.4 mg Sublingual Q5 Min PRN Edmond Espinoza DO        ondansetron (ZOFRAN) injection 4 mg  4 mg Intravenous Q6H PRN Rupinder Sutherland APRN   4 mg at 11/20/24 0055    pantoprazole (PROTONIX) EC tablet 40 mg  40 mg Oral Q AM Karen Mandel PA-C   40 mg at 11/21/24 0543    Phosphorus Replacement - Follow Nurse / BPA Driven Protocol   Not Applicable PRN Edmond Espinoza DO        Potassium Replacement - Follow Nurse / BPA Driven Protocol   Not Applicable PRN Edmond Espinoza DO        pregabalin (LYRICA) capsule 225 mg  225 mg Oral BID Tylor, Varun AGUIRRE [facility]   225 mg at 11/20/24 1015    rOPINIRole (REQUIP) tablet 0.25 mg  0.25 mg  Oral Nightly Lisa Vallejo MD   0.25 mg at 11/20/24 2006    sodium chloride 0.9 % flush 10 mL  10 mL Intravenous PRN Edmond Espinoza, DO        sodium chloride 0.9 % flush 10 mL  10 mL Intravenous Q12H Edmond Espinoza, DO   10 mL at 11/20/24 2007    sodium chloride 0.9 % flush 10 mL  10 mL Intravenous PRN Edmond Espinoza, DO        sodium chloride 0.9 % flush 10 mL  10 mL Intravenous Q12H Edmond Espinoza, DO   10 mL at 11/20/24 2007    sodium chloride 0.9 % flush 10 mL  10 mL Intravenous PRN Edmond Espinoza, DO        sodium chloride 0.9 % infusion 40 mL  40 mL Intravenous PRN Edmond Espinoza, DO        sodium chloride 0.9 % infusion 40 mL  40 mL Intravenous PRN Edmond Espinoza, DO        tamsulosin (FLOMAX) 24 hr capsule 0.8 mg  0.8 mg Oral Nightly Lisa Vallejo MD   0.8 mg at 11/20/24 2007       Antibiotics:  Anti-Infectives (From admission, onward)      Ordered     Dose/Rate Route Frequency Start Stop    11/19/24 0737  DAPTOmycin (CUBICIN) 450 mg in sodium chloride 0.9 % 50 mL IVPB        Ordering Provider: Varun Dominique MD    6 mg/kg × 72.3 kg  100 mL/hr over 30 Minutes Intravenous Every 24 Hours 11/19/24 0900 12/03/24 0859    11/19/24 0737  ertapenem (INVanz) 1,000 mg in sodium chloride 0.9 % 100 mL MBP        Ordering Provider: Varun Dominique MD    1,000 mg  200 mL/hr over 30 Minutes Intravenous Every 24 Hours 11/19/24 0900 12/03/24 0859              Review of Systems    11/21/24     Constitutional-- No Fever, chills or sweats.  Appetite diminished with fatigue.  Heent-- No new vision, hearing or throat complaints.  No epistaxis or oral sores.  Denies odynophagia or dysphagia.  No flashers, floaters or eye pain. No odynophagia or dysphagia. No headache, photophobia or neck stiffness.  CV-- No chest pain, palpitation or syncope  Resp-- No SOB/cough/Hemoptysis  GI- No nausea, vomiting, or diarrhea.  No hematochezia, melena, or hematemesis. Denies jaundice or chronic liver disease.  -- No dysuria, hematuria, or flank pain.   "Denies hesitancy, urgency or flank pain.  Lymph- no swollen lymph nodes in neck/axilla or groin.   Heme- No active bruising or bleeding; no Hx of DVT or PE.  MS-- no swelling or pain in the bones or joints of arms/legs.  No new back pain.  Neuro-- No acute focal weakness or numbness in the arms or legs.  No seizures.    Full 12 point review of systems reviewed and negative otherwise for acute complaints, except for above    Physical Exam:   Vital Signs   /53 (BP Location: Right arm, Patient Position: Lying)   Pulse 88   Temp 98 °F (36.7 °C)   Resp 15   Ht 162.6 cm (64\")   Wt 72.3 kg (159 lb 6.3 oz)   SpO2 95%   BMI 27.36 kg/m²     GENERAL: sleepy in no acute distress.   HEENT: Normocephalic, atraumatic.   No conjunctival injection. No icterus. Oropharynx clear without evidence of thrush or exudate. No evidence of periodontal disease.    NECK: Supple without nuchal rigidity. No mass.   HEART: RRR; No murmur, rubs, gallops.   LUNGS: diminished at bases but otherwise Clear to auscultation bilaterally without wheezing, rales, rhonchi. Normal respiratory effort. Nonlabored. No dullness.  ABDOMEN: Soft, nontender, nondistended. Positive bowel sounds. No rebound or guarding. NO mass or HSM.  EXT: see below   MSK: FROM without joint effusions noted arms/legs.    SKIN: Warm and dry without cutaneous eruptions on Inspection/palpation.    NEURO: sleepy.    Right foot second toe amputation site noted, no redness/duration or warmth there.  No oral or active drainage    Left heel with large wound, deeper wound at the proximal aspect of this tracking towards the Achilles although I am unable to visualize  tendon or palpate bone at present.  Bloody drainage.  Vague surrounding erythema with mild tenderness but no discrete mass bulge or fluctuance.  No crepitus or bulla.  Multifocal crusted areas otherwise at his lower extremities      Laboratory Data    Results from last 7 days   Lab Units 11/21/24  0701 11/20/24  0550 " 11/19/24  0717   WBC 10*3/mm3 11.11* 15.93* 9.33   HEMOGLOBIN g/dL 9.4* 10.5* 12.0*   HEMATOCRIT % 30.1* 34.3* 38.6   PLATELETS 10*3/mm3 266 262 276     Results from last 7 days   Lab Units 11/21/24  0701   SODIUM mmol/L 133*   POTASSIUM mmol/L 4.0   CHLORIDE mmol/L 97*   CO2 mmol/L 29.0   BUN mg/dL 31*   CREATININE mg/dL 0.85   GLUCOSE mg/dL 227*   CALCIUM mg/dL 9.3     Results from last 7 days   Lab Units 11/15/24  1107   ALK PHOS U/L 124*   BILIRUBIN mg/dL 0.4   ALT (SGPT) U/L 14   AST (SGOT) U/L 13               Estimated Creatinine Clearance: 83.1 mL/min (by C-G formula based on SCr of 0.85 mg/dL).      Microbiology:      Radiology:  Imaging Results (Last 72 Hours)       Procedure Component Value Units Date/Time    MRI Foot Left Without Contrast [[id number]] Collected: 11/18/24 2320     Updated: 11/18/24 2331    Narrative:        MRI FOOT LEFT WO CONTRAST    Date of Exam: 11/18/2024 5:50 PM EST    Indication: Evaluation for left heel osteomyelitis.     Comparison: 8/1/2024    Technique:  Routine multiplanar/multisequence sequence images of the left foot were obtained without contrast administration.      Findings:  Bones: There is marrow edema seen within the posterior calcaneus. There appears to be loss of T1 cortical and marrow signal seen along the posterior lateral aspect of the talus calcaneus. Additionally there appears to be nondisplaced stress fracture   along the posterior aspect of the calcaneus best seen on sagittal T1 images. No additional areas of abnormal marrow edema. No additional fracture seen. No substantial joint effusions.    Soft tissues: There is soft tissue wound seen along the posterior heel with ill-defined fluid extending into and disrupting the distal lateral Achilles tendon (long axial image 26). There is a tiny associated fluid collection seen here measuring   approximately 0.7 x 0.5 x 0.7 cm. Fluid seen at the master knot of Brown. Flexor extensor tendons appear grossly intact.  Peroneal tendons appear grossly intact. Diffuse atrophic and edematous changes of the intrinsic foot muscles suggestive of   neuropathic changes. Lisfranc ligament appears intact. Diffuse subcutaneous edema.      Impression:      Impression:  Soft tissue wound seen along the posterior heel with ill-defined fluid that extends through and disrupts the distal Achilles tendon. Underlying this area there is evidence of osteomyelitis of the posterior lateral calcaneus. There is a tiny fluid   collection here as well that may represent an associated nondrainable abscess.    Additionally there appears to be a nondisplaced stress fracture of the posterior calcaneus.        Electronically Signed: Nolan Brady MD    11/18/2024 11:28 PM EST    Workstation ID: [id number]              Impression:       --acute left heel wound infection ,  abnormal MRI as above raising concern for fluid extending into and disrupting the distal lateral Achilles tendon in addition to with evidence for osteomyelitis at the posterior lateral calcaneus in addition to possible nondisplaced stress fracture at the posterior calcaneus; High risk for persistent/recurrent or nonhealing wounds, persistent/progressive or recurrent infection and risk for further functional/limb loss, higher level amputation etc. Surgery 8/3;  High risk for amputation.  Empiric antibiotics for now     --urinary retention per admission notes, some hematuria on November 15; further urologic evaluation regarding functional/anatomic assessment at discretion of medicine team with respect inpatient versus outpatient     --Diabetes, uncontrolled ; glucose control per medicine team     --diabetic neuropathy     --Chronic debility as per patient wheelchair bound     --Hx ESBL    PLAN:     --IV daptomycin/Invanz    --Check/review labs cultures and scans    --Partial history Per nursing staff    --Discussed with microbiology    --Discussed with surgery team    --orthopedic team making  plans    --Highly complex at of issues with high risk for further serious morbidity and other serious sequela     Copied text in this note has been reviewed and is accurate as of 11/21/24.     aVrun Dominique MD  11/21/2024

## 2024-11-21 NOTE — CONSULTS
"          Clinical Nutrition Assessment     Patient Name: Fracisco Mullen  YOB: 1965  MRN: [id number]  Date of Encounter: 11/21/24 09:15 EST  Admission date: 11/15/2024  Reason for Visit: Nurse practioner/physician assistant consult education vs supplement    Assessment   Nutrition Assessment   Admission Diagnosis:  DKA (diabetic ketoacidosis) [E11.10]    Problem List:    DKA (diabetic ketoacidosis)    Non healing left heel wound    Severe protein-calorie malnutrition      PMH:   He  has a past medical history of Acute renal failure, Obesity, Sleep apnea, and Type 2 diabetes mellitus.    PSH:  He  has a past surgical history that includes Back surgery; cholecystectomy with intraoperative cholangiogram (N/A, 1/6/2021); ERCP (N/A, 1/9/2021); Incision and drainage foot (Left, 8/3/2024); and PLACEMENT OF WOUND VAC (Left, 8/3/2024).    Applicable Nutrition History:   (11/18) SLP Diet Recommendation: soft to chew textures, ground, thin liquids, other (see comments) (no straw).      Anthropometrics     Height: Height: 162.6 cm (64\")  Last Filed Weight: Weight: 72.3 kg (159 lb 6.3 oz) (11/18/24 0321)  Method: Weight Method: Bed scale  BMI: BMI (Calculated): 27.3    UBW:     Weight       Weight (kg) Weight (lbs) Weight Method Visit Report   8/1/2024 86.183 kg  190 lb  Stated     11/15/2024 85.73 kg  189 lb  Stated      72.303 kg  159 lb 6.4 oz  Bed scale     11/18/2024 72.3 kg  159 lb 6.3 oz  Bed scale       Weight change: weight loss of 23 lbs (12.1%) over 3 month(s)    Significant?  Yes    Bed weight at time of visit: 167lb     Nutrition Focused Physical Exam    Date: 11/18    Patient meets criteria for malnutrition diagnosis, see MSA note.     Subjective   Reported/Observed/Food/Nutrition Related History:   11/21  Consult for supplement options.  It seems patient has refusing meals, only drinking liquids.  Good PO intake with initial admission.      Dicussed with patient, reports he is getting tired of the " "food provided. He feels its the same food for all meals.  Prefs noted for lunch meal, wants a hamburger and fries.  Prefs limited due to soft texture, ground meat restriction.  Declined ONS, patient reports he tried ONS before and did not like them.     11/18  Alert and oriented, able to contribute to intake and weight history.  Reports gradual weight loss over the past 3 months. He feels his appetite has decline a little. He thinks he feels a little down which has affected his appetite.  Has been disabled for about two years when \"his leg gave out\", he is not sure if he has a stroke.  He feels he is compliant with his insulin.  He does not know what insulin he takes, his wife is the one who knows the dose and frequency. He is not sure why his A1C is so high.      Current Nutrition Prescription   PO: NPO Diet NPO Type: Strict NPO, Sips with Meds  Diet: Diabetic; Consistent Carbohydrate; No Straw; Texture: Soft to Chew (NDD 3); Soft to Chew: Chopped Meat; Fluid Consistency: Thin (IDDSI 0)  Oral Nutrition Supplement:   Intake: 2 Days: 71% x 6 meals     Assessment & Plan   Nutrition Diagnosis   Date: 11/18  Updated:  Problem Malnutrition, acute severe   Etiology Intake < needs; ? Poor BG control    Signs/Symptoms <75% of EEN x>7d, significant weight loss of 12.1% x 3, moderate muscle wasting, and moderate subcutaneous fat loss   Status: New    Goal / Objectives:   Nutrition to support treatment and Continue positive trend      Nutrition Intervention      Follow treatment progress, Care plan reviewed, Interview for preferences    Nutrition POC:  Supplement offered/declined  RD reached out to SLP for possible re-eval/ liberalize diet to improve intake  Patient provided with phone at bedside to help kitchen staff to reach him and review menu options.      Monitoring/Evaluation:   Per protocol, I&O, PO intake, Pertinent labs, Symptoms    Lynda Pisano RD  Time Spent: 30min    "

## 2024-11-21 NOTE — PROGRESS NOTES
[facility]  PROGRESS NOTE    Patient Name: Fracisco Mullen  : 1965  MRN: [id number]    Date of Admission: 11/15/2024  Primary Care Physician: Brandy Roberson    Subjective   Subjective     CC:  F/u DKA    HPI:  Patient reports less abdominal fullness this morning s/p insertion of ledesma catheter. Patient reports he is nervous about his surgery tomorrow, patient's questions answered. Nurse at bedside. No family at bedside.      Objective   Objective     Vital Signs:   Temp:  [98 °F (36.7 °C)-98.7 °F (37.1 °C)] 98.1 °F (36.7 °C)  Heart Rate:  [] 92  Resp:  [15-18] 16  BP: (102-131)/(52-75) 105/58  Flow (L/min) (Oxygen Therapy):  [2] 2     Physical Exam:  Constitutional: Chronically-ill appearing male lying in bed in NAD  HENT: NCAT, mucous membranes dry  Respiratory: Clear to auscultation bilaterally, nonlabored respirations   Cardiovascular: RRR, no murmurs, rubs, or gallops, no bilateral ankle edema  Gastrointestinal: Soft, nontender, non distended  Musculoskeletal: ROLDAN spontaneously  Psychiatric: Appropriate affect, cooperative  Neurologic: Alert and oriented, speech clear  Skin: Bilateral foot wounds    Results Reviewed:  LAB RESULTS:      Lab 24  0701 24  0550 24  0717 24  0608 24  0629 24  0343 11/15/24  1532 11/15/24  1107 11/15/24  0948 11/15/24  0942   WBC 11.11* 15.93* 9.33 6.74 9.52 11.93*  --   --   --  13.03*   HEMOGLOBIN 9.4* 10.5* 12.0* 11.2* 10.9* 11.2*  --   --   --  12.3*   HEMOGLOBIN, POC  --   --   --   --   --   --   --   --    < >  --    HEMATOCRIT 30.1* 34.3* 38.6 35.4* 33.7* 34.1*  --   --   --  37.6   HEMATOCRIT POC  --   --   --   --   --   --   --   --    < >  --    PLATELETS 266 262 276 242 266 283  --   --   --  321   NEUTROS ABS 7.82*  --   --   --   --  7.30*  --   --   --  11.22*   IMMATURE GRANS (ABS) 0.06*  --   --   --   --  0.07*  --   --   --  0.12*   LYMPHS ABS 2.17  --   --   --    --  3.19*  --   --   --  1.18   MONOS ABS 0.76  --   --   --   --  1.22*  --   --   --  0.41   EOS ABS 0.22  --   --   --   --  0.07  --   --   --  0.01   MCV 82.9 84.3 82.8 80.6 79.1 80.0  --   --   --  78.7*   LACTATE  --   --   --   --   --   --  1.9  --   --  3.6*   HSTROP T  --   --   --   --   --   --  44* 32*  --   --     < > = values in this interval not displayed.         Lab 11/21/24  0701 11/20/24  0550 11/19/24  0717 11/18/24  0608 11/17/24  0629 11/16/24  1342 11/16/24  0846 11/16/24  0343 11/16/24  0045 11/15/24  1943 11/15/24  0948 11/15/24  0942   SODIUM 133* 133* 134* 135* 132*  --  134* 143 137 136   < >  --    POTASSIUM 4.0 4.4 3.9 4.0 4.3  --  3.7 4.1 3.8 4.5   < >  --    CHLORIDE 97* 97* 97* 99 98  --  104 109* 105 103   < >  --    CO2 29.0 24.0 27.0 25.0 23.0  --  22.0 24.0 24.0 25.0   < >  --    ANION GAP 7.0 12.0 10.0 11.0 11.0  --  8.0 10.0 8.0 8.0   < >  --    BUN 31* 35* 20 22 13  --  12 16 15 17   < >  --    CREATININE 0.85 0.98 1.00 0.88 0.89  --  0.64* 0.72* 0.74* 0.84   < >  --    EGFR 98.9 87.7 85.6 97.8 97.5  --  107.7 103.9 103.1 99.2   < >  --    GLUCOSE 227* 433* 279* 270* 324*  --  171* 120* 154* 183*   < >  --    CALCIUM 9.3 8.4* 8.8 8.2* 8.1*  --  8.0* 8.4* 8.0* 8.8   < >  --    MAGNESIUM  --   --   --   --   --  1.6 2.0 1.9 1.8 1.8   < >  --    PHOSPHORUS  --   --   --   --   --  2.5 2.4* 3.2 2.7 2.6   < >  --    HEMOGLOBIN A1C  --   --   --   --   --   --   --   --   --   --   --  14.10*    < > = values in this interval not displayed.         Lab 11/15/24  1107   TOTAL PROTEIN 6.7   ALBUMIN 3.4*   GLOBULIN 3.3   ALT (SGPT) 14   AST (SGOT) 13   BILIRUBIN 0.4   ALK PHOS 124*         Lab 11/15/24  1532 11/15/24  1107   HSTROP T 44* 32*                 Lab 11/15/24  0954   PH, ARTERIAL 7.328*   PCO2, ARTERIAL 36.0   PO2 ART 88.9   FIO2 21   HCO3 ART 18.9*   BASE EXCESS ART -6.4*   CARBOXYHEMOGLOBIN 1.5     Brief Urine Lab Results  (Last result in the past 365 days)        Color    Clarity   Blood   Leuk Est   Nitrite   Protein   CREAT   Urine HCG        11/15/24 1142 Yellow   Clear   Small (1+)   Negative   Negative   100 mg/dL (2+)                   Microbiology Results Abnormal       None            Doppler Arterial Multi Level Lower Extremity - Bilateral CAR    Result Date: 11/19/2024    Right Conclusion: The right ANNY is normal. Unable to assess digital insufficiency.   Left Conclusion: The left ANNY is normal. Normal digital pressures.          Current medications:  Scheduled Meds:amitriptyline, 25 mg, Oral, BID  aspirin, 81 mg, Oral, Daily  DAPTOmycin, 6 mg/kg, Intravenous, Q24H  enoxaparin, 40 mg, Subcutaneous, Daily  ertapenem, 1,000 mg, Intravenous, Q24H  finasteride, 5 mg, Oral, Nightly  insulin glargine, 10 Units, Subcutaneous, Nightly  insulin glargine, 15 Units, Subcutaneous, Daily  Insulin Lispro, 10 Units, Subcutaneous, TID With Meals  insulin lispro, 2-9 Units, Subcutaneous, 4x Daily AC & at Bedtime  metoprolol succinate XL, 50 mg, Oral, Daily  pantoprazole, 40 mg, Oral, Q AM  pregabalin, 225 mg, Oral, BID  rOPINIRole, 0.25 mg, Oral, Nightly  sodium chloride, 10 mL, Intravenous, Q12H  sodium chloride, 10 mL, Intravenous, Q12H  tamsulosin, 0.8 mg, Oral, Nightly      Continuous Infusions:lactated ringers, 50 mL/hr, Last Rate: 50 mL/hr (11/21/24 1122)      PRN Meds:.  acetaminophen **OR** acetaminophen **OR** acetaminophen    senna-docusate sodium **AND** polyethylene glycol **AND** bisacodyl **AND** bisacodyl    calcium carbonate    Calcium Replacement - Follow Nurse / BPA Driven Protocol    dextrose    dextrose    glucagon (human recombinant)    Lidocaine HCl gel    Magnesium Standard Dose Replacement - Follow Nurse / BPA Driven Protocol    nitroglycerin    ondansetron    Phosphorus Replacement - Follow Nurse / BPA Driven Protocol    Potassium Replacement - Follow Nurse / BPA Driven Protocol    sodium chloride    sodium chloride    sodium chloride    sodium chloride    sodium  chloride    Assessment & Plan   Assessment & Plan     Active Hospital Problems    Diagnosis  POA    **DKA (diabetic ketoacidosis) [E11.10]  Yes    Severe protein-calorie malnutrition [E43]  Yes    Non healing left heel wound [S91.302A]  Yes      Resolved Hospital Problems   No resolved problems to display.        Brief Hospital Course to date:  Fracisco ADAM Lady is a 59 y.o. male with past medical history of hypertension, type 2 diabetes with insulin use, diabetic neuropathy, and chronic diabetic foot wounds who presents via EMS from home due to altered mental status and fatigue. Lab work consistent with diabetic ketoacidosis.     This patient's problems and plans were partially entered by my partner and updated as appropriate by me 11/21/24. Copied text in this note has been reviewed and is accurate as of today's date.      Chronic bilateral foot wounds  - Patient with chronic left heel wound and right plantar foot wound  - Admitted August 2024 for left foot cellulitis and discharged on oral antibiotics and with home wound vac following debridement, MRI left foot obtained at that time with no definite findings of osteomyelitis (was seen by ID/Dr. Dominique and Orthopedics/Dr. Gan)  - Repeat MRI left foot shows soft tissue wound with evidence of osteomyelitis of the calcaneus and possibly an associated non-drainable abscess  - Continue daptomycin and Invanz per ID recs  - Wound care consulted  - Dr. Gan planning for left BKA on Friday, 11/22/24, no vascular intervention anticipated at this time  - NPO at midnight    BPH  Urinary retention  - Continue home Flomax, Proscar  - Patient had urinary retention with significant amount of urine on bladder scan (>1000 ml) on 11/20 despite increase in home Flomax on 11/18. Patient repeatedly refused in-and-out cath. Opted for insertion of ledesma cath overnight due to high infection risk. Recorded 1350 mL of output s/p ledesma cath. Nursing described the urine as purulent.  Patient with good antibiotic coverage as above. Will continue to monitor.    DKA in setting of uncontrolled IDDM complicated by diabetic neuropathy and chronic foot wounds , improving  - Uncertain of medication compliance  - A1c 14.1%  - S/p DKA protocol including insulin drip, IV fluids, and electrolyte replacement per protocol  - Now transitioned to basal-bolus insulin, will likely need titration as needed  - DM educator consulted    Malnutrition  - Patient with poor oral intake, refusing supplementation  - Appears dry on exam, gentle IVF  - Nutrition following     Acute toxic metabolic encephalopathy, improved  - CT head without acute abnormality  - UDS negative  - Likely secondary to DKA     HTN  - Continue home metoprolol     Mood disorder  - Continue home Amitriptyline      RLS  - Continue home Requip    Expected Discharge Location and Transportation: Rehab  Expected Discharge   Expected Discharge Date: 11/25/2024; Expected Discharge Time:      VTE Prophylaxis:  Pharmacologic VTE prophylaxis orders are present.         AM-PAC 6 Clicks Score (PT): 6 (11/20/24 2000)    CODE STATUS:   Code Status and Medical Interventions: CPR (Attempt to Resuscitate); Full Support; Full code per last hosptial admission. Patient disoriented on exam with no family present at bedside. Only contact information is friend.   Ordered at: 11/15/24 1218     Level Of Support Discussed With:    Patient     Code Status (Patient has no pulse and is not breathing):    CPR (Attempt to Resuscitate)     Medical Interventions (Patient has pulse or is breathing):    Full Support     Comments:    Full code per last hosptial admission. Patient disoriented on exam with no family present at bedside. Only contact information is friend.       Karen Mandel PA-C  11/21/24

## 2024-11-21 NOTE — THERAPY TREATMENT NOTE
Acute Care - Speech Language Pathology   Swallow Treatment Note [facility]     Patient Name: Fracisco Mullen  : 1965  MRN: [id number]  Today's Date: 2024               Admit Date: 11/15/2024    Visit Dx:     ICD-10-CM ICD-9-CM   1. Diabetic ketoacidosis without coma associated with type 2 diabetes mellitus  E11.10 250.12   2. Oropharyngeal dysphagia  R13.12 787.22   3. Non healing left heel wound  S91.302A 892.1   4. Acute osteomyelitis of left foot  M86.172 730.07     Patient Active Problem List   Diagnosis    HTN (hypertension)    Type 2 diabetes mellitus, with long-term current use of insulin    Diabetic retinopathy    Obesity (BMI 30.0-34.9)    Peripheral neuropathy    Asthma    Causalgia of lower limb    Chronic constipation    Compression of lumbar nerve root    GERD (gastroesophageal reflux disease)    Hyperlipidemia    IBS (irritable bowel syndrome)    RLS (restless legs syndrome)    Sleep apnea    Vitamin D deficiency    Cigar smoker    Chronic back pain    Diabetic ketoacidosis associated with type 2 diabetes mellitus    Multiple open wounds of foot    Status post cholecystectomy    Cholestatic hepatitis    Diabetic infection of left foot    Precordial pain    Tobacco use    DKA (diabetic ketoacidosis)    Non healing left heel wound    Severe protein-calorie malnutrition     Past Medical History:   Diagnosis Date    Acute renal failure     Hx of    Obesity     Hx of    Sleep apnea     Type 2 diabetes mellitus      Past Surgical History:   Procedure Laterality Date    BACK SURGERY      lower back    CHOLECYSTECTOMY WITH INTRAOPERATIVE CHOLANGIOGRAM N/A 2021    Procedure: CHOLECYSTECTOMY LAPAROSCOPIC INTRAOPERATIVE CHOLANGIOGRAM;  Surgeon: Juventino Hooker MD;  Location: [facility] OR;  Service: General;  Laterality: N/A;    ERCP N/A 2021    Procedure: ENDOSCOPIC RETROGRADE CHOLANGIOPANCREATOGRAPHY;  Surgeon: Jeremy Dowell MD;  Location: [facility] ENDOSCOPY;  Service: Gastroenterology;   Laterality: N/A;  with sphiincterotomy and balloon sweep with 9mm-12mm balloon    INCISION AND DRAINAGE FOOT Left 8/3/2024    Procedure: HEAL IRRIGATION AND DEBRIDEMENT LEFT;  Surgeon: Dru Gan Jr., MD;  Location:  [facility] OR;  Service: Orthopedics;  Laterality: Left;    PLACEMENT OF WOUND VAC Left 8/3/2024    Procedure: PLACEMENT OF WOUND VAC;  Surgeon: Dru Gan Jr., MD;  Location:  [facility] OR;  Service: Orthopedics;  Laterality: Left;       SLP Recommendation and Plan     SLP Diet Recommendation: (P) regular textures, thin liquids (11/21/24 1542)  Recommended Precautions and Strategies: (P) upright posture during/after eating, small bites of food and sips of liquid, general aspiration precautions (11/21/24 1542)  SLP Rec. for Method of Medication Administration: (P) meds whole, with thin liquids, as tolerated (11/21/24 1542)     Monitor for Signs of Aspiration: (P) yes, notify SLP if any concerns (11/21/24 1542)        Anticipated Discharge Disposition (SLP): (P) home (11/21/24 1542)     Therapy Frequency (Swallow): (P) 3 days per week (11/21/24 1542)  Predicted Duration Therapy Intervention (Days): (P) 1 week (11/21/24 1542)  Oral Care Recommendations: (P) Oral Care BID/PRN, Toothbrush (11/21/24 1542)        Daily Summary of Progress (SLP): (P) progress toward functional goals is good (11/21/24 1542)               Treatment Assessment (SLP): (P) no clinical signs of, aspiration (11/21/24 1542)  Treatment Assessment Comments (SLP): (P) Trailed solid with no issues observed at bedside. Pt endorsing that he does not like the soft chopped tray. Diet includes regular solids at baseline. 3-ounce water screen administered with thins via straw. No s/sxs of aspiration. Pt sensed aspiration and responded with cough on previous instrumental study. Pt reports he uses straws occasionally at baseline. Recommend regular tray and allowance of straws as tolerated at this time. (11/21/24 1542)  Plan for Continued  Treatment (SLP): (P) continue treatment per plan of care (11/21/24 1542)                SWALLOW EVALUATION (Last 72 Hours)       SLP Adult Swallow Evaluation       Row Name 11/21/24 1542 11/19/24 1050                Rehab Evaluation    Document Type therapy note (daily note) (P)   -SM therapy note (daily note)  -[facility]       Subjective Information no complaints (P)   -SM no complaints  -[facility]       Patient Observations alert;cooperative (P)   -SM alert;cooperative  -[facility]       Patient Effort good (P)   -SM good  -[facility]       Symptoms Noted During/After Treatment none (P)   -SM --          General Information    Current Method of Nutrition -- soft to chew textures;ground;thin liquids;other (see comments)  no straws, small cup sips  -[facility]          Pain    Pretreatment Pain Rating 0/10 - no pain (P)   -SM 9/10  -SMA       Posttreatment Pain Rating 0/10 - no pain (P)   -SM 9/10  -[facility]       Pain Location -- back  -[facility]       Pre/Posttreatment Pain Comment -- RN aware and managing  -[facility]          SLP Treatment Clinical Impressions    Treatment Assessment (SLP) no clinical signs of;aspiration (P)   -SM toleration of diet;suspected;improved;pharyngeal dysphagia  -[facility]       Treatment Assessment Comments (SLP) Trailed solid with no issues observed at bedside. Pt endorsing that he does not like the soft chopped tray. Diet includes regular solids at baseline. 3-ounce water screen administered with thins via straw. No s/sxs of aspiration. Pt sensed aspiration and responded with cough on previous instrumental study. Pt reports he uses straws occasionally at baseline. Recommend regular tray and allowance of straws as tolerated at this time. (P)   -SM --       Daily Summary of Progress (SLP) progress toward functional goals is good (P)   -SM progress toward functional goals is good  -[facility]       Plan for Continued Treatment (SLP) continue treatment per plan of care (P)   -SM continue treatment per plan of care  -[facility]       Care Plan Review care  plan/treatment goals reviewed;patient/other agree to care plan (P)   - evaluation/treatment results reviewed;care plan/treatment goals reviewed;risks/benefits reviewed;current/potential barriers reviewed;patient/other agree to care plan  -[facility]          Recommendations    Therapy Frequency (Swallow) 3 days per week (P)   -SM --       Predicted Duration Therapy Intervention (Days) 1 week (P)   - --       SLP Diet Recommendation regular textures;thin liquids (P)   - --       Recommended Precautions and Strategies upright posture during/after eating;small bites of food and sips of liquid;general aspiration precautions (P)   - --       Oral Care Recommendations Oral Care BID/PRN;Toothbrush (P)   - --       SLP Rec. for Method of Medication Administration meds whole;with thin liquids;as tolerated (P)   - --       Monitor for Signs of Aspiration yes;notify SLP if any concerns (P)   -SM --       Anticipated Discharge Disposition (SLP) home (P)   - --                 User Key  (r) = Recorded By, (t) = Taken By, (c) = Cosigned By      Initials Name Effective Dates    Kami Jauregui, MS CCC-SLP 02/03/23 -     Mago Lowe, Speech Therapy Student 07/30/24 -                     EDUCATION  The patient has been educated in the following areas:   Dysphagia (Swallowing Impairment).        SLP GOALS       Row Name 11/21/24 1542 11/19/24 1050          (LTG) Patient will demonstrate functional swallow for    Diet Texture (Demonstrate functional swallow) regular textures (P)   - soft to chew (chopped) textures  -[facility]     Liquid viscosity (Demonstrate functional swallow) thin liquids (P)   - thin liquids  -[facility]     [place] (Demonstrate functional swallow) independently (over 90% accuracy) (P)   - independently (over 90% accuracy)  -[facility]     Time Frame (Demonstrate functional swallow) 1 week (P)   - 1 week  -[facility]     Progress/Outcomes (Demonstrate functional swallow) goal revised this date (P)   -SM  good progress toward goal  -SMA     Comment (Demonstrate functional swallow) Upgraded diet texture this date (P)   -SM --        (STG) Patient will tolerate trials of    Consistencies Trialed (Tolerate trials) regular textures;thin liquids (P)   -SM thin liquids;soft to chew (chopped) textures  -SMA     Desired Outcome (Tolerate trials) without signs/symptoms of aspiration;with adequate oral prep/transit/clearance (P)   -SM without signs/symptoms of aspiration;with adequate oral prep/transit/clearance  -SMA     [place] (Tolerate trials) independently (over 90% accuracy) (P)   -SM independently (over 90% accuracy)  -SMA     Time Frame (Tolerate trials) 1 week (P)   -SM 1 week  -SMA     Progress/Outcomes (Tolerate trials) goal revised this date (P)   -SM goal revised this date;good progress toward goal  -SMA     Comment (Tolerate trials) -- Previous goal met. Advancing to soft/chopped texture. If pt wishes for furhter advancement, ok to advance to regular texture. No s/s aspiration  -SMA        (STG) Patient will tolerate therapeutic trials of    Consistencies Trialed (Tolerate therapeutic trials) thin liquids (P)   -SM thin liquids  via large, consecutive sips of thin liquids  -SMA     Desired Outcome (Tolerate therapeutic trials) without signs/symptoms of aspiration;without signs of distress;with adequate oral prep/transit/clearance (P)   -SM without signs/symptoms of aspiration  -SMA     [place] (Tolerate therapeutic trials) independently (over 90% accuracy) (P)   -SM independently (over 90% accuracy)  -SMA     Time Frame (Tolerate therapeutic trials) 1 week (P)   -SM 1 week  -SMA     Progress/Outcomes (Tolerate therapeutic trials) goal met (P)   -SM goal revised this date;good progress toward goal  -SMA     Comment (Tolerate therapeutic trials) -- No s/s aspiration. Pt reports overall feeling stronger. Consider repeat MBS soon for ? dysphagia resolve.  -SMA        (STG) Pharyngeal Strengthening Exercise  Goal 1 (SLP)    Activity (Pharyngeal Strengthening Goal 1, SLP) increase timing;increase anterior movement of the hyolaryngeal complex;increase squeeze/positive pressure generation (P)   -SM increase timing;increase anterior movement of the hyolaryngeal complex;increase squeeze/positive pressure generation  -SMA     Increase Timing prepping - 3 second prep or suck swallow or 3-step swallow (P)   -SM prepping - 3 second prep or suck swallow or 3-step swallow  -SMA     Increase Anterior Movement of the Hyolaryngeal Complex chin tuck against resistance (CTAR) (P)   -SM chin tuck against resistance (CTAR)  -SMA     Increase Squeeze/Positive Pressure Generation hard effortful swallow (P)   -SM hard effortful swallow  -SMA     [place]/Accuracy (Pharyngeal Strengthening Goal 1, SLP) with minimal cues (75-90% accuracy) (P)   -SM with minimal cues (75-90% accuracy)  -SMA     Time Frame (Pharyngeal Strengthening Goal 1, SLP) 1 week (P)   -SM 1 week  -SMA     Progress/Outcomes (Pharyngeal Strengthening Goal 1, SLP) goal met (P)   -SM continuing progress toward goal  -SMA               User Key  (r) = Recorded By, (t) = Taken By, (c) = Cosigned By      Initials Name Provider Type    Kami Jauregui, MS CCC-SLP Speech and Language Pathologist    Mago Lowe, Speech Therapy Student SLP Student                         Time Calculation:    Time Calculation- SLP       Row Name 11/21/24 1617             Time Calculation- SLP    SLP Start Time 1542 (P)   -SM      SLP Received On 11/21/24 (P)   -SM         Untimed Charges    83701-[id number] Treatment Swallow Minutes 45 (P)   -SM         Total Minutes    Untimed Charges Total Minutes 45 (P)   -SM       Total Minutes 45 (P)   -SM                User Key  (r) = Recorded By, (t) = Taken By, (c) = Cosigned By      Initials Name Provider Type    Mago Lowe, Speech Therapy Student SLP Student                    Therapy Charges for Today       Code Description Service  Date Service Provider Modifiers Qty    [id number]  ST TREATMENT SWALLOW 3 11/21/2024 Mago Multani, Speech Therapy Student GN 1                 Mago Multani, Speech Therapy Student  11/21/2024

## 2024-11-21 NOTE — PLAN OF CARE
Goal Outcome Evaluation:  Plan of Care Reviewed With: (P) patient                Anticipated Discharge Disposition (SLP): (P) home             Treatment Assessment (SLP): (P) no clinical signs of, aspiration (11/21/24 1542)  Treatment Assessment Comments (SLP): (P) Trailed solid with no issues observed at bedside. Pt endorsing that he does not like the soft chopped tray. Diet includes regular solids at baseline. 3-ounce water screen administered with thins via straw. No s/sxs of aspiration. Pt sensed aspiration and responded with cough on previous instrumental study. Pt reports he uses straws occasionally at baseline. Recommend regular tray and allowance of straws as tolerated at this time. (11/21/24 1542)  Plan for Continued Treatment (SLP): (P) continue treatment per plan of care (11/21/24 1542)

## 2024-11-21 NOTE — PAYOR COMM NOTE
"Ref# [id number]   Clinical Update    SANDRA Boyer, RN  Utilization Review  Phone [phone]  Fax [phone]    [place] [address]       Fracisco Patterson \"Bill\" (59 y.o. Male)       Date of Birth   1965    Social Security Number       Address   [address] [address]    Home Phone   [phone]    MRN   [id number]       Jain   None    Marital Status                               Admission Date   11/15/24    Admission Type   Emergency    Admitting Provider   Geovanna Ku MD    Attending Provider   Geovanna Ku MD    Department, Room/Bed   [facility] 4H, S474/1       Discharge Date       Discharge Disposition       Discharge Destination                                 Attending Provider: Geovanna Ku MD    Allergies: Sertraline Hcl    Isolation: Contact   Infection: ESBL Klebsiella (08/02/24)   Code Status: CPR    Ht: 162.6 cm (64\")   Wt: 72.3 kg (159 lb 6.3 oz)    Admission Cmt: None   Principal Problem: DKA (diabetic ketoacidosis) [E11.10]                   Active Insurance as of 11/15/2024       Primary Coverage       Payor Plan Insurance Group Employer/Plan Group    [employer] [place] [employer] [place]ICA[place]        Payor Plan Address Payor Plan Phone Number Payor Plan Fax Number Effective Dates    [address] [address] [phone]  4/10/2016 - None Entered    [facility] [address]         Subscriber Name Subscriber Birth Date Member ID       FRACISCO PATTERSON 1965 [id number]                     Emergency Contacts        (Rel.) Home Phone Work Phone Mobile Phone    Yael Cosme (Partner) -- -- [phone]              Oxygen Therapy (last 2 days)       Date/Time SpO2 Device (Oxygen Therapy) Flow (L/min) (Oxygen Therapy) Oxygen Concentration (%) ETCO2 (mmHg)    11/21/24 1200 96 nasal cannula 2 -- --    11/21/24 1040 93 -- -- -- --    11/21/24 1000 94 nasal cannula 2 -- --    11/21/24 0800 95 nasal " cannula 2 -- --    11/21/24 0532 95 -- -- -- --    11/21/24 0015 94 -- 2 -- --    11/20/24 2000 -- -- 2 -- --    11/20/24 1940 94 room air -- -- --    11/20/24 1800 93 room air -- -- --    11/20/24 1517 91 -- -- -- --    11/20/24 1400 96 room air -- -- --    11/20/24 1310 -- room air -- -- --    11/20/24 1200 91 room air -- -- --    11/20/24 1109 87 room air -- -- --    11/20/24 1020 90 nasal cannula 2 -- --    11/20/24 1000 93 room air -- -- --    11/20/24 0800 94 room air -- -- --    11/20/24 0609 -- nasal cannula 2 -- --    11/20/24 0530 92 -- -- -- --    11/20/24 0400 -- nasal cannula 2 -- --    11/20/24 0227 94 -- -- -- --    11/20/24 0200 -- nasal cannula 2 -- --    11/20/24 0000 -- nasal cannula 2 -- --    11/19/24 2200 -- nasal cannula 2 -- --    11/19/24 2042 96 -- -- -- --    11/19/24 2000 -- room air -- -- --    11/19/24 1432 94 room air -- -- --    11/19/24 1411 96 -- -- -- --    11/19/24 1125 93 -- -- -- --    11/19/24 0840 92 room air -- -- --    11/19/24 0800 -- room air -- -- --    11/19/24 0700 95 -- -- -- --    11/19/24 0607 95 nasal cannula 1 -- --    11/19/24 0606 67 nasal cannula 1 -- --    11/19/24 0401 95 room air -- -- --    11/19/24 0200 93 room air -- -- --    11/19/24 0043 95 room air -- -- --             Physician Progress Notes (last 72 hours)        Chris Barone PA-C at 11/21/24 0918          Fracisco ADAM        LOS: 6 days   Patient Care Team:  Brandy Roberson as PCP - General (Family Medicine)  Varun Fontenot MD as Consulting Physician (Cardiology)  Yulissa Taveras APRN as Nurse Practitioner (Cardiology)    Chief Complaint:  left calcaneal osteomyelitis    Subjective     Interval History:     Resting comfortably in bed. Pain controlled. No acute events overnight    History taken from: patient    Review of Systems:   The following systems were reviewed and negative     Gen - No fevers, chills  CV - No chest pain, palpitations  Resp - No cough, dyspnea  GI - No  "N/V/D, abdominal pain    Objective     Vital Signs  Vital Signs (last 24 hours)         11/19 0700  11/20 0659 11/20 0700  11/20 0858   Most Recent      Temp (°F) 97.7 -  98.2       98 (36.7) 11/19 2042    Heart Rate 90 -  103       103 11/20 0530    Resp 16 -  18       16 11/19 2042    BP 73/59 -  115/74       115/74 11/19 1425    SpO2 (%) 92 -  96       92 11/20 0530    Flow (L/min) (Oxygen Therapy)   2       2 11/20 0609              Physical Exam:  No acute distress  Nonlabored respirations  Regular rate and rhythm  Abdomen nondistended  Left lower extremity:  Posterior left heel wound with mild surrounding erythema, serosanguineous drainage. No warmth, induration, julisa purulence.      Results Review:     I reviewed the patient's new clinical results.    Medication Review:   Hospital Medications (active)         Dose Frequency Start End    acetaminophen (TYLENOL) 160 MG/5ML oral solution 650 mg 650 mg Every 4 Hours PRN 11/15/2024 --    Admin Instructions: If given for fever, use fever parameter: fever greater than 100.4 °F  Based on patient request - if ordered for moderate or severe pain, provider allows for administration of a medication prescribed for a lower pain scale.    Do not exceed 4 grams of acetaminophen in a 24 hr period. Max dose of 2gm for AST/ALT greater than 120 units/L.    If given for pain, use the following pain scale:   Mild Pain = Pain Score of 1-3, CPOT 1-2  Moderate Pain = Pain Score of 4-6, CPOT 3-4  Severe Pain = Pain Score of 7-10, CPOT 5-8    Route: Oral    Linked Group 1: Placed in \"Or\" Linked Group        acetaminophen (TYLENOL) suppository 650 mg 650 mg Every 4 Hours PRN 11/15/2024 --    Admin Instructions: If given for fever, use fever parameter: fever greater than 100.4 °F  Based on patient request - if ordered for moderate or severe pain, provider allows for administration of a medication prescribed for a lower pain scale.    Do not exceed 4 grams of acetaminophen in a 24 hr " "period. Max dose of 2gm for AST/ALT greater than 120 units/L.    If given for pain, use the following pain scale:   Mild Pain = Pain Score of 1-3, CPOT 1-2  Moderate Pain = Pain Score of 4-6, CPOT 3-4  Severe Pain = Pain Score of 7-10, CPOT 5-8    Route: Rectal    Linked Group 1: Placed in \"Or\" Linked Group        acetaminophen (TYLENOL) tablet 650 mg 650 mg Every 4 Hours PRN 11/15/2024 --    Admin Instructions: If given for fever, use fever parameter: fever greater than 100.4 °F  Based on patient request - if ordered for moderate or severe pain, provider allows for administration of a medication prescribed for a lower pain scale.    Do not exceed 4 grams of acetaminophen in a 24 hr period. Max dose of 2gm for AST/ALT greater than 120 units/L.    If given for pain, use the following pain scale:   Mild Pain = Pain Score of 1-3, CPOT 1-2  Moderate Pain = Pain Score of 4-6, CPOT 3-4  Severe Pain = Pain Score of 7-10, CPOT 5-8    Route: Oral    Linked Group 1: Placed in \"Or\" Linked Group        amitriptyline (ELAVIL) tablet 25 mg 25 mg 2 Times Daily 11/15/2024 --    Route: Oral    aspirin EC tablet 81 mg 81 mg Daily 11/15/2024 --    Admin Instructions: Do not crush or chew the capsules or tablets. The drug may not work as designed if the capsule or tablet is crushed or chewed. Swallow whole.  Do not exceed 4 grams of aspirin in a 24 hr period.    If given for pain, use the following pain scale:   Mild Pain = Pain Score of 1-3, CPOT 1-2  Moderate Pain = Pain Score of 4-6, CPOT 3-4  Severe Pain = Pain Score of 7-10, CPOT 5-8    Route: Oral    bisacodyl (DULCOLAX) EC tablet 5 mg 5 mg Daily PRN 11/15/2024 --    Admin Instructions: Use if no bowel movement after 12 hours.  Swallow whole. Do not crush, split, or chew tablet.    Route: Oral    Linked Group 2: Placed in \"And\" Linked Group        bisacodyl (DULCOLAX) suppository 10 mg 10 mg Daily PRN 11/15/2024 --    Admin Instructions: Use if no bowel movement after 12 " "hours.  Hold for diarrhea    Route: Rectal    Linked Group 2: Placed in \"And\" Linked Group        calcium carbonate (TUMS) chewable tablet 500 mg (200 mg elemental) 2 tablet 3 Times Daily PRN 11/18/2024 --    Admin Instructions: One tablet contains 200 mg elemental calcium.  Take with food.    Route: Oral    Calcium Replacement - Follow Nurse / BPA Driven Protocol  As Needed 11/15/2024 --    Admin Instructions: Open Order & Select \"BHS Electrolyte Replacement Protocol Algorithm\" to View Details    Route: Not Applicable    DAPTOmycin (CUBICIN) 450 mg in sodium chloride 0.9 % 50 mL IVPB 6 mg/kg × 72.3 kg Every 24 Hours 11/19/2024 12/3/2024    Admin Instructions: Caution: Look alike/sound alike drug alert.  Refrigerate. Do not shake.    Route: Intravenous    dextrose (D50W) (25 g/50 mL) IV injection 25 g 25 g Every 15 Minutes PRN 11/16/2024 --    Admin Instructions: Blood sugar less than 70; patient has IV access - Unresponsive, NPO or Unable To Safely Swallow    Route: Intravenous    dextrose (GLUTOSE) oral gel 15 g 15 g Every 15 Minutes PRN 11/16/2024 --    Admin Instructions: BS<70, Patient Alert, Is not NPO, Can safely swallow.    Route: Oral    Enoxaparin Sodium (LOVENOX) syringe 40 mg 40 mg Daily 11/15/2024 --    Admin Instructions: Give subcutaneous in abdomen only. Do not massage site after injection.    Route: Subcutaneous    ertapenem (INVanz) 1,000 mg in sodium chloride 0.9 % 100 mL MBP 1,000 mg Every 24 Hours 11/19/2024 12/3/2024    Admin Instructions: Caution: Look alike/sound alike drug alert.    Route: Intravenous    finasteride (PROSCAR) tablet 5 mg 5 mg Nightly 11/15/2024 --    Admin Instructions: Group 2 (Pink) Hazardous Drug - Reproductive Risk Only - See Handling Guide    Route: Oral    glucagon (GLUCAGEN) injection 1 mg 1 mg Every 15 Minutes PRN 11/16/2024 --    Admin Instructions: Blood Glucose Less Than 70 - Patient Without IV Access - Unresponsive, NPO or Unable To Safely Swallow  Reconstitute " "powder for injection by adding 1 mL of -supplied sterile diluent or sterile water for injection to a vial containing 1 mg of the drug, to provide solutions containing 1 mg/mL. Shake vial gently to dissolve.    Route: Intramuscular    insulin glargine (LANTUS, SEMGLEE) injection 10 Units 10 Units Nightly 11/17/2024 --    Admin Instructions: Do not hold basal insulin without an order. Consider requesting a dose edit, if needed.      Route: Subcutaneous    insulin glargine (LANTUS, SEMGLEE) injection 15 Units 15 Units Daily 11/18/2024 --    Admin Instructions: Do not hold basal insulin without an order. Consider requesting a dose edit, if needed.      Route: Subcutaneous    Insulin Lispro (humaLOG) injection 2-9 Units 2-9 Units 4 Times Daily Before Meals & Nightly 11/17/2024 --    Admin Instructions: Correction Insulin - Moderate Dose (Total Insulin Dose 40-60 units/day, Average Weight Patient, Patient Taking Oral Hypoglycemic)    Blood Glucose 150-199 mg/dL - 2 units  Blood Glucose 200-249 mg/dL - 4 units  Blood Glucose 250-299 mg/dL - 6 units  Blood Glucose 300-349 mg/dL - 7 units  Blood Glucose 350-400 mg/dL - 8 units  Blood Glucose greater than 400 mg/dL - 9 units & Call Provider   Caution: Look alike/sound alike drug alert    Route: Subcutaneous    Insulin Lispro (humaLOG) injection 7 Units 7 Units 3 Times Daily With Meals 11/17/2024 --    Admin Instructions:  Solution should be clear. If cloudy, contact pharmacy for a new vial. Scheduled mealtime doses of this medication should be held if patient NPO.   Caution: Look alike/sound alike drug alert    Route: Subcutaneous    Magnesium Standard Dose Replacement - Follow Nurse / BPA Driven Protocol  As Needed 11/15/2024 --    Admin Instructions: Open Order & Select \"BHS Electrolyte Replacement Protocol Algorithm\" to View Details    Route: Not Applicable    metoprolol succinate XL (TOPROL-XL) 24 hr tablet 50 mg 50 mg Daily 11/15/2024 --    Admin " "Instructions: Hold for SBP less than 100, DBP less than 60, or heart rate less than 50    Do not crush or chew the capsules or tablets. The drug may not work as designed if the capsule or tablet is crushed or chewed. Swallow whole.  Do not crush or chew.    Route: Oral    nitroglycerin (NITROSTAT) SL tablet 0.4 mg 0.4 mg Every 5 Minutes PRN 11/15/2024 --    Admin Instructions: If Pain Unrelieved After 3 Doses Notify MD  May administer up to 3 doses per episode.    Route: Sublingual    ondansetron (ZOFRAN) injection 4 mg 4 mg Every 6 Hours PRN 11/20/2024 --    Admin Instructions: \"If multiple N/V medications ordered, use in the following order: Ondansetron, Prochlorperazine, Promethazine. Use PO unless patient refuses or patient unable to swallow.\"    Route: Intravenous    Phosphorus Replacement - Follow Nurse / BPA Driven Protocol  As Needed 11/15/2024 --    Admin Instructions: Open Order & Select \"BHS Electrolyte Replacement Protocol Algorithm\" to View Details    Route: Not Applicable    polyethylene glycol (MIRALAX) packet 17 g 17 g Daily PRN 11/15/2024 --    Admin Instructions: Use if no bowel movement after 12 hours. Mix in 6-8 ounces of water.  Use 4-8 ounces of water, tea, or juice for each 17 gram dose.    Route: Oral    Linked Group 2: Placed in \"And\" Linked Group        Potassium Replacement - Follow Nurse / BPA Driven Protocol  As Needed 11/15/2024 --    Admin Instructions: Open Order & Select \"BHS Electrolyte Replacement Protocol Algorithm\" to View Details    Route: Not Applicable    pregabalin (LYRICA) capsule 225 mg 225 mg 2 Times Daily 11/16/2024 --    Admin Instructions:     Route: Oral    rOPINIRole (REQUIP) tablet 0.25 mg 0.25 mg Nightly 11/15/2024 --    Admin Instructions: Caution: Look alike/sound alike drug alert    Route: Oral    sennosides-docusate (PERICOLACE) 8.6-50 MG per tablet 2 tablet 2 tablet 2 Times Daily PRN 11/15/2024 --    Admin Instructions: Start bowel management regimen if patient " "has not had a bowel movement after 12 hours.    Route: Oral    Linked Group 2: Placed in \"And\" Linked Group        sodium chloride 0.9 % flush 10 mL 10 mL As Needed 11/15/2024 --    Route: Intravenous    Cosign for Ordering: Accepted by Nic Hewitt MD on 11/15/2024  3:10 PM    sodium chloride 0.9 % flush 10 mL 10 mL Every 12 Hours Scheduled 11/15/2024 --    Route: Intravenous    sodium chloride 0.9 % flush 10 mL 10 mL As Needed 11/15/2024 --    Route: Intravenous    sodium chloride 0.9 % flush 10 mL 10 mL Every 12 Hours Scheduled 11/15/2024 --    Route: Intravenous    sodium chloride 0.9 % flush 10 mL 10 mL As Needed 11/15/2024 --    Route: Intravenous    sodium chloride 0.9 % infusion 40 mL 40 mL As Needed 11/15/2024 --    Admin Instructions: Following administration of an IV intermittent medication, flush line with 40mL NS at 100mL/hr.    Route: Intravenous    sodium chloride 0.9 % infusion 40 mL 40 mL As Needed 11/15/2024 --    Admin Instructions: Following administration of an IV intermittent medication, flush line with 40mL NS at 100mL/hr.    Route: Intravenous    tamsulosin (FLOMAX) 24 hr capsule 0.8 mg 0.8 mg Nightly 11/18/2024 --    Admin Instructions: Do not crush or chew the capsules or tablets. The drug may not work as designed if the capsule or tablet is crushed or chewed. Swallow whole. If patient unable to swallow whole, contact pharmacy for alternative.    Route: Oral              Assessment & Plan   He is a 59-year-old male who has previously established care with Dr. Gan.  He underwent left heel irrigation and debridement with wound vacuum-assisted closure in August 2024.  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      DKA (diabetic ketoacidosis)    Non healing left heel wound    Severe protein-calorie malnutrition      We discussed the previously discussed surgical plan of left below-knee amputation.  Plan for surgery will be on Friday, 11/22/2024.  Patient verbalized understanding and " agreement to the procedure.  Continue diet 11/21/2024, begin NPO at midnight.  Questions were answered.    I personally discussed the surgery at length with the patient.  The procedure was discussed using terms that the patient clearly understood, and I reviewed the consent form with the patient personally.  The consent form was then reviewed again, along with other written material about preparation for the surgery, with the medical assistant.  All of the alternatives that I know to treat the patient´s condition, including non-operative care and other surgical procedures, were discussed during the office visit.  We spent a great deal of time talking about the benefit to risk analysis of the treatment options and the proposed surgery.  The risks of the planned procedure include, but are not limited to, the possibility of wound healing problems, nerve damage, tendon damage, infection, incomplete relief of pain, chronic pain, the development of reflex sympathetic dystrophy, loss of limb, death.  The pros and cons of anticoagulation were discussed.  I also had a discussion with the patient regarding the risks of smoking and secondhand smoke, as well as the consequence of noncompliance with the postoperative regimen.  My recommendations about driving a motor vehicle or operating machinery were clearly outlined.      As with any surgery, this patient was told about the possibility of additional surgery.  My qualifications and experience in recommending the procedure were discussed.  There was adequate time for questions and all the patient´s questions were answered.  The patient was told to call the office before surgery if they had any questions about the operation.  After this discussion, it was my professional opinion that the patient fully understood their medical condition and that proper informed consent had been obtained.          Chris Barone PA-C  11/21/24  09:18 EST            Electronically signed by  "Chris Barone PA-C at 11/21/24 0919       Varun Dominique MD at 11/21/24 0801          Fracisco Mullen  1965  [id number]    Evaluating Physician: Varun Dominique MD    Chief Complaint: fatigue    Reason for Consultation: foot infection    History of present illness:     Patient is a 59 y.o.  Yr old male with history of diabetes/hypertension, previously followed with Dr. Gatica; admitted 2023 with right foot infection, cultures with MSSA/Morganella/ESBL Klebsiella/enterococcus and strep species.had surgery at the second toe with amputation, received IV daptomycin/Invanz with general improvements at that site.  He has been left with a chronic right plantar foot wound and a chronic wound at the left heel that has turned black; He apparently was admitted to the hospital August 1 after a fall at [facility].  Noted to have urinary retention with concern for possible UTI.  In addition left heel with black discoloration/malodor and redness, empiric antibiotics initiated.  He is chronically debilitated and wheelchair bound by his report.Bustos catheter-based at admission     8/3/24  Dr Gan  \"PROCEDURE:            Left  Left      98928:             Debridement of skin and subcutaneous tissue  01957:             Wound vacuum-assisted closure\"     8/6/24 MRI and surgical findings did not confirm bone involvement, transitioned to oral agents at discharge       Readmitted November 15, 2024 with reports of appearing \"sluggish\" according to admission notes with DKA, noted to have high hemoglobin A1c and persistent/worsening left heel wound according to patient; medicine team notes mention urinary retention, acute toxic metabolic encephalopathy improved and low-grade fever. Abnormal MRI at the left foot:    Per radiology-- \"Soft tissue wound seen along the posterior heel with ill-defined fluid that extends through and disrupts the distal Achilles tendon. Underlying this area there is evidence of osteomyelitis of " "the posterior lateral calcaneus. There is a tiny fluid   collection here as well that may represent an associated nondrainable abscess.     Additionally there appears to be a nondisplaced stress fracture of the posterior calcaneus. \"      11/21/24 sleepy; Dr Gan considering options;  vascular team has seen; Left heel pain  dull , worse with palpation, better with pain meds and not severe, 2-3/10.     No headache photophobia or neck stiffness.  No shortness of breath cough or hemoptysis.  No nausea vomiting diarrhea or abdominal pain.  No other new skin rash.  no dysuria hematuria or pyuria.       Past Medical History:   Diagnosis Date    Acute renal failure     Hx of    Obesity     Hx of    Sleep apnea     Type 2 diabetes mellitus        Past Surgical History:   Procedure Laterality Date    BACK SURGERY      lower back    CHOLECYSTECTOMY WITH INTRAOPERATIVE CHOLANGIOGRAM N/A 1/6/2021    Procedure: CHOLECYSTECTOMY LAPAROSCOPIC INTRAOPERATIVE CHOLANGIOGRAM;  Surgeon: Juventino Hooker MD;  Location:  [facility];  Service: General;  Laterality: N/A;    ERCP N/A 1/9/2021    Procedure: ENDOSCOPIC RETROGRADE CHOLANGIOPANCREATOGRAPHY;  Surgeon: Jeremy Dowell MD;  Location:  [facility];  Service: Gastroenterology;  Laterality: N/A;  with sphiincterotomy and balloon sweep with 9mm-12mm balloon    INCISION AND DRAINAGE FOOT Left 8/3/2024    Procedure: HEAL IRRIGATION AND DEBRIDEMENT LEFT;  Surgeon: Dru Gan Jr., MD;  Location:  [facility];  Service: Orthopedics;  Laterality: Left;    PLACEMENT OF WOUND VAC Left 8/3/2024    Procedure: PLACEMENT OF WOUND VAC;  Surgeon: Dru Gan Jr., MD;  Location:  [facility];  Service: Orthopedics;  Laterality: Left;       Pediatric History   Patient Parents    Not on file     Other Topics Concern    Not on file   Social History Narrative    Not on file       family history includes Cancer in his brother, maternal grandmother, mother, and another family member; " Diabetes in an other family member; Heart attack in an other family member; Heart disease in his brother and father; Hyperlipidemia in his mother and another family member; Hypertension in his mother and another family member; Obesity in an other family member.    Allergies   Allergen Reactions    Sertraline Hcl Hives     Zoloft       Medication:  Current Facility-Administered Medications   Medication Dose Route Frequency Provider Last Rate Last Admin    acetaminophen (TYLENOL) tablet 650 mg  650 mg Oral Q4H PRN Edmond Espinoza DO   650 mg at 11/19/24 1112    Or    acetaminophen (TYLENOL) 160 MG/5ML oral solution 650 mg  650 mg Oral Q4H PRN Edmond Espinoza DO        Or    acetaminophen (TYLENOL) suppository 650 mg  650 mg Rectal Q4H PRN Edmond Espinoza DO   650 mg at 11/15/24 2327    amitriptyline (ELAVIL) tablet 25 mg  25 mg Oral BID Lisa Vallejo MD   25 mg at 11/20/24 2007    aspirin EC tablet 81 mg  81 mg Oral Daily Lisa Vallejo MD   81 mg at 11/20/24 1015    sennosides-docusate (PERICOLACE) 8.6-50 MG per tablet 2 tablet  2 tablet Oral BID PRN Edmond Espinoza DO        And    polyethylene glycol (MIRALAX) packet 17 g  17 g Oral Daily PRN Edmond Espinoza DO        And    bisacodyl (DULCOLAX) EC tablet 5 mg  5 mg Oral Daily PRN Edmond Espinoza DO        And    bisacodyl (DULCOLAX) suppository 10 mg  10 mg Rectal Daily PRN Edmond Espinoza DO        calcium carbonate (TUMS) chewable tablet 500 mg (200 mg elemental)  2 tablet Oral TID PRN Lisa Vallejo MD   2 tablet at 11/20/24 1307    Calcium Replacement - Follow Nurse / BPA Driven Protocol   Not Applicable PRN Edmond Espinoza DO        DAPTOmycin (CUBICIN) 450 mg in sodium chloride 0.9 % 50 mL IVPB  6 mg/kg Intravenous Q24H Varun Dominique  mL/hr at 11/20/24 1311 450 mg at 11/20/24 1311    dextrose (D50W) (25 g/50 mL) IV injection 25 g  25 g Intravenous Q15 Min PRN Perla Pascual MD   25 g at 11/20/24 2232    dextrose (GLUTOSE) oral gel 15 g  15 g Oral Q15 Min PRN Opii,  MD Perla        Enoxaparin Sodium (LOVENOX) syringe 40 mg  40 mg Subcutaneous Daily Edmond Espinoza DO   40 mg at 11/20/24 1014    ertapenem (INVanz) 1,000 mg in sodium chloride 0.9 % 100 mL MBP  1,000 mg Intravenous Q24H Varun Dominique  mL/hr at 11/20/24 1014 1,000 mg at 11/20/24 1014    finasteride (PROSCAR) tablet 5 mg  5 mg Oral Nightly Lisa Vallejo MD   5 mg at 11/20/24 2007    glucagon (GLUCAGEN) injection 1 mg  1 mg Intramuscular Q15 Min PRN Perla Pascual MD        insulin glargine (LANTUS, SEMGLEE) injection 10 Units  10 Units Subcutaneous Nightly Lisa Vallejo MD   10 Units at 11/18/24 2141    insulin glargine (LANTUS, SEMGLEE) injection 15 Units  15 Units Subcutaneous Daily Karen Mandel PA-C        Insulin Lispro (humaLOG) injection 10 Units  10 Units Subcutaneous TID With Meals Karen Mandel PA-C   10 Units at 11/20/24 1736    Insulin Lispro (humaLOG) injection 2-9 Units  2-9 Units Subcutaneous 4x Daily AC & at Bedtime Lisa Vallejo MD   8 Units at 11/20/24 1311    Lidocaine HCl gel (XYLOCAINE) urethral/mucosal syringe   Urethral PRN Karen Mandel PA-C   Given at 11/20/24 1948    Magnesium Standard Dose Replacement - Follow Nurse / BPA Driven Protocol   Not Applicable PRN Edmond Espinoza DO        metoprolol succinate XL (TOPROL-XL) 24 hr tablet 50 mg  50 mg Oral Daily Lisa Vallejo MD   50 mg at 11/20/24 1015    nitroglycerin (NITROSTAT) SL tablet 0.4 mg  0.4 mg Sublingual Q5 Min PRN Edmond Espinoza DO        ondansetron (ZOFRAN) injection 4 mg  4 mg Intravenous Q6H PRN Rupinder Sutherland APRN   4 mg at 11/20/24 0055    pantoprazole (PROTONIX) EC tablet 40 mg  40 mg Oral Q AM Karen Mandel PA-C   40 mg at 11/21/24 0543    Phosphorus Replacement - Follow Nurse / BPA Driven Protocol   Not Applicable PRN Edmond Espinoza DO        Potassium Replacement - Follow Nurse / BPA Driven Protocol   Not Applicable PRN Edmond Espinoza DO        pregabalin (LYRICA) capsule 225 mg   225 mg Oral BID Varun Snider, [facility]   225 mg at 11/20/24 1015    rOPINIRole (REQUIP) tablet 0.25 mg  0.25 mg Oral Nightly Lisa Vallejo MD   0.25 mg at 11/20/24 2006    sodium chloride 0.9 % flush 10 mL  10 mL Intravenous PRN Edmond Espinoza, DO        sodium chloride 0.9 % flush 10 mL  10 mL Intravenous Q12H Edmond Espinoza, DO   10 mL at 11/20/24 2007    sodium chloride 0.9 % flush 10 mL  10 mL Intravenous PRN EspinozaReesece, DO        sodium chloride 0.9 % flush 10 mL  10 mL Intravenous Q12H EspinozaEdmond, DO   10 mL at 11/20/24 2007    sodium chloride 0.9 % flush 10 mL  10 mL Intravenous PRN Edmond Espinoza, DO        sodium chloride 0.9 % infusion 40 mL  40 mL Intravenous PRN Edmond Espinoaz, DO        sodium chloride 0.9 % infusion 40 mL  40 mL Intravenous PRN Edmond Espinoza, DO        tamsulosin (FLOMAX) 24 hr capsule 0.8 mg  0.8 mg Oral Nightly Lisa Vallejo MD   0.8 mg at 11/20/24 2007       Antibiotics:  Anti-Infectives (From admission, onward)      Ordered     Dose/Rate Route Frequency Start Stop    11/19/24 0737  DAPTOmycin (CUBICIN) 450 mg in sodium chloride 0.9 % 50 mL IVPB        Ordering Provider: Varun Dominique MD    6 mg/kg × 72.3 kg  100 mL/hr over 30 Minutes Intravenous Every 24 Hours 11/19/24 0900 12/03/24 0859    11/19/24 0737  ertapenem (INVanz) 1,000 mg in sodium chloride 0.9 % 100 mL MBP        Ordering Provider: Varun Dominique MD    1,000 mg  200 mL/hr over 30 Minutes Intravenous Every 24 Hours 11/19/24 0900 12/03/24 0859              Review of Systems    11/21/24     Constitutional-- No Fever, chills or sweats.  Appetite diminished with fatigue.  Heent-- No new vision, hearing or throat complaints.  No epistaxis or oral sores.  Denies odynophagia or dysphagia.  No flashers, floaters or eye pain. No odynophagia or dysphagia. No headache, photophobia or neck stiffness.  CV-- No chest pain, palpitation or syncope  Resp-- No SOB/cough/Hemoptysis  GI- No nausea, vomiting, or diarrhea.  No hematochezia,  "melena, or hematemesis. Denies jaundice or chronic liver disease.  -- No dysuria, hematuria, or flank pain.  Denies hesitancy, urgency or flank pain.  Lymph- no swollen lymph nodes in neck/axilla or groin.   Heme- No active bruising or bleeding; no Hx of DVT or PE.  MS-- no swelling or pain in the bones or joints of arms/legs.  No new back pain.  Neuro-- No acute focal weakness or numbness in the arms or legs.  No seizures.    Full 12 point review of systems reviewed and negative otherwise for acute complaints, except for above    Physical Exam:   Vital Signs   /53 (BP Location: Right arm, Patient Position: Lying)   Pulse 88   Temp 98 °F (36.7 °C)   Resp 15   Ht 162.6 cm (64\")   Wt 72.3 kg (159 lb 6.3 oz)   SpO2 95%   BMI 27.36 kg/m²     GENERAL: sleepy in no acute distress.   HEENT: Normocephalic, atraumatic.   No conjunctival injection. No icterus. Oropharynx clear without evidence of thrush or exudate. No evidence of periodontal disease.    NECK: Supple without nuchal rigidity. No mass.   HEART: RRR; No murmur, rubs, gallops.   LUNGS: diminished at bases but otherwise Clear to auscultation bilaterally without wheezing, rales, rhonchi. Normal respiratory effort. Nonlabored. No dullness.  ABDOMEN: Soft, nontender, nondistended. Positive bowel sounds. No rebound or guarding. NO mass or HSM.  EXT: see below   MSK: FROM without joint effusions noted arms/legs.    SKIN: Warm and dry without cutaneous eruptions on Inspection/palpation.    NEURO: sleepy.    Right foot second toe amputation site noted, no redness/duration or warmth there.  No oral or active drainage    Left heel with large wound, deeper wound at the proximal aspect of this tracking towards the Achilles although I am unable to visualize  tendon or palpate bone at present.  Bloody drainage.  Vague surrounding erythema with mild tenderness but no discrete mass bulge or fluctuance.  No crepitus or bulla.  Multifocal crusted areas otherwise at " his lower extremities      Laboratory Data    Results from last 7 days   Lab Units 11/21/24  0701 11/20/24  0550 11/19/24  0717   WBC 10*3/mm3 11.11* 15.93* 9.33   HEMOGLOBIN g/dL 9.4* 10.5* 12.0*   HEMATOCRIT % 30.1* 34.3* 38.6   PLATELETS 10*3/mm3 266 262 276     Results from last 7 days   Lab Units 11/21/24  0701   SODIUM mmol/L 133*   POTASSIUM mmol/L 4.0   CHLORIDE mmol/L 97*   CO2 mmol/L 29.0   BUN mg/dL 31*   CREATININE mg/dL 0.85   GLUCOSE mg/dL 227*   CALCIUM mg/dL 9.3     Results from last 7 days   Lab Units 11/15/24  1107   ALK PHOS U/L 124*   BILIRUBIN mg/dL 0.4   ALT (SGPT) U/L 14   AST (SGOT) U/L 13               Estimated Creatinine Clearance: 83.1 mL/min (by C-G formula based on SCr of 0.85 mg/dL).      Microbiology:      Radiology:  Imaging Results (Last 72 Hours)       Procedure Component Value Units Date/Time    MRI Foot Left Without Contrast [[id number]] Collected: 11/18/24 2320     Updated: 11/18/24 2331    Narrative:        MRI FOOT LEFT WO CONTRAST    Date of Exam: 11/18/2024 5:50 PM EST    Indication: Evaluation for left heel osteomyelitis.     Comparison: 8/1/2024    Technique:  Routine multiplanar/multisequence sequence images of the left foot were obtained without contrast administration.      Findings:  Bones: There is marrow edema seen within the posterior calcaneus. There appears to be loss of T1 cortical and marrow signal seen along the posterior lateral aspect of the talus calcaneus. Additionally there appears to be nondisplaced stress fracture   along the posterior aspect of the calcaneus best seen on sagittal T1 images. No additional areas of abnormal marrow edema. No additional fracture seen. No substantial joint effusions.    Soft tissues: There is soft tissue wound seen along the posterior heel with ill-defined fluid extending into and disrupting the distal lateral Achilles tendon (long axial image 26). There is a tiny associated fluid collection seen here measuring    approximately 0.7 x 0.5 x 0.7 cm. Fluid seen at the master knot of Brown. Flexor extensor tendons appear grossly intact. Peroneal tendons appear grossly intact. Diffuse atrophic and edematous changes of the intrinsic foot muscles suggestive of   neuropathic changes. Lisfranc ligament appears intact. Diffuse subcutaneous edema.      Impression:      Impression:  Soft tissue wound seen along the posterior heel with ill-defined fluid that extends through and disrupts the distal Achilles tendon. Underlying this area there is evidence of osteomyelitis of the posterior lateral calcaneus. There is a tiny fluid   collection here as well that may represent an associated nondrainable abscess.    Additionally there appears to be a nondisplaced stress fracture of the posterior calcaneus.        Electronically Signed: Nolan Brady MD    11/18/2024 11:28 PM EST    Workstation ID: [id number]              Impression:       --acute left heel wound infection ,  abnormal MRI as above raising concern for fluid extending into and disrupting the distal lateral Achilles tendon in addition to with evidence for osteomyelitis at the posterior lateral calcaneus in addition to possible nondisplaced stress fracture at the posterior calcaneus; High risk for persistent/recurrent or nonhealing wounds, persistent/progressive or recurrent infection and risk for further functional/limb loss, higher level amputation etc. Surgery 8/3;  High risk for amputation.  Empiric antibiotics for now     --urinary retention per admission notes, some hematuria on November 15; further urologic evaluation regarding functional/anatomic assessment at discretion of medicine team with respect inpatient versus outpatient     --Diabetes, uncontrolled ; glucose control per medicine team     --diabetic neuropathy     --Chronic debility as per patient wheelchair bound     --Hx ESBL    PLAN:     --IV daptomycin/Invanz    --Check/review labs cultures and  scans    --Partial history Per nursing staff    --Discussed with microbiology    --Discussed with surgery team    --orthopedic team making plans    --Highly complex at of issues with high risk for further serious morbidity and other serious sequela     Copied text in this note has been reviewed and is accurate as of 24.     Varun Dominique MD  2024                Electronically signed by Varun Dominique MD at 24 0802       Karen Mandel PA-C at 24 0751              [facility] Medicine Services  PROGRESS NOTE    Patient Name: Fracisco Mullen  : 1965  MRN: [id number]    Date of Admission: 11/15/2024  Primary Care Physician: Brandy Roberson    Subjective   Subjective     CC:  F/u DKA    HPI:  Patient reports less abdominal fullness this morning s/p insertion of ledesma catheter. Patient reports he is nervous about his surgery tomorrow, patient's questions answered. Nurse at bedside. No family at bedside.      Objective   Objective     Vital Signs:   Temp:  [98 °F (36.7 °C)-98.7 °F (37.1 °C)] 98.1 °F (36.7 °C)  Heart Rate:  [] 92  Resp:  [15-18] 16  BP: (102-131)/(52-75) 105/58  Flow (L/min) (Oxygen Therapy):  [2] 2     Physical Exam:  Constitutional: Chronically-ill appearing male lying in bed in NAD  HENT: NCAT, mucous membranes dry  Respiratory: Clear to auscultation bilaterally, nonlabored respirations   Cardiovascular: RRR, no murmurs, rubs, or gallops, no bilateral ankle edema  Gastrointestinal: Soft, nontender, non distended  Musculoskeletal: ROLDAN spontaneously  Psychiatric: Appropriate affect, cooperative  Neurologic: Alert and oriented, speech clear  Skin: Bilateral foot wounds    Results Reviewed:  LAB RESULTS:      Lab 24  0701 24  0550 24  0717 24  0608 24  0629 24  0343 11/15/24  1532 11/15/24  1107 11/15/24  0948 11/15/24  0942   WBC 11.11* 15.93* 9.33 6.74 9.52 11.93*  --   --   --   13.03*   HEMOGLOBIN 9.4* 10.5* 12.0* 11.2* 10.9* 11.2*  --   --   --  12.3*   HEMOGLOBIN, POC  --   --   --   --   --   --   --   --    < >  --    HEMATOCRIT 30.1* 34.3* 38.6 35.4* 33.7* 34.1*  --   --   --  37.6   HEMATOCRIT POC  --   --   --   --   --   --   --   --    < >  --    PLATELETS 266 262 276 242 266 283  --   --   --  321   NEUTROS ABS 7.82*  --   --   --   --  7.30*  --   --   --  11.22*   IMMATURE GRANS (ABS) 0.06*  --   --   --   --  0.07*  --   --   --  0.12*   LYMPHS ABS 2.17  --   --   --   --  3.19*  --   --   --  1.18   MONOS ABS 0.76  --   --   --   --  1.22*  --   --   --  0.41   EOS ABS 0.22  --   --   --   --  0.07  --   --   --  0.01   MCV 82.9 84.3 82.8 80.6 79.1 80.0  --   --   --  78.7*   LACTATE  --   --   --   --   --   --  1.9  --   --  3.6*   HSTROP T  --   --   --   --   --   --  44* 32*  --   --     < > = values in this interval not displayed.         Lab 11/21/24  0701 11/20/24  0550 11/19/24  0717 11/18/24  0608 11/17/24  0629 11/16/24  1342 11/16/24  0846 11/16/24  0343 11/16/24  0045 11/15/24  1943 11/15/24  0948 11/15/24  0942   SODIUM 133* 133* 134* 135* 132*  --  134* 143 137 136   < >  --    POTASSIUM 4.0 4.4 3.9 4.0 4.3  --  3.7 4.1 3.8 4.5   < >  --    CHLORIDE 97* 97* 97* 99 98  --  104 109* 105 103   < >  --    CO2 29.0 24.0 27.0 25.0 23.0  --  22.0 24.0 24.0 25.0   < >  --    ANION GAP 7.0 12.0 10.0 11.0 11.0  --  8.0 10.0 8.0 8.0   < >  --    BUN 31* 35* 20 22 13  --  12 16 15 17   < >  --    CREATININE 0.85 0.98 1.00 0.88 0.89  --  0.64* 0.72* 0.74* 0.84   < >  --    EGFR 98.9 87.7 85.6 97.8 97.5  --  107.7 103.9 103.1 99.2   < >  --    GLUCOSE 227* 433* 279* 270* 324*  --  171* 120* 154* 183*   < >  --    CALCIUM 9.3 8.4* 8.8 8.2* 8.1*  --  8.0* 8.4* 8.0* 8.8   < >  --    MAGNESIUM  --   --   --   --   --  1.6 2.0 1.9 1.8 1.8   < >  --    PHOSPHORUS  --   --   --   --   --  2.5 2.4* 3.2 2.7 2.6   < >  --    HEMOGLOBIN A1C  --   --   --   --   --   --   --   --   --    --   --  14.10*    < > = values in this interval not displayed.         Lab 11/15/24  1107   TOTAL PROTEIN 6.7   ALBUMIN 3.4*   GLOBULIN 3.3   ALT (SGPT) 14   AST (SGOT) 13   BILIRUBIN 0.4   ALK PHOS 124*         Lab 11/15/24  1532 11/15/24  1107   HSTROP T 44* 32*                 Lab 11/15/24  0954   PH, ARTERIAL 7.328*   PCO2, ARTERIAL 36.0   PO2 ART 88.9   FIO2 21   HCO3 ART 18.9*   BASE EXCESS ART -6.4*   CARBOXYHEMOGLOBIN 1.5     Brief Urine Lab Results  (Last result in the past 365 days)        Color   Clarity   Blood   Leuk Est   Nitrite   Protein   CREAT   Urine HCG        11/15/24 1142 Yellow   Clear   Small (1+)   Negative   Negative   100 mg/dL (2+)                   Microbiology Results Abnormal       None            Doppler Arterial Multi Level Lower Extremity - Bilateral CAR    Result Date: 11/19/2024    Right Conclusion: The right ANNY is normal. Unable to assess digital insufficiency.   Left Conclusion: The left ANNY is normal. Normal digital pressures.          Current medications:  Scheduled Meds:amitriptyline, 25 mg, Oral, BID  aspirin, 81 mg, Oral, Daily  DAPTOmycin, 6 mg/kg, Intravenous, Q24H  enoxaparin, 40 mg, Subcutaneous, Daily  ertapenem, 1,000 mg, Intravenous, Q24H  finasteride, 5 mg, Oral, Nightly  insulin glargine, 10 Units, Subcutaneous, Nightly  insulin glargine, 15 Units, Subcutaneous, Daily  Insulin Lispro, 10 Units, Subcutaneous, TID With Meals  insulin lispro, 2-9 Units, Subcutaneous, 4x Daily AC & at Bedtime  metoprolol succinate XL, 50 mg, Oral, Daily  pantoprazole, 40 mg, Oral, Q AM  pregabalin, 225 mg, Oral, BID  rOPINIRole, 0.25 mg, Oral, Nightly  sodium chloride, 10 mL, Intravenous, Q12H  sodium chloride, 10 mL, Intravenous, Q12H  tamsulosin, 0.8 mg, Oral, Nightly      Continuous Infusions:lactated ringers, 50 mL/hr, Last Rate: 50 mL/hr (11/21/24 1122)      PRN Meds:.  acetaminophen **OR** acetaminophen **OR** acetaminophen    senna-docusate sodium **AND** polyethylene glycol  **AND** bisacodyl **AND** bisacodyl    calcium carbonate    Calcium Replacement - Follow Nurse / BPA Driven Protocol    dextrose    dextrose    glucagon (human recombinant)    Lidocaine HCl gel    Magnesium Standard Dose Replacement - Follow Nurse / BPA Driven Protocol    nitroglycerin    ondansetron    Phosphorus Replacement - Follow Nurse / BPA Driven Protocol    Potassium Replacement - Follow Nurse / BPA Driven Protocol    sodium chloride    sodium chloride    sodium chloride    sodium chloride    sodium chloride    Assessment & Plan   Assessment & Plan     Active Hospital Problems    Diagnosis  POA    **DKA (diabetic ketoacidosis) [E11.10]  Yes    Severe protein-calorie malnutrition [E43]  Yes    Non healing left heel wound [S91.302A]  Yes      Resolved Hospital Problems   No resolved problems to display.        Brief Hospital Course to date:  Fracisco ADAM Ladgallito is a 59 y.o. male with past medical history of hypertension, type 2 diabetes with insulin use, diabetic neuropathy, and chronic diabetic foot wounds who presents via EMS from home due to altered mental status and fatigue. Lab work consistent with diabetic ketoacidosis.     This patient's problems and plans were partially entered by my partner and updated as appropriate by me 11/21/24. Copied text in this note has been reviewed and is accurate as of today's date.      Chronic bilateral foot wounds  - Patient with chronic left heel wound and right plantar foot wound  - Admitted August 2024 for left foot cellulitis and discharged on oral antibiotics and with home wound vac following debridement, MRI left foot obtained at that time with no definite findings of osteomyelitis (was seen by ID/Dr. Dominique and Orthopedics/Dr. Gan)  - Repeat MRI left foot shows soft tissue wound with evidence of osteomyelitis of the calcaneus and possibly an associated non-drainable abscess  - Continue daptomycin and Invanz per ID recs  - Wound care consulted  - Dr. Gan  planning for left BKA on Friday, 11/22/24, no vascular intervention anticipated at this time  - NPO at midnight    BPH  Urinary retention  - Continue home Flomax, Proscar  - Patient had urinary retention with significant amount of urine on bladder scan (>1000 ml) on 11/20 despite increase in home Flomax on 11/18. Patient repeatedly refused in-and-out cath. Opted for insertion of ledesma cath overnight due to high infection risk. Recorded 1350 mL of output s/p ledesma cath. Nursing described the urine as purulent. Patient with good antibiotic coverage as above. Will continue to monitor.    DKA in setting of uncontrolled IDDM complicated by diabetic neuropathy and chronic foot wounds , improving  - Uncertain of medication compliance  - A1c 14.1%  - S/p DKA protocol including insulin drip, IV fluids, and electrolyte replacement per protocol  - Now transitioned to basal-bolus insulin, will likely need titration as needed  - DM educator consulted    Malnutrition  - Patient with poor oral intake, refusing supplementation  - Appears dry on exam, gentle IVF  - Nutrition following     Acute toxic metabolic encephalopathy, improved  - CT head without acute abnormality  - UDS negative  - Likely secondary to DKA     HTN  - Continue home metoprolol     Mood disorder  - Continue home Amitriptyline      RLS  - Continue home Requip    Expected Discharge Location and Transportation: Rehab  Expected Discharge   Expected Discharge Date: 11/25/2024; Expected Discharge Time:      VTE Prophylaxis:  Pharmacologic VTE prophylaxis orders are present.         AM-PAC 6 Clicks Score (PT): 6 (11/20/24 2000)    CODE STATUS:   Code Status and Medical Interventions: CPR (Attempt to Resuscitate); Full Support; Full code per last hosptial admission. Patient disoriented on exam with no family present at bedside. Only contact information is friend.   Ordered at: 11/15/24 1218     Level Of Support Discussed With:    Patient     Code Status (Patient has no  pulse and is not breathing):    CPR (Attempt to Resuscitate)     Medical Interventions (Patient has pulse or is breathing):    Full Support     Comments:    Full code per last hosptial admission. Patient disoriented on exam with no family present at bedside. Only contact information is friend.       Karen Mandel PA-C  11/21/24        Electronically signed by Karen Mandel PA-C at 11/21/24 1141       Chris Barone PA-C at 11/20/24 0858          Fracisco Mullen       LOS: 5 days   Patient Care Team:  Brandy Roberson as PCP - General (Family Medicine)  Varun Fontenot MD as Consulting Physician (Cardiology)  Yulissa Taveras APRN as Nurse Practitioner (Cardiology)    Chief Complaint:  left calcaneal osteomyelitis    Subjective     Interval History:     Resting comfortably in bed. Pain controlled. No acute events overnight    History taken from: patient    Review of Systems:   The following systems were reviewed and negative     Gen - No fevers, chills  CV - No chest pain, palpitations  Resp - No cough, dyspnea  GI - No N/V/D, abdominal pain    Objective     Vital Signs  Vital Signs (last 24 hours)         11/19 0700  11/20 0659 11/20 0700  11/20 0858   Most Recent      Temp (°F) 97.7 -  98.2       98 (36.7) 11/19 2042    Heart Rate 90 -  103       103 11/20 0530    Resp 16 -  18       16 11/19 2042    BP 73/59 -  115/74       115/74 11/19 1425    SpO2 (%) 92 -  96       92 11/20 0530    Flow (L/min) (Oxygen Therapy)   2       2 11/20 0609              Physical Exam:  No acute distress  Nonlabored respirations  Regular rate and rhythm  Abdomen nondistended  Left lower extremity:  Posterior left heel wound with mild surrounding erythema, serosanguineous drainage. No warmth, induration, julisa purulence.      Results Review:     I reviewed the patient's new clinical results.    Medication Review:   Hospital Medications (active)         Dose Frequency Start End    acetaminophen (TYLENOL) 160  "MG/5ML oral solution 650 mg 650 mg Every 4 Hours PRN 11/15/2024 --    Admin Instructions: If given for fever, use fever parameter: fever greater than 100.4 °F  Based on patient request - if ordered for moderate or severe pain, provider allows for administration of a medication prescribed for a lower pain scale.    Do not exceed 4 grams of acetaminophen in a 24 hr period. Max dose of 2gm for AST/ALT greater than 120 units/L.    If given for pain, use the following pain scale:   Mild Pain = Pain Score of 1-3, CPOT 1-2  Moderate Pain = Pain Score of 4-6, CPOT 3-4  Severe Pain = Pain Score of 7-10, CPOT 5-8    Route: Oral    Linked Group 1: Placed in \"Or\" Linked Group        acetaminophen (TYLENOL) suppository 650 mg 650 mg Every 4 Hours PRN 11/15/2024 --    Admin Instructions: If given for fever, use fever parameter: fever greater than 100.4 °F  Based on patient request - if ordered for moderate or severe pain, provider allows for administration of a medication prescribed for a lower pain scale.    Do not exceed 4 grams of acetaminophen in a 24 hr period. Max dose of 2gm for AST/ALT greater than 120 units/L.    If given for pain, use the following pain scale:   Mild Pain = Pain Score of 1-3, CPOT 1-2  Moderate Pain = Pain Score of 4-6, CPOT 3-4  Severe Pain = Pain Score of 7-10, CPOT 5-8    Route: Rectal    Linked Group 1: Placed in \"Or\" Linked Group        acetaminophen (TYLENOL) tablet 650 mg 650 mg Every 4 Hours PRN 11/15/2024 --    Admin Instructions: If given for fever, use fever parameter: fever greater than 100.4 °F  Based on patient request - if ordered for moderate or severe pain, provider allows for administration of a medication prescribed for a lower pain scale.    Do not exceed 4 grams of acetaminophen in a 24 hr period. Max dose of 2gm for AST/ALT greater than 120 units/L.    If given for pain, use the following pain scale:   Mild Pain = Pain Score of 1-3, CPOT 1-2  Moderate Pain = Pain Score of 4-6, CPOT " "3-4  Severe Pain = Pain Score of 7-10, CPOT 5-8    Route: Oral    Linked Group 1: Placed in \"Or\" Linked Group        amitriptyline (ELAVIL) tablet 25 mg 25 mg 2 Times Daily 11/15/2024 --    Route: Oral    aspirin EC tablet 81 mg 81 mg Daily 11/15/2024 --    Admin Instructions: Do not crush or chew the capsules or tablets. The drug may not work as designed if the capsule or tablet is crushed or chewed. Swallow whole.  Do not exceed 4 grams of aspirin in a 24 hr period.    If given for pain, use the following pain scale:   Mild Pain = Pain Score of 1-3, CPOT 1-2  Moderate Pain = Pain Score of 4-6, CPOT 3-4  Severe Pain = Pain Score of 7-10, CPOT 5-8    Route: Oral    bisacodyl (DULCOLAX) EC tablet 5 mg 5 mg Daily PRN 11/15/2024 --    Admin Instructions: Use if no bowel movement after 12 hours.  Swallow whole. Do not crush, split, or chew tablet.    Route: Oral    Linked Group 2: Placed in \"And\" Linked Group        bisacodyl (DULCOLAX) suppository 10 mg 10 mg Daily PRN 11/15/2024 --    Admin Instructions: Use if no bowel movement after 12 hours.  Hold for diarrhea    Route: Rectal    Linked Group 2: Placed in \"And\" Linked Group        calcium carbonate (TUMS) chewable tablet 500 mg (200 mg elemental) 2 tablet 3 Times Daily PRN 11/18/2024 --    Admin Instructions: One tablet contains 200 mg elemental calcium.  Take with food.    Route: Oral    Calcium Replacement - Follow Nurse / BPA Driven Protocol  As Needed 11/15/2024 --    Admin Instructions: Open Order & Select \"[facility] Electrolyte Replacement Protocol Algorithm\" to View Details    Route: Not Applicable    DAPTOmycin (CUBICIN) 450 mg in sodium chloride 0.9 % 50 mL IVPB 6 mg/kg × 72.3 kg Every 24 Hours 11/19/2024 12/3/2024    Admin Instructions: Caution: Look alike/sound alike drug alert.  Refrigerate. Do not shake.    Route: Intravenous    dextrose (D50W) (25 g/50 mL) IV injection 25 g 25 g Every 15 Minutes PRN 11/16/2024 --    Admin Instructions: Blood sugar less than " 70; patient has IV access - Unresponsive, NPO or Unable To Safely Swallow    Route: Intravenous    dextrose (GLUTOSE) oral gel 15 g 15 g Every 15 Minutes PRN 11/16/2024 --    Admin Instructions: BS<70, Patient Alert, Is not NPO, Can safely swallow.    Route: Oral    Enoxaparin Sodium (LOVENOX) syringe 40 mg 40 mg Daily 11/15/2024 --    Admin Instructions: Give subcutaneous in abdomen only. Do not massage site after injection.    Route: Subcutaneous    ertapenem (INVanz) 1,000 mg in sodium chloride 0.9 % 100 mL MBP 1,000 mg Every 24 Hours 11/19/2024 12/3/2024    Admin Instructions: Caution: Look alike/sound alike drug alert.    Route: Intravenous    finasteride (PROSCAR) tablet 5 mg 5 mg Nightly 11/15/2024 --    Admin Instructions: Group 2 (Pink) Hazardous Drug - Reproductive Risk Only - See Handling Guide    Route: Oral    glucagon (GLUCAGEN) injection 1 mg 1 mg Every 15 Minutes PRN 11/16/2024 --    Admin Instructions: Blood Glucose Less Than 70 - Patient Without IV Access - Unresponsive, NPO or Unable To Safely Swallow  Reconstitute powder for injection by adding 1 mL of -supplied sterile diluent or sterile water for injection to a vial containing 1 mg of the drug, to provide solutions containing 1 mg/mL. Shake vial gently to dissolve.    Route: Intramuscular    insulin glargine (LANTUS, SEMGLEE) injection 10 Units 10 Units Nightly 11/17/2024 --    Admin Instructions: Do not hold basal insulin without an order. Consider requesting a dose edit, if needed.      Route: Subcutaneous    insulin glargine (LANTUS, SEMGLEE) injection 15 Units 15 Units Daily 11/18/2024 --    Admin Instructions: Do not hold basal insulin without an order. Consider requesting a dose edit, if needed.      Route: Subcutaneous    Insulin Lispro (humaLOG) injection 2-9 Units 2-9 Units 4 Times Daily Before Meals & Nightly 11/17/2024 --    Admin Instructions: Correction Insulin - Moderate Dose (Total Insulin Dose 40-60 units/day,  "Average Weight Patient, Patient Taking Oral Hypoglycemic)    Blood Glucose 150-199 mg/dL - 2 units  Blood Glucose 200-249 mg/dL - 4 units  Blood Glucose 250-299 mg/dL - 6 units  Blood Glucose 300-349 mg/dL - 7 units  Blood Glucose 350-400 mg/dL - 8 units  Blood Glucose greater than 400 mg/dL - 9 units & Call Provider   Caution: Look alike/sound alike drug alert    Route: Subcutaneous    Insulin Lispro (humaLOG) injection 7 Units 7 Units 3 Times Daily With Meals 11/17/2024 --    Admin Instructions:  Solution should be clear. If cloudy, contact pharmacy for a new vial. Scheduled mealtime doses of this medication should be held if patient NPO.   Caution: Look alike/sound alike drug alert    Route: Subcutaneous    Magnesium Standard Dose Replacement - Follow Nurse / BPA Driven Protocol  As Needed 11/15/2024 --    Admin Instructions: Open Order & Select \"[facility] Electrolyte Replacement Protocol Algorithm\" to View Details    Route: Not Applicable    metoprolol succinate XL (TOPROL-XL) 24 hr tablet 50 mg 50 mg Daily 11/15/2024 --    Admin Instructions: Hold for SBP less than 100, DBP less than 60, or heart rate less than 50    Do not crush or chew the capsules or tablets. The drug may not work as designed if the capsule or tablet is crushed or chewed. Swallow whole.  Do not crush or chew.    Route: Oral    nitroglycerin (NITROSTAT) SL tablet 0.4 mg 0.4 mg Every 5 Minutes PRN 11/15/2024 --    Admin Instructions: If Pain Unrelieved After 3 Doses Notify MD  May administer up to 3 doses per episode.    Route: Sublingual    ondansetron (ZOFRAN) injection 4 mg 4 mg Every 6 Hours PRN 11/20/2024 --    Admin Instructions: \"If multiple N/V medications ordered, use in the following order: Ondansetron, Prochlorperazine, Promethazine. Use PO unless patient refuses or patient unable to swallow.\"    Route: Intravenous    Phosphorus Replacement - Follow Nurse / BPA Driven Protocol  As Needed 11/15/2024 --    Admin Instructions: Open Order & " "Select \"S Electrolyte Replacement Protocol Algorithm\" to View Details    Route: Not Applicable    polyethylene glycol (MIRALAX) packet 17 g 17 g Daily PRN 11/15/2024 --    Admin Instructions: Use if no bowel movement after 12 hours. Mix in 6-8 ounces of water.  Use 4-8 ounces of water, tea, or juice for each 17 gram dose.    Route: Oral    Linked Group 2: Placed in \"And\" Linked Group        Potassium Replacement - Follow Nurse / BPA Driven Protocol  As Needed 11/15/2024 --    Admin Instructions: Open Order & Select \"[facility] Electrolyte Replacement Protocol Algorithm\" to View Details    Route: Not Applicable    pregabalin (LYRICA) capsule 225 mg 225 mg 2 Times Daily 11/16/2024 --    Admin Instructions:     Route: Oral    rOPINIRole (REQUIP) tablet 0.25 mg 0.25 mg Nightly 11/15/2024 --    Admin Instructions: Caution: Look alike/sound alike drug alert    Route: Oral    sennosides-docusate (PERICOLACE) 8.6-50 MG per tablet 2 tablet 2 tablet 2 Times Daily PRN 11/15/2024 --    Admin Instructions: Start bowel management regimen if patient has not had a bowel movement after 12 hours.    Route: Oral    Linked Group 2: Placed in \"And\" Linked Group        sodium chloride 0.9 % flush 10 mL 10 mL As Needed 11/15/2024 --    Route: Intravenous    Cosign for Ordering: Accepted by Nic Hewitt MD on 11/15/2024  3:10 PM    sodium chloride 0.9 % flush 10 mL 10 mL Every 12 Hours Scheduled 11/15/2024 --    Route: Intravenous    sodium chloride 0.9 % flush 10 mL 10 mL As Needed 11/15/2024 --    Route: Intravenous    sodium chloride 0.9 % flush 10 mL 10 mL Every 12 Hours Scheduled 11/15/2024 --    Route: Intravenous    sodium chloride 0.9 % flush 10 mL 10 mL As Needed 11/15/2024 --    Route: Intravenous    sodium chloride 0.9 % infusion 40 mL 40 mL As Needed 11/15/2024 --    Admin Instructions: Following administration of an IV intermittent medication, flush line with 40mL NS at 100mL/hr.    Route: Intravenous    sodium chloride 0.9 % " infusion 40 mL 40 mL As Needed 11/15/2024 --    Admin Instructions: Following administration of an IV intermittent medication, flush line with 40mL NS at 100mL/hr.    Route: Intravenous    tamsulosin (FLOMAX) 24 hr capsule 0.8 mg 0.8 mg Nightly 2024 --    Admin Instructions: Do not crush or chew the capsules or tablets. The drug may not work as designed if the capsule or tablet is crushed or chewed. Swallow whole. If patient unable to swallow whole, contact pharmacy for alternative.    Route: Oral              Assessment & Plan   He is a 59-year-old male who has previously established care with Dr. Gan.  He underwent left heel irrigation and debridement with wound vacuum-assisted closure in 2024.  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      DKA (diabetic ketoacidosis)    Non healing left heel wound    Severe protein-calorie malnutrition      We discussed the previously discussed surgical plan of left below-knee amputation.  Plan for surgery will be on Friday, 2024.  Patient verbalized understanding and agreement to the procedure.  Continue diet  and 2024.  Questions were answered.        Chris Barone PA-C  24  08:58 EST            Electronically signed by Chris Barone PA-C at 24 0904       Karen Mandel PA-C at 24 0819       Attestation signed by Geovanna Ku MD at 24 1940    I have reviewed this documentation and agree.                      [facility] Medicine Services  PROGRESS NOTE    Patient Name: Fracisco Mullen  : 1965  MRN: [id number]    Date of Admission: 11/15/2024  Primary Care Physician: Brandy Roberson    Subjective   Subjective     CC:  F/u DKA    HPI:  Patient reports he does not feel the urge to urinate despite bladder scans showing ~ 600mL of urine. At this time he is refusing catheterization however, he agrees to consider it.       Objective   Objective     Vital Signs:   Temp:   [97.7 °F (36.5 °C)-98.2 °F (36.8 °C)] 98.2 °F (36.8 °C)  Heart Rate:  [] 97  Resp:  [16-18] 18  BP: ()/(56-74) 128/72  Flow (L/min) (Oxygen Therapy):  [2] 2     Physical Exam:  Constitutional: Chronically-ill appearing male lying in bed in NAD  HENT: NCAT, mucous membranes moist  Respiratory: Clear to auscultation bilaterally, nonlabored respirations   Cardiovascular: RRR, no murmurs, rubs, or gallops, no bilateral ankle edema  Gastrointestinal: Positive bowel sounds, soft, nontender, distended  Musculoskeletal: ROLDAN spontaneously  Psychiatric: Appropriate affect, cooperative  Neurologic: Alert and oriented, speech clear  Skin: Bilateral foot wounds    Results Reviewed:  LAB RESULTS:      Lab 11/20/24  0550 11/19/24  0717 11/18/24  0608 11/17/24  0629 11/16/24  0343 11/15/24  1532 11/15/24  1107 11/15/24  0948 11/15/24  0942   WBC 15.93* 9.33 6.74 9.52 11.93*  --   --   --  13.03*   HEMOGLOBIN 10.5* 12.0* 11.2* 10.9* 11.2*  --   --   --  12.3*   HEMOGLOBIN, POC  --   --   --   --   --   --   --    < >  --    HEMATOCRIT 34.3* 38.6 35.4* 33.7* 34.1*  --   --   --  37.6   HEMATOCRIT POC  --   --   --   --   --   --   --    < >  --    PLATELETS 262 276 242 266 283  --   --   --  321   NEUTROS ABS  --   --   --   --  7.30*  --   --   --  11.22*   IMMATURE GRANS (ABS)  --   --   --   --  0.07*  --   --   --  0.12*   LYMPHS ABS  --   --   --   --  3.19*  --   --   --  1.18   MONOS ABS  --   --   --   --  1.22*  --   --   --  0.41   EOS ABS  --   --   --   --  0.07  --   --   --  0.01   MCV 84.3 82.8 80.6 79.1 80.0  --   --   --  78.7*   LACTATE  --   --   --   --   --  1.9  --   --  3.6*   HSTROP T  --   --   --   --   --  44* 32*  --   --     < > = values in this interval not displayed.         Lab 11/20/24  0550 11/19/24  0717 11/18/24  0608 11/17/24  0629 11/16/24  1342 11/16/24  0846 11/16/24  0343 11/16/24  0045 11/15/24  1943 11/15/24  0948 11/15/24  0942   SODIUM 133* 134* 135* 132*  --  134* 143 137 136    < >  --    POTASSIUM 4.4 3.9 4.0 4.3  --  3.7 4.1 3.8 4.5   < >  --    CHLORIDE 97* 97* 99 98  --  104 109* 105 103   < >  --    CO2 24.0 27.0 25.0 23.0  --  22.0 24.0 24.0 25.0   < >  --    ANION GAP 12.0 10.0 11.0 11.0  --  8.0 10.0 8.0 8.0   < >  --    BUN 35* 20 22 13  --  12 16 15 17   < >  --    CREATININE 0.98 1.00 0.88 0.89  --  0.64* 0.72* 0.74* 0.84   < >  --    EGFR 87.7 85.6 97.8 97.5  --  107.7 103.9 103.1 99.2   < >  --    GLUCOSE 433* 279* 270* 324*  --  171* 120* 154* 183*   < >  --    CALCIUM 8.4* 8.8 8.2* 8.1*  --  8.0* 8.4* 8.0* 8.8   < >  --    MAGNESIUM  --   --   --   --  1.6 2.0 1.9 1.8 1.8   < >  --    PHOSPHORUS  --   --   --   --  2.5 2.4* 3.2 2.7 2.6   < >  --    HEMOGLOBIN A1C  --   --   --   --   --   --   --   --   --   --  14.10*    < > = values in this interval not displayed.         Lab 11/15/24  1107   TOTAL PROTEIN 6.7   ALBUMIN 3.4*   GLOBULIN 3.3   ALT (SGPT) 14   AST (SGOT) 13   BILIRUBIN 0.4   ALK PHOS 124*         Lab 11/15/24  1532 11/15/24  1107   HSTROP T 44* 32*                 Lab 11/15/24  0954   PH, ARTERIAL 7.328*   PCO2, ARTERIAL 36.0   PO2 ART 88.9   FIO2 21   HCO3 ART 18.9*   BASE EXCESS ART -6.4*   CARBOXYHEMOGLOBIN 1.5     Brief Urine Lab Results  (Last result in the past 365 days)        Color   Clarity   Blood   Leuk Est   Nitrite   Protein   CREAT   Urine HCG        11/15/24 1142 Yellow   Clear   Small (1+)   Negative   Negative   100 mg/dL (2+)                   Microbiology Results Abnormal       None            Doppler Arterial Multi Level Lower Extremity - Bilateral CAR    Result Date: 11/19/2024    Right Conclusion: The right ANNY is normal. Unable to assess digital insufficiency.   Left Conclusion: The left ANNY is normal. Normal digital pressures.     MRI Foot Left Without Contrast    Result Date: 11/18/2024  MRI FOOT LEFT WO CONTRAST Date of Exam: 11/18/2024 5:50 PM EST Indication: Evaluation for left heel osteomyelitis.  Comparison: 8/1/2024 Technique:   Routine multiplanar/multisequence sequence images of the left foot were obtained without contrast administration. Findings: Bones: There is marrow edema seen within the posterior calcaneus. There appears to be loss of T1 cortical and marrow signal seen along the posterior lateral aspect of the talus calcaneus. Additionally there appears to be nondisplaced stress fracture along the posterior aspect of the calcaneus best seen on sagittal T1 images. No additional areas of abnormal marrow edema. No additional fracture seen. No substantial joint effusions. Soft tissues: There is soft tissue wound seen along the posterior heel with ill-defined fluid extending into and disrupting the distal lateral Achilles tendon (long axial image 26). There is a tiny associated fluid collection seen here measuring approximately 0.7 x 0.5 x 0.7 cm. Fluid seen at the master knot of Brown. Flexor extensor tendons appear grossly intact. Peroneal tendons appear grossly intact. Diffuse atrophic and edematous changes of the intrinsic foot muscles suggestive of neuropathic changes. Lisfranc ligament appears intact. Diffuse subcutaneous edema.     Impression: Impression: Soft tissue wound seen along the posterior heel with ill-defined fluid that extends through and disrupts the distal Achilles tendon. Underlying this area there is evidence of osteomyelitis of the posterior lateral calcaneus. There is a tiny fluid collection here as well that may represent an associated nondrainable abscess. Additionally there appears to be a nondisplaced stress fracture of the posterior calcaneus. Electronically Signed: Nolan Brady MD  11/18/2024 11:28 PM EST  Workstation ID: [id number]         Current medications:  Scheduled Meds:amitriptyline, 25 mg, Oral, BID  aspirin, 81 mg, Oral, Daily  DAPTOmycin, 6 mg/kg, Intravenous, Q24H  enoxaparin, 40 mg, Subcutaneous, Daily  ertapenem, 1,000 mg, Intravenous, Q24H  finasteride, 5 mg, Oral, Nightly  insulin  glargine, 10 Units, Subcutaneous, Nightly  [START ON 11/21/2024] insulin glargine, 20 Units, Subcutaneous, Daily  Insulin Lispro, 10 Units, Subcutaneous, TID With Meals  insulin lispro, 2-9 Units, Subcutaneous, 4x Daily AC & at Bedtime  metoprolol succinate XL, 50 mg, Oral, Daily  [START ON 11/21/2024] pantoprazole, 40 mg, Oral, Q AM  pregabalin, 225 mg, Oral, BID  rOPINIRole, 0.25 mg, Oral, Nightly  sodium chloride, 10 mL, Intravenous, Q12H  sodium chloride, 10 mL, Intravenous, Q12H  tamsulosin, 0.8 mg, Oral, Nightly      Continuous Infusions:   PRN Meds:.  acetaminophen **OR** acetaminophen **OR** acetaminophen    senna-docusate sodium **AND** polyethylene glycol **AND** bisacodyl **AND** bisacodyl    calcium carbonate    Calcium Replacement - Follow Nurse / BPA Driven Protocol    dextrose    dextrose    glucagon (human recombinant)    Lidocaine HCl gel    Magnesium Standard Dose Replacement - Follow Nurse / BPA Driven Protocol    nitroglycerin    ondansetron    Phosphorus Replacement - Follow Nurse / BPA Driven Protocol    Potassium Replacement - Follow Nurse / BPA Driven Protocol    sodium chloride    sodium chloride    sodium chloride    sodium chloride    sodium chloride    Assessment & Plan   Assessment & Plan     Active Hospital Problems    Diagnosis  POA    **DKA (diabetic ketoacidosis) [E11.10]  Yes    Severe protein-calorie malnutrition [E43]  Yes    Non healing left heel wound [S91.302A]  Yes      Resolved Hospital Problems   No resolved problems to display.        Brief Hospital Course to date:  Fracisco Mullen is a 59 y.o. male with past medical history of hypertension, type 2 diabetes with insulin use, diabetic neuropathy, and chronic diabetic foot wounds who presents via EMS from home due to altered mental status and fatigue. Lab work consistent with diabetic ketoacidosis.     This patient's problems and plans were partially entered by my partner and updated as appropriate by me 11/20/24. Copied text  in this note has been reviewed and is accurate as of today's date.      DKA in setting of uncontrolled IDDM complicated by diabetic neuropathy and chronic foot wounds   - Uncertain of medication compliance  - A1c 14.1%  - S/p DKA protocol including insulin drip, IV fluids, and electrolyte replacement per protocol  - Now transitioned to basal-bolus insulin, will likely need titration as needed  - DM educator consulted     Chronic bilateral foot wounds  - Patient with chronic left heel wound and right plantar foot wound  - Admitted August 2024 for left foot cellulitis and discharged on oral antibiotics and with home wound vac following debridement, MRI left foot obtained at that time with no definite findings of osteomyelitis (was seen by ID/Dr. Dominique and Orthopedics/Dr. Gan)  - Repeat MRI left foot shows soft tissue wound with evidence of osteomyelitis of the calcaneus and possibly an associated non-drainable abscess  - Wound care consulted  - Dr. Gan planning for left BKA on Friday, 11/22/24, no vascular intervention anticipated at this time    BPH  Urinary retention  - Continue home Flomax, Proscar  - He is having some retention, now considering allowing I/O cath, ordered lidocaine as needed to help with catheter discomfort, will continue to monitor - Flomax increased 11/18     Acute toxic metabolic encephalopathy, improved  - CT head without acute abnormality  - UDS negative  - Likely secondary to DKA     HTN  - Continue home metoprolol     Mood disorder  - Continue home Amitriptyline      RLS  - Continue home Requip    Expected Discharge Location and Transportation: Rehab  Expected Discharge   Expected Discharge Date: 11/25/2024; Expected Discharge Time:      VTE Prophylaxis:  Pharmacologic VTE prophylaxis orders are present.         AM-PAC 6 Clicks Score (PT): 6 (11/20/24 0800)    CODE STATUS:   Code Status and Medical Interventions: CPR (Attempt to Resuscitate); Full Support; Full code per last  hosptial admission. Patient disoriented on exam with no family present at bedside. Only contact information is friend.   Ordered at: 11/15/24 1218     Level Of Support Discussed With:    Patient     Code Status (Patient has no pulse and is not breathing):    CPR (Attempt to Resuscitate)     Medical Interventions (Patient has pulse or is breathing):    Full Support     Comments:    Full code per last hosptial admission. Patient disoriented on exam with no family present at bedside. Only contact information is friend.       Karen Mandel PA-C  24        Electronically signed by Geovanna Ku MD at 24 1940       Geovanna Ku MD at 24 0702              [facility] Medicine Services  PROGRESS NOTE    Patient Name: Fracisco Mullen  : 1965  MRN: [id number]    Date of Admission: 11/15/2024  Primary Care Physician: Brandy Roberson    Subjective   Subjective     CC:  F/U DKA    HPI:  No complaints today.  Has discussed with ID and surgery already this morning the anticipated need for amputation.      Objective   Objective     Vital Signs:   Temp:  [97.7 °F (36.5 °C)-97.9 °F (36.6 °C)] 97.8 °F (36.6 °C)  Heart Rate:  [] 90  Resp:  [16] 16  BP: ()/(66-88) 116/67  Flow (L/min) (Oxygen Therapy):  [1] 1     Physical Exam:  Constitutional: No acute distress, awake, alert, sitting up in bed, chronically ill appearing  HENT: NCAT, mucous membranes moist  Respiratory: Respiratory effort normal   Cardiovascular: RRR  Musculoskeletal: No bilateral ankle edema  Psychiatric: Appropriate affect, cooperative  Neurologic: Speech clear  Skin: No rashes on exposed surfaces      Results Reviewed:  LAB RESULTS:      Lab 24  0608 24  0629 24  0343 11/15/24  1532 11/15/24  1107 11/15/24  0948 11/15/24  0942   WBC 6.74 9.52 11.93*  --   --   --  13.03*   HEMOGLOBIN 11.2* 10.9* 11.2*  --   --   --  12.3*   HEMOGLOBIN, POC  --   --   --   --   --  13.6   --    HEMATOCRIT 35.4* 33.7* 34.1*  --   --   --  37.6   HEMATOCRIT POC  --   --   --   --   --  40  --    PLATELETS 242 266 283  --   --   --  321   NEUTROS ABS  --   --  7.30*  --   --   --  11.22*   IMMATURE GRANS (ABS)  --   --  0.07*  --   --   --  0.12*   LYMPHS ABS  --   --  3.19*  --   --   --  1.18   MONOS ABS  --   --  1.22*  --   --   --  0.41   EOS ABS  --   --  0.07  --   --   --  0.01   MCV 80.6 79.1 80.0  --   --   --  78.7*   LACTATE  --   --   --  1.9  --   --  3.6*   HSTROP T  --   --   --  44* 32*  --   --          Lab 11/18/24  0608 11/17/24  0629 11/16/24  1342 11/16/24  0846 11/16/24  0343 11/16/24  0045 11/15/24  1943 11/15/24  0948 11/15/24  0942   SODIUM 135* 132*  --  134* 143 137 136   < >  --    POTASSIUM 4.0 4.3  --  3.7 4.1 3.8 4.5   < >  --    CHLORIDE 99 98  --  104 109* 105 103   < >  --    CO2 25.0 23.0  --  22.0 24.0 24.0 25.0   < >  --    ANION GAP 11.0 11.0  --  8.0 10.0 8.0 8.0   < >  --    BUN 22 13  --  12 16 15 17   < >  --    CREATININE 0.88 0.89  --  0.64* 0.72* 0.74* 0.84   < >  --    EGFR 97.8 97.5  --  107.7 103.9 103.1 99.2   < >  --    GLUCOSE 270* 324*  --  171* 120* 154* 183*   < >  --    CALCIUM 8.2* 8.1*  --  8.0* 8.4* 8.0* 8.8   < >  --    MAGNESIUM  --   --  1.6 2.0 1.9 1.8 1.8   < >  --    PHOSPHORUS  --   --  2.5 2.4* 3.2 2.7 2.6   < >  --    HEMOGLOBIN A1C  --   --   --   --   --   --   --   --  14.10*    < > = values in this interval not displayed.         Lab 11/15/24  1107   TOTAL PROTEIN 6.7   ALBUMIN 3.4*   GLOBULIN 3.3   ALT (SGPT) 14   AST (SGOT) 13   BILIRUBIN 0.4   ALK PHOS 124*         Lab 11/15/24  1532 11/15/24  1107   HSTROP T 44* 32*                 Lab 11/15/24  0954   PH, ARTERIAL 7.328*   PCO2, ARTERIAL 36.0   PO2 ART 88.9   FIO2 21   HCO3 ART 18.9*   BASE EXCESS ART -6.4*   CARBOXYHEMOGLOBIN 1.5     Brief Urine Lab Results  (Last result in the past 365 days)        Color   Clarity   Blood   Leuk Est   Nitrite   Protein   CREAT   Urine HCG         11/15/24 1142 Yellow   Clear   Small (1+)   Negative   Negative   100 mg/dL (2+)                   Microbiology Results Abnormal       None            MRI Foot Left Without Contrast    Result Date: 11/18/2024  MRI FOOT LEFT WO CONTRAST Date of Exam: 11/18/2024 5:50 PM EST Indication: Evaluation for left heel osteomyelitis.  Comparison: 8/1/2024 Technique:  Routine multiplanar/multisequence sequence images of the left foot were obtained without contrast administration. Findings: Bones: There is marrow edema seen within the posterior calcaneus. There appears to be loss of T1 cortical and marrow signal seen along the posterior lateral aspect of the talus calcaneus. Additionally there appears to be nondisplaced stress fracture along the posterior aspect of the calcaneus best seen on sagittal T1 images. No additional areas of abnormal marrow edema. No additional fracture seen. No substantial joint effusions. Soft tissues: There is soft tissue wound seen along the posterior heel with ill-defined fluid extending into and disrupting the distal lateral Achilles tendon (long axial image 26). There is a tiny associated fluid collection seen here measuring approximately 0.7 x 0.5 x 0.7 cm. Fluid seen at the master knot of Brown. Flexor extensor tendons appear grossly intact. Peroneal tendons appear grossly intact. Diffuse atrophic and edematous changes of the intrinsic foot muscles suggestive of neuropathic changes. Lisfranc ligament appears intact. Diffuse subcutaneous edema.     Impression: Impression: Soft tissue wound seen along the posterior heel with ill-defined fluid that extends through and disrupts the distal Achilles tendon. Underlying this area there is evidence of osteomyelitis of the posterior lateral calcaneus. There is a tiny fluid collection here as well that may represent an associated nondrainable abscess. Additionally there appears to be a nondisplaced stress fracture of the posterior calcaneus.  Electronically Signed: Nolan Brady MD  11/18/2024 11:28 PM EST  Workstation ID: [id number]         Current medications:  Scheduled Meds:amitriptyline, 25 mg, Oral, BID  aspirin, 81 mg, Oral, Daily  enoxaparin, 40 mg, Subcutaneous, Daily  finasteride, 5 mg, Oral, Nightly  insulin glargine, 10 Units, Subcutaneous, Nightly  insulin glargine, 15 Units, Subcutaneous, Daily  insulin lispro, 2-9 Units, Subcutaneous, 4x Daily AC & at Bedtime  Insulin Lispro, 7 Units, Subcutaneous, TID With Meals  metoprolol succinate XL, 50 mg, Oral, Daily  pregabalin, 225 mg, Oral, BID  rOPINIRole, 0.25 mg, Oral, Nightly  sodium chloride, 10 mL, Intravenous, Q12H  sodium chloride, 10 mL, Intravenous, Q12H  tamsulosin, 0.8 mg, Oral, Nightly      Continuous Infusions:   PRN Meds:.  acetaminophen **OR** acetaminophen **OR** acetaminophen    senna-docusate sodium **AND** polyethylene glycol **AND** bisacodyl **AND** bisacodyl    calcium carbonate    Calcium Replacement - Follow Nurse / BPA Driven Protocol    dextrose    dextrose    glucagon (human recombinant)    Magnesium Standard Dose Replacement - Follow Nurse / BPA Driven Protocol    nitroglycerin    Phosphorus Replacement - Follow Nurse / BPA Driven Protocol    Potassium Replacement - Follow Nurse / BPA Driven Protocol    sodium chloride    sodium chloride    sodium chloride    sodium chloride    sodium chloride    Assessment & Plan   Assessment & Plan     Active Hospital Problems    Diagnosis  POA    **DKA (diabetic ketoacidosis) [E11.10]  Yes    Non healing left heel wound [S91.302A]  Yes      Resolved Hospital Problems   No resolved problems to display.        Brief Hospital Course to date:  Fracisco ADAM Lady is a 59 y.o. male with past medical history of hypertension, type 2 diabetes with insulin use, diabetic neuropathy, and chronic diabetic foot wounds who presents via EMS from home due to altered mental status and fatigue. Lab work consistent with diabetic ketoacidosis.     This  patient's problems and plans were partially entered by my partner and updated as appropriate by me 11/19/24. Copied text in this note has been reviewed and is accurate as of today's date.      DKA in setting of uncontrolled IDDM complicated by diabetic neuropathy and chronic foot wounds   --Uncertain of medication compliance  --A1c 14.1%  --s/p DKA protocol including insulin drip, IV fluids, and electrolyte replacement per protocol  --Now transitioned to basal-bolus insulin  --DM educator consulted     Chronic bilateral foot wounds  --Patient with chronic left heel wound and right plantar foot wound  --Admitted August 2024 for left foot cellulitis and discharged on oral antibiotics and with home wound vac following debridement, MRI left foot obtained at that time with no definite findings of osteomyelitis (was seen by ID/Dr. Dominique and Orthopedics/Dr. Gan)  --Repeat MRI left foot shows soft tissue wound with evidence of osteomyelitis of the calcaneus and possibly an associated non-drainable abscess  --Wound care consulted  --Consult ID and Dr. Gan given MRI findings     Acute toxic metabolic encephalopathy, improved  --CT head without acute abnormality  --UDS negative  --Likely secondary to DKA  --Improved now     HTN  --Continue home Metoprolol     BPH  Urinary retention  --Continue home Flomax, Proscar  --He is having some retention and at times refusing I/O cath, will continue to monitor - Flomax increased 11/18     Mood disorder  --Continue home Amitriptyline      RLS  --Continue home Requip    Expected Discharge Location and Transportation: rehab  Expected Discharge   Expected Discharge Date: 11/20/2024; Expected Discharge Time:      VTE Prophylaxis:  Pharmacologic VTE prophylaxis orders are present.         AM-PAC 6 Clicks Score (PT): 11 (11/18/24 2000)    CODE STATUS:   Code Status and Medical Interventions: CPR (Attempt to Resuscitate); Full Support; Full code per last hosptial admission. Patient  disoriented on exam with no family present at bedside. Only contact information is friend.   Ordered at: 11/15/24 1218     Level Of Support Discussed With:    Patient     Code Status (Patient has no pulse and is not breathing):    CPR (Attempt to Resuscitate)     Medical Interventions (Patient has pulse or is breathing):    Full Support     Comments:    Full code per last hosptial admission. Patient disoriented on exam with no family present at bedside. Only contact information is friend.       Geovanna Ku MD  24        Electronically signed by Geovanna Ku MD at 24 1029       Lisa Vallejo MD at 24 [phone]              [facility] Medicine Services  PROGRESS NOTE    Patient Name: Fracisco Mullen  : 1965  MRN: [id number]    Date of Admission: 11/15/2024  Primary Care Physician: Brandy Roberson    Subjective   Subjective     CC:  F/U DKA    HPI:  Seen this morning. He is interested in rehab.       Objective   Objective     Vital Signs:   Temp:  [97.8 °F (36.6 °C)-98.7 °F (37.1 °C)] 97.9 °F (36.6 °C)  Heart Rate:  [] 91  Resp:  [16-17] 16  BP: ()/(63-88) 120/74     Physical Exam:  Gen-no acute distress  HENT-NCAT, mucous membranes moist  CV-RRR, S1 S2 normal, no m/r/g  Resp-CTAB, no wheezes or rales  Abd-soft, NT, ND, +BS  Ext-no edema  Neuro-A&Ox3, no focal deficits  Skin-left heel ulcer in dressing  Psych-appropriate mood      Results Reviewed:  LAB RESULTS:      Lab 24  0608 24  0629 24  0343 11/15/24  1532 11/15/24  1107 11/15/24  0948 11/15/24  0942   WBC 6.74 9.52 11.93*  --   --   --  13.03*   HEMOGLOBIN 11.2* 10.9* 11.2*  --   --   --  12.3*   HEMOGLOBIN, POC  --   --   --   --   --  13.6  --    HEMATOCRIT 35.4* 33.7* 34.1*  --   --   --  37.6   HEMATOCRIT POC  --   --   --   --   --  40  --    PLATELETS 242 266 283  --   --   --  321   NEUTROS ABS  --   --  7.30*  --   --   --  11.22*   IMMATURE GRANS (ABS)  --   --   0.07*  --   --   --  0.12*   LYMPHS ABS  --   --  3.19*  --   --   --  1.18   MONOS ABS  --   --  1.22*  --   --   --  0.41   EOS ABS  --   --  0.07  --   --   --  0.01   MCV 80.6 79.1 80.0  --   --   --  78.7*   LACTATE  --   --   --  1.9  --   --  3.6*   HSTROP T  --   --   --  44* 32*  --   --          Lab 11/18/24  0608 11/17/24  0629 11/16/24  1342 11/16/24  0846 11/16/24  0343 11/16/24  0045 11/15/24  1943 11/15/24  0948 11/15/24  0942   SODIUM 135* 132*  --  134* 143 137 136   < >  --    POTASSIUM 4.0 4.3  --  3.7 4.1 3.8 4.5   < >  --    CHLORIDE 99 98  --  104 109* 105 103   < >  --    CO2 25.0 23.0  --  22.0 24.0 24.0 25.0   < >  --    ANION GAP 11.0 11.0  --  8.0 10.0 8.0 8.0   < >  --    BUN 22 13  --  12 16 15 17   < >  --    CREATININE 0.88 0.89  --  0.64* 0.72* 0.74* 0.84   < >  --    EGFR 97.8 97.5  --  107.7 103.9 103.1 99.2   < >  --    GLUCOSE 270* 324*  --  171* 120* 154* 183*   < >  --    CALCIUM 8.2* 8.1*  --  8.0* 8.4* 8.0* 8.8   < >  --    MAGNESIUM  --   --  1.6 2.0 1.9 1.8 1.8   < >  --    PHOSPHORUS  --   --  2.5 2.4* 3.2 2.7 2.6   < >  --    HEMOGLOBIN A1C  --   --   --   --   --   --   --   --  14.10*    < > = values in this interval not displayed.         Lab 11/15/24  1107   TOTAL PROTEIN 6.7   ALBUMIN 3.4*   GLOBULIN 3.3   ALT (SGPT) 14   AST (SGOT) 13   BILIRUBIN 0.4   ALK PHOS 124*         Lab 11/15/24  1532 11/15/24  1107   HSTROP T 44* 32*                 Lab 11/15/24  0954   PH, ARTERIAL 7.328*   PCO2, ARTERIAL 36.0   PO2 ART 88.9   FIO2 21   HCO3 ART 18.9*   BASE EXCESS ART -6.4*   CARBOXYHEMOGLOBIN 1.5     Brief Urine Lab Results  (Last result in the past 365 days)        Color   Clarity   Blood   Leuk Est   Nitrite   Protein   CREAT   Urine HCG        11/15/24 1142 Yellow   Clear   Small (1+)   Negative   Negative   100 mg/dL (2+)                   Microbiology Results Abnormal       None            No radiology results from the last 24 hrs        Current  medications:  Scheduled Meds:amitriptyline, 25 mg, Oral, BID  aspirin, 81 mg, Oral, Daily  enoxaparin, 40 mg, Subcutaneous, Daily  finasteride, 5 mg, Oral, Nightly  insulin glargine, 10 Units, Subcutaneous, Nightly  insulin glargine, 15 Units, Subcutaneous, Daily  insulin lispro, 2-9 Units, Subcutaneous, 4x Daily AC & at Bedtime  Insulin Lispro, 7 Units, Subcutaneous, TID With Meals  metoprolol succinate XL, 50 mg, Oral, Daily  pregabalin, 225 mg, Oral, BID  rOPINIRole, 0.25 mg, Oral, Nightly  sodium chloride, 10 mL, Intravenous, Q12H  sodium chloride, 10 mL, Intravenous, Q12H  tamsulosin, 0.8 mg, Oral, Nightly      Continuous Infusions:   PRN Meds:.  acetaminophen **OR** acetaminophen **OR** acetaminophen    senna-docusate sodium **AND** polyethylene glycol **AND** bisacodyl **AND** bisacodyl    calcium carbonate    Calcium Replacement - Follow Nurse / BPA Driven Protocol    dextrose    dextrose    glucagon (human recombinant)    Magnesium Standard Dose Replacement - Follow Nurse / BPA Driven Protocol    nitroglycerin    Phosphorus Replacement - Follow Nurse / BPA Driven Protocol    Potassium Replacement - Follow Nurse / BPA Driven Protocol    sodium chloride    sodium chloride    sodium chloride    sodium chloride    sodium chloride    Assessment & Plan   Assessment & Plan     Active Hospital Problems    Diagnosis  POA    **DKA (diabetic ketoacidosis) [E11.10]  Yes    Non healing left heel wound [S91.302A]  Yes      Resolved Hospital Problems   No resolved problems to display.        Brief Hospital Course to date:  Fracisco Mullen is a 59 y.o. male with past medical history of hypertension, type 2 diabetes with insulin use, diabetic neuropathy, and chronic diabetic foot wounds who presents via EMS from home due to altered mental status and fatigue. Lab work consistent with diabetic ketoacidosis.     This patient's problems and plans were partially entered by my partner and updated as appropriate by me 11/18/24.  Copied text in this note has been reviewed and is accurate as of today's date.      DKA in setting of uncontrolled IDDM complicated by diabetic neuropathy and chronic foot wounds   --Uncertain of medication compliance  --pH 7.328, blood glucose 567, lactate 3.6, beta hydroxybutyrate 4.29, anion gap metabolic acidosis, HbA1c 14.1%  --UA/CXR without signs of infection   --s/p DKA protocol including insulin drip, IV fluids, and electrolyte replacement per protocol  --Anion gap now closed, transitioned to basal-bolus regimen now  --Adjust as needed based on finger sticks  --DM educator consulted     Chronic bilateral foot wounds  --Patient with chronic left heel wound and right plantar foot wound  --Admitted August 2024 for left foot cellulitis and discharged on oral antibiotics and with home wound vac following debridement, MRI left foot obtained at that time with no definite findings of osteomyelitis (was seen by ID/Dr. Dominique and Orthopedics/Dr. Gan)  --Repeat MRI left foot pending  --Wound care consulted  --Consider surgery/ID consults pending MRI results     Low grade fever  --Temp 100.4 on 11/15/24 evening  --Mild leukocytosis, improved from admission  --CXR and UA negative  --Await MRI left foot  --Defer ATBx for now unless fever spikes or other signs of infection     Acute toxic metabolic encephalopathy, improved  --CT head without acute abnormality  --UDS negative  --Likely secondary to DKA  --Improved now     HTN  --Continue home Metoprolol     BPH  Urinary retention  --Continue home Flomax, Proscar  --He is having some retention and at times refusing I/O cath, will continue to monitor - increase Flomax today     Mood disorder  --Continue home Amitriptyline      RLS  --Continue home Requip    Expected Discharge Location and Transportation: rehab  Expected Discharge   Expected Discharge Date: 11/20/2024; Expected Discharge Time:      VTE Prophylaxis:  Pharmacologic VTE prophylaxis orders are present.      "    AM-PAC 6 Clicks Score (PT): 10 (11/18/24 0800)    CODE STATUS:   Code Status and Medical Interventions: CPR (Attempt to Resuscitate); Full Support; Full code per last hosptial admission. Patient disoriented on exam with no family present at bedside. Only contact information is friend.   Ordered at: 11/15/24 1218     Level Of Support Discussed With:    Patient     Code Status (Patient has no pulse and is not breathing):    CPR (Attempt to Resuscitate)     Medical Interventions (Patient has pulse or is breathing):    Full Support     Comments:    Full code per last hosptial admission. Patient disoriented on exam with no family present at bedside. Only contact information is friend.       Lisa Vallejo MD  11/18/24        Electronically signed by Lisa Vallejo MD at 11/18/24 1451          Consult Notes (last 72 hours)        Varun Dominique MD at 11/20/24 0800          Fracisco Mullen  1965  [id number]    Evaluating Physician: Varun Dominique MD    Chief Complaint: fatigue    Reason for Consultation: foot infection    History of present illness:     Patient is a 59 y.o.  Yr old male with history of diabetes/hypertension, previously followed with Dr. Gatica; admitted 2023 with right foot infection, cultures with MSSA/Morganella/ESBL Klebsiella/enterococcus and strep species.had surgery at the second toe with amputation, received IV daptomycin/Invanz with general improvements at that site.  He has been left with a chronic right plantar foot wound and a chronic wound at the left heel that has turned black; He apparently was admitted to the hospital August 1 after a fall at [facility].  Noted to have urinary retention with concern for possible UTI.  In addition left heel with black discoloration/malodor and redness, empiric antibiotics initiated.  He is chronically debilitated and wheelchair bound by his report.Bustos catheter-based at admission     8/3/24  Dr Gan  \"PROCEDURE:            Left  Left    " "  [id number]:             Debridement of skin and subcutaneous tissue  [id number]:             Wound vacuum-assisted closure\"     8/6/24 MRI and surgical findings did not confirm bone involvement, transitioned to oral agents at discharge       Readmitted November 15, 2024 with reports of appearing \"sluggish\" according to admission notes with DKA, noted to have high hemoglobin A1c and persistent/worsening left heel wound according to patient; medicine team notes mention urinary retention, acute toxic metabolic encephalopathy improved and low-grade fever. Abnormal MRI at the left foot:    Per radiology-- \"Soft tissue wound seen along the posterior heel with ill-defined fluid that extends through and disrupts the distal Achilles tendon. Underlying this area there is evidence of osteomyelitis of the posterior lateral calcaneus. There is a tiny fluid   collection here as well that may represent an associated nondrainable abscess.     Additionally there appears to be a nondisplaced stress fracture of the posterior calcaneus. \"      11/20/24 Dr Gan considering options;  vascular team has seen; Left heel pain  dull , worse with palpation, better with pain meds and not severe, 2-3/10.     No headache photophobia or neck stiffness.  No shortness of breath cough or hemoptysis.  No nausea vomiting diarrhea or abdominal pain.  No other new skin rash.  no dysuria hematuria or pyuria.       Past Medical History:   Diagnosis Date    Acute renal failure     Hx of    Obesity     Hx of    Sleep apnea     Type 2 diabetes mellitus        Past Surgical History:   Procedure Laterality Date    BACK SURGERY      lower back    CHOLECYSTECTOMY WITH INTRAOPERATIVE CHOLANGIOGRAM N/A 1/6/2021    Procedure: CHOLECYSTECTOMY LAPAROSCOPIC INTRAOPERATIVE CHOLANGIOGRAM;  Surgeon: Juventino Hooker MD;  [facility];  Service: General;  Laterality: N/A;    ERCP N/A 1/9/2021    Procedure: ENDOSCOPIC RETROGRADE CHOLANGIOPANCREATOGRAPHY;  Surgeon: " Jeremy Dowell MD;  Location:  [facility];  Service: Gastroenterology;  Laterality: N/A;  with sphiincterotomy and balloon sweep with 9mm-12mm balloon    INCISION AND DRAINAGE FOOT Left 8/3/2024    Procedure: HEAL IRRIGATION AND DEBRIDEMENT LEFT;  Surgeon: Dru Gan Jr., MD;  Location:  [facility];  Service: Orthopedics;  Laterality: Left;    PLACEMENT OF WOUND VAC Left 8/3/2024    Procedure: PLACEMENT OF WOUND VAC;  Surgeon: Dru Gan Jr., MD;  Location:  [facility];  Service: Orthopedics;  Laterality: Left;       Pediatric History   Patient Parents    Not on file     Other Topics Concern    Not on file   Social History Narrative    Not on file       family history includes Cancer in his brother, maternal grandmother, mother, and another family member; Diabetes in an other family member; Heart attack in an other family member; Heart disease in his brother and father; Hyperlipidemia in his mother and another family member; Hypertension in his mother and another family member; Obesity in an other family member.    Allergies   Allergen Reactions    Sertraline Hcl Hives     Zoloft       Medication:  Current Facility-Administered Medications   Medication Dose Route Frequency Provider Last Rate Last Admin    acetaminophen (TYLENOL) tablet 650 mg  650 mg Oral Q4H PRN Edmond Espinoza DO   650 mg at 11/19/24 1112    Or    acetaminophen (TYLENOL) 160 MG/5ML oral solution 650 mg  650 mg Oral Q4H PRN Edmond Espinoza DO        Or    acetaminophen (TYLENOL) suppository 650 mg  650 mg Rectal Q4H PRN Edmond Espinoza DO   650 mg at 11/15/24 2327    amitriptyline (ELAVIL) tablet 25 mg  25 mg Oral BID Lisa Vallejo MD   25 mg at 11/19/24 2111    aspirin EC tablet 81 mg  81 mg Oral Daily Lisa Vallejo MD   81 mg at 11/19/24 0840    sennosides-docusate (PERICOLACE) 8.6-50 MG per tablet 2 tablet  2 tablet Oral BID PRN Edmond Espinoza DO        And    polyethylene glycol (MIRALAX) packet 17 g  17 g Oral Daily PRN Edmond Espinoza DO         And    bisacodyl (DULCOLAX) EC tablet 5 mg  5 mg Oral Daily PRN Edmond Espinoza DO        And    bisacodyl (DULCOLAX) suppository 10 mg  10 mg Rectal Daily PRN Edmond Espinoza DO        calcium carbonate (TUMS) chewable tablet 500 mg (200 mg elemental)  2 tablet Oral TID PRN Lisa Vallejo MD   2 tablet at 11/19/24 2119    Calcium Replacement - Follow Nurse / BPA Driven Protocol   Not Applicable PRN Edmond Espinoza DO        DAPTOmycin (CUBICIN) 450 mg in sodium chloride 0.9 % 50 mL IVPB  6 mg/kg Intravenous Q24H Varun Dominique  mL/hr at 11/19/24 1112 450 mg at 11/19/24 1112    dextrose (D50W) (25 g/50 mL) IV injection 25 g  25 g Intravenous Q15 Min PRN Perla Pascual MD        dextrose (GLUTOSE) oral gel 15 g  15 g Oral Q15 Min PRN Perla Pascual MD        Enoxaparin Sodium (LOVENOX) syringe 40 mg  40 mg Subcutaneous Daily Edmond Espinoza DO   40 mg at 11/19/24 0840    ertapenem (INVanz) 1,000 mg in sodium chloride 0.9 % 100 mL MBP  1,000 mg Intravenous Q24H Varun Dominique  mL/hr at 11/19/24 0840 1,000 mg at 11/19/24 0840    finasteride (PROSCAR) tablet 5 mg  5 mg Oral Nightly Lisa Vallejo MD   5 mg at 11/19/24 2111    glucagon (GLUCAGEN) injection 1 mg  1 mg Intramuscular Q15 Min PRN Perla Pascual MD        insulin glargine (LANTUS, SEMGLEE) injection 10 Units  10 Units Subcutaneous Nightly Lisa Vallejo MD   10 Units at 11/18/24 2141    insulin glargine (LANTUS, SEMGLEE) injection 15 Units  15 Units Subcutaneous Daily Lisa Vallejo MD   15 Units at 11/19/24 0840    Insulin Lispro (humaLOG) injection 2-9 Units  2-9 Units Subcutaneous 4x Daily AC & at Bedtime Lisa Vallejo MD   6 Units at 11/19/24 1226    Insulin Lispro (humaLOG) injection 7 Units  7 Units Subcutaneous TID With Meals Lisa Vallejo MD   7 Units at 11/19/24 174    Magnesium Standard Dose Replacement - Follow Nurse / BPA Driven Protocol   Not Applicable PRN Edmond Espinoza DO        metoprolol succinate XL (TOPROL-XL) 24  hr tablet 50 mg  50 mg Oral Daily Lisa Vallejo MD   50 mg at 11/19/24 0840    nitroglycerin (NITROSTAT) SL tablet 0.4 mg  0.4 mg Sublingual Q5 Min PRN Edmond Espinoza DO        ondansetron (ZOFRAN) injection 4 mg  4 mg Intravenous Q6H PRN Rupinder Sutherland APRN   4 mg at 11/20/24 0055    Phosphorus Replacement - Follow Nurse / BPA Driven Protocol   Not Applicable PRN Edmond Espinoza DO        Potassium Replacement - Follow Nurse / BPA Driven Protocol   Not Applicable PRN Edmond Espinoza DO        pregabalin (LYRICA) capsule 225 mg  225 mg Oral BID Varun Snider, [facility]   225 mg at 11/19/24 2111    rOPINIRole (REQUIP) tablet 0.25 mg  0.25 mg Oral Nightly Lisa Vallejo MD   0.25 mg at 11/19/24 2112    sodium chloride 0.9 % flush 10 mL  10 mL Intravenous PRN Edmond Espinoza,         sodium chloride 0.9 % flush 10 mL  10 mL Intravenous Q12H Edmond Espinoza,    10 mL at 11/19/24 2116    sodium chloride 0.9 % flush 10 mL  10 mL Intravenous PRN Edmond Espinoza,         sodium chloride 0.9 % flush 10 mL  10 mL Intravenous Q12H Edmond Espinoza,    10 mL at 11/19/24 2116    sodium chloride 0.9 % flush 10 mL  10 mL Intravenous PRN Edmond Espinoza,         sodium chloride 0.9 % infusion 40 mL  40 mL Intravenous PRN Edmond Espinoza,         sodium chloride 0.9 % infusion 40 mL  40 mL Intravenous PRN Edmond Espinoza DO        tamsulosin (FLOMAX) 24 hr capsule 0.8 mg  0.8 mg Oral Nightly Lisa Vallejo MD   0.8 mg at 11/19/24 2111       Antibiotics:  Anti-Infectives (From admission, onward)      Ordered     Dose/Rate Route Frequency Start Stop    11/19/24 0737  DAPTOmycin (CUBICIN) 450 mg in sodium chloride 0.9 % 50 mL IVPB        Ordering Provider: Varun Dominique MD    6 mg/kg × 72.3 kg  100 mL/hr over 30 Minutes Intravenous Every 24 Hours 11/19/24 0900 12/03/24 0859    11/19/24 0737  ertapenem (INVanz) 1,000 mg in sodium chloride 0.9 % 100 mL MBP        Ordering Provider: Varun Dominique MD    1,000 mg  200 mL/hr over 30 Minutes Intravenous Every  "24 Hours 11/19/24 0900 12/03/24 0859              Review of Systems    11/20/24     Constitutional-- No Fever, chills or sweats.  Appetite diminished with fatigue.  Heent-- No new vision, hearing or throat complaints.  No epistaxis or oral sores.  Denies odynophagia or dysphagia.  No flashers, floaters or eye pain. No odynophagia or dysphagia. No headache, photophobia or neck stiffness.  CV-- No chest pain, palpitation or syncope  Resp-- No SOB/cough/Hemoptysis  GI- No nausea, vomiting, or diarrhea.  No hematochezia, melena, or hematemesis. Denies jaundice or chronic liver disease.  -- No dysuria, hematuria, or flank pain.  Denies hesitancy, urgency or flank pain.  Lymph- no swollen lymph nodes in neck/axilla or groin.   Heme- No active bruising or bleeding; no Hx of DVT or PE.  MS-- no swelling or pain in the bones or joints of arms/legs.  No new back pain.  Neuro-- No acute focal weakness or numbness in the arms or legs.  No seizures.    Full 12 point review of systems reviewed and negative otherwise for acute complaints, except for above    Physical Exam:   Vital Signs   /74 (BP Location: Left arm, Patient Position: Lying)   Pulse 103   Temp 98 °F (36.7 °C) (Oral)   Resp 16   Ht 162.6 cm (64\")   Wt 72.3 kg (159 lb 6.3 oz)   SpO2 92%   BMI 27.36 kg/m²     GENERAL: Awake and alert, in no acute distress.   HEENT: Normocephalic, atraumatic.  PERRL. EOMI. No conjunctival injection. No icterus. Oropharynx clear without evidence of thrush or exudate. No evidence of periodontal disease.    NECK: Supple without nuchal rigidity. No mass.  LYMPH: No cervical, axillary or inguinal lymphadenopathy.  HEART: RRR; No murmur, rubs, gallops.   LUNGS: diminished at bases but otherwise Clear to auscultation bilaterally without wheezing, rales, rhonchi. Normal respiratory effort. Nonlabored. No dullness.  ABDOMEN: Soft, nontender, nondistended. Positive bowel sounds. No rebound or guarding. NO mass or HSM.  EXT: see " below  : Genitalia generally unremarkable.  Without Bustos catheter.  MSK: FROM without joint effusions noted arms/legs.    SKIN: Warm and dry without cutaneous eruptions on Inspection/palpation.    NEURO: Oriented to PPT. He has a difficult time cooperating with a detailed motor/sensory exam given positioning in bed.    Right foot second toe amputation site noted, no redness/duration or warmth there.  No oral or active drainage    Left heel with large wound, deeper wound at the proximal aspect of this tracking towards the Achilles although I am unable to visualize  tendon or palpate bone at present.  Bloody drainage.  Vague surrounding erythema with mild tenderness but no discrete mass bulge or fluctuance.  No crepitus or bulla.  Multifocal crusted areas otherwise at his lower extremities      Laboratory Data    Results from last 7 days   Lab Units 11/20/24  0550 11/19/24  0717 11/18/24  0608   WBC 10*3/mm3 15.93* 9.33 6.74   HEMOGLOBIN g/dL 10.5* 12.0* 11.2*   HEMATOCRIT % 34.3* 38.6 35.4*   PLATELETS 10*3/mm3 262 276 242     Results from last 7 days   Lab Units 11/20/24  0550   SODIUM mmol/L 133*   POTASSIUM mmol/L 4.4   CHLORIDE mmol/L 97*   CO2 mmol/L 24.0   BUN mg/dL 35*   CREATININE mg/dL 0.98   GLUCOSE mg/dL 433*   CALCIUM mg/dL 8.4*     Results from last 7 days   Lab Units 11/15/24  1107   ALK PHOS U/L 124*   BILIRUBIN mg/dL 0.4   ALT (SGPT) U/L 14   AST (SGOT) U/L 13               Estimated Creatinine Clearance: 72.1 mL/min (by C-G formula based on SCr of 0.98 mg/dL).      Microbiology:      Radiology:  Imaging Results (Last 72 Hours)       Procedure Component Value Units Date/Time    MRI Foot Left Without Contrast [[id number]] Collected: 11/18/24 2320     Updated: 11/18/24 2331    Narrative:        MRI FOOT LEFT WO CONTRAST    Date of Exam: 11/18/2024 5:50 PM EST    Indication: Evaluation for left heel osteomyelitis.     Comparison: 8/1/2024    Technique:  Routine multiplanar/multisequence sequence images  of the left foot were obtained without contrast administration.      Findings:  Bones: There is marrow edema seen within the posterior calcaneus. There appears to be loss of T1 cortical and marrow signal seen along the posterior lateral aspect of the talus calcaneus. Additionally there appears to be nondisplaced stress fracture   along the posterior aspect of the calcaneus best seen on sagittal T1 images. No additional areas of abnormal marrow edema. No additional fracture seen. No substantial joint effusions.    Soft tissues: There is soft tissue wound seen along the posterior heel with ill-defined fluid extending into and disrupting the distal lateral Achilles tendon (long axial image 26). There is a tiny associated fluid collection seen here measuring   approximately 0.7 x 0.5 x 0.7 cm. Fluid seen at the master knot of Brown. Flexor extensor tendons appear grossly intact. Peroneal tendons appear grossly intact. Diffuse atrophic and edematous changes of the intrinsic foot muscles suggestive of   neuropathic changes. Lisfranc ligament appears intact. Diffuse subcutaneous edema.      Impression:      Impression:  Soft tissue wound seen along the posterior heel with ill-defined fluid that extends through and disrupts the distal Achilles tendon. Underlying this area there is evidence of osteomyelitis of the posterior lateral calcaneus. There is a tiny fluid   collection here as well that may represent an associated nondrainable abscess.    Additionally there appears to be a nondisplaced stress fracture of the posterior calcaneus.        Electronically Signed: Nolan Brady MD    11/18/2024 11:28 PM EST    Workstation ID: [id number]              Impression:       --acute left heel wound infection ,  abnormal MRI as above raising concern for fluid extending into and disrupting the distal lateral Achilles tendon in addition to with evidence for osteomyelitis at the posterior lateral calcaneus in addition to possible  nondisplaced stress fracture at the posterior calcaneus; High risk for persistent/recurrent or nonhealing wounds, persistent/progressive or recurrent infection and risk for further functional/limb loss, higher level amputation etc. Surgery 8/3;  High risk for amputation.  Empiric antibiotics for now     --urinary retention per admission notes, some hematuria on November 15; further urologic evaluation regarding functional/anatomic assessment at discretion of medicine team with respect inpatient versus outpatient     --Diabetes, uncontrolled ; glucose control per medicine team     --diabetic neuropathy     --Chronic debility as per patient wheelchair bound     --Hx ESBL    PLAN:     --IV daptomycin/Invanz    --Check/review labs cultures and scans    --Partial history Per nursing staff    --Discussed with microbiology    --Discussed with surgery team    --ortho and vascular team to see    --Highly complex at of issues with high risk for further serious morbidity and other serious sequela     Copied text in this note has been reviewed and is accurate as of 11/20/24.     Varun Dominique MD  11/20/2024                Electronically signed by Varun Dominique MD at 11/20/24 0802       Isabel Gaxiola PA-C at 11/19/24 1041        Consult Orders    1. Inpatient Vascular Surgery Consult [[id number]] ordered by Varun Dominique MD              Summary:Vascular Consult - [facility]                 Inpatient Vascular Surgery Consult  Consult performed by: Isabel Gaxiola PA-C  Consult ordered by: Varun Dominique MD          Patient Care Team:  Brandy Roberson as PCP - General (Family Medicine)  Varun Fontenot MD as Consulting Physician (Cardiology)  Yulissa Taveras APRN as Nurse Practitioner (Cardiology)    Chief complaint: Left heel osteomyelitis    Subjective     History of Present Illness  Mr. Mullen is a 59-year-old male with uncontrolled type 2 diabetes and a chronic left heel ulceration now  with left heel osteomyelitis.  Vascular consulted for recommendations regarding peripheral arterial disease in setting of nonhealing left heel ulceration.  Patient states wound has been present for greater than 1 year and has been followed by his primary care and infectious disease.  He was also recently admitted to [facility] 8/2024 and underwent left heel irrigation, debridement and wound VAC closure with Dr. Gan.  Patient reports left heel pain has increased over the past several weeks.  No fevers, chills, significant drainage or malodor.  He states his last A1c was approximately 12%.  He denies any claudication pain or history of previous vascular intervention.    Review of Systems   Constitutional:  Negative for chills, fatigue and fever.   HENT:  Negative for hearing loss.    Eyes:  Negative for visual disturbance.   Respiratory:  Negative for cough, chest tightness and shortness of breath.    Cardiovascular:  Negative for chest pain, palpitations and leg swelling.   Gastrointestinal:  Negative for abdominal pain, constipation, diarrhea, nausea and vomiting.   Genitourinary:  Negative for difficulty urinating.   Musculoskeletal:  Positive for arthralgias and gait problem.   Skin:  Positive for wound. Negative for color change.   Neurological:  Negative for weakness and numbness.   Psychiatric/Behavioral:  Negative for confusion. The patient is not nervous/anxious.         Past Medical History:   Diagnosis Date    Acute renal failure     Hx of    Obesity     Hx of    Sleep apnea     Type 2 diabetes mellitus    ,   Past Surgical History:   Procedure Laterality Date    BACK SURGERY      lower back    CHOLECYSTECTOMY WITH INTRAOPERATIVE CHOLANGIOGRAM N/A 1/6/2021    Procedure: CHOLECYSTECTOMY LAPAROSCOPIC INTRAOPERATIVE CHOLANGIOGRAM;  Surgeon: Juventino Hooker MD;  Location: [facility];  Service: General;  Laterality: N/A;    ERCP N/A 1/9/2021    Procedure: ENDOSCOPIC RETROGRADE  CHOLANGIOPANCREATOGRAPHY;  Surgeon: Jeremy Dowell MD;  Location:  [facility];  Service: Gastroenterology;  Laterality: N/A;  with sphiincterotomy and balloon sweep with 9mm-12mm balloon    INCISION AND DRAINAGE FOOT Left 8/3/2024    Procedure: HEAL IRRIGATION AND DEBRIDEMENT LEFT;  Surgeon: Dru Gan Jr., MD;  Location:  [facility];  Service: Orthopedics;  Laterality: Left;    PLACEMENT OF WOUND VAC Left 8/3/2024    Procedure: PLACEMENT OF WOUND VAC;  Surgeon: Dru Gan Jr., MD;  Location:  [facility];  Service: Orthopedics;  Laterality: Left;   ,   Family History   Problem Relation Age of Onset    Diabetes Other     Hypertension Other     Cancer Other         Pancreatic cancer-first cousin    Heart attack Other         MI    Obesity Other     Hyperlipidemia Other         High blood cholesterol    Hyperlipidemia Mother     Hypertension Mother     Cancer Mother         lung cancer    Heart disease Father         MI at 75 yo    Cancer Brother         esophageal cancer with mets    Heart disease Brother         several MIs earlies in 30s    Cancer Maternal Grandmother    ,   Social History     Socioeconomic History    Marital status:    Tobacco Use    Smoking status: Former     Types: Cigars    Smokeless tobacco: Never    Tobacco comments:     3 per month. Former cig smoker   Vaping Use    Vaping status: Never Used   Substance and Sexual Activity    Alcohol use: No     Comment: Past drank about a pint per month for 2-3 years    Drug use: No    Sexual activity: Defer     E-cigarette/Vaping    E-cigarette/Vaping Use Never User     Passive Exposure No     Counseling Given No      E-cigarette/Vaping Substances    Nicotine No     THC No     CBD No     Flavoring No      E-cigarette/Vaping Devices    Disposable No     Pre-filled or Refillable Cartridge No     Refillable Tank No     Pre-filled Pod No          , Scheduled Meds:  amitriptyline, 25 mg, Oral, BID  aspirin, 81 mg, Oral,  Daily  DAPTOmycin, 6 mg/kg, Intravenous, Q24H  enoxaparin, 40 mg, Subcutaneous, Daily  ertapenem, 1,000 mg, Intravenous, Q24H  finasteride, 5 mg, Oral, Nightly  insulin glargine, 10 Units, Subcutaneous, Nightly  insulin glargine, 15 Units, Subcutaneous, Daily  insulin lispro, 2-9 Units, Subcutaneous, 4x Daily AC & at Bedtime  Insulin Lispro, 7 Units, Subcutaneous, TID With Meals  metoprolol succinate XL, 50 mg, Oral, Daily  pregabalin, 225 mg, Oral, BID  rOPINIRole, 0.25 mg, Oral, Nightly  sodium chloride, 10 mL, Intravenous, Q12H  sodium chloride, 10 mL, Intravenous, Q12H  tamsulosin, 0.8 mg, Oral, Nightly   , Continuous Infusions:   , PRN Meds:    acetaminophen **OR** acetaminophen **OR** acetaminophen    senna-docusate sodium **AND** polyethylene glycol **AND** bisacodyl **AND** bisacodyl    calcium carbonate    Calcium Replacement - Follow Nurse / BPA Driven Protocol    dextrose    dextrose    glucagon (human recombinant)    Magnesium Standard Dose Replacement - Follow Nurse / BPA Driven Protocol    nitroglycerin    Phosphorus Replacement - Follow Nurse / BPA Driven Protocol    Potassium Replacement - Follow Nurse / BPA Driven Protocol    sodium chloride    sodium chloride    sodium chloride    sodium chloride    sodium chloride, and Allergies:  Sertraline hcl    Objective      Vital Signs  Temp:  [97.7 °F (36.5 °C)-98.2 °F (36.8 °C)] 98.2 °F (36.8 °C)  Heart Rate:  [82-97] 96  Resp:  [16-18] 18  BP: ()/(66-84) 107/68    Physical Exam  Vitals reviewed. Chaperone present: No family at bedside.   Constitutional:       General: He is not in acute distress.     Appearance: Normal appearance. He is not ill-appearing, toxic-appearing or diaphoretic.   HENT:      Head: Normocephalic and atraumatic.      Right Ear: External ear normal.      Left Ear: External ear normal.      Nose: Nose normal.      Mouth/Throat:      Mouth: Mucous membranes are moist.   Eyes:      Extraocular Movements: Extraocular movements  intact.      Conjunctiva/sclera: Conjunctivae normal.   Cardiovascular:      Rate and Rhythm: Normal rate and regular rhythm.      Pulses:           Femoral pulses are 2+ on the right side and 2+ on the left side.       Popliteal pulses are 2+ on the right side and 2+ on the left side.        Dorsalis pedis pulses are 2+ on the right side and 2+ on the left side.        Posterior tibial pulses are 2+ on the right side and 2+ on the left side.   Pulmonary:      Effort: Pulmonary effort is normal. No respiratory distress.   Abdominal:      General: There is no distension.      Palpations: Abdomen is soft.      Tenderness: There is no abdominal tenderness.   Musculoskeletal:         General: Tenderness (Left heel tender to palpation) present. No swelling.      Cervical back: Normal range of motion.      Left foot: Normal range of motion.      Right Lower Extremity: (right second toe amputation, well-healed)  Feet:      Left foot:      Skin integrity: Ulcer (Left heel) and erythema present.   Skin:     General: Skin is warm.      Coloration: Skin is not pale.   Neurological:      General: No focal deficit present.      Mental Status: He is alert and oriented to person, place, and time. Mental status is at baseline.      Sensory: No sensory deficit.      Motor: No weakness.   Psychiatric:         Mood and Affect: Mood normal.         Behavior: Behavior normal.         Thought Content: Thought content normal.         Judgment: Judgment normal.         Results Review:    I reviewed the patient's new clinical results.    Lab Results   Component Value Date    WBC 9.33 11/19/2024    HGB 12.0 (L) 11/19/2024    HCT 38.6 11/19/2024    MCV 82.8 11/19/2024     11/19/2024     Lab Results   Component Value Date    GLUCOSE 279 (H) 11/19/2024    CALCIUM 8.8 11/19/2024     (L) 11/19/2024    K 3.9 11/19/2024    CO2 27.0 11/19/2024    CL 97 (L) 11/19/2024    BUN 20 11/19/2024    CREATININE 1.00 11/19/2024    EGFR 85.6  11/19/2024    BCR 20.0 11/19/2024    ANIONGAP 10.0 11/19/2024     MRI Foot Left Without Contrast  Narrative: MRI FOOT LEFT WO CONTRAST    Date of Exam: 11/18/2024 5:50 PM EST    Indication: Evaluation for left heel osteomyelitis.     Comparison: 8/1/2024    Technique:  Routine multiplanar/multisequence sequence images of the left foot were obtained without contrast administration.    Findings:  Bones: There is marrow edema seen within the posterior calcaneus. There appears to be loss of T1 cortical and marrow signal seen along the posterior lateral aspect of the talus calcaneus. Additionally there appears to be nondisplaced stress fracture   along the posterior aspect of the calcaneus best seen on sagittal T1 images. No additional areas of abnormal marrow edema. No additional fracture seen. No substantial joint effusions.    Soft tissues: There is soft tissue wound seen along the posterior heel with ill-defined fluid extending into and disrupting the distal lateral Achilles tendon (long axial image 26). There is a tiny associated fluid collection seen here measuring   approximately 0.7 x 0.5 x 0.7 cm. Fluid seen at the master knot of Brown. Flexor extensor tendons appear grossly intact. Peroneal tendons appear grossly intact. Diffuse atrophic and edematous changes of the intrinsic foot muscles suggestive of   neuropathic changes. Lisfranc ligament appears intact. Diffuse subcutaneous edema.  Impression: Impression:  Soft tissue wound seen along the posterior heel with ill-defined fluid that extends through and disrupts the distal Achilles tendon. Underlying this area there is evidence of osteomyelitis of the posterior lateral calcaneus. There is a tiny fluid   collection here as well that may represent an associated nondrainable abscess.    Additionally there appears to be a nondisplaced stress fracture of the posterior calcaneus.    Electronically Signed: Nolan Brady MD    11/18/2024 11:28 PM EST     Workstation ID: [id number]            Assessment & Plan       DKA (diabetic ketoacidosis)    Non healing left heel wound    Severe protein-calorie malnutrition      Assessment & Plan    Assessment     Mr. Mullen is a 59-year-old male with uncontrolled type 2 diabetes and chronic left heel ulceration now with left heel osteomyelitis.  Patient with bounding palpable femoral, popliteal and pedal pulses bilaterally on exam.  Arterial studies ordered and pending although I suspect it will be normal given palpable pulses on exam.  Orthopedics following with tentative plans for left below the knee amputation.    Plan   -No vascular intervention anticipated at this time, okay to proceed with LEFT BKA  -Follow-up arterial studies  -agree with DVT ppx  -Wound care and weightbearing status per orthopedics  -Antibiotics per ID  -glycemic control reinforced   -Additional recommendations to follow physician review    Plan of care was reviewed with the patient who verbalizes understanding and agreement.  All questions answered.  Plan of care was reviewed with primary hospitalist, Dr. Ku.  Case was reviewed with Dr. Mckinney.      Isabel Gaxiola PA-C  11/19/24  10:41 EST          Electronically signed by Isabel Gaxiola PA-C at 11/19/24 1049       Claudia Lim PA at 11/19/24 0905        Consult Orders    1. Inpatient Orthopedic Surgery Consult [[id number]] ordered by Geovanna Ku MD at 11/19/24 0706                 [facility], [facility]  [address] [address]  [phone]       Orthopedic Consult    Patient: Fracisco Mullen    Date of Admission: 11/15/2024  9:19 AM    YOB: 1965    Medical Record Number: [id number]    Attending Physician: Geovanna Ku MD    Consulting Physician: KAY Bautista    Chief Complaint: DKA (diabetic ketoacidosis) [E11.10]    History of Present Illness: 59 y.o. male admitted to [facility] with DKA (diabetic ketoacidosis) [E11.10]. I was consulted  for further evaluation and treatment.  He has a past medical history significant for diabetes mellitus, diabetic peripheral neuropathy, hypertension, chronic bilateral diabetic foot ulcers.  He was recently admitted to [facility] August 2024 and underwent left heel irrigation, debridement, wound vacuum-assisted closure performed by Dr. Gan.  MRI at that time was negative for acute findings of osteomyelitis.  This morning the patient is seen lying comfortably in bed.  He reports increased left foot pain over the last several weeks.  He denies any history of fevers but states that he has had chills.  He denies any other symptoms or concerns at this time.       Allergies   Allergen Reactions    Sertraline Hcl Hives     Zoloft        Home Medications:  Medications Prior to Admission   Medication Sig Dispense Refill Last Dose/Taking    amitriptyline (ELAVIL) 25 MG tablet Take 1 tablet by mouth 2 (Two) Times a Day. One twice daily   11/14/2024    cyclobenzaprine (FLEXERIL) 5 MG tablet Take 1 tablet by mouth 3 (Three) Times a Day As Needed.   11/14/2024    DULoxetine (CYMBALTA) 60 MG capsule Take 1 capsule by mouth Daily.   11/14/2024    fluticasone (FLONASE) 50 MCG/ACT nasal spray 2 sprays into each nostril Daily. 16 g 2 11/14/2024    insulin aspart prot & aspart (NovoLOG Mix 70/30 FlexPen) (70-30) 100 UNIT/ML suspension pen-injector injection Inject 0.6 mL (60 units) under the skin into the appropriate area as directed 2 Times a Day Before Meals. 15 mL 0 11/14/2024    levocetirizine (XYZAL) 5 MG tablet Take 1 tablet by mouth Daily.   11/14/2024    metFORMIN (GLUCOPHAGE) 1000 MG tablet Take 0.5 (one-half) tablet by mouth 2 Times a Day With Meals. 60 tablet 0 11/14/2024    metoprolol succinate XL (TOPROL-XL) 50 MG 24 hr tablet Take 1 tablet by mouth Daily. 30 tablet 5 11/14/2024    omeprazole (priLOSEC) 40 MG capsule Take 1 capsule by mouth 2 (Two) Times a Day. 60 capsule 2 11/14/2024    pregabalin  "(Lyrica) 225 MG capsule Take 1 capsule by mouth 2 (Two) Times a Day. 60 capsule 0 11/14/2024    rOPINIRole (REQUIP) 0.25 MG tablet Take 1 tablet by mouth Every Night, 1 hour before bedtime. 30 tablet 0 11/14/2024    albuterol (PROVENTIL HFA;VENTOLIN HFA) 108 (90 BASE) MCG/ACT inhaler Inhale 2 puffs Every 4 (Four) Hours As Needed for Wheezing. 1 inhaler 5     Blood Glucose Monitoring Suppl (Blood Glucose Monitor System) w/Device kit Use as directed to test blood sugar 3 (Three) Times a Day. 1 each 0     Cholecalciferol (VITAMIN D3) 2000 UNITS capsule Take 1 capsule by mouth Daily. 90 capsule 2     docusate sodium (COLACE) 100 MG capsule Take 1 capsule by mouth 2 (Two) Times a Day. 20 capsule 0     finasteride (PROSCAR) 5 MG tablet Take 1 tablet by mouth Every Night. (Patient not taking: Reported on 11/15/2024) 30 tablet 0 Not Taking    glucose blood test strip Use as directed to test blood sugar 3 times daily 100 each 0     Insulin Pen Needle (B-D UF III MINI PEN NEEDLES) 31G X 5 MM misc Use as directed 2 times daily 200 each 12     Insulin Pen Needle (Pen Needles 3/16\") 31G X 5 MM misc Use 1 pen needle as directed 2 (Two) Times a Day. 100 each 0     Lancets misc Use as directed to test blood sugar 3 (Three) Times a Day. 100 each 0     naloxone (NARCAN) 4 MG/0.1ML nasal spray Call 911. Don't prime. Spray in 1 nostril as needed for overdose. Repeat in 2-3 minutes in other nostril if no or minimal breathing/responsiveness. 2 each 0     oxyCODONE-acetaminophen (PERCOCET) 5-325 MG per tablet Take 2 tablets by mouth Every 4 (Four) Hours As Needed for Severe Pain. 12 tablet 0     simvastatin (ZOCOR) 20 MG tablet Take 1 tablet by mouth Daily.       tamsulosin (FLOMAX) 0.4 MG capsule 24 hr capsule Take 1 capsule by mouth Every Night. (Patient not taking: Reported on 11/15/2024) 30 capsule 0 Not Taking       Current Medications:  Scheduled Meds:amitriptyline, 25 mg, Oral, BID  aspirin, 81 mg, Oral, Daily  DAPTOmycin, 6 mg/kg, " Intravenous, Q24H  enoxaparin, 40 mg, Subcutaneous, Daily  ertapenem, 1,000 mg, Intravenous, Q24H  finasteride, 5 mg, Oral, Nightly  insulin glargine, 10 Units, Subcutaneous, Nightly  insulin glargine, 15 Units, Subcutaneous, Daily  insulin lispro, 2-9 Units, Subcutaneous, 4x Daily AC & at Bedtime  Insulin Lispro, 7 Units, Subcutaneous, TID With Meals  metoprolol succinate XL, 50 mg, Oral, Daily  pregabalin, 225 mg, Oral, BID  rOPINIRole, 0.25 mg, Oral, Nightly  sodium chloride, 10 mL, Intravenous, Q12H  sodium chloride, 10 mL, Intravenous, Q12H  tamsulosin, 0.8 mg, Oral, Nightly      Continuous Infusions:   PRN Meds:.  acetaminophen **OR** acetaminophen **OR** acetaminophen    senna-docusate sodium **AND** polyethylene glycol **AND** bisacodyl **AND** bisacodyl    calcium carbonate    Calcium Replacement - Follow Nurse / BPA Driven Protocol    dextrose    dextrose    glucagon (human recombinant)    Magnesium Standard Dose Replacement - Follow Nurse / BPA Driven Protocol    nitroglycerin    Phosphorus Replacement - Follow Nurse / BPA Driven Protocol    Potassium Replacement - Follow Nurse / BPA Driven Protocol    sodium chloride    sodium chloride    sodium chloride    sodium chloride    sodium chloride    Past Medical History:   Diagnosis Date    Acute renal failure     Hx of    Obesity     Hx of    Sleep apnea     Type 2 diabetes mellitus         Past Surgical History:   Procedure Laterality Date    BACK SURGERY      lower back    CHOLECYSTECTOMY WITH INTRAOPERATIVE CHOLANGIOGRAM N/A 1/6/2021    Procedure: CHOLECYSTECTOMY LAPAROSCOPIC INTRAOPERATIVE CHOLANGIOGRAM;  Surgeon: Juventino Hooker MD;  Location: [facility];  Service: General;  Laterality: N/A;    ERCP N/A 1/9/2021    Procedure: ENDOSCOPIC RETROGRADE CHOLANGIOPANCREATOGRAPHY;  Surgeon: Jeremy Dowell MD;  Location: [facility];  Service: Gastroenterology;  Laterality: N/A;  with sphiincterotomy and balloon sweep with 9mm-12mm balloon    INCISION  AND DRAINAGE FOOT Left 8/3/2024    Procedure: HEAL IRRIGATION AND DEBRIDEMENT LEFT;  Surgeon: Dru Gan Jr., MD;  Location:  [facility];  Service: Orthopedics;  Laterality: Left;    PLACEMENT OF WOUND VAC Left 8/3/2024    Procedure: PLACEMENT OF WOUND VAC;  Surgeon: Dru Gan Jr., MD;  Location:  [facility];  Service: Orthopedics;  Laterality: Left;        Social History     Occupational History    Not on file   Tobacco Use    Smoking status: Former     Types: Cigars    Smokeless tobacco: Never    Tobacco comments:     3 per month. Former cig smoker   Vaping Use    Vaping status: Never Used   Substance and Sexual Activity    Alcohol use: No     Comment: Past drank about a pint per month for 2-3 years    Drug use: No    Sexual activity: Defer      Social History     Social History Narrative    Not on file        Family History   Problem Relation Age of Onset    Diabetes Other     Hypertension Other     Cancer Other         Pancreatic cancer-first cousin    Heart attack Other         MI    Obesity Other     Hyperlipidemia Other         High blood cholesterol    Hyperlipidemia Mother     Hypertension Mother     Cancer Mother         lung cancer    Heart disease Father         MI at 73 yo    Cancer Brother         esophageal cancer with mets    Heart disease Brother         several MIs earlies in 30s    Cancer Maternal Grandmother          Review of Systems:   HEENT: Patient denies any headaches, vision changes, change in hearing, or tinnitus, Patient denies any rhinorrhea,epistaxis, sinus pain, mouth or dental problems, sore throat or hoarseness, or dysphagia  Pulmonary: Patient denies any cough, congestion, SOA, or wheezing  Cardiovascular: Patient denies any chest pain, dyspnea, palpitations, weakness, intolerance of exercise, varicosities, swelling of extremities, known murmur  Gastrointestinal:  Patient denies nausea, vomiting, diarrhea, constipation, loss  of appetite, change in appetite, dysphagia,  gas, heartburn, melena, change in bowel habits, use of laxatives or other drugs to alter the function of the gastrointestinal tract.  Genital/Urinary: Patient denies dysuria, change in color of urine, change in frequency of urination, pain with urgency, incontinence, retention, or nocturia.  Musculoskeletal: Patient denies increased warmth; redness; or swelling of joints; limitation of function; deformity; crepitation: pain in a joint or an extremity, the neck, or the back, especially with movement.  Neurological: Patient denies dizziness, tremor, ataxia, difficulty in speaking, change in speech, paresthesia, loss of sensation, seizures, syncope, changes in memory.  Endocrine system: Patient denies tremors, palpitations, intolerance of heat or cold, polyuria, polydipsia, polyphagia, diaphoresis, exophthalmos, or goiter.  Psychological: Patient denies thoughts/plans or harming self or other; depression,  insomnia, night terrors, zhanna, memory loss, disorientation.  Skin: Patient denies any bruising, rashes, discoloration, pruritus, wounds, ulcers, decubiti, changes in the hair or nails  Hematopoietic: Patient denies history of spontaneous or excessive bleeding, epistaxis, hematuria, melena, fatigue, enlarged or tender lymph nodes, pallor, history of anemia.    Physical Exam: 59 y.o. male    General Appearance:    Alert, cooperative, in no acute distress                   Vitals:    11/19/24 0606 11/19/24 0607 11/19/24 0700 11/19/24 0840   BP:    107/68   BP Location:       Patient Position:       Pulse: 91 90 92 96   Resp:       Temp:       TempSrc:       SpO2: (!) 67% 95% 95%    Weight:       Height:            Head:    Normocephalic, without obvious abnormality, atraumatic               Back:     No C-T-L spine tenderness   Lungs:   Symmetric chest rise and fall,respirations regular, even and    unlabored.  No accessory muscle use, speaks in complete sentences.   Chest Wall:    No abnormalities observed        Extremities: Left lower extremity: Posterior left heel wound with mild surrounding erythema, serosanguineous drainage.  No warmth, induration, julisa purulence.  Right lower extremity: Right plantar forefoot wound with overlying scab/necrotic skin.  No erythema, drainage, purulence, warmth.   Pulses: Dorsalis pedis/posterior tibial pulses weakly palpable   Skin:   No bleeding, bruising or rash   Lymph nodes:   No palpable adenopathy   Neurologic:  Cranial nerves 2 - 12 grossly intact, DTR present and equal bilaterally           Diagnostic Tests:    No results displayed because visit has over 200 results.          MRI Foot Left Without Contrast    Result Date: 11/18/2024  Narrative: MRI FOOT LEFT WO CONTRAST Date of Exam: 11/18/2024 5:50 PM EST Indication: Evaluation for left heel osteomyelitis.  Comparison: 8/1/2024 Technique:  Routine multiplanar/multisequence sequence images of the left foot were obtained without contrast administration. Findings: Bones: There is marrow edema seen within the posterior calcaneus. There appears to be loss of T1 cortical and marrow signal seen along the posterior lateral aspect of the talus calcaneus. Additionally there appears to be nondisplaced stress fracture along the posterior aspect of the calcaneus best seen on sagittal T1 images. No additional areas of abnormal marrow edema. No additional fracture seen. No substantial joint effusions. Soft tissues: There is soft tissue wound seen along the posterior heel with ill-defined fluid extending into and disrupting the distal lateral Achilles tendon (long axial image 26). There is a tiny associated fluid collection seen here measuring approximately 0.7 x 0.5 x 0.7 cm. Fluid seen at the master knot of Brown. Flexor extensor tendons appear grossly intact. Peroneal tendons appear grossly intact. Diffuse atrophic and edematous changes of the intrinsic foot muscles suggestive of neuropathic changes. Lisfranc ligament appears intact.  Diffuse subcutaneous edema.     Impression: Impression: Soft tissue wound seen along the posterior heel with ill-defined fluid that extends through and disrupts the distal Achilles tendon. Underlying this area there is evidence of osteomyelitis of the posterior lateral calcaneus. There is a tiny fluid collection here as well that may represent an associated nondrainable abscess. Additionally there appears to be a nondisplaced stress fracture of the posterior calcaneus. Electronically Signed: Nolan Brady MD  11/18/2024 11:28 PM EST  Workstation ID: [id number]    FL Video Swallow With Speech Single Contrast    Result Date: 11/16/2024  Narrative: FL VIDEO SWALLOW W SPEECH SINGLE-CONTRAST Date of Exam: 11/16/2024 11:18 AM EST Indication: dysphagia.   Comparison: None available. Technique:   The speech pathologist administered food and/or liquid mixed with barium to the patient with cine/video imaging.  Imaging assistance was provided to the speech pathologist and an image was saved. Fluoroscopic Time: 2 minutes 12 seconds Number of Images: 8 cine loops Findings: The patient was evaluated in the seated lateral position while taking a variety of consistencies by mouth under the direction of speech pathology. No aspiration was observed. Please see the speech pathologist's procedure report for full details and recommendations.     Impression: Impression: Fluoroscopy provided during modified barium swallow. Electronically Signed: Endy Shaw MD  11/16/2024 1:27 PM EST  Workstation ID: [id number]    XR Abdomen KUB    Result Date: 11/15/2024  Narrative: XR ABDOMEN KUB Date of Exam: 11/15/2024 2:47 PM EST Indication: MRI clearance Comparison: None available. Findings: There is a nonobstructing bowel gas pattern. Postsurgical changes are noted from fusion at the lumbosacral junction. No metal devices to suggest contraindication for MRI     Impression: Impression: Unremarkable radiograph abdomen Electronically Signed: Kirby  MD Andrzej  11/15/2024 3:14 PM EST  Workstation ID: [id number]    CT Head Without Contrast    Result Date: 11/15/2024  Narrative: CT HEAD WO CONTRAST Date of Exam: 11/15/2024 2:21 PM EST Indication: Altered mental status. Comparison: MRI of the brain without contrast from 1/6/2021 Technique: Axial CT images were obtained of the head without contrast administration.  Automated exposure control and iterative construction methods were used. Findings: There is generalized parenchymal volume loss with sulcal widening and ventricular prominence, not significantly changed since the 2021 MR. There is no CT evidence of acute hemorrhage, infarct, mass, mass effect, or midline shift. The gray-white matter differentiation is preserved. . The sulci and ventricles are symmetric.  No extraaxial fluid collections. The globes and orbital contents are  normal and symmetric. The mastoid air cells and visualized paranasal sinuses are clear. No skull fractures or suspicious calvarial lesions.     Impression: Impression: No acute intracranial abnormality. There is generalized atrophy and ventricular prominence similar to the 2021 study. Electronically Signed: Tony Putnam DO  11/15/2024 2:37 PM EST  Workstation ID: [id number]    XR Chest 1 View    Result Date: 11/15/2024  Narrative: XR CHEST 1 VW Date of Exam: 11/15/2024 9:31 AM EST Indication: Weak/Dizzy/AMS triage protocol Comparison: Chest radiograph 1/5/2021. Findings: Cardiomediastinal silhouette is unchanged. No focal consolidation or overt pulmonary edema. No pleural effusion or pneumothorax. Osseous structures are unchanged.     Impression: Impression: No evidence of acute cardiopulmonary disease. Electronically Signed: Jerad Mata MD  11/15/2024 9:48 AM EST  Workstation ID: [id number]       Assessment:      DKA (diabetic ketoacidosis)    Non healing left heel wound    Severe protein-calorie malnutrition           Plan:      59-year-old male with poorly controlled diabetes  mellitus, status post previous left heel irrigation, debridement, wound vacuum-assisted closure August 2024 with worsening posterior left heel ulcer, osteomyelitis.    Vascular studies/consultation pending.    MRI of the left foot demonstrates concern for calcaneal osteomyelitis which was not previously visualized on MRI performed August 2024.    I discussed further management options the patient today at bedside including surgical as well as nonsurgical and the associated risks/benefits alternatives to each.  Given his nonhealing left posterior heel ulcer, concern for calcaneal osteomyelitis, we discussed surgical intervention in the form of left below-knee amputation.  The patient is tentatively agreeable to proceed with left below-knee amputation.  Plan for surgical intervention later this week pending vascular studies/consultation, medical optimization.  No imminent orthopedic intervention planned for today.    Will follow.    Date: 11/19/2024    KAY Bautista      Electronically signed by Claudia Lim PA at 11/19/24 0913       Varun Dominique MD at 11/19/24 0737        Consult Orders    1. Inpatient Infectious Diseases Consult [[id number]] ordered by Geovanna Ku MD at 11/19/24 0706                 Fracisco Mullen  1965  [id number]    Date of Consult: 11/19/2024    Admit Date:  11/15/2024      Requesting Provider: Dr Ku  Evaluating Physician: Varun Dominique MD    Chief Complaint: fatigue    Reason for Consultation: foot infection    History of present illness:     Patient is a 59 y.o.  Yr old male with history of diabetes/hypertension, previously followed with Dr. Gatica; admitted 2023 with right foot infection, cultures with MSSA/Morganella/ESBL Klebsiella/enterococcus and strep species.had surgery at the second toe with amputation, received IV daptomycin/Invanz with general improvements at that site.  He has been left with a chronic right plantar foot wound and a chronic wound at  "the left heel that has turned black; He apparently was admitted to the hospital August 1 after a fall at [facility].  Noted to have urinary retention with concern for possible UTI.  In addition left heel with black discoloration/malodor and redness, empiric antibiotics initiated.  He is chronically debilitated and wheelchair bound by his report.Bustos catheter-based at admission     8/3/24  Dr Gan  \"PROCEDURE:            Left  Left      [id number]:             Debridement of skin and subcutaneous tissue  [id number]:             Wound vacuum-assisted closure\"     8/6/24 MRI and surgical findings did not confirm bone involvement, transitioned to oral agents at discharge       Readmitted November 15, 2024 with reports of appearing \"sluggish\" according to admission notes with DKA, noted to have high hemoglobin A1c and persistent/worsening left heel wound according to patient; medicine team notes mention urinary retention, acute toxic metabolic encephalopathy improved and low-grade fever. Abnormal MRI at the left foot:    Per radiology-- \"Soft tissue wound seen along the posterior heel with ill-defined fluid that extends through and disrupts the distal Achilles tendon. Underlying this area there is evidence of osteomyelitis of the posterior lateral calcaneus. There is a tiny fluid   collection here as well that may represent an associated nondrainable abscess.     Additionally there appears to be a nondisplaced stress fracture of the posterior calcaneus. \"      11/19/24 Left heel pain  dull , worse with palpation, better with pain meds and not severe, 2-3/10.    He denies any new trauma or exposure.  No one partial penetrating trauma.  No animal insect arthropod bite.  No prior history VRE C. Difficile or KPC/CRE organisms        No headache photophobia or neck stiffness.  No shortness of breath cough or hemoptysis.  No nausea vomiting diarrhea or abdominal pain.  No other new skin rash.  no dysuria hematuria or pyuria.   "     Past Medical History:   Diagnosis Date    Acute renal failure     Hx of    Obesity     Hx of    Sleep apnea     Type 2 diabetes mellitus        Past Surgical History:   Procedure Laterality Date    BACK SURGERY      lower back    CHOLECYSTECTOMY WITH INTRAOPERATIVE CHOLANGIOGRAM N/A 1/6/2021    Procedure: CHOLECYSTECTOMY LAPAROSCOPIC INTRAOPERATIVE CHOLANGIOGRAM;  Surgeon: Juventino Hooker MD;  Location: [facility] OR;  Service: General;  Laterality: N/A;    ERCP N/A 1/9/2021    Procedure: ENDOSCOPIC RETROGRADE CHOLANGIOPANCREATOGRAPHY;  Surgeon: Jeremy Dowell MD;  Location: [facility] ENDOSCOPY;  Service: Gastroenterology;  Laterality: N/A;  with sphiincterotomy and balloon sweep with 9mm-12mm balloon    INCISION AND DRAINAGE FOOT Left 8/3/2024    Procedure: HEAL IRRIGATION AND DEBRIDEMENT LEFT;  Surgeon: Dru Gan Jr., MD;  Location:  [facility] OR;  Service: Orthopedics;  Laterality: Left;    PLACEMENT OF WOUND VAC Left 8/3/2024    Procedure: PLACEMENT OF WOUND VAC;  Surgeon: Dru Gan Jr., MD;  Location:  [facility] OR;  Service: Orthopedics;  Laterality: Left;       Pediatric History   Patient Parents    Not on file     Other Topics Concern    Not on file   Social History Narrative    Not on file       family history includes Cancer in his brother, maternal grandmother, mother, and another family member; Diabetes in an other family member; Heart attack in an other family member; Heart disease in his brother and father; Hyperlipidemia in his mother and another family member; Hypertension in his mother and another family member; Obesity in an other family member.    Allergies   Allergen Reactions    Sertraline Hcl Hives     Zoloft       Medication:  Current Facility-Administered Medications   Medication Dose Route Frequency Provider Last Rate Last Admin    acetaminophen (TYLENOL) tablet 650 mg  650 mg Oral Q4H PRN Edmond Espinoza DO   650 mg at 11/18/24 1133    Or    acetaminophen (TYLENOL) 160 MG/5ML oral  solution 650 mg  650 mg Oral Q4H PRN Edmond Espinoza DO        Or    acetaminophen (TYLENOL) suppository 650 mg  650 mg Rectal Q4H PRN Edmond Espinoza DO   650 mg at 11/15/24 2327    amitriptyline (ELAVIL) tablet 25 mg  25 mg Oral BID Lisa Vallejo MD   25 mg at 11/18/24 2018    aspirin EC tablet 81 mg  81 mg Oral Daily Lisa Vallejo MD   81 mg at 11/18/24 0818    sennosides-docusate (PERICOLACE) 8.6-50 MG per tablet 2 tablet  2 tablet Oral BID PRN Edmond Espinoza DO        And    polyethylene glycol (MIRALAX) packet 17 g  17 g Oral Daily PRN Edmond Espinoza DO        And    bisacodyl (DULCOLAX) EC tablet 5 mg  5 mg Oral Daily PRN Edmond Espinoza DO        And    bisacodyl (DULCOLAX) suppository 10 mg  10 mg Rectal Daily PRN Edmond Espinoza DO        calcium carbonate (TUMS) chewable tablet 500 mg (200 mg elemental)  2 tablet Oral TID PRN Lisa Vallejo MD   2 tablet at 11/19/24 0704    Calcium Replacement - Follow Nurse / BPA Driven Protocol   Not Applicable PRN Edmond Espinoza DO        DAPTOmycin (CUBICIN) 450 mg in sodium chloride 0.9 % 50 mL IVPB  6 mg/kg Intravenous Q24H Varun Dominique MD        dextrose (D50W) (25 g/50 mL) IV injection 25 g  25 g Intravenous Q15 Min PRN Perla Pascual MD        dextrose (GLUTOSE) oral gel 15 g  15 g Oral Q15 Min PRN Perla Pascual MD        Enoxaparin Sodium (LOVENOX) syringe 40 mg  40 mg Subcutaneous Daily Edmond Espinoza DO   40 mg at 11/18/24 0818    ertapenem (INVanz) 1,000 mg in sodium chloride 0.9 % 100 mL MBP  1,000 mg Intravenous Q24H Varun Dominique MD        finasteride (PROSCAR) tablet 5 mg  5 mg Oral Nightly Lisa Vallejo MD   5 mg at 11/18/24 2017    glucagon (GLUCAGEN) injection 1 mg  1 mg Intramuscular Q15 Min PRN Perla Pascual MD        insulin glargine (LANTUS, SEMGLEE) injection 10 Units  10 Units Subcutaneous Nightly Lisa Vallejo MD   10 Units at 11/18/24 2141    insulin glargine (LANTUS, SEMGLEE) injection 15 Units  15 Units Subcutaneous Daily Lisa Vallejo,  MD   15 Units at 11/18/24 0819    Insulin Lispro (humaLOG) injection 2-9 Units  2-9 Units Subcutaneous 4x Daily AC & at Bedtime Lisa Vallejo MD   2 Units at 11/18/24 1133    Insulin Lispro (humaLOG) injection 7 Units  7 Units Subcutaneous TID With Meals Lisa Vallejo MD   7 Units at 11/18/24 1133    Magnesium Standard Dose Replacement - Follow Nurse / BPA Driven Protocol   Not Applicable PREdmond Carranza DO        metoprolol succinate XL (TOPROL-XL) 24 hr tablet 50 mg  50 mg Oral Daily Lisa Vallejo MD   50 mg at 11/18/24 0818    nitroglycerin (NITROSTAT) SL tablet 0.4 mg  0.4 mg Sublingual Q5 Min PRN Edmond Espinoza DO        Phosphorus Replacement - Follow Nurse / BPA Driven Protocol   Not Applicable PREdmond Carranza DO        Potassium Replacement - Follow Nurse / BPA Driven Protocol   Not Applicable PREdmond Carranza DO        pregabalin (LYRICA) capsule 225 mg  225 mg Oral BID Varun Snider, [facility]   225 mg at 11/18/24 2017    rOPINIRole (REQUIP) tablet 0.25 mg  0.25 mg Oral Nightly Lisa Vallejo MD   0.25 mg at 11/18/24 2018    sodium chloride 0.9 % flush 10 mL  10 mL Intravenous PRN Edmond Espinoza,         sodium chloride 0.9 % flush 10 mL  10 mL Intravenous Q12H Edmond Espinoza DO   10 mL at 11/18/24 2039    sodium chloride 0.9 % flush 10 mL  10 mL Intravenous PRN Edmond Espinoza,         sodium chloride 0.9 % flush 10 mL  10 mL Intravenous Q12H Edmond Espinoza, DO   10 mL at 11/18/24 0819    sodium chloride 0.9 % flush 10 mL  10 mL Intravenous PRN Edmond Espinoza DO        sodium chloride 0.9 % infusion 40 mL  40 mL Intravenous PRN Edmond Espinoza DO        sodium chloride 0.9 % infusion 40 mL  40 mL Intravenous PRN Edmond Espinoza DO        tamsulosin (FLOMAX) 24 hr capsule 0.8 mg  0.8 mg Oral Nightly Lisa Vallejo MD   0.8 mg at 11/18/24 2017       Antibiotics:  Anti-Infectives (From admission, onward)      Ordered     Dose/Rate Route Frequency Start Stop    11/19/24 0737  DAPTOmycin (CUBICIN) 450 mg in sodium chloride 0.9 %  "50 mL IVPB        Ordering Provider: Varun Dominique MD    6 mg/kg × 72.3 kg  100 mL/hr over 30 Minutes Intravenous Every 24 Hours 11/19/24 0830 12/03/24 0829    11/19/24 0737  ertapenem (INVanz) 1,000 mg in sodium chloride 0.9 % 100 mL MBP        Ordering Provider: Varun Dominique MD    1,000 mg  200 mL/hr over 30 Minutes Intravenous Every 24 Hours 11/19/24 0830 12/03/24 0829              Review of Systems    Constitutional-- No Fever, chills or sweats.  Appetite diminished with fatigue.  Heent-- No new vision, hearing or throat complaints.  No epistaxis or oral sores.  Denies odynophagia or dysphagia.  No flashers, floaters or eye pain. No odynophagia or dysphagia. No headache, photophobia or neck stiffness.  CV-- No chest pain, palpitation or syncope  Resp-- No SOB/cough/Hemoptysis  GI- No nausea, vomiting, or diarrhea.  No hematochezia, melena, or hematemesis. Denies jaundice or chronic liver disease.  -- No dysuria, hematuria, or flank pain.  Denies hesitancy, urgency or flank pain.  Lymph- no swollen lymph nodes in neck/axilla or groin.   Heme- No active bruising or bleeding; no Hx of DVT or PE.  MS-- no swelling or pain in the bones or joints of arms/legs.  No new back pain.  Neuro-- No acute focal weakness or numbness in the arms or legs.  No seizures.    Full 12 point review of systems reviewed and negative otherwise for acute complaints, except for above    Physical Exam:   Vital Signs   /67 (BP Location: Left arm, Patient Position: Lying)   Pulse 90   Temp 97.8 °F (36.6 °C) (Oral)   Resp 16   Ht 162.6 cm (64\")   Wt 72.3 kg (159 lb 6.3 oz)   SpO2 95%   BMI 27.36 kg/m²     GENERAL: Awake and alert, in no acute distress.   HEENT: Normocephalic, atraumatic.  PERRL. EOMI. No conjunctival injection. No icterus. Oropharynx clear without evidence of thrush or exudate. No evidence of periodontal disease.    NECK: Supple without nuchal rigidity. No mass.  LYMPH: No cervical, axillary or " inguinal lymphadenopathy.  HEART: RRR; No murmur, rubs, gallops.   LUNGS: diminished at bases but otherwise Clear to auscultation bilaterally without wheezing, rales, rhonchi. Normal respiratory effort. Nonlabored. No dullness.  ABDOMEN: Soft, nontender, nondistended. Positive bowel sounds. No rebound or guarding. NO mass or HSM.  EXT: see below  : Genitalia generally unremarkable.  Without Bustos catheter.  MSK: FROM without joint effusions noted arms/legs.    SKIN: Warm and dry without cutaneous eruptions on Inspection/palpation.    NEURO: Oriented to PPT. He has a difficult time cooperating with a detailed motor/sensory exam given positioning in bed.    Right foot second toe amputation site noted, no redness/duration or warmth there.  No oral or active drainage    Left heel with large wound, deeper wound at the proximal aspect of this tracking towards the Achilles although I am unable to visualize  tendon or palpate bone at present.  Bloody drainage.  Vague surrounding erythema with mild tenderness but no discrete mass bulge or fluctuance.  No crepitus or bulla.  Multifocal crusted areas otherwise at his lower extremities      Laboratory Data    Results from last 7 days   Lab Units 11/18/24  0608 11/17/24  0629 11/16/24  0343   WBC 10*3/mm3 6.74 9.52 11.93*   HEMOGLOBIN g/dL 11.2* 10.9* 11.2*   HEMATOCRIT % 35.4* 33.7* 34.1*   PLATELETS 10*3/mm3 242 266 283     Results from last 7 days   Lab Units 11/18/24  0608   SODIUM mmol/L 135*   POTASSIUM mmol/L 4.0   CHLORIDE mmol/L 99   CO2 mmol/L 25.0   BUN mg/dL 22   CREATININE mg/dL 0.88   GLUCOSE mg/dL 270*   CALCIUM mg/dL 8.2*     Results from last 7 days   Lab Units 11/15/24  1107   ALK PHOS U/L 124*   BILIRUBIN mg/dL 0.4   ALT (SGPT) U/L 14   AST (SGOT) U/L 13               Estimated Creatinine Clearance: 80.3 mL/min (by C-G formula based on SCr of 0.88 mg/dL).      Microbiology:      Radiology:  Imaging Results (Last 72 Hours)       Procedure Component Value  Units Date/Time    MRI Foot Left Without Contrast [[id number]] Collected: 11/18/24 2320     Updated: 11/18/24 2331    Narrative:        MRI FOOT LEFT WO CONTRAST    Date of Exam: 11/18/2024 5:50 PM EST    Indication: Evaluation for left heel osteomyelitis.     Comparison: 8/1/2024    Technique:  Routine multiplanar/multisequence sequence images of the left foot were obtained without contrast administration.      Findings:  Bones: There is marrow edema seen within the posterior calcaneus. There appears to be loss of T1 cortical and marrow signal seen along the posterior lateral aspect of the talus calcaneus. Additionally there appears to be nondisplaced stress fracture   along the posterior aspect of the calcaneus best seen on sagittal T1 images. No additional areas of abnormal marrow edema. No additional fracture seen. No substantial joint effusions.    Soft tissues: There is soft tissue wound seen along the posterior heel with ill-defined fluid extending into and disrupting the distal lateral Achilles tendon (long axial image 26). There is a tiny associated fluid collection seen here measuring   approximately 0.7 x 0.5 x 0.7 cm. Fluid seen at the master knot of Brown. Flexor extensor tendons appear grossly intact. Peroneal tendons appear grossly intact. Diffuse atrophic and edematous changes of the intrinsic foot muscles suggestive of   neuropathic changes. Lisfranc ligament appears intact. Diffuse subcutaneous edema.      Impression:      Impression:  Soft tissue wound seen along the posterior heel with ill-defined fluid that extends through and disrupts the distal Achilles tendon. Underlying this area there is evidence of osteomyelitis of the posterior lateral calcaneus. There is a tiny fluid   collection here as well that may represent an associated nondrainable abscess.    Additionally there appears to be a nondisplaced stress fracture of the posterior calcaneus.        Electronically Signed: Nolan Brady  MD    11/18/2024 11:28 PM EST    Workstation ID: [id number]    FL Video Swallow With Speech Single Contrast [[id number]] Collected: 11/16/24 1325     Updated: 11/16/24 1330    Narrative:      FL VIDEO SWALLOW W SPEECH SINGLE-CONTRAST    Date of Exam: 11/16/2024 11:18 AM EST    Indication: dysphagia.       Comparison: None available.    Technique:   The speech pathologist administered food and/or liquid mixed with barium to the patient with cine/video imaging.  Imaging assistance was provided to the speech pathologist and an image was saved.    Fluoroscopic Time: 2 minutes 12 seconds    Number of Images: 8 cine loops    Findings:  The patient was evaluated in the seated lateral position while taking a variety of consistencies by mouth under the direction of speech pathology. No aspiration was observed. Please see the speech pathologist's procedure report for full details and   recommendations.      Impression:      Impression:  Fluoroscopy provided during modified barium swallow.      Electronically Signed: Endy Shaw MD    11/16/2024 1:27 PM EST    Workstation ID: [id number]              Impression:       --acute left heel wound infection ,  abnormal MRI as above raising concern for fluid extending into and disrupting the distal lateral Achilles tendon in addition to with evidence for osteomyelitis at the posterior lateral calcaneus in addition to possible nondisplaced stress fracture at the posterior calcaneus; High risk for persistent/recurrent or nonhealing wounds, persistent/progressive or recurrent infection and risk for further functional/limb loss, higher level amputation etc. Surgery 8/3  need further clarification from vascular team and Dr. Gan regarding salvageability here.  High risk for amputation.  Empiric antibiotics     --urinary retention per admission notes, some hematuria on November 15; further urologic evaluation regarding functional/anatomic assessment at discretion of medicine team with respect  inpatient versus outpatient     --Diabetes, uncontrolled ; glucose control per medicine team     --diabetic neuropathy     --Chronic debility as per patient wheelchair bound     --Hx ESBL    PLAN: Thank you for asking us to see Fracisco ADAM Lady, I recommend the following:    --IV daptomycin/Invanz    --Check/review labs cultures and scans    --Partial history Per nursing staff    --Discussed with microbiology    --Discussed with Dr. Ku    --ortho and vascular team to see    --Highly complex at of issues with high risk for further serious morbidity and other serious sequela       Varun Dominique MD  11/19/2024                Electronically signed by Varun Dominique MD at 11/19/24 0820

## 2024-11-21 NOTE — PLAN OF CARE
Goal Outcome Evaluation:   Bustos placed over night. Very difficult insertion. 14F coude placed by house supervisor. This RN has concerns for lyrica dosing, held overnight for LOC. Mentation improved.     1 amp of dextrose given at beginning of shift for sugar of 73, decreased appetite. Overnight insulin held.    Patient requested ice cream at 0600.GCS of 15.

## 2024-11-21 NOTE — PROGRESS NOTES
Fracisco Mullen       LOS: 6 days   Patient Care Team:  Brandy Roberson as PCP - General (Family Medicine)  Varun Fontenot MD as Consulting Physician (Cardiology)  Yulissa Taveras APRN as Nurse Practitioner (Cardiology)    Chief Complaint:  left calcaneal osteomyelitis    Subjective     Interval History:     Resting comfortably in bed. Pain controlled. No acute events overnight    History taken from: patient    Review of Systems:   The following systems were reviewed and negative     Gen - No fevers, chills  CV - No chest pain, palpitations  Resp - No cough, dyspnea  GI - No N/V/D, abdominal pain    Objective     Vital Signs  Vital Signs (last 24 hours)         11/19 0700  11/20 0659 11/20 0700  11/20 0858   Most Recent      Temp (°F) 97.7 -  98.2       98 (36.7) 11/19 2042    Heart Rate 90 -  103       103 11/20 0530    Resp 16 -  18       16 11/19 2042    BP 73/59 -  115/74       115/74 11/19 1425    SpO2 (%) 92 -  96       92 11/20 0530    Flow (L/min) (Oxygen Therapy)   2       2 11/20 0609              Physical Exam:  No acute distress  Nonlabored respirations  Regular rate and rhythm  Abdomen nondistended  Left lower extremity:  Posterior left heel wound with mild surrounding erythema, serosanguineous drainage. No warmth, induration, julisa purulence.      Results Review:     I reviewed the patient's new clinical results.    Medication Review:   Hospital Medications (active)         Dose Frequency Start End    acetaminophen (TYLENOL) 160 MG/5ML oral solution 650 mg 650 mg Every 4 Hours PRN 11/15/2024 --    Admin Instructions: If given for fever, use fever parameter: fever greater than 100.4 °F  Based on patient request - if ordered for moderate or severe pain, provider allows for administration of a medication prescribed for a lower pain scale.    Do not exceed 4 grams of acetaminophen in a 24 hr period. Max dose of 2gm for AST/ALT greater than 120 units/L.    If given for pain, use the following  "pain scale:   Mild Pain = Pain Score of 1-3, CPOT 1-2  Moderate Pain = Pain Score of 4-6, CPOT 3-4  Severe Pain = Pain Score of 7-10, CPOT 5-8    Route: Oral    Linked Group 1: Placed in \"Or\" Linked Group        acetaminophen (TYLENOL) suppository 650 mg 650 mg Every 4 Hours PRN 11/15/2024 --    Admin Instructions: If given for fever, use fever parameter: fever greater than 100.4 °F  Based on patient request - if ordered for moderate or severe pain, provider allows for administration of a medication prescribed for a lower pain scale.    Do not exceed 4 grams of acetaminophen in a 24 hr period. Max dose of 2gm for AST/ALT greater than 120 units/L.    If given for pain, use the following pain scale:   Mild Pain = Pain Score of 1-3, CPOT 1-2  Moderate Pain = Pain Score of 4-6, CPOT 3-4  Severe Pain = Pain Score of 7-10, CPOT 5-8    Route: Rectal    Linked Group 1: Placed in \"Or\" Linked Group        acetaminophen (TYLENOL) tablet 650 mg 650 mg Every 4 Hours PRN 11/15/2024 --    Admin Instructions: If given for fever, use fever parameter: fever greater than 100.4 °F  Based on patient request - if ordered for moderate or severe pain, provider allows for administration of a medication prescribed for a lower pain scale.    Do not exceed 4 grams of acetaminophen in a 24 hr period. Max dose of 2gm for AST/ALT greater than 120 units/L.    If given for pain, use the following pain scale:   Mild Pain = Pain Score of 1-3, CPOT 1-2  Moderate Pain = Pain Score of 4-6, CPOT 3-4  Severe Pain = Pain Score of 7-10, CPOT 5-8    Route: Oral    Linked Group 1: Placed in \"Or\" Linked Group        amitriptyline (ELAVIL) tablet 25 mg 25 mg 2 Times Daily 11/15/2024 --    Route: Oral    aspirin EC tablet 81 mg 81 mg Daily 11/15/2024 --    Admin Instructions: Do not crush or chew the capsules or tablets. The drug may not work as designed if the capsule or tablet is crushed or chewed. Swallow whole.  Do not exceed 4 grams of aspirin in a 24 hr " "period.    If given for pain, use the following pain scale:   Mild Pain = Pain Score of 1-3, CPOT 1-2  Moderate Pain = Pain Score of 4-6, CPOT 3-4  Severe Pain = Pain Score of 7-10, CPOT 5-8    Route: Oral    bisacodyl (DULCOLAX) EC tablet 5 mg 5 mg Daily PRN 11/15/2024 --    Admin Instructions: Use if no bowel movement after 12 hours.  Swallow whole. Do not crush, split, or chew tablet.    Route: Oral    Linked Group 2: Placed in \"And\" Linked Group        bisacodyl (DULCOLAX) suppository 10 mg 10 mg Daily PRN 11/15/2024 --    Admin Instructions: Use if no bowel movement after 12 hours.  Hold for diarrhea    Route: Rectal    Linked Group 2: Placed in \"And\" Linked Group        calcium carbonate (TUMS) chewable tablet 500 mg (200 mg elemental) 2 tablet 3 Times Daily PRN 11/18/2024 --    Admin Instructions: One tablet contains 200 mg elemental calcium.  Take with food.    Route: Oral    Calcium Replacement - Follow Nurse / BPA Driven Protocol  As Needed 11/15/2024 --    Admin Instructions: Open Order & Select \"[facility] Electrolyte Replacement Protocol Algorithm\" to View Details    Route: Not Applicable    DAPTOmycin (CUBICIN) 450 mg in sodium chloride 0.9 % 50 mL IVPB 6 mg/kg × 72.3 kg Every 24 Hours 11/19/2024 12/3/2024    Admin Instructions: Caution: Look alike/sound alike drug alert.  Refrigerate. Do not shake.    Route: Intravenous    dextrose (D50W) (25 g/50 mL) IV injection 25 g 25 g Every 15 Minutes PRN 11/16/2024 --    Admin Instructions: Blood sugar less than 70; patient has IV access - Unresponsive, NPO or Unable To Safely Swallow    Route: Intravenous    dextrose (GLUTOSE) oral gel 15 g 15 g Every 15 Minutes PRN 11/16/2024 --    Admin Instructions: BS<70, Patient Alert, Is not NPO, Can safely swallow.    Route: Oral    Enoxaparin Sodium (LOVENOX) syringe 40 mg 40 mg Daily 11/15/2024 --    Admin Instructions: Give subcutaneous in abdomen only. Do not massage site after injection.    Route: Subcutaneous    " ertapenem (INVanz) 1,000 mg in sodium chloride 0.9 % 100 mL MBP 1,000 mg Every 24 Hours 11/19/2024 12/3/2024    Admin Instructions: Caution: Look alike/sound alike drug alert.    Route: Intravenous    finasteride (PROSCAR) tablet 5 mg 5 mg Nightly 11/15/2024 --    Admin Instructions: Group 2 (Pink) Hazardous Drug - Reproductive Risk Only - See Handling Guide    Route: Oral    glucagon (GLUCAGEN) injection 1 mg 1 mg Every 15 Minutes PRN 11/16/2024 --    Admin Instructions: Blood Glucose Less Than 70 - Patient Without IV Access - Unresponsive, NPO or Unable To Safely Swallow  Reconstitute powder for injection by adding 1 mL of -supplied sterile diluent or sterile water for injection to a vial containing 1 mg of the drug, to provide solutions containing 1 mg/mL. Shake vial gently to dissolve.    Route: Intramuscular    insulin glargine (LANTUS, SEMGLEE) injection 10 Units 10 Units Nightly 11/17/2024 --    Admin Instructions: Do not hold basal insulin without an order. Consider requesting a dose edit, if needed.      Route: Subcutaneous    insulin glargine (LANTUS, SEMGLEE) injection 15 Units 15 Units Daily 11/18/2024 --    Admin Instructions: Do not hold basal insulin without an order. Consider requesting a dose edit, if needed.      Route: Subcutaneous    Insulin Lispro (humaLOG) injection 2-9 Units 2-9 Units 4 Times Daily Before Meals & Nightly 11/17/2024 --    Admin Instructions: Correction Insulin - Moderate Dose (Total Insulin Dose 40-60 units/day, Average Weight Patient, Patient Taking Oral Hypoglycemic)    Blood Glucose 150-199 mg/dL - 2 units  Blood Glucose 200-249 mg/dL - 4 units  Blood Glucose 250-299 mg/dL - 6 units  Blood Glucose 300-349 mg/dL - 7 units  Blood Glucose 350-400 mg/dL - 8 units  Blood Glucose greater than 400 mg/dL - 9 units & Call Provider   Caution: Look alike/sound alike drug alert    Route: Subcutaneous    Insulin Lispro (humaLOG) injection 7 Units 7 Units 3 Times Daily With  "Meals 11/17/2024 --    Admin Instructions:  Solution should be clear. If cloudy, contact pharmacy for a new vial. Scheduled mealtime doses of this medication should be held if patient NPO.   Caution: Look alike/sound alike drug alert    Route: Subcutaneous    Magnesium Standard Dose Replacement - Follow Nurse / BPA Driven Protocol  As Needed 11/15/2024 --    Admin Instructions: Open Order & Select \"BHS Electrolyte Replacement Protocol Algorithm\" to View Details    Route: Not Applicable    metoprolol succinate XL (TOPROL-XL) 24 hr tablet 50 mg 50 mg Daily 11/15/2024 --    Admin Instructions: Hold for SBP less than 100, DBP less than 60, or heart rate less than 50    Do not crush or chew the capsules or tablets. The drug may not work as designed if the capsule or tablet is crushed or chewed. Swallow whole.  Do not crush or chew.    Route: Oral    nitroglycerin (NITROSTAT) SL tablet 0.4 mg 0.4 mg Every 5 Minutes PRN 11/15/2024 --    Admin Instructions: If Pain Unrelieved After 3 Doses Notify MD  May administer up to 3 doses per episode.    Route: Sublingual    ondansetron (ZOFRAN) injection 4 mg 4 mg Every 6 Hours PRN 11/20/2024 --    Admin Instructions: \"If multiple N/V medications ordered, use in the following order: Ondansetron, Prochlorperazine, Promethazine. Use PO unless patient refuses or patient unable to swallow.\"    Route: Intravenous    Phosphorus Replacement - Follow Nurse / BPA Driven Protocol  As Needed 11/15/2024 --    Admin Instructions: Open Order & Select \"BHS Electrolyte Replacement Protocol Algorithm\" to View Details    Route: Not Applicable    polyethylene glycol (MIRALAX) packet 17 g 17 g Daily PRN 11/15/2024 --    Admin Instructions: Use if no bowel movement after 12 hours. Mix in 6-8 ounces of water.  Use 4-8 ounces of water, tea, or juice for each 17 gram dose.    Route: Oral    Linked Group 2: Placed in \"And\" Linked Group        Potassium Replacement - Follow Nurse / BPA Driven Protocol  As " "Needed 11/15/2024 --    Admin Instructions: Open Order & Select \"BHS Electrolyte Replacement Protocol Algorithm\" to View Details    Route: Not Applicable    pregabalin (LYRICA) capsule 225 mg 225 mg 2 Times Daily 11/16/2024 --    Admin Instructions:     Route: Oral    rOPINIRole (REQUIP) tablet 0.25 mg 0.25 mg Nightly 11/15/2024 --    Admin Instructions: Caution: Look alike/sound alike drug alert    Route: Oral    sennosides-docusate (PERICOLACE) 8.6-50 MG per tablet 2 tablet 2 tablet 2 Times Daily PRN 11/15/2024 --    Admin Instructions: Start bowel management regimen if patient has not had a bowel movement after 12 hours.    Route: Oral    Linked Group 2: Placed in \"And\" Linked Group        sodium chloride 0.9 % flush 10 mL 10 mL As Needed 11/15/2024 --    Route: Intravenous    Cosign for Ordering: Accepted by Nic Hewitt MD on 11/15/2024  3:10 PM    sodium chloride 0.9 % flush 10 mL 10 mL Every 12 Hours Scheduled 11/15/2024 --    Route: Intravenous    sodium chloride 0.9 % flush 10 mL 10 mL As Needed 11/15/2024 --    Route: Intravenous    sodium chloride 0.9 % flush 10 mL 10 mL Every 12 Hours Scheduled 11/15/2024 --    Route: Intravenous    sodium chloride 0.9 % flush 10 mL 10 mL As Needed 11/15/2024 --    Route: Intravenous    sodium chloride 0.9 % infusion 40 mL 40 mL As Needed 11/15/2024 --    Admin Instructions: Following administration of an IV intermittent medication, flush line with 40mL NS at 100mL/hr.    Route: Intravenous    sodium chloride 0.9 % infusion 40 mL 40 mL As Needed 11/15/2024 --    Admin Instructions: Following administration of an IV intermittent medication, flush line with 40mL NS at 100mL/hr.    Route: Intravenous    tamsulosin (FLOMAX) 24 hr capsule 0.8 mg 0.8 mg Nightly 11/18/2024 --    Admin Instructions: Do not crush or chew the capsules or tablets. The drug may not work as designed if the capsule or tablet is crushed or chewed. Swallow whole. If patient unable to swallow whole, " contact pharmacy for alternative.    Route: Oral              Assessment & Plan   He is a 59-year-old male who has previously established care with Dr. Gan.  He underwent left heel irrigation and debridement with wound vacuum-assisted closure in August 2024.  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      DKA (diabetic ketoacidosis)    Non healing left heel wound    Severe protein-calorie malnutrition      We discussed the previously discussed surgical plan of left below-knee amputation.  Plan for surgery will be on Friday, 11/22/2024.  Patient verbalized understanding and agreement to the procedure.  Continue diet 11/21/2024, begin NPO at midnight.  Questions were answered.    I personally discussed the surgery at length with the patient.  The procedure was discussed using terms that the patient clearly understood, and I reviewed the consent form with the patient personally.  The consent form was then reviewed again, along with other written material about preparation for the surgery, with the medical assistant.  All of the alternatives that I know to treat the patient´s condition, including non-operative care and other surgical procedures, were discussed during the office visit.  We spent a great deal of time talking about the benefit to risk analysis of the treatment options and the proposed surgery.  The risks of the planned procedure include, but are not limited to, the possibility of wound healing problems, nerve damage, tendon damage, infection, incomplete relief of pain, chronic pain, the development of reflex sympathetic dystrophy, loss of limb, death.  The pros and cons of anticoagulation were discussed.  I also had a discussion with the patient regarding the risks of smoking and secondhand smoke, as well as the consequence of noncompliance with the postoperative regimen.  My recommendations about driving a motor vehicle or operating machinery were clearly outlined.      As with any surgery, this  patient was told about the possibility of additional surgery.  My qualifications and experience in recommending the procedure were discussed.  There was adequate time for questions and all the patient´s questions were answered.  The patient was told to call the office before surgery if they had any questions about the operation.  After this discussion, it was my professional opinion that the patient fully understood their medical condition and that proper informed consent had been obtained.          Chris Barone PA-C  11/21/24  09:18 EST

## 2024-11-22 ENCOUNTER — ANESTHESIA EVENT CONVERTED (OUTPATIENT)
Dept: ANESTHESIOLOGY | Facility: HOSPITAL | Age: 59
End: 2024-11-22
Payer: COMMERCIAL

## 2024-11-22 ENCOUNTER — ANESTHESIA EVENT (OUTPATIENT)
Dept: PERIOP | Facility: HOSPITAL | Age: 59
End: 2024-11-22
Payer: COMMERCIAL

## 2024-11-22 ENCOUNTER — ANESTHESIA (OUTPATIENT)
Dept: PERIOP | Facility: HOSPITAL | Age: 59
End: 2024-11-22
Payer: COMMERCIAL

## 2024-11-22 PROBLEM — M86.172 ACUTE OSTEOMYELITIS OF LEFT FOOT: Status: ACTIVE | Noted: 2024-11-15

## 2024-11-22 LAB
ABO GROUP BLD: NORMAL
ANION GAP SERPL CALCULATED.3IONS-SCNC: 7 MMOL/L (ref 5–15)
BASOPHILS # BLD AUTO: 0.09 10*3/MM3 (ref 0–0.2)
BASOPHILS NFR BLD AUTO: 0.9 % (ref 0–1.5)
BLD GP AB SCN SERPL QL: NEGATIVE
BUN SERPL-MCNC: 19 MG/DL (ref 8–23)
BUN/CREAT SERPL: 26 (ref 7–25)
CALCIUM SPEC-SCNC: 9.1 MG/DL (ref 8.6–10.5)
CHLORIDE SERPL-SCNC: 98 MMOL/L (ref 98–107)
CO2 SERPL-SCNC: 32 MMOL/L (ref 22–29)
CREAT SERPL-MCNC: 0.73 MG/DL (ref 0.76–1.27)
DEPRECATED RDW RBC AUTO: 45.1 FL (ref 37–54)
EGFRCR SERPLBLD CKD-EPI 2021: 103.5 ML/MIN/1.73
EOSINOPHIL # BLD AUTO: 0.26 10*3/MM3 (ref 0–0.4)
EOSINOPHIL NFR BLD AUTO: 2.5 % (ref 0.3–6.2)
ERYTHROCYTE [DISTWIDTH] IN BLOOD BY AUTOMATED COUNT: 15.3 % (ref 12.3–15.4)
GLUCOSE BLDC GLUCOMTR-MCNC: 113 MG/DL (ref 70–130)
GLUCOSE BLDC GLUCOMTR-MCNC: 133 MG/DL (ref 70–130)
GLUCOSE BLDC GLUCOMTR-MCNC: 157 MG/DL (ref 70–130)
GLUCOSE BLDC GLUCOMTR-MCNC: 157 MG/DL (ref 70–130)
GLUCOSE BLDC GLUCOMTR-MCNC: 171 MG/DL (ref 70–130)
GLUCOSE SERPL-MCNC: 160 MG/DL (ref 65–99)
HCT VFR BLD AUTO: 29.2 % (ref 37.5–51)
HGB BLD-MCNC: 9.4 G/DL (ref 13–17.7)
IMM GRANULOCYTES # BLD AUTO: 0.08 10*3/MM3 (ref 0–0.05)
IMM GRANULOCYTES NFR BLD AUTO: 0.8 % (ref 0–0.5)
LYMPHOCYTES # BLD AUTO: 2.37 10*3/MM3 (ref 0.7–3.1)
LYMPHOCYTES NFR BLD AUTO: 22.6 % (ref 19.6–45.3)
MCH RBC QN AUTO: 26 PG (ref 26.6–33)
MCHC RBC AUTO-ENTMCNC: 32.2 G/DL (ref 31.5–35.7)
MCV RBC AUTO: 80.9 FL (ref 79–97)
MONOCYTES # BLD AUTO: 1.1 10*3/MM3 (ref 0.1–0.9)
MONOCYTES NFR BLD AUTO: 10.5 % (ref 5–12)
NEUTROPHILS NFR BLD AUTO: 6.58 10*3/MM3 (ref 1.7–7)
NEUTROPHILS NFR BLD AUTO: 62.7 % (ref 42.7–76)
NRBC BLD AUTO-RTO: 0 /100 WBC (ref 0–0.2)
PLATELET # BLD AUTO: 271 10*3/MM3 (ref 140–450)
PMV BLD AUTO: 10.5 FL (ref 6–12)
POTASSIUM SERPL-SCNC: 4 MMOL/L (ref 3.5–5.2)
RBC # BLD AUTO: 3.61 10*6/MM3 (ref 4.14–5.8)
RH BLD: POSITIVE
SODIUM SERPL-SCNC: 137 MMOL/L (ref 136–145)
T&S EXPIRATION DATE: NORMAL
WBC NRBC COR # BLD AUTO: 10.48 10*3/MM3 (ref 3.4–10.8)

## 2024-11-22 PROCEDURE — 86901 BLOOD TYPING SEROLOGIC RH(D): CPT | Performed by: ANESTHESIOLOGY

## 2024-11-22 PROCEDURE — 87075 CULTR BACTERIA EXCEPT BLOOD: CPT | Performed by: ORTHOPAEDIC SURGERY

## 2024-11-22 PROCEDURE — 25010000002 BUPIVACAINE (PF) 0.25 % SOLUTION: Performed by: NURSE ANESTHETIST, CERTIFIED REGISTERED

## 2024-11-22 PROCEDURE — 25010000002 BUPIVACAINE (PF) 0.5 % SOLUTION: Performed by: NURSE ANESTHETIST, CERTIFIED REGISTERED

## 2024-11-22 PROCEDURE — 25810000003 LACTATED RINGERS PER 1000 ML

## 2024-11-22 PROCEDURE — 80048 BASIC METABOLIC PNL TOTAL CA: CPT

## 2024-11-22 PROCEDURE — 63710000001 INSULIN LISPRO (HUMAN) PER 5 UNITS: Performed by: HOSPITALIST

## 2024-11-22 PROCEDURE — 25010000002 FENTANYL CITRATE (PF) 50 MCG/ML SOLUTION: Performed by: NURSE ANESTHETIST, CERTIFIED REGISTERED

## 2024-11-22 PROCEDURE — 25010000002 ENOXAPARIN PER 10 MG: Performed by: STUDENT IN AN ORGANIZED HEALTH CARE EDUCATION/TRAINING PROGRAM

## 2024-11-22 PROCEDURE — 86900 BLOOD TYPING SEROLOGIC ABO: CPT | Performed by: ANESTHESIOLOGY

## 2024-11-22 PROCEDURE — 25810000003 LACTATED RINGERS PER 1000 ML: Performed by: ORTHOPAEDIC SURGERY

## 2024-11-22 PROCEDURE — 25010000002 CEFAZOLIN PER 500 MG: Performed by: STUDENT IN AN ORGANIZED HEALTH CARE EDUCATION/TRAINING PROGRAM

## 2024-11-22 PROCEDURE — 87102 FUNGUS ISOLATION CULTURE: CPT | Performed by: ORTHOPAEDIC SURGERY

## 2024-11-22 PROCEDURE — 63710000001 INSULIN GLARGINE PER 5 UNITS: Performed by: ORTHOPAEDIC SURGERY

## 2024-11-22 PROCEDURE — 87070 CULTURE OTHR SPECIMN AEROBIC: CPT | Performed by: INTERNAL MEDICINE

## 2024-11-22 PROCEDURE — 87205 SMEAR GRAM STAIN: CPT | Performed by: INTERNAL MEDICINE

## 2024-11-22 PROCEDURE — 25010000002 DAPTOMYCIN PER 1 MG: Performed by: INTERNAL MEDICINE

## 2024-11-22 PROCEDURE — 63710000001 INSULIN LISPRO (HUMAN) PER 5 UNITS

## 2024-11-22 PROCEDURE — 87206 SMEAR FLUORESCENT/ACID STAI: CPT | Performed by: ORTHOPAEDIC SURGERY

## 2024-11-22 PROCEDURE — 88307 TISSUE EXAM BY PATHOLOGIST: CPT | Performed by: ORTHOPAEDIC SURGERY

## 2024-11-22 PROCEDURE — 25010000002 PROPOFOL 10 MG/ML EMULSION: Performed by: NURSE ANESTHETIST, CERTIFIED REGISTERED

## 2024-11-22 PROCEDURE — 86850 RBC ANTIBODY SCREEN: CPT | Performed by: ANESTHESIOLOGY

## 2024-11-22 PROCEDURE — 99232 SBSQ HOSP IP/OBS MODERATE 35: CPT | Performed by: INTERNAL MEDICINE

## 2024-11-22 PROCEDURE — 87116 MYCOBACTERIA CULTURE: CPT | Performed by: ORTHOPAEDIC SURGERY

## 2024-11-22 PROCEDURE — 82948 REAGENT STRIP/BLOOD GLUCOSE: CPT

## 2024-11-22 PROCEDURE — 63710000001 INSULIN LISPRO (HUMAN) PER 5 UNITS: Performed by: ORTHOPAEDIC SURGERY

## 2024-11-22 PROCEDURE — 25010000002 ERTAPENEM PER 500 MG: Performed by: INTERNAL MEDICINE

## 2024-11-22 PROCEDURE — 85025 COMPLETE CBC W/AUTO DIFF WBC: CPT

## 2024-11-22 PROCEDURE — P9041 ALBUMIN (HUMAN),5%, 50ML: HCPCS

## 2024-11-22 PROCEDURE — 25010000002 ALBUMIN HUMAN 5% PER 50 ML

## 2024-11-22 PROCEDURE — 0Y6J0Z1 DETACHMENT AT LEFT LOWER LEG, HIGH, OPEN APPROACH: ICD-10-PCS | Performed by: ORTHOPAEDIC SURGERY

## 2024-11-22 PROCEDURE — 25810000003 LACTATED RINGERS PER 1000 ML: Performed by: ANESTHESIOLOGY

## 2024-11-22 PROCEDURE — 25010000002 VANCOMYCIN 1 G RECONSTITUTED SOLUTION: Performed by: ORTHOPAEDIC SURGERY

## 2024-11-22 PROCEDURE — 25010000002 ROPIVACAINE HCL-NACL 0.2-0.9 % SOLUTION: Performed by: NURSE ANESTHETIST, CERTIFIED REGISTERED

## 2024-11-22 PROCEDURE — 25010000002 DEXAMETHASONE SODIUM PHOSPHATE 10 MG/ML SOLUTION: Performed by: NURSE ANESTHETIST, CERTIFIED REGISTERED

## 2024-11-22 PROCEDURE — 63710000001 INSULIN GLARGINE PER 5 UNITS

## 2024-11-22 PROCEDURE — 25010000002 LIDOCAINE PF 1% 1 % SOLUTION: Performed by: NURSE ANESTHETIST, CERTIFIED REGISTERED

## 2024-11-22 PROCEDURE — 25010000002 BUPIVACAINE 0.5 % SOLUTION: Performed by: ORTHOPAEDIC SURGERY

## 2024-11-22 DEVICE — VIOLET ANTIBACTERIAL POLYDIOXANONE, KNOTLESS TISSUE CONTROL DEVICE
Type: IMPLANTABLE DEVICE | Site: LEG | Status: FUNCTIONAL
Brand: STRATAFIX

## 2024-11-22 RX ORDER — ONDANSETRON 2 MG/ML
4 INJECTION INTRAMUSCULAR; INTRAVENOUS ONCE AS NEEDED
Status: DISCONTINUED | OUTPATIENT
Start: 2024-11-22 | End: 2024-11-22

## 2024-11-22 RX ORDER — SODIUM CHLORIDE 0.9 % (FLUSH) 0.9 %
3 SYRINGE (ML) INJECTION EVERY 12 HOURS SCHEDULED
Status: DISCONTINUED | OUTPATIENT
Start: 2024-11-22 | End: 2024-11-22

## 2024-11-22 RX ORDER — ROCURONIUM BROMIDE 10 MG/ML
INJECTION, SOLUTION INTRAVENOUS AS NEEDED
Status: DISCONTINUED | OUTPATIENT
Start: 2024-11-22 | End: 2024-11-22 | Stop reason: SURG

## 2024-11-22 RX ORDER — ALBUMIN HUMAN 50 G/1000ML
250 SOLUTION INTRAVENOUS ONCE
Status: COMPLETED | OUTPATIENT
Start: 2024-11-22 | End: 2024-11-22

## 2024-11-22 RX ORDER — DROPERIDOL 2.5 MG/ML
0.62 INJECTION, SOLUTION INTRAMUSCULAR; INTRAVENOUS ONCE AS NEEDED
Status: DISCONTINUED | OUTPATIENT
Start: 2024-11-22 | End: 2024-11-22

## 2024-11-22 RX ORDER — MEPERIDINE HYDROCHLORIDE 25 MG/ML
12.5 INJECTION INTRAMUSCULAR; INTRAVENOUS; SUBCUTANEOUS
Status: DISCONTINUED | OUTPATIENT
Start: 2024-11-22 | End: 2024-11-22

## 2024-11-22 RX ORDER — SODIUM CHLORIDE, SODIUM LACTATE, POTASSIUM CHLORIDE, CALCIUM CHLORIDE 600; 310; 30; 20 MG/100ML; MG/100ML; MG/100ML; MG/100ML
9 INJECTION, SOLUTION INTRAVENOUS CONTINUOUS
Status: ACTIVE | OUTPATIENT
Start: 2024-11-22 | End: 2024-11-23

## 2024-11-22 RX ORDER — ROPIVACAINE HYDROCHLORIDE 2 MG/ML
INJECTION, SOLUTION EPIDURAL; INFILTRATION; PERINEURAL CONTINUOUS
Status: DISCONTINUED | OUTPATIENT
Start: 2024-11-22 | End: 2024-12-03

## 2024-11-22 RX ORDER — NALOXONE HCL 0.4 MG/ML
0.4 VIAL (ML) INJECTION AS NEEDED
Status: DISCONTINUED | OUTPATIENT
Start: 2024-11-22 | End: 2024-11-22

## 2024-11-22 RX ORDER — SODIUM CHLORIDE 0.9 % (FLUSH) 0.9 %
3-10 SYRINGE (ML) INJECTION AS NEEDED
Status: DISCONTINUED | OUTPATIENT
Start: 2024-11-22 | End: 2024-11-22

## 2024-11-22 RX ORDER — FAMOTIDINE 10 MG/ML
20 INJECTION, SOLUTION INTRAVENOUS ONCE
Status: CANCELLED | OUTPATIENT
Start: 2024-11-22 | End: 2024-11-22

## 2024-11-22 RX ORDER — BUPIVACAINE HCL/0.9 % NACL/PF 0.125 %
PLASTIC BAG, INJECTION (ML) EPIDURAL AS NEEDED
Status: DISCONTINUED | OUTPATIENT
Start: 2024-11-22 | End: 2024-11-22 | Stop reason: SURG

## 2024-11-22 RX ORDER — EPHEDRINE SULFATE 50 MG/ML
INJECTION INTRAVENOUS AS NEEDED
Status: DISCONTINUED | OUTPATIENT
Start: 2024-11-22 | End: 2024-11-22 | Stop reason: SURG

## 2024-11-22 RX ORDER — OXYCODONE HYDROCHLORIDE 5 MG/1
5 TABLET ORAL EVERY 4 HOURS PRN
Qty: 42 TABLET | Refills: 0 | Status: SHIPPED | OUTPATIENT
Start: 2024-11-22 | End: 2024-12-05 | Stop reason: HOSPADM

## 2024-11-22 RX ORDER — SODIUM CHLORIDE 0.9 % (FLUSH) 0.9 %
10 SYRINGE (ML) INJECTION AS NEEDED
Status: DISCONTINUED | OUTPATIENT
Start: 2024-11-22 | End: 2024-11-22 | Stop reason: HOSPADM

## 2024-11-22 RX ORDER — BUPIVACAINE HYDROCHLORIDE 2.5 MG/ML
INJECTION, SOLUTION EPIDURAL; INFILTRATION; INTRACAUDAL
Status: COMPLETED | OUTPATIENT
Start: 2024-11-22 | End: 2024-11-22

## 2024-11-22 RX ORDER — ALBUMIN HUMAN 50 G/1000ML
SOLUTION INTRAVENOUS
Status: COMPLETED
Start: 2024-11-22 | End: 2024-11-22

## 2024-11-22 RX ORDER — SODIUM CHLORIDE 0.9 % (FLUSH) 0.9 %
10 SYRINGE (ML) INJECTION EVERY 12 HOURS SCHEDULED
Status: CANCELLED | OUTPATIENT
Start: 2024-11-22

## 2024-11-22 RX ORDER — CEFAZOLIN SODIUM 1 G/3ML
INJECTION, POWDER, FOR SOLUTION INTRAMUSCULAR; INTRAVENOUS AS NEEDED
Status: DISCONTINUED | OUTPATIENT
Start: 2024-11-22 | End: 2024-11-22

## 2024-11-22 RX ORDER — HYDRALAZINE HYDROCHLORIDE 20 MG/ML
5 INJECTION INTRAMUSCULAR; INTRAVENOUS
Status: DISCONTINUED | OUTPATIENT
Start: 2024-11-22 | End: 2024-11-22

## 2024-11-22 RX ORDER — HYDROMORPHONE HYDROCHLORIDE 1 MG/ML
0.5 INJECTION, SOLUTION INTRAMUSCULAR; INTRAVENOUS; SUBCUTANEOUS
Status: DISCONTINUED | OUTPATIENT
Start: 2024-11-22 | End: 2024-11-22

## 2024-11-22 RX ORDER — BUPIVACAINE HYDROCHLORIDE 5 MG/ML
INJECTION, SOLUTION EPIDURAL; INTRACAUDAL
Status: COMPLETED | OUTPATIENT
Start: 2024-11-22 | End: 2024-11-22

## 2024-11-22 RX ORDER — FAMOTIDINE 20 MG/1
20 TABLET, FILM COATED ORAL ONCE
Status: CANCELLED | OUTPATIENT
Start: 2024-11-22 | End: 2024-11-22

## 2024-11-22 RX ORDER — VANCOMYCIN HYDROCHLORIDE 1 G/20ML
INJECTION, POWDER, LYOPHILIZED, FOR SOLUTION INTRAVENOUS AS NEEDED
Status: DISCONTINUED | OUTPATIENT
Start: 2024-11-22 | End: 2024-11-22 | Stop reason: HOSPADM

## 2024-11-22 RX ORDER — DEXAMETHASONE SODIUM PHOSPHATE 10 MG/ML
INJECTION, SOLUTION INTRAMUSCULAR; INTRAVENOUS
Status: COMPLETED | OUTPATIENT
Start: 2024-11-22 | End: 2024-11-22

## 2024-11-22 RX ORDER — LIDOCAINE HYDROCHLORIDE 10 MG/ML
INJECTION, SOLUTION EPIDURAL; INFILTRATION; INTRACAUDAL; PERINEURAL AS NEEDED
Status: DISCONTINUED | OUTPATIENT
Start: 2024-11-22 | End: 2024-11-22 | Stop reason: SURG

## 2024-11-22 RX ORDER — HYDROCODONE BITARTRATE AND ACETAMINOPHEN 5; 325 MG/1; MG/1
1 TABLET ORAL ONCE AS NEEDED
Status: DISCONTINUED | OUTPATIENT
Start: 2024-11-22 | End: 2024-11-22

## 2024-11-22 RX ORDER — IPRATROPIUM BROMIDE AND ALBUTEROL SULFATE 2.5; .5 MG/3ML; MG/3ML
3 SOLUTION RESPIRATORY (INHALATION) ONCE AS NEEDED
Status: DISCONTINUED | OUTPATIENT
Start: 2024-11-22 | End: 2024-11-22

## 2024-11-22 RX ORDER — FENTANYL CITRATE 50 UG/ML
INJECTION, SOLUTION INTRAMUSCULAR; INTRAVENOUS
Status: COMPLETED | OUTPATIENT
Start: 2024-11-22 | End: 2024-11-22

## 2024-11-22 RX ORDER — DROPERIDOL 2.5 MG/ML
0.62 INJECTION, SOLUTION INTRAMUSCULAR; INTRAVENOUS
Status: DISCONTINUED | OUTPATIENT
Start: 2024-11-22 | End: 2024-11-22

## 2024-11-22 RX ORDER — BUPIVACAINE HYDROCHLORIDE 5 MG/ML
INJECTION, SOLUTION PERINEURAL AS NEEDED
Status: DISCONTINUED | OUTPATIENT
Start: 2024-11-22 | End: 2024-11-22 | Stop reason: HOSPADM

## 2024-11-22 RX ORDER — MAGNESIUM HYDROXIDE 1200 MG/15ML
LIQUID ORAL AS NEEDED
Status: DISCONTINUED | OUTPATIENT
Start: 2024-11-22 | End: 2024-11-22 | Stop reason: HOSPADM

## 2024-11-22 RX ORDER — PROMETHAZINE HYDROCHLORIDE 25 MG/1
25 SUPPOSITORY RECTAL ONCE AS NEEDED
Status: DISCONTINUED | OUTPATIENT
Start: 2024-11-22 | End: 2024-11-22

## 2024-11-22 RX ORDER — LIDOCAINE HYDROCHLORIDE 10 MG/ML
0.5 INJECTION, SOLUTION EPIDURAL; INFILTRATION; INTRACAUDAL; PERINEURAL ONCE AS NEEDED
Status: DISCONTINUED | OUTPATIENT
Start: 2024-11-22 | End: 2024-11-22 | Stop reason: HOSPADM

## 2024-11-22 RX ORDER — PROPOFOL 10 MG/ML
VIAL (ML) INTRAVENOUS AS NEEDED
Status: DISCONTINUED | OUTPATIENT
Start: 2024-11-22 | End: 2024-11-22 | Stop reason: SURG

## 2024-11-22 RX ORDER — SODIUM CHLORIDE, SODIUM LACTATE, POTASSIUM CHLORIDE, CALCIUM CHLORIDE 600; 310; 30; 20 MG/100ML; MG/100ML; MG/100ML; MG/100ML
9 INJECTION, SOLUTION INTRAVENOUS CONTINUOUS
Status: ACTIVE | OUTPATIENT
Start: 2024-11-23 | End: 2024-11-23

## 2024-11-22 RX ORDER — PROMETHAZINE HYDROCHLORIDE 25 MG/1
25 TABLET ORAL ONCE AS NEEDED
Status: DISCONTINUED | OUTPATIENT
Start: 2024-11-22 | End: 2024-11-22

## 2024-11-22 RX ORDER — FENTANYL CITRATE 50 UG/ML
50 INJECTION, SOLUTION INTRAMUSCULAR; INTRAVENOUS
Status: DISCONTINUED | OUTPATIENT
Start: 2024-11-22 | End: 2024-11-22

## 2024-11-22 RX ORDER — MIDAZOLAM HYDROCHLORIDE 1 MG/ML
1 INJECTION, SOLUTION INTRAMUSCULAR; INTRAVENOUS
Status: DISCONTINUED | OUTPATIENT
Start: 2024-11-22 | End: 2024-11-22 | Stop reason: HOSPADM

## 2024-11-22 RX ORDER — LABETALOL HYDROCHLORIDE 5 MG/ML
5 INJECTION, SOLUTION INTRAVENOUS
Status: DISCONTINUED | OUTPATIENT
Start: 2024-11-22 | End: 2024-11-22

## 2024-11-22 RX ORDER — SODIUM CHLORIDE 9 MG/ML
9 INJECTION, SOLUTION INTRAVENOUS AS NEEDED
Status: DISCONTINUED | OUTPATIENT
Start: 2024-11-22 | End: 2024-11-22

## 2024-11-22 RX ADMIN — Medication 150 MCG: at 13:51

## 2024-11-22 RX ADMIN — EPHEDRINE SULFATE 5 MG: 50 INJECTION INTRAVENOUS at 13:36

## 2024-11-22 RX ADMIN — AMITRIPTYLINE HYDROCHLORIDE 25 MG: 25 TABLET, FILM COATED ORAL at 08:45

## 2024-11-22 RX ADMIN — METOPROLOL SUCCINATE 50 MG: 50 TABLET, EXTENDED RELEASE ORAL at 08:45

## 2024-11-22 RX ADMIN — Medication 200 MCG: at 13:00

## 2024-11-22 RX ADMIN — Medication 200 MCG: at 13:18

## 2024-11-22 RX ADMIN — Medication 100 MCG: at 12:59

## 2024-11-22 RX ADMIN — SODIUM CHLORIDE, SODIUM LACTATE, POTASSIUM CHLORIDE, CALCIUM CHLORIDE 9 ML/HR: 20; 30; 600; 310 INJECTION, SOLUTION INTRAVENOUS at 23:26

## 2024-11-22 RX ADMIN — PREGABALIN 225 MG: 75 CAPSULE ORAL at 08:45

## 2024-11-22 RX ADMIN — Medication 1000 MG: at 14:37

## 2024-11-22 RX ADMIN — ERTAPENEM 1000 MG: 1 INJECTION INTRAMUSCULAR; INTRAVENOUS at 08:42

## 2024-11-22 RX ADMIN — DAPTOMYCIN 450 MG: 500 INJECTION, POWDER, LYOPHILIZED, FOR SOLUTION INTRAVENOUS at 08:40

## 2024-11-22 RX ADMIN — BUPIVACAINE HYDROCHLORIDE 30 ML: 2.5 INJECTION, SOLUTION EPIDURAL; INFILTRATION; INTRACAUDAL; PERINEURAL at 11:20

## 2024-11-22 RX ADMIN — INSULIN LISPRO 2 UNITS: 100 INJECTION, SOLUTION INTRAVENOUS; SUBCUTANEOUS at 17:49

## 2024-11-22 RX ADMIN — SODIUM CHLORIDE, SODIUM LACTATE, POTASSIUM CHLORIDE, CALCIUM CHLORIDE: 20; 30; 600; 310 INJECTION, SOLUTION INTRAVENOUS at 13:28

## 2024-11-22 RX ADMIN — EPHEDRINE SULFATE 5 MG: 50 INJECTION INTRAVENOUS at 13:27

## 2024-11-22 RX ADMIN — INSULIN GLARGINE 10 UNITS: 100 INJECTION, SOLUTION SUBCUTANEOUS at 20:55

## 2024-11-22 RX ADMIN — INSULIN LISPRO 2 UNITS: 100 INJECTION, SOLUTION INTRAVENOUS; SUBCUTANEOUS at 20:55

## 2024-11-22 RX ADMIN — Medication 200 MCG: at 13:40

## 2024-11-22 RX ADMIN — Medication 100 MCG: at 13:27

## 2024-11-22 RX ADMIN — EPHEDRINE SULFATE 5 MG: 50 INJECTION INTRAVENOUS at 13:21

## 2024-11-22 RX ADMIN — Medication 200 MCG: at 13:02

## 2024-11-22 RX ADMIN — EPHEDRINE SULFATE 7.5 MG: 50 INJECTION INTRAVENOUS at 13:00

## 2024-11-22 RX ADMIN — Medication 10 ML: at 22:13

## 2024-11-22 RX ADMIN — LIDOCAINE HYDROCHLORIDE 50 MG: 10 INJECTION, SOLUTION EPIDURAL; INFILTRATION; INTRACAUDAL; PERINEURAL at 12:50

## 2024-11-22 RX ADMIN — BUPIVACAINE HYDROCHLORIDE 30 ML: 5 INJECTION, SOLUTION EPIDURAL; INTRACAUDAL; PERINEURAL at 11:06

## 2024-11-22 RX ADMIN — SODIUM CHLORIDE, SODIUM LACTATE, POTASSIUM CHLORIDE, CALCIUM CHLORIDE 50 ML/HR: 20; 30; 600; 310 INJECTION, SOLUTION INTRAVENOUS at 09:49

## 2024-11-22 RX ADMIN — PANTOPRAZOLE SODIUM 40 MG: 40 TABLET, DELAYED RELEASE ORAL at 05:25

## 2024-11-22 RX ADMIN — ASPIRIN 81 MG: 81 TABLET, COATED ORAL at 08:45

## 2024-11-22 RX ADMIN — Medication 100 MCG: at 12:58

## 2024-11-22 RX ADMIN — Medication 10 ML: at 08:47

## 2024-11-22 RX ADMIN — DEXAMETHASONE SODIUM PHOSPHATE 2 MG: 10 INJECTION, SOLUTION INTRAMUSCULAR; INTRAVENOUS at 11:06

## 2024-11-22 RX ADMIN — ALBUMIN HUMAN 250 ML: 50 SOLUTION INTRAVENOUS at 14:23

## 2024-11-22 RX ADMIN — Medication 100 MCG: at 12:57

## 2024-11-22 RX ADMIN — PROPOFOL 100 MG: 10 INJECTION, EMULSION INTRAVENOUS at 12:50

## 2024-11-22 RX ADMIN — SODIUM CHLORIDE, SODIUM LACTATE, POTASSIUM CHLORIDE, CALCIUM CHLORIDE 50 ML/HR: 20; 30; 600; 310 INJECTION, SOLUTION INTRAVENOUS at 10:45

## 2024-11-22 RX ADMIN — Medication 100 MCG: at 13:01

## 2024-11-22 RX ADMIN — ALBUMIN (HUMAN) 250 ML: 12.5 INJECTION, SOLUTION INTRAVENOUS at 14:23

## 2024-11-22 RX ADMIN — EPHEDRINE SULFATE 7.5 MG: 50 INJECTION INTRAVENOUS at 13:02

## 2024-11-22 RX ADMIN — INSULIN GLARGINE 15 UNITS: 100 INJECTION, SOLUTION SUBCUTANEOUS at 08:46

## 2024-11-22 RX ADMIN — ENOXAPARIN SODIUM 40 MG: 100 INJECTION SUBCUTANEOUS at 08:46

## 2024-11-22 RX ADMIN — EPHEDRINE SULFATE 7.5 MG: 50 INJECTION INTRAVENOUS at 13:30

## 2024-11-22 RX ADMIN — SODIUM CHLORIDE, SODIUM LACTATE, POTASSIUM CHLORIDE, CALCIUM CHLORIDE: 20; 30; 600; 310 INJECTION, SOLUTION INTRAVENOUS at 12:42

## 2024-11-22 RX ADMIN — INSULIN LISPRO 12 UNITS: 100 INJECTION, SOLUTION INTRAVENOUS; SUBCUTANEOUS at 17:49

## 2024-11-22 RX ADMIN — FENTANYL CITRATE 100 MCG: 50 INJECTION, SOLUTION INTRAMUSCULAR; INTRAVENOUS at 11:06

## 2024-11-22 RX ADMIN — INSULIN LISPRO 2 UNITS: 100 INJECTION, SOLUTION INTRAVENOUS; SUBCUTANEOUS at 08:46

## 2024-11-22 RX ADMIN — ROCURONIUM BROMIDE 50 MG: 10 INJECTION INTRAVENOUS at 12:50

## 2024-11-22 RX ADMIN — SODIUM CHLORIDE 2000 MG: 900 INJECTION INTRAVENOUS at 12:55

## 2024-11-22 RX ADMIN — Medication 100 MCG: at 13:30

## 2024-11-22 RX ADMIN — INSULIN LISPRO 12 UNITS: 100 INJECTION, SOLUTION INTRAVENOUS; SUBCUTANEOUS at 08:46

## 2024-11-22 NOTE — ANESTHESIA POSTPROCEDURE EVALUATION
Patient: Fracisco Mullen    Procedure Summary       Date: 11/22/24 Room / Location:  [facility]    Anesthesia Start: 1242 Anesthesia Stop: 1413    Procedure: LEFT BELOW-KNEE AMPUTATION (Left: Leg Lower) Diagnosis:       Non healing left heel wound      Acute osteomyelitis of left foot      (Non healing left heel wound [S91.302A])      (Acute osteomyelitis of left foot [M86.172])    Surgeons: Dru Gan Jr., MD Provider: Manasa Martinez MD    Anesthesia Type: general with block ASA Status: 3            Anesthesia Type: general with block    Vitals  Vitals Value Taken Time   /73 11/22/24 1515   Temp 97.6 °F (36.4 °C) 11/22/24 1515   Pulse 76 11/22/24 1518   Resp 18 11/22/24 1515   SpO2 95 % 11/22/24 1518   Vitals shown include unfiled device data.        Post Anesthesia Care and Evaluation    Patient location during evaluation: PACU  Patient participation: complete - patient participated  Level of consciousness: awake and alert  Pain management: adequate    Airway patency: patent  Anesthetic complications: No anesthetic complications  PONV Status: none  Cardiovascular status: hemodynamically stable and acceptable  Respiratory status: nonlabored ventilation, acceptable and nasal cannula  Hydration status: acceptable    Comments: Pt initial OR sat 80 % resolved with 100%, treated intra-op hyptotension with fluid and pressors , postop extubation, resp unlabored but sshallow, BS with rhonchi and cont decreased to bases, hob elevated, NRB on and iv bolus for initial postop hypotension, will transition to CPAP to wean NRB, improve aeration/expansion.  Thank you

## 2024-11-22 NOTE — PROGRESS NOTES
"Fracisco Mullen  1965  [id number]    Evaluating Physician: Varun Dominique MD    Chief Complaint: fatigue    Reason for Consultation: foot infection    History of present illness:     Patient is a 59 y.o.  Yr old male with history of diabetes/hypertension, previously followed with Dr. Gatica; admitted 2023 with right foot infection, cultures with MSSA/Morganella/ESBL Klebsiella/enterococcus and strep species.had surgery at the second toe with amputation, received IV daptomycin/Invanz with general improvements at that site.  He has been left with a chronic right plantar foot wound and a chronic wound at the left heel that has turned black; He apparently was admitted to the hospital August 1 after a fall at [facility].  Noted to have urinary retention with concern for possible UTI.  In addition left heel with black discoloration/malodor and redness, empiric antibiotics initiated.  He is chronically debilitated and wheelchair bound by his report.Bustos catheter-based at admission     8/3/24  Dr Gan  \"PROCEDURE:            Left  Left      35734:             Debridement of skin and subcutaneous tissue  50455:             Wound vacuum-assisted closure\"     8/6/24 MRI and surgical findings did not confirm bone involvement, transitioned to oral agents at discharge       Readmitted November 15, 2024 with reports of appearing \"sluggish\" according to admission notes with DKA, noted to have high hemoglobin A1c and persistent/worsening left heel wound according to patient; medicine team notes mention urinary retention, acute toxic metabolic encephalopathy improved and low-grade fever. Abnormal MRI at the left foot:    Per radiology-- \"Soft tissue wound seen along the posterior heel with ill-defined fluid that extends through and disrupts the distal Achilles tendon. Underlying this area there is evidence of osteomyelitis of the posterior lateral calcaneus. There is a tiny fluid   collection here as well that may represent " "an associated nondrainable abscess.     Additionally there appears to be a nondisplaced stress fracture of the posterior calcaneus. \"      11/22/24 seen early and sleepy; Dr Gan considering options;  vascular team has seen; Left heel pain  dull , worse with palpation, better with pain meds and not severe,  3/10.     No headache photophobia or neck stiffness.  No shortness of breath cough or hemoptysis.  No nausea vomiting diarrhea or abdominal pain.  No other new skin rash.  no dysuria hematuria or pyuria.       Past Medical History:   Diagnosis Date    Acute renal failure     Hx of    Obesity     Hx of    Sleep apnea     Type 2 diabetes mellitus        Past Surgical History:   Procedure Laterality Date    BACK SURGERY      lower back    CHOLECYSTECTOMY WITH INTRAOPERATIVE CHOLANGIOGRAM N/A 1/6/2021    Procedure: CHOLECYSTECTOMY LAPAROSCOPIC INTRAOPERATIVE CHOLANGIOGRAM;  Surgeon: Juventino Hooker MD;  Location:  [facility];  Service: General;  Laterality: N/A;    ERCP N/A 1/9/2021    Procedure: ENDOSCOPIC RETROGRADE CHOLANGIOPANCREATOGRAPHY;  Surgeon: Jeremy Dowell MD;  Location:  [facility];  Service: Gastroenterology;  Laterality: N/A;  with sphiincterotomy and balloon sweep with 9mm-12mm balloon    INCISION AND DRAINAGE FOOT Left 8/3/2024    Procedure: HEAL IRRIGATION AND DEBRIDEMENT LEFT;  Surgeon: Dru Gan Jr., MD;  Location:  [facility];  Service: Orthopedics;  Laterality: Left;    PLACEMENT OF WOUND VAC Left 8/3/2024    Procedure: PLACEMENT OF WOUND VAC;  Surgeon: Dru Gan Jr., MD;  Location:  [facility];  Service: Orthopedics;  Laterality: Left;       Pediatric History   Patient Parents    Not on file     Other Topics Concern    Not on file   Social History Narrative    Not on file       family history includes Cancer in his brother, maternal grandmother, mother, and another family member; Diabetes in an other family member; Heart attack in an other family member; Heart disease in " his brother and father; Hyperlipidemia in his mother and another family member; Hypertension in his mother and another family member; Obesity in an other family member.    Allergies   Allergen Reactions    Sertraline Hcl Hives     Zoloft       Medication:  Current Facility-Administered Medications   Medication Dose Route Frequency Provider Last Rate Last Admin    acetaminophen (TYLENOL) tablet 650 mg  650 mg Oral Q4H PRN Edmond Espinoza DO   650 mg at 11/19/24 1112    Or    acetaminophen (TYLENOL) 160 MG/5ML oral solution 650 mg  650 mg Oral Q4H PRN Edmond Espinoza DO        Or    acetaminophen (TYLENOL) suppository 650 mg  650 mg Rectal Q4H PRN Edmond Espinoza DO   650 mg at 11/15/24 2327    amitriptyline (ELAVIL) tablet 25 mg  25 mg Oral BID Lisa Vallejo MD   25 mg at 11/21/24 2107    aspirin EC tablet 81 mg  81 mg Oral Daily Lisa Vallejo MD   81 mg at 11/21/24 0857    sennosides-docusate (PERICOLACE) 8.6-50 MG per tablet 2 tablet  2 tablet Oral BID PRN Edmond Espinoza DO        And    polyethylene glycol (MIRALAX) packet 17 g  17 g Oral Daily PRN Edmond Espinoza DO        And    bisacodyl (DULCOLAX) EC tablet 5 mg  5 mg Oral Daily PRN Edmond Espinoza DO        And    bisacodyl (DULCOLAX) suppository 10 mg  10 mg Rectal Daily PRN Edmond Espinoza DO        calcium carbonate (TUMS) chewable tablet 500 mg (200 mg elemental)  2 tablet Oral TID PRN Lisa Vallejo MD   2 tablet at 11/21/24 2107    Calcium Replacement - Follow Nurse / BPA Driven Protocol   Not Applicable PRN Edmond Espinoza DO        DAPTOmycin (CUBICIN) 450 mg in sodium chloride 0.9 % 50 mL IVPB  6 mg/kg Intravenous Q24H Varun Dominique  mL/hr at 11/21/24 1000 450 mg at 11/21/24 1000    dextrose (D50W) (25 g/50 mL) IV injection 25 g  25 g Intravenous Q15 Min PRN Perla Pascual MD   25 g at 11/20/24 2232    dextrose (GLUTOSE) oral gel 15 g  15 g Oral Q15 Min PRN Perla Pascual MD        Enoxaparin Sodium (LOVENOX) syringe 40 mg  40 mg Subcutaneous Daily Espinoza,  DO Edmond   40 mg at 11/21/24 0859    ertapenem (INVanz) 1,000 mg in sodium chloride 0.9 % 100 mL MBP  1,000 mg Intravenous Q24H Varun Dominique  mL/hr at 11/21/24 0859 1,000 mg at 11/21/24 0859    finasteride (PROSCAR) tablet 5 mg  5 mg Oral Nightly Lisa Vallejo MD   5 mg at 11/21/24 2107    glucagon (GLUCAGEN) injection 1 mg  1 mg Intramuscular Q15 Min PRN Perla Pascual MD        HYDROcodone-acetaminophen (NORCO) 5-325 MG per tablet 1 tablet  1 tablet Oral Q6H PRN Geovanna Ku MD   1 tablet at 11/21/24 1843    influenza virus vacc split PF FLUZONE 0.5 mL  0.5 mL Intramuscular During Hospitalization Geovanna Ku MD        insulin glargine (LANTUS, SEMGLEE) injection 10 Units  10 Units Subcutaneous Nightly Lisa Vallejo MD   10 Units at 11/21/24 2108    insulin glargine (LANTUS, SEMGLEE) injection 15 Units  15 Units Subcutaneous Daily Karen Mandel PA-C   15 Units at 11/21/24 0859    Insulin Lispro (humaLOG) injection 12 Units  12 Units Subcutaneous TID With Meals Karen Mandel PA-C   12 Units at 11/21/24 1840    Insulin Lispro (humaLOG) injection 2-9 Units  2-9 Units Subcutaneous 4x Daily AC & at Bedtime Lisa Vallejo MD   4 Units at 11/21/24 2108    lactated ringers infusion  50 mL/hr Intravenous Continuous Karen Mandel PA-C 50 mL/hr at 11/21/24 1122 50 mL/hr at 11/21/24 1122    Lidocaine HCl gel (XYLOCAINE) urethral/mucosal syringe   Urethral PRN Karen Mandel PA-C   Given at 11/20/24 1948    Magnesium Standard Dose Replacement - Follow Nurse / BPA Driven Protocol   Not Applicable PRN Edmond Espinoza DO        metoprolol succinate XL (TOPROL-XL) 24 hr tablet 50 mg  50 mg Oral Daily Lisa Vallejo MD   50 mg at 11/21/24 0858    nitroglycerin (NITROSTAT) SL tablet 0.4 mg  0.4 mg Sublingual Q5 Min PRN Edmond Espinoza DO        ondansetron (ZOFRAN) injection 4 mg  4 mg Intravenous Q6H PRN Rupinder Sutherland APRN   4 mg at 11/20/24 0055    pantoprazole (PROTONIX) EC  tablet 40 mg  40 mg Oral Q AM TequilaKaren white KENNY Melara   40 mg at 11/22/24 0525    Phosphorus Replacement - Follow Nurse / BPA Driven Protocol   Not Applicable PRN Edmond Espinoza DO        Potassium Replacement - Follow Nurse / BPA Driven Protocol   Not Applicable PRN Edmond Espinoza DO        pregabalin (LYRICA) capsule 225 mg  225 mg Oral BID Varun Snider, [facility]   225 mg at 11/21/24 2107    rOPINIRole (REQUIP) tablet 0.25 mg  0.25 mg Oral Nightly Lisa Vallejo MD   0.25 mg at 11/21/24 2107    sodium chloride 0.9 % flush 10 mL  10 mL Intravenous Q12H Edmond Espinoza DO   10 mL at 11/21/24 2108    sodium chloride 0.9 % flush 10 mL  10 mL Intravenous Q12H Edmond Espinoza DO   10 mL at 11/21/24 2108    sodium chloride 0.9 % flush 10 mL  10 mL Intravenous PRN Edmond Espinoza DO        sodium chloride 0.9 % infusion 40 mL  40 mL Intravenous PRN Edmond Espinoza DO        tamsulosin (FLOMAX) 24 hr capsule 0.8 mg  0.8 mg Oral Nightly Lisa Vallejo MD   0.8 mg at 11/21/24 2107       Antibiotics:  Anti-Infectives (From admission, onward)      Ordered     Dose/Rate Route Frequency Start Stop    11/19/24 0737  DAPTOmycin (CUBICIN) 450 mg in sodium chloride 0.9 % 50 mL IVPB        Ordering Provider: Varun Dominique MD    6 mg/kg × 72.3 kg  100 mL/hr over 30 Minutes Intravenous Every 24 Hours 11/19/24 0900 12/03/24 0859    11/19/24 0737  ertapenem (INVanz) 1,000 mg in sodium chloride 0.9 % 100 mL MBP        Ordering Provider: Varun Dominique MD    1,000 mg  200 mL/hr over 30 Minutes Intravenous Every 24 Hours 11/19/24 0900 12/03/24 0859              Review of Systems    11/22/24 per nursing also    Constitutional-- No Fever, chills or sweats.  Appetite diminished with fatigue.  Heent-- No new vision, hearing or throat complaints.  No epistaxis or oral sores.  Denies odynophagia or dysphagia.  No flashers, floaters or eye pain. No odynophagia or dysphagia. No headache, photophobia or neck stiffness.  CV-- No chest pain, palpitation or  "syncope  Resp-- No SOB/cough/Hemoptysis  GI- No nausea, vomiting, or diarrhea.  No hematochezia, melena, or hematemesis. Denies jaundice or chronic liver disease.  -- No dysuria, hematuria, or flank pain.  Denies hesitancy, urgency or flank pain.  Lymph- no swollen lymph nodes in neck/axilla or groin.   Heme- No active bruising or bleeding; no Hx of DVT or PE.  MS-- no swelling or pain in the bones or joints of arms/legs.  No new back pain.  Neuro-- No acute focal weakness or numbness in the arms or legs.  No seizures.    Full 12 point review of systems reviewed and negative otherwise for acute complaints, except for above    Physical Exam:   Vital Signs   BP 97/54 (BP Location: Right arm, Patient Position: Lying)   Pulse 97   Temp 98 °F (36.7 °C) (Oral)   Resp 15   Ht 162.6 cm (64\")   Wt 72.3 kg (159 lb 6.3 oz)   SpO2 96%   BMI 27.36 kg/m²     GENERAL: sleepy in no acute distress.   HEENT: Normocephalic, atraumatic.   No conjunctival injection. No icterus. Oropharynx clear without evidence of thrush or exudate. No evidence of periodontal disease.    NECK: Supple without nuchal rigidity. No mass.   HEART: RRR; No murmur, rubs, gallops.   LUNGS: diminished at bases but otherwise Clear to auscultation bilaterally without wheezing, rales, rhonchi. Normal respiratory effort. Nonlabored. No dullness.  ABDOMEN: Soft, nontender, nondistended. Positive bowel sounds. No rebound or guarding. NO mass or HSM.  EXT: see below   MSK: FROM without joint effusions noted arms/legs.    SKIN: Warm and dry without cutaneous eruptions on Inspection/palpation.    NEURO: sleepy.    Right foot second toe amputation site noted, no redness/duration or warmth there.  No oral or active drainage    Left heel with large wound, deeper wound at the proximal aspect of this tracking towards the Achilles although I am unable to visualize  tendon or palpate bone at present.  Bloody drainage.  Vague surrounding erythema with mild tenderness " but no discrete mass bulge or fluctuance.  No crepitus or bulla.  Multifocal crusted areas otherwise at his lower extremities      Laboratory Data    Results from last 7 days   Lab Units 11/22/24  0606 11/21/24  0701 11/20/24  0550   WBC 10*3/mm3 10.48 11.11* 15.93*   HEMOGLOBIN g/dL 9.4* 9.4* 10.5*   HEMATOCRIT % 29.2* 30.1* 34.3*   PLATELETS 10*3/mm3 271 266 262     Results from last 7 days   Lab Units 11/22/24  0606   SODIUM mmol/L 137   POTASSIUM mmol/L 4.0   CHLORIDE mmol/L 98   CO2 mmol/L 32.0*   BUN mg/dL 19   CREATININE mg/dL 0.73*   GLUCOSE mg/dL 160*   CALCIUM mg/dL 9.1     Results from last 7 days   Lab Units 11/15/24  1107   ALK PHOS U/L 124*   BILIRUBIN mg/dL 0.4   ALT (SGPT) U/L 14   AST (SGOT) U/L 13               Estimated Creatinine Clearance: 96.8 mL/min (A) (by C-G formula based on SCr of 0.73 mg/dL (L)).      Microbiology:      Radiology:  Imaging Results (Last 72 Hours)       ** No results found for the last 72 hours. **              Impression:       --acute left heel wound infection ,  abnormal MRI as above raising concern for fluid extending into and disrupting the distal lateral Achilles tendon in addition to with evidence for osteomyelitis at the posterior lateral calcaneus in addition to possible nondisplaced stress fracture at the posterior calcaneus; High risk for persistent/recurrent or nonhealing wounds, persistent/progressive or recurrent infection and risk for further functional/limb loss, higher level amputation etc. Surgery 8/3;  High risk for amputation.  Empiric antibiotics for now     --urinary retention per admission notes, some hematuria on November 15; further urologic evaluation regarding functional/anatomic assessment at discretion of medicine team with respect inpatient versus outpatient     --Diabetes, uncontrolled ; glucose control per medicine team     --diabetic neuropathy     --Chronic debility as per patient wheelchair bound     --Hx ESBL    PLAN:     --IV  daptomycin/Invanz    --Check/review labs cultures and scans    --Partial history Per nursing staff    --Discussed with microbiology    --Discussed with surgery team    --orthopedic team making plans    --Highly complex at of issues with high risk for further serious morbidity and other serious sequela     Copied text in this note has been reviewed and is accurate as of 11/22/24.     Varun Dominique MD  11/22/2024

## 2024-11-22 NOTE — CASE MANAGEMENT/SOCIAL WORK
Continued Stay Note  [facility]     Patient Name: Fracisco Mullen  MRN: [id number]  Today's Date: 11/22/2024    Admit Date: 11/15/2024    Plan: TBD   Discharge Plan       Row Name 11/22/24 1137       Plan    Plan TBD    Patient/Family in Agreement with Plan yes    Plan Comments Spoke with patient at bedside. Plan is for surgery today. No discharge needs or concerns at this time. CM will continue to follow and assist with discharge planning.    Final Discharge Disposition Code 01 - home or self-care                   Discharge Codes    No documentation.                 Expected Discharge Date and Time       Expected Discharge Date Expected Discharge Time    Nov 25, 2024               Kirby Riggs RN

## 2024-11-22 NOTE — PROGRESS NOTES
[facility]  PROGRESS NOTE    Patient Name: Fracisco Mullen  : 1965  MRN: [id number]    Date of Admission: 11/15/2024  Primary Care Physician: Brandy Roberson    Subjective   Subjective     CC:  F/u DKA    HPI:  Patient left  below-knee amputation and just came to the floor.  A sleep but arousable and tells me that he is comfortable and does not have any pain.      Objective   Objective     Vital Signs:   Temp:  [97 °F (36.1 °C)-98 °F (36.7 °C)] 97.6 °F (36.4 °C)  Heart Rate:  [75-97] 75  Resp:  [15-18] 18  BP: ()/(50-73) 120/73  Flow (L/min) (Oxygen Therapy):  [2-15] 3     Physical Exam:  Constitutional: No acute distress  HENT: NCAT, mucous membranes dry  Respiratory: Clear to auscultation bilaterally, nonlabored respirations   Cardiovascular: RRR, no murmurs, rubs, or gallops, no bilateral ankle edema  Gastrointestinal: Soft, nontender, non distended  Musculoskeletal: Left below knee amputation  Psychiatric: Appropriate affect, cooperative  Neurologic: Alert and oriented, speech clear      Results Reviewed:  LAB RESULTS:      Lab 24  0606 24  0701 24  0550 24  0717 24  0608 24  0629 24  0343   WBC 10.48 11.11* 15.93* 9.33 6.74   < > 11.93*   HEMOGLOBIN 9.4* 9.4* 10.5* 12.0* 11.2*   < > 11.2*   HEMATOCRIT 29.2* 30.1* 34.3* 38.6 35.4*   < > 34.1*   PLATELETS 271 266 262 276 242   < > 283   NEUTROS ABS 6.58 7.82*  --   --   --   --  7.30*   IMMATURE GRANS (ABS) 0.08* 0.06*  --   --   --   --  0.07*   LYMPHS ABS 2.37 2.17  --   --   --   --  3.19*   MONOS ABS 1.10* 0.76  --   --   --   --  1.22*   EOS ABS 0.26 0.22  --   --   --   --  0.07   MCV 80.9 82.9 84.3 82.8 80.6   < > 80.0    < > = values in this interval not displayed.         Lab 24  0606 24  0701 24  0550 24  0717 24  0608 24  0629 24  1342 24  0846 24  0343 24  0045 11/15/24  1943   SODIUM  137 133* 133* 134* 135*   < >  --  134* 143 137 136   POTASSIUM 4.0 4.0 4.4 3.9 4.0   < >  --  3.7 4.1 3.8 4.5   CHLORIDE 98 97* 97* 97* 99   < >  --  104 109* 105 103   CO2 32.0* 29.0 24.0 27.0 25.0   < >  --  22.0 24.0 24.0 25.0   ANION GAP 7.0 7.0 12.0 10.0 11.0   < >  --  8.0 10.0 8.0 8.0   BUN 19 31* 35* 20 22   < >  --  12 16 15 17   CREATININE 0.73* 0.85 0.98 1.00 0.88   < >  --  0.64* 0.72* 0.74* 0.84   EGFR 103.5 98.9 87.7 85.6 97.8   < >  --  107.7 103.9 103.1 99.2   GLUCOSE 160* 227* 433* 279* 270*   < >  --  171* 120* 154* 183*   CALCIUM 9.1 9.3 8.4* 8.8 8.2*   < >  --  8.0* 8.4* 8.0* 8.8   MAGNESIUM  --   --   --   --   --   --  1.6 2.0 1.9 1.8 1.8   PHOSPHORUS  --   --   --   --   --   --  2.5 2.4* 3.2 2.7 2.6    < > = values in this interval not displayed.                         Lab 11/22/24  1054   ABO TYPING O   RH TYPING Positive   ANTIBODY SCREEN Negative           Brief Urine Lab Results  (Last result in the past 365 days)        Color   Clarity   Blood   Leuk Est   Nitrite   Protein   CREAT   Urine HCG        11/15/24 1142 Yellow   Clear   Small (1+)   Negative   Negative   100 mg/dL (2+)                   Microbiology Results Abnormal       None            Peripheral Block    Result Date: 11/22/2024  Yonatan Jain CRNA     11/22/2024 11:40 AM Peripheral Block Pre-sedation assessment completed: 11/22/2024 11:20 AM Patient reassessed immediately prior to procedure Patient location during procedure: pre-op Reason for block: at surgeon's request and post-op pain management Performed by ROGELIO/CAA: Yonatan Jain, CRNA Assisted by: Janeen Ferrer RNSRNA: Shruthi Salcido SRNA Preanesthetic Checklist Completed: patient identified, IV checked, site marked, risks and benefits discussed, surgical consent, monitors and equipment checked, pre-op evaluation and timeout performed Prep: Pt Position: right lateral decubitus Sterile barriers:cap, gloves, mask and washed/disinfected hands Prep: ChloraPrep  Patient monitoring: blood pressure monitoring, continuous pulse oximetry and EKG Procedure Sedation: yes Performed under: local infiltration Guidance:ultrasound guided ULTRASOUND INTERPRETATION.  Using ultrasound guidance a 20 G gauge needle was placed in close proximity to the nerve, at which point, under ultrasound guidance anesthetic was injected in the area of the nerve and spread of the anesthesia was seen on ultrasound in close proximity thereto.  There were no abnormalities seen on ultrasound; a digital image was taken; and the patient tolerated the procedure with no complications. Images:still images obtained, printed/placed on chart Laterality:left Block Type:popliteal Injection Technique:catheter Needle Type:echogenic and Tuohy Needle Gauge:18 G Resistance on Injection: none Catheter Size:20 G Cath Depth at skin: 7 cm Medications Used: bupivacaine PF (MARCAINE) 0.25 % injection - Injection  30 mL - 11/22/2024 11:20:00 AM Post Assessment Injection Assessment: negative aspiration for heme, no paresthesia on injection and incremental injection Patient Tolerance:comfortable throughout block Complications:no Additional Notes CATHETER                             A high-frequency linear transducer, with sterile cover, was placed in the popliteal fossa to identify the popliteal artery and vein, Tibial nerve (TN) and Common Peroneal nerve (CP). The transducer was then moved in a cephalad fashion to observe the TN and CP nerve bifurcation to form the Sciatic Nerve. The insertion site was prepped and draped in sterile fashion. Skin and cutaneous tissue was infiltrated with 2-5 ml of 1% Lidocaine. Using ultrasound-guidance, an 18-gauge Contiplex Ultra 360 Touhy needle was advanced in plane from lateral to medial. Preservative-free normal saline was utilized for hydro-dissection of tissue, advancement of Touhy needle, and to confirm final needle placement posterior to the nerves. Local anesthetic injection spread, in  "incremental 3-5 ml injections, to surround both nerve structures. Aspiration every 5 ml to prevent intravascular injection. Injection was completed with negative aspiration of blood and negative intravascular injection. Injection pressures were normal with minimal resistance. A 20-gauge Contiplex Echo catheter was placed through the needle and advance out the tip of the Touhy 1-3 cm. The Touhy needle was then removed, and final catheter position verified (Below/above or Anterior/Posterior) the nerve structures. The catheter was secured in the usual fashion with skin glue, benzoin, steri-strips, CHG tegaderm and Label noting \"Nerve Block Catheter\". Jerk tape applied at yellow connector and catheter connection. Performed by: Yonatan Jain CRNA     Peripheral Block    Result Date: 11/22/2024  Yonatan Jain CRNA     11/22/2024 11:42 AM Peripheral Block Patient reassessed immediately prior to procedure Patient location during procedure: pre-op Start time: 11/22/2024 11:06 AM Reason for block: at surgeon's request and post-op pain management Performed by ROGELIO/CAA: Yonatan Jain CRNA Assisted by: Janeen Ferrer RNSRNA: Shruthi Salcido SRNA Preanesthetic Checklist Completed: patient identified, IV checked, site marked, risks and benefits discussed, surgical consent, monitors and equipment checked, pre-op evaluation and timeout performed Prep: Pt Position: supine Sterile barriers:gloves, cap, sterile barriers, mask and washed/disinfected hands Prep: ChloraPrep Patient monitoring: blood pressure monitoring, continuous pulse oximetry and EKG Procedure Sedation: yes Guidance:ultrasound guided ULTRASOUND INTERPRETATION.  Using ultrasound guidance a 20 G gauge needle was placed in close proximity to the femoral nerve, at which point, under ultrasound guidance anesthetic was injected in the area of the nerve and spread of the anesthesia was seen on ultrasound in close proximity thereto.  There were no abnormalities seen on " "ultrasound; a digital image was taken; and the patient tolerated the procedure with no complications. Images:still images obtained, printed/placed on chart Laterality:left Block Type:femoral Injection Technique:single-shot Needle Type:short-bevel Needle Gauge:20 G Resistance on Injection: none Medications Used: bupivacaine (PF) (MARCAINE) 0.5 % injection - Injection  30 mL - 11/22/2024 11:06:00 AM fentaNYL citrate (PF) (SUBLIMAZE) injection - Intravenous  100 mcg - 11/22/2024 11:06:00 AM dexamethasone sodium phosphate injection - Injection  2 mg - 11/22/2024 11:06:00 AM Post Assessment Injection Assessment: negative aspiration for heme, no paresthesia on injection and incremental injection Patient Tolerance:comfortable throughout block Complications:no Additional Notes SINGLE Shot A high-frequency linear transducer, with sterile cover, was placed in the inguinal crease to visualize the Femoral Vein, Artery, and Nerve (medial to lateral). The insertion site was prepped in sterile fashion. Skin and cutaneous tissue was infiltrated with 2-5 ml of 1% Lidocaine. Using ultrasound-guidance, a 20-gauge B-Henderson 4\" Ultraplex 360 non-stimulating echogenic needle was then inserted and advanced in-plane from lateral to medial with ultrasound guidance. The needle was directed below Fascia Iliacus towards the Femoral nerve. Preservative-free normal saline was utilized for hydro-dissection of tissue. Local anesthetic injection spread, in incremental 3-5 ml injections, was visualized lateral to the artery to surround the femoral nerve. Aspiration every 5 ml to prevent intravascular injection. Injection was completed with negative aspiration of blood and negative intravascular injection. Injection pressures were normal with minimal resistance. Performed by: Yonatan Jain CRNA         Current medications:  Scheduled Meds:amitriptyline, 25 mg, Oral, BID  aspirin, 81 mg, Oral, Daily  DAPTOmycin, 6 mg/kg, Intravenous, Q24H  enoxaparin, " 40 mg, Subcutaneous, Daily  ertapenem, 1,000 mg, Intravenous, Q24H  finasteride, 5 mg, Oral, Nightly  insulin glargine, 10 Units, Subcutaneous, Nightly  insulin glargine, 15 Units, Subcutaneous, Daily  Insulin Lispro, 12 Units, Subcutaneous, TID With Meals  insulin lispro, 2-9 Units, Subcutaneous, 4x Daily AC & at Bedtime  metoprolol succinate XL, 50 mg, Oral, Daily  pantoprazole, 40 mg, Oral, Q AM  pregabalin, 225 mg, Oral, BID  rOPINIRole, 0.25 mg, Oral, Nightly  sodium chloride, 10 mL, Intravenous, Q12H  sodium chloride, 10 mL, Intravenous, Q12H  tamsulosin, 0.8 mg, Oral, Nightly      Continuous Infusions:[START ON 11/23/2024] lactated ringers, 9 mL/hr  lactated ringers, 9 mL/hr  ropivacaine,       PRN Meds:.  acetaminophen **OR** acetaminophen **OR** acetaminophen    senna-docusate sodium **AND** polyethylene glycol **AND** bisacodyl **AND** bisacodyl    calcium carbonate    Calcium Replacement - Follow Nurse / BPA Driven Protocol    dextrose    dextrose    glucagon (human recombinant)    HYDROcodone-acetaminophen    influenza vaccine    Lidocaine HCl gel    Magnesium Standard Dose Replacement - Follow Nurse / BPA Driven Protocol    nitroglycerin    ondansetron    Phosphorus Replacement - Follow Nurse / BPA Driven Protocol    Potassium Replacement - Follow Nurse / BPA Driven Protocol    sodium chloride    sodium chloride    Assessment & Plan   Assessment & Plan     Active Hospital Problems    Diagnosis  POA    **DKA (diabetic ketoacidosis) [E11.10]  Yes    Severe protein-calorie malnutrition [E43]  Yes    Non healing left heel wound [S91.302A]  Yes    Acute osteomyelitis of left foot [M86.172]  Unknown      Resolved Hospital Problems   No resolved problems to display.        Brief Hospital Course to date:  Fracisco Contrerasy is a 59 y.o. male with past medical history of hypertension, type 2 diabetes with insulin use, diabetic neuropathy, and chronic diabetic foot wounds who presents via EMS from home due to altered  mental status and fatigue. Lab work consistent with diabetic ketoacidosis.     This patient's problems and plans were partially entered by my partner and updated as appropriate by me 11/22/24. Copied text in this note has been reviewed and is accurate as of today's date.      Chronic bilateral foot wounds  - Patient with chronic left heel wound and right plantar foot wound  - Admitted August 2024 for left foot cellulitis and discharged on oral antibiotics and with home wound vac following debridement, MRI left foot obtained at that time with no definite findings of osteomyelitis (was seen by ID/Dr. Dominique and Orthopedics/Dr. Gan)  - Repeat MRI left foot shows soft tissue wound with evidence of osteomyelitis of the calcaneus and possibly an associated non-drainable abscess  -S/p left below-knee amputation by Dr. Gan today without any immediate complications    BPH  Urinary retention  - Continue home Flomax, Proscar      DKA in setting of uncontrolled IDDM complicated by diabetic neuropathy and chronic foot wounds , improving  - Uncertain of medication compliance  - A1c 14.1%  - S/p DKA protocol including insulin drip, IV fluids, and electrolyte replacement per protocol  - Now transitioned to basal-bolus insulin, will likely need titration as needed  - DM educator consulted    Malnutrition  - Patient with poor oral intake, refusing supplementation  - Appears dry on exam, gentle IVF  - Nutrition following     Acute toxic metabolic encephalopathy, improved  - CT head without acute abnormality  - UDS negative  - Likely secondary to DKA     HTN  - Continue home metoprolol     Mood disorder  - Continue home Amitriptyline      RLS  - Continue home Requip    Expected Discharge Location and Transportation: Rehab  Expected Discharge   Expected Discharge Date: 11/25/2024; Expected Discharge Time:      VTE Prophylaxis:  Pharmacologic VTE prophylaxis orders are present.         AM-PAC 6 Clicks Score (PT): 9 (11/21/24  2100)    CODE STATUS:   Code Status and Medical Interventions: CPR (Attempt to Resuscitate); Full Support; Full code per last hosptial admission. Patient disoriented on exam with no family present at bedside. Only contact information is friend.   Ordered at: 11/15/24 1218     Level Of Support Discussed With:    Patient     Code Status (Patient has no pulse and is not breathing):    CPR (Attempt to Resuscitate)     Medical Interventions (Patient has pulse or is breathing):    Full Support     Comments:    Full code per last hosptial admission. Patient disoriented on exam with no family present at bedside. Only contact information is friend.       Mahad Mijares MD  11/22/24       11-Jun-2021

## 2024-11-22 NOTE — OP NOTE
[facility]     OPERATIVE REPORT      PATIENT NAME:  Fracisco Mullen                            YOB: 1965       PREOP DIAGNOSIS:   Left lower extremity osteomyelitis    POSTOP DIAGNOSIS:   Same.    PROCEDURE:   Left     88656: Below-knee amputation    SURGEON:     Dru Gan MD    OPERATIVE TEAM:   Circulator: Romelia Garcia RN  Scrub Person: Florence Brooks; Yana Thibodeaux  Nursing Assistant: Viky Scherer    ANESTHETIST:  Anesthesiologist: Manasa Martinez MD  CRNA: Meek Rene CRNA    ANESTHESIA:   Choice    ESTIMATED BLOOD LOSS:   < 30 ml    TOURNIQUET TIME:   < 30 min    FINDINGS:     Remaining tissue appeared healthy.  Tension-free wound closure.  No overt signs or symptoms of infection in the residual limb.    IMPLANTS:     Cerament impregnated with vancomycin / gentamicin    SPECIMENS:     Specimens       ID Source Type Tests Collected By Collected At Frozen?    1 Leg, Left Tissue FUNGAL CULTURE  TISSUE / BONE CULTURE  AFB CULTURE  ANAEROBIC CULTURE 10 DAY INCUBATION   Dru Gan Jr., MD 11/22/24 1327     Description: Left Below Knee Culture    This specimen was not marked as sent.    A Leg, Left Tissue TISSUE PATHOLOGY EXAM   Dru Gan Jr., MD 11/22/24 1319     Description: Left Below Knee Amputation    This specimen was not marked as sent.            COMPLICATIONS:    None.    DISPOSITION:    Stable to recovery.     INDICATIONS:    59 y.o. male with Left calcaneal osteomyelitis.  I had a discussion with the patient regarding further management.  After discussion of risk, benefits, alternatives, they were amenable with Left below-knee amputation.  Risks of surgery were discussed which included but were not limited to pain, bleeding, infection, damage to adjacent structures, need for further surgery, wound healing complications, loss of limb and death.  The patient expressed verbal consent and written consent was obtained for the above  procedure.    NARRATIVE:    Patient was identified in preoperative holding.  Operative site was marked in indelible ink.  History, physical, consent were reviewed and updated.  Patient was surrendered to the anesthesia team and transported to the operative suite where anesthesia was induced.  Hip bump was placed on the ipsilateral side and nonsterile thigh tourniquet was placed.  Nonsterile dressing was placed over the foot and Ioban was placed over this.  Operative extremity was prepped and draped in the usual sterile fashion, prepping over the Ioban.  The operative team donned sterile gowns and gloves and a timeout was called.  All in attendance agreed regarding the patient's identity, proposed operative procedure, and operative site.    I elevated the operative extremity and the tourniquet was inflated to 300 mmHg.    I marked a transverse incision 12 cm distal to the tibial tubercle, marked medial and lateral incisions for a posterior flap.  I made a full-thickness skin incision along the entire length of the incision.  Utilizing Bovie electrocautery, I incised to the anterior compartment, identified the anterior vascular bundle which was ligated with free and stick tie.  This was transected distal to the tie.  I placed an [employer] retractor deep to the tibia, transected the tibia using a sagittal saw 12 cm distal to the tibial tubercle.  I transected the fibula approximately 1 cm proximal to the tibial cut.  Using an amputation blade, I freed the foot, sent this for gross pathology.    I took swab specimens of the wound as well as deep bone biopsy from the residual tibia which I sent for culture.    I identified the posterior vascular structures and in a similar manner, ligated and transected them.  Identified the tibial nerve, freed it posterior to the tibia, injected anesthetic into the nerve, cut it proximal to the tibial cut, inserted it, retracted proximal to the tibial cut.  I then took down the  tourniquet, achieved hemostasis, removed muscle fibers from the deep posterior compartment, leaving the gastroc and soleus for the flap.  I performed a chamfer cut at the distal aspect of the tibia.   I sharply incised redundant distal Achilles and gastrocnemius tendon, as well as redundant skin.      I copiously irrigated the wound.    I placed a deep drain exiting superior laterally which was sewn in place. I placed vancomycin powder into the wound.  I then closed in anatomic layers with the gastrocnemius tendon attached to the anterior fascia.  Remaining layers were closed with monofilament suture, skin was closed with staples.    Sterile dressing applied.  Anesthesia was concluded and the patient was transported to the PACU in stable condition.  Counts were correct x2.  There were no apparent complications.  I was present scrubbed for the entire case.    Dru Gan Jr, MD  11/22/2024

## 2024-11-22 NOTE — ANESTHESIA PROCEDURE NOTES
Airway  Urgency: elective    Date/Time: 11/22/2024 12:52 PM  Airway not difficult    General Information and Staff    Patient location during procedure: OR  CRNA/CAA: Meek Rene CRNA    Indications and Patient Condition  Indications for airway management: airway protection    Preoxygenated: yes  MILS not maintained throughout  Mask difficulty assessment: 1 - vent by mask    Final Airway Details  Final airway type: endotracheal airway      Successful airway: ETT  Cuffed: yes   Successful intubation technique: direct laryngoscopy  Endotracheal tube insertion site: oral  Blade: Lord  Blade size: 2  ETT size (mm): 7.0  Cormack-Lehane Classification: grade I - full view of glottis  Placement verified by: chest auscultation and capnometry   Measured from: lips  ETT/EBT  to lips (cm): 20  Number of attempts at approach: 1  Assessment: lips, teeth, and gum same as pre-op and atraumatic intubation    Additional Comments  Negative epigastric sounds, Breath sound equal bilaterally with symmetric chest rise and fall

## 2024-11-22 NOTE — ANESTHESIA PROCEDURE NOTES
Peripheral Block      Patient reassessed immediately prior to procedure    Patient location during procedure: pre-op  Start time: 11/22/2024 11:06 AM  Reason for block: at surgeon's request and post-op pain management  Performed by  CRNA/CAA: Yonatan Jain, CRNA  Assisted by: Janeen Ferrer RNSRNA: Shruthi Salcido SRNA  Preanesthetic Checklist  Completed: patient identified, IV checked, site marked, risks and benefits discussed, surgical consent, monitors and equipment checked, pre-op evaluation and timeout performed  Prep:  Pt Position: supine  Sterile barriers:gloves, cap, sterile barriers, mask and washed/disinfected hands  Prep: ChloraPrep  Patient monitoring: blood pressure monitoring, continuous pulse oximetry and EKG  Procedure    Sedation: yes    Guidance:ultrasound guided    ULTRASOUND INTERPRETATION.  Using ultrasound guidance a 20 G gauge needle was placed in close proximity to the femoral nerve, at which point, under ultrasound guidance anesthetic was injected in the area of the nerve and spread of the anesthesia was seen on ultrasound in close proximity thereto.  There were no abnormalities seen on ultrasound; a digital image was taken; and the patient tolerated the procedure with no complications. Images:still images obtained, printed/placed on chart    Laterality:left  Block Type:femoral  Injection Technique:single-shot  Needle Type:short-bevel  Needle Gauge:20 G  Resistance on Injection: none    Medications Used: bupivacaine (PF) (MARCAINE) 0.5 % injection - Injection   30 mL - 11/22/2024 11:06:00 AM  fentaNYL citrate (PF) (SUBLIMAZE) injection - Intravenous   100 mcg - 11/22/2024 11:06:00 AM  dexamethasone sodium phosphate injection - Injection   2 mg - 11/22/2024 11:06:00 AM      Post Assessment  Injection Assessment: negative aspiration for heme, no paresthesia on injection and incremental injection  Patient Tolerance:comfortable throughout block  Complications:no  Additional Notes  SINGLE  "Shot  A high-frequency linear transducer, with sterile cover, was placed in the inguinal crease to visualize the Femoral Vein, Artery, and Nerve (medial to lateral). The insertion site was prepped in sterile fashion. Skin and cutaneous tissue was infiltrated with 2-5 ml of 1% Lidocaine. Using ultrasound-guidance, a 20-gauge B-Henderson 4\" Ultraplex 360 non-stimulating echogenic needle was then inserted and advanced in-plane from lateral to medial with ultrasound guidance. The needle was directed below Fascia Iliacus towards the Femoral nerve. Preservative-free normal saline was utilized for hydro-dissection of tissue. Local anesthetic injection spread, in incremental 3-5 ml injections, was visualized lateral to the artery to surround the femoral nerve. Aspiration every 5 ml to prevent intravascular injection. Injection was completed with negative aspiration of blood and negative intravascular injection. Injection pressures were normal with minimal resistance.   Performed by: Yonatan Jain, CRNA          "

## 2024-11-22 NOTE — PROGRESS NOTES
Fracisco Mullen       LOS: 7 days   Patient Care Team:  Brandy Roberson as PCP - General (Family Medicine)  Varun Fontenot MD as Consulting Physician (Cardiology)  Yulissa Taveras APRN as Nurse Practitioner (Cardiology)    Chief Complaint:  left calcaneal osteomyelitis    Subjective     Interval History:     Resting comfortably in bed. Pain controlled. No acute events overnight    History taken from: patient    Review of Systems:   The following systems were reviewed and negative     Gen - No fevers, chills  CV - No chest pain, palpitations  Resp - No cough, dyspnea  GI - No N/V/D, abdominal pain    Objective     Vital Signs  Vital Signs (last 24 hours)         11/19 0700  11/20 0659 11/20 0700  11/20 0858   Most Recent      Temp (°F) 97.7 -  98.2       98 (36.7) 11/19 2042    Heart Rate 90 -  103       103 11/20 0530    Resp 16 -  18       16 11/19 2042    BP 73/59 -  115/74       115/74 11/19 1425    SpO2 (%) 92 -  96       92 11/20 0530    Flow (L/min) (Oxygen Therapy)   2       2 11/20 0609              Physical Exam:  No acute distress  Nonlabored respirations  Regular rate and rhythm  Abdomen nondistended  Left lower extremity:  Posterior left heel wound with mild surrounding erythema, serosanguineous drainage. No warmth, induration, julisa purulence.      Results Review:     I reviewed the patient's new clinical results.    Medication Review:   Hospital Medications (active)         Dose Frequency Start End    acetaminophen (TYLENOL) 160 MG/5ML oral solution 650 mg 650 mg Every 4 Hours PRN 11/15/2024 --    Admin Instructions: If given for fever, use fever parameter: fever greater than 100.4 °F  Based on patient request - if ordered for moderate or severe pain, provider allows for administration of a medication prescribed for a lower pain scale.    Do not exceed 4 grams of acetaminophen in a 24 hr period. Max dose of 2gm for AST/ALT greater than 120 units/L.    If given for pain, use the following  "pain scale:   Mild Pain = Pain Score of 1-3, CPOT 1-2  Moderate Pain = Pain Score of 4-6, CPOT 3-4  Severe Pain = Pain Score of 7-10, CPOT 5-8    Route: Oral    Linked Group 1: Placed in \"Or\" Linked Group        acetaminophen (TYLENOL) suppository 650 mg 650 mg Every 4 Hours PRN 11/15/2024 --    Admin Instructions: If given for fever, use fever parameter: fever greater than 100.4 °F  Based on patient request - if ordered for moderate or severe pain, provider allows for administration of a medication prescribed for a lower pain scale.    Do not exceed 4 grams of acetaminophen in a 24 hr period. Max dose of 2gm for AST/ALT greater than 120 units/L.    If given for pain, use the following pain scale:   Mild Pain = Pain Score of 1-3, CPOT 1-2  Moderate Pain = Pain Score of 4-6, CPOT 3-4  Severe Pain = Pain Score of 7-10, CPOT 5-8    Route: Rectal    Linked Group 1: Placed in \"Or\" Linked Group        acetaminophen (TYLENOL) tablet 650 mg 650 mg Every 4 Hours PRN 11/15/2024 --    Admin Instructions: If given for fever, use fever parameter: fever greater than 100.4 °F  Based on patient request - if ordered for moderate or severe pain, provider allows for administration of a medication prescribed for a lower pain scale.    Do not exceed 4 grams of acetaminophen in a 24 hr period. Max dose of 2gm for AST/ALT greater than 120 units/L.    If given for pain, use the following pain scale:   Mild Pain = Pain Score of 1-3, CPOT 1-2  Moderate Pain = Pain Score of 4-6, CPOT 3-4  Severe Pain = Pain Score of 7-10, CPOT 5-8    Route: Oral    Linked Group 1: Placed in \"Or\" Linked Group        amitriptyline (ELAVIL) tablet 25 mg 25 mg 2 Times Daily 11/15/2024 --    Route: Oral    aspirin EC tablet 81 mg 81 mg Daily 11/15/2024 --    Admin Instructions: Do not crush or chew the capsules or tablets. The drug may not work as designed if the capsule or tablet is crushed or chewed. Swallow whole.  Do not exceed 4 grams of aspirin in a 24 hr " "period.    If given for pain, use the following pain scale:   Mild Pain = Pain Score of 1-3, CPOT 1-2  Moderate Pain = Pain Score of 4-6, CPOT 3-4  Severe Pain = Pain Score of 7-10, CPOT 5-8    Route: Oral    bisacodyl (DULCOLAX) EC tablet 5 mg 5 mg Daily PRN 11/15/2024 --    Admin Instructions: Use if no bowel movement after 12 hours.  Swallow whole. Do not crush, split, or chew tablet.    Route: Oral    Linked Group 2: Placed in \"And\" Linked Group        bisacodyl (DULCOLAX) suppository 10 mg 10 mg Daily PRN 11/15/2024 --    Admin Instructions: Use if no bowel movement after 12 hours.  Hold for diarrhea    Route: Rectal    Linked Group 2: Placed in \"And\" Linked Group        calcium carbonate (TUMS) chewable tablet 500 mg (200 mg elemental) 2 tablet 3 Times Daily PRN 11/18/2024 --    Admin Instructions: One tablet contains 200 mg elemental calcium.  Take with food.    Route: Oral    Calcium Replacement - Follow Nurse / BPA Driven Protocol  As Needed 11/15/2024 --    Admin Instructions: Open Order & Select \"[facility] Electrolyte Replacement Protocol Algorithm\" to View Details    Route: Not Applicable    DAPTOmycin (CUBICIN) 450 mg in sodium chloride 0.9 % 50 mL IVPB 6 mg/kg × 72.3 kg Every 24 Hours 11/19/2024 12/3/2024    Admin Instructions: Caution: Look alike/sound alike drug alert.  Refrigerate. Do not shake.    Route: Intravenous    dextrose (D50W) (25 g/50 mL) IV injection 25 g 25 g Every 15 Minutes PRN 11/16/2024 --    Admin Instructions: Blood sugar less than 70; patient has IV access - Unresponsive, NPO or Unable To Safely Swallow    Route: Intravenous    dextrose (GLUTOSE) oral gel 15 g 15 g Every 15 Minutes PRN 11/16/2024 --    Admin Instructions: BS<70, Patient Alert, Is not NPO, Can safely swallow.    Route: Oral    Enoxaparin Sodium (LOVENOX) syringe 40 mg 40 mg Daily 11/15/2024 --    Admin Instructions: Give subcutaneous in abdomen only. Do not massage site after injection.    Route: Subcutaneous    " ertapenem (INVanz) 1,000 mg in sodium chloride 0.9 % 100 mL MBP 1,000 mg Every 24 Hours 11/19/2024 12/3/2024    Admin Instructions: Caution: Look alike/sound alike drug alert.    Route: Intravenous    finasteride (PROSCAR) tablet 5 mg 5 mg Nightly 11/15/2024 --    Admin Instructions: Group 2 (Pink) Hazardous Drug - Reproductive Risk Only - See Handling Guide    Route: Oral    glucagon (GLUCAGEN) injection 1 mg 1 mg Every 15 Minutes PRN 11/16/2024 --    Admin Instructions: Blood Glucose Less Than 70 - Patient Without IV Access - Unresponsive, NPO or Unable To Safely Swallow  Reconstitute powder for injection by adding 1 mL of -supplied sterile diluent or sterile water for injection to a vial containing 1 mg of the drug, to provide solutions containing 1 mg/mL. Shake vial gently to dissolve.    Route: Intramuscular    insulin glargine (LANTUS, SEMGLEE) injection 10 Units 10 Units Nightly 11/17/2024 --    Admin Instructions: Do not hold basal insulin without an order. Consider requesting a dose edit, if needed.      Route: Subcutaneous    insulin glargine (LANTUS, SEMGLEE) injection 15 Units 15 Units Daily 11/18/2024 --    Admin Instructions: Do not hold basal insulin without an order. Consider requesting a dose edit, if needed.      Route: Subcutaneous    Insulin Lispro (humaLOG) injection 2-9 Units 2-9 Units 4 Times Daily Before Meals & Nightly 11/17/2024 --    Admin Instructions: Correction Insulin - Moderate Dose (Total Insulin Dose 40-60 units/day, Average Weight Patient, Patient Taking Oral Hypoglycemic)    Blood Glucose 150-199 mg/dL - 2 units  Blood Glucose 200-249 mg/dL - 4 units  Blood Glucose 250-299 mg/dL - 6 units  Blood Glucose 300-349 mg/dL - 7 units  Blood Glucose 350-400 mg/dL - 8 units  Blood Glucose greater than 400 mg/dL - 9 units & Call Provider   Caution: Look alike/sound alike drug alert    Route: Subcutaneous    Insulin Lispro (humaLOG) injection 7 Units 7 Units 3 Times Daily With  "Meals 11/17/2024 --    Admin Instructions:  Solution should be clear. If cloudy, contact pharmacy for a new vial. Scheduled mealtime doses of this medication should be held if patient NPO.   Caution: Look alike/sound alike drug alert    Route: Subcutaneous    Magnesium Standard Dose Replacement - Follow Nurse / BPA Driven Protocol  As Needed 11/15/2024 --    Admin Instructions: Open Order & Select \"[facility] Electrolyte Replacement Protocol Algorithm\" to View Details    Route: Not Applicable    metoprolol succinate XL (TOPROL-XL) 24 hr tablet 50 mg 50 mg Daily 11/15/2024 --    Admin Instructions: Hold for SBP less than 100, DBP less than 60, or heart rate less than 50    Do not crush or chew the capsules or tablets. The drug may not work as designed if the capsule or tablet is crushed or chewed. Swallow whole.  Do not crush or chew.    Route: Oral    nitroglycerin (NITROSTAT) SL tablet 0.4 mg 0.4 mg Every 5 Minutes PRN 11/15/2024 --    Admin Instructions: If Pain Unrelieved After 3 Doses Notify MD  May administer up to 3 doses per episode.    Route: Sublingual    ondansetron (ZOFRAN) injection 4 mg 4 mg Every 6 Hours PRN 11/20/2024 --    Admin Instructions: \"If multiple N/V medications ordered, use in the following order: Ondansetron, Prochlorperazine, Promethazine. Use PO unless patient refuses or patient unable to swallow.\"    Route: Intravenous    Phosphorus Replacement - Follow Nurse / BPA Driven Protocol  As Needed 11/15/2024 --    Admin Instructions: Open Order & Select \"[facility]HS Electrolyte Replacement Protocol Algorithm\" to View Details    Route: Not Applicable    polyethylene glycol (MIRALAX) packet 17 g 17 g Daily PRN 11/15/2024 --    Admin Instructions: Use if no bowel movement after 12 hours. Mix in 6-8 ounces of water.  Use 4-8 ounces of water, tea, or juice for each 17 gram dose.    Route: Oral    Linked Group 2: Placed in \"And\" Linked Group        Potassium Replacement - Follow Nurse / BPA Driven Protocol  As " "Needed 11/15/2024 --    Admin Instructions: Open Order & Select \"[facility]HS Electrolyte Replacement Protocol Algorithm\" to View Details    Route: Not Applicable    pregabalin (LYRICA) capsule 225 mg 225 mg 2 Times Daily 11/16/2024 --    Admin Instructions:     Route: Oral    rOPINIRole (REQUIP) tablet 0.25 mg 0.25 mg Nightly 11/15/2024 --    Admin Instructions: Caution: Look alike/sound alike drug alert    Route: Oral    sennosides-docusate (PERICOLACE) 8.6-50 MG per tablet 2 tablet 2 tablet 2 Times Daily PRN 11/15/2024 --    Admin Instructions: Start bowel management regimen if patient has not had a bowel movement after 12 hours.    Route: Oral    Linked Group 2: Placed in \"And\" Linked Group        sodium chloride 0.9 % flush 10 mL 10 mL As Needed 11/15/2024 --    Route: Intravenous    Cosign for Ordering: Accepted by Nic Hewitt MD on 11/15/2024  3:10 PM    sodium chloride 0.9 % flush 10 mL 10 mL Every 12 Hours Scheduled 11/15/2024 --    Route: Intravenous    sodium chloride 0.9 % flush 10 mL 10 mL As Needed 11/15/2024 --    Route: Intravenous    sodium chloride 0.9 % flush 10 mL 10 mL Every 12 Hours Scheduled 11/15/2024 --    Route: Intravenous    sodium chloride 0.9 % flush 10 mL 10 mL As Needed 11/15/2024 --    Route: Intravenous    sodium chloride 0.9 % infusion 40 mL 40 mL As Needed 11/15/2024 --    Admin Instructions: Following administration of an IV intermittent medication, flush line with 40mL NS at 100mL/hr.    Route: Intravenous    sodium chloride 0.9 % infusion 40 mL 40 mL As Needed 11/15/2024 --    Admin Instructions: Following administration of an IV intermittent medication, flush line with 40mL NS at 100mL/hr.    Route: Intravenous    tamsulosin (FLOMAX) 24 hr capsule 0.8 mg 0.8 mg Nightly 11/18/2024 --    Admin Instructions: Do not crush or chew the capsules or tablets. The drug may not work as designed if the capsule or tablet is crushed or chewed. Swallow whole. If patient unable to swallow whole, " contact pharmacy for alternative.    Route: Oral              Assessment & Plan   He is a 59-year-old male who has previously established care with Dr. Gan.  He underwent left heel irrigation and debridement with wound vacuum-assisted closure in August 2024.  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      DKA (diabetic ketoacidosis)    Non healing left heel wound    Severe protein-calorie malnutrition    Acute osteomyelitis of left foot      After discussion of risk, benefits, alternatives, he wished to proceed with left below-knee amputation.  He is NPO.    I personally discussed the surgery at length with the patient.  The procedure was discussed using terms that the patient clearly understood, and I reviewed the consent form with the patient personally.  The consent form was then reviewed again, along with other written material about preparation for the surgery, with the medical assistant.  All of the alternatives that I know to treat the patient´s condition, including non-operative care and other surgical procedures, were discussed during the office visit.  We spent a great deal of time talking about the benefit to risk analysis of the treatment options and the proposed surgery.  The risks of the planned procedure include, but are not limited to, the possibility of wound healing problems, nerve damage, tendon damage, infection, incomplete relief of pain, chronic pain, the development of reflex sympathetic dystrophy, loss of limb, death.  The pros and cons of anticoagulation were discussed.  I also had a discussion with the patient regarding the risks of smoking and secondhand smoke, as well as the consequence of noncompliance with the postoperative regimen.  My recommendations about driving a motor vehicle or operating machinery were clearly outlined.      As with any surgery, this patient was told about the possibility of additional surgery.  My qualifications and experience in recommending the procedure  were discussed.  There was adequate time for questions and all the patient´s questions were answered.  The patient was told to call the office before surgery if they had any questions about the operation.  After this discussion, it was my professional opinion that the patient fully understood their medical condition and that proper informed consent had been obtained.          Dru Gan Jr, MD  11/22/24  07:44 EST

## 2024-11-22 NOTE — ANESTHESIA PREPROCEDURE EVALUATION
Anesthesia Evaluation     Patient summary reviewed and Nursing notes reviewed   no history of anesthetic complications:   NPO Solid Status: > 8 hours  NPO Liquid Status: > 8 hours           Airway   Mallampati: I  TM distance: >3 FB  Neck ROM: full  No difficulty expected  Dental    (+) edentulous    Pulmonary    (+) a smoker (occasional cigar now; former heavy smoker) Current, asthma,sleep apnea, decreased breath sounds  Cardiovascular - normal exam    ECG reviewed    (+) hypertension, hyperlipidemia  (-) dysrhythmias, angina, GORDILLO      Neuro/Psych  (+) numbness  (-) seizures, TIA, CVA  GI/Hepatic/Renal/Endo    (+) obesity, GERD poorly controlled, hepatitis, liver disease, renal disease-, diabetes mellitus    Musculoskeletal         ROS comment: left calcaneal osteomyelitis  Abdominal    Substance History      OB/GYN          Other   blood dyscrasia anemia,     ROS/Med Hx Other: H/H 9.4/29.2              Anesthesia Plan    ASA 3     general with block     intravenous induction     Anesthetic plan, risks, benefits, and alternatives have been provided, discussed and informed consent has been obtained with: patient.    Plan discussed with CRNA.    CODE STATUS:    Level Of Support Discussed With: Patient  Code Status (Patient has no pulse and is not breathing): CPR (Attempt to Resuscitate)  Medical Interventions (Patient has pulse or is breathing): Full Support  Comments: Full code per last hosptial admission. Patient disoriented on exam with no family present at bedside. Only contact information is friend.

## 2024-11-22 NOTE — SIGNIFICANT NOTE
11/22/24 0912   SLP Deferred Reason   SLP Deferred Reason Patient unavailable for treatment  (Noted pt NPO for L BKA this afternoon. Will f/u for further swallowing tx when appropriate.)

## 2024-11-22 NOTE — ANESTHESIA PROCEDURE NOTES
Peripheral Block    Pre-sedation assessment completed: 11/22/2024 11:20 AM    Patient reassessed immediately prior to procedure    Patient location during procedure: pre-op  Reason for block: at surgeon's request and post-op pain management  Performed by  CRNA/CAA: Yonatan Jain, CRNA  Assisted by: Janeen Ferrer RNSRNA: Shruthi Salcido SRNA  Preanesthetic Checklist  Completed: patient identified, IV checked, site marked, risks and benefits discussed, surgical consent, monitors and equipment checked, pre-op evaluation and timeout performed  Prep:  Pt Position: right lateral decubitus  Sterile barriers:cap, gloves, mask and washed/disinfected hands  Prep: ChloraPrep  Patient monitoring: blood pressure monitoring, continuous pulse oximetry and EKG  Procedure    Sedation: yes  Performed under: local infiltration  Guidance:ultrasound guided    ULTRASOUND INTERPRETATION.  Using ultrasound guidance a 20 G gauge needle was placed in close proximity to the nerve, at which point, under ultrasound guidance anesthetic was injected in the area of the nerve and spread of the anesthesia was seen on ultrasound in close proximity thereto.  There were no abnormalities seen on ultrasound; a digital image was taken; and the patient tolerated the procedure with no complications. Images:still images obtained, printed/placed on chart    Laterality:left  Block Type:popliteal  Injection Technique:catheter  Needle Type:echogenic and Tuohy  Needle Gauge:18 G  Resistance on Injection: none  Catheter Size:20 G  Cath Depth at skin: 7 cm    Medications Used: bupivacaine PF (MARCAINE) 0.25 % injection - Injection   30 mL - 11/22/2024 11:20:00 AM      Post Assessment  Injection Assessment: negative aspiration for heme, no paresthesia on injection and incremental injection  Patient Tolerance:comfortable throughout block  Complications:no  Additional Notes  CATHETER                               A high-frequency linear transducer, with sterile  "cover, was placed in the popliteal fossa to identify the popliteal artery and vein, Tibial nerve (TN) and Common Peroneal nerve (CP). The transducer was then moved in a cephalad fashion to observe the TN and CP nerve bifurcation to form the Sciatic Nerve. The insertion site was prepped and draped in sterile fashion. Skin and cutaneous tissue was infiltrated with 2-5 ml of 1% Lidocaine. Using ultrasound-guidance, an 18-gauge Contiplex Ultra 360 Touhy needle was advanced in plane from lateral to medial. Preservative-free normal saline was utilized for hydro-dissection of tissue, advancement of Touhy needle, and to confirm final needle placement posterior to the nerves. Local anesthetic injection spread, in incremental 3-5 ml injections, to surround both nerve structures. Aspiration every 5 ml to prevent intravascular injection. Injection was completed with negative aspiration of blood and negative intravascular injection. Injection pressures were normal with minimal resistance. A 20-gauge Contiplex Echo catheter was placed through the needle and advance out the tip of the Touhy 1-3 cm. The Touhy needle was then removed, and final catheter position verified (Below/above or Anterior/Posterior) the nerve structures. The catheter was secured in the usual fashion with skin glue, benzoin, steri-strips, CHG tegaderm and Label noting \"Nerve Block Catheter\". Jerk tape applied at yellow connector and catheter connection.    Performed by: Yonatan Jain, CRNA            "

## 2024-11-23 LAB
GLUCOSE BLDC GLUCOMTR-MCNC: 145 MG/DL (ref 70–130)
GLUCOSE BLDC GLUCOMTR-MCNC: 154 MG/DL (ref 70–130)
GLUCOSE BLDC GLUCOMTR-MCNC: 272 MG/DL (ref 70–130)
GLUCOSE BLDC GLUCOMTR-MCNC: 83 MG/DL (ref 70–130)

## 2024-11-23 PROCEDURE — 63710000001 INSULIN GLARGINE PER 5 UNITS: Performed by: ORTHOPAEDIC SURGERY

## 2024-11-23 PROCEDURE — 25810000003 LACTATED RINGERS PER 1000 ML: Performed by: ORTHOPAEDIC SURGERY

## 2024-11-23 PROCEDURE — 82948 REAGENT STRIP/BLOOD GLUCOSE: CPT

## 2024-11-23 PROCEDURE — 63710000001 INSULIN LISPRO (HUMAN) PER 5 UNITS: Performed by: ORTHOPAEDIC SURGERY

## 2024-11-23 PROCEDURE — 25010000002 ERTAPENEM PER 500 MG: Performed by: ORTHOPAEDIC SURGERY

## 2024-11-23 PROCEDURE — 25010000002 ENOXAPARIN PER 10 MG: Performed by: ORTHOPAEDIC SURGERY

## 2024-11-23 PROCEDURE — 99232 SBSQ HOSP IP/OBS MODERATE 35: CPT | Performed by: INTERNAL MEDICINE

## 2024-11-23 PROCEDURE — 25010000002 DAPTOMYCIN PER 1 MG: Performed by: ORTHOPAEDIC SURGERY

## 2024-11-23 RX ADMIN — INSULIN GLARGINE 15 UNITS: 100 INJECTION, SOLUTION SUBCUTANEOUS at 08:38

## 2024-11-23 RX ADMIN — TAMSULOSIN HYDROCHLORIDE 0.8 MG: 0.4 CAPSULE ORAL at 21:19

## 2024-11-23 RX ADMIN — INSULIN LISPRO 2 UNITS: 100 INJECTION, SOLUTION INTRAVENOUS; SUBCUTANEOUS at 12:25

## 2024-11-23 RX ADMIN — INSULIN LISPRO 12 UNITS: 100 INJECTION, SOLUTION INTRAVENOUS; SUBCUTANEOUS at 12:26

## 2024-11-23 RX ADMIN — ROPINIROLE 0.25 MG: 0.5 TABLET, FILM COATED ORAL at 21:19

## 2024-11-23 RX ADMIN — FINASTERIDE 5 MG: 5 TABLET, FILM COATED ORAL at 21:19

## 2024-11-23 RX ADMIN — INSULIN LISPRO 6 UNITS: 100 INJECTION, SOLUTION INTRAVENOUS; SUBCUTANEOUS at 08:39

## 2024-11-23 RX ADMIN — HYDROCODONE BITARTRATE AND ACETAMINOPHEN 1 TABLET: 5; 325 TABLET ORAL at 05:47

## 2024-11-23 RX ADMIN — AMITRIPTYLINE HYDROCHLORIDE 25 MG: 25 TABLET, FILM COATED ORAL at 08:40

## 2024-11-23 RX ADMIN — Medication 10 ML: at 08:41

## 2024-11-23 RX ADMIN — SODIUM CHLORIDE, SODIUM LACTATE, POTASSIUM CHLORIDE, CALCIUM CHLORIDE 9 ML/HR: 20; 30; 600; 310 INJECTION, SOLUTION INTRAVENOUS at 08:54

## 2024-11-23 RX ADMIN — INSULIN GLARGINE 10 UNITS: 100 INJECTION, SOLUTION SUBCUTANEOUS at 21:19

## 2024-11-23 RX ADMIN — PANTOPRAZOLE SODIUM 40 MG: 40 TABLET, DELAYED RELEASE ORAL at 05:47

## 2024-11-23 RX ADMIN — PREGABALIN 225 MG: 75 CAPSULE ORAL at 08:40

## 2024-11-23 RX ADMIN — Medication 10 ML: at 21:22

## 2024-11-23 RX ADMIN — METOPROLOL SUCCINATE 50 MG: 50 TABLET, EXTENDED RELEASE ORAL at 08:40

## 2024-11-23 RX ADMIN — PREGABALIN 225 MG: 75 CAPSULE ORAL at 21:19

## 2024-11-23 RX ADMIN — AMITRIPTYLINE HYDROCHLORIDE 25 MG: 25 TABLET, FILM COATED ORAL at 21:19

## 2024-11-23 RX ADMIN — DAPTOMYCIN 450 MG: 500 INJECTION, POWDER, LYOPHILIZED, FOR SOLUTION INTRAVENOUS at 08:39

## 2024-11-23 RX ADMIN — INSULIN LISPRO 12 UNITS: 100 INJECTION, SOLUTION INTRAVENOUS; SUBCUTANEOUS at 08:38

## 2024-11-23 RX ADMIN — HYDROCODONE BITARTRATE AND ACETAMINOPHEN 1 TABLET: 5; 325 TABLET ORAL at 12:25

## 2024-11-23 RX ADMIN — ERTAPENEM 1000 MG: 1 INJECTION INTRAMUSCULAR; INTRAVENOUS at 08:39

## 2024-11-23 RX ADMIN — ASPIRIN 81 MG: 81 TABLET, COATED ORAL at 08:39

## 2024-11-23 RX ADMIN — ENOXAPARIN SODIUM 40 MG: 100 INJECTION SUBCUTANEOUS at 08:41

## 2024-11-23 RX ADMIN — CALCIUM CARBONATE (ANTACID) CHEW TAB 500 MG 2 TABLET: 500 CHEW TAB at 05:47

## 2024-11-23 RX ADMIN — Medication 10 ML: at 21:21

## 2024-11-23 NOTE — PROGRESS NOTES
Fracisco Mullen       LOS: 8 days   Patient Care Team:  Brandy Roberson as PCP - General (Family Medicine)  Varun Fontenot MD as Consulting Physician (Cardiology)  Yulissa Taveras APRN as Nurse Practitioner (Cardiology)    Chief Complaint:  left calcaneal osteomyelitis    Subjective     Interval History:     Resting comfortably in bed. Pain controlled. No acute events overnight    History taken from: patient    Review of Systems:   The following systems were reviewed and negative     Gen - No fevers, chills  CV - No chest pain, palpitations  Resp - No cough, dyspnea  GI - No N/V/D, abdominal pain    Objective     Vital Signs  Vital Signs (last 24 hours)         11/19 0700  11/20 0659 11/20 0700  11/20 0858   Most Recent      Temp (°F) 97.7 -  98.2       98 (36.7) 11/19 2042    Heart Rate 90 -  103       103 11/20 0530    Resp 16 -  18       16 11/19 2042    BP 73/59 -  115/74       115/74 11/19 1425    SpO2 (%) 92 -  96       92 11/20 0530    Flow (L/min) (Oxygen Therapy)   2       2 11/20 0609              Physical Exam:  No acute distress  Nonlabored respirations  Regular rate and rhythm  Abdomen nondistended  Left lower extremity:  Operative dressing in place, drain with serosanguineous output.     Results Review:     I reviewed the patient's new clinical results.    Medication Review:   Hospital Medications (active)         Dose Frequency Start End    acetaminophen (TYLENOL) 160 MG/5ML oral solution 650 mg 650 mg Every 4 Hours PRN 11/15/2024 --    Admin Instructions: If given for fever, use fever parameter: fever greater than 100.4 °F  Based on patient request - if ordered for moderate or severe pain, provider allows for administration of a medication prescribed for a lower pain scale.    Do not exceed 4 grams of acetaminophen in a 24 hr period. Max dose of 2gm for AST/ALT greater than 120 units/L.    If given for pain, use the following pain scale:   Mild Pain = Pain Score of 1-3, CPOT 1-2  Moderate  "Pain = Pain Score of 4-6, CPOT 3-4  Severe Pain = Pain Score of 7-10, CPOT 5-8    Route: Oral    Linked Group 1: Placed in \"Or\" Linked Group        acetaminophen (TYLENOL) suppository 650 mg 650 mg Every 4 Hours PRN 11/15/2024 --    Admin Instructions: If given for fever, use fever parameter: fever greater than 100.4 °F  Based on patient request - if ordered for moderate or severe pain, provider allows for administration of a medication prescribed for a lower pain scale.    Do not exceed 4 grams of acetaminophen in a 24 hr period. Max dose of 2gm for AST/ALT greater than 120 units/L.    If given for pain, use the following pain scale:   Mild Pain = Pain Score of 1-3, CPOT 1-2  Moderate Pain = Pain Score of 4-6, CPOT 3-4  Severe Pain = Pain Score of 7-10, CPOT 5-8    Route: Rectal    Linked Group 1: Placed in \"Or\" Linked Group        acetaminophen (TYLENOL) tablet 650 mg 650 mg Every 4 Hours PRN 11/15/2024 --    Admin Instructions: If given for fever, use fever parameter: fever greater than 100.4 °F  Based on patient request - if ordered for moderate or severe pain, provider allows for administration of a medication prescribed for a lower pain scale.    Do not exceed 4 grams of acetaminophen in a 24 hr period. Max dose of 2gm for AST/ALT greater than 120 units/L.    If given for pain, use the following pain scale:   Mild Pain = Pain Score of 1-3, CPOT 1-2  Moderate Pain = Pain Score of 4-6, CPOT 3-4  Severe Pain = Pain Score of 7-10, CPOT 5-8    Route: Oral    Linked Group 1: Placed in \"Or\" Linked Group        amitriptyline (ELAVIL) tablet 25 mg 25 mg 2 Times Daily 11/15/2024 --    Route: Oral    aspirin EC tablet 81 mg 81 mg Daily 11/15/2024 --    Admin Instructions: Do not crush or chew the capsules or tablets. The drug may not work as designed if the capsule or tablet is crushed or chewed. Swallow whole.  Do not exceed 4 grams of aspirin in a 24 hr period.    If given for pain, use the following pain scale:   Mild " "Pain = Pain Score of 1-3, CPOT 1-2  Moderate Pain = Pain Score of 4-6, CPOT 3-4  Severe Pain = Pain Score of 7-10, CPOT 5-8    Route: Oral    bisacodyl (DULCOLAX) EC tablet 5 mg 5 mg Daily PRN 11/15/2024 --    Admin Instructions: Use if no bowel movement after 12 hours.  Swallow whole. Do not crush, split, or chew tablet.    Route: Oral    Linked Group 2: Placed in \"And\" Linked Group        bisacodyl (DULCOLAX) suppository 10 mg 10 mg Daily PRN 11/15/2024 --    Admin Instructions: Use if no bowel movement after 12 hours.  Hold for diarrhea    Route: Rectal    Linked Group 2: Placed in \"And\" Linked Group        calcium carbonate (TUMS) chewable tablet 500 mg (200 mg elemental) 2 tablet 3 Times Daily PRN 11/18/2024 --    Admin Instructions: One tablet contains 200 mg elemental calcium.  Take with food.    Route: Oral    Calcium Replacement - Follow Nurse / BPA Driven Protocol  As Needed 11/15/2024 --    Admin Instructions: Open Order & Select \"[facility] Electrolyte Replacement Protocol Algorithm\" to View Details    Route: Not Applicable    DAPTOmycin (CUBICIN) 450 mg in sodium chloride 0.9 % 50 mL IVPB 6 mg/kg × 72.3 kg Every 24 Hours 11/19/2024 12/3/2024    Admin Instructions: Caution: Look alike/sound alike drug alert.  Refrigerate. Do not shake.    Route: Intravenous    dextrose (D50W) (25 g/50 mL) IV injection 25 g 25 g Every 15 Minutes PRN 11/16/2024 --    Admin Instructions: Blood sugar less than 70; patient has IV access - Unresponsive, NPO or Unable To Safely Swallow    Route: Intravenous    dextrose (GLUTOSE) oral gel 15 g 15 g Every 15 Minutes PRN 11/16/2024 --    Admin Instructions: BS<70, Patient Alert, Is not NPO, Can safely swallow.    Route: Oral    Enoxaparin Sodium (LOVENOX) syringe 40 mg 40 mg Daily 11/15/2024 --    Admin Instructions: Give subcutaneous in abdomen only. Do not massage site after injection.    Route: Subcutaneous    ertapenem (INVanz) 1,000 mg in sodium chloride 0.9 % 100 mL MBP 1,000 mg " Every 24 Hours 11/19/2024 12/3/2024    Admin Instructions: Caution: Look alike/sound alike drug alert.    Route: Intravenous    finasteride (PROSCAR) tablet 5 mg 5 mg Nightly 11/15/2024 --    Admin Instructions: Group 2 (Pink) Hazardous Drug - Reproductive Risk Only - See Handling Guide    Route: Oral    glucagon (GLUCAGEN) injection 1 mg 1 mg Every 15 Minutes PRN 11/16/2024 --    Admin Instructions: Blood Glucose Less Than 70 - Patient Without IV Access - Unresponsive, NPO or Unable To Safely Swallow  Reconstitute powder for injection by adding 1 mL of -supplied sterile diluent or sterile water for injection to a vial containing 1 mg of the drug, to provide solutions containing 1 mg/mL. Shake vial gently to dissolve.    Route: Intramuscular    insulin glargine (LANTUS, SEMGLEE) injection 10 Units 10 Units Nightly 11/17/2024 --    Admin Instructions: Do not hold basal insulin without an order. Consider requesting a dose edit, if needed.      Route: Subcutaneous    insulin glargine (LANTUS, SEMGLEE) injection 15 Units 15 Units Daily 11/18/2024 --    Admin Instructions: Do not hold basal insulin without an order. Consider requesting a dose edit, if needed.      Route: Subcutaneous    Insulin Lispro (humaLOG) injection 2-9 Units 2-9 Units 4 Times Daily Before Meals & Nightly 11/17/2024 --    Admin Instructions: Correction Insulin - Moderate Dose (Total Insulin Dose 40-60 units/day, Average Weight Patient, Patient Taking Oral Hypoglycemic)    Blood Glucose 150-199 mg/dL - 2 units  Blood Glucose 200-249 mg/dL - 4 units  Blood Glucose 250-299 mg/dL - 6 units  Blood Glucose 300-349 mg/dL - 7 units  Blood Glucose 350-400 mg/dL - 8 units  Blood Glucose greater than 400 mg/dL - 9 units & Call Provider   Caution: Look alike/sound alike drug alert    Route: Subcutaneous    Insulin Lispro (humaLOG) injection 7 Units 7 Units 3 Times Daily With Meals 11/17/2024 --    Admin Instructions:  Solution should be clear. If  "cloudy, contact pharmacy for a new vial. Scheduled mealtime doses of this medication should be held if patient NPO.   Caution: Look alike/sound alike drug alert    Route: Subcutaneous    Magnesium Standard Dose Replacement - Follow Nurse / BPA Driven Protocol  As Needed 11/15/2024 --    Admin Instructions: Open Order & Select \"[facility] Electrolyte Replacement Protocol Algorithm\" to View Details    Route: Not Applicable    metoprolol succinate XL (TOPROL-XL) 24 hr tablet 50 mg 50 mg Daily 11/15/2024 --    Admin Instructions: Hold for SBP less than 100, DBP less than 60, or heart rate less than 50    Do not crush or chew the capsules or tablets. The drug may not work as designed if the capsule or tablet is crushed or chewed. Swallow whole.  Do not crush or chew.    Route: Oral    nitroglycerin (NITROSTAT) SL tablet 0.4 mg 0.4 mg Every 5 Minutes PRN 11/15/2024 --    Admin Instructions: If Pain Unrelieved After 3 Doses Notify MD  May administer up to 3 doses per episode.    Route: Sublingual    ondansetron (ZOFRAN) injection 4 mg 4 mg Every 6 Hours PRN 11/20/2024 --    Admin Instructions: \"If multiple N/V medications ordered, use in the following order: Ondansetron, Prochlorperazine, Promethazine. Use PO unless patient refuses or patient unable to swallow.\"    Route: Intravenous    Phosphorus Replacement - Follow Nurse / BPA Driven Protocol  As Needed 11/15/2024 --    Admin Instructions: Open Order & Select \"BHS Electrolyte Replacement Protocol Algorithm\" to View Details    Route: Not Applicable    polyethylene glycol (MIRALAX) packet 17 g 17 g Daily PRN 11/15/2024 --    Admin Instructions: Use if no bowel movement after 12 hours. Mix in 6-8 ounces of water.  Use 4-8 ounces of water, tea, or juice for each 17 gram dose.    Route: Oral    Linked Group 2: Placed in \"And\" Linked Group        Potassium Replacement - Follow Nurse / BPA Driven Protocol  As Needed 11/15/2024 --    Admin Instructions: Open Order & Select \"B[facility] " "Electrolyte Replacement Protocol Algorithm\" to View Details    Route: Not Applicable    pregabalin (LYRICA) capsule 225 mg 225 mg 2 Times Daily 11/16/2024 --    Admin Instructions:     Route: Oral    rOPINIRole (REQUIP) tablet 0.25 mg 0.25 mg Nightly 11/15/2024 --    Admin Instructions: Caution: Look alike/sound alike drug alert    Route: Oral    sennosides-docusate (PERICOLACE) 8.6-50 MG per tablet 2 tablet 2 tablet 2 Times Daily PRN 11/15/2024 --    Admin Instructions: Start bowel management regimen if patient has not had a bowel movement after 12 hours.    Route: Oral    Linked Group 2: Placed in \"And\" Linked Group        sodium chloride 0.9 % flush 10 mL 10 mL As Needed 11/15/2024 --    Route: Intravenous    Cosign for Ordering: Accepted by Nic Hewitt MD on 11/15/2024  3:10 PM    sodium chloride 0.9 % flush 10 mL 10 mL Every 12 Hours Scheduled 11/15/2024 --    Route: Intravenous    sodium chloride 0.9 % flush 10 mL 10 mL As Needed 11/15/2024 --    Route: Intravenous    sodium chloride 0.9 % flush 10 mL 10 mL Every 12 Hours Scheduled 11/15/2024 --    Route: Intravenous    sodium chloride 0.9 % flush 10 mL 10 mL As Needed 11/15/2024 --    Route: Intravenous    sodium chloride 0.9 % infusion 40 mL 40 mL As Needed 11/15/2024 --    Admin Instructions: Following administration of an IV intermittent medication, flush line with 40mL NS at 100mL/hr.    Route: Intravenous    sodium chloride 0.9 % infusion 40 mL 40 mL As Needed 11/15/2024 --    Admin Instructions: Following administration of an IV intermittent medication, flush line with 40mL NS at 100mL/hr.    Route: Intravenous    tamsulosin (FLOMAX) 24 hr capsule 0.8 mg 0.8 mg Nightly 11/18/2024 --    Admin Instructions: Do not crush or chew the capsules or tablets. The drug may not work as designed if the capsule or tablet is crushed or chewed. Swallow whole. If patient unable to swallow whole, contact pharmacy for alternative.    Route: Oral      "         Assessment & Plan   He is a 59-year-old male status post left below-knee amputation November 22, 2024.      DKA (diabetic ketoacidosis)    Non healing left heel wound    Severe protein-calorie malnutrition    Acute osteomyelitis of left foot    Nonweightbearing left lower extremity.  Follow intraoperative cultures.  Plan for dressing change, drain removal tomorrow.    To begin postoperative day three:    Daily dressing change:    Xeroform  4x4  Kerlix  Ace     Follow up in 2 weeks with Claudia Lim PA-C or Chris Barone PA-C.    [facility]  [address], [address]  [facility], [address]  Please schedule at [phone]    Dru Gan Jr, MD  11/23/24  07:28 EST

## 2024-11-23 NOTE — PLAN OF CARE
Goal Outcome Evaluation:              Outcome Evaluation: Pt on RA. VSS. NSR. BKA performed today, benny drain in place, and nerve block in place. Patient A&O x3-x4. Patient currently in bed resting with call light in reach.

## 2024-11-23 NOTE — PROGRESS NOTES
[facility]  PROGRESS NOTE    Patient Name: Fracisco Mullen  : 1965  MRN: [id number]    Date of Admission: 11/15/2024  Primary Care Physician: Brandy Roberson    Subjective   Subjective     CC:  F/u DKA    HPI:  Resting in bed in no acute distress and feels okay except pain in the left lower extremity is not well-controlled.  Currently patient still has nerve block.  Denies any fever or chills.  No chest pain palpitation shortness of breath.      Objective   Objective     Vital Signs:   Temp:  [98 °F (36.7 °C)-99.9 °F (37.7 °C)] 99.9 °F (37.7 °C)  Heart Rate:  [] 104  Resp:  [14-18] 18  BP: (107-143)/(63-93) 143/76  Flow (L/min) (Oxygen Therapy):  [2] 2     Physical Exam:  Constitutional: No acute distress  HENT: NCAT, mucous membranes dry  Respiratory: Clear to auscultation bilaterally, nonlabored respirations   Cardiovascular: RRR, no murmurs, rubs, or gallops, no bilateral ankle edema  Gastrointestinal: Soft, nontender, non distended  Musculoskeletal: Left below knee amputation  Psychiatric: Appropriate affect, cooperative  Neurologic: Alert and oriented, speech clear      Results Reviewed:  LAB RESULTS:      Lab 24  0606 24  0701 24  0550 24  0717 24  0608   WBC 10.48 11.11* 15.93* 9.33 6.74   HEMOGLOBIN 9.4* 9.4* 10.5* 12.0* 11.2*   HEMATOCRIT 29.2* 30.1* 34.3* 38.6 35.4*   PLATELETS 271 266 262 276 242   NEUTROS ABS 6.58 7.82*  --   --   --    IMMATURE GRANS (ABS) 0.08* 0.06*  --   --   --    LYMPHS ABS 2.37 2.17  --   --   --    MONOS ABS 1.10* 0.76  --   --   --    EOS ABS 0.26 0.22  --   --   --    MCV 80.9 82.9 84.3 82.8 80.6         Lab 24  0606 24  0701 24  0550 24  0717 24  0608   SODIUM 137 133* 133* 134* 135*   POTASSIUM 4.0 4.0 4.4 3.9 4.0   CHLORIDE 98 97* 97* 97* 99   CO2 32.0* 29.0 24.0 27.0 25.0   ANION GAP 7.0 7.0 12.0 10.0 11.0   BUN 19 31* 35* 20 22   CREATININE 0.73*  0.85 0.98 1.00 0.88   EGFR 103.5 98.9 87.7 85.6 97.8   GLUCOSE 160* 227* 433* 279* 270*   CALCIUM 9.1 9.3 8.4* 8.8 8.2*                         Lab 11/22/24  1054   ABO TYPING O   RH TYPING Positive   ANTIBODY SCREEN Negative           Brief Urine Lab Results  (Last result in the past 365 days)        Color   Clarity   Blood   Leuk Est   Nitrite   Protein   CREAT   Urine HCG        11/15/24 1142 Yellow   Clear   Small (1+)   Negative   Negative   100 mg/dL (2+)                   Microbiology Results Abnormal       None            Peripheral Block    Result Date: 11/22/2024  Yonatan Jain CRNA     11/22/2024 11:40 AM Peripheral Block Pre-sedation assessment completed: 11/22/2024 11:20 AM Patient reassessed immediately prior to procedure Patient location during procedure: pre-op Reason for block: at surgeon's request and post-op pain management Performed by CRNA/CAA: Yonatan Jain, ROGELIO Assisted by: Janeen Ferrer RNSRNA: Shruthi Salcido SRNA Preanesthetic Checklist Completed: patient identified, IV checked, site marked, risks and benefits discussed, surgical consent, monitors and equipment checked, pre-op evaluation and timeout performed Prep: Pt Position: right lateral decubitus Sterile barriers:cap, gloves, mask and washed/disinfected hands Prep: ChloraPrep Patient monitoring: blood pressure monitoring, continuous pulse oximetry and EKG Procedure Sedation: yes Performed under: local infiltration Guidance:ultrasound guided ULTRASOUND INTERPRETATION.  Using ultrasound guidance a 20 G gauge needle was placed in close proximity to the nerve, at which point, under ultrasound guidance anesthetic was injected in the area of the nerve and spread of the anesthesia was seen on ultrasound in close proximity thereto.  There were no abnormalities seen on ultrasound; a digital image was taken; and the patient tolerated the procedure with no complications. Images:still images obtained, printed/placed on chart  "Laterality:left Block Type:popliteal Injection Technique:catheter Needle Type:echogenic and Tuohy Needle Gauge:18 G Resistance on Injection: none Catheter Size:20 G Cath Depth at skin: 7 cm Medications Used: bupivacaine PF (MARCAINE) 0.25 % injection - Injection  30 mL - 11/22/2024 11:20:00 AM Post Assessment Injection Assessment: negative aspiration for heme, no paresthesia on injection and incremental injection Patient Tolerance:comfortable throughout block Complications:no Additional Notes CATHETER                             A high-frequency linear transducer, with sterile cover, was placed in the popliteal fossa to identify the popliteal artery and vein, Tibial nerve (TN) and Common Peroneal nerve (CP). The transducer was then moved in a cephalad fashion to observe the TN and CP nerve bifurcation to form the Sciatic Nerve. The insertion site was prepped and draped in sterile fashion. Skin and cutaneous tissue was infiltrated with 2-5 ml of 1% Lidocaine. Using ultrasound-guidance, an 18-gauge Contiplex Ultra 360 Touhy needle was advanced in plane from lateral to medial. Preservative-free normal saline was utilized for hydro-dissection of tissue, advancement of Touhy needle, and to confirm final needle placement posterior to the nerves. Local anesthetic injection spread, in incremental 3-5 ml injections, to surround both nerve structures. Aspiration every 5 ml to prevent intravascular injection. Injection was completed with negative aspiration of blood and negative intravascular injection. Injection pressures were normal with minimal resistance. A 20-gauge Contiplex Echo catheter was placed through the needle and advance out the tip of the Touhy 1-3 cm. The Touhy needle was then removed, and final catheter position verified (Below/above or Anterior/Posterior) the nerve structures. The catheter was secured in the usual fashion with skin glue, benzoin, steri-strips, CHG tegaderm and Label noting \"Nerve Block " "Catheter\". Jerk tape applied at yellow connector and catheter connection. Performed by: Yonatan Jain CRNA     Peripheral Block    Result Date: 11/22/2024  Yonatan Jain CRNA     11/22/2024 11:42 AM Peripheral Block Patient reassessed immediately prior to procedure Patient location during procedure: pre-op Start time: 11/22/2024 11:06 AM Reason for block: at surgeon's request and post-op pain management Performed by ROGELIO/CAA: Yonatan Jain, ROGELIO Assisted by: Janeen Ferrer RNSRNA: Shruthi Salcido SRNA Preanesthetic Checklist Completed: patient identified, IV checked, site marked, risks and benefits discussed, surgical consent, monitors and equipment checked, pre-op evaluation and timeout performed Prep: Pt Position: supine Sterile barriers:gloves, cap, sterile barriers, mask and washed/disinfected hands Prep: ChloraPrep Patient monitoring: blood pressure monitoring, continuous pulse oximetry and EKG Procedure Sedation: yes Guidance:ultrasound guided ULTRASOUND INTERPRETATION.  Using ultrasound guidance a 20 G gauge needle was placed in close proximity to the femoral nerve, at which point, under ultrasound guidance anesthetic was injected in the area of the nerve and spread of the anesthesia was seen on ultrasound in close proximity thereto.  There were no abnormalities seen on ultrasound; a digital image was taken; and the patient tolerated the procedure with no complications. Images:still images obtained, printed/placed on chart Laterality:left Block Type:femoral Injection Technique:single-shot Needle Type:short-bevel Needle Gauge:20 G Resistance on Injection: none Medications Used: bupivacaine (PF) (MARCAINE) 0.5 % injection - Injection  30 mL - 11/22/2024 11:06:00 AM fentaNYL citrate (PF) (SUBLIMAZE) injection - Intravenous  100 mcg - 11/22/2024 11:06:00 AM dexamethasone sodium phosphate injection - Injection  2 mg - 11/22/2024 11:06:00 AM Post Assessment Injection Assessment: negative aspiration for heme, " "no paresthesia on injection and incremental injection Patient Tolerance:comfortable throughout block Complications:no Additional Notes SINGLE Shot A high-frequency linear transducer, with sterile cover, was placed in the inguinal crease to visualize the Femoral Vein, Artery, and Nerve (medial to lateral). The insertion site was prepped in sterile fashion. Skin and cutaneous tissue was infiltrated with 2-5 ml of 1% Lidocaine. Using ultrasound-guidance, a 20-gauge B-Henderson 4\" Ultraplex 360 non-stimulating echogenic needle was then inserted and advanced in-plane from lateral to medial with ultrasound guidance. The needle was directed below Fascia Iliacus towards the Femoral nerve. Preservative-free normal saline was utilized for hydro-dissection of tissue. Local anesthetic injection spread, in incremental 3-5 ml injections, was visualized lateral to the artery to surround the femoral nerve. Aspiration every 5 ml to prevent intravascular injection. Injection was completed with negative aspiration of blood and negative intravascular injection. Injection pressures were normal with minimal resistance. Performed by: Yonatan Jain CRNA         Current medications:  Scheduled Meds:amitriptyline, 25 mg, Oral, BID  aspirin, 81 mg, Oral, Daily  DAPTOmycin, 6 mg/kg, Intravenous, Q24H  enoxaparin, 40 mg, Subcutaneous, Daily  ertapenem, 1,000 mg, Intravenous, Q24H  finasteride, 5 mg, Oral, Nightly  insulin glargine, 10 Units, Subcutaneous, Nightly  insulin glargine, 15 Units, Subcutaneous, Daily  Insulin Lispro, 12 Units, Subcutaneous, TID With Meals  insulin lispro, 2-9 Units, Subcutaneous, 4x Daily AC & at Bedtime  metoprolol succinate XL, 50 mg, Oral, Daily  pantoprazole, 40 mg, Oral, Q AM  pregabalin, 225 mg, Oral, BID  rOPINIRole, 0.25 mg, Oral, Nightly  sodium chloride, 10 mL, Intravenous, Q12H  sodium chloride, 10 mL, Intravenous, Q12H  tamsulosin, 0.8 mg, Oral, Nightly      Continuous Infusions:lactated ringers, 9 mL/hr, " Last Rate: 9 mL/hr (11/22/24 1749)  ropivacaine, , Last Rate: 1 mL/hr at 11/23/24 0700      PRN Meds:.  acetaminophen **OR** acetaminophen **OR** acetaminophen    senna-docusate sodium **AND** polyethylene glycol **AND** bisacodyl **AND** bisacodyl    calcium carbonate    Calcium Replacement - Follow Nurse / BPA Driven Protocol    dextrose    dextrose    glucagon (human recombinant)    HYDROcodone-acetaminophen    influenza vaccine    Lidocaine HCl gel    Magnesium Standard Dose Replacement - Follow Nurse / BPA Driven Protocol    nitroglycerin    ondansetron    Phosphorus Replacement - Follow Nurse / BPA Driven Protocol    Potassium Replacement - Follow Nurse / BPA Driven Protocol    sodium chloride    sodium chloride    Assessment & Plan   Assessment & Plan     Active Hospital Problems    Diagnosis  POA    **DKA (diabetic ketoacidosis) [E11.10]  Yes    Severe protein-calorie malnutrition [E43]  Yes    Non healing left heel wound [S91.302A]  Yes    Acute osteomyelitis of left foot [M86.172]  Unknown      Resolved Hospital Problems   No resolved problems to display.        Brief Hospital Course to date:  Fracisco Mullen is a 59 y.o. male with past medical history of hypertension, type 2 diabetes with insulin use, diabetic neuropathy, and chronic diabetic foot wounds who presents via EMS from home due to altered mental status and fatigue. Lab work consistent with diabetic ketoacidosis.     This patient's problems and plans were partially entered by my partner and updated as appropriate by me 11/23/24. Copied text in this note has been reviewed and is accurate as of today's date.      Chronic bilateral foot wounds  - Patient with chronic left heel wound and right plantar foot wound  - Admitted August 2024 for left foot cellulitis and discharged on oral antibiotics and with home wound vac following debridement, MRI left foot obtained at that time with no definite findings of osteomyelitis (was seen by ID/Dr. Dominique and  Orthopedics/Dr. Gan)  - Repeat MRI left foot shows soft tissue wound with evidence of osteomyelitis of the calcaneus and possibly an associated non-drainable abscess  -S/p left below-knee amputation by Dr. Gan on 11/22/2024 without any immediate complications    BPH  Urinary retention  - Continue home Flomax, Proscar      DKA in setting of uncontrolled IDDM complicated by diabetic neuropathy and chronic foot wounds , improving  - Uncertain of medication compliance  - A1c 14.1%  - S/p DKA protocol including insulin drip, IV fluids, and electrolyte replacement per protocol  - Now transitioned to basal-bolus insulin, will likely need titration as needed  - DM educator consulted    Malnutrition  - Patient with poor oral intake, refusing supplementation  - Appears dry on exam, gentle IVF  - Nutrition following     Acute toxic metabolic encephalopathy, improved  - CT head without acute abnormality  - UDS negative  - Likely secondary to DKA     HTN  - Continue home metoprolol     Mood disorder  - Continue home Amitriptyline      RLS  - Continue home Requip    Expected Discharge Location and Transportation: Rehab  Expected Discharge   Expected Discharge Date: 11/25/2024; Expected Discharge Time:      VTE Prophylaxis:  Pharmacologic VTE prophylaxis orders are present.         AM-PAC 6 Clicks Score (PT): 11 (11/22/24 2000)    CODE STATUS:   Code Status and Medical Interventions: CPR (Attempt to Resuscitate); Full Support; Full code per last hosptial admission. Patient disoriented on exam with no family present at bedside. Only contact information is friend.   Ordered at: 11/15/24 1212     Level Of Support Discussed With:    Patient     Code Status (Patient has no pulse and is not breathing):    CPR (Attempt to Resuscitate)     Medical Interventions (Patient has pulse or is breathing):    Full Support     Comments:    Full code per last hosptial admission. Patient disoriented on exam with no family present at  bedside. Only contact information is friend.       Mahad Mijares MD  11/23/24

## 2024-11-23 NOTE — PLAN OF CARE
Goal Outcome Evaluation:   NAEO. Oral pain medication given this AM.

## 2024-11-23 NOTE — PLAN OF CARE
Goal Outcome Evaluation:              Outcome Evaluation: Pt on RA. A&Ox4. VSS. NSR. JENNY in place and nerve block in place. Patient currently in bed resting with call light in reach.

## 2024-11-24 LAB
ANION GAP SERPL CALCULATED.3IONS-SCNC: 9 MMOL/L (ref 5–15)
BASOPHILS # BLD AUTO: 0.09 10*3/MM3 (ref 0–0.2)
BASOPHILS NFR BLD AUTO: 0.7 % (ref 0–1.5)
BUN SERPL-MCNC: 11 MG/DL (ref 8–23)
BUN/CREAT SERPL: 16.7 (ref 7–25)
CALCIUM SPEC-SCNC: 7.9 MG/DL (ref 8.6–10.5)
CHLORIDE SERPL-SCNC: 100 MMOL/L (ref 98–107)
CO2 SERPL-SCNC: 26 MMOL/L (ref 22–29)
CREAT SERPL-MCNC: 0.66 MG/DL (ref 0.76–1.27)
DEPRECATED RDW RBC AUTO: 48.6 FL (ref 37–54)
EGFRCR SERPLBLD CKD-EPI 2021: 106.7 ML/MIN/1.73
EOSINOPHIL # BLD AUTO: 0.08 10*3/MM3 (ref 0–0.4)
EOSINOPHIL NFR BLD AUTO: 0.6 % (ref 0.3–6.2)
ERYTHROCYTE [DISTWIDTH] IN BLOOD BY AUTOMATED COUNT: 16.2 % (ref 12.3–15.4)
GLUCOSE BLDC GLUCOMTR-MCNC: 106 MG/DL (ref 70–130)
GLUCOSE BLDC GLUCOMTR-MCNC: 112 MG/DL (ref 70–130)
GLUCOSE BLDC GLUCOMTR-MCNC: 214 MG/DL (ref 70–130)
GLUCOSE BLDC GLUCOMTR-MCNC: 283 MG/DL (ref 70–130)
GLUCOSE SERPL-MCNC: 262 MG/DL (ref 65–99)
HCT VFR BLD AUTO: 28.7 % (ref 37.5–51)
HGB BLD-MCNC: 8.9 G/DL (ref 13–17.7)
IMM GRANULOCYTES # BLD AUTO: 0.13 10*3/MM3 (ref 0–0.05)
IMM GRANULOCYTES NFR BLD AUTO: 1.1 % (ref 0–0.5)
LYMPHOCYTES # BLD AUTO: 1.62 10*3/MM3 (ref 0.7–3.1)
LYMPHOCYTES NFR BLD AUTO: 13.1 % (ref 19.6–45.3)
MAGNESIUM SERPL-MCNC: 1.4 MG/DL (ref 1.6–2.4)
MCH RBC QN AUTO: 26 PG (ref 26.6–33)
MCHC RBC AUTO-ENTMCNC: 31 G/DL (ref 31.5–35.7)
MCV RBC AUTO: 83.9 FL (ref 79–97)
MONOCYTES # BLD AUTO: 1.73 10*3/MM3 (ref 0.1–0.9)
MONOCYTES NFR BLD AUTO: 14 % (ref 5–12)
NEUTROPHILS NFR BLD AUTO: 70.5 % (ref 42.7–76)
NEUTROPHILS NFR BLD AUTO: 8.73 10*3/MM3 (ref 1.7–7)
NRBC BLD AUTO-RTO: 0 /100 WBC (ref 0–0.2)
PHOSPHATE SERPL-MCNC: 2.6 MG/DL (ref 2.5–4.5)
PLATELET # BLD AUTO: 298 10*3/MM3 (ref 140–450)
PMV BLD AUTO: 11.3 FL (ref 6–12)
POTASSIUM SERPL-SCNC: 4.1 MMOL/L (ref 3.5–5.2)
RBC # BLD AUTO: 3.42 10*6/MM3 (ref 4.14–5.8)
SODIUM SERPL-SCNC: 135 MMOL/L (ref 136–145)
WBC NRBC COR # BLD AUTO: 12.38 10*3/MM3 (ref 3.4–10.8)

## 2024-11-24 PROCEDURE — 99232 SBSQ HOSP IP/OBS MODERATE 35: CPT | Performed by: INTERNAL MEDICINE

## 2024-11-24 PROCEDURE — 80048 BASIC METABOLIC PNL TOTAL CA: CPT | Performed by: INTERNAL MEDICINE

## 2024-11-24 PROCEDURE — 63710000001 INSULIN GLARGINE PER 5 UNITS: Performed by: ORTHOPAEDIC SURGERY

## 2024-11-24 PROCEDURE — 84100 ASSAY OF PHOSPHORUS: CPT | Performed by: INTERNAL MEDICINE

## 2024-11-24 PROCEDURE — 83735 ASSAY OF MAGNESIUM: CPT | Performed by: INTERNAL MEDICINE

## 2024-11-24 PROCEDURE — 25010000002 ERTAPENEM PER 500 MG: Performed by: ORTHOPAEDIC SURGERY

## 2024-11-24 PROCEDURE — 63710000001 INSULIN GLARGINE PER 5 UNITS: Performed by: INTERNAL MEDICINE

## 2024-11-24 PROCEDURE — 63710000001 INSULIN LISPRO (HUMAN) PER 5 UNITS: Performed by: ORTHOPAEDIC SURGERY

## 2024-11-24 PROCEDURE — 25010000002 MAGNESIUM SULFATE 2 GM/50ML SOLUTION: Performed by: INTERNAL MEDICINE

## 2024-11-24 PROCEDURE — 82948 REAGENT STRIP/BLOOD GLUCOSE: CPT

## 2024-11-24 PROCEDURE — 25010000002 DAPTOMYCIN PER 1 MG: Performed by: ORTHOPAEDIC SURGERY

## 2024-11-24 PROCEDURE — 25010000002 ONDANSETRON PER 1 MG: Performed by: ORTHOPAEDIC SURGERY

## 2024-11-24 PROCEDURE — 25010000002 ENOXAPARIN PER 10 MG: Performed by: ORTHOPAEDIC SURGERY

## 2024-11-24 PROCEDURE — 85025 COMPLETE CBC W/AUTO DIFF WBC: CPT | Performed by: INTERNAL MEDICINE

## 2024-11-24 RX ORDER — MAGNESIUM SULFATE HEPTAHYDRATE 40 MG/ML
2 INJECTION, SOLUTION INTRAVENOUS
Status: COMPLETED | OUTPATIENT
Start: 2024-11-24 | End: 2024-11-24

## 2024-11-24 RX ADMIN — INSULIN LISPRO 6 UNITS: 100 INJECTION, SOLUTION INTRAVENOUS; SUBCUTANEOUS at 09:48

## 2024-11-24 RX ADMIN — FINASTERIDE 5 MG: 5 TABLET, FILM COATED ORAL at 20:58

## 2024-11-24 RX ADMIN — Medication 10 ML: at 20:59

## 2024-11-24 RX ADMIN — MAGNESIUM SULFATE IN WATER FOR 2 G: 40 INJECTION INTRAVENOUS at 07:31

## 2024-11-24 RX ADMIN — ERTAPENEM 1000 MG: 1 INJECTION INTRAMUSCULAR; INTRAVENOUS at 10:30

## 2024-11-24 RX ADMIN — CALCIUM CARBONATE (ANTACID) CHEW TAB 500 MG 2 TABLET: 500 CHEW TAB at 18:29

## 2024-11-24 RX ADMIN — AMITRIPTYLINE HYDROCHLORIDE 25 MG: 25 TABLET, FILM COATED ORAL at 09:45

## 2024-11-24 RX ADMIN — Medication 10 ML: at 09:54

## 2024-11-24 RX ADMIN — HYDROCODONE BITARTRATE AND ACETAMINOPHEN 1 TABLET: 5; 325 TABLET ORAL at 09:45

## 2024-11-24 RX ADMIN — INSULIN LISPRO 12 UNITS: 100 INJECTION, SOLUTION INTRAVENOUS; SUBCUTANEOUS at 09:48

## 2024-11-24 RX ADMIN — INSULIN LISPRO 6 UNITS: 100 INJECTION, SOLUTION INTRAVENOUS; SUBCUTANEOUS at 12:36

## 2024-11-24 RX ADMIN — ONDANSETRON 4 MG: 2 INJECTION INTRAMUSCULAR; INTRAVENOUS at 20:30

## 2024-11-24 RX ADMIN — MAGNESIUM SULFATE IN WATER FOR 2 G: 40 INJECTION INTRAVENOUS at 09:51

## 2024-11-24 RX ADMIN — AMITRIPTYLINE HYDROCHLORIDE 25 MG: 25 TABLET, FILM COATED ORAL at 20:58

## 2024-11-24 RX ADMIN — DAPTOMYCIN 450 MG: 500 INJECTION, POWDER, LYOPHILIZED, FOR SOLUTION INTRAVENOUS at 09:51

## 2024-11-24 RX ADMIN — PREGABALIN 225 MG: 75 CAPSULE ORAL at 09:44

## 2024-11-24 RX ADMIN — ASPIRIN 81 MG: 81 TABLET, COATED ORAL at 09:44

## 2024-11-24 RX ADMIN — INSULIN GLARGINE 15 UNITS: 100 INJECTION, SOLUTION SUBCUTANEOUS at 21:21

## 2024-11-24 RX ADMIN — PANTOPRAZOLE SODIUM 40 MG: 40 TABLET, DELAYED RELEASE ORAL at 05:36

## 2024-11-24 RX ADMIN — TAMSULOSIN HYDROCHLORIDE 0.8 MG: 0.4 CAPSULE ORAL at 20:58

## 2024-11-24 RX ADMIN — METOPROLOL SUCCINATE 50 MG: 50 TABLET, EXTENDED RELEASE ORAL at 09:45

## 2024-11-24 RX ADMIN — INSULIN LISPRO 12 UNITS: 100 INJECTION, SOLUTION INTRAVENOUS; SUBCUTANEOUS at 12:36

## 2024-11-24 RX ADMIN — PREGABALIN 225 MG: 75 CAPSULE ORAL at 20:58

## 2024-11-24 RX ADMIN — MAGNESIUM SULFATE IN WATER FOR 2 G: 40 INJECTION INTRAVENOUS at 11:55

## 2024-11-24 RX ADMIN — INSULIN GLARGINE 15 UNITS: 100 INJECTION, SOLUTION SUBCUTANEOUS at 09:49

## 2024-11-24 RX ADMIN — ROPINIROLE 0.25 MG: 0.5 TABLET, FILM COATED ORAL at 20:58

## 2024-11-24 RX ADMIN — ENOXAPARIN SODIUM 40 MG: 100 INJECTION SUBCUTANEOUS at 09:48

## 2024-11-24 NOTE — PROGRESS NOTES
"Fracisco Mullen  1965  [id number]    Evaluating Physician: Varun Dominique MD    Chief Complaint: fatigue    Reason for Consultation: foot infection    History of present illness:     Patient is a 59 y.o.  Yr old male with history of diabetes/hypertension, previously followed with Dr. Gatica; admitted 2023 with right foot infection, cultures with MSSA/Morganella/ESBL Klebsiella/enterococcus and strep species.had surgery at the second toe with amputation, received IV daptomycin/Invanz with general improvements at that site.  He has been left with a chronic right plantar foot wound and a chronic wound at the left heel that has turned black; He apparently was admitted to the hospital August 1 after a fall at [facility].  Noted to have urinary retention with concern for possible UTI.  In addition left heel with black discoloration/malodor and redness, empiric antibiotics initiated.  He is chronically debilitated and wheelchair bound by his report.Bustos catheter-based at admission     8/3/24  Dr Gan  \"PROCEDURE:            Left  Left      25989:             Debridement of skin and subcutaneous tissue  67239:             Wound vacuum-assisted closure\"     8/6/24 MRI and surgical findings did not confirm bone involvement, transitioned to oral agents at discharge       Readmitted November 15, 2024 with reports of appearing \"sluggish\" according to admission notes with DKA, noted to have high hemoglobin A1c and persistent/worsening left heel wound according to patient; medicine team notes mention urinary retention, acute toxic metabolic encephalopathy improved and low-grade fever. Abnormal MRI at the left foot:    Per radiology-- \"Soft tissue wound seen along the posterior heel with ill-defined fluid that extends through and disrupts the distal Achilles tendon. Underlying this area there is evidence of osteomyelitis of the posterior lateral calcaneus. There is a tiny fluid   collection here as well that may represent " "an associated nondrainable abscess.     Additionally there appears to be a nondisplaced stress fracture of the posterior calcaneus. \"    11/22 left BKA    11/24/24 seen early and sleepy;  vascular team has seen; Left heel pain  dull , worse with palpation, better with pain meds and not severe,  3/10.     No headache photophobia or neck stiffness.  No shortness of breath cough or hemoptysis.  No nausea vomiting diarrhea or abdominal pain.  No other new skin rash.  no dysuria hematuria or pyuria.       Past Medical History:   Diagnosis Date    Acute renal failure     Hx of    Obesity     Hx of    Sleep apnea     Type 2 diabetes mellitus        Past Surgical History:   Procedure Laterality Date    BACK SURGERY      lower back    CHOLECYSTECTOMY WITH INTRAOPERATIVE CHOLANGIOGRAM N/A 1/6/2021    Procedure: CHOLECYSTECTOMY LAPAROSCOPIC INTRAOPERATIVE CHOLANGIOGRAM;  Surgeon: Juventino Hooker MD;  Location:  [facility];  Service: General;  Laterality: N/A;    ERCP N/A 1/9/2021    Procedure: ENDOSCOPIC RETROGRADE CHOLANGIOPANCREATOGRAPHY;  Surgeon: Jeremy Dowell MD;  Location:  [facility];  Service: Gastroenterology;  Laterality: N/A;  with sphiincterotomy and balloon sweep with 9mm-12mm balloon    INCISION AND DRAINAGE FOOT Left 8/3/2024    Procedure: HEAL IRRIGATION AND DEBRIDEMENT LEFT;  Surgeon: Dru Gan Jr., MD;  Location:  [facility];  Service: Orthopedics;  Laterality: Left;    PLACEMENT OF WOUND VAC Left 8/3/2024    Procedure: PLACEMENT OF WOUND VAC;  Surgeon: Dru Gan Jr., MD;  Location:  [facility];  Service: Orthopedics;  Laterality: Left;       Pediatric History   Patient Parents    Not on file     Other Topics Concern    Not on file   Social History Narrative    Not on file       family history includes Cancer in his brother, maternal grandmother, mother, and another family member; Diabetes in an other family member; Heart attack in an other family member; Heart disease in his brother and " father; Hyperlipidemia in his mother and another family member; Hypertension in his mother and another family member; Obesity in an other family member.    Allergies   Allergen Reactions    Sertraline Hcl Hives     Zoloft       Medication:  Current Facility-Administered Medications   Medication Dose Route Frequency Provider Last Rate Last Admin    acetaminophen (TYLENOL) tablet 650 mg  650 mg Oral Q4H PRN Dru Gan Jr., MD   650 mg at 11/19/24 1112    Or    acetaminophen (TYLENOL) 160 MG/5ML oral solution 650 mg  650 mg Oral Q4H PRN Dru Gan Jr., MD        Or    acetaminophen (TYLENOL) suppository 650 mg  650 mg Rectal Q4H PRN Dru Gan Jr., MD   650 mg at 11/15/24 2327    amitriptyline (ELAVIL) tablet 25 mg  25 mg Oral BID Dru Gan Jr., MD   25 mg at 11/24/24 0945    aspirin EC tablet 81 mg  81 mg Oral Daily Dru Gan Jr., MD   81 mg at 11/24/24 0944    sennosides-docusate (PERICOLACE) 8.6-50 MG per tablet 2 tablet  2 tablet Oral BID PRN Dru Gan Jr., MD        And    polyethylene glycol (MIRALAX) packet 17 g  17 g Oral Daily PRN Dru Gan Jr., MD        And    bisacodyl (DULCOLAX) EC tablet 5 mg  5 mg Oral Daily PRN Dru Gan Jr., MD        And    bisacodyl (DULCOLAX) suppository 10 mg  10 mg Rectal Daily PRN Dru Gan Jr., MD        calcium carbonate (TUMS) chewable tablet 500 mg (200 mg elemental)  2 tablet Oral TID PRN Dru Gan Jr., MD   2 tablet at 11/23/24 0547    Calcium Replacement - Follow Nurse / BPA Driven Protocol   Not Applicable PRN Dru Gan Jr., MD        DAPTOmycin (CUBICIN) 450 mg in sodium chloride 0.9 % 50 mL IVPB  6 mg/kg Intravenous Q24H Dru Gan Jr.,  mL/hr at 11/24/24 0951 450 mg at 11/24/24 0951    dextrose (D50W) (25 g/50 mL) IV injection 25 g  25 g Intravenous Q15 Min PRN Dru Gan Jr., MD   25 g at 11/20/24 2232    dextrose (GLUTOSE) oral gel 15 g  15 g Oral Q15 Min PRN  Dru Gan Jr., MD        Enoxaparin Sodium (LOVENOX) syringe 40 mg  40 mg Subcutaneous Daily Dru Gan Jr., MD   40 mg at 11/24/24 0948    ertapenem (INVanz) 1,000 mg in sodium chloride 0.9 % 100 mL MBP  1,000 mg Intravenous Q24H Dru Gan Jr.,  mL/hr at 11/24/24 1030 1,000 mg at 11/24/24 1030    finasteride (PROSCAR) tablet 5 mg  5 mg Oral Nightly Dru Gan Jr., MD   5 mg at 11/23/24 2119    glucagon (GLUCAGEN) injection 1 mg  1 mg Intramuscular Q15 Min PRN Dru Gan Jr., MD        HYDROcodone-acetaminophen (NORCO) 5-325 MG per tablet 1 tablet  1 tablet Oral Q6H PRN Dru Gan Jr., MD   1 tablet at 11/24/24 0945    influenza virus vacc split PF FLUZONE 0.5 mL  0.5 mL Intramuscular During Hospitalization Dru Gan Jr., MD        insulin glargine (LANTUS, SEMGLEE) injection 15 Units  15 Units Subcutaneous Daily Dru Gan Jr., MD   15 Units at 11/24/24 0949    insulin glargine (LANTUS, SEMGLEE) injection 15 Units  15 Units Subcutaneous Nightly Mahad Mijares MD        Insulin Lispro (humaLOG) injection 12 Units  12 Units Subcutaneous TID With Meals Dru Gan Jr., MD   12 Units at 11/24/24 0948    Insulin Lispro (humaLOG) injection 2-9 Units  2-9 Units Subcutaneous 4x Daily AC & at Bedtime Dru Gan Jr., MD   6 Units at 11/24/24 0948    Lidocaine HCl gel (XYLOCAINE) urethral/mucosal syringe   Urethral PRN Dru Gan Jr., MD   Given at 11/20/24 1948    Magnesium Standard Dose Replacement - Follow Nurse / BPA Driven Protocol   Not Applicable PRN Dru Gan Jr., MD        magnesium sulfate 2g/50 mL (PREMIX) infusion  2 g Intravenous Q2H Mahad Mijares MD   2 g at 11/24/24 0951    metoprolol succinate XL (TOPROL-XL) 24 hr tablet 50 mg  50 mg Oral Daily Dru Gan Jr., MD   50 mg at 11/24/24 0945    nitroglycerin (NITROSTAT) SL tablet 0.4 mg  0.4 mg Sublingual Q5 Min PRN Dru Gan Jr., MD         ondansetron (ZOFRAN) injection 4 mg  4 mg Intravenous Q6H PRN Dru Gan Jr., MD   4 mg at 11/20/24 0055    pantoprazole (PROTONIX) EC tablet 40 mg  40 mg Oral Q AM Dru Gan Jr., MD   40 mg at 11/24/24 0536    Phosphorus Replacement - Follow Nurse / BPA Driven Protocol   Not Applicable PRN Dru Gan Jr., MD        Potassium Replacement - Follow Nurse / BPA Driven Protocol   Not Applicable PRN Dru Gan Jr., MD        pregabalin (LYRICA) capsule 225 mg  225 mg Oral BID Dru Gan Jr., MD   225 mg at 11/24/24 0944    rOPINIRole (REQUIP) tablet 0.25 mg  0.25 mg Oral Nightly Dru Gan Jr., MD   0.25 mg at 11/23/24 2119    ropivacaine (NAROPIN) 0.2 % infusion (INFUSYSTEM)   Peripheral Nerve Continuous Dru Gan Jr., MD 1 mL/hr at 11/23/24 0700 Rate Verify at 11/23/24 0700    sodium chloride 0.9 % flush 10 mL  10 mL Intravenous Q12H Dru Gan Jr., MD   10 mL at 11/24/24 0954    sodium chloride 0.9 % flush 10 mL  10 mL Intravenous Q12H Dru Gan Jr., MD   10 mL at 11/24/24 0954    sodium chloride 0.9 % flush 10 mL  10 mL Intravenous PRN Dru Gan Jr., MD        sodium chloride 0.9 % infusion 40 mL  40 mL Intravenous PRN Dru Gan Jr., MD        tamsulosin (FLOMAX) 24 hr capsule 0.8 mg  0.8 mg Oral Nightly Dru Gan Jr., MD   0.8 mg at 11/23/24 2119       Antibiotics:  Anti-Infectives (From admission, onward)      Ordered     Dose/Rate Route Frequency Start Stop    11/22/24 1045  ceFAZolin 2000 mg IVPB in 100 mL NS (MBP)        Ordering Provider: Claudia Lim PA    2,000 mg  over 30 Minutes Intravenous Once 11/22/24 1047 11/22/24 1255    11/19/24 0737  DAPTOmycin (CUBICIN) 450 mg in sodium chloride 0.9 % 50 mL IVPB        Ordering Provider: Dru Gan Jr., MD    6 mg/kg × 72.3 kg  100 mL/hr over 30 Minutes Intravenous Every 24 Hours 11/19/24 0900 12/03/24 0859    11/19/24 0737  ertapenem (INVanz) 1,000 mg in sodium  "chloride 0.9 % 100 mL MBP        Ordering Provider: Dru Gan Jr., MD    1,000 mg  200 mL/hr over 30 Minutes Intravenous Every 24 Hours 11/19/24 0900 12/03/24 0859              Review of Systems    11/24/24 per nursing also    Constitutional-- No Fever, chills or sweats.  Appetite diminished with fatigue.  Heent-- No new vision, hearing or throat complaints.  No epistaxis or oral sores.  Denies odynophagia or dysphagia.  No flashers, floaters or eye pain. No odynophagia or dysphagia. No headache, photophobia or neck stiffness.  CV-- No chest pain, palpitation or syncope  Resp-- No SOB/cough/Hemoptysis  GI- No nausea, vomiting, or diarrhea.  No hematochezia, melena, or hematemesis. Denies jaundice or chronic liver disease.  -- No dysuria, hematuria, or flank pain.  Denies hesitancy, urgency or flank pain.  Lymph- no swollen lymph nodes in neck/axilla or groin.   Heme- No active bruising or bleeding; no Hx of DVT or PE.  MS-- no swelling or pain in the bones or joints of arms/legs.  No new back pain.  Neuro-- No acute focal weakness or numbness in the arms or legs.  No seizures.    Full 12 point review of systems reviewed and negative otherwise for acute complaints, except for above    Physical Exam:   Vital Signs   /88 (BP Location: Left arm, Patient Position: Lying)   Pulse 94   Temp 98 °F (36.7 °C) (Oral)   Resp 18   Ht 162.6 cm (64.02\")   Wt 77.7 kg (171 lb 3.2 oz)   SpO2 90%   BMI 29.37 kg/m²     GENERAL: sleepy in no acute distress.   HEENT: Normocephalic, atraumatic.   No conjunctival injection. No icterus. Oropharynx clear without evidence of thrush or exudate. No evidence of periodontal disease.    NECK: Supple without nuchal rigidity. No mass.   HEART: RRR; No murmur, rubs, gallops.   LUNGS: diminished at bases but otherwise Clear to auscultation bilaterally without wheezing, rales, rhonchi. Normal respiratory effort. Nonlabored. No dullness.  ABDOMEN: Soft, nontender, nondistended. " Positive bowel sounds. No rebound or guarding. NO mass or HSM.  EXT: see below   MSK: FROM without joint effusions noted arms/legs.    SKIN: Warm and dry without cutaneous eruptions on Inspection/palpation.    NEURO: sleepy.    Right foot second toe amputation site noted, no redness/duration or warmth there.  No oral or active drainage    Left heel with large wound, deeper wound at the proximal aspect of this tracking towards the Achilles although I am unable to visualize  tendon or palpate bone at present.  Bloody drainage.  Vague surrounding erythema with mild tenderness but no discrete mass bulge or fluctuance.  No crepitus or bulla.  Multifocal crusted areas otherwise at his lower extremities      Laboratory Data    Results from last 7 days   Lab Units 11/24/24  0452 11/22/24  0606 11/21/24  0701   WBC 10*3/mm3 12.38* 10.48 11.11*   HEMOGLOBIN g/dL 8.9* 9.4* 9.4*   HEMATOCRIT % 28.7* 29.2* 30.1*   PLATELETS 10*3/mm3 298 271 266     Results from last 7 days   Lab Units 11/24/24  0452   SODIUM mmol/L 135*   POTASSIUM mmol/L 4.1   CHLORIDE mmol/L 100   CO2 mmol/L 26.0   BUN mg/dL 11   CREATININE mg/dL 0.66*   GLUCOSE mg/dL 262*   CALCIUM mg/dL 7.9*                     Estimated Creatinine Clearance: 110.7 mL/min (A) (by C-G formula based on SCr of 0.66 mg/dL (L)).      Microbiology:      Radiology:  Imaging Results (Last 72 Hours)       ** No results found for the last 72 hours. **              Impression:       --acute left heel wound infection ,  abnormal MRI as above raising concern for fluid extending into and disrupting the distal lateral Achilles tendon in addition to with evidence for osteomyelitis at the posterior lateral calcaneus in addition to possible nondisplaced stress fracture at the posterior calcaneus; High risk for persistent/recurrent or nonhealing wounds, persistent/progressive or recurrent infection and risk for further functional/limb loss, higher level amputation etc. Surgery 8/3;  High risk  for amputation.  Empiric antibiotics for now     --urinary retention per admission notes, some hematuria on November 15; further urologic evaluation regarding functional/anatomic assessment at discretion of medicine team with respect inpatient versus outpatient     --Diabetes, uncontrolled ; glucose control per medicine team     --diabetic neuropathy     --Chronic debility as per patient wheelchair bound     --Hx ESBL    PLAN:     --IV daptomycin/Invanz    --Check/review labs cultures and scans    --Partial history Per nursing staff    --Discussed with microbiology    --Discussed with surgery team    --orthopedic team making plans    --Highly complex at of issues with high risk for further serious morbidity and other serious sequela     Copied text in this note has been reviewed and is accurate as of 11/24/24.     Varun Dominique MD  11/24/2024

## 2024-11-24 NOTE — PROGRESS NOTES
Fracisco Mullen       LOS: 9 days   Patient Care Team:  Brandy Roberson as PCP - General (Family Medicine)  Varun Fontenot MD as Consulting Physician (Cardiology)  Yulissa Taveras APRN as Nurse Practitioner (Cardiology)    Chief Complaint:  left calcaneal osteomyelitis    Subjective     Interval History:     Resting comfortably in bed. Pain controlled. No acute events overnight    History taken from: patient    Review of Systems:   The following systems were reviewed and negative     Gen - No fevers, chills  CV - No chest pain, palpitations  Resp - No cough, dyspnea  GI - No N/V/D, abdominal pain    Objective     Vital Signs  Vital Signs (last 24 hours)         11/19 0700  11/20 0659 11/20 0700  11/20 0858   Most Recent      Temp (°F) 97.7 -  98.2       98 (36.7) 11/19 2042    Heart Rate 90 -  103       103 11/20 0530    Resp 16 -  18       16 11/19 2042    BP 73/59 -  115/74       115/74 11/19 1425    SpO2 (%) 92 -  96       92 11/20 0530    Flow (L/min) (Oxygen Therapy)   2       2 11/20 0609              Physical Exam:  No acute distress  Nonlabored respirations  Regular rate and rhythm  Abdomen nondistended  Left lower extremity:  Dressing changed, incision well approximated, drain removed.     Results Review:     I reviewed the patient's new clinical results.    Medication Review:   Hospital Medications (active)         Dose Frequency Start End    acetaminophen (TYLENOL) 160 MG/5ML oral solution 650 mg 650 mg Every 4 Hours PRN 11/15/2024 --    Admin Instructions: If given for fever, use fever parameter: fever greater than 100.4 °F  Based on patient request - if ordered for moderate or severe pain, provider allows for administration of a medication prescribed for a lower pain scale.    Do not exceed 4 grams of acetaminophen in a 24 hr period. Max dose of 2gm for AST/ALT greater than 120 units/L.    If given for pain, use the following pain scale:   Mild Pain = Pain Score of 1-3, CPOT 1-2  Moderate  "Pain = Pain Score of 4-6, CPOT 3-4  Severe Pain = Pain Score of 7-10, CPOT 5-8    Route: Oral    Linked Group 1: Placed in \"Or\" Linked Group        acetaminophen (TYLENOL) suppository 650 mg 650 mg Every 4 Hours PRN 11/15/2024 --    Admin Instructions: If given for fever, use fever parameter: fever greater than 100.4 °F  Based on patient request - if ordered for moderate or severe pain, provider allows for administration of a medication prescribed for a lower pain scale.    Do not exceed 4 grams of acetaminophen in a 24 hr period. Max dose of 2gm for AST/ALT greater than 120 units/L.    If given for pain, use the following pain scale:   Mild Pain = Pain Score of 1-3, CPOT 1-2  Moderate Pain = Pain Score of 4-6, CPOT 3-4  Severe Pain = Pain Score of 7-10, CPOT 5-8    Route: Rectal    Linked Group 1: Placed in \"Or\" Linked Group        acetaminophen (TYLENOL) tablet 650 mg 650 mg Every 4 Hours PRN 11/15/2024 --    Admin Instructions: If given for fever, use fever parameter: fever greater than 100.4 °F  Based on patient request - if ordered for moderate or severe pain, provider allows for administration of a medication prescribed for a lower pain scale.    Do not exceed 4 grams of acetaminophen in a 24 hr period. Max dose of 2gm for AST/ALT greater than 120 units/L.    If given for pain, use the following pain scale:   Mild Pain = Pain Score of 1-3, CPOT 1-2  Moderate Pain = Pain Score of 4-6, CPOT 3-4  Severe Pain = Pain Score of 7-10, CPOT 5-8    Route: Oral    Linked Group 1: Placed in \"Or\" Linked Group        amitriptyline (ELAVIL) tablet 25 mg 25 mg 2 Times Daily 11/15/2024 --    Route: Oral    aspirin EC tablet 81 mg 81 mg Daily 11/15/2024 --    Admin Instructions: Do not crush or chew the capsules or tablets. The drug may not work as designed if the capsule or tablet is crushed or chewed. Swallow whole.  Do not exceed 4 grams of aspirin in a 24 hr period.    If given for pain, use the following pain scale:   Mild " "Pain = Pain Score of 1-3, CPOT 1-2  Moderate Pain = Pain Score of 4-6, CPOT 3-4  Severe Pain = Pain Score of 7-10, CPOT 5-8    Route: Oral    bisacodyl (DULCOLAX) EC tablet 5 mg 5 mg Daily PRN 11/15/2024 --    Admin Instructions: Use if no bowel movement after 12 hours.  Swallow whole. Do not crush, split, or chew tablet.    Route: Oral    Linked Group 2: Placed in \"And\" Linked Group        bisacodyl (DULCOLAX) suppository 10 mg 10 mg Daily PRN 11/15/2024 --    Admin Instructions: Use if no bowel movement after 12 hours.  Hold for diarrhea    Route: Rectal    Linked Group 2: Placed in \"And\" Linked Group        calcium carbonate (TUMS) chewable tablet 500 mg (200 mg elemental) 2 tablet 3 Times Daily PRN 11/18/2024 --    Admin Instructions: One tablet contains 200 mg elemental calcium.  Take with food.    Route: Oral    Calcium Replacement - Follow Nurse / BPA Driven Protocol  As Needed 11/15/2024 --    Admin Instructions: Open Order & Select \"BHS Electrolyte Replacement Protocol Algorithm\" to View Details    Route: Not Applicable    DAPTOmycin (CUBICIN) 450 mg in sodium chloride 0.9 % 50 mL IVPB 6 mg/kg × 72.3 kg Every 24 Hours 11/19/2024 12/3/2024    Admin Instructions: Caution: Look alike/sound alike drug alert.  Refrigerate. Do not shake.    Route: Intravenous    dextrose (D50W) (25 g/50 mL) IV injection 25 g 25 g Every 15 Minutes PRN 11/16/2024 --    Admin Instructions: Blood sugar less than 70; patient has IV access - Unresponsive, NPO or Unable To Safely Swallow    Route: Intravenous    dextrose (GLUTOSE) oral gel 15 g 15 g Every 15 Minutes PRN 11/16/2024 --    Admin Instructions: BS<70, Patient Alert, Is not NPO, Can safely swallow.    Route: Oral    Enoxaparin Sodium (LOVENOX) syringe 40 mg 40 mg Daily 11/15/2024 --    Admin Instructions: Give subcutaneous in abdomen only. Do not massage site after injection.    Route: Subcutaneous    ertapenem (INVanz) 1,000 mg in sodium chloride 0.9 % 100 mL MBP 1,000 mg " Every 24 Hours 11/19/2024 12/3/2024    Admin Instructions: Caution: Look alike/sound alike drug alert.    Route: Intravenous    finasteride (PROSCAR) tablet 5 mg 5 mg Nightly 11/15/2024 --    Admin Instructions: Group 2 (Pink) Hazardous Drug - Reproductive Risk Only - See Handling Guide    Route: Oral    glucagon (GLUCAGEN) injection 1 mg 1 mg Every 15 Minutes PRN 11/16/2024 --    Admin Instructions: Blood Glucose Less Than 70 - Patient Without IV Access - Unresponsive, NPO or Unable To Safely Swallow  Reconstitute powder for injection by adding 1 mL of -supplied sterile diluent or sterile water for injection to a vial containing 1 mg of the drug, to provide solutions containing 1 mg/mL. Shake vial gently to dissolve.    Route: Intramuscular    insulin glargine (LANTUS, SEMGLEE) injection 10 Units 10 Units Nightly 11/17/2024 --    Admin Instructions: Do not hold basal insulin without an order. Consider requesting a dose edit, if needed.      Route: Subcutaneous    insulin glargine (LANTUS, SEMGLEE) injection 15 Units 15 Units Daily 11/18/2024 --    Admin Instructions: Do not hold basal insulin without an order. Consider requesting a dose edit, if needed.      Route: Subcutaneous    Insulin Lispro (humaLOG) injection 2-9 Units 2-9 Units 4 Times Daily Before Meals & Nightly 11/17/2024 --    Admin Instructions: Correction Insulin - Moderate Dose (Total Insulin Dose 40-60 units/day, Average Weight Patient, Patient Taking Oral Hypoglycemic)    Blood Glucose 150-199 mg/dL - 2 units  Blood Glucose 200-249 mg/dL - 4 units  Blood Glucose 250-299 mg/dL - 6 units  Blood Glucose 300-349 mg/dL - 7 units  Blood Glucose 350-400 mg/dL - 8 units  Blood Glucose greater than 400 mg/dL - 9 units & Call Provider   Caution: Look alike/sound alike drug alert    Route: Subcutaneous    Insulin Lispro (humaLOG) injection 7 Units 7 Units 3 Times Daily With Meals 11/17/2024 --    Admin Instructions:  Solution should be clear. If  "cloudy, contact pharmacy for a new vial. Scheduled mealtime doses of this medication should be held if patient NPO.   Caution: Look alike/sound alike drug alert    Route: Subcutaneous    Magnesium Standard Dose Replacement - Follow Nurse / BPA Driven Protocol  As Needed 11/15/2024 --    Admin Instructions: Open Order & Select \"BHS Electrolyte Replacement Protocol Algorithm\" to View Details    Route: Not Applicable    metoprolol succinate XL (TOPROL-XL) 24 hr tablet 50 mg 50 mg Daily 11/15/2024 --    Admin Instructions: Hold for SBP less than 100, DBP less than 60, or heart rate less than 50    Do not crush or chew the capsules or tablets. The drug may not work as designed if the capsule or tablet is crushed or chewed. Swallow whole.  Do not crush or chew.    Route: Oral    nitroglycerin (NITROSTAT) SL tablet 0.4 mg 0.4 mg Every 5 Minutes PRN 11/15/2024 --    Admin Instructions: If Pain Unrelieved After 3 Doses Notify MD  May administer up to 3 doses per episode.    Route: Sublingual    ondansetron (ZOFRAN) injection 4 mg 4 mg Every 6 Hours PRN 11/20/2024 --    Admin Instructions: \"If multiple N/V medications ordered, use in the following order: Ondansetron, Prochlorperazine, Promethazine. Use PO unless patient refuses or patient unable to swallow.\"    Route: Intravenous    Phosphorus Replacement - Follow Nurse / BPA Driven Protocol  As Needed 11/15/2024 --    Admin Instructions: Open Order & Select \"BHS Electrolyte Replacement Protocol Algorithm\" to View Details    Route: Not Applicable    polyethylene glycol (MIRALAX) packet 17 g 17 g Daily PRN 11/15/2024 --    Admin Instructions: Use if no bowel movement after 12 hours. Mix in 6-8 ounces of water.  Use 4-8 ounces of water, tea, or juice for each 17 gram dose.    Route: Oral    Linked Group 2: Placed in \"And\" Linked Group        Potassium Replacement - Follow Nurse / BPA Driven Protocol  As Needed 11/15/2024 --    Admin Instructions: Open Order & Select \"BHS " "Electrolyte Replacement Protocol Algorithm\" to View Details    Route: Not Applicable    pregabalin (LYRICA) capsule 225 mg 225 mg 2 Times Daily 11/16/2024 --    Admin Instructions:     Route: Oral    rOPINIRole (REQUIP) tablet 0.25 mg 0.25 mg Nightly 11/15/2024 --    Admin Instructions: Caution: Look alike/sound alike drug alert    Route: Oral    sennosides-docusate (PERICOLACE) 8.6-50 MG per tablet 2 tablet 2 tablet 2 Times Daily PRN 11/15/2024 --    Admin Instructions: Start bowel management regimen if patient has not had a bowel movement after 12 hours.    Route: Oral    Linked Group 2: Placed in \"And\" Linked Group        sodium chloride 0.9 % flush 10 mL 10 mL As Needed 11/15/2024 --    Route: Intravenous    Cosign for Ordering: Accepted by Nic Hewitt MD on 11/15/2024  3:10 PM    sodium chloride 0.9 % flush 10 mL 10 mL Every 12 Hours Scheduled 11/15/2024 --    Route: Intravenous    sodium chloride 0.9 % flush 10 mL 10 mL As Needed 11/15/2024 --    Route: Intravenous    sodium chloride 0.9 % flush 10 mL 10 mL Every 12 Hours Scheduled 11/15/2024 --    Route: Intravenous    sodium chloride 0.9 % flush 10 mL 10 mL As Needed 11/15/2024 --    Route: Intravenous    sodium chloride 0.9 % infusion 40 mL 40 mL As Needed 11/15/2024 --    Admin Instructions: Following administration of an IV intermittent medication, flush line with 40mL NS at 100mL/hr.    Route: Intravenous    sodium chloride 0.9 % infusion 40 mL 40 mL As Needed 11/15/2024 --    Admin Instructions: Following administration of an IV intermittent medication, flush line with 40mL NS at 100mL/hr.    Route: Intravenous    tamsulosin (FLOMAX) 24 hr capsule 0.8 mg 0.8 mg Nightly 11/18/2024 --    Admin Instructions: Do not crush or chew the capsules or tablets. The drug may not work as designed if the capsule or tablet is crushed or chewed. Swallow whole. If patient unable to swallow whole, contact pharmacy for alternative.    Route: Oral      "         Assessment & Plan   He is a 59-year-old male status post left below-knee amputation November 22, 2024.      DKA (diabetic ketoacidosis)    Non healing left heel wound    Severe protein-calorie malnutrition    Acute osteomyelitis of left foot    Nonweightbearing left lower extremity.  Follow intraoperative cultures.  Plan for dressing change, drain removed 11/24/24.    To begin postoperative day three:    Daily dressing change:    Xeroform  4x4  Kerlix  Ace     Follow up in 2 weeks with Claudia Lim PA-C or Chris Barone PA-C.    [facility]  [address] [place] [address], [address]  Formerly [facility], [id number]  Please schedule at [phone]    Dru Gan Jr, MD  11/24/24  09:54 EST

## 2024-11-24 NOTE — PROGRESS NOTES
[facility]    Acute pain service Inpatient Progress Note    Patient Name: Fracisco Mullen  :  1965  MRN:  [id number]        Acute Pain  Service Inpatient Progress Note:    Analgesia:Good  Pain Score:4/10  LOC: alert and awake  Resp Status: room air  Cardiac: VS stable  Side Effects:None  Catheter Site:clean, dressing intact and dry  Cath type: peripheral nerve cath(InfuSystem)  Infusion rate: Ext/Pop: Basal: 1ml/hr, PIB: 5ml q 2 h, PCA: 5 ml q 30 min (1mL,5ml, 5ml InfuSystem Pump)  Catheter Plan:Catheter to remain Insitu and Continue catheter infusion rate unchanged  Comments:

## 2024-11-24 NOTE — PLAN OF CARE
Goal Outcome Evaluation:  Plan of Care Reviewed With: patient        Progress: no change  Outcome Evaluation: benny drain and nerve block remain intact. 30 ml removed from benny drain. Some confusion to situation overnight. ledesma catheter in place. calls out for family members

## 2024-11-24 NOTE — PLAN OF CARE
Goal Outcome Evaluation:              Outcome Evaluation: VSS. NSR, RA. JENNY removed this morning., Bustos cath removed today, no voids yet but bladder scan at 1800 was 215. Nerve block still in place. Patient in bed resting with call light in reach.

## 2024-11-24 NOTE — PROGRESS NOTES
[facility] Medicine Services  PROGRESS NOTE    Patient Name: Fracisco Mullen  : 1965  MRN: [id number]    Date of Admission: 11/15/2024  Primary Care Physician: Brandy Roberson    Subjective   Subjective     CC:  F/u DKA    HPI:  Resting in bed in no acute distress and feels okay. Denies any fever or chills.  No chest pain palpitation shortness of breath.  Pain is currently well-controlled.      Objective   Objective     Vital Signs:   Temp:  [98 °F (36.7 °C)-98.4 °F (36.9 °C)] 98 °F (36.7 °C)  Heart Rate:  [] 94  Resp:  [18] 18  BP: (134-140)/(69-88) 140/88     Physical Exam:  Constitutional: No acute distress  HENT: NCAT  Respiratory: Clear to auscultation bilaterally, nonlabored respirations   Cardiovascular: RRR, no murmurs, rubs, or gallops, no bilateral ankle edema  Gastrointestinal: Soft, nontender, non distended  Musculoskeletal: Left below knee amputation  Psychiatric: Appropriate affect, cooperative  Neurologic: Awake, alert, oriented x 3, no focality appreciated.  Skin: No rash      Results Reviewed:  LAB RESULTS:      Lab 24  0606 24  0701 24  0550 24  0717   WBC 12.38* 10.48 11.11* 15.93* 9.33   HEMOGLOBIN 8.9* 9.4* 9.4* 10.5* 12.0*   HEMATOCRIT 28.7* 29.2* 30.1* 34.3* 38.6   PLATELETS 298 271 266 262 276   NEUTROS ABS 8.73* 6.58 7.82*  --   --    IMMATURE GRANS (ABS) 0.13* 0.08* 0.06*  --   --    LYMPHS ABS 1.62 2.37 2.17  --   --    MONOS ABS 1.73* 1.10* 0.76  --   --    EOS ABS 0.08 0.26 0.22  --   --    MCV 83.9 80.9 82.9 84.3 82.8         Lab 24  0452 24  0606 24  0701 24  0550 24  0717   SODIUM 135* 137 133* 133* 134*   POTASSIUM 4.1 4.0 4.0 4.4 3.9   CHLORIDE 100 98 97* 97* 97*   CO2 26.0 32.0* 29.0 24.0 27.0   ANION GAP 9.0 7.0 7.0 12.0 10.0   BUN 11 19 31* 35* 20   CREATININE 0.66* 0.73* 0.85 0.98 1.00   EGFR 106.7 103.5 98.9 87.7 85.6   GLUCOSE 262* 160* 227* 433* 279*    CALCIUM 7.9* 9.1 9.3 8.4* 8.8   MAGNESIUM 1.4*  --   --   --   --    PHOSPHORUS 2.6  --   --   --   --                          Lab 11/22/24  1054   ABO TYPING O   RH TYPING Positive   ANTIBODY SCREEN Negative           Brief Urine Lab Results  (Last result in the past 365 days)        Color   Clarity   Blood   Leuk Est   Nitrite   Protein   CREAT   Urine HCG        11/15/24 1142 Yellow   Clear   Small (1+)   Negative   Negative   100 mg/dL (2+)                   Microbiology Results Abnormal       None            No radiology results from the last 24 hrs        Current medications:  Scheduled Meds:amitriptyline, 25 mg, Oral, BID  aspirin, 81 mg, Oral, Daily  DAPTOmycin, 6 mg/kg, Intravenous, Q24H  enoxaparin, 40 mg, Subcutaneous, Daily  ertapenem, 1,000 mg, Intravenous, Q24H  finasteride, 5 mg, Oral, Nightly  insulin glargine, 15 Units, Subcutaneous, Daily  insulin glargine, 15 Units, Subcutaneous, Nightly  Insulin Lispro, 12 Units, Subcutaneous, TID With Meals  insulin lispro, 2-9 Units, Subcutaneous, 4x Daily AC & at Bedtime  metoprolol succinate XL, 50 mg, Oral, Daily  pantoprazole, 40 mg, Oral, Q AM  pregabalin, 225 mg, Oral, BID  rOPINIRole, 0.25 mg, Oral, Nightly  sodium chloride, 10 mL, Intravenous, Q12H  sodium chloride, 10 mL, Intravenous, Q12H  tamsulosin, 0.8 mg, Oral, Nightly      Continuous Infusions:ropivacaine, , Last Rate: 1 mL/hr at 11/24/24 0731      PRN Meds:.  acetaminophen **OR** acetaminophen **OR** acetaminophen    senna-docusate sodium **AND** polyethylene glycol **AND** bisacodyl **AND** bisacodyl    calcium carbonate    Calcium Replacement - Follow Nurse / BPA Driven Protocol    dextrose    dextrose    glucagon (human recombinant)    HYDROcodone-acetaminophen    influenza vaccine    Lidocaine HCl gel    Magnesium Standard Dose Replacement - Follow Nurse / BPA Driven Protocol    nitroglycerin    ondansetron    Phosphorus Replacement - Follow Nurse / BPA Driven Protocol    Potassium  Replacement - Follow Nurse / BPA Driven Protocol    sodium chloride    sodium chloride    Assessment & Plan   Assessment & Plan     Active Hospital Problems    Diagnosis  POA    **DKA (diabetic ketoacidosis) [E11.10]  Yes    Severe protein-calorie malnutrition [E43]  Yes    Non healing left heel wound [S91.302A]  Yes    Acute osteomyelitis of left foot [M86.172]  Unknown      Resolved Hospital Problems   No resolved problems to display.        Brief Hospital Course to date:  Fracisco ADAM Lady is a 59 y.o. male with past medical history of hypertension, type 2 diabetes with insulin use, diabetic neuropathy, and chronic diabetic foot wounds who presents via EMS from home due to altered mental status and fatigue. Lab work consistent with diabetic ketoacidosis.     This patient's problems and plans were partially entered by my partner and updated as appropriate by me 11/24/24. Copied text in this note has been reviewed and is accurate as of today's date.      Chronic bilateral foot wounds  - Patient with chronic left heel wound and right plantar foot wound  - Admitted August 2024 for left foot cellulitis and discharged on oral antibiotics and with home wound vac following debridement, MRI left foot obtained at that time with no definite findings of osteomyelitis (was seen by ID/Dr. Dominique and Orthopedics/Dr. Gan)  - Repeat MRI left foot shows soft tissue wound with evidence of osteomyelitis of the calcaneus and possibly an associated non-drainable abscess  -S/p left below-knee amputation by Dr. Gan on 11/22/2024 without any immediate complications    BPH  Urinary retention  - Continue home Flomax, Proscar  -Patient has anchored Bustos.  Will try to remove the Bustos catheter.      DKA in setting of uncontrolled IDDM complicated by diabetic neuropathy and chronic foot wounds , improving  - Uncertain of medication compliance  - A1c 14.1%  - S/p DKA protocol including insulin drip, IV fluids, and electrolyte  replacement per protocol  - Now on subcutaneous insulin.  Will increase nightly dose of Lantus and monitor closely.      Malnutrition  - Patient with poor oral intake, refusing supplementation  - Nutrition following     Acute toxic metabolic encephalopathy, improved  - CT head without acute abnormality  - UDS negative  - Likely secondary to DKA     HTN  - Continue home metoprolol     Mood disorder  - Continue home Amitriptyline      RLS  - Continue home Requip    Expected Discharge Location and Transportation: Rehab  Expected Discharge   Expected Discharge Date: 11/25/2024; Expected Discharge Time:      VTE Prophylaxis:  Pharmacologic VTE prophylaxis orders are present.         AM-PAC 6 Clicks Score (PT): 10 (11/24/24 0800)    CODE STATUS:   Code Status and Medical Interventions: CPR (Attempt to Resuscitate); Full Support; Full code per last hosptial admission. Patient disoriented on exam with no family present at bedside. Only contact information is friend.   Ordered at: 11/15/24 1218     Level Of Support Discussed With:    Patient     Code Status (Patient has no pulse and is not breathing):    CPR (Attempt to Resuscitate)     Medical Interventions (Patient has pulse or is breathing):    Full Support     Comments:    Full code per last hosptial admission. Patient disoriented on exam with no family present at bedside. Only contact information is friend.       Mahad Mijares MD  11/24/24

## 2024-11-25 ENCOUNTER — APPOINTMENT (OUTPATIENT)
Dept: CT IMAGING | Facility: HOSPITAL | Age: 59
End: 2024-11-25
Payer: COMMERCIAL

## 2024-11-25 ENCOUNTER — APPOINTMENT (OUTPATIENT)
Dept: GENERAL RADIOLOGY | Facility: HOSPITAL | Age: 59
End: 2024-11-25
Payer: COMMERCIAL

## 2024-11-25 LAB
ALBUMIN SERPL-MCNC: 2.4 G/DL (ref 3.5–5.2)
ALBUMIN/GLOB SERPL: 0.8 G/DL
ALP SERPL-CCNC: 87 U/L (ref 39–117)
ALT SERPL W P-5'-P-CCNC: 21 U/L (ref 1–41)
ANION GAP SERPL CALCULATED.3IONS-SCNC: 8 MMOL/L (ref 5–15)
ARTERIAL PATENCY WRIST A: POSITIVE
AST SERPL-CCNC: 29 U/L (ref 1–40)
ATMOSPHERIC PRESS: ABNORMAL MM[HG]
BACTERIA SPEC AEROBE CULT: NORMAL
BASE EXCESS BLDA CALC-SCNC: 5.7 MMOL/L (ref 0–2)
BASOPHILS # BLD AUTO: 0.08 10*3/MM3 (ref 0–0.2)
BASOPHILS NFR BLD AUTO: 0.7 % (ref 0–1.5)
BDY SITE: ABNORMAL
BILIRUB SERPL-MCNC: 0.2 MG/DL (ref 0–1.2)
BODY TEMPERATURE: 37
BUN SERPL-MCNC: 18 MG/DL (ref 8–23)
BUN/CREAT SERPL: 19.4 (ref 7–25)
CALCIUM SPEC-SCNC: 8 MG/DL (ref 8.6–10.5)
CHLORIDE SERPL-SCNC: 97 MMOL/L (ref 98–107)
CO2 BLDA-SCNC: 31.8 MMOL/L (ref 22–33)
CO2 SERPL-SCNC: 28 MMOL/L (ref 22–29)
COHGB MFR BLD: 1.4 % (ref 0–2)
CREAT SERPL-MCNC: 0.93 MG/DL (ref 0.76–1.27)
D DIMER PPP FEU-MCNC: 1.29 MCGFEU/ML (ref 0–0.61)
D-LACTATE SERPL-SCNC: 1.8 MMOL/L (ref 0.5–2)
DEPRECATED RDW RBC AUTO: 49.6 FL (ref 37–54)
EGFRCR SERPLBLD CKD-EPI 2021: 93.4 ML/MIN/1.73
EOSINOPHIL # BLD AUTO: 0.16 10*3/MM3 (ref 0–0.4)
EOSINOPHIL NFR BLD AUTO: 1.5 % (ref 0.3–6.2)
EPAP: 0
ERYTHROCYTE [DISTWIDTH] IN BLOOD BY AUTOMATED COUNT: 15.9 % (ref 12.3–15.4)
GLOBULIN UR ELPH-MCNC: 3.1 GM/DL
GLUCOSE BLDC GLUCOMTR-MCNC: 127 MG/DL (ref 70–130)
GLUCOSE BLDC GLUCOMTR-MCNC: 128 MG/DL (ref 70–130)
GLUCOSE BLDC GLUCOMTR-MCNC: 150 MG/DL (ref 70–130)
GLUCOSE BLDC GLUCOMTR-MCNC: 173 MG/DL (ref 70–130)
GLUCOSE BLDC GLUCOMTR-MCNC: 181 MG/DL (ref 70–130)
GLUCOSE BLDC GLUCOMTR-MCNC: 222 MG/DL (ref 70–130)
GLUCOSE BLDC GLUCOMTR-MCNC: 56 MG/DL (ref 70–130)
GLUCOSE BLDC GLUCOMTR-MCNC: 62 MG/DL (ref 70–130)
GLUCOSE BLDC GLUCOMTR-MCNC: 68 MG/DL (ref 70–130)
GLUCOSE SERPL-MCNC: 176 MG/DL (ref 65–99)
GRAM STN SPEC: NORMAL
HCO3 BLDA-SCNC: 30.5 MMOL/L (ref 20–26)
HCT VFR BLD AUTO: 28.3 % (ref 37.5–51)
HCT VFR BLD CALC: 27.8 % (ref 38–51)
HGB BLD-MCNC: 8.6 G/DL (ref 13–17.7)
HGB BLDA-MCNC: 9.1 G/DL (ref 13.5–17.5)
IMM GRANULOCYTES # BLD AUTO: 0.09 10*3/MM3 (ref 0–0.05)
IMM GRANULOCYTES NFR BLD AUTO: 0.8 % (ref 0–0.5)
INHALED O2 CONCENTRATION: 80 %
IPAP: 0
LYMPHOCYTES # BLD AUTO: 1.42 10*3/MM3 (ref 0.7–3.1)
LYMPHOCYTES NFR BLD AUTO: 13 % (ref 19.6–45.3)
MAGNESIUM SERPL-MCNC: 2.1 MG/DL (ref 1.6–2.4)
MAGNESIUM SERPL-MCNC: 2.3 MG/DL (ref 1.6–2.4)
MCH RBC QN AUTO: 26.1 PG (ref 26.6–33)
MCHC RBC AUTO-ENTMCNC: 30.4 G/DL (ref 31.5–35.7)
MCV RBC AUTO: 86 FL (ref 79–97)
METHGB BLD QL: ABNORMAL
MODALITY: ABNORMAL
MONOCYTES # BLD AUTO: 1.13 10*3/MM3 (ref 0.1–0.9)
MONOCYTES NFR BLD AUTO: 10.3 % (ref 5–12)
NEUTROPHILS NFR BLD AUTO: 73.7 % (ref 42.7–76)
NEUTROPHILS NFR BLD AUTO: 8.05 10*3/MM3 (ref 1.7–7)
NRBC BLD AUTO-RTO: 0 /100 WBC (ref 0–0.2)
OXYHGB MFR BLDV: 97.1 % (ref 94–99)
PAW @ PEAK INSP FLOW SETTING VENT: 0 CMH2O
PCO2 BLDA: 44.9 MM HG (ref 35–45)
PCO2 TEMP ADJ BLD: 44.9 MM HG (ref 35–48)
PH BLDA: 7.44 PH UNITS (ref 7.35–7.45)
PH, TEMP CORRECTED: 7.44 PH UNITS
PLATELET # BLD AUTO: 340 10*3/MM3 (ref 140–450)
PMV BLD AUTO: 10.8 FL (ref 6–12)
PO2 BLDA: 91.4 MM HG (ref 83–108)
PO2 TEMP ADJ BLD: 91.4 MM HG (ref 83–108)
POTASSIUM SERPL-SCNC: 4.3 MMOL/L (ref 3.5–5.2)
PROT SERPL-MCNC: 5.5 G/DL (ref 6–8.5)
RBC # BLD AUTO: 3.29 10*6/MM3 (ref 4.14–5.8)
SODIUM SERPL-SCNC: 133 MMOL/L (ref 136–145)
TOTAL RATE: 0 BREATHS/MINUTE
WBC NRBC COR # BLD AUTO: 10.93 10*3/MM3 (ref 3.4–10.8)

## 2024-11-25 PROCEDURE — 70450 CT HEAD/BRAIN W/O DYE: CPT

## 2024-11-25 PROCEDURE — 80053 COMPREHEN METABOLIC PANEL: CPT | Performed by: NURSE PRACTITIONER

## 2024-11-25 PROCEDURE — 83605 ASSAY OF LACTIC ACID: CPT | Performed by: NURSE PRACTITIONER

## 2024-11-25 PROCEDURE — 63710000001 INSULIN LISPRO (HUMAN) PER 5 UNITS: Performed by: ORTHOPAEDIC SURGERY

## 2024-11-25 PROCEDURE — 93010 ELECTROCARDIOGRAM REPORT: CPT | Performed by: INTERNAL MEDICINE

## 2024-11-25 PROCEDURE — 85379 FIBRIN DEGRADATION QUANT: CPT | Performed by: NURSE PRACTITIONER

## 2024-11-25 PROCEDURE — 85025 COMPLETE CBC W/AUTO DIFF WBC: CPT | Performed by: NURSE PRACTITIONER

## 2024-11-25 PROCEDURE — 83735 ASSAY OF MAGNESIUM: CPT | Performed by: INTERNAL MEDICINE

## 2024-11-25 PROCEDURE — 71045 X-RAY EXAM CHEST 1 VIEW: CPT

## 2024-11-25 PROCEDURE — 25010000002 ERTAPENEM PER 500 MG: Performed by: ORTHOPAEDIC SURGERY

## 2024-11-25 PROCEDURE — 71275 CT ANGIOGRAPHY CHEST: CPT

## 2024-11-25 PROCEDURE — 93005 ELECTROCARDIOGRAM TRACING: CPT | Performed by: NURSE PRACTITIONER

## 2024-11-25 PROCEDURE — 82805 BLOOD GASES W/O2 SATURATION: CPT

## 2024-11-25 PROCEDURE — 94799 UNLISTED PULMONARY SVC/PX: CPT

## 2024-11-25 PROCEDURE — 82375 ASSAY CARBOXYHB QUANT: CPT

## 2024-11-25 PROCEDURE — 87040 BLOOD CULTURE FOR BACTERIA: CPT | Performed by: NURSE PRACTITIONER

## 2024-11-25 PROCEDURE — 99232 SBSQ HOSP IP/OBS MODERATE 35: CPT | Performed by: STUDENT IN AN ORGANIZED HEALTH CARE EDUCATION/TRAINING PROGRAM

## 2024-11-25 PROCEDURE — 25010000002 ENOXAPARIN PER 10 MG: Performed by: ORTHOPAEDIC SURGERY

## 2024-11-25 PROCEDURE — 25810000003 SODIUM CHLORIDE 0.9 % SOLUTION: Performed by: NURSE PRACTITIONER

## 2024-11-25 PROCEDURE — 83735 ASSAY OF MAGNESIUM: CPT | Performed by: NURSE PRACTITIONER

## 2024-11-25 PROCEDURE — 92526 ORAL FUNCTION THERAPY: CPT

## 2024-11-25 PROCEDURE — 83050 HGB METHEMOGLOBIN QUAN: CPT

## 2024-11-25 PROCEDURE — 36600 WITHDRAWAL OF ARTERIAL BLOOD: CPT

## 2024-11-25 PROCEDURE — 25010000002 DAPTOMYCIN PER 1 MG: Performed by: ORTHOPAEDIC SURGERY

## 2024-11-25 PROCEDURE — 25510000001 IOPAMIDOL PER 1 ML: Performed by: STUDENT IN AN ORGANIZED HEALTH CARE EDUCATION/TRAINING PROGRAM

## 2024-11-25 PROCEDURE — 82948 REAGENT STRIP/BLOOD GLUCOSE: CPT

## 2024-11-25 RX ORDER — DEXTROSE MONOHYDRATE 50 MG/ML
50 INJECTION, SOLUTION INTRAVENOUS CONTINUOUS
Status: DISCONTINUED | OUTPATIENT
Start: 2024-11-25 | End: 2024-11-25

## 2024-11-25 RX ORDER — IOPAMIDOL 755 MG/ML
80 INJECTION, SOLUTION INTRAVASCULAR
Status: COMPLETED | OUTPATIENT
Start: 2024-11-26 | End: 2024-11-25

## 2024-11-25 RX ORDER — IPRATROPIUM BROMIDE AND ALBUTEROL SULFATE 2.5; .5 MG/3ML; MG/3ML
3 SOLUTION RESPIRATORY (INHALATION)
Status: DISCONTINUED | OUTPATIENT
Start: 2024-11-25 | End: 2024-12-03

## 2024-11-25 RX ORDER — OXYCODONE AND ACETAMINOPHEN 5; 325 MG/1; MG/1
1 TABLET ORAL EVERY 6 HOURS PRN
Status: DISPENSED | OUTPATIENT
Start: 2024-11-25 | End: 2024-11-30

## 2024-11-25 RX ORDER — FUROSEMIDE 10 MG/ML
40 INJECTION INTRAMUSCULAR; INTRAVENOUS ONCE
Status: COMPLETED | OUTPATIENT
Start: 2024-11-26 | End: 2024-11-26

## 2024-11-25 RX ADMIN — ENOXAPARIN SODIUM 40 MG: 100 INJECTION SUBCUTANEOUS at 08:32

## 2024-11-25 RX ADMIN — PREGABALIN 225 MG: 75 CAPSULE ORAL at 08:30

## 2024-11-25 RX ADMIN — METOPROLOL SUCCINATE 50 MG: 50 TABLET, EXTENDED RELEASE ORAL at 08:30

## 2024-11-25 RX ADMIN — ASPIRIN 81 MG: 81 TABLET, COATED ORAL at 08:30

## 2024-11-25 RX ADMIN — PANTOPRAZOLE SODIUM 40 MG: 40 TABLET, DELAYED RELEASE ORAL at 05:44

## 2024-11-25 RX ADMIN — DEXTROSE MONOHYDRATE 25 G: 25 INJECTION, SOLUTION INTRAVENOUS at 21:24

## 2024-11-25 RX ADMIN — INSULIN LISPRO 12 UNITS: 100 INJECTION, SOLUTION INTRAVENOUS; SUBCUTANEOUS at 12:25

## 2024-11-25 RX ADMIN — SODIUM CHLORIDE 500 ML: 9 INJECTION, SOLUTION INTRAVENOUS at 21:48

## 2024-11-25 RX ADMIN — INSULIN LISPRO 4 UNITS: 100 INJECTION, SOLUTION INTRAVENOUS; SUBCUTANEOUS at 12:24

## 2024-11-25 RX ADMIN — HYDROCODONE BITARTRATE AND ACETAMINOPHEN 1 TABLET: 5; 325 TABLET ORAL at 09:56

## 2024-11-25 RX ADMIN — INSULIN LISPRO 12 UNITS: 100 INJECTION, SOLUTION INTRAVENOUS; SUBCUTANEOUS at 08:30

## 2024-11-25 RX ADMIN — IOPAMIDOL 80 ML: 755 INJECTION, SOLUTION INTRAVENOUS at 23:31

## 2024-11-25 RX ADMIN — ERTAPENEM 1000 MG: 1 INJECTION INTRAMUSCULAR; INTRAVENOUS at 08:30

## 2024-11-25 RX ADMIN — AMITRIPTYLINE HYDROCHLORIDE 25 MG: 25 TABLET, FILM COATED ORAL at 08:30

## 2024-11-25 RX ADMIN — DAPTOMYCIN 450 MG: 500 INJECTION, POWDER, LYOPHILIZED, FOR SOLUTION INTRAVENOUS at 12:25

## 2024-11-25 RX ADMIN — DEXTROSE 15 G: 15 GEL ORAL at 20:39

## 2024-11-25 RX ADMIN — INSULIN LISPRO 12 UNITS: 100 INJECTION, SOLUTION INTRAVENOUS; SUBCUTANEOUS at 17:39

## 2024-11-25 NOTE — CASE MANAGEMENT/SOCIAL WORK
Continued Stay Note  [facility]     Patient Name: Fracisco Mullen  MRN: [id number]  Today's Date: 11/25/2024    Admit Date: 11/15/2024    Plan: TBD   Discharge Plan       Row Name 11/25/24 1141       Plan    Plan TBD    Patient/Family in Agreement with Plan yes    Plan Comments Spoke with patient at bedside. Patient is s/p day #3 Lt BKA. Awaiting updated PT/OT notes in Epic to assist with proper discharge placement. Patient did not have any immediate discharge needs or concerns at this time. CM will continue to follow and assist with discharge planning.    Final Discharge Disposition Code 01 - home or self-care                   Discharge Codes    No documentation.                 Expected Discharge Date and Time       Expected Discharge Date Expected Discharge Time    Nov 28, 2024               Kirby Rgigs RN

## 2024-11-25 NOTE — PROGRESS NOTES
[facility] Medicine Services  PROGRESS NOTE    Patient Name: Fracisco Mullen  : 1965  MRN: [id number]    Date of Admission: 11/15/2024  Primary Care Physician: Brandy Roberson    Subjective   Subjective     CC:  F/u DKA     HPI:  Patient reports continued pain around surgical extremity. Denies fevers, chills, sweats.       Objective   Objective     Vital Signs:   Temp:  [97.7 °F (36.5 °C)-99.2 °F (37.3 °C)] 97.7 °F (36.5 °C)  Heart Rate:  [83-92] 84  Resp:  [18] 18  BP: (100-116)/(52-65) 100/57  Flow (L/min) (Oxygen Therapy):  [2] 2     Physical Exam:  General appearance: alert, awake, oriented, no acute distress, chronically ill appearing   Cardiovascular: RRR, no murmurs or rubs, radial pulse full 2/4 BL   Respiratory: CTAB, no crackles or wheezes   Abdomen: soft, non-tender, no organomegaly, bowel sounds normoactive    MSK: s/p L BKA, stump wrapped and dressed   Neuro/CNS: alert and oriented x3, normal speech    Results Reviewed:  LAB RESULTS:      Lab 24  0452 24  0606 24  0701 24  0550 24  0717   WBC 12.38* 10.48 11.11* 15.93* 9.33   HEMOGLOBIN 8.9* 9.4* 9.4* 10.5* 12.0*   HEMATOCRIT 28.7* 29.2* 30.1* 34.3* 38.6   PLATELETS 298 271 266 262 276   NEUTROS ABS 8.73* 6.58 7.82*  --   --    IMMATURE GRANS (ABS) 0.13* 0.08* 0.06*  --   --    LYMPHS ABS 1.62 2.37 2.17  --   --    MONOS ABS 1.73* 1.10* 0.76  --   --    EOS ABS 0.08 0.26 0.22  --   --    MCV 83.9 80.9 82.9 84.3 82.8         Lab 24  0540 24  0452 24  0606 24  0701 24  0550 24  0717   SODIUM  --  135* 137 133* 133* 134*   POTASSIUM  --  4.1 4.0 4.0 4.4 3.9   CHLORIDE  --  100 98 97* 97* 97*   CO2  --  26.0 32.0* 29.0 24.0 27.0   ANION GAP  --  9.0 7.0 7.0 12.0 10.0   BUN  --   19 31* 35* 20   CREATININE  --  0.66* 0.73* 0.85 0.98 1.00   EGFR  --  106.7 103.5 98.9 87.7 85.6   GLUCOSE  --  262* 160* 227* 433* 279*   CALCIUM  --  7.9* 9.1 9.3 8.4*  8.8   MAGNESIUM 2.3 1.4*  --   --   --   --    PHOSPHORUS  --  2.6  --   --   --   --                      Lab 11/22/24  1054   ABO TYPING O   RH TYPING Positive   ANTIBODY SCREEN Negative         Brief Urine Lab Results  (Last result in the past 365 days)        Color   Clarity   Blood   Leuk Est   Nitrite   Protein   CREAT   Urine HCG        11/15/24 1142 Yellow   Clear   Small (1+)   Negative   Negative   100 mg/dL (2+)                   Microbiology Results Abnormal       None            No radiology results from the last 24 hrs        Current medications:  Scheduled Meds:amitriptyline, 25 mg, Oral, BID  aspirin, 81 mg, Oral, Daily  DAPTOmycin, 6 mg/kg, Intravenous, Q24H  enoxaparin, 40 mg, Subcutaneous, Daily  ertapenem, 1,000 mg, Intravenous, Q24H  finasteride, 5 mg, Oral, Nightly  insulin glargine, 30 Units, Subcutaneous, Nightly  Insulin Lispro, 12 Units, Subcutaneous, TID With Meals  insulin lispro, 2-9 Units, Subcutaneous, 4x Daily AC & at Bedtime  metoprolol succinate XL, 50 mg, Oral, Daily  pantoprazole, 40 mg, Oral, Q AM  pregabalin, 225 mg, Oral, BID  rOPINIRole, 0.25 mg, Oral, Nightly  sodium chloride, 10 mL, Intravenous, Q12H  sodium chloride, 10 mL, Intravenous, Q12H  tamsulosin, 0.8 mg, Oral, Nightly      Continuous Infusions:ropivacaine, , Last Rate: 1 mL/hr at 11/24/24 0731      PRN Meds:.  acetaminophen **OR** acetaminophen **OR** acetaminophen    senna-docusate sodium **AND** polyethylene glycol **AND** bisacodyl **AND** bisacodyl    calcium carbonate    Calcium Replacement - Follow Nurse / BPA Driven Protocol    dextrose    dextrose    glucagon (human recombinant)    HYDROcodone-acetaminophen    influenza vaccine    Lidocaine HCl gel    Magnesium Standard Dose Replacement - Follow Nurse / BPA Driven Protocol    nitroglycerin    ondansetron    Phosphorus Replacement - Follow Nurse / BPA Driven Protocol    Potassium Replacement - Follow Nurse / BPA Driven Protocol    sodium chloride    sodium  chloride    Assessment & Plan   Assessment & Plan     Active Hospital Problems    Diagnosis  POA    **DKA (diabetic ketoacidosis) [E11.10]  Yes    Severe protein-calorie malnutrition [E43]  Yes    Non healing left heel wound [S91.302A]  Yes    Acute osteomyelitis of left foot [M86.172]  Unknown      Resolved Hospital Problems   No resolved problems to display.          Brief Hospital Course to date:  Fracisco ADAM Lady is a 59 y.o. male with past medical history of hypertension, type 2 diabetes with insulin use, diabetic neuropathy, and chronic diabetic foot wounds who presents via EMS from home due to altered mental status and fatigue. Lab work consistent with diabetic ketoacidosis.      Left calcaneal osteomyelitis   - Patient with chronic left heel wound and right plantar foot wound  - Admitted August 2024 for left foot cellulitis and discharged on oral antibiotics and with home wound vac following debridement, MRI left foot obtained at that time with no definite findings of osteomyelitis  - Repeat MRI left foot shows soft tissue wound with evidence of osteomyelitis of the calcaneus and possibly an associated non-drainable abscess  -S/p left below-knee amputation by Dr. Gan on 11/22/2024 without any immediate complications  -ID following, on dapto and invanz, possibly deescalate to oral antibiotics if patient continues to improve  -Optimize pain control  -PT/OT     BPH  Urinary retention  - Continue home Flomax, Proscar  -Patient has anchored Bustos; voiding trial         DKA in setting of uncontrolled IDDM complicated by diabetic neuropathy and chronic foot wounds , improving  - Uncertain of medication compliance  - A1c 14.1%  - S/p DKA protocol including insulin drip, IV fluids, and electrolyte replacement per protocol  - Now on subcutaneous insulin. Improving. Titrate insulin as indicated      Malnutrition  - Patient with poor oral intake, refusing supplementation  - Nutrition following     Acute toxic  metabolic encephalopathy, improved  - CT head without acute abnormality  - UDS negative  - secondary to DKA     HTN  - Continue home metoprolol     Mood disorder  - Continue home Amitriptyline      RLS  - Continue home Requip    Expected Discharge Location and Transportation: Rehab  Expected Discharge   Expected Discharge Date: 11/25/2024; Expected Discharge Time:      VTE Prophylaxis:  Pharmacologic VTE prophylaxis orders are present.         AM-PAC 6 Clicks Score (PT): 10 (11/24/24 0800)    CODE STATUS:   Code Status and Medical Interventions: CPR (Attempt to Resuscitate); Full Support; Full code per last hosptial admission. Patient disoriented on exam with no family present at bedside. Only contact information is friend.   Ordered at: 11/15/24 1218     Level Of Support Discussed With:    Patient     Code Status (Patient has no pulse and is not breathing):    CPR (Attempt to Resuscitate)     Medical Interventions (Patient has pulse or is breathing):    Full Support     Comments:    Full code per last hosptial admission. Patient disoriented on exam with no family present at bedside. Only contact information is friend.       Koko Lord, DO  11/25/24

## 2024-11-25 NOTE — PLAN OF CARE
Goal Outcome Evaluation:                   Anticipated Discharge Disposition (SLP): home          SLP Swallowing Diagnosis: mild, oral dysphagia, pharyngeal dysphagia (11/25/24 1140)  Treatment Assessment (SLP): continued, toleration of diet, no clinical signs of, aspiration (11/25/24 1140)  Treatment Assessment Comments (SLP): No overt s/s of aspiration with regular consistency or thin liquids (11/25/24 1140)  Plan for Continued Treatment (SLP): treatment no longer indicated as all goals met (11/25/24 1140)

## 2024-11-25 NOTE — PROGRESS NOTES
"          Clinical Nutrition Assessment     Patient Name: Fracisco Mullen  YOB: 1965  MRN: [id number]  Date of Encounter: 11/25/24 16:48 EST  Admission date: 11/15/2024  Reason for Visit: Follow-up protocol     Assessment   Nutrition Assessment   Admission Diagnosis:  DKA (diabetic ketoacidosis) [E11.10]    Problem List:    DKA (diabetic ketoacidosis)    Non healing left heel wound    Severe protein-calorie malnutrition    Acute osteomyelitis of left foot      PMH:   He  has a past medical history of Acute renal failure, Obesity, Sleep apnea, and Type 2 diabetes mellitus.    PSH:  He  has a past surgical history that includes Back surgery; cholecystectomy with intraoperative cholangiogram (N/A, 1/6/2021); ERCP (N/A, 1/9/2021); Incision and drainage foot (Left, 8/3/2024); PLACEMENT OF WOUND VAC (Left, 8/3/2024); and Leg amputation, lower tibia/fibula (Left, 11/22/2024).    Applicable Nutrition History:   (11/18) SLP Diet Recommendation: soft to chew textures, ground, thin liquids, other (see comments) (no straw).  (11/25) SLP: regular, thin liquids      Anthropometrics     Height: Height: 162.6 cm (64.02\")  Last Filed Weight: Weight: 77.7 kg (171 lb 3.2 oz) (11/24/24 0500)  Method: Weight Method: Bed scale  BMI: BMI (Calculated): 29.4    UBW:     Weight       Weight (kg) Weight (lbs) Weight Method Visit Report   8/1/2024 86.183 kg  190 lb  Stated     11/15/2024 85.73 kg  189 lb  Stated      72.303 kg  159 lb 6.4 oz  Bed scale     11/18/2024 72.3 kg  159 lb 6.3 oz  Bed scale       Weight change: weight loss of 23 lbs (12.1%) over 3 month(s)    Significant?  Yes    Bed weight at time of visit: 167lb     Nutrition Focused Physical Exam    Date: 11/18    Patient meets criteria for malnutrition diagnosis, see MSA note.     Subjective   Reported/Observed/Food/Nutrition Related History:   11/25  Patient with good appetite and PO intake. No new nutrition concerns or needs at this time.    11/21  Consult for " "supplement options.  It seems patient has refusing meals, only drinking liquids.  Good PO intake with initial admission.      Dicussed with patient, reports he is getting tired of the food provided. He feels its the same food for all meals.  Prefs noted for lunch meal, wants a hamburger and fries.  Prefs limited due to soft texture, ground meat restriction.  Declined ONS, patient reports he tried ONS before and did not like them.     11/18  Alert and oriented, able to contribute to intake and weight history.  Reports gradual weight loss over the past 3 months. He feels his appetite has decline a little. He thinks he feels a little down which has affected his appetite.  Has been disabled for about two years when \"his leg gave out\", he is not sure if he has a stroke.  He feels he is compliant with his insulin.  He does not know what insulin he takes, his wife is the one who knows the dose and frequency. He is not sure why his A1C is so high.      Current Nutrition Prescription   PO: Diet: Diabetic; Consistent Carbohydrate; Fluid Consistency: Thin (IDDSI 0)  Oral Nutrition Supplement: n/a   Intake: 86% avg PO intake since previous visit    Assessment & Plan   Nutrition Diagnosis   Date: 11/18  Updated:  Problem Malnutrition, acute severe   Etiology Intake < needs; ? Poor BG control    Signs/Symptoms <75% of EEN x>7d, significant weight loss of 12.1% x 3, moderate muscle wasting, and moderate subcutaneous fat loss   Status: New    Goal / Objectives:   Maintain nutrition and Maintain intake      Nutrition Intervention      Care plan reviewed    No further nutrition monitoring warranted at this time. RDN will follow peripherally. Available via consult.      Monitoring/Evaluation:   Per protocol    Concepcino Purvis, MS,RD,LD  Time Spent: 25min  "

## 2024-11-25 NOTE — PROGRESS NOTES
"Fracisco Mullen  1965  [id number]    Evaluating Physician: Varun Dominique MD    Chief Complaint: fatigue    Reason for Consultation: foot infection    History of present illness:     Patient is a 59 y.o.  Yr old male with history of diabetes/hypertension, previously followed with Dr. Gatica; admitted 2023 with right foot infection, cultures with MSSA/Morganella/ESBL Klebsiella/enterococcus and strep species.had surgery at the second toe with amputation, received IV daptomycin/Invanz with general improvements at that site.  He has been left with a chronic right plantar foot wound and a chronic wound at the left heel that has turned black; He apparently was admitted to the hospital August 1 after a fall at [facility].  Noted to have urinary retention with concern for possible UTI.  In addition left heel with black discoloration/malodor and redness, empiric antibiotics initiated.  He is chronically debilitated and wheelchair bound by his report.Bustos catheter-based at admission     8/3/24  Dr Gan  \"PROCEDURE:            Left  Left      48713:             Debridement of skin and subcutaneous tissue  53316:             Wound vacuum-assisted closure\"     8/6/24 MRI and surgical findings did not confirm bone involvement, transitioned to oral agents at discharge       Readmitted November 15, 2024 with reports of appearing \"sluggish\" according to admission notes with DKA, noted to have high hemoglobin A1c and persistent/worsening left heel wound according to patient; medicine team notes mention urinary retention, acute toxic metabolic encephalopathy improved and low-grade fever. Abnormal MRI at the left foot:    Per radiology-- \"Soft tissue wound seen along the posterior heel with ill-defined fluid that extends through and disrupts the distal Achilles tendon. Underlying this area there is evidence of osteomyelitis of the posterior lateral calcaneus. There is a tiny fluid   collection here as well that may represent " "an associated nondrainable abscess.     Additionally there appears to be a nondisplaced stress fracture of the posterior calcaneus. \"    11/22 left BKA    11/25/24 seen early and sleepy;  Left LE postop pain  dull , worse with palpation, better with pain meds and not severe,  3/10.     No headache photophobia or neck stiffness.  No shortness of breath cough or hemoptysis.  No nausea vomiting diarrhea or abdominal pain.  No other new skin rash.  no dysuria hematuria or pyuria.       Past Medical History:   Diagnosis Date    Acute renal failure     Hx of    Obesity     Hx of    Sleep apnea     Type 2 diabetes mellitus        Past Surgical History:   Procedure Laterality Date    BACK SURGERY      lower back    CHOLECYSTECTOMY WITH INTRAOPERATIVE CHOLANGIOGRAM N/A 1/6/2021    Procedure: CHOLECYSTECTOMY LAPAROSCOPIC INTRAOPERATIVE CHOLANGIOGRAM;  Surgeon: Juventino Hooker MD;  Location:  [facility];  Service: General;  Laterality: N/A;    ERCP N/A 1/9/2021    Procedure: ENDOSCOPIC RETROGRADE CHOLANGIOPANCREATOGRAPHY;  Surgeon: Jeremy Dowell MD;  Location:  [facility];  Service: Gastroenterology;  Laterality: N/A;  with sphiincterotomy and balloon sweep with 9mm-12mm balloon    INCISION AND DRAINAGE FOOT Left 8/3/2024    Procedure: HEAL IRRIGATION AND DEBRIDEMENT LEFT;  Surgeon: Dru Gan Jr., MD;  Location:  [facility];  Service: Orthopedics;  Laterality: Left;    PLACEMENT OF WOUND VAC Left 8/3/2024    Procedure: PLACEMENT OF WOUND VAC;  Surgeon: Dru Gan Jr., MD;  Location:  [facility];  Service: Orthopedics;  Laterality: Left;       Pediatric History   Patient Parents    Not on file     Other Topics Concern    Not on file   Social History Narrative    Not on file       family history includes Cancer in his brother, maternal grandmother, mother, and another family member; Diabetes in an other family member; Heart attack in an other family member; Heart disease in his brother and father; " Hyperlipidemia in his mother and another family member; Hypertension in his mother and another family member; Obesity in an other family member.    Allergies   Allergen Reactions    Sertraline Hcl Hives     Zoloft       Medication:  Current Facility-Administered Medications   Medication Dose Route Frequency Provider Last Rate Last Admin    acetaminophen (TYLENOL) tablet 650 mg  650 mg Oral Q4H PRN Dru Gan Jr., MD   650 mg at 11/19/24 1112    Or    acetaminophen (TYLENOL) 160 MG/5ML oral solution 650 mg  650 mg Oral Q4H PRN Dru Gan Jr., MD        Or    acetaminophen (TYLENOL) suppository 650 mg  650 mg Rectal Q4H PRN Dru Gan Jr., MD   650 mg at 11/15/24 2327    amitriptyline (ELAVIL) tablet 25 mg  25 mg Oral BID Dru Gan Jr., MD   25 mg at 11/24/24 2058    aspirin EC tablet 81 mg  81 mg Oral Daily Dru Gan Jr., MD   81 mg at 11/24/24 0944    sennosides-docusate (PERICOLACE) 8.6-50 MG per tablet 2 tablet  2 tablet Oral BID PRN Dru Gan Jr., MD        And    polyethylene glycol (MIRALAX) packet 17 g  17 g Oral Daily PRN Dru Gan Jr., MD        And    bisacodyl (DULCOLAX) EC tablet 5 mg  5 mg Oral Daily PRN Dru Gan Jr., MD        And    bisacodyl (DULCOLAX) suppository 10 mg  10 mg Rectal Daily PRN Dru Gan Jr., MD        calcium carbonate (TUMS) chewable tablet 500 mg (200 mg elemental)  2 tablet Oral TID PRN Dru Gan Jr., MD   2 tablet at 11/24/24 1829    Calcium Replacement - Follow Nurse / BPA Driven Protocol   Not Applicable PRN Dru Gan Jr., MD        DAPTOmycin (CUBICIN) 450 mg in sodium chloride 0.9 % 50 mL IVPB  6 mg/kg Intravenous Q24H Dru Gan Jr.,  mL/hr at 11/24/24 0951 450 mg at 11/24/24 0951    dextrose (D50W) (25 g/50 mL) IV injection 25 g  25 g Intravenous Q15 Min PRN Dru Gan Jr., MD   25 g at 11/20/24 2232    dextrose (GLUTOSE) oral gel 15 g  15 g Oral Q15 Min PRN Malik  Dru ADAM Jr., MD        Enoxaparin Sodium (LOVENOX) syringe 40 mg  40 mg Subcutaneous Daily Dru Gna Jr., MD   40 mg at 11/24/24 0948    ertapenem (INVanz) 1,000 mg in sodium chloride 0.9 % 100 mL MBP  1,000 mg Intravenous Q24H Dru Gan Jr.,  mL/hr at 11/24/24 1030 1,000 mg at 11/24/24 1030    finasteride (PROSCAR) tablet 5 mg  5 mg Oral Nightly Dru Gan Jr., MD   5 mg at 11/24/24 2058    glucagon (GLUCAGEN) injection 1 mg  1 mg Intramuscular Q15 Min PRN Dru Gan Jr., MD        HYDROcodone-acetaminophen (NORCO) 5-325 MG per tablet 1 tablet  1 tablet Oral Q6H PRN Dru Gan Jr., MD   1 tablet at 11/24/24 0945    influenza virus vacc split PF FLUZONE 0.5 mL  0.5 mL Intramuscular During Hospitalization Dru Gan Jr., MD        insulin glargine (LANTUS, SEMGLEE) injection 30 Units  30 Units Subcutaneous Nightly Koko Lord DO        Insulin Lispro (humaLOG) injection 12 Units  12 Units Subcutaneous TID With Meals Dru Gan Jr., MD   12 Units at 11/24/24 1236    Insulin Lispro (humaLOG) injection 2-9 Units  2-9 Units Subcutaneous 4x Daily AC & at Bedtime Dru Gan Jr., MD   6 Units at 11/24/24 1236    Lidocaine HCl gel (XYLOCAINE) urethral/mucosal syringe   Urethral PRN Dru Gan Jr., MD   Given at 11/20/24 1948    Magnesium Standard Dose Replacement - Follow Nurse / BPA Driven Protocol   Not Applicable PRN Dru Gan Jr., MD        metoprolol succinate XL (TOPROL-XL) 24 hr tablet 50 mg  50 mg Oral Daily Dru Gan Jr., MD   50 mg at 11/24/24 0945    nitroglycerin (NITROSTAT) SL tablet 0.4 mg  0.4 mg Sublingual Q5 Min PRN Dru Gan Jr., MD        ondansetron (ZOFRAN) injection 4 mg  4 mg Intravenous Q6H PRN Dru Gan Jr., MD   4 mg at 11/24/24 2030    pantoprazole (PROTONIX) EC tablet 40 mg  40 mg Oral Q AM Dru Gan Jr., MD   40 mg at 11/25/24 0525    Phosphorus Replacement - Follow Nurse / BPA  Driven Protocol   Not Applicable PRN Dru Gan Jr., MD        Potassium Replacement - Follow Nurse / BPA Driven Protocol   Not Applicable PRN Dru Gan Jr., MD        pregabalin (LYRICA) capsule 225 mg  225 mg Oral BID Dur Gan Jr., MD   225 mg at 11/24/24 2058    rOPINIRole (REQUIP) tablet 0.25 mg  0.25 mg Oral Nightly Dru Gan Jr., MD   0.25 mg at 11/24/24 2058    ropivacaine (NAROPIN) 0.2 % infusion (INFUSYSTEM)   Peripheral Nerve Continuous Dru Gan Jr., MD 1 mL/hr at 11/24/24 0731 Rate Verify at 11/24/24 0731    sodium chloride 0.9 % flush 10 mL  10 mL Intravenous Q12H Dru Gan Jr., MD   10 mL at 11/24/24 2059    sodium chloride 0.9 % flush 10 mL  10 mL Intravenous Q12H Dru Gan Jr., MD   10 mL at 11/24/24 2059    sodium chloride 0.9 % flush 10 mL  10 mL Intravenous PRN Dru Gan Jr., MD        sodium chloride 0.9 % infusion 40 mL  40 mL Intravenous PRN Dru Gan Jr., MD        tamsulosin (FLOMAX) 24 hr capsule 0.8 mg  0.8 mg Oral Nightly Dru Gan Jr., MD   0.8 mg at 11/24/24 2058       Antibiotics:  Anti-Infectives (From admission, onward)      Ordered     Dose/Rate Route Frequency Start Stop    11/22/24 1045  ceFAZolin 2000 mg IVPB in 100 mL NS (MBP)        Ordering Provider: Claudia Lim PA    2,000 mg  over 30 Minutes Intravenous Once 11/22/24 1047 11/22/24 1255    11/19/24 0737  DAPTOmycin (CUBICIN) 450 mg in sodium chloride 0.9 % 50 mL IVPB        Ordering Provider: Dru Gan Jr., MD    6 mg/kg × 72.3 kg  100 mL/hr over 30 Minutes Intravenous Every 24 Hours 11/19/24 0900 12/03/24 0859    11/19/24 0737  ertapenem (INVanz) 1,000 mg in sodium chloride 0.9 % 100 mL MBP        Ordering Provider: Dru Gan Jr., MD    1,000 mg  200 mL/hr over 30 Minutes Intravenous Every 24 Hours 11/19/24 0900 12/03/24 0859              Review of Systems    11/25/24 per nursing also    Constitutional-- No Fever, chills or  "sweats.  Appetite diminished with fatigue.  Heent-- No new vision, hearing or throat complaints.  No epistaxis or oral sores.  Denies odynophagia or dysphagia.  No flashers, floaters or eye pain. No odynophagia or dysphagia. No headache, photophobia or neck stiffness.  CV-- No chest pain, palpitation or syncope  Resp-- No SOB/cough/Hemoptysis  GI- No nausea, vomiting, or diarrhea.  No hematochezia, melena, or hematemesis. Denies jaundice or chronic liver disease.  -- No dysuria, hematuria, or flank pain.  Denies hesitancy, urgency or flank pain.  Lymph- no swollen lymph nodes in neck/axilla or groin.   Heme- No active bruising or bleeding; no Hx of DVT or PE.  MS-- no swelling or pain in the bones or joints of arms/legs.  No new back pain.  Neuro-- No acute focal weakness or numbness in the arms or legs.  No seizures.    Full 12 point review of systems reviewed and negative otherwise for acute complaints, except for above    Physical Exam:   Vital Signs   /60 (BP Location: Right arm, Patient Position: Lying)   Pulse 92   Temp 97.8 °F (36.6 °C) (Axillary)   Resp 18   Ht 162.6 cm (64.02\")   Wt 77.7 kg (171 lb 3.2 oz)   SpO2 96%   BMI 29.37 kg/m²     GENERAL: sleepy in no acute distress.   HEENT: Normocephalic, atraumatic.   No conjunctival injection. No icterus. Oropharynx clear without evidence of thrush or exudate. No evidence of periodontal disease.    NECK: Supple without nuchal rigidity. No mass.   HEART: RRR; No murmur, rubs, gallops.   LUNGS: diminished at bases but otherwise Clear to auscultation bilaterally without wheezing, rales, rhonchi. Normal respiratory effort. Nonlabored. No dullness.  ABDOMEN: Soft, nontender, nondistended. Positive bowel sounds. No rebound or guarding. NO mass or HSM.  EXT: see below   MSK: FROM without joint effusions noted arms/legs.    SKIN: Warm and dry without cutaneous eruptions on Inspection/palpation.    NEURO: sleepy.    Right foot second toe amputation site " noted, no redness/duration or warmth there.  No oral or active drainage    Left  LE surgical site no new visible redness/purulence.  No crepitus or bulla.         Laboratory Data    Results from last 7 days   Lab Units 11/24/24  0452 11/22/24  0606 11/21/24  0701   WBC 10*3/mm3 12.38* 10.48 11.11*   HEMOGLOBIN g/dL 8.9* 9.4* 9.4*   HEMATOCRIT % 28.7* 29.2* 30.1*   PLATELETS 10*3/mm3 298 271 266     Results from last 7 days   Lab Units 11/24/24  0452   SODIUM mmol/L 135*   POTASSIUM mmol/L 4.1   CHLORIDE mmol/L 100   CO2 mmol/L 26.0   BUN mg/dL 11   CREATININE mg/dL 0.66*   GLUCOSE mg/dL 262*   CALCIUM mg/dL 7.9*                     Estimated Creatinine Clearance: 110.7 mL/min (A) (by C-G formula based on SCr of 0.66 mg/dL (L)).      Microbiology:      Radiology:  Imaging Results (Last 72 Hours)       ** No results found for the last 72 hours. **              Impression:       --acute left heel wound infection ,  abnormal MRI as above raising concern for fluid extending into and disrupting the distal lateral Achilles tendon in addition to with evidence for osteomyelitis at the posterior lateral calcaneus in addition to possible nondisplaced stress fracture at the posterior calcaneus; High risk for persistent/recurrent or nonhealing wounds, persistent/progressive or recurrent infection and risk for further functional/limb loss, higher level amputation etc. Surgery 8/3;  High risk for amputation.  Empiric antibiotics for now     --urinary retention per admission notes, some hematuria on November 15; further urologic evaluation regarding functional/anatomic assessment at discretion of medicine team with respect inpatient versus outpatient     --Diabetes, uncontrolled ; glucose control per medicine team     --diabetic neuropathy     --Chronic debility as per patient wheelchair bound     --Hx ESBL    PLAN:     --IV daptomycin/Invanz; anticipate taper to oral agent if continued improvements postop    --Check/review labs  cultures and scans    --Partial history Per nursing staff    --Discussed with microbiology    --Discussed with surgery team    --orthopedic team making plans    --Highly complex at of issues with high risk for further serious morbidity and other serious sequela     Copied text in this note has been reviewed and is accurate as of 11/25/24.     Varun Dominique MD  11/25/2024

## 2024-11-25 NOTE — THERAPY DISCHARGE NOTE
Acute Care - Speech Language Pathology   Swallow Treatment Note/Discharge    [place]     Patient Name: Fracisco Mullen  : 1965  MRN: [id number]  Today's Date: 2024               Admit Date: 11/15/2024    Visit Dx:    ICD-10-CM ICD-9-CM   1. S/P BKA (below knee amputation) unilateral, left  Z89.512 V49.75   2. Diabetic ketoacidosis without coma associated with type 2 diabetes mellitus  E11.10 250.12   3. Oropharyngeal dysphagia  R13.12 787.22   4. Non healing left heel wound  S91.302A 892.1   5. Acute osteomyelitis of left foot  M86.172 730.07     Patient Active Problem List   Diagnosis    HTN (hypertension)    Type 2 diabetes mellitus, with long-term current use of insulin    Diabetic retinopathy    Obesity (BMI 30.0-34.9)    Peripheral neuropathy    Asthma    Causalgia of lower limb    Chronic constipation    Compression of lumbar nerve root    GERD (gastroesophageal reflux disease)    Hyperlipidemia    IBS (irritable bowel syndrome)    RLS (restless legs syndrome)    Sleep apnea    Vitamin D deficiency    Cigar smoker    Chronic back pain    Diabetic ketoacidosis associated with type 2 diabetes mellitus    Multiple open wounds of foot    Status post cholecystectomy    Cholestatic hepatitis    Diabetic infection of left foot    Precordial pain    Tobacco use    DKA (diabetic ketoacidosis)    Non healing left heel wound    Severe protein-calorie malnutrition    Acute osteomyelitis of left foot     Past Medical History:   Diagnosis Date    Acute renal failure     Hx of    Obesity     Hx of    Sleep apnea     Type 2 diabetes mellitus      Past Surgical History:   Procedure Laterality Date    BACK SURGERY      lower back    BELOW KNEE AMPUTATION Left 2024    Procedure: BELOW-KNEE AMPUTATION LEFT;  Surgeon: Dru Gan Jr., MD;  Location: [facility];  Service: Orthopedics;  Laterality: Left;    CHOLECYSTECTOMY WITH INTRAOPERATIVE CHOLANGIOGRAM N/A 2021    Procedure: CHOLECYSTECTOMY  LAPAROSCOPIC INTRAOPERATIVE CHOLANGIOGRAM;  Surgeon: Juventino Hooker MD;  Location:  [facility];  Service: General;  Laterality: N/A;    ERCP N/A 1/9/2021    Procedure: ENDOSCOPIC RETROGRADE CHOLANGIOPANCREATOGRAPHY;  Surgeon: Jeremy Dowell MD;  Location:  [facility];  Service: Gastroenterology;  Laterality: N/A;  with sphiincterotomy and balloon sweep with 9mm-12mm balloon    INCISION AND DRAINAGE FOOT Left 8/3/2024    Procedure: HEAL IRRIGATION AND DEBRIDEMENT LEFT;  Surgeon: Dru Gan Jr., MD;  Location:  [facility];  Service: Orthopedics;  Laterality: Left;    PLACEMENT OF WOUND VAC Left 8/3/2024    Procedure: PLACEMENT OF WOUND VAC;  Surgeon: Dru Gan Jr., MD;  Location:  [facility];  Service: Orthopedics;  Laterality: Left;       SLP Recommendation and Plan  SLP Swallowing Diagnosis: mild, oral dysphagia, pharyngeal dysphagia (11/25/24 1140)  SLP Diet Recommendation: regular textures, thin liquids (11/25/24 1140)     Monitor for Signs of Aspiration: yes, notify SLP if any concerns (11/25/24 1140)     Swallow Criteria for Skilled Therapeutic Interventions Met: demonstrates skilled criteria (11/25/24 1140)  Anticipated Discharge Disposition (SLP): home (11/25/24 1140)  Rehab Potential/Prognosis, Swallowing: good, to achieve stated therapy goals (11/25/24 1140)           Daily Summary of Progress (SLP): progress toward functional goals as expected (11/25/24 1140)     Anticipated Discharge Disposition (SLP): home (11/25/24 1140)                 Treatment Assessment (SLP): continued, toleration of diet, no clinical signs of, aspiration (11/25/24 1140)  Treatment Assessment Comments (SLP): No overt s/s of aspiration with regular consistency or thin liquids (11/25/24 1140)  Plan for Continued Treatment (SLP): treatment no longer indicated as all goals met (11/25/24 1140)         SWALLOW EVALUATION (Last 72 Hours)       SLP Adult Swallow Evaluation       Row Name 11/25/24 1140                    Rehab Evaluation    Document Type therapy note (daily note)  -        Subjective Information no complaints  -        Patient Observations alert;cooperative;agree to therapy  -        Patient/Family/Caregiver Comments/Observations none present  -        Patient Effort good  -        Symptoms Noted During/After Treatment none  -        Oral Care patient refused intervention  -           General Information    Patient Profile Reviewed yes  -CH           Pain    Pretreatment Pain Rating 0/10 - no pain  -        Posttreatment Pain Rating 0/10 - no pain  -           Pain Scale: FACES Pre/Post-Treatment    Pain: FACES Scale, Pretreatment 0-->no hurt  -        Posttreatment Pain Rating 0-->no hurt  -           SLP Evaluation Clinical Impression    SLP Swallowing Diagnosis mild;oral dysphagia;pharyngeal dysphagia  -        Functional Impact risk of aspiration/pneumonia;risk of malnutrition;risk of dehydration  -        Rehab Potential/Prognosis, Swallowing good, to achieve stated therapy goals  -        Swallow Criteria for Skilled Therapeutic Interventions Met demonstrates skilled criteria  -           SLP Treatment Clinical Impressions    Treatment Assessment (SLP) continued;toleration of diet;no clinical signs of;aspiration  -        Treatment Assessment Comments (SLP) No overt s/s of aspiration with regular consistency or thin liquids  -        Daily Summary of Progress (SLP) progress toward functional goals as expected  -        Plan for Continued Treatment (SLP) treatment no longer indicated as all goals met  -        Care Plan Review care plan/treatment goals reviewed  -           Recommendations    SLP Diet Recommendation regular textures;thin liquids  -        Recommended Precautions and Strategies upright posture during/after eating;small bites of food and sips of liquid;general aspiration precautions  -        Oral Care Recommendations Oral Care BID/PRN;Toothbrush  -         SLP Rec. for Method of Medication Administration meds whole;with thin liquids;as tolerated  -        Monitor for Signs of Aspiration yes;notify SLP if any concerns  -CH        Anticipated Discharge Disposition (SLP) home  -                  User Key  (r) = Recorded By, (t) = Taken By, (c) = Cosigned By      Initials Name Effective Dates     Ana Galvan, MS CCC-SLP 06/16/21 -                     EDUCATION  The patient has been educated in the following areas:   Dysphagia (Swallowing Impairment) Oral Care/Hydration.         SLP GOALS       Row Name 11/25/24 1140             (LTG) Patient will demonstrate functional swallow for    Diet Texture (Demonstrate functional swallow) regular textures  -CH      Liquid viscosity (Demonstrate functional swallow) thin liquids  -CH      [place] (Demonstrate functional swallow) independently (over 90% accuracy)  -CH      Time Frame (Demonstrate functional swallow) 1 week  -CH      Progress/Outcomes (Demonstrate functional swallow) goal met  -CH         (STG) Patient will tolerate trials of    Consistencies Trialed (Tolerate trials) regular textures;thin liquids  -CH      Desired Outcome (Tolerate trials) without signs/symptoms of aspiration;with adequate oral prep/transit/clearance  -CH      [place] (Tolerate trials) independently (over 90% accuracy)  -CH      Time Frame (Tolerate trials) 1 week  -CH      Progress/Outcomes (Tolerate trials) goal met  -CH         (STG) Patient will tolerate therapeutic trials of    Consistencies Trialed (Tolerate therapeutic trials) thin liquids  -CH      Desired Outcome (Tolerate therapeutic trials) without signs/symptoms of aspiration;without signs of distress;with adequate oral prep/transit/clearance  -CH      [place] (Tolerate therapeutic trials) independently (over 90% accuracy)  -CH      Time Frame (Tolerate therapeutic trials) 1 week  -CH      Progress/Outcomes (Tolerate therapeutic trials) goal met  -CH       Comment (Tolerate therapeutic trials) No s/s aspiration.  -CH         (STG) Pharyngeal Strengthening Exercise Goal 1 (SLP)    Activity (Pharyngeal Strengthening Goal 1, SLP) increase timing;increase anterior movement of the hyolaryngeal complex;increase squeeze/positive pressure generation  -CH      Increase Timing prepping - 3 second prep or suck swallow or 3-step swallow  -CH      Increase Anterior Movement of the Hyolaryngeal Complex chin tuck against resistance (CTAR)  -CH      Increase Squeeze/Positive Pressure Generation hard effortful swallow  -CH      [place]/Accuracy (Pharyngeal Strengthening Goal 1, SLP) with minimal cues (75-90% accuracy)  -CH      Progress/Outcomes (Pharyngeal Strengthening Goal 1, SLP) goal met  -CH                User Key  (r) = Recorded By, (t) = Taken By, (c) = Cosigned By      Initials Name Provider Type    Ana Michelle MS CCC-SLP Speech and Language Pathologist                           Time Calculation:    Time Calculation- SLP       Row Name 11/25/24 1302             Time Calculation- SLP    SLP Start Time 1140  -CH      SLP Received On 11/25/24  -CH         Untimed Charges    76819-DV Treatment Swallow Minutes 30  -CH         Total Minutes    Untimed Charges Total Minutes 30  -CH       Total Minutes 30  -CH                User Key  (r) = Recorded By, (t) = Taken By, (c) = Cosigned By      Initials Name Provider Type    Ana Michelle MS CCC-SLP Speech and Language Pathologist                    Therapy Charges for Today       Code Description Service Date Service Provider Modifiers Qty    [id number] HC ST TREATMENT SWALLOW 2 11/25/2024 Ana Galvan MS CCC-SLP GN 1                 SLP Discharge Summary  Anticipated Discharge Disposition (SLP): home    MS BRENT Ortiz  11/25/2024

## 2024-11-25 NOTE — PROGRESS NOTES
[facility]    Acute pain service Inpatient Progress Note    Patient Name: Fracisco Mullen  :  1965  MRN:  [id number]        Acute Pain  Service Inpatient Progress Note:    Analgesia:Fair  Pain Score:7/10  LOC: alert and awake  Resp Status: room air  Cardiac: VS stable  Side Effects:None  Catheter Site:clean, dressing intact and dry  Cath type: peripheral nerve cath(InfuSystem)  Infusion rate: Ext/Pop: Basal: 1ml/hr, PIB: 5ml q 2 h, PCA: 5 ml q 30 min (1mL,5ml, 5ml InfuSystem Pump)  Dosing/Volume: ropivacaine 0.2%  Catheter Plan:Catheter to remain Insitu and Continue catheter infusion rate unchanged  Comments: Bolus given through nerve catheter

## 2024-11-26 LAB
ALBUMIN SERPL-MCNC: 2.5 G/DL (ref 3.5–5.2)
ALBUMIN/GLOB SERPL: 0.8 G/DL
ALP SERPL-CCNC: 90 U/L (ref 39–117)
ALT SERPL W P-5'-P-CCNC: 17 U/L (ref 1–41)
AMMONIA BLD-SCNC: 25 UMOL/L (ref 16–60)
AMPHET+METHAMPHET UR QL: NEGATIVE
AMPHETAMINES UR QL: NEGATIVE
ANION GAP SERPL CALCULATED.3IONS-SCNC: 7 MMOL/L (ref 5–15)
AST SERPL-CCNC: 22 U/L (ref 1–40)
B PARAPERT DNA SPEC QL NAA+PROBE: NOT DETECTED
B PERT DNA SPEC QL NAA+PROBE: NOT DETECTED
BARBITURATES UR QL SCN: NEGATIVE
BENZODIAZ UR QL SCN: NEGATIVE
BILIRUB SERPL-MCNC: 0.2 MG/DL (ref 0–1.2)
BUN SERPL-MCNC: 15 MG/DL (ref 8–23)
BUN/CREAT SERPL: 18.5 (ref 7–25)
BUPRENORPHINE SERPL-MCNC: NEGATIVE NG/ML
C PNEUM DNA NPH QL NAA+NON-PROBE: NOT DETECTED
CALCIUM SPEC-SCNC: 7.4 MG/DL (ref 8.6–10.5)
CANNABINOIDS SERPL QL: NEGATIVE
CHLORIDE SERPL-SCNC: 97 MMOL/L (ref 98–107)
CO2 SERPL-SCNC: 28 MMOL/L (ref 22–29)
COCAINE UR QL: NEGATIVE
CREAT SERPL-MCNC: 0.81 MG/DL (ref 0.76–1.27)
CYTO UR: NORMAL
DEPRECATED RDW RBC AUTO: 49.1 FL (ref 37–54)
EGFRCR SERPLBLD CKD-EPI 2021: 100.3 ML/MIN/1.73
ERYTHROCYTE [DISTWIDTH] IN BLOOD BY AUTOMATED COUNT: 15.6 % (ref 12.3–15.4)
FENTANYL UR-MCNC: NEGATIVE NG/ML
FLUAV SUBTYP SPEC NAA+PROBE: NOT DETECTED
FLUBV RNA ISLT QL NAA+PROBE: NOT DETECTED
GLOBULIN UR ELPH-MCNC: 3.2 GM/DL
GLUCOSE BLDC GLUCOMTR-MCNC: 121 MG/DL (ref 70–130)
GLUCOSE BLDC GLUCOMTR-MCNC: 129 MG/DL (ref 70–130)
GLUCOSE BLDC GLUCOMTR-MCNC: 234 MG/DL (ref 70–130)
GLUCOSE BLDC GLUCOMTR-MCNC: 248 MG/DL (ref 70–130)
GLUCOSE SERPL-MCNC: 238 MG/DL (ref 65–99)
HADV DNA SPEC NAA+PROBE: NOT DETECTED
HCOV 229E RNA SPEC QL NAA+PROBE: NOT DETECTED
HCOV HKU1 RNA SPEC QL NAA+PROBE: NOT DETECTED
HCOV NL63 RNA SPEC QL NAA+PROBE: NOT DETECTED
HCOV OC43 RNA SPEC QL NAA+PROBE: NOT DETECTED
HCT VFR BLD AUTO: 27.1 % (ref 37.5–51)
HGB BLD-MCNC: 8.2 G/DL (ref 13–17.7)
HMPV RNA NPH QL NAA+NON-PROBE: NOT DETECTED
HPIV1 RNA ISLT QL NAA+PROBE: NOT DETECTED
HPIV2 RNA SPEC QL NAA+PROBE: NOT DETECTED
HPIV3 RNA NPH QL NAA+PROBE: NOT DETECTED
HPIV4 P GENE NPH QL NAA+PROBE: NOT DETECTED
LAB AP CASE REPORT: NORMAL
LAB AP CLINICAL INFORMATION: NORMAL
M PNEUMO IGG SER IA-ACNC: NOT DETECTED
MCH RBC QN AUTO: 25.9 PG (ref 26.6–33)
MCHC RBC AUTO-ENTMCNC: 30.3 G/DL (ref 31.5–35.7)
MCV RBC AUTO: 85.5 FL (ref 79–97)
METHADONE UR QL SCN: NEGATIVE
MRSA DNA SPEC QL NAA+PROBE: NEGATIVE
OPIATES UR QL: POSITIVE
OXYCODONE UR QL SCN: NEGATIVE
PATH REPORT.FINAL DX SPEC: NORMAL
PATH REPORT.GROSS SPEC: NORMAL
PCP UR QL SCN: NEGATIVE
PLATELET # BLD AUTO: 337 10*3/MM3 (ref 140–450)
PMV BLD AUTO: 10.8 FL (ref 6–12)
POTASSIUM SERPL-SCNC: 4 MMOL/L (ref 3.5–5.2)
PROT SERPL-MCNC: 5.7 G/DL (ref 6–8.5)
QT INTERVAL: 378 MS
QTC INTERVAL: 452 MS
RBC # BLD AUTO: 3.17 10*6/MM3 (ref 4.14–5.8)
RHINOVIRUS RNA SPEC NAA+PROBE: NOT DETECTED
RSV RNA NPH QL NAA+NON-PROBE: NOT DETECTED
SARS-COV-2 RNA NPH QL NAA+NON-PROBE: NOT DETECTED
SODIUM SERPL-SCNC: 132 MMOL/L (ref 136–145)
TRICYCLICS UR QL SCN: POSITIVE
WBC NRBC COR # BLD AUTO: 10.62 10*3/MM3 (ref 3.4–10.8)

## 2024-11-26 PROCEDURE — 82140 ASSAY OF AMMONIA: CPT | Performed by: NURSE PRACTITIONER

## 2024-11-26 PROCEDURE — 94799 UNLISTED PULMONARY SVC/PX: CPT

## 2024-11-26 PROCEDURE — 0202U NFCT DS 22 TRGT SARS-COV-2: CPT | Performed by: NURSE PRACTITIONER

## 2024-11-26 PROCEDURE — 25010000002 METOCLOPRAMIDE PER 10 MG: Performed by: NURSE PRACTITIONER

## 2024-11-26 PROCEDURE — 25010000002 LINEZOLID 600 MG/300ML SOLUTION: Performed by: INTERNAL MEDICINE

## 2024-11-26 PROCEDURE — 25010000002 MEROPENEM PER 100 MG: Performed by: INTERNAL MEDICINE

## 2024-11-26 PROCEDURE — 63710000001 INSULIN GLARGINE PER 5 UNITS: Performed by: STUDENT IN AN ORGANIZED HEALTH CARE EDUCATION/TRAINING PROGRAM

## 2024-11-26 PROCEDURE — 63710000001 INSULIN LISPRO (HUMAN) PER 5 UNITS: Performed by: ORTHOPAEDIC SURGERY

## 2024-11-26 PROCEDURE — 99232 SBSQ HOSP IP/OBS MODERATE 35: CPT | Performed by: STUDENT IN AN ORGANIZED HEALTH CARE EDUCATION/TRAINING PROGRAM

## 2024-11-26 PROCEDURE — 82948 REAGENT STRIP/BLOOD GLUCOSE: CPT

## 2024-11-26 PROCEDURE — 25010000002 ENOXAPARIN PER 10 MG: Performed by: ORTHOPAEDIC SURGERY

## 2024-11-26 PROCEDURE — 94640 AIRWAY INHALATION TREATMENT: CPT

## 2024-11-26 PROCEDURE — 25010000002 FUROSEMIDE PER 20 MG: Performed by: NURSE PRACTITIONER

## 2024-11-26 PROCEDURE — 80307 DRUG TEST PRSMV CHEM ANLYZR: CPT | Performed by: NURSE PRACTITIONER

## 2024-11-26 PROCEDURE — 85027 COMPLETE CBC AUTOMATED: CPT | Performed by: INTERNAL MEDICINE

## 2024-11-26 PROCEDURE — 80053 COMPREHEN METABOLIC PANEL: CPT | Performed by: INTERNAL MEDICINE

## 2024-11-26 PROCEDURE — 87641 MR-STAPH DNA AMP PROBE: CPT | Performed by: NURSE PRACTITIONER

## 2024-11-26 RX ORDER — FAMOTIDINE 10 MG/ML
20 INJECTION, SOLUTION INTRAVENOUS EVERY 12 HOURS SCHEDULED
Status: COMPLETED | OUTPATIENT
Start: 2024-11-26 | End: 2024-11-26

## 2024-11-26 RX ORDER — METOCLOPRAMIDE HYDROCHLORIDE 5 MG/ML
10 INJECTION INTRAMUSCULAR; INTRAVENOUS ONCE
Status: COMPLETED | OUTPATIENT
Start: 2024-11-26 | End: 2024-11-26

## 2024-11-26 RX ORDER — PANTOPRAZOLE SODIUM 40 MG/1
40 TABLET, DELAYED RELEASE ORAL
Status: DISCONTINUED | OUTPATIENT
Start: 2024-11-26 | End: 2024-12-05 | Stop reason: HOSPADM

## 2024-11-26 RX ORDER — LINEZOLID 2 MG/ML
600 INJECTION, SOLUTION INTRAVENOUS EVERY 12 HOURS
Status: COMPLETED | OUTPATIENT
Start: 2024-11-26 | End: 2024-11-29

## 2024-11-26 RX ADMIN — INSULIN LISPRO 12 UNITS: 100 INJECTION, SOLUTION INTRAVENOUS; SUBCUTANEOUS at 18:22

## 2024-11-26 RX ADMIN — LIDOCAINE HYDROCHLORIDE 1 APPLICATION: 20 JELLY TOPICAL at 03:33

## 2024-11-26 RX ADMIN — DOXYCYCLINE 100 MG: 100 INJECTION, POWDER, LYOPHILIZED, FOR SOLUTION INTRAVENOUS at 12:10

## 2024-11-26 RX ADMIN — PREGABALIN 225 MG: 75 CAPSULE ORAL at 22:25

## 2024-11-26 RX ADMIN — IPRATROPIUM BROMIDE AND ALBUTEROL SULFATE 3 ML: 2.5; .5 SOLUTION RESPIRATORY (INHALATION) at 16:49

## 2024-11-26 RX ADMIN — LINEZOLID 600 MG: 600 INJECTION, SOLUTION INTRAVENOUS at 12:10

## 2024-11-26 RX ADMIN — ACETAMINOPHEN 650 MG: 325 TABLET ORAL at 15:40

## 2024-11-26 RX ADMIN — INSULIN GLARGINE 25 UNITS: 100 INJECTION, SOLUTION SUBCUTANEOUS at 22:28

## 2024-11-26 RX ADMIN — INSULIN LISPRO 4 UNITS: 100 INJECTION, SOLUTION INTRAVENOUS; SUBCUTANEOUS at 12:11

## 2024-11-26 RX ADMIN — IPRATROPIUM BROMIDE AND ALBUTEROL SULFATE 3 ML: 2.5; .5 SOLUTION RESPIRATORY (INHALATION) at 07:29

## 2024-11-26 RX ADMIN — INSULIN LISPRO 12 UNITS: 100 INJECTION, SOLUTION INTRAVENOUS; SUBCUTANEOUS at 08:32

## 2024-11-26 RX ADMIN — PANTOPRAZOLE SODIUM 40 MG: 40 TABLET, DELAYED RELEASE ORAL at 08:34

## 2024-11-26 RX ADMIN — ROPINIROLE 0.25 MG: 0.5 TABLET, FILM COATED ORAL at 22:25

## 2024-11-26 RX ADMIN — FUROSEMIDE 40 MG: 10 INJECTION, SOLUTION INTRAMUSCULAR; INTRAVENOUS at 02:09

## 2024-11-26 RX ADMIN — IPRATROPIUM BROMIDE AND ALBUTEROL SULFATE 3 ML: 2.5; .5 SOLUTION RESPIRATORY (INHALATION) at 20:01

## 2024-11-26 RX ADMIN — FAMOTIDINE 20 MG: 10 INJECTION, SOLUTION INTRAVENOUS at 08:33

## 2024-11-26 RX ADMIN — DOXYCYCLINE 100 MG: 100 INJECTION, POWDER, LYOPHILIZED, FOR SOLUTION INTRAVENOUS at 02:10

## 2024-11-26 RX ADMIN — IPRATROPIUM BROMIDE AND ALBUTEROL SULFATE 3 ML: 2.5; .5 SOLUTION RESPIRATORY (INHALATION) at 12:30

## 2024-11-26 RX ADMIN — LINEZOLID 600 MG: 600 INJECTION, SOLUTION INTRAVENOUS at 22:47

## 2024-11-26 RX ADMIN — Medication 10 ML: at 23:20

## 2024-11-26 RX ADMIN — ENOXAPARIN SODIUM 40 MG: 100 INJECTION SUBCUTANEOUS at 08:34

## 2024-11-26 RX ADMIN — ASPIRIN 81 MG: 81 TABLET, COATED ORAL at 08:34

## 2024-11-26 RX ADMIN — PREGABALIN 225 MG: 75 CAPSULE ORAL at 08:34

## 2024-11-26 RX ADMIN — METOPROLOL SUCCINATE 50 MG: 50 TABLET, EXTENDED RELEASE ORAL at 08:34

## 2024-11-26 RX ADMIN — PANTOPRAZOLE SODIUM 40 MG: 40 TABLET, DELAYED RELEASE ORAL at 18:23

## 2024-11-26 RX ADMIN — Medication 10 ML: at 08:36

## 2024-11-26 RX ADMIN — MEROPENEM 1000 MG: 1 INJECTION, POWDER, FOR SOLUTION INTRAVENOUS at 08:33

## 2024-11-26 RX ADMIN — FINASTERIDE 5 MG: 5 TABLET, FILM COATED ORAL at 22:25

## 2024-11-26 RX ADMIN — INSULIN LISPRO 12 UNITS: 100 INJECTION, SOLUTION INTRAVENOUS; SUBCUTANEOUS at 12:10

## 2024-11-26 RX ADMIN — TAMSULOSIN HYDROCHLORIDE 0.8 MG: 0.4 CAPSULE ORAL at 22:25

## 2024-11-26 RX ADMIN — INSULIN GLARGINE 5 UNITS: 100 INJECTION, SOLUTION SUBCUTANEOUS at 08:32

## 2024-11-26 RX ADMIN — MEROPENEM 1000 MG: 1 INJECTION, POWDER, FOR SOLUTION INTRAVENOUS at 15:40

## 2024-11-26 RX ADMIN — INSULIN LISPRO 4 UNITS: 100 INJECTION, SOLUTION INTRAVENOUS; SUBCUTANEOUS at 08:33

## 2024-11-26 RX ADMIN — METOCLOPRAMIDE 10 MG: 5 INJECTION, SOLUTION INTRAMUSCULAR; INTRAVENOUS at 02:09

## 2024-11-26 RX ADMIN — ACETAMINOPHEN 650 MG: 325 TABLET ORAL at 22:47

## 2024-11-26 RX ADMIN — FAMOTIDINE 20 MG: 10 INJECTION, SOLUTION INTRAVENOUS at 02:09

## 2024-11-26 NOTE — NURSING NOTE
Shortly after shift change O2 rang out at 84%- RN responded and pt was on 2L NC, turned O2 up to 6L NC and pt unable to maintain above 90% O2 sat. APRN paged. PT BG low, protocol followed by gel than amp. BG remains stable. Pt Respiratory status did get better weaning to 4L; however, shortly after pt rang out at 79% again. APRN paged again. Pt remains lethargic but does respond to voice and able to follow commands. Pt placed on HFNC.     Pt remains refusing urinary catheterization, APRN and charge RN aware.

## 2024-11-26 NOTE — PROGRESS NOTES
[facility]    Acute pain service Inpatient Progress Note    Patient Name: Fracisco Mullen  :  1965  MRN:  [id number]        Acute Pain  Service Inpatient Progress Note:    Analgesia:Good  LOC: alert and awake  Resp Status: room air  Cardiac: VS stable  Side Effects:None  Catheter Site:clean, dressing intact and dry  Cath type: peripheral nerve cath(InfuSystem)  Infusion rate: Ext/Pop: Basal: 1ml/hr, PIB: 5ml q 2 h, PCA: 5 ml q 30 min (1mL,8ml, 8ml InfuSystem Pump)  Catheter Plan:Catheter to remain Insitu and Continue catheter infusion rate unchanged  Comments:    Pt nerve block cath pulled apart, tips prepped with alcohol  and flushed, cut short with sterile suture kit, reconnected, secured, infusion restarted. Thank you

## 2024-11-26 NOTE — PLAN OF CARE
Goal Outcome Evaluation:              Outcome Evaluation: VSS; O2 > 90% on 2L NC; Nerve block still in place; Pt due to void; bladder scanned at 1540 measured 622; pt refused to allow straight cath performed; pt educated on importance of straight cath but continued to refuse.  Pt resting in bed; alarm on; call light within reach.                              167.64

## 2024-11-26 NOTE — SIGNIFICANT NOTE
@ 2000 notified of increased oxygen demand, pt increased from 2 liters to 5 liters NC w/ decreased responsiveness; requested nursing to obtain glucose, orders placed for ABG, chest xray, labs including d dimer, and EKG. 500 ml NS bolus for no urine output. Fingerstick 56, treated per hypoglycemia protocol and held evening insulin. Chest xray w/ LLL airspace disease w/ small to moderate effusion, correlate for pneumonia. Added nebs, incentive spirometer and flutter valve. @ 2220 worsening hypoxia / lethargy, placed on NRB then HFNC; d dimer elevated 1.29, CTA chest obtained, CTH, respiratory PCR and ammonia level (normal). CTH w/ no acute intracranial process; CTA chest negative for PE w/ small bilateral pleural effusions w/ bilateral basilar atelectasis and fluid in the esophagus c/w reflux. Given increase in oxygen demand and effusions, blood pressure stable and normal renal function, ordered lasix 40 mg IV x 1 as well as doxycycline to cover for atypical pneumonia (pt on ertapenemen / dapto for osteo), check MRSA pcr nares, add aspiration precautions. Pt also w/ c/o reflux, will elevated HOB, add pepcid IV x 2, increase protonix to bid and give single dose of reglan.HFNC weaned from 71% FiO2 to 62% FiO2 w/ pulse oximeter documented at 100%, continue to wean FiO2. Hold any sedating medications for lethargy.

## 2024-11-26 NOTE — PLAN OF CARE
Goal Outcome Evaluation:         Pt finally agreeable to let staff insert bladder cath along with charge RN and house supervisor. Charge RN expressed pt is extremely difficult to cath and when done previously urine was incredibly thick with sediment- APRN paged for orders to anchor ledesma and request denied. Charge RN reached out to DR Montelongo and expressed concerns and that we had already tried increasing Flomax and given orders for ledesma. Pt is more alert this morning.  Continues on HFNC. NSR on tele

## 2024-11-26 NOTE — PROGRESS NOTES
"Fracisco Mullen  1965  [id number]    Evaluating Physician: Varun Dominique MD    Chief Complaint: fatigue    Reason for Consultation: foot infection    History of present illness:     Patient is a 59 y.o.  Yr old male with history of diabetes/hypertension, previously followed with Dr. Gatica; admitted 2023 with right foot infection, cultures with MSSA/Morganella/ESBL Klebsiella/enterococcus and strep species.had surgery at the second toe with amputation, received IV daptomycin/Invanz with general improvements at that site.  He has been left with a chronic right plantar foot wound and a chronic wound at the left heel that has turned black; He apparently was admitted to the hospital August 1 after a fall at [facility].  Noted to have urinary retention with concern for possible UTI.  In addition left heel with black discoloration/malodor and redness, empiric antibiotics initiated.  He is chronically debilitated and wheelchair bound by his report.Bustos catheter-based at admission     8/3/24  Dr Gan  \"PROCEDURE:            Left  Left      04746:             Debridement of skin and subcutaneous tissue  95899:             Wound vacuum-assisted closure\"     8/6/24 MRI and surgical findings did not confirm bone involvement, transitioned to oral agents at discharge       Readmitted November 15, 2024 with reports of appearing \"sluggish\" according to admission notes with DKA, noted to have high hemoglobin A1c and persistent/worsening left heel wound according to patient; medicine team notes mention urinary retention, acute toxic metabolic encephalopathy improved and low-grade fever. Abnormal MRI at the left foot:    Per radiology-- \"Soft tissue wound seen along the posterior heel with ill-defined fluid that extends through and disrupts the distal Achilles tendon. Underlying this area there is evidence of osteomyelitis of the posterior lateral calcaneus. There is a tiny fluid   collection here as well that may represent " "an associated nondrainable abscess.     Additionally there appears to be a nondisplaced stress fracture of the posterior calcaneus. \"    11/22 left BKA    11/26/24 increased O2 requirements with less responsive per medicine team notes overnight, awake this morning ;  Left LE postop pain  dull , worse with palpation, better with pain meds and not severe,  hw won't attach a numerical severity     No headache photophobia or neck stiffness.  No   cough or hemoptysis.  No nausea vomiting diarrhea or abdominal pain.  No other new skin rash.  no dysuria hematuria or pyuria.       Past Medical History:   Diagnosis Date    Acute renal failure     Hx of    Obesity     Hx of    Sleep apnea     Type 2 diabetes mellitus        Past Surgical History:   Procedure Laterality Date    BACK SURGERY      lower back    BELOW KNEE AMPUTATION Left 11/22/2024    Procedure: BELOW-KNEE AMPUTATION LEFT;  Surgeon: Dru Gan Jr., MD;  Location:  [facility];  Service: Orthopedics;  Laterality: Left;    CHOLECYSTECTOMY WITH INTRAOPERATIVE CHOLANGIOGRAM N/A 1/6/2021    Procedure: CHOLECYSTECTOMY LAPAROSCOPIC INTRAOPERATIVE CHOLANGIOGRAM;  Surgeon: Juventino Hooker MD;  Location:  [facility];  Service: General;  Laterality: N/A;    ERCP N/A 1/9/2021    Procedure: ENDOSCOPIC RETROGRADE CHOLANGIOPANCREATOGRAPHY;  Surgeon: Jeremy Dowell MD;  Location: [facility];  Service: Gastroenterology;  Laterality: N/A;  with sphiincterotomy and balloon sweep with 9mm-12mm balloon    INCISION AND DRAINAGE FOOT Left 8/3/2024    Procedure: HEAL IRRIGATION AND DEBRIDEMENT LEFT;  Surgeon: Dru Gan Jr., MD;  Location:  [facility];  Service: Orthopedics;  Laterality: Left;    PLACEMENT OF WOUND VAC Left 8/3/2024    Procedure: PLACEMENT OF WOUND VAC;  Surgeon: Dru Gan Jr., MD;  Location:  [facility];  Service: Orthopedics;  Laterality: Left;       Pediatric History   Patient Parents    Not on file     Other Topics Concern    Not on file "   Social History Narrative    Not on file       family history includes Cancer in his brother, maternal grandmother, mother, and another family member; Diabetes in an other family member; Heart attack in an other family member; Heart disease in his brother and father; Hyperlipidemia in his mother and another family member; Hypertension in his mother and another family member; Obesity in an other family member.    Allergies   Allergen Reactions    Sertraline Hcl Hives     Zoloft       Medication:  Current Facility-Administered Medications   Medication Dose Route Frequency Provider Last Rate Last Admin    acetaminophen (TYLENOL) tablet 650 mg  650 mg Oral Q4H PRN Dru Gan Jr., MD   650 mg at 11/19/24 1112    Or    acetaminophen (TYLENOL) 160 MG/5ML oral solution 650 mg  650 mg Oral Q4H PRN Dru Gan Jr., MD        Or    acetaminophen (TYLENOL) suppository 650 mg  650 mg Rectal Q4H PRN Dru Gan Jr., MD   650 mg at 11/15/24 2327    amitriptyline (ELAVIL) tablet 25 mg  25 mg Oral BID Dru Gan Jr., MD   25 mg at 11/25/24 0830    aspirin EC tablet 81 mg  81 mg Oral Daily Dru Gan Jr., MD   81 mg at 11/25/24 0830    sennosides-docusate (PERICOLACE) 8.6-50 MG per tablet 2 tablet  2 tablet Oral BID PRN Dru Gan Jr., MD        And    polyethylene glycol (MIRALAX) packet 17 g  17 g Oral Daily PRN Dru Gan Jr., MD        And    bisacodyl (DULCOLAX) EC tablet 5 mg  5 mg Oral Daily PRN Dru Gan Jr., MD        And    bisacodyl (DULCOLAX) suppository 10 mg  10 mg Rectal Daily PRN Dru Gan Jr., MD        calcium carbonate (TUMS) chewable tablet 500 mg (200 mg elemental)  2 tablet Oral TID PRN Dru Gan Jr., MD   2 tablet at 11/24/24 1829    Calcium Replacement - Follow Nurse / BPA Driven Protocol   Not Applicable PRN Dru Gan Jr., MD        DAPTOmycin (CUBICIN) 450 mg in sodium chloride 0.9 % 50 mL IVPB  6 mg/kg Intravenous Q24H Malik  Dru ADAM Jr.,  mL/hr at 11/25/24 1225 450 mg at 11/25/24 1225    dextrose (D50W) (25 g/50 mL) IV injection 25 g  25 g Intravenous Q15 Min PRN Dru Gan Jr., MD   25 g at 11/25/24 2124    dextrose (GLUTOSE) oral gel 15 g  15 g Oral Q15 Min PRN Dru Gan Jr., MD   15 g at 11/25/24 2039    doxycycline (VIBRAMYCIN) 100 mg in sodium chloride 0.9 % 100 mL MBP  100 mg Intravenous Q12H Enriqueta Muir APRN   100 mg at 11/26/24 0210    Enoxaparin Sodium (LOVENOX) syringe 40 mg  40 mg Subcutaneous Daily Dru Gan Jr., MD   40 mg at 11/25/24 0832    ertapenem (INVanz) 1,000 mg in sodium chloride 0.9 % 100 mL MBP  1,000 mg Intravenous Q24H Dru Gan Jr.,  mL/hr at 11/25/24 0830 1,000 mg at 11/25/24 0830    famotidine (PEPCID) injection 20 mg  20 mg Intravenous Q12H Enriqueta Muir APRN   20 mg at 11/26/24 0209    finasteride (PROSCAR) tablet 5 mg  5 mg Oral Nightly Dru Gan Jr., MD   5 mg at 11/24/24 2058    glucagon (GLUCAGEN) injection 1 mg  1 mg Intramuscular Q15 Min PRN Dru Gan Jr., MD        influenza virus vacc split PF FLUZONE 0.5 mL  0.5 mL Intramuscular During Hospitalization Dru Gan Jr., MD        insulin glargine (LANTUS, SEMGLEE) injection 25 Units  25 Units Subcutaneous Nightly Koko Lord DO        insulin glargine (LANTUS, SEMGLEE) injection 5 Units  5 Units Subcutaneous Once Koko Lord DO        Insulin Lispro (humaLOG) injection 12 Units  12 Units Subcutaneous TID With Meals Dru Gan Jr., MD   12 Units at 11/25/24 1739    Insulin Lispro (humaLOG) injection 2-9 Units  2-9 Units Subcutaneous 4x Daily AC & at Bedtime Dru Gan Jr., MD   4 Units at 11/25/24 1224    ipratropium-albuterol (DUO-NEB) nebulizer solution 3 mL  3 mL Nebulization 4x Daily - RT Enriqueta Muir, APRN   3 mL at 11/26/24 0729    Lidocaine HCl gel (XYLOCAINE) urethral/mucosal syringe   Urethral PRN Dru Gan Jr., MD   1 Application at  11/26/24 0333    Magnesium Standard Dose Replacement - Follow Nurse / BPA Driven Protocol   Not Applicable PRN rDu Gan Jr., MD        metoprolol succinate XL (TOPROL-XL) 24 hr tablet 50 mg  50 mg Oral Daily Dru Gan Jr., MD   50 mg at 11/25/24 0830    nitroglycerin (NITROSTAT) SL tablet 0.4 mg  0.4 mg Sublingual Q5 Min PRN Dru Gan Jr., MD        ondansetron (ZOFRAN) injection 4 mg  4 mg Intravenous Q6H PRN Dru Gan Jr., MD   4 mg at 11/24/24 2030    oxyCODONE-acetaminophen (PERCOCET) 5-325 MG per tablet 1 tablet  1 tablet Oral Q6H PRN Koko Lord,         pantoprazole (PROTONIX) EC tablet 40 mg  40 mg Oral BID AC Enriqueta Muir, APRN        Phosphorus Replacement - Follow Nurse / BPA Driven Protocol   Not Applicable PRN Dru Gan Jr., MD        Potassium Replacement - Follow Nurse / BPA Driven Protocol   Not Applicable PRN Dru Gan Jr., MD        pregabalin (LYRICA) capsule 225 mg  225 mg Oral BID Enriqueta Muir, APRN   225 mg at 11/25/24 0830    rOPINIRole (REQUIP) tablet 0.25 mg  0.25 mg Oral Nightly Dru Gan Jr., MD   0.25 mg at 11/24/24 2058    ropivacaine (NAROPIN) 0.2 % infusion (INFUSYSTEM)   Peripheral Nerve Continuous Dru Gan Jr., MD 1 mL/hr at 11/24/24 0731 Rate Verify at 11/24/24 0731    sodium chloride 0.9 % flush 10 mL  10 mL Intravenous Q12H Dru Gan Jr., MD   10 mL at 11/24/24 2059    sodium chloride 0.9 % flush 10 mL  10 mL Intravenous PRN Dru Gan Jr., MD        sodium chloride 0.9 % infusion 40 mL  40 mL Intravenous PRN Dru Gan Jr., MD        tamsulosin (FLOMAX) 24 hr capsule 0.8 mg  0.8 mg Oral Nightly Dru Gan Jr., MD   0.8 mg at 11/24/24 2058       Antibiotics:  Anti-Infectives (From admission, onward)      Ordered     Dose/Rate Route Frequency Start Stop    11/26/24 0024  doxycycline (VIBRAMYCIN) 100 mg in sodium chloride 0.9 % 100 mL MBP        Ordering Provider: Enriqueta Muir  "CARLA, APRN    100 mg  over 60 Minutes Intravenous Every 12 Hours 11/26/24 0100 12/03/24 0059    11/22/24 1045  ceFAZolin 2000 mg IVPB in 100 mL NS (MBP)        Ordering Provider: Claudia Lim PA    2,000 mg  over 30 Minutes Intravenous Once 11/22/24 1047 11/22/24 1255    11/19/24 0737  DAPTOmycin (CUBICIN) 450 mg in sodium chloride 0.9 % 50 mL IVPB        Ordering Provider: Dru Gan Jr., MD    6 mg/kg × 72.3 kg  100 mL/hr over 30 Minutes Intravenous Every 24 Hours 11/19/24 0900 12/03/24 0859    11/19/24 0737  ertapenem (INVanz) 1,000 mg in sodium chloride 0.9 % 100 mL MBP        Ordering Provider: Dru Gan Jr., MD    1,000 mg  200 mL/hr over 30 Minutes Intravenous Every 24 Hours 11/19/24 0900 12/03/24 0859              Review of Systems    11/26/24 per nursing also    Constitutional-- No Fever, chills or sweats.  Appetite diminished with fatigue.  Heent-- No new vision, hearing or throat complaints.  No epistaxis or oral sores.  Denies odynophagia or dysphagia.  No flashers, floaters or eye pain. No odynophagia or dysphagia. No headache, photophobia or neck stiffness.  CV-- No chest pain, palpitation or syncope  Resp-- see above  GI- No nausea, vomiting, or diarrhea.  No hematochezia, melena, or hematemesis. Denies jaundice or chronic liver disease.  -- No dysuria, hematuria, or flank pain.  Denies hesitancy, urgency or flank pain.  Lymph- no swollen lymph nodes in neck/axilla or groin.   Heme- No active bruising or bleeding; no Hx of DVT or PE.  MS-- no swelling or pain in the bones or joints of arms/legs.  No new back pain.  Neuro-- No acute focal weakness or numbness in the arms or legs.  No seizures.    Full 12 point review of systems reviewed and negative otherwise for acute complaints, except for above    Physical Exam:   Vital Signs   /62   Pulse 79   Temp 97.9 °F (36.6 °C) (Axillary)   Resp 16   Ht 162.6 cm (64.02\")   Wt 77.7 kg (171 lb 3.2 oz)   SpO2 99%   BMI 29.37 " kg/m²     GENERAL: sleepy     HEENT: Normocephalic, atraumatic.   No conjunctival injection. No icterus. Oropharynx clear without evidence of thrush or exudate. No evidence of periodontal disease.    NECK: Supple without nuchal rigidity. No mass.   HEART: RRR; No murmur, rubs, gallops.   LUNGS: diminished at bases  ; No dullness.  ABDOMEN: Soft, nontender, nondistended. Positive bowel sounds. No rebound or guarding. NO mass or HSM.  EXT: see below   MSK: FROM without joint effusions noted arms/legs.    SKIN: Warm and dry without cutaneous eruptions on Inspection/palpation.    NEURO: sleepy.    Right foot second toe amputation site noted, no redness/duration or warmth there.  No oral or active drainage    Left  LE surgical site no new visible redness/purulence.  No crepitus or bulla.         Laboratory Data    Results from last 7 days   Lab Units 11/25/24 2124 11/24/24  0452 11/22/24  0606   WBC 10*3/mm3 10.93* 12.38* 10.48   HEMOGLOBIN g/dL 8.6* 8.9* 9.4*   HEMATOCRIT % 28.3* 28.7* 29.2*   PLATELETS 10*3/mm3 340 298 271     Results from last 7 days   Lab Units 11/25/24 2125   SODIUM mmol/L 133*   POTASSIUM mmol/L 4.3   CHLORIDE mmol/L 97*   CO2 mmol/L 28.0   BUN mg/dL 18   CREATININE mg/dL 0.93   GLUCOSE mg/dL 176*   CALCIUM mg/dL 8.0*     Results from last 7 days   Lab Units 11/25/24 2125   ALK PHOS U/L 87   BILIRUBIN mg/dL 0.2   ALT (SGPT) U/L 21   AST (SGOT) U/L 29                 Estimated Creatinine Clearance: 78.6 mL/min (by C-G formula based on SCr of 0.93 mg/dL).      Microbiology:      Radiology:  Imaging Results (Last 72 Hours)       Procedure Component Value Units Date/Time    CT Head Without Contrast [[id number]] Collected: 11/25/24 2347     Updated: 11/25/24 2352    Narrative:      CT HEAD WO CONTRAST    Date of Exam: 11/25/2024 11:20 PM EST    Indication: decreased LOC.    Comparison: CT scan of the head dated November 15, 2024    Technique: Axial CT images were obtained of the head without contrast  administration.  Automated exposure control and iterative construction methods were used.    Findings:  There is mild diffuse generalized atrophy. There are low-attenuation areas in the periventricular white matter consistent with chronic microvascular ischemic change. There is no mass, mass effect or midline shift. There are no abnormal extra-axial fluid   collections or areas of acute hemorrhage. The paranasal sinuses are clear. The mastoid air cells are clear.      Impression:      Impression:  Atrophy and chronic microvascular ischemic change. No acute intracranial process.        Electronically Signed: Horacio Bailey MD    11/25/2024 11:49 PM EST    Workstation ID: [id number]    CT Angiogram Chest [[id number]] Collected: 11/25/24 2340     Updated: 11/25/24 2350    Narrative:      CT ANGIOGRAM CHEST    Date of Exam: 11/25/2024 11:20 PM EST    Indication: elevated d dimer, hypoxia.    Comparison: None available.    Technique: CTA of the chest was performed after the uneventful intravenous administration of 80 mL Isovue-370. Reconstructed coronal and sagittal images were also obtained. In addition, a 3-D volume rendered image was created for interpretation.   Automated exposure control and iterative reconstruction methods were used.    Findings:  Pulmonary arteries: Adequate opacification of the pulmonary arteries. No evidence of acute pulmonary embolism.    Lungs and Pleura: There are small bilateral pleural effusions. There is bilateral basilar atelectasis. There is mild right middle lobe and posterior left upper lobe atelectasis.    Mediastinum/Clare: No mediastinal or hilar lymphadenopathy.    Lymph nodes: No axillary or supraclavicular adenopathy.    Cardiovascular: The cardiac chambers are within normal limits. The pericardium is normal. The aorta and its arch branch vessels are unremarkable. There is mild coronary artery calcific atherosclerosis.       Upper Abdomen: The stomach is distended with a large amount  of fluid and food stuff. There is fluid in the esophagus to the mid esophagus consistent with reflux. There are clips from cholecystectomy.    Bones and Soft Tissue: No suspicious osseous lesion.        Impression:      Impression:  1.No evidence of pulmonary embolism.  2.Small bilateral pleural effusions with bilateral basilar atelectasis.  3.Fluid in the esophagus consistent with reflux.        Electronically Signed: Horacio Bailey MD    11/25/2024 11:47 PM EST    Workstation ID: [id number]    XR Chest 1 View [[id number]] Collected: 11/25/24 2051     Updated: 11/25/24 2055    Narrative:      XR CHEST 1 VW    Date of Exam: 11/25/2024 8:06 PM EST    Indication: increased oxygen demand    Comparison: 11/15/2024    Findings:  Cardiomediastinal silhouette is unremarkable. There is left lower lobe airspace disease with small to moderate left effusion. Correlate for signs of pneumonia. No pneumothorax. Right lung is relatively clear. No acute osseous abnormality identified.      Impression:      Impression:  Left lower lobe airspace disease with small to moderate left effusion. Correlate for signs of pneumonia.        Electronically Signed: Nic Fonseca MD    11/25/2024 8:52 PM EST    Workstation ID: [id number]              Impression:       --acute left heel wound infection ,  abnormal MRI as above raising concern for fluid extending into and disrupting the distal lateral Achilles tendon in addition to with evidence for osteomyelitis at the posterior lateral calcaneus in addition to possible nondisplaced stress fracture at the posterior calcaneus; High risk for persistent/recurrent or nonhealing wounds, persistent/progressive or recurrent infection and risk for further functional/limb loss, higher level amputation etc. Surgery 8/3;  left BKA on November 22. Empiric antibiotics for now    --hypoxia; empiric abx with decreased responsiveness overnight, aspiration risk although no witnessed aspiration.  Viral panel negative.  No  productive cough to send for microbiology.     --urinary retention per admission notes, some hematuria on November 15; further urologic evaluation regarding functional/anatomic assessment at discretion of medicine team with respect inpatient versus outpatient     --Diabetes, uncontrolled ; glucose control per medicine team     --diabetic neuropathy     --Chronic debility as per patient wheelchair bound     --Hx ESBL    PLAN:     --IV zyvox/merrem, doxy    --Check/review labs cultures and scans    --Partial history Per nursing staff    --Discussed with microbiology    --Discussed with surgery team    --d/w Dr Lord    --Highly complex at of issues with high risk for further serious morbidity and other serious sequela     Copied text in this note has been reviewed and is accurate as of 11/26/24.     Varun Dominique MD  11/26/2024

## 2024-11-26 NOTE — PROGRESS NOTES
[facility] Medicine Services  PROGRESS NOTE    Patient Name: Fracisco Mullen  : 1965  MRN: [id number]    Date of Admission: 11/15/2024  Primary Care Physician: Brandy Roberson    Subjective   Subjective     CC:  F/u DKA     HPI:  Overnight patient developed respiratory distress, required nonrebreather.  Imaging revealed small bilateral pleural effusions with no evidence of PE.  Patient reportedly had urinary retention and Ledesma was anchored with 1800 cc output.  This morning, his respiratory status has improved, on 3 L nasal cannula without any shortness of breath.  He does not recall the events overnight.      Objective   Objective     Vital Signs:   Temp:  [97.7 °F (36.5 °C)-97.9 °F (36.6 °C)] 97.9 °F (36.6 °C)  Heart Rate:  [] 88  Resp:  [14-18] 16  BP: ()/(46-84) 113/70  Flow (L/min) (Oxygen Therapy):  [2-55] 3     Physical Exam:  General appearance: alert, awake, oriented, no acute distress, chronically ill appearing   Cardiovascular: RRR, no murmurs or rubs, radial pulse full 2/4 BL   Respiratory: CTAB, no crackles or wheezes, on 3L NC   Abdomen: soft, non-tender, no organomegaly, bowel sounds normoactive    : ledesma with yellow urine   MSK: s/p L BKA, stump wrapped and dressed w noted edema   Neuro/CNS: alert and oriented x3, normal speech    Results Reviewed:  LAB RESULTS:      Lab 24  0757 245 24  2124 24  0452 24  0606 24  0701   WBC 10.62  --  10.93* 12.38* 10.48 11.11*   HEMOGLOBIN 8.2*  --  8.6* 8.9* 9.4* 9.4*   HEMATOCRIT 27.1*  --  28.3* 28.7* 29.2* 30.1*   PLATELETS 337  --  340 298 271 266   NEUTROS ABS  --   --  8.05* 8.73* 6.58 7.82*   IMMATURE GRANS (ABS)  --   --  0.09* 0.13* 0.08* 0.06*   LYMPHS ABS  --   --  1.42 1.62 2.37 2.17   MONOS ABS  --   --  1.13* 1.73* 1.10* 0.76   EOS ABS  --   --  0.16 0.08 0.26 0.22   MCV 85.5  --  86.0 83.9 80.9 82.9   LACTATE  --   --  1.8  --   --   --    D DIMER  QUANT  --  1.29*  --   --   --   --          Lab 11/26/24  0757 11/25/24  2125 11/25/24  0540 11/24/24  0452 11/22/24  0606 11/21/24  0701   SODIUM 132* 133*  --  135* 137 133*   POTASSIUM 4.0 4.3  --  4.1 4.0 4.0   CHLORIDE 97* 97*  --  100 98 97*   CO2 28.0 28.0  --  26.0 32.0* 29.0   ANION GAP 7.0 8.0  --  9.0 7.0 7.0   BUN 15 18  --  11 19 31*   CREATININE 0.81 0.93  --  0.66* 0.73* 0.85   EGFR 100.3 93.4  --  106.7 103.5 98.9   GLUCOSE 238* 176*  --  262* 160* 227*   CALCIUM 7.4* 8.0*  --  7.9* 9.1 9.3   MAGNESIUM  --  2.1 2.3 1.4*  --   --    PHOSPHORUS  --   --   --  2.6  --   --          Lab 11/26/24  0757 11/25/24 2125   TOTAL PROTEIN 5.7* 5.5*   ALBUMIN 2.5* 2.4*   GLOBULIN 3.2 3.1   ALT (SGPT) 17 21   AST (SGOT) 22 29   BILIRUBIN 0.2 0.2   ALK PHOS 90 87                 Lab 11/22/24  1054   ABO TYPING O   RH TYPING Positive   ANTIBODY SCREEN Negative         Lab 11/25/24 2026   PH, ARTERIAL 7.440   PCO2, ARTERIAL 44.9   PO2 ART 91.4   FIO2 80   HCO3 ART 30.5*   BASE EXCESS ART 5.7*   CARBOXYHEMOGLOBIN 1.4     Brief Urine Lab Results  (Last result in the past 365 days)        Color   Clarity   Blood   Leuk Est   Nitrite   Protein   CREAT   Urine HCG        11/15/24 1142 Yellow   Clear   Small (1+)   Negative   Negative   100 mg/dL (2+)                   Microbiology Results Abnormal       None            CT Head Without Contrast    Result Date: 11/25/2024  CT HEAD WO CONTRAST Date of Exam: 11/25/2024 11:20 PM EST Indication: decreased LOC. Comparison: CT scan of the head dated November 15, 2024 Technique: Axial CT images were obtained of the head without contrast administration.  Automated exposure control and iterative construction methods were used. Findings: There is mild diffuse generalized atrophy. There are low-attenuation areas in the periventricular white matter consistent with chronic microvascular ischemic change. There is no mass, mass effect or midline shift. There are no abnormal extra-axial  fluid collections or areas of acute hemorrhage. The paranasal sinuses are clear. The mastoid air cells are clear.     Impression: Impression: Atrophy and chronic microvascular ischemic change. No acute intracranial process. Electronically Signed: Horacio Bailey MD  11/25/2024 11:49 PM EST  Workstation ID: [id number]    CT Angiogram Chest    Result Date: 11/25/2024  CT ANGIOGRAM CHEST Date of Exam: 11/25/2024 11:20 PM EST Indication: elevated d dimer, hypoxia. Comparison: None available. Technique: CTA of the chest was performed after the uneventful intravenous administration of 80 mL Isovue-370. Reconstructed coronal and sagittal images were also obtained. In addition, a 3-D volume rendered image was created for interpretation. Automated exposure control and iterative reconstruction methods were used. Findings: Pulmonary arteries: Adequate opacification of the pulmonary arteries. No evidence of acute pulmonary embolism. Lungs and Pleura: There are small bilateral pleural effusions. There is bilateral basilar atelectasis. There is mild right middle lobe and posterior left upper lobe atelectasis. Mediastinum/Clare: No mediastinal or hilar lymphadenopathy. Lymph nodes: No axillary or supraclavicular adenopathy. Cardiovascular: The cardiac chambers are within normal limits. The pericardium is normal. The aorta and its arch branch vessels are unremarkable. There is mild coronary artery calcific atherosclerosis.   Upper Abdomen: The stomach is distended with a large amount of fluid and food stuff. There is fluid in the esophagus to the mid esophagus consistent with reflux. There are clips from cholecystectomy. Bones and Soft Tissue: No suspicious osseous lesion.     Impression: Impression: 1.No evidence of pulmonary embolism. 2.Small bilateral pleural effusions with bilateral basilar atelectasis. 3.Fluid in the esophagus consistent with reflux. Electronically Signed: Horacio Bailey MD  11/25/2024 11:47 PM EST  Workstation ID:  [id number]    XR Chest 1 View    Result Date: 11/25/2024  XR CHEST 1 VW Date of Exam: 11/25/2024 8:06 PM EST Indication: increased oxygen demand Comparison: 11/15/2024 Findings: Cardiomediastinal silhouette is unremarkable. There is left lower lobe airspace disease with small to moderate left effusion. Correlate for signs of pneumonia. No pneumothorax. Right lung is relatively clear. No acute osseous abnormality identified.     Impression: Impression: Left lower lobe airspace disease with small to moderate left effusion. Correlate for signs of pneumonia. Electronically Signed: Nic Fonseca MD  11/25/2024 8:52 PM EST  Workstation ID: [id number]         Current medications:  Scheduled Meds:aspirin, 81 mg, Oral, Daily  doxycycline, 100 mg, Intravenous, Q12H  enoxaparin, 40 mg, Subcutaneous, Daily  finasteride, 5 mg, Oral, Nightly  insulin glargine, 25 Units, Subcutaneous, Nightly  Insulin Lispro, 12 Units, Subcutaneous, TID With Meals  insulin lispro, 2-9 Units, Subcutaneous, 4x Daily AC & at Bedtime  ipratropium-albuterol, 3 mL, Nebulization, 4x Daily - RT  Linezolid, 600 mg, Intravenous, Q12H  meropenem, 1,000 mg, Intravenous, Q8H  metoprolol succinate XL, 50 mg, Oral, Daily  pantoprazole, 40 mg, Oral, BID AC  pregabalin, 225 mg, Oral, BID  rOPINIRole, 0.25 mg, Oral, Nightly  sodium chloride, 10 mL, Intravenous, Q12H  tamsulosin, 0.8 mg, Oral, Nightly      Continuous Infusions:ropivacaine, , Last Rate: 1 mL/hr at 11/24/24 0731      PRN Meds:.  acetaminophen **OR** acetaminophen **OR** acetaminophen    senna-docusate sodium **AND** polyethylene glycol **AND** bisacodyl **AND** bisacodyl    calcium carbonate    Calcium Replacement - Follow Nurse / BPA Driven Protocol    dextrose    dextrose    glucagon (human recombinant)    influenza vaccine    Lidocaine HCl gel    Magnesium Standard Dose Replacement - Follow Nurse / BPA Driven Protocol    nitroglycerin    ondansetron    oxyCODONE-acetaminophen    Phosphorus Replacement  - Follow Nurse / BPA Driven Protocol    Potassium Replacement - Follow Nurse / BPA Driven Protocol    sodium chloride    sodium chloride    Assessment & Plan   Assessment & Plan     Active Hospital Problems    Diagnosis  POA    **DKA (diabetic ketoacidosis) [E11.10]  Yes    Severe protein-calorie malnutrition [E43]  Yes    Non healing left heel wound [S91.302A]  Yes    Acute osteomyelitis of left foot [M86.172]  Unknown      Resolved Hospital Problems   No resolved problems to display.        Brief Hospital Course to date:  Fracisco Mullen is a 59 y.o. male with past medical history of hypertension, type 2 diabetes with insulin use, diabetic neuropathy, and chronic diabetic foot wounds who presents via EMS from home due to altered mental status and fatigue. Lab work consistent with diabetic ketoacidosis.      Left calcaneal osteomyelitis   - Patient with chronic left heel wound and right plantar foot wound  - Admitted August 2024 for left foot cellulitis and discharged on oral antibiotics and with home wound vac following debridement, MRI left foot obtained at that time with no definite findings of osteomyelitis  - Repeat MRI left foot shows soft tissue wound with evidence of osteomyelitis of the calcaneus and possibly an associated non-drainable abscess  -S/p left below-knee amputation by Dr. Gan on 11/22/2024 without any immediate complications  -ID following, escalated antibiotics from dapto and invanz to Zyvox, merrem and doxy to cover for pneumonia that possibly developed overnight 11/25   -Optimize pain control  -PT/OT    Acute hypoxia  -Likely due to pulmonary edema, as this improved with diuresis   -Wean as tolerated      BPH  Urinary retention  - Continue home Flomax, Proscar  -Bustos anchored again; failed voiding trial      DKA in setting of uncontrolled IDDM complicated by diabetic neuropathy and chronic foot wounds , improving  - Uncertain of medication compliance  - A1c 14.1%  - S/p DKA protocol  including insulin drip, IV fluids, and electrolyte replacement per protocol  - Now on subcutaneous insulin. Improving. Titrate insulin as indicated. Due to hypoglycemia overnight, administered 5 units lantus this AM, and will schedule 25 units QHS      Malnutrition  - Patient with poor oral intake, refusing supplementation  - Nutrition following     Acute toxic metabolic encephalopathy, improved  - CT head without acute abnormality  - UDS negative  - secondary to DKA     HTN  - Continue home metoprolol     Mood disorder  - Continue home Amitriptyline      RLS  - Continue home Requip    Expected Discharge Location and Transportation: TBD   Expected Discharge   Expected Discharge Date: 11/28/2024; Expected Discharge Time:      VTE Prophylaxis:  Pharmacologic VTE prophylaxis orders are present.         AM-PAC 6 Clicks Score (PT): 10 (11/25/24 2698)    CODE STATUS:   Code Status and Medical Interventions: CPR (Attempt to Resuscitate); Full Support; Full code per last hosptial admission. Patient disoriented on exam with no family present at bedside. Only contact information is friend.   Ordered at: 11/15/24 1218     Level Of Support Discussed With:    Patient     Code Status (Patient has no pulse and is not breathing):    CPR (Attempt to Resuscitate)     Medical Interventions (Patient has pulse or is breathing):    Full Support     Comments:    Full code per last hosptial admission. Patient disoriented on exam with no family present at bedside. Only contact information is friend.       Koko Lord, DO  11/26/24

## 2024-11-27 LAB
ANION GAP SERPL CALCULATED.3IONS-SCNC: 7 MMOL/L (ref 5–15)
BUN SERPL-MCNC: 14 MG/DL (ref 8–23)
BUN/CREAT SERPL: 17.1 (ref 7–25)
CALCIUM SPEC-SCNC: 7.5 MG/DL (ref 8.6–10.5)
CHLORIDE SERPL-SCNC: 100 MMOL/L (ref 98–107)
CO2 SERPL-SCNC: 27 MMOL/L (ref 22–29)
CREAT SERPL-MCNC: 0.82 MG/DL (ref 0.76–1.27)
DEPRECATED RDW RBC AUTO: 47.4 FL (ref 37–54)
EGFRCR SERPLBLD CKD-EPI 2021: 99.9 ML/MIN/1.73
ERYTHROCYTE [DISTWIDTH] IN BLOOD BY AUTOMATED COUNT: 15.5 % (ref 12.3–15.4)
GLUCOSE BLDC GLUCOMTR-MCNC: 144 MG/DL (ref 70–130)
GLUCOSE BLDC GLUCOMTR-MCNC: 168 MG/DL (ref 70–130)
GLUCOSE BLDC GLUCOMTR-MCNC: 177 MG/DL (ref 70–130)
GLUCOSE BLDC GLUCOMTR-MCNC: 235 MG/DL (ref 70–130)
GLUCOSE SERPL-MCNC: 249 MG/DL (ref 65–99)
HCT VFR BLD AUTO: 25.3 % (ref 37.5–51)
HGB BLD-MCNC: 7.7 G/DL (ref 13–17.7)
MCH RBC QN AUTO: 25.8 PG (ref 26.6–33)
MCHC RBC AUTO-ENTMCNC: 30.4 G/DL (ref 31.5–35.7)
MCV RBC AUTO: 84.6 FL (ref 79–97)
PLATELET # BLD AUTO: 366 10*3/MM3 (ref 140–450)
PMV BLD AUTO: 10.7 FL (ref 6–12)
POTASSIUM SERPL-SCNC: 4.2 MMOL/L (ref 3.5–5.2)
RBC # BLD AUTO: 2.99 10*6/MM3 (ref 4.14–5.8)
SODIUM SERPL-SCNC: 134 MMOL/L (ref 136–145)
WBC NRBC COR # BLD AUTO: 10.1 10*3/MM3 (ref 3.4–10.8)

## 2024-11-27 PROCEDURE — 63710000001 INSULIN LISPRO (HUMAN) PER 5 UNITS: Performed by: ORTHOPAEDIC SURGERY

## 2024-11-27 PROCEDURE — 25810000003 SODIUM CHLORIDE 0.9 % SOLUTION 250 ML FLEX CONT: Performed by: ORTHOPAEDIC SURGERY

## 2024-11-27 PROCEDURE — 94799 UNLISTED PULMONARY SVC/PX: CPT

## 2024-11-27 PROCEDURE — 94664 DEMO&/EVAL PT USE INHALER: CPT

## 2024-11-27 PROCEDURE — 25010000002 MEROPENEM PER 100 MG: Performed by: INTERNAL MEDICINE

## 2024-11-27 PROCEDURE — 25010000002 LINEZOLID 600 MG/300ML SOLUTION: Performed by: INTERNAL MEDICINE

## 2024-11-27 PROCEDURE — 25010000002 ENOXAPARIN PER 10 MG: Performed by: ORTHOPAEDIC SURGERY

## 2024-11-27 PROCEDURE — 63710000001 INSULIN GLARGINE PER 5 UNITS: Performed by: STUDENT IN AN ORGANIZED HEALTH CARE EDUCATION/TRAINING PROGRAM

## 2024-11-27 PROCEDURE — 82948 REAGENT STRIP/BLOOD GLUCOSE: CPT

## 2024-11-27 PROCEDURE — 99232 SBSQ HOSP IP/OBS MODERATE 35: CPT | Performed by: STUDENT IN AN ORGANIZED HEALTH CARE EDUCATION/TRAINING PROGRAM

## 2024-11-27 PROCEDURE — 85027 COMPLETE CBC AUTOMATED: CPT | Performed by: STUDENT IN AN ORGANIZED HEALTH CARE EDUCATION/TRAINING PROGRAM

## 2024-11-27 PROCEDURE — 80048 BASIC METABOLIC PNL TOTAL CA: CPT | Performed by: STUDENT IN AN ORGANIZED HEALTH CARE EDUCATION/TRAINING PROGRAM

## 2024-11-27 RX ADMIN — INSULIN GLARGINE 30 UNITS: 100 INJECTION, SOLUTION SUBCUTANEOUS at 21:13

## 2024-11-27 RX ADMIN — ROPINIROLE 0.25 MG: 0.5 TABLET, FILM COATED ORAL at 21:12

## 2024-11-27 RX ADMIN — IPRATROPIUM BROMIDE AND ALBUTEROL SULFATE 3 ML: 2.5; .5 SOLUTION RESPIRATORY (INHALATION) at 15:38

## 2024-11-27 RX ADMIN — ASPIRIN 81 MG: 81 TABLET, COATED ORAL at 08:33

## 2024-11-27 RX ADMIN — IPRATROPIUM BROMIDE AND ALBUTEROL SULFATE 3 ML: 2.5; .5 SOLUTION RESPIRATORY (INHALATION) at 18:52

## 2024-11-27 RX ADMIN — MEROPENEM 1000 MG: 1 INJECTION, POWDER, FOR SOLUTION INTRAVENOUS at 18:30

## 2024-11-27 RX ADMIN — INSULIN LISPRO 2 UNITS: 100 INJECTION, SOLUTION INTRAVENOUS; SUBCUTANEOUS at 12:38

## 2024-11-27 RX ADMIN — PREGABALIN 225 MG: 75 CAPSULE ORAL at 08:33

## 2024-11-27 RX ADMIN — INSULIN LISPRO 12 UNITS: 100 INJECTION, SOLUTION INTRAVENOUS; SUBCUTANEOUS at 18:29

## 2024-11-27 RX ADMIN — TAMSULOSIN HYDROCHLORIDE 0.8 MG: 0.4 CAPSULE ORAL at 21:13

## 2024-11-27 RX ADMIN — ENOXAPARIN SODIUM 40 MG: 100 INJECTION SUBCUTANEOUS at 08:34

## 2024-11-27 RX ADMIN — FINASTERIDE 5 MG: 5 TABLET, FILM COATED ORAL at 21:13

## 2024-11-27 RX ADMIN — DOXYCYCLINE 100 MG: 100 INJECTION, POWDER, LYOPHILIZED, FOR SOLUTION INTRAVENOUS at 02:10

## 2024-11-27 RX ADMIN — PANTOPRAZOLE SODIUM 40 MG: 40 TABLET, DELAYED RELEASE ORAL at 18:29

## 2024-11-27 RX ADMIN — METOPROLOL SUCCINATE 50 MG: 50 TABLET, EXTENDED RELEASE ORAL at 08:33

## 2024-11-27 RX ADMIN — OXYCODONE HYDROCHLORIDE AND ACETAMINOPHEN 1 TABLET: 5; 325 TABLET ORAL at 21:13

## 2024-11-27 RX ADMIN — PANTOPRAZOLE SODIUM 40 MG: 40 TABLET, DELAYED RELEASE ORAL at 08:33

## 2024-11-27 RX ADMIN — INSULIN LISPRO 4 UNITS: 100 INJECTION, SOLUTION INTRAVENOUS; SUBCUTANEOUS at 08:35

## 2024-11-27 RX ADMIN — INSULIN LISPRO 12 UNITS: 100 INJECTION, SOLUTION INTRAVENOUS; SUBCUTANEOUS at 08:35

## 2024-11-27 RX ADMIN — IPRATROPIUM BROMIDE AND ALBUTEROL SULFATE 3 ML: 2.5; .5 SOLUTION RESPIRATORY (INHALATION) at 12:34

## 2024-11-27 RX ADMIN — INSULIN LISPRO 12 UNITS: 100 INJECTION, SOLUTION INTRAVENOUS; SUBCUTANEOUS at 12:38

## 2024-11-27 RX ADMIN — DOXYCYCLINE 100 MG: 100 INJECTION, POWDER, LYOPHILIZED, FOR SOLUTION INTRAVENOUS at 12:38

## 2024-11-27 RX ADMIN — MEROPENEM 1000 MG: 1 INJECTION, POWDER, FOR SOLUTION INTRAVENOUS at 03:48

## 2024-11-27 RX ADMIN — PREGABALIN 225 MG: 75 CAPSULE ORAL at 21:13

## 2024-11-27 RX ADMIN — MEROPENEM 1000 MG: 1 INJECTION, POWDER, FOR SOLUTION INTRAVENOUS at 12:30

## 2024-11-27 RX ADMIN — IPRATROPIUM BROMIDE AND ALBUTEROL SULFATE 3 ML: 2.5; .5 SOLUTION RESPIRATORY (INHALATION) at 08:59

## 2024-11-27 RX ADMIN — SODIUM CHLORIDE 40 ML: 9 INJECTION, SOLUTION INTRAVENOUS at 12:40

## 2024-11-27 RX ADMIN — LINEZOLID 600 MG: 600 INJECTION, SOLUTION INTRAVENOUS at 21:13

## 2024-11-27 RX ADMIN — Medication 10 ML: at 08:35

## 2024-11-27 RX ADMIN — OXYCODONE HYDROCHLORIDE AND ACETAMINOPHEN 1 TABLET: 5; 325 TABLET ORAL at 14:39

## 2024-11-27 RX ADMIN — Medication 10 ML: at 21:14

## 2024-11-27 RX ADMIN — INSULIN LISPRO 2 UNITS: 100 INJECTION, SOLUTION INTRAVENOUS; SUBCUTANEOUS at 18:30

## 2024-11-27 RX ADMIN — LINEZOLID 600 MG: 600 INJECTION, SOLUTION INTRAVENOUS at 08:31

## 2024-11-27 RX ADMIN — SODIUM CHLORIDE 40 ML: 9 INJECTION, SOLUTION INTRAVENOUS at 12:41

## 2024-11-27 NOTE — PROGRESS NOTES
[facility]    Acute pain service Inpatient Progress Note    Patient Name: Fracisco Mullen  :  1965  MRN:  [id number]        Acute Pain  Service Inpatient Progress Note:    Analgesia:Good  Pain Score:5/10  LOC: alert and awake  Resp Status: room air  Cardiac: VS stable  Side Effects:None  Catheter Site:clean, dressing intact and dry  Cath type: peripheral nerve cath(InfuSystem)  Infusion rate: Ext/Pop: Basal: 1ml/hr, PIB: 5ml q 2 h, PCA: 5 ml q 30 min (1mL,5ml, 5ml InfuSystem Pump)  Catheter Plan:Catheter to remain Insitu and Continue catheter infusion rate unchanged  Comments: The neuro assessment of the operative extremity includes the ability to flex and extend the toes. The neuro exam of the patient also includes sensory function throughout the operative extremity.

## 2024-11-27 NOTE — PROGRESS NOTES
"Fracisco Mullen  1965  [id number]    Evaluating Physician: Varun Dominique MD    Chief Complaint: fatigue    Reason for Consultation: foot infection    History of present illness:     Patient is a 59 y.o.  Yr old male with history of diabetes/hypertension, previously followed with Dr. Gatica; admitted 2023 with right foot infection, cultures with MSSA/Morganella/ESBL Klebsiella/enterococcus and strep species.had surgery at the second toe with amputation, received IV daptomycin/Invanz with general improvements at that site.  He has been left with a chronic right plantar foot wound and a chronic wound at the left heel that has turned black; He apparently was admitted to the hospital August 1 after a fall at [facility].  Noted to have urinary retention with concern for possible UTI.  In addition left heel with black discoloration/malodor and redness, empiric antibiotics initiated.  He is chronically debilitated and wheelchair bound by his report.Bustos catheter-based at admission     8/3/24  Dr Gan  \"PROCEDURE:            Left  Left      90140:             Debridement of skin and subcutaneous tissue  89225:             Wound vacuum-assisted closure\"     8/6/24 MRI and surgical findings did not confirm bone involvement, transitioned to oral agents at discharge       Readmitted November 15, 2024 with reports of appearing \"sluggish\" according to admission notes with DKA, noted to have high hemoglobin A1c and persistent/worsening left heel wound according to patient; medicine team notes mention urinary retention, acute toxic metabolic encephalopathy improved and low-grade fever. Abnormal MRI at the left foot:    Per radiology-- \"Soft tissue wound seen along the posterior heel with ill-defined fluid that extends through and disrupts the distal Achilles tendon. Underlying this area there is evidence of osteomyelitis of the posterior lateral calcaneus. There is a tiny fluid   collection here as well that may represent " "an associated nondrainable abscess.     Additionally there appears to be a nondisplaced stress fracture of the posterior calcaneus. \"    11/22 left BKA    11/26/24 increased O2 requirements with less responsive per medicine team notes overnight; subsequently better    11/27/24 sleepy at my visit, on room air;  Left LE postop pain  dull , worse with palpation, better with pain meds and not severe,  hw won't attach a numerical severity; history Per nursing staff as well     No headache photophobia or neck stiffness.  No   cough or hemoptysis.  No nausea vomiting diarrhea or abdominal pain.  No other new skin rash.  no dysuria hematuria or pyuria.       Past Medical History:   Diagnosis Date    Acute renal failure     Hx of    Obesity     Hx of    Sleep apnea     Type 2 diabetes mellitus        Past Surgical History:   Procedure Laterality Date    BACK SURGERY      lower back    BELOW KNEE AMPUTATION Left 11/22/2024    Procedure: BELOW-KNEE AMPUTATION LEFT;  Surgeon: Dru Gan Jr., MD;  Location:  [facility];  Service: Orthopedics;  Laterality: Left;    CHOLECYSTECTOMY WITH INTRAOPERATIVE CHOLANGIOGRAM N/A 1/6/2021    Procedure: CHOLECYSTECTOMY LAPAROSCOPIC INTRAOPERATIVE CHOLANGIOGRAM;  Surgeon: Juventino Hooker MD;  Location:  [facility];  Service: General;  Laterality: N/A;    ERCP N/A 1/9/2021    Procedure: ENDOSCOPIC RETROGRADE CHOLANGIOPANCREATOGRAPHY;  Surgeon: Jeremy Dowell MD;  Location: [facility];  Service: Gastroenterology;  Laterality: N/A;  with sphiincterotomy and balloon sweep with 9mm-12mm balloon    INCISION AND DRAINAGE FOOT Left 8/3/2024    Procedure: HEAL IRRIGATION AND DEBRIDEMENT LEFT;  Surgeon: Dru Gan Jr., MD;  Location:  [facility];  Service: Orthopedics;  Laterality: Left;    PLACEMENT OF WOUND VAC Left 8/3/2024    Procedure: PLACEMENT OF WOUND VAC;  Surgeon: Dru Gan Jr., MD;  Location:  [facility];  Service: Orthopedics;  Laterality: Left;       Pediatric " History   Patient Parents    Not on file     Other Topics Concern    Not on file   Social History Narrative    Not on file       family history includes Cancer in his brother, maternal grandmother, mother, and another family member; Diabetes in an other family member; Heart attack in an other family member; Heart disease in his brother and father; Hyperlipidemia in his mother and another family member; Hypertension in his mother and another family member; Obesity in an other family member.    Allergies   Allergen Reactions    Sertraline Hcl Hives     Zoloft       Medication:  Current Facility-Administered Medications   Medication Dose Route Frequency Provider Last Rate Last Admin    acetaminophen (TYLENOL) tablet 650 mg  650 mg Oral Q4H PRN Dru Gan Jr., MD   650 mg at 11/26/24 2247    Or    acetaminophen (TYLENOL) 160 MG/5ML oral solution 650 mg  650 mg Oral Q4H PRN rDu Gan Jr., MD        Or    acetaminophen (TYLENOL) suppository 650 mg  650 mg Rectal Q4H PRN Dru Gan Jr., MD   650 mg at 11/15/24 2327    aspirin EC tablet 81 mg  81 mg Oral Daily Dru Gan Jr., MD   81 mg at 11/26/24 0834    sennosides-docusate (PERICOLACE) 8.6-50 MG per tablet 2 tablet  2 tablet Oral BID PRN Dru Gan Jr., MD        And    polyethylene glycol (MIRALAX) packet 17 g  17 g Oral Daily PRN Dru Gan Jr., MD        And    bisacodyl (DULCOLAX) EC tablet 5 mg  5 mg Oral Daily PRN Dru Gan Jr., MD        And    bisacodyl (DULCOLAX) suppository 10 mg  10 mg Rectal Daily PRN Dru Gan Jr., MD        calcium carbonate (TUMS) chewable tablet 500 mg (200 mg elemental)  2 tablet Oral TID PRN Dru Gan Jr., MD   2 tablet at 11/24/24 1829    Calcium Replacement - Follow Nurse / BPA Driven Protocol   Not Applicable PRN Dru Gan Jr., MD        dextrose (D50W) (25 g/50 mL) IV injection 25 g  25 g Intravenous Q15 Min PRN Dru Gan Jr., MD   25 g at 11/25/24  2124    dextrose (GLUTOSE) oral gel 15 g  15 g Oral Q15 Min PRN Dru Gan Jr., MD   15 g at 11/25/24 2039    doxycycline (VIBRAMYCIN) 100 mg in sodium chloride 0.9 % 100 mL MBP  100 mg Intravenous Q12H Enriqueta Muir APRN   100 mg at 11/27/24 0210    Enoxaparin Sodium (LOVENOX) syringe 40 mg  40 mg Subcutaneous Daily Dru Gan Jr., MD   40 mg at 11/26/24 0834    finasteride (PROSCAR) tablet 5 mg  5 mg Oral Nightly Dru Gan Jr., MD   5 mg at 11/26/24 2225    glucagon (GLUCAGEN) injection 1 mg  1 mg Intramuscular Q15 Min PRN Dru Gan Jr., MD        influenza virus vacc split PF FLUZONE 0.5 mL  0.5 mL Intramuscular During Hospitalization Dru Gan Jr., MD        insulin glargine (LANTUS, SEMGLEE) injection 30 Units  30 Units Subcutaneous Nightly Koko Lord DO        Insulin Lispro (humaLOG) injection 12 Units  12 Units Subcutaneous TID With Meals Dru Gan Jr., MD   12 Units at 11/26/24 1822    Insulin Lispro (humaLOG) injection 2-9 Units  2-9 Units Subcutaneous 4x Daily AC & at Bedtime Dru Gan Jr., MD   4 Units at 11/26/24 1211    ipratropium-albuterol (DUO-NEB) nebulizer solution 3 mL  3 mL Nebulization 4x Daily - RT Enriqueta Muir APRN   3 mL at 11/26/24 2001    Lidocaine HCl gel (XYLOCAINE) urethral/mucosal syringe   Urethral PRN Dru Gan Jr., MD   1 Application at 11/26/24 0333    Linezolid (ZYVOX) 600 mg 300 mL  600 mg Intravenous Q12H Varun Dominique  mL/hr at 11/26/24 2247 600 mg at 11/26/24 2247    Magnesium Standard Dose Replacement - Follow Nurse / BPA Driven Protocol   Not Applicable PRN Dru Gan Jr., MD        meropenem (MERREM) 1,000 mg in sodium chloride 0.9 % 100 mL MBP  1,000 mg Intravenous Q8H Varun Dominique MD   1,000 mg at 11/27/24 0348    metoprolol succinate XL (TOPROL-XL) 24 hr tablet 50 mg  50 mg Oral Daily Dru Gan Jr., MD   50 mg at 11/26/24 0834    nitroglycerin (NITROSTAT) SL  tablet 0.4 mg  0.4 mg Sublingual Q5 Min PRN Dru Gan Jr., MD        ondansetron (ZOFRAN) injection 4 mg  4 mg Intravenous Q6H PRN Dru Gan Jr., MD   4 mg at 11/24/24 2030    oxyCODONE-acetaminophen (PERCOCET) 5-325 MG per tablet 1 tablet  1 tablet Oral Q6H PRN Koko Lord DO        pantoprazole (PROTONIX) EC tablet 40 mg  40 mg Oral BID AC Enriqueta Muir, APRN   40 mg at 11/26/24 1823    Phosphorus Replacement - Follow Nurse / BPA Driven Protocol   Not Applicable PRN Dru Gan Jr., MD        Potassium Replacement - Follow Nurse / BPA Driven Protocol   Not Applicable PRN Dru Gan Jr., MD        pregabalin (LYRICA) capsule 225 mg  225 mg Oral BID Enriqueta Muir, APRN   225 mg at 11/26/24 2225    rOPINIRole (REQUIP) tablet 0.25 mg  0.25 mg Oral Nightly Dru Gan Jr., MD   0.25 mg at 11/26/24 2225    ropivacaine (NAROPIN) 0.2 % infusion (INFUSYSTEM)   Peripheral Nerve Continuous Dru Gan Jr., MD 1 mL/hr at 11/27/24 0700 Rate Verify at 11/27/24 0700    sodium chloride 0.9 % flush 10 mL  10 mL Intravenous Q12H Dru Gan Jr., MD   10 mL at 11/26/24 2320    sodium chloride 0.9 % flush 10 mL  10 mL Intravenous PRN Dru Gan Jr., MD        sodium chloride 0.9 % infusion 40 mL  40 mL Intravenous PRN Dru Gan Jr., MD        tamsulosin (FLOMAX) 24 hr capsule 0.8 mg  0.8 mg Oral Nightly Dru Gan Jr., MD   0.8 mg at 11/26/24 2225       Antibiotics:  Anti-Infectives (From admission, onward)      Ordered     Dose/Rate Route Frequency Start Stop    11/26/24 0749  meropenem (MERREM) 1,000 mg in sodium chloride 0.9 % 100 mL MBP        Ordering Provider: Varun Dominique MD    1,000 mg  over 3 Hours Intravenous Every 8 Hours 11/26/24 1400 12/03/24 1129    11/26/24 0750  Linezolid (ZYVOX) 600 mg 300 mL        Ordering Provider: Varun Dominique MD    600 mg  300 mL/hr over 60 Minutes Intravenous Every 12 Hours 11/26/24 0900 11/29/24 7800     "11/26/24 0749  meropenem (MERREM) 1,000 mg in sodium chloride 0.9 % 100 mL MBP        Ordering Provider: Varun Dominique MD    1,000 mg  over 30 Minutes Intravenous Once 11/26/24 0800 11/26/24 0903    11/26/24 0024  doxycycline (VIBRAMYCIN) 100 mg in sodium chloride 0.9 % 100 mL MBP        Ordering Provider: Enriqueta Muir APRN    100 mg  over 60 Minutes Intravenous Every 12 Hours 11/26/24 0100 12/03/24 0059    11/22/24 1045  ceFAZolin 2000 mg IVPB in 100 mL NS (MBP)        Ordering Provider: Claudia Lim PA    2,000 mg  over 30 Minutes Intravenous Once 11/22/24 1047 11/22/24 1255              Review of Systems    11/27/24 per nursing also    Constitutional-- No Fever, chills or sweats.  Appetite diminished with fatigue.  Heent-- No new vision, hearing or throat complaints.  No epistaxis or oral sores.  Denies odynophagia or dysphagia.  No flashers, floaters or eye pain. No odynophagia or dysphagia. No headache, photophobia or neck stiffness.  CV-- No chest pain, palpitation or syncope  Resp-- see above  GI- No nausea, vomiting, or diarrhea.  No hematochezia, melena, or hematemesis. Denies jaundice or chronic liver disease.  -- No dysuria, hematuria, or flank pain.  Denies hesitancy, urgency or flank pain.  Lymph- no swollen lymph nodes in neck/axilla or groin.   Heme- No active bruising or bleeding; no Hx of DVT or PE.  MS-- no swelling or pain in the bones or joints of arms/legs.  No new back pain.  Neuro-- No acute focal weakness or numbness in the arms or legs.  No seizures.    Full 12 point review of systems reviewed and negative otherwise for acute complaints, except for above    Physical Exam:   Vital Signs   /67 (BP Location: Left arm, Patient Position: Lying)   Pulse 88   Temp 98 °F (36.7 °C) (Oral)   Resp 16   Ht 162.6 cm (64.02\")   Wt 78.5 kg (173 lb)   SpO2 95%   BMI 29.68 kg/m²     GENERAL: sleepy     HEENT: Normocephalic, atraumatic.   No conjunctival injection. No icterus. " Oropharynx clear without evidence of thrush or exudate. No evidence of periodontal disease.    NECK: Supple without nuchal rigidity. No mass.   HEART: RRR; No murmur, rubs, gallops.   LUNGS: diminished at bases  ; No dullness.  ABDOMEN: Soft, nontender, nondistended. Positive bowel sounds. No rebound or guarding. NO mass or HSM.  EXT: see below   MSK: FROM without joint effusions noted arms/legs.    SKIN: Warm and dry without cutaneous eruptions on Inspection/palpation.    NEURO: sleepy.    Right foot second toe amputation site noted, no redness/duration or warmth there.  No oral or active drainage    Left  LE surgical site no new visible redness/purulence.  No crepitus or bulla.         Laboratory Data    Results from last 7 days   Lab Units 11/27/24  0502 11/26/24  0757 11/25/24  2124   WBC 10*3/mm3 10.10 10.62 10.93*   HEMOGLOBIN g/dL 7.7* 8.2* 8.6*   HEMATOCRIT % 25.3* 27.1* 28.3*   PLATELETS 10*3/mm3 366 337 340     Results from last 7 days   Lab Units 11/27/24  0502   SODIUM mmol/L 134*   POTASSIUM mmol/L 4.2   CHLORIDE mmol/L 100   CO2 mmol/L 27.0   BUN mg/dL 14   CREATININE mg/dL 0.82   GLUCOSE mg/dL 249*   CALCIUM mg/dL 7.5*     Results from last 7 days   Lab Units 11/26/24  0757   ALK PHOS U/L 90   BILIRUBIN mg/dL 0.2   ALT (SGPT) U/L 17   AST (SGOT) U/L 22                 Estimated Creatinine Clearance: 89.5 mL/min (by C-G formula based on SCr of 0.82 mg/dL).      Microbiology:      Radiology:  Imaging Results (Last 72 Hours)       Procedure Component Value Units Date/Time    CT Head Without Contrast [[id number]] Collected: 11/25/24 2347     Updated: 11/25/24 2352    Narrative:      CT HEAD WO CONTRAST    Date of Exam: 11/25/2024 11:20 PM EST    Indication: decreased LOC.    Comparison: CT scan of the head dated November 15, 2024    Technique: Axial CT images were obtained of the head without contrast administration.  Automated exposure control and iterative construction methods were  used.    Findings:  There is mild diffuse generalized atrophy. There are low-attenuation areas in the periventricular white matter consistent with chronic microvascular ischemic change. There is no mass, mass effect or midline shift. There are no abnormal extra-axial fluid   collections or areas of acute hemorrhage. The paranasal sinuses are clear. The mastoid air cells are clear.      Impression:      Impression:  Atrophy and chronic microvascular ischemic change. No acute intracranial process.        Electronically Signed: Horacio Bailey MD    11/25/2024 11:49 PM EST    Workstation ID: [id number]    CT Angiogram Chest [[id number]] Collected: 11/25/24 2340     Updated: 11/25/24 2350    Narrative:      CT ANGIOGRAM CHEST    Date of Exam: 11/25/2024 11:20 PM EST    Indication: elevated d dimer, hypoxia.    Comparison: None available.    Technique: CTA of the chest was performed after the uneventful intravenous administration of 80 mL Isovue-370. Reconstructed coronal and sagittal images were also obtained. In addition, a 3-D volume rendered image was created for interpretation.   Automated exposure control and iterative reconstruction methods were used.    Findings:  Pulmonary arteries: Adequate opacification of the pulmonary arteries. No evidence of acute pulmonary embolism.    Lungs and Pleura: There are small bilateral pleural effusions. There is bilateral basilar atelectasis. There is mild right middle lobe and posterior left upper lobe atelectasis.    Mediastinum/Clare: No mediastinal or hilar lymphadenopathy.    Lymph nodes: No axillary or supraclavicular adenopathy.    Cardiovascular: The cardiac chambers are within normal limits. The pericardium is normal. The aorta and its arch branch vessels are unremarkable. There is mild coronary artery calcific atherosclerosis.       Upper Abdomen: The stomach is distended with a large amount of fluid and food stuff. There is fluid in the esophagus to the mid esophagus  consistent with reflux. There are clips from cholecystectomy.    Bones and Soft Tissue: No suspicious osseous lesion.        Impression:      Impression:  1.No evidence of pulmonary embolism.  2.Small bilateral pleural effusions with bilateral basilar atelectasis.  3.Fluid in the esophagus consistent with reflux.        Electronically Signed: Horacio Bailey MD    11/25/2024 11:47 PM EST    Workstation ID: [id number]    XR Chest 1 View [[id number]] Collected: 11/25/24 2051     Updated: 11/25/24 2055    Narrative:      XR CHEST 1 VW    Date of Exam: 11/25/2024 8:06 PM EST    Indication: increased oxygen demand    Comparison: 11/15/2024    Findings:  Cardiomediastinal silhouette is unremarkable. There is left lower lobe airspace disease with small to moderate left effusion. Correlate for signs of pneumonia. No pneumothorax. Right lung is relatively clear. No acute osseous abnormality identified.      Impression:      Impression:  Left lower lobe airspace disease with small to moderate left effusion. Correlate for signs of pneumonia.        Electronically Signed: Nic Fonseca MD    11/25/2024 8:52 PM EST    Workstation ID: [id number]              Impression:       --acute left heel wound infection ,  abnormal MRI as above raising concern for fluid extending into and disrupting the distal lateral Achilles tendon in addition to with evidence for osteomyelitis at the posterior lateral calcaneus in addition to possible nondisplaced stress fracture at the posterior calcaneus; High risk for persistent/recurrent or nonhealing wounds, persistent/progressive or recurrent infection and risk for further functional/limb loss, higher level amputation etc. Surgery 8/3;  left BKA on November 22. Empiric antibiotics for now    --hypoxia; empiric abx with decreased responsiveness overnight, aspiration risk although no witnessed aspiration.  Viral panel negative.  No productive cough to send for microbiology.     --urinary retention per  admission notes, some hematuria on November 15; further urologic evaluation regarding functional/anatomic assessment at discretion of medicine team with respect inpatient versus outpatient     --Diabetes, uncontrolled ; glucose control per medicine team     --diabetic neuropathy     --Chronic debility as per patient wheelchair bound     --Hx ESBL    PLAN:     --IV zyvox/merrem, doxy; anticipate tapering to oral regimen by discharge if steadily better and no new suppurative process    --Check/review labs cultures and scans    --Partial history Per nursing staff    --Discussed with microbiology    --Discussed with surgery team    --d/w Dr Lord    --Highly complex at of issues with high risk for further serious morbidity and other serious sequela     Copied text in this note has been reviewed and is accurate as of 11/27/24.     Varun Dominique MD  11/27/2024

## 2024-11-27 NOTE — CASE MANAGEMENT/SOCIAL WORK
Continued Stay Note  [facility]     Patient Name: Fracisco Mullen  MRN: [id number]  Today's Date: 11/27/2024    Admit Date: 11/15/2024    Plan: TBD   Discharge Plan       Row Name 11/27/24 1205       Plan    Plan TBD    Patient/Family in Agreement with Plan yes    Plan Comments Spoke with patient at bedside and discussed patient in MDR. Emailed therapy to see if they could evaluate patient and provide updated therapy notes in [facility]. Will await therapy notes and recommendations to assist with proper discharge placement. CM will continue to follow and assist with discharge planning.    Final Discharge Disposition Code 01 - home or self-care                   Discharge Codes    No documentation.                 Expected Discharge Date and Time       Expected Discharge Date Expected Discharge Time    Nov 28, 2024               Kirby Riggs RN

## 2024-11-27 NOTE — PROGRESS NOTES
[facility] Medicine Services  PROGRESS NOTE    Patient Name: Fracisco Mullen  : 1965  MRN: [id number]    Date of Admission: 11/15/2024  Primary Care Physician: Brandy Roberson    Subjective   Subjective     CC:  F/u DKA       HPI:  Patient reportedly slept better overnight, confusion improving.  He states that his pain is controlled.  Denies fevers, chills, sweats.      Objective   Objective     Vital Signs:   Temp:  [98 °F (36.7 °C)-98.3 °F (36.8 °C)] 98.1 °F (36.7 °C)  Heart Rate:  [] 85  Resp:  [16-18] 18  BP: (108-119)/(67-75) 117/72  Flow (L/min) (Oxygen Therapy):  [2-3] 2     Physical Exam:  General appearance: alert, awake, oriented, no acute distress, chronically ill appearing   Cardiovascular: RRR, no murmurs or rubs, radial pulse full 2/4 BL   Respiratory: CTAB, no crackles or wheezes, on 3L NC   Abdomen: soft, non-tender, no organomegaly, bowel sounds normoactive    : ledesma with yellow urine   MSK: s/p L BKA, stump wrapped and dressed w mild edema   Neuro/CNS: alert and oriented x3,, intermittent confusion, normal speech    Results Reviewed:  LAB RESULTS:      Lab 24  0502 24  0757 24  2125 24  2124 24  0452 24  0606 24  0701   WBC 10.10 10.62  --  10.93* 12.38* 10.48 11.11*   HEMOGLOBIN 7.7* 8.2*  --  8.6* 8.9* 9.4* 9.4*   HEMATOCRIT 25.3* 27.1*  --  28.3* 28.7* 29.2* 30.1*   PLATELETS 366 337  --  340 298 271 266   NEUTROS ABS  --   --   --  8.05* 8.73* 6.58 7.82*   IMMATURE GRANS (ABS)  --   --   --  0.09* 0.13* 0.08* 0.06*   LYMPHS ABS  --   --   --  1.42 1.62 2.37 2.17   MONOS ABS  --   --   --  1.13* 1.73* 1.10* 0.76   EOS ABS  --   --   --  0.16 0.08 0.26 0.22   MCV 84.6 85.5  --  86.0 83.9 80.9 82.9   LACTATE  --   --   --  1.8  --   --   --    D DIMER QUANT  --   --  1.29*  --   --   --   --          Lab 24  0502 24  0757 24  2125 24  0540 24  0452 24  0606   SODIUM  134* 132* 133*  --  135* 137   POTASSIUM 4.2 4.0 4.3  --  4.1 4.0   CHLORIDE 100 97* 97*  --  100 98   CO2 27.0 28.0 28.0  --  26.0 32.0*   ANION GAP 7.0 7.0 8.0  --  9.0 7.0   BUN 14 15 18  --  11 19   CREATININE 0.82 0.81 0.93  --  0.66* 0.73*   EGFR 99.9 100.3 93.4  --  106.7 103.5   GLUCOSE 249* 238* 176*  --  262* 160*   CALCIUM 7.5* 7.4* 8.0*  --  7.9* 9.1   MAGNESIUM  --   --  2.1 2.3 1.4*  --    PHOSPHORUS  --   --   --   --  2.6  --          Lab 11/26/24  0757 11/25/24  2125   TOTAL PROTEIN 5.7* 5.5*   ALBUMIN 2.5* 2.4*   GLOBULIN 3.2 3.1   ALT (SGPT) 17 21   AST (SGOT) 22 29   BILIRUBIN 0.2 0.2   ALK PHOS 90 87                 Lab 11/22/24  1054   ABO TYPING O   RH TYPING Positive   ANTIBODY SCREEN Negative         Lab 11/25/24 2026   PH, ARTERIAL 7.440   PCO2, ARTERIAL 44.9   PO2 ART 91.4   FIO2 80   HCO3 ART 30.5*   BASE EXCESS ART 5.7*   CARBOXYHEMOGLOBIN 1.4     Brief Urine Lab Results  (Last result in the past 365 days)        Color   Clarity   Blood   Leuk Est   Nitrite   Protein   CREAT   Urine HCG        11/15/24 1142 Yellow   Clear   Small (1+)   Negative   Negative   100 mg/dL (2+)                   Microbiology Results Abnormal       None            CT Head Without Contrast    Result Date: 11/25/2024  CT HEAD WO CONTRAST Date of Exam: 11/25/2024 11:20 PM EST Indication: decreased LOC. Comparison: CT scan of the head dated November 15, 2024 Technique: Axial CT images were obtained of the head without contrast administration.  Automated exposure control and iterative construction methods were used. Findings: There is mild diffuse generalized atrophy. There are low-attenuation areas in the periventricular white matter consistent with chronic microvascular ischemic change. There is no mass, mass effect or midline shift. There are no abnormal extra-axial fluid collections or areas of acute hemorrhage. The paranasal sinuses are clear. The mastoid air cells are clear.     Impression: Impression:  Atrophy and chronic microvascular ischemic change. No acute intracranial process. Electronically Signed: Horacio Bailey MD  11/25/2024 11:49 PM EST  Workstation ID: [id number]    CT Angiogram Chest    Result Date: 11/25/2024  CT ANGIOGRAM CHEST Date of Exam: 11/25/2024 11:20 PM EST Indication: elevated d dimer, hypoxia. Comparison: None available. Technique: CTA of the chest was performed after the uneventful intravenous administration of 80 mL Isovue-370. Reconstructed coronal and sagittal images were also obtained. In addition, a 3-D volume rendered image was created for interpretation. Automated exposure control and iterative reconstruction methods were used. Findings: Pulmonary arteries: Adequate opacification of the pulmonary arteries. No evidence of acute pulmonary embolism. Lungs and Pleura: There are small bilateral pleural effusions. There is bilateral basilar atelectasis. There is mild right middle lobe and posterior left upper lobe atelectasis. Mediastinum/Clare: No mediastinal or hilar lymphadenopathy. Lymph nodes: No axillary or supraclavicular adenopathy. Cardiovascular: The cardiac chambers are within normal limits. The pericardium is normal. The aorta and its arch branch vessels are unremarkable. There is mild coronary artery calcific atherosclerosis.   Upper Abdomen: The stomach is distended with a large amount of fluid and food stuff. There is fluid in the esophagus to the mid esophagus consistent with reflux. There are clips from cholecystectomy. Bones and Soft Tissue: No suspicious osseous lesion.     Impression: Impression: 1.No evidence of pulmonary embolism. 2.Small bilateral pleural effusions with bilateral basilar atelectasis. 3.Fluid in the esophagus consistent with reflux. Electronically Signed: Horacio Bailey MD  11/25/2024 11:47 PM EST  Workstation ID: [id number]    XR Chest 1 View    Result Date: 11/25/2024  XR CHEST 1 VW Date of Exam: 11/25/2024 8:06 PM EST Indication: increased oxygen demand  Comparison: 11/15/2024 Findings: Cardiomediastinal silhouette is unremarkable. There is left lower lobe airspace disease with small to moderate left effusion. Correlate for signs of pneumonia. No pneumothorax. Right lung is relatively clear. No acute osseous abnormality identified.     Impression: Impression: Left lower lobe airspace disease with small to moderate left effusion. Correlate for signs of pneumonia. Electronically Signed: Nic Fonseca MD  11/25/2024 8:52 PM EST  Workstation ID: [id number]         Current medications:  Scheduled Meds:aspirin, 81 mg, Oral, Daily  doxycycline, 100 mg, Intravenous, Q12H  enoxaparin, 40 mg, Subcutaneous, Daily  finasteride, 5 mg, Oral, Nightly  insulin glargine, 30 Units, Subcutaneous, Nightly  Insulin Lispro, 12 Units, Subcutaneous, TID With Meals  insulin lispro, 2-9 Units, Subcutaneous, 4x Daily AC & at Bedtime  ipratropium-albuterol, 3 mL, Nebulization, 4x Daily - RT  Linezolid, 600 mg, Intravenous, Q12H  meropenem, 1,000 mg, Intravenous, Q8H  metoprolol succinate XL, 50 mg, Oral, Daily  pantoprazole, 40 mg, Oral, BID AC  pregabalin, 225 mg, Oral, BID  rOPINIRole, 0.25 mg, Oral, Nightly  sodium chloride, 10 mL, Intravenous, Q12H  tamsulosin, 0.8 mg, Oral, Nightly      Continuous Infusions:ropivacaine, , Last Rate: 1 mL/hr at 11/27/24 0700      PRN Meds:.  acetaminophen **OR** acetaminophen **OR** acetaminophen    senna-docusate sodium **AND** polyethylene glycol **AND** bisacodyl **AND** bisacodyl    calcium carbonate    Calcium Replacement - Follow Nurse / BPA Driven Protocol    dextrose    dextrose    glucagon (human recombinant)    influenza vaccine    Lidocaine HCl gel    Magnesium Standard Dose Replacement - Follow Nurse / BPA Driven Protocol    nitroglycerin    ondansetron    oxyCODONE-acetaminophen    Phosphorus Replacement - Follow Nurse / BPA Driven Protocol    Potassium Replacement - Follow Nurse / BPA Driven Protocol    sodium chloride    sodium  chloride    Assessment & Plan   Assessment & Plan     Active Hospital Problems    Diagnosis  POA    **DKA (diabetic ketoacidosis) [E11.10]  Yes    Severe protein-calorie malnutrition [E43]  Yes    Non healing left heel wound [S91.302A]  Yes    Acute osteomyelitis of left foot [M86.172]  Unknown      Resolved Hospital Problems   No resolved problems to display.        Brief Hospital Course to date:  Fracisco Contrerasy is a 59 y.o. male with past medical history of hypertension, type 2 diabetes with insulin use, diabetic neuropathy, and chronic diabetic foot wounds who presents via EMS from home due to altered mental status and fatigue. Lab work consistent with diabetic ketoacidosis.  He was then found to have left calcaneal osteomyelitis.  Orthopedic surgery was consulted and performed left BKA on November 22.  He had an acute hypoxic event overnight on November 25 with significant urinary retention that improved after Bustos was placed.  There was concern for aspiration at that time so his antibiotics were escalated.     Left calcaneal osteomyelitis   - Patient with chronic left heel wound and right plantar foot wound  - Admitted August 2024 for left foot cellulitis and discharged on oral antibiotics and with home wound vac following debridement, MRI left foot obtained at that time with no definite findings of osteomyelitis  - Repeat MRI left foot shows soft tissue wound with evidence of osteomyelitis of the calcaneus and possibly an associated non-drainable abscess  -S/p left below-knee amputation by Dr. Gan on 11/22/2024 without any immediate complications  -ID following, escalated antibiotics from dapto and invanz to Zyvox, merrem and doxy to cover for pneumonia that possibly developed overnight 11/25   -Improving   -Optimize pain control  -PT/OT     Acute hypoxia  -Likely due to pulmonary edema, as this improved with diuresis   -Wean as tolerated   -improving      BPH  Urinary retention  - Continue home Flomax,  Proscar  -Bustos anchored again; failed voiding trial. Outpatient urology f/u     DKA in setting of uncontrolled IDDM complicated by diabetic neuropathy and chronic foot wounds , improving  - Uncertain of medication compliance  - A1c 14.1%  - S/p DKA protocol including insulin drip, IV fluids, and electrolyte replacement per protocol  - Now on subcutaneous insulin. Improving. Titrate insulin as indicated. Increase lantus to 30 units and continue to titrate as PO intake increases      Malnutrition  - Patient with poor oral intake, refusing supplementation  - Nutrition following     Acute toxic metabolic encephalopathy, improved  - CT head without acute abnormality  - UDS negative  - secondary to DKA     HTN  - Continue home metoprolol     Mood disorder  - Continue home Amitriptyline      RLS  - Continue home Requip       Expected Discharge Location and Transportation: Rehab   Expected Discharge   Expected Discharge Date: 11/29/2024; Expected Discharge Time:      VTE Prophylaxis:  Pharmacologic VTE prophylaxis orders are present.         AM-PAC 6 Clicks Score (PT): 9 (11/27/24 7983)    CODE STATUS:   Code Status and Medical Interventions: CPR (Attempt to Resuscitate); Full Support; Full code per last hosptial admission. Patient disoriented on exam with no family present at bedside. Only contact information is friend.   Ordered at: 11/15/24 1218     Level Of Support Discussed With:    Patient     Code Status (Patient has no pulse and is not breathing):    CPR (Attempt to Resuscitate)     Medical Interventions (Patient has pulse or is breathing):    Full Support     Comments:    Full code per last hosptial admission. Patient disoriented on exam with no family present at bedside. Only contact information is friend.       Koko Lord, DO  11/27/24

## 2024-11-27 NOTE — PLAN OF CARE
Goal Outcome Evaluation:              Outcome Evaluation: VSS; O2 > 90% on room air ~ 2L NC; Pt with some intermittent confusion from 1200 forward throughout shift. Pt disconnected the nerve block wire from tubing; provider notified; provider was able to reconnect.  Pt pulled out IV x2 this shift.  Pt resting in bed; alarm on; call light within reach.

## 2024-11-28 LAB
ANION GAP SERPL CALCULATED.3IONS-SCNC: 7 MMOL/L (ref 5–15)
BUN SERPL-MCNC: 11 MG/DL (ref 8–23)
BUN/CREAT SERPL: 15.1 (ref 7–25)
CALCIUM SPEC-SCNC: 7.3 MG/DL (ref 8.6–10.5)
CHLORIDE SERPL-SCNC: 102 MMOL/L (ref 98–107)
CO2 SERPL-SCNC: 26 MMOL/L (ref 22–29)
CREAT SERPL-MCNC: 0.73 MG/DL (ref 0.76–1.27)
DEPRECATED RDW RBC AUTO: 48.3 FL (ref 37–54)
EGFRCR SERPLBLD CKD-EPI 2021: 103.5 ML/MIN/1.73
ERYTHROCYTE [DISTWIDTH] IN BLOOD BY AUTOMATED COUNT: 15.8 % (ref 12.3–15.4)
GLUCOSE BLDC GLUCOMTR-MCNC: 150 MG/DL (ref 70–130)
GLUCOSE BLDC GLUCOMTR-MCNC: 253 MG/DL (ref 70–130)
GLUCOSE BLDC GLUCOMTR-MCNC: 266 MG/DL (ref 70–130)
GLUCOSE BLDC GLUCOMTR-MCNC: 96 MG/DL (ref 70–130)
GLUCOSE SERPL-MCNC: 227 MG/DL (ref 65–99)
HCT VFR BLD AUTO: 26.5 % (ref 37.5–51)
HGB BLD-MCNC: 8.1 G/DL (ref 13–17.7)
MCH RBC QN AUTO: 26 PG (ref 26.6–33)
MCHC RBC AUTO-ENTMCNC: 30.6 G/DL (ref 31.5–35.7)
MCV RBC AUTO: 85.2 FL (ref 79–97)
PLATELET # BLD AUTO: 388 10*3/MM3 (ref 140–450)
PMV BLD AUTO: 10.5 FL (ref 6–12)
POTASSIUM SERPL-SCNC: 4.4 MMOL/L (ref 3.5–5.2)
RBC # BLD AUTO: 3.11 10*6/MM3 (ref 4.14–5.8)
SODIUM SERPL-SCNC: 135 MMOL/L (ref 136–145)
WBC NRBC COR # BLD AUTO: 8.39 10*3/MM3 (ref 3.4–10.8)

## 2024-11-28 PROCEDURE — 97164 PT RE-EVAL EST PLAN CARE: CPT

## 2024-11-28 PROCEDURE — 94761 N-INVAS EAR/PLS OXIMETRY MLT: CPT

## 2024-11-28 PROCEDURE — 97110 THERAPEUTIC EXERCISES: CPT

## 2024-11-28 PROCEDURE — 94664 DEMO&/EVAL PT USE INHALER: CPT

## 2024-11-28 PROCEDURE — 82948 REAGENT STRIP/BLOOD GLUCOSE: CPT

## 2024-11-28 PROCEDURE — 63710000001 INSULIN LISPRO (HUMAN) PER 5 UNITS: Performed by: INTERNAL MEDICINE

## 2024-11-28 PROCEDURE — 25010000002 MEROPENEM PER 100 MG: Performed by: INTERNAL MEDICINE

## 2024-11-28 PROCEDURE — 25010000002 ENOXAPARIN PER 10 MG: Performed by: ORTHOPAEDIC SURGERY

## 2024-11-28 PROCEDURE — 99232 SBSQ HOSP IP/OBS MODERATE 35: CPT | Performed by: INTERNAL MEDICINE

## 2024-11-28 PROCEDURE — 97535 SELF CARE MNGMENT TRAINING: CPT

## 2024-11-28 PROCEDURE — 63710000001 INSULIN LISPRO (HUMAN) PER 5 UNITS: Performed by: ORTHOPAEDIC SURGERY

## 2024-11-28 PROCEDURE — 63710000001 INSULIN GLARGINE PER 5 UNITS: Performed by: STUDENT IN AN ORGANIZED HEALTH CARE EDUCATION/TRAINING PROGRAM

## 2024-11-28 PROCEDURE — 97168 OT RE-EVAL EST PLAN CARE: CPT

## 2024-11-28 PROCEDURE — 97530 THERAPEUTIC ACTIVITIES: CPT

## 2024-11-28 PROCEDURE — 80048 BASIC METABOLIC PNL TOTAL CA: CPT | Performed by: STUDENT IN AN ORGANIZED HEALTH CARE EDUCATION/TRAINING PROGRAM

## 2024-11-28 PROCEDURE — 85027 COMPLETE CBC AUTOMATED: CPT | Performed by: STUDENT IN AN ORGANIZED HEALTH CARE EDUCATION/TRAINING PROGRAM

## 2024-11-28 PROCEDURE — 94799 UNLISTED PULMONARY SVC/PX: CPT

## 2024-11-28 PROCEDURE — 25010000002 LINEZOLID 600 MG/300ML SOLUTION: Performed by: INTERNAL MEDICINE

## 2024-11-28 RX ORDER — INSULIN LISPRO 100 [IU]/ML
14 INJECTION, SOLUTION INTRAVENOUS; SUBCUTANEOUS
Status: DISCONTINUED | OUTPATIENT
Start: 2024-11-28 | End: 2024-12-02

## 2024-11-28 RX ADMIN — ROPINIROLE 0.25 MG: 0.5 TABLET, FILM COATED ORAL at 20:54

## 2024-11-28 RX ADMIN — INSULIN LISPRO 6 UNITS: 100 INJECTION, SOLUTION INTRAVENOUS; SUBCUTANEOUS at 08:50

## 2024-11-28 RX ADMIN — IPRATROPIUM BROMIDE AND ALBUTEROL SULFATE 3 ML: 2.5; .5 SOLUTION RESPIRATORY (INHALATION) at 21:13

## 2024-11-28 RX ADMIN — CALCIUM CARBONATE (ANTACID) CHEW TAB 500 MG 2 TABLET: 500 CHEW TAB at 13:41

## 2024-11-28 RX ADMIN — Medication 10 ML: at 08:51

## 2024-11-28 RX ADMIN — PREGABALIN 225 MG: 75 CAPSULE ORAL at 20:55

## 2024-11-28 RX ADMIN — PANTOPRAZOLE SODIUM 40 MG: 40 TABLET, DELAYED RELEASE ORAL at 08:50

## 2024-11-28 RX ADMIN — DOXYCYCLINE 100 MG: 100 INJECTION, POWDER, LYOPHILIZED, FOR SOLUTION INTRAVENOUS at 13:40

## 2024-11-28 RX ADMIN — SODIUM CHLORIDE 40 ML: 9 INJECTION, SOLUTION INTRAVENOUS at 18:53

## 2024-11-28 RX ADMIN — ENOXAPARIN SODIUM 40 MG: 100 INJECTION SUBCUTANEOUS at 08:51

## 2024-11-28 RX ADMIN — MEROPENEM 1000 MG: 1 INJECTION, POWDER, FOR SOLUTION INTRAVENOUS at 11:45

## 2024-11-28 RX ADMIN — LINEZOLID 600 MG: 600 INJECTION, SOLUTION INTRAVENOUS at 09:13

## 2024-11-28 RX ADMIN — MEROPENEM 1000 MG: 1 INJECTION, POWDER, FOR SOLUTION INTRAVENOUS at 04:22

## 2024-11-28 RX ADMIN — LINEZOLID 600 MG: 600 INJECTION, SOLUTION INTRAVENOUS at 20:54

## 2024-11-28 RX ADMIN — MEROPENEM 1000 MG: 1 INJECTION, POWDER, FOR SOLUTION INTRAVENOUS at 18:52

## 2024-11-28 RX ADMIN — INSULIN LISPRO 6 UNITS: 100 INJECTION, SOLUTION INTRAVENOUS; SUBCUTANEOUS at 11:48

## 2024-11-28 RX ADMIN — CALCIUM CARBONATE (ANTACID) CHEW TAB 500 MG 2 TABLET: 500 CHEW TAB at 22:39

## 2024-11-28 RX ADMIN — IPRATROPIUM BROMIDE AND ALBUTEROL SULFATE 3 ML: 2.5; .5 SOLUTION RESPIRATORY (INHALATION) at 06:34

## 2024-11-28 RX ADMIN — FINASTERIDE 5 MG: 5 TABLET, FILM COATED ORAL at 20:55

## 2024-11-28 RX ADMIN — SODIUM CHLORIDE 40 ML: 9 INJECTION, SOLUTION INTRAVENOUS at 09:13

## 2024-11-28 RX ADMIN — ASPIRIN 81 MG: 81 TABLET, COATED ORAL at 08:50

## 2024-11-28 RX ADMIN — METOPROLOL SUCCINATE 50 MG: 50 TABLET, EXTENDED RELEASE ORAL at 08:50

## 2024-11-28 RX ADMIN — INSULIN LISPRO 12 UNITS: 100 INJECTION, SOLUTION INTRAVENOUS; SUBCUTANEOUS at 08:50

## 2024-11-28 RX ADMIN — SODIUM CHLORIDE 40 ML: 9 INJECTION, SOLUTION INTRAVENOUS at 13:41

## 2024-11-28 RX ADMIN — SODIUM CHLORIDE 40 ML: 9 INJECTION, SOLUTION INTRAVENOUS at 11:49

## 2024-11-28 RX ADMIN — INSULIN LISPRO 14 UNITS: 100 INJECTION, SOLUTION INTRAVENOUS; SUBCUTANEOUS at 17:36

## 2024-11-28 RX ADMIN — Medication 10 ML: at 20:05

## 2024-11-28 RX ADMIN — PANTOPRAZOLE SODIUM 40 MG: 40 TABLET, DELAYED RELEASE ORAL at 17:36

## 2024-11-28 RX ADMIN — INSULIN LISPRO 12 UNITS: 100 INJECTION, SOLUTION INTRAVENOUS; SUBCUTANEOUS at 11:48

## 2024-11-28 RX ADMIN — IPRATROPIUM BROMIDE AND ALBUTEROL SULFATE 3 ML: 2.5; .5 SOLUTION RESPIRATORY (INHALATION) at 15:59

## 2024-11-28 RX ADMIN — INSULIN GLARGINE 30 UNITS: 100 INJECTION, SOLUTION SUBCUTANEOUS at 21:42

## 2024-11-28 RX ADMIN — PREGABALIN 225 MG: 75 CAPSULE ORAL at 08:50

## 2024-11-28 RX ADMIN — INSULIN LISPRO 2 UNITS: 100 INJECTION, SOLUTION INTRAVENOUS; SUBCUTANEOUS at 17:36

## 2024-11-28 RX ADMIN — DOXYCYCLINE 100 MG: 100 INJECTION, POWDER, LYOPHILIZED, FOR SOLUTION INTRAVENOUS at 00:44

## 2024-11-28 RX ADMIN — TAMSULOSIN HYDROCHLORIDE 0.8 MG: 0.4 CAPSULE ORAL at 20:55

## 2024-11-28 NOTE — PROGRESS NOTES
"Fracisco Mullen  1965  [id number]    Evaluating Physician: Varun Dominique MD    Chief Complaint: fatigue    Reason for Consultation: foot infection    History of present illness:     Patient is a 59 y.o.  Yr old male with history of diabetes/hypertension, previously followed with Dr. Gatica; admitted 2023 with right foot infection, cultures with MSSA/Morganella/ESBL Klebsiella/enterococcus and strep species.had surgery at the second toe with amputation, received IV daptomycin/Invanz with general improvements at that site.  He has been left with a chronic right plantar foot wound and a chronic wound at the left heel that has turned black; He apparently was admitted to the hospital August 1 after a fall at [facility].  Noted to have urinary retention with concern for possible UTI.  In addition left heel with black discoloration/malodor and redness, empiric antibiotics initiated.  He is chronically debilitated and wheelchair bound by his report.Bustos catheter-based at admission     8/3/24  Dr Gan  \"PROCEDURE:            Left  Left      22563:             Debridement of skin and subcutaneous tissue  32250:             Wound vacuum-assisted closure\"     8/6/24 MRI and surgical findings did not confirm bone involvement, transitioned to oral agents at discharge       Readmitted November 15, 2024 with reports of appearing \"sluggish\" according to admission notes with DKA, noted to have high hemoglobin A1c and persistent/worsening left heel wound according to patient; medicine team notes mention urinary retention, acute toxic metabolic encephalopathy improved and low-grade fever. Abnormal MRI at the left foot:    Per radiology-- \"Soft tissue wound seen along the posterior heel with ill-defined fluid that extends through and disrupts the distal Achilles tendon. Underlying this area there is evidence of osteomyelitis of the posterior lateral calcaneus. There is a tiny fluid   collection here as well that may represent " "an associated nondrainable abscess.     Additionally there appears to be a nondisplaced stress fracture of the posterior calcaneus. \"    11/22 left BKA    11/26/24 increased O2 requirements with less responsive per medicine team notes overnight; subsequently better    11/28/24 sleepy at my visit, on room air; some nasal cannula overnight then weaned. Left LE postop pain  dull , worse with palpation, better with pain meds and not severe,  hw won't attach a numerical severity; history Per nursing staff as well     No headache photophobia or neck stiffness.  No   cough or hemoptysis.  No nausea vomiting diarrhea or abdominal pain.  No other new skin rash.  no dysuria hematuria or pyuria.       Past Medical History:   Diagnosis Date    Acute renal failure     Hx of    Obesity     Hx of    Sleep apnea     Type 2 diabetes mellitus        Past Surgical History:   Procedure Laterality Date    BACK SURGERY      lower back    BELOW KNEE AMPUTATION Left 11/22/2024    Procedure: BELOW-KNEE AMPUTATION LEFT;  Surgeon: Dru Gan Jr., MD;  Location:  [facility];  Service: Orthopedics;  Laterality: Left;    CHOLECYSTECTOMY WITH INTRAOPERATIVE CHOLANGIOGRAM N/A 1/6/2021    Procedure: CHOLECYSTECTOMY LAPAROSCOPIC INTRAOPERATIVE CHOLANGIOGRAM;  Surgeon: Juventino Hooker MD;  Location:  [facility];  Service: General;  Laterality: N/A;    ERCP N/A 1/9/2021    Procedure: ENDOSCOPIC RETROGRADE CHOLANGIOPANCREATOGRAPHY;  Surgeon: Jeremy Dowell MD;  Location: [facility];  Service: Gastroenterology;  Laterality: N/A;  with sphiincterotomy and balloon sweep with 9mm-12mm balloon    INCISION AND DRAINAGE FOOT Left 8/3/2024    Procedure: HEAL IRRIGATION AND DEBRIDEMENT LEFT;  Surgeon: Dru Gan Jr., MD;  Location:  [facility];  Service: Orthopedics;  Laterality: Left;    PLACEMENT OF WOUND VAC Left 8/3/2024    Procedure: PLACEMENT OF WOUND VAC;  Surgeon: Dru Gan Jr., MD;  Location:  [facility];  Service: Orthopedics; "  Laterality: Left;       Pediatric History   Patient Parents    Not on file     Other Topics Concern    Not on file   Social History Narrative    Not on file       family history includes Cancer in his brother, maternal grandmother, mother, and another family member; Diabetes in an other family member; Heart attack in an other family member; Heart disease in his brother and father; Hyperlipidemia in his mother and another family member; Hypertension in his mother and another family member; Obesity in an other family member.    Allergies   Allergen Reactions    Sertraline Hcl Hives     Zoloft       Medication:  Current Facility-Administered Medications   Medication Dose Route Frequency Provider Last Rate Last Admin    acetaminophen (TYLENOL) tablet 650 mg  650 mg Oral Q4H PRN Dru Gan Jr., MD   650 mg at 11/26/24 2247    Or    acetaminophen (TYLENOL) 160 MG/5ML oral solution 650 mg  650 mg Oral Q4H PRN Dru Gan Jr., MD        Or    acetaminophen (TYLENOL) suppository 650 mg  650 mg Rectal Q4H PRN Dru Gan Jr., MD   650 mg at 11/15/24 2327    aspirin EC tablet 81 mg  81 mg Oral Daily Dru Gan Jr., MD   81 mg at 11/28/24 0850    sennosides-docusate (PERICOLACE) 8.6-50 MG per tablet 2 tablet  2 tablet Oral BID PRN Dru Gan Jr., MD        And    polyethylene glycol (MIRALAX) packet 17 g  17 g Oral Daily PRN Dru Gan Jr., MD        And    bisacodyl (DULCOLAX) EC tablet 5 mg  5 mg Oral Daily PRN Dru Gan Jr., MD        And    bisacodyl (DULCOLAX) suppository 10 mg  10 mg Rectal Daily PRN Dru Gan Jr., MD        calcium carbonate (TUMS) chewable tablet 500 mg (200 mg elemental)  2 tablet Oral TID PRN Dru Gan Jr., MD   2 tablet at 11/24/24 1829    Calcium Replacement - Follow Nurse / BPA Driven Protocol   Not Applicable PRN Dru Gan Jr., MD        dextrose (D50W) (25 g/50 mL) IV injection 25 g  25 g Intravenous Q15 Min PRN Malik  Dru ADAM Jr., MD   25 g at 11/25/24 2124    dextrose (GLUTOSE) oral gel 15 g  15 g Oral Q15 Min PRN Dru Gan Jr., MD   15 g at 11/25/24 2039    doxycycline (VIBRAMYCIN) 100 mg in sodium chloride 0.9 % 100 mL MBP  100 mg Intravenous Q12H Enriqueta Muir APRN   100 mg at 11/28/24 0044    Enoxaparin Sodium (LOVENOX) syringe 40 mg  40 mg Subcutaneous Daily Dru Gan Jr., MD   40 mg at 11/28/24 0851    finasteride (PROSCAR) tablet 5 mg  5 mg Oral Nightly Dru Gan Jr., MD   5 mg at 11/27/24 2113    glucagon (GLUCAGEN) injection 1 mg  1 mg Intramuscular Q15 Min PRN Dru Gan Jr., MD        influenza virus vacc split PF FLUZONE 0.5 mL  0.5 mL Intramuscular During Hospitalization Dru Gan Jr., MD        insulin glargine (LANTUS, SEMGLEE) injection 30 Units  30 Units Subcutaneous Nightly Koko Lord DO   30 Units at 11/27/24 2113    Insulin Lispro (humaLOG) injection 12 Units  12 Units Subcutaneous TID With Meals Dru Gan Jr., MD   12 Units at 11/28/24 0850    Insulin Lispro (humaLOG) injection 2-9 Units  2-9 Units Subcutaneous 4x Daily AC & at Bedtime Dru Gan Jr., MD   6 Units at 11/28/24 0850    ipratropium-albuterol (DUO-NEB) nebulizer solution 3 mL  3 mL Nebulization 4x Daily - RT Enriqueta Muir APRN   3 mL at 11/28/24 0634    Lidocaine HCl gel (XYLOCAINE) urethral/mucosal syringe   Urethral PRN Dru Gan Jr., MD   1 Application at 11/26/24 0333    Linezolid (ZYVOX) 600 mg 300 mL  600 mg Intravenous Q12H Varun Dominique  mL/hr at 11/27/24 2113 600 mg at 11/27/24 2113    Magnesium Standard Dose Replacement - Follow Nurse / BPA Driven Protocol   Not Applicable PRN Dru Gan Jr., MD        meropenem (MERREM) 1,000 mg in sodium chloride 0.9 % 100 mL MBP  1,000 mg Intravenous Q8H Varun Dominique MD   1,000 mg at 11/28/24 0422    metoprolol succinate XL (TOPROL-XL) 24 hr tablet 50 mg  50 mg Oral Daily Dru Gan Jr., MD    50 mg at 11/28/24 0850    nitroglycerin (NITROSTAT) SL tablet 0.4 mg  0.4 mg Sublingual Q5 Min PRN Dru Gan Jr., MD        ondansetron (ZOFRAN) injection 4 mg  4 mg Intravenous Q6H PRN Dru Gan Jr., MD   4 mg at 11/24/24 2030    oxyCODONE-acetaminophen (PERCOCET) 5-325 MG per tablet 1 tablet  1 tablet Oral Q6H PRN Koko Lord DO   1 tablet at 11/27/24 2113    pantoprazole (PROTONIX) EC tablet 40 mg  40 mg Oral BID AC Enriqueta Muir, APRN   40 mg at 11/28/24 0850    Phosphorus Replacement - Follow Nurse / BPA Driven Protocol   Not Applicable PRN Dru Gan Jr., MD        Potassium Replacement - Follow Nurse / BPA Driven Protocol   Not Applicable PRN Dru Gan Jr., MD        pregabalin (LYRICA) capsule 225 mg  225 mg Oral BID Enriqueta Muir, APRN   225 mg at 11/28/24 0850    rOPINIRole (REQUIP) tablet 0.25 mg  0.25 mg Oral Nightly Dru Gan Jr., MD   0.25 mg at 11/27/24 2112    ropivacaine (NAROPIN) 0.2 % infusion (INFUSYSTEM)   Peripheral Nerve Continuous Dru Gan Jr., MD 1 mL/hr at 11/28/24 0600 Rate Verify at 11/28/24 0600    sodium chloride 0.9 % flush 10 mL  10 mL Intravenous Q12H Dru Gan Jr., MD   10 mL at 11/28/24 0851    sodium chloride 0.9 % flush 10 mL  10 mL Intravenous PRN Dru Gan Jr., MD        sodium chloride 0.9 % infusion 40 mL  40 mL Intravenous PRN Dru Gan Jr., MD   40 mL at 11/27/24 1241    tamsulosin (FLOMAX) 24 hr capsule 0.8 mg  0.8 mg Oral Nightly Dru Gan Jr., MD   0.8 mg at 11/27/24 2113       Antibiotics:  Anti-Infectives (From admission, onward)      Ordered     Dose/Rate Route Frequency Start Stop    11/26/24 0749  meropenem (MERREM) 1,000 mg in sodium chloride 0.9 % 100 mL MBP        Ordering Provider: Varun Dominique MD    1,000 mg  over 3 Hours Intravenous Every 8 Hours 11/26/24 1400 12/03/24 1129    11/26/24 0750  Linezolid (ZYVOX) 600 mg 300 mL        Ordering Provider: Varun Dominique  "MD ANNMARIE    600 mg  300 mL/hr over 60 Minutes Intravenous Every 12 Hours 11/26/24 0900 11/29/24 2059    11/26/24 0749  meropenem (MERREM) 1,000 mg in sodium chloride 0.9 % 100 mL MBP        Ordering Provider: Varun Dominique MD    1,000 mg  over 30 Minutes Intravenous Once 11/26/24 0800 11/26/24 0903    11/26/24 0024  doxycycline (VIBRAMYCIN) 100 mg in sodium chloride 0.9 % 100 mL MBP        Ordering Provider: Enriqueta Muir APRN    100 mg  over 60 Minutes Intravenous Every 12 Hours 11/26/24 0100 12/03/24 0059    11/22/24 1045  ceFAZolin 2000 mg IVPB in 100 mL NS (MBP)        Ordering Provider: Claudia Lim PA    2,000 mg  over 30 Minutes Intravenous Once 11/22/24 1047 11/22/24 1255              Review of Systems    11/28/24 per nursing also    Constitutional-- No Fever, chills or sweats.  Appetite diminished with fatigue.  Heent-- No new vision, hearing or throat complaints.  No epistaxis or oral sores.  Denies odynophagia or dysphagia.  No flashers, floaters or eye pain. No odynophagia or dysphagia. No headache, photophobia or neck stiffness.  CV-- No chest pain, palpitation or syncope  Resp-- see above  GI- No nausea, vomiting, or diarrhea.  No hematochezia, melena, or hematemesis. Denies jaundice or chronic liver disease.  -- No dysuria, hematuria, or flank pain.  Denies hesitancy, urgency or flank pain.  Lymph- no swollen lymph nodes in neck/axilla or groin.   Heme- No active bruising or bleeding; no Hx of DVT or PE.  MS-- no swelling or pain in the bones or joints of arms/legs.  No new back pain.  Neuro-- No acute focal weakness or numbness in the arms or legs.  No seizures.    Full 12 point review of systems reviewed and negative otherwise for acute complaints, except for above    Physical Exam:   Vital Signs   /76 (BP Location: Left arm, Patient Position: Lying)   Pulse 89   Temp 98.1 °F (36.7 °C) (Oral)   Resp 18   Ht 162.6 cm (64.02\")   Wt 79.4 kg (175 lb 1.6 oz)   SpO2 90%   BMI " 30.04 kg/m²     GENERAL: sleepy     HEENT: Normocephalic, atraumatic.   No conjunctival injection. No icterus. Oropharynx clear without evidence of thrush or exudate. No evidence of periodontal disease.    NECK: Supple without nuchal rigidity. No mass.   HEART: RRR; No murmur, rubs, gallops.   LUNGS: diminished at bases  ; No dullness.  ABDOMEN: Soft, nontender, nondistended. Positive bowel sounds. No rebound or guarding. NO mass or HSM.  EXT: see below   MSK: FROM without joint effusions noted arms/legs.    SKIN: Warm and dry without cutaneous eruptions on Inspection/palpation.    NEURO: sleepy.    Right foot second toe amputation site noted, no redness/duration or warmth there.  No oral or active drainage    Left  LE surgical site no new visible redness/purulence.  No crepitus or bulla.         Laboratory Data    Results from last 7 days   Lab Units 11/28/24  0612 11/27/24  0502 11/26/24  0757   WBC 10*3/mm3 8.39 10.10 10.62   HEMOGLOBIN g/dL 8.1* 7.7* 8.2*   HEMATOCRIT % 26.5* 25.3* 27.1*   PLATELETS 10*3/mm3 388 366 337     Results from last 7 days   Lab Units 11/28/24  0612   SODIUM mmol/L 135*   POTASSIUM mmol/L 4.4   CHLORIDE mmol/L 102   CO2 mmol/L 26.0   BUN mg/dL 11   CREATININE mg/dL 0.73*   GLUCOSE mg/dL 227*   CALCIUM mg/dL 7.3*     Results from last 7 days   Lab Units 11/26/24  0757   ALK PHOS U/L 90   BILIRUBIN mg/dL 0.2   ALT (SGPT) U/L 17   AST (SGOT) U/L 22                 Estimated Creatinine Clearance: 101.2 mL/min (A) (by C-G formula based on SCr of 0.73 mg/dL (L)).      Microbiology:      Radiology:  Imaging Results (Last 72 Hours)       Procedure Component Value Units Date/Time    CT Head Without Contrast [[id number]] Collected: 11/25/24 2347     Updated: 11/25/24 2352    Narrative:      CT HEAD WO CONTRAST    Date of Exam: 11/25/2024 11:20 PM EST    Indication: decreased LOC.    Comparison: CT scan of the head dated November 15, 2024    Technique: Axial CT images were obtained of the head  without contrast administration.  Automated exposure control and iterative construction methods were used.    Findings:  There is mild diffuse generalized atrophy. There are low-attenuation areas in the periventricular white matter consistent with chronic microvascular ischemic change. There is no mass, mass effect or midline shift. There are no abnormal extra-axial fluid   collections or areas of acute hemorrhage. The paranasal sinuses are clear. The mastoid air cells are clear.      Impression:      Impression:  Atrophy and chronic microvascular ischemic change. No acute intracranial process.        Electronically Signed: Horacio Bailey MD    11/25/2024 11:49 PM EST    Workstation ID: [id number]    CT Angiogram Chest [[id number]] Collected: 11/25/24 2340     Updated: 11/25/24 2350    Narrative:      CT ANGIOGRAM CHEST    Date of Exam: 11/25/2024 11:20 PM EST    Indication: elevated d dimer, hypoxia.    Comparison: None available.    Technique: CTA of the chest was performed after the uneventful intravenous administration of 80 mL Isovue-370. Reconstructed coronal and sagittal images were also obtained. In addition, a 3-D volume rendered image was created for interpretation.   Automated exposure control and iterative reconstruction methods were used.    Findings:  Pulmonary arteries: Adequate opacification of the pulmonary arteries. No evidence of acute pulmonary embolism.    Lungs and Pleura: There are small bilateral pleural effusions. There is bilateral basilar atelectasis. There is mild right middle lobe and posterior left upper lobe atelectasis.    Mediastinum/Clare: No mediastinal or hilar lymphadenopathy.    Lymph nodes: No axillary or supraclavicular adenopathy.    Cardiovascular: The cardiac chambers are within normal limits. The pericardium is normal. The aorta and its arch branch vessels are unremarkable. There is mild coronary artery calcific atherosclerosis.       Upper Abdomen: The stomach is distended with  a large amount of fluid and food stuff. There is fluid in the esophagus to the mid esophagus consistent with reflux. There are clips from cholecystectomy.    Bones and Soft Tissue: No suspicious osseous lesion.        Impression:      Impression:  1.No evidence of pulmonary embolism.  2.Small bilateral pleural effusions with bilateral basilar atelectasis.  3.Fluid in the esophagus consistent with reflux.        Electronically Signed: Horacio Bailey MD    11/25/2024 11:47 PM EST    Workstation ID: [id number]    XR Chest 1 View [[id number]] Collected: 11/25/24 2051     Updated: 11/25/24 2055    Narrative:      XR CHEST 1 VW    Date of Exam: 11/25/2024 8:06 PM EST    Indication: increased oxygen demand    Comparison: 11/15/2024    Findings:  Cardiomediastinal silhouette is unremarkable. There is left lower lobe airspace disease with small to moderate left effusion. Correlate for signs of pneumonia. No pneumothorax. Right lung is relatively clear. No acute osseous abnormality identified.      Impression:      Impression:  Left lower lobe airspace disease with small to moderate left effusion. Correlate for signs of pneumonia.        Electronically Signed: Nic Fonseca MD    11/25/2024 8:52 PM EST    Workstation ID: [id number]              Impression:       --acute left heel wound infection ,  abnormal MRI as above raising concern for fluid extending into and disrupting the distal lateral Achilles tendon in addition to with evidence for osteomyelitis at the posterior lateral calcaneus in addition to possible nondisplaced stress fracture at the posterior calcaneus; High risk for persistent/recurrent or nonhealing wounds, persistent/progressive or recurrent infection and risk for further functional/limb loss, higher level amputation etc. Surgery 8/3;  left BKA on November 22. Empiric antibiotics for now    --hypoxia; empiric abx with decreased responsiveness overnight, aspiration risk although no witnessed aspiration.  Viral  panel negative.  No productive cough to send for microbiology.     --urinary retention per admission notes, some hematuria on November 15; further urologic evaluation regarding functional/anatomic assessment at discretion of medicine team with respect inpatient versus outpatient     --Diabetes, uncontrolled ; glucose control per medicine team     --diabetic neuropathy     --Chronic debility as per patient wheelchair bound     --Hx ESBL    PLAN:     --IV zyvox/merrem, doxy; anticipate tapering to oral regimen by discharge if steadily better and no new suppurative process    --Check/review labs cultures and scans    --Partial history Per nursing staff    --Discussed with microbiology    --Discussed with surgery team    --Highly complex at of issues with high risk for further serious morbidity and other serious sequela     Copied text in this note has been reviewed and is accurate as of 11/28/24.     Varun Dominique MD  11/28/2024

## 2024-11-28 NOTE — THERAPY RE-EVALUATION
Patient Name: Fracisco Mullen  : 1965    MRN: [id number]                              Today's Date: 2024       Admit Date: 11/15/2024    Visit Dx:     ICD-10-CM ICD-9-CM   1. S/P BKA (below knee amputation) unilateral, left  Z89.512 V49.75   2. Diabetic ketoacidosis without coma associated with type 2 diabetes mellitus  E11.10 250.12   3. Oropharyngeal dysphagia  R13.12 787.22   4. Non healing left heel wound  S91.302A 892.1   5. Acute osteomyelitis of left foot  M86.172 730.07     Patient Active Problem List   Diagnosis    HTN (hypertension)    Type 2 diabetes mellitus, with long-term current use of insulin    Diabetic retinopathy    Obesity (BMI 30.0-34.9)    Peripheral neuropathy    Asthma    Causalgia of lower limb    Chronic constipation    Compression of lumbar nerve root    GERD (gastroesophageal reflux disease)    Hyperlipidemia    IBS (irritable bowel syndrome)    RLS (restless legs syndrome)    Sleep apnea    Vitamin D deficiency    Cigar smoker    Chronic back pain    Diabetic ketoacidosis associated with type 2 diabetes mellitus    Multiple open wounds of foot    Status post cholecystectomy    Cholestatic hepatitis    Diabetic infection of left foot    Precordial pain    Tobacco use    DKA (diabetic ketoacidosis)    Non healing left heel wound    Severe protein-calorie malnutrition    Acute osteomyelitis of left foot     Past Medical History:   Diagnosis Date    Acute renal failure     Hx of    Obesity     Hx of    Sleep apnea     Type 2 diabetes mellitus      Past Surgical History:   Procedure Laterality Date    BACK SURGERY      lower back    BELOW KNEE AMPUTATION Left 2024    Procedure: BELOW-KNEE AMPUTATION LEFT;  Surgeon: Dru Gan Jr., MD;  Location: [facility];  Service: Orthopedics;  Laterality: Left;    CHOLECYSTECTOMY WITH INTRAOPERATIVE CHOLANGIOGRAM N/A 2021    Procedure: CHOLECYSTECTOMY LAPAROSCOPIC INTRAOPERATIVE CHOLANGIOGRAM;  Surgeon: Juventino Hooker MD;   Location:  [facility];  Service: General;  Laterality: N/A;    ERCP N/A 1/9/2021    Procedure: ENDOSCOPIC RETROGRADE CHOLANGIOPANCREATOGRAPHY;  Surgeon: Jeremy Dowell MD;  Location: [facility];  Service: Gastroenterology;  Laterality: N/A;  with sphiincterotomy and balloon sweep with 9mm-12mm balloon    INCISION AND DRAINAGE FOOT Left 8/3/2024    Procedure: HEAL IRRIGATION AND DEBRIDEMENT LEFT;  Surgeon: Dru Gan Jr., MD;  Location:  [facility];  Service: Orthopedics;  Laterality: Left;    PLACEMENT OF WOUND VAC Left 8/3/2024    Procedure: PLACEMENT OF WOUND VAC;  Surgeon: Dru Gan Jr., MD;  Location:  [facility];  Service: Orthopedics;  Laterality: Left;      General Information       Row Name 11/28/24 0935          OT Time and Intention    Document Type re-evaluation  -TA     Mode of Treatment occupational therapy  -TA       Row Name 11/28/24 0935          General Information    Patient Profile Reviewed yes  -TA     Prior Level of Function --  Please refer to initial eval  -TA     Existing Precautions/Restrictions fall;weight bearing  WBAT RLE  -TA     Barriers to Rehab medically complex  -TA       Row Name 11/28/24 0935          Occupational Profile    Reason for Services/Referral (Occupational Profile) fxl decline; recent LLE BKA  -TA     Patient Goals (Occupational Profile) rehab  -TA       Row Name 11/28/24 0935          Living Environment    People in Home --  refer to IE  -TA       Row Name 11/28/24 0935          Cognition    Orientation Status (Cognition) oriented to;person;place;situation;verbal cues/prompts needed for orientation;time  Knew month, VC for year  -TA       Row Name 11/28/24 0935          Safety Issues/Impairments Affecting Functional Mobility    Safety Issues Affecting Function (Mobility) safety precautions follow-through/compliance  -TA     Impairments Affecting Function (Mobility) endurance/activity tolerance;pain;strength  -TA               User Key  (r) = Recorded  By, (t) = Taken By, (c) = Cosigned By      Initials Name Provider Type    Waldemar Smalls OT Occupational Therapist                     Mobility/ADL's       Row Name 11/28/24 09          Bed Mobility    Bed Mobility supine-sit;sit-supine;rolling left;rolling right;scooting/bridging  -TA     Rolling Left [place] (Bed Mobility) minimum assist (75% patient effort);verbal cues  -TA     Rolling Right [place] (Bed Mobility) minimum assist (75% patient effort);verbal cues  -TA     Scooting/Bridging [place] (Bed Mobility) contact guard;verbal cues  -TA     Supine-Sit [place] (Bed Mobility) minimum assist (75% patient effort);2 person assist;verbal cues  -TA     Sit-Supine [place] (Bed Mobility) minimum assist (75% patient effort);2 person assist;verbal cues  -TA     Bed Mobility, Safety Issues decreased use of legs for bridging/pushing  -TA     Assistive Device (Bed Mobility) head of bed elevated;bed rails  -TA       Row Name 11/28/24 0935          Activities of Daily Living    BADL Assessment/Intervention upper body dressing;grooming;toileting  -TA       Row Name 11/28/24 0935          Upper Body Dressing Assessment/Training    [place] Level (Upper Body Dressing) don;doff;pajama/robe;minimum assist (75% patient effort)  -TA     Position (Upper Body Dressing) edge of bed sitting  -TA       Row Name 11/28/24 0935          Grooming Assessment/Training    [place] Level (Grooming) wash face, hands;set up;supervision  -TA     Position (Grooming) edge of bed sitting  -TA       Row Name 11/28/24 0935          Toileting Assessment/Training    [place] Level (Toileting) perform perineal hygiene;dependent (less than 25% patient effort)  -TA     Position (Toileting) supine  -TA               User Key  (r) = Recorded By, (t) = Taken By, (c) = Cosigned By      Initials Name Provider Type    Waldemar Smalls OT Occupational Therapist                   Obj/Interventions       Row  Name 11/28/24 0935          Sensory Assessment (Somatosensory)    Sensory Assessment (Somatosensory) bilateral UE;sensation intact  -TA       Row Name 11/28/24 0935          Vision Assessment/Intervention    Visual Impairment/Limitations WFL;corrective lenses full-time  glasses not at hospital  -TA       Row Name 11/28/24 0935          Range of Motion Comprehensive    General Range of Motion bilateral upper extremity ROM WFL  -TA       Row Name 11/28/24 0935          Strength Comprehensive (MMT)    General Manual Muscle Testing (MMT) Assessment no strength deficits identified  -TA       Row Name 11/28/24 0935          Upper Extremity (Manual Muscle Testing)    Comment, MMT: Upper Extremity BUE 5/5  -TA       Row Name 11/28/24 0935          Balance    Balance Assessment sitting dynamic balance  -TA     Dynamic Sitting Balance contact guard;standby assist  -TA     Position, Sitting Balance sitting edge of bed  -TA     Balance Interventions UE activity with balance activity;weight shifting activity;occupation based/functional task  -TA               User Key  (r) = Recorded By, (t) = Taken By, (c) = Cosigned By      Initials Name Provider Type    TA Waldemar Malik OT Occupational Therapist                   Goals/Plan       Row Name 11/28/24 0935          Bed Mobility Goal 1 (OT)    Activity/Assistive Device (Bed Mobility Goal 1, OT) supine to sit;sit to supine  -TA     [place] Level/Cues Needed (Bed Mobility Goal 1, OT) standby assist  -TA     Time Frame (Bed Mobility Goal 1, OT) short term goal (STG)  -TA     Progress/Outcomes (Bed Mobility Goal 1, OT) new goal  -TA       Row Name 11/28/24 0935          Transfer Goal 1 (OT)    Activity/Assistive Device (Transfer Goal 1, OT) sit-to-stand/stand-to-sit;bed-to-chair/chair-to-bed;toilet  AAD  -TA     [place] Level/Cues Needed (Transfer Goal 1, OT) moderate assist (50-74% patient effort)  -TA     Time Frame (Transfer Goal 1, OT) long term goal (LTG)  -TA      Progress/Outcome (Transfer Goal 1, OT) new goal  -TA       Row Name 11/28/24 0935          Toileting Goal 1 (OT)    Activity/Device (Toileting Goal 1, OT) adjust/manage clothing;perform perineal hygiene  -TA     [place] Level/Cues Needed (Toileting Goal 1, OT) minimum assist (75% or more patient effort)  -TA     Time Frame (Toileting Goal 1, OT) long term goal (LTG)  -TA     Progress/Outcome (Toileting Goal 1, OT) new goal  -TA       Row Name 11/28/24 0935          Therapy Assessment/Plan (OT)    Planned Therapy Interventions (OT) activity tolerance training;BADL retraining;functional balance retraining;occupation/activity based interventions;patient/caregiver education/training;ROM/therapeutic exercise;strengthening exercise;transfer/mobility retraining  -TA               User Key  (r) = Recorded By, (t) = Taken By, (c) = Cosigned By      Initials Name Provider Type    TA Waldemar Malik, OT Occupational Therapist                   Clinical Impression       Row Name 11/28/24 0935          Pain Assessment    Pretreatment Pain Rating 9/10  -TA     Posttreatment Pain Rating 9/10  -TA     Pain Location extremity  -TA     Pain Side/Orientation left;lower  surgical site  -TA     Pain Management Interventions exercise or physical activity utilized;nursing notified  -TA     Response to Pain Interventions activity participation with tolerable pain  -TA       Row Name 11/28/24 0935          Plan of Care Review    Plan of Care Reviewed With patient  -TA     Outcome Evaluation VSS; OT re-eval completed. Pt presents with fxl decline from PLOF, deficits in ADL performance, fxl mobility, occupational endurance. Pt limited by pain at surgical site, LE weaknes. Pt required Min A rolling R/L, CGA scooting, Min A x 2 sup<> sit, set up grooming, dependent post toilet hygiene, Min A UBD. Pt will benefit from skilled OT services to address deficits, facilitate increased fxl I. Recommend IRF at discharge.  -TA       Row Name  11/28/24 0935          Therapy Assessment/Plan (OT)    Patient/Family Therapy Goal Statement (OT) rehab  -TA     Rehab Potential (OT) good  -TA     Criteria for Skilled Therapeutic Interventions Met (OT) yes;skilled treatment is necessary  -TA     Therapy Frequency (OT) daily  -TA     Predicted Duration of Therapy Intervention (OT) 5 days  -TA       Row Name 11/28/24 0935          Therapy Plan Review/Discharge Plan (OT)    Anticipated Discharge Disposition (OT) inpatient rehabilitation facility  -TA       Row Name 11/28/24 0935          Vital Signs    Pre Systolic BP Rehab --  VSS; RN cleared pt for tx  -TA     O2 Delivery Pre Treatment room air  -TA     O2 Delivery Intra Treatment room air  -TA     O2 Delivery Post Treatment room air  -TA     Pre Patient Position Supine  -TA     Intra Patient Position Sitting  -TA     Post Patient Position Supine  -TA       Row Name 11/28/24 0935          Positioning and Restraints    Pre-Treatment Position in bed  -TA     Post Treatment Position bed  -TA     In Bed notified nsg;fowlers;call light within reach;encouraged to call for assist;exit alarm on;side rails up x3;legs elevated  -TA               User Key  (r) = Recorded By, (t) = Taken By, (c) = Cosigned By      Initials Name Provider Type    Waldemar Smalls, OT Occupational Therapist                   Outcome Measures       Row Name 11/28/24 0935          How much help from another is currently needed...    Putting on and taking off regular lower body clothing? 2  -TA     Bathing (including washing, rinsing, and drying) 2  -TA     Toileting (which includes using toilet bed pan or urinal) 1  -TA     Putting on and taking off regular upper body clothing 3  -TA     Taking care of personal grooming (such as brushing teeth) 3  -TA     Eating meals 4  -TA     AM-PAC 6 Clicks Score (OT) 15  -TA       Row Name 11/28/24 0400 11/28/24 0000       How much help from another person do you currently need...    Turning from your  back to your side while in flat bed without using bedrails? 3  -JI 3  -JI    Moving from lying on back to sitting on the side of a flat bed without bedrails? 3  -JI 3  -JI    Moving to and from a bed to a chair (including a wheelchair)? 1  -JI 1  -JI    Standing up from a chair using your arms (e.g., wheelchair, bedside chair)? 1  -JI 1  -JI    Climbing 3-5 steps with a railing? 1  -JI 1  -JI    To walk in hospital room? 1  -JI 1  -JI    AM-PAC 6 Clicks Score (PT) 10  -JI 10  -JI    Highest Level of Mobility Goal 4 --> Transfer to chair/commode  -JI 4 --> Transfer to chair/commode  -JI      Row Name 11/28/24 0935          Functional Assessment    Outcome Measure Options AM-PAC 6 Clicks Daily Activity (OT)  -TA               User Key  (r) = Recorded By, (t) = Taken By, (c) = Cosigned By      Initials Name Provider Type    Waldemar Smalls OT Occupational Therapist    Ion Mahoney RN Registered Nurse                    Occupational Therapy Education       Title: PT OT SLP Therapies (In Progress)       Topic: Occupational Therapy (In Progress)       Point: ADL training (Done)       Description:   Instruct learner(s) on proper safety adaptation and remediation techniques during self care or transfers.   Instruct in proper use of assistive devices.                  Learning Progress Summary            Patient Acceptance, E, VU,NR by TA at 11/28/2024 1044    Acceptance, E,D, NR by PATTIE at 11/19/2024 1352    Acceptance, E,TB, NR by KF at 11/17/2024 1308                      Point: Home exercise program (Not Started)       Description:   Instruct learner(s) on appropriate technique for monitoring, assisting and/or progressing therapeutic exercises/activities.                  Learner Progress:  Not documented in this visit.              Point: Precautions (Done)       Description:   Instruct learner(s) on prescribed precautions during self-care and functional transfers.                  Learning Progress Summary             Patient Acceptance, E, VU,NR by TA at 11/28/2024 1044    Acceptance, E,D, NR by JSKYLER at 11/19/2024 1352    Acceptance, E,TB, NR by KF at 11/17/2024 1308                      Point: Body mechanics (Done)       Description:   Instruct learner(s) on proper positioning and spine alignment during self-care, functional mobility activities and/or exercises.                  Learning Progress Summary            Patient Acceptance, E, VU,NR by TA at 11/28/2024 1044    Acceptance, E,D, NR by PATTIE at 11/19/2024 1352    Acceptance, E,TB, NR by KF at 11/17/2024 1308                                      User Key       Initials Effective Dates Name Provider Type Discipline    SOLOMON 06/16/21 -  Waldemar Malik, OT Occupational Therapist OT    PATTIE 06/16/21 -  Dianelys Peguero OT Occupational Therapist OT    KF 08/09/23 -  Jamilah Meyers OT Occupational Therapist OT                  OT Recommendation and Plan  Planned Therapy Interventions (OT): activity tolerance training, BADL retraining, functional balance retraining, occupation/activity based interventions, patient/caregiver education/training, ROM/therapeutic exercise, strengthening exercise, transfer/mobility retraining  Therapy Frequency (OT): daily  Plan of Care Review  Plan of Care Reviewed With: patient  Outcome Evaluation: VSS; OT re-eval completed. Pt presents with fxl decline from PLOF, deficits in ADL performance, fxl mobility, occupational endurance. Pt limited by pain at surgical site, LE weaknes. Pt required Min A rolling R/L, CGA scooting, Min A x 2 sup<> sit, set up grooming, dependent post toilet hygiene, Min A UBD. Pt will benefit from skilled OT services to address deficits, facilitate increased fxl I. Recommend IRF at discharge.     Time Calculation:   Evaluation Complexity (OT)  Review Occupational Profile/Medical/Therapy History Complexity: expanded/moderate complexity  Assessment, Occupational Performance/Identification of Deficit Complexity: 3-5  performance deficits  Clinical Decision Making Complexity (OT): detailed assessment/moderate complexity  Overall Complexity of Evaluation (OT): moderate complexity     Time Calculation- OT       Row Name 11/28/24 0935             Time Calculation- OT    OT Start Time 0935  ttc 9 minutes  -TA      Total Timed Code Minutes- OT 9 minute(s)  -TA      OT Received On 11/28/24  -TA      OT Goal Re-Cert Due Date 12/08/24  -TA         Timed Charges    17796 - OT Self Care/Mgmt Minutes 9  -TA         Untimed Charges    OT Eval/Re-eval Minutes 25  -TA         Total Minutes    Timed Charges Total Minutes 9  -TA      Untimed Charges Total Minutes 25  -TA       Total Minutes 34  -TA                User Key  (r) = Recorded By, (t) = Taken By, (c) = Cosigned By      Initials Name Provider Type    TA Waldemar Malik, OT Occupational Therapist                  Therapy Charges for Today       Code Description Service Date Service Provider Modifiers Qty    79894577680 HC OT SELF CARE/MGMT/TRAIN EA 15 MIN 11/28/2024 Waldemar Malik, OT GO 1    46701432862 HC OT RE-EVAL 2 11/28/2024 Waldemar Malik OT GO 1                 Waldemar Malik OT  11/28/2024

## 2024-11-28 NOTE — THERAPY RE-EVALUATION
Patient Name: Fracisco Mullen  : 1965    MRN: [id number]                              Today's Date: 2024       Admit Date: 11/15/2024    Visit Dx:     ICD-10-CM ICD-9-CM   1. S/P BKA (below knee amputation) unilateral, left  Z89.512 V49.75   2. Diabetic ketoacidosis without coma associated with type 2 diabetes mellitus  E11.10 250.12   3. Oropharyngeal dysphagia  R13.12 787.22   4. Non healing left heel wound  S91.302A 892.1   5. Acute osteomyelitis of left foot  M86.172 730.07     Patient Active Problem List   Diagnosis    HTN (hypertension)    Type 2 diabetes mellitus, with long-term current use of insulin    Diabetic retinopathy    Obesity (BMI 30.0-34.9)    Peripheral neuropathy    Asthma    Causalgia of lower limb    Chronic constipation    Compression of lumbar nerve root    GERD (gastroesophageal reflux disease)    Hyperlipidemia    IBS (irritable bowel syndrome)    RLS (restless legs syndrome)    Sleep apnea    Vitamin D deficiency    Cigar smoker    Chronic back pain    Diabetic ketoacidosis associated with type 2 diabetes mellitus    Multiple open wounds of foot    Status post cholecystectomy    Cholestatic hepatitis    Diabetic infection of left foot    Precordial pain    Tobacco use    DKA (diabetic ketoacidosis)    Non healing left heel wound    Severe protein-calorie malnutrition    Acute osteomyelitis of left foot     Past Medical History:   Diagnosis Date    Acute renal failure     Hx of    Obesity     Hx of    Sleep apnea     Type 2 diabetes mellitus      Past Surgical History:   Procedure Laterality Date    BACK SURGERY      lower back    BELOW KNEE AMPUTATION Left 2024    Procedure: BELOW-KNEE AMPUTATION LEFT;  Surgeon: Dru Gan Jr., MD;  Location: [facility];  Service: Orthopedics;  Laterality: Left;    CHOLECYSTECTOMY WITH INTRAOPERATIVE CHOLANGIOGRAM N/A 2021    Procedure: CHOLECYSTECTOMY LAPAROSCOPIC INTRAOPERATIVE CHOLANGIOGRAM;  Surgeon: Juventino Hooker MD;   Location:  [facility];  Service: General;  Laterality: N/A;    ERCP N/A 1/9/2021    Procedure: ENDOSCOPIC RETROGRADE CHOLANGIOPANCREATOGRAPHY;  Surgeon: Jeremy Dowell MD;  Location: [facility];  Service: Gastroenterology;  Laterality: N/A;  with sphiincterotomy and balloon sweep with 9mm-12mm balloon    INCISION AND DRAINAGE FOOT Left 8/3/2024    Procedure: HEAL IRRIGATION AND DEBRIDEMENT LEFT;  Surgeon: Dru Gan Jr., MD;  Location:  [facility];  Service: Orthopedics;  Laterality: Left;    PLACEMENT OF WOUND VAC Left 8/3/2024    Procedure: PLACEMENT OF WOUND VAC;  Surgeon: Dru Gan Jr., MD;  Location:  [facility];  Service: Orthopedics;  Laterality: Left;      General Information       Row Name 11/28/24 1013          Physical Therapy Time and Intention    Document Type re-evaluation  -     Mode of Treatment physical therapy  -       Row Name 11/28/24 1013          General Information    Patient Profile Reviewed yes  -BA     Prior Level of Function --  Please refer to IE.  -BA     Existing Precautions/Restrictions fall;non-weight bearing;left;partial weight bearing;right;other (see comments)  s/p L BKA 11/22/24; NWB L residual limb; peripheral nerve block catheter; R plantarsurface foot wound; R heel WB with offloading shoe (pt's family is supposed to be bringing his offloading shoe later today); ledesma catheter  -     Barriers to Rehab medically complex;previous functional deficit;physical barrier  -       Row Name 11/28/24 1013          Living Environment    People in Home --  Please refer to IE.  -       Row Name 11/28/24 1013          Home Main Entrance    Number of Stairs, Main Entrance --  Please refer to IE.  -       Row Name 11/28/24 1013          Cognition    Orientation Status (Cognition) oriented x 3;verbal cues/prompts needed for orientation;time;other (see comments)  Oriented to month, but reported it was year 2005.  -       Row Name 11/28/24 1013          Safety  Issues/Impairments Affecting Functional Mobility    Safety Issues Affecting Function (Mobility) awareness of need for assistance;insight into deficits/self-awareness;judgment;safety precaution awareness;safety precautions follow-through/compliance;sequencing abilities  -     Impairments Affecting Function (Mobility) balance;coordination;endurance/activity tolerance;motor planning;pain;postural/trunk control;range of motion (ROM);sensation/sensory awareness;strength  -               User Key  (r) = Recorded By, (t) = Taken By, (c) = Cosigned By      Initials Name Provider Type    Galilea Mckeon PT Physical Therapist                   Mobility       Row Name 11/28/24 1022          Bed Mobility    Bed Mobility supine-sit;sit-supine;rolling right;scooting/bridging;rolling left  -     Rolling Left [place] (Bed Mobility) minimum assist (75% patient effort);1 person assist;verbal cues;nonverbal cues (demo/gesture)  -     Rolling Right [place] (Bed Mobility) minimum assist (75% patient effort);1 person assist;verbal cues;nonverbal cues (demo/gesture)  -     Scooting/Bridging [place] (Bed Mobility) minimum assist (75% patient effort);2 person assist;contact guard;verbal cues;nonverbal cues (demo/gesture);other (see comments)  Areli toward EOB, CGA toward HOB  -     Supine-Sit [place] (Bed Mobility) minimum assist (75% patient effort);2 person assist;verbal cues;nonverbal cues (demo/gesture)  -     Sit-Supine [place] (Bed Mobility) minimum assist (75% patient effort);2 person assist;verbal cues;nonverbal cues (demo/gesture)  -     Assistive Device (Bed Mobility) bed rails;head of bed elevated;repositioning sheet  -     Comment, (Bed Mobility) Increased time and effort.  VCs/TCs for sequencing and hand placement.  Required increased assist to manage BLE and trunk, along with draw sheet to help scoot hips toward EOB.  Reported no dizziness upon sitting EOB.  With return to  supine, rolling to both L and R side for replacing kleber pads and for dep pericare d/t small episode bowel incontinence.  -       Row Name 11/28/24 1022          Bed-Chair Transfer    Comment, (Bed-Chair Transfer) Deferred d/t pt not currently in mechanical lift room.  Pt would benefit from being moved to mechanical lift room for safety and progression with transfers and with pursuing slide board transfers; RN notified.  -       Row Name 11/28/24 1022          Sit-Stand Transfer    Comment, (Sit-Stand Transfer) Deferred d/t pt with L BKA and R heel WB with offloading shoe.  Pt currently does not have offloading shoe present at hospital; family is supposed to be bringing in offloading shoe later today.  If pt's family does not bring in offloading shoe, then we will provide pt with new offloading shoe next session.  -       Row Name 11/28/24 1022          Mobility    Extremity Weight-bearing Status left lower extremity;right lower extremity  -     Left Lower Extremity (Weight-bearing Status) non weight-bearing (NWB)  -     Right Lower Extremity (Weight-bearing Status) partial weight-bearing (PWB);other (see comments)  R heel WB with offloading shoe  -               User Key  (r) = Recorded By, (t) = Taken By, (c) = Cosigned By      Initials Name Provider Type    Galilea Mckeon, PT Physical Therapist                   Obj/Interventions       Row Name 11/28/24 1030          Range of Motion Comprehensive    General Range of Motion lower extremity range of motion deficits identified  -     Comment, General Range of Motion Generalized decreased AROM BLE d/t weakness and tightness.  AROM/AAROM B hip and knee WFL.  Minimal AROM R ankle DF/PF with decreased AAROM R ankle DF to lacking ~5 degrees from neutral.  -       Row Name 11/28/24 1030          Strength Comprehensive (MMT)    General Manual Muscle Testing (MMT) Assessment lower extremity strength deficits identified  -     Comment, General Manual  Muscle Testing (MMT) Assessment Grossly R hip 2+/5, R knee ext 3/5, R knee flex 3+/5, R ankle 3+/5.  LLE not formally assessed d/t NWB L residual limb and recent L BKA.  -       Row Name 11/28/24 1030          Motor Skills    Motor Skills coordination;functional endurance  -     Coordination gross motor deficit;bilateral;lower extremity;minimal impairment  -     Functional Endurance Decreased functional endurance.  Fatigues with activity.  -     Therapeutic Exercise knee;ankle  -       Row Name 11/28/24 1030          Knee (Therapeutic Exercise)    Knee (Therapeutic Exercise) isometric exercises  -     Knee Isometrics (Therapeutic Exercise) left;quad sets;supine;3 repetitions;5 second hold  -       Row Name 11/28/24 1030          Ankle (Therapeutic Exercise)    Ankle (Therapeutic Exercise) AAROM (active assistive range of motion)  -     Ankle AAROM (Therapeutic Exercise) right;dorsiflexion;supine;3 repetitions;other (see comments)  with gentle overpressure/stretch at end range with ~5-10 sec hold  -       Row Name 11/28/24 1030          Balance    Balance Assessment sitting static balance;sitting dynamic balance  -     Static Sitting Balance contact guard;standby assist;verbal cues  -     Dynamic Sitting Balance contact guard;minimal assist;verbal cues;non-verbal cues (demo/gesture)  -     Position, Sitting Balance unsupported;sitting edge of bed  -     Balance Interventions sitting;static;dynamic;occupation based/functional task;weight shifting activity  -     Comment, Balance Intermittent mild instability with sitting EOB with SBA/CGA for safety.  Performed seated lateral weight shifts with Areli/CGA in preparation for transfers.  -       Row Name 11/28/24 1030          Sensory Assessment (Somatosensory)    Bilateral LE Sensory Assessment light touch awareness;impaired;other (see comments)  Reported N/T from B knees down to L residual limb and R foot/toes.  -               User Key   (r) = Recorded By, (t) = Taken By, (c) = Cosigned By      Initials Name Provider Type     Galilea Burkett, PT Physical Therapist                   Goals/Plan       Row Name 11/28/24 1051          Bed Mobility Goal 1 (PT)    Activity/Assistive Device (Bed Mobility Goal 1, PT) sit to supine/supine to sit  -     [place] Level/Cues Needed (Bed Mobility Goal 1, PT) minimum assist (75% or more patient effort)  -     Time Frame (Bed Mobility Goal 1, PT) short term goal (STG);5 days  -       Row Name 11/28/24 1051          Transfer Goal 1 (PT)    Activity/Assistive Device (Transfer Goal 1, PT) bed-to-chair/chair-to-bed;wheelchair transfer;sliding board  -     [place] Level/Cues Needed (Transfer Goal 1, PT) maximum assist (25-49% patient effort)  -     Time Frame (Transfer Goal 1, PT) long term goal (LTG);10 days  -[facility] Name 11/28/24 1051          Balance Goal 1 (PT)    Activity/Assistive Device (Balance Goal) sitting dynamic balance;with functional activities/occupations;with functional mobility activities  -     [place] Level/Cues Needed (Balance Goal 1, PT) contact guard required  -     Time Frame (Balance Goal 1, PT) short-term goal (STG);5-7 days  -[facility] Name 11/28/24 1051          Problem Specific Goal 1 (PT)    Problem Specific Goal 1 (PT) Pt will be able to self-propel w/c 50ft with CGA and good obstacle navigation.  -     Time Frame (Problem Specific Goal 1, PT) long-term goal (LTG);2 weeks  -[facility] Name 11/28/24 1051          Therapy Assessment/Plan (PT)    Planned Therapy Interventions (PT) balance training;bed mobility training;home exercise program;motor coordination training;neuromuscular re-education;patient/family education;postural re-education;ROM (range of motion);strengthening;stretching;transfer training;wheelchair management/propulsion training  -               User Key  (r) = Recorded By, (t) = Taken By, (c) = Cosigned By      Initials Name  Provider Type     Galilea Burkett, PT Physical Therapist                   Clinical Impression       Row Name 11/28/24 1043          Pain    Pretreatment Pain Rating 9/10  -     Posttreatment Pain Rating 9/10  -     Pain Location residual limb;hip  -     Pain Side/Orientation left;right  -     Pain Management Interventions exercise or physical activity utilized;positioning techniques utilized;nursing notified  -     Response to Pain Interventions activity participation with tolerable pain;activity participation with increased pain  -     Pre/Posttreatment Pain Comment Reported pain in R hip primarily with lateral weight shifts when leaning toward L side.  Reported pain slightly increased with participation in mobility in both L residual limb and R hip to 9.5/10.  Requesting pain meds; RN notified.  -       Row Name 11/28/24 1042          Plan of Care Review    Plan of Care Reviewed With patient  -     Outcome Evaluation PT ReEval completed.  Pt presents s/p L BKA with generalized weakness, L residual limb and R hip pain, R ankle ROM deficits, BLE sensory impairments, balance deficits, decreased functional endurance, and decreased indep and safety with functional mobility compared to baseline.  Supine to sit and return to supine with minAx2.  Participated in seated dynamic balance activities with CGA/Areli.  Pt will benefit from skilled IP PT to improve indep and safety with mobility and promote return to PLOF.  Rec IPR upon d/c for best fxl outcome.  -       Row Name 11/28/24 1042          Therapy Assessment/Plan (PT)    Patient/Family Therapy Goals Statement (PT) to return home and to PLOF  -     Rehab Potential (PT) good  -     Criteria for Skilled Interventions Met (PT) yes;meets criteria;skilled treatment is necessary  -     Therapy Frequency (PT) daily  -     Predicted Duration of Therapy Intervention (PT) 10 days  -       Row Name 11/28/24 1043          Vital Signs    Pre  Systolic BP Rehab 128  -BA     Pre Treatment Diastolic BP 75  -BA     Intra Systolic BP Rehab 130  -BA     Intra Treatment Diastolic BP 68  -BA     Pretreatment Heart Rate (beats/min) 90  -BA     Posttreatment Heart Rate (beats/min) 91  -BA     Pre SpO2 (%) 93  -BA     O2 Delivery Pre Treatment room air  -BA     O2 Delivery Intra Treatment room air  -BA     Post SpO2 (%) 96  -BA     O2 Delivery Post Treatment room air  -BA     Pre Patient Position Supine  -BA     Intra Patient Position Sitting  -BA     Post Patient Position Supine  -BA       Row Name 11/28/24 1043          Positioning and Restraints    Pre-Treatment Position in bed  -BA     Post Treatment Position bed  -BA     In Bed notified nsg;supine;side lying left;fowlers;call light within reach;encouraged to call for assist;exit alarm on;side rails up x3  Semi L side lying with pillow placed underneath pt's R side for pressure relief  -BA               User Key  (r) = Recorded By, (t) = Taken By, (c) = Cosigned By      Initials Name Provider Type    BA Galilea Burkett, PT Physical Therapist                   Outcome Measures       Row Name 11/28/24 1136 11/28/24 0400       How much help from another person do you currently need...    Turning from your back to your side while in flat bed without using bedrails? 3  -BA 3  -JI    Moving from lying on back to sitting on the side of a flat bed without bedrails? 3  -BA 3  -JI    Moving to and from a bed to a chair (including a wheelchair)? 1  -BA 1  -JI    Standing up from a chair using your arms (e.g., wheelchair, bedside chair)? 1  -BA 1  -JI    Climbing 3-5 steps with a railing? 1  -BA 1  -JI    To walk in hospital room? 1  -BA 1  -JI    AM-PAC 6 Clicks Score (PT) 10  -BA 10  -JI    Highest Level of Mobility Goal 4 --> Transfer to chair/commode  -BA 4 --> Transfer to chair/commode  -JI      Row Name 11/28/24 0000          How much help from another person do you currently need...    Turning from your back to  your side while in flat bed without using bedrails? 3  -JI     Moving from lying on back to sitting on the side of a flat bed without bedrails? 3  -JI     Moving to and from a bed to a chair (including a wheelchair)? 1  -JI     Standing up from a chair using your arms (e.g., wheelchair, bedside chair)? 1  -JI     Climbing 3-5 steps with a railing? 1  -JI     To walk in hospital room? 1  -JI     AM-PAC 6 Clicks Score (PT) 10  -JI     Highest Level of Mobility Goal 4 --> Transfer to chair/commode  -JI       Row Name 11/28/24 1136 11/28/24 0935       Functional Assessment    Outcome Measure Options AM-PAC 6 Clicks Basic Mobility (PT)  -BA AM-PAC 6 Clicks Daily Activity (OT)  -TA              User Key  (r) = Recorded By, (t) = Taken By, (c) = Cosigned By      Initials Name Provider Type    Waldemar Smalls, OT Occupational Therapist    Galilea Mckeon, PT Physical Therapist    Ion Mahoney, RN Registered Nurse                                 Physical Therapy Education       Title: PT OT SLP Therapies (In Progress)       Topic: Physical Therapy (Done)       Point: Mobility training (Done)       Learning Progress Summary            Patient Acceptance, E, VU,NR by BA at 11/28/2024 1136    Acceptance, E, VU,NR by  at 11/19/2024 1824    Acceptance, E, VU,NR by BA at 11/19/2024 1824    Acceptance, E,D, NR by DM at 11/17/2024 1806                      Point: Home exercise program (Done)       Learning Progress Summary            Patient Acceptance, E, VU,NR by BA at 11/28/2024 1136    Acceptance, E, VU,NR by BA at 11/19/2024 1824    Acceptance, E, VU,NR by BA at 11/19/2024 1824    Acceptance, E,D, NR by DM at 11/17/2024 1806                      Point: Body mechanics (Done)       Learning Progress Summary            Patient Acceptance, E, VU,NR by BA at 11/28/2024 1136    Acceptance, E, VU,NR by BA at 11/19/2024 1824    Acceptance, E, VU,NR by BA at 11/19/2024 1824    Acceptance, E,D, NR by DM at 11/17/2024  1806                      Point: Precautions (Done)       Learning Progress Summary            Patient Acceptance, E, VU,NR by BA at 11/28/2024 1136    Acceptance, E, VU,NR by BA at 11/19/2024 1824    Acceptance, E, VU,NR by BA at 11/19/2024 1824    Acceptance, E,D, NR by DM at 11/17/2024 1806                                      User Key       Initials Effective Dates Name Provider Type Discipline    DM 02/03/23 -  Jacklyn Gtz PT Physical Therapist PT     09/21/21 -  Galilea Burkett PT Physical Therapist PT                  PT Recommendation and Plan  Planned Therapy Interventions (PT): balance training, bed mobility training, home exercise program, motor coordination training, neuromuscular re-education, patient/family education, postural re-education, ROM (range of motion), strengthening, stretching, transfer training, wheelchair management/propulsion training  Progress: no change  Outcome Evaluation: PT ReEval completed.  Pt presents s/p L BKA with generalized weakness, L residual limb and R hip pain, R ankle ROM deficits, BLE sensory impairments, balance deficits, decreased functional endurance, and decreased indep and safety with functional mobility compared to baseline.  Supine to sit and return to supine with minAx2.  Participated in seated dynamic balance activities with CGA/Areli.  Pt will benefit from skilled IP PT to improve indep and safety with mobility and promote return to PLOF.  Rec IPR upon d/c for best fxl outcome.     Time Calculation:         PT Charges       Row Name 11/28/24 1136             Time Calculation    Start Time 0937  -BA      PT Received On 11/28/24  -BA      PT Goal Re-Cert Due Date 12/08/24  -         Time Calculation- PT    Total Timed Code Minutes- PT 23 minute(s)  -BA         Timed Charges    69661 - PT Therapeutic Exercise Minutes 8  -BA      20317 - PT Therapeutic Activity Minutes 15  -BA         Untimed Charges    PT Eval/Re-eval Minutes 43  -BA         Total  Minutes    Timed Charges Total Minutes 23  -BA      Untimed Charges Total Minutes 43  -BA       Total Minutes 66  -BA                User Key  (r) = Recorded By, (t) = Taken By, (c) = Cosigned By      Initials Name Provider Type    Galilea Mckeon, PT Physical Therapist                  Therapy Charges for Today       Code Description Service Date Service Provider Modifiers Qty    76056504475  PT THER PROC EA 15 MIN 11/28/2024 Galilea Burkett, PT GP 1    [id number] HC PT THERAPEUTIC ACT EA 15 MIN 11/28/2024 Galilea Burkett, PT GP 1    [id number]  PT RE-EVAL ESTABLISHED PLAN 2 11/28/2024 Galilea Burkett, PT GP 1            PT G-Codes  Outcome Measure Options: AM-PAC 6 Clicks Basic Mobility (PT)  AM-PAC 6 Clicks Score (PT): 10  AM-PAC 6 Clicks Score (OT): 15  PT Discharge Summary  Anticipated Discharge Disposition (PT): inpatient rehabilitation facility    Galilea Burkett PT  11/28/2024

## 2024-11-28 NOTE — PROGRESS NOTES
[facility] Medicine Services  PROGRESS NOTE    Patient Name: Fracisco Mullen  : 1965  MRN: [id number]    Date of Admission: 11/15/2024  Primary Care Physician: Brandy Roberson    Subjective   Subjective     CC:  Foot infection    HPI:  Doing okay other than he is stuck in the hospital. Discussed keeping his blood sugar under control to help with wound healing      Objective   Objective     Vital Signs:   Temp:  [98 °F (36.7 °C)-98.6 °F (37 °C)] 98.3 °F (36.8 °C)  Heart Rate:  [84-92] 91  Resp:  [16-18] 18  BP: (109-128)/(64-76) 123/69  Flow (L/min) (Oxygen Therapy):  [2] 2     Physical Exam:  Constitutional: No acute distress, awake, alert  HENT: NCAT, mucous membranes moist  Respiratory: Respiratory effort normal   Cardiovascular: RRR, no murmurs, rubs, or gallops  Musculoskeletal: Left BKA, wrapped  Psychiatric: Appropriate affect, cooperative  Neurologic: Oriented x 3, speech clear  Skin: No rashes      Results Reviewed:  LAB RESULTS:      Lab 24  0612 24  0502 24  0757 24  2125 244 24  0452 24  0606   WBC 8.39 10.10 10.62  --  10.93* 12.38* 10.48   HEMOGLOBIN 8.1* 7.7* 8.2*  --  8.6* 8.9* 9.4*   HEMATOCRIT 26.5* 25.3* 27.1*  --  28.3* 28.7* 29.2*   PLATELETS 388 366 337  --  340 298 271   NEUTROS ABS  --   --   --   --  8.05* 8.73* 6.58   IMMATURE GRANS (ABS)  --   --   --   --  0.09* 0.13* 0.08*   LYMPHS ABS  --   --   --   --  1.42 1.62 2.37   MONOS ABS  --   --   --   --  1.13* 1.73* 1.10*   EOS ABS  --   --   --   --  0.16 0.08 0.26   MCV 85.2 84.6 85.5  --  86.0 83.9 80.9   LACTATE  --   --   --   --  1.8  --   --    D DIMER QUANT  --   --   --  1.29*  --   --   --          Lab 24  0612 24  0502 24  0757 24  2125 24  0540 24  0452   SODIUM 135* 134* 132* 133*  --  135*   POTASSIUM 4.4 4.2 4.0 4.3  --  4.1   CHLORIDE 102 100 97* 97*  --  100   CO2 26.0 27.0 28.0 28.0  --  26.0    ANION GAP 7.0 7.0 7.0 8.0  --  9.0   BUN 11 14 15 18  --  11   CREATININE 0.73* 0.82 0.81 0.93  --  0.66*   EGFR 103.5 99.9 100.3 93.4  --  106.7   GLUCOSE 227* 249* 238* 176*  --  262*   CALCIUM 7.3* 7.5* 7.4* 8.0*  --  7.9*   MAGNESIUM  --   --   --  2.1 2.3 1.4*   PHOSPHORUS  --   --   --   --   --  2.6         Lab 11/26/24  0757 11/25/24  2125   TOTAL PROTEIN 5.7* 5.5*   ALBUMIN 2.5* 2.4*   GLOBULIN 3.2 3.1   ALT (SGPT) 17 21   AST (SGOT) 22 29   BILIRUBIN 0.2 0.2   ALK PHOS 90 87                 Lab 11/22/24  1054   ABO TYPING O   RH TYPING Positive   ANTIBODY SCREEN Negative         Lab 11/25/24 2026   PH, ARTERIAL 7.440   PCO2, ARTERIAL 44.9   PO2 ART 91.4   FIO2 80   HCO3 ART 30.5*   BASE EXCESS ART 5.7*   CARBOXYHEMOGLOBIN 1.4     Brief Urine Lab Results  (Last result in the past 365 days)        Color   Clarity   Blood   Leuk Est   Nitrite   Protein   CREAT   Urine HCG        11/15/24 1142 Yellow   Clear   Small (1+)   Negative   Negative   100 mg/dL (2+)                   Microbiology Results Abnormal       None            No radiology results from the last 24 hrs        Current medications:  Scheduled Meds:aspirin, 81 mg, Oral, Daily  doxycycline, 100 mg, Intravenous, Q12H  enoxaparin, 40 mg, Subcutaneous, Daily  finasteride, 5 mg, Oral, Nightly  insulin glargine, 30 Units, Subcutaneous, Nightly  Insulin Lispro, 14 Units, Subcutaneous, TID With Meals  insulin lispro, 2-9 Units, Subcutaneous, 4x Daily AC & at Bedtime  ipratropium-albuterol, 3 mL, Nebulization, 4x Daily - RT  Linezolid, 600 mg, Intravenous, Q12H  meropenem, 1,000 mg, Intravenous, Q8H  metoprolol succinate XL, 50 mg, Oral, Daily  pantoprazole, 40 mg, Oral, BID AC  pregabalin, 225 mg, Oral, BID  rOPINIRole, 0.25 mg, Oral, Nightly  sodium chloride, 10 mL, Intravenous, Q12H  tamsulosin, 0.8 mg, Oral, Nightly      Continuous Infusions:ropivacaine, , Last Rate: Stopped (11/28/24 0700)      PRN Meds:.  acetaminophen **OR** acetaminophen **OR**  acetaminophen    senna-docusate sodium **AND** polyethylene glycol **AND** bisacodyl **AND** bisacodyl    calcium carbonate    Calcium Replacement - Follow Nurse / BPA Driven Protocol    dextrose    dextrose    glucagon (human recombinant)    influenza vaccine    Lidocaine HCl gel    Magnesium Standard Dose Replacement - Follow Nurse / BPA Driven Protocol    nitroglycerin    ondansetron    oxyCODONE-acetaminophen    Phosphorus Replacement - Follow Nurse / BPA Driven Protocol    Potassium Replacement - Follow Nurse / BPA Driven Protocol    sodium chloride    sodium chloride    Assessment & Plan   Assessment & Plan     Active Hospital Problems    Diagnosis  POA    **DKA (diabetic ketoacidosis) [E11.10]  Yes    Severe protein-calorie malnutrition [E43]  Yes    Non healing left heel wound [S91.302A]  Yes    Acute osteomyelitis of left foot [M86.172]  Unknown      Resolved Hospital Problems   No resolved problems to display.        Brief Hospital Course to date:  Fracisco Mullen is a 59 y.o. male with past medical history of hypertension, type 2 diabetes with insulin use, diabetic neuropathy, and chronic diabetic foot wounds who presents via EMS from home due to altered mental status and fatigue. Lab work consistent with diabetic ketoacidosis.  He was then found to have left calcaneal osteomyelitis.  Orthopedic surgery was consulted and performed left BKA on November 22.  He had an acute hypoxic event overnight on November 25 with significant urinary retention that improved after Bustos was placed.  There was concern for aspiration at that time so his antibiotics were escalated.    This patient's problems and plans were partially entered by my partner and updated as appropriate by me 11/28/24.       Left calcaneal osteomyelitis   - Patient with chronic left heel wound and right plantar foot wound  - Admitted August 2024 for left foot cellulitis and discharged on oral antibiotics and with home wound vac following  debridement, MRI left foot obtained at that time with no definite findings of osteomyelitis  - Repeat MRI left foot shows soft tissue wound with evidence of osteomyelitis of the calcaneus and possibly an associated non-drainable abscess  -S/p left below-knee amputation by Dr. Gan on 11/22/2024 without any immediate complications  -ID following, escalated antibiotics from dapto and invanz to Zyvox, merrem and doxy to cover for pneumonia that possibly developed overnight 11/25   -Improving   -Optimize pain control  -PT/OT     Acute hypoxia, bettter  -Likely due to pulmonary edema, as this improved with diuresis      BPH  Urinary retention  - Continue home Flomax, Proscar  -Bustos anchored again; failed voiding trial. Outpatient urology f/u     DKA in setting of uncontrolled IDDM complicated by diabetic neuropathy and chronic foot wounds , improving  - Uncertain of medication compliance  - A1c 14.1%  - S/p DKA protocol including insulin drip, IV fluids, and electrolyte replacement per protocol  - Now on subcutaneous insulin. Improving. Titrate insulin as indicated.     Malnutrition  - Patient with poor oral intake, refusing supplementation  - Nutrition following     Acute toxic metabolic encephalopathy, improved  - CT head without acute abnormality  - UDS negative  - secondary to DKA     HTN  - Continue home metoprolol    RLS  - Continue home Requip       Expected Discharge Location and Transportation: likely snf  Expected Discharge   Expected Discharge Date: 12/2/2024; Expected Discharge Time:      VTE Prophylaxis:  Pharmacologic VTE prophylaxis orders are present.         AM-PAC 6 Clicks Score (PT): 10 (11/28/24 1136)    CODE STATUS:   Code Status and Medical Interventions: CPR (Attempt to Resuscitate); Full Support; Full code per last hosptial admission. Patient disoriented on exam with no family present at bedside. Only contact information is friend.   Ordered at: 11/15/24 2742     Level Of Support Discussed  With:    Patient     Code Status (Patient has no pulse and is not breathing):    CPR (Attempt to Resuscitate)     Medical Interventions (Patient has pulse or is breathing):    Full Support     Comments:    Full code per last hosptial admission. Patient disoriented on exam with no family present at bedside. Only contact information is friend.       Ana Laura Stephenson, DO  11/28/24

## 2024-11-28 NOTE — PLAN OF CARE
Goal Outcome Evaluation:  Plan of Care Reviewed With: patient        Progress: no change  Outcome Evaluation: PT ReEval completed.  Pt presents s/p L BKA with generalized weakness, L residual limb and R hip pain, R ankle ROM deficits, BLE sensory impairments, balance deficits, decreased functional endurance, and decreased indep and safety with functional mobility compared to baseline.  Supine to sit and return to supine with minAx2.  Participated in seated dynamic balance activities with CGA/Areli.  Pt will benefit from skilled IP PT to improve indep and safety with mobility and promote return to PLOF.  Rec IPR upon d/c for best fxl outcome.    Anticipated Discharge Disposition (PT): inpatient rehabilitation facility

## 2024-11-28 NOTE — PROGRESS NOTES
[place]    Acute pain service Inpatient Progress Note    Patient Name: Fracisco Mullen  :  1965  MRN:  [id number]        Acute Pain  Service Inpatient Progress Note:    Analgesia:Good  Pain Score:2/10  LOC: alert and awake  Resp Status: spontaneous ventilation and supplemental oxygen  Cardiac: VS stable  Side Effects:None  Catheter Site:clean and dry  Catheter Plan:Catheter removed and tip intact

## 2024-11-28 NOTE — PLAN OF CARE
Goal Outcome Evaluation:              Outcome Evaluation: Pt on RA. VSS. NSR. Juli in place. Patient currently in bed resting with call light in reach.

## 2024-11-28 NOTE — PLAN OF CARE
Problem: Adult Inpatient Plan of Care  Goal: Plan of Care Review  Recent Flowsheet Documentation  Taken 11/28/2024 0935 by Waldemar Malik OT  Outcome Evaluation:   VSS   OT re-eval completed. Pt presents with fxl decline from PLOF, deficits in ADL performance, fxl mobility, occupational endurance. Pt limited by pain at surgical site, LE weaknes. Pt required Min A rolling R/L, CGA scooting, Min A x 2 sup<> sit, set up grooming, dependent post toilet hygiene, Min A UBD. Pt will benefit from skilled OT services to address deficits, facilitate increased fxl I. Recommend IRF at discharge.   Goal Outcome Evaluation:  Plan of Care Reviewed With: patient           Outcome Evaluation: VSS; OT re-eval completed. Pt presents with fxl decline from PLOF, deficits in ADL performance, fxl mobility, occupational endurance. Pt limited by pain at surgical site, LE weaknes. Pt required Min A rolling R/L, CGA scooting, Min A x 2 sup<> sit, set up grooming, dependent post toilet hygiene, Min A UBD. Pt will benefit from skilled OT services to address deficits, facilitate increased fxl I. Recommend IRF at discharge.    Anticipated Discharge Disposition (OT): inpatient rehabilitation facility

## 2024-11-29 LAB
ALBUMIN SERPL-MCNC: 2.4 G/DL (ref 3.5–5.2)
ANION GAP SERPL CALCULATED.3IONS-SCNC: 8 MMOL/L (ref 5–15)
BASOPHILS # BLD AUTO: 0.07 10*3/MM3 (ref 0–0.2)
BASOPHILS NFR BLD AUTO: 1 % (ref 0–1.5)
BUN SERPL-MCNC: 10 MG/DL (ref 8–23)
BUN/CREAT SERPL: 12 (ref 7–25)
CALCIUM SPEC-SCNC: 7.8 MG/DL (ref 8.6–10.5)
CHLORIDE SERPL-SCNC: 103 MMOL/L (ref 98–107)
CO2 SERPL-SCNC: 25 MMOL/L (ref 22–29)
CREAT SERPL-MCNC: 0.83 MG/DL (ref 0.76–1.27)
DEPRECATED RDW RBC AUTO: 47.9 FL (ref 37–54)
EGFRCR SERPLBLD CKD-EPI 2021: 99.6 ML/MIN/1.73
EOSINOPHIL # BLD AUTO: 0.53 10*3/MM3 (ref 0–0.4)
EOSINOPHIL NFR BLD AUTO: 7.4 % (ref 0.3–6.2)
ERYTHROCYTE [DISTWIDTH] IN BLOOD BY AUTOMATED COUNT: 15.7 % (ref 12.3–15.4)
GLUCOSE BLDC GLUCOMTR-MCNC: 154 MG/DL (ref 70–130)
GLUCOSE BLDC GLUCOMTR-MCNC: 192 MG/DL (ref 70–130)
GLUCOSE BLDC GLUCOMTR-MCNC: 235 MG/DL (ref 70–130)
GLUCOSE BLDC GLUCOMTR-MCNC: 256 MG/DL (ref 70–130)
GLUCOSE SERPL-MCNC: 230 MG/DL (ref 65–99)
HCT VFR BLD AUTO: 26.1 % (ref 37.5–51)
HGB BLD-MCNC: 8 G/DL (ref 13–17.7)
IMM GRANULOCYTES # BLD AUTO: 0.07 10*3/MM3 (ref 0–0.05)
IMM GRANULOCYTES NFR BLD AUTO: 1 % (ref 0–0.5)
LYMPHOCYTES # BLD AUTO: 2.14 10*3/MM3 (ref 0.7–3.1)
LYMPHOCYTES NFR BLD AUTO: 30 % (ref 19.6–45.3)
MCH RBC QN AUTO: 25.8 PG (ref 26.6–33)
MCHC RBC AUTO-ENTMCNC: 30.7 G/DL (ref 31.5–35.7)
MCV RBC AUTO: 84.2 FL (ref 79–97)
MONOCYTES # BLD AUTO: 0.59 10*3/MM3 (ref 0.1–0.9)
MONOCYTES NFR BLD AUTO: 8.3 % (ref 5–12)
NEUTROPHILS NFR BLD AUTO: 3.73 10*3/MM3 (ref 1.7–7)
NEUTROPHILS NFR BLD AUTO: 52.3 % (ref 42.7–76)
NRBC BLD AUTO-RTO: 0 /100 WBC (ref 0–0.2)
PHOSPHATE SERPL-MCNC: 2.1 MG/DL (ref 2.5–4.5)
PHOSPHATE SERPL-MCNC: 2.6 MG/DL (ref 2.5–4.5)
PLATELET # BLD AUTO: 437 10*3/MM3 (ref 140–450)
PMV BLD AUTO: 10.4 FL (ref 6–12)
POTASSIUM SERPL-SCNC: 4.8 MMOL/L (ref 3.5–5.2)
RBC # BLD AUTO: 3.1 10*6/MM3 (ref 4.14–5.8)
SODIUM SERPL-SCNC: 136 MMOL/L (ref 136–145)
WBC NRBC COR # BLD AUTO: 7.13 10*3/MM3 (ref 3.4–10.8)

## 2024-11-29 PROCEDURE — 25010000002 ENOXAPARIN PER 10 MG: Performed by: ORTHOPAEDIC SURGERY

## 2024-11-29 PROCEDURE — 25010000002 LINEZOLID 600 MG/300ML SOLUTION: Performed by: INTERNAL MEDICINE

## 2024-11-29 PROCEDURE — 63710000001 INSULIN GLARGINE PER 5 UNITS: Performed by: STUDENT IN AN ORGANIZED HEALTH CARE EDUCATION/TRAINING PROGRAM

## 2024-11-29 PROCEDURE — 94799 UNLISTED PULMONARY SVC/PX: CPT

## 2024-11-29 PROCEDURE — 63710000001 INSULIN LISPRO (HUMAN) PER 5 UNITS: Performed by: ORTHOPAEDIC SURGERY

## 2024-11-29 PROCEDURE — 25810000003 SODIUM CHLORIDE 0.9 % SOLUTION 250 ML FLEX CONT: Performed by: INTERNAL MEDICINE

## 2024-11-29 PROCEDURE — 99232 SBSQ HOSP IP/OBS MODERATE 35: CPT | Performed by: NURSE PRACTITIONER

## 2024-11-29 PROCEDURE — 94761 N-INVAS EAR/PLS OXIMETRY MLT: CPT

## 2024-11-29 PROCEDURE — 82948 REAGENT STRIP/BLOOD GLUCOSE: CPT

## 2024-11-29 PROCEDURE — 84100 ASSAY OF PHOSPHORUS: CPT | Performed by: INTERNAL MEDICINE

## 2024-11-29 PROCEDURE — 25010000002 MEROPENEM PER 100 MG: Performed by: INTERNAL MEDICINE

## 2024-11-29 PROCEDURE — 63710000001 INSULIN LISPRO (HUMAN) PER 5 UNITS: Performed by: INTERNAL MEDICINE

## 2024-11-29 PROCEDURE — 80069 RENAL FUNCTION PANEL: CPT | Performed by: INTERNAL MEDICINE

## 2024-11-29 PROCEDURE — 85025 COMPLETE CBC W/AUTO DIFF WBC: CPT | Performed by: INTERNAL MEDICINE

## 2024-11-29 PROCEDURE — 94664 DEMO&/EVAL PT USE INHALER: CPT

## 2024-11-29 RX ADMIN — INSULIN LISPRO 2 UNITS: 100 INJECTION, SOLUTION INTRAVENOUS; SUBCUTANEOUS at 18:00

## 2024-11-29 RX ADMIN — METOPROLOL SUCCINATE 50 MG: 50 TABLET, EXTENDED RELEASE ORAL at 08:27

## 2024-11-29 RX ADMIN — INSULIN LISPRO 2 UNITS: 100 INJECTION, SOLUTION INTRAVENOUS; SUBCUTANEOUS at 21:06

## 2024-11-29 RX ADMIN — IPRATROPIUM BROMIDE AND ALBUTEROL SULFATE 3 ML: 2.5; .5 SOLUTION RESPIRATORY (INHALATION) at 16:53

## 2024-11-29 RX ADMIN — INSULIN LISPRO 14 UNITS: 100 INJECTION, SOLUTION INTRAVENOUS; SUBCUTANEOUS at 18:00

## 2024-11-29 RX ADMIN — INSULIN GLARGINE 30 UNITS: 100 INJECTION, SOLUTION SUBCUTANEOUS at 21:07

## 2024-11-29 RX ADMIN — MEROPENEM 1000 MG: 1 INJECTION, POWDER, FOR SOLUTION INTRAVENOUS at 12:49

## 2024-11-29 RX ADMIN — ENOXAPARIN SODIUM 40 MG: 100 INJECTION SUBCUTANEOUS at 08:27

## 2024-11-29 RX ADMIN — PREGABALIN 225 MG: 75 CAPSULE ORAL at 21:05

## 2024-11-29 RX ADMIN — ROPINIROLE 0.25 MG: 0.5 TABLET, FILM COATED ORAL at 21:06

## 2024-11-29 RX ADMIN — FINASTERIDE 5 MG: 5 TABLET, FILM COATED ORAL at 21:06

## 2024-11-29 RX ADMIN — SODIUM CHLORIDE 15 MMOL: 9 INJECTION, SOLUTION INTRAVENOUS at 09:47

## 2024-11-29 RX ADMIN — PANTOPRAZOLE SODIUM 40 MG: 40 TABLET, DELAYED RELEASE ORAL at 18:00

## 2024-11-29 RX ADMIN — CALCIUM CARBONATE (ANTACID) CHEW TAB 500 MG 2 TABLET: 500 CHEW TAB at 21:17

## 2024-11-29 RX ADMIN — MEROPENEM 1000 MG: 1 INJECTION, POWDER, FOR SOLUTION INTRAVENOUS at 02:41

## 2024-11-29 RX ADMIN — DOXYCYCLINE 100 MG: 100 INJECTION, POWDER, LYOPHILIZED, FOR SOLUTION INTRAVENOUS at 14:23

## 2024-11-29 RX ADMIN — INSULIN LISPRO 14 UNITS: 100 INJECTION, SOLUTION INTRAVENOUS; SUBCUTANEOUS at 12:48

## 2024-11-29 RX ADMIN — TAMSULOSIN HYDROCHLORIDE 0.8 MG: 0.4 CAPSULE ORAL at 21:06

## 2024-11-29 RX ADMIN — PREGABALIN 225 MG: 75 CAPSULE ORAL at 08:27

## 2024-11-29 RX ADMIN — INSULIN LISPRO 14 UNITS: 100 INJECTION, SOLUTION INTRAVENOUS; SUBCUTANEOUS at 08:28

## 2024-11-29 RX ADMIN — IPRATROPIUM BROMIDE AND ALBUTEROL SULFATE 3 ML: 2.5; .5 SOLUTION RESPIRATORY (INHALATION) at 12:33

## 2024-11-29 RX ADMIN — Medication 10 ML: at 08:28

## 2024-11-29 RX ADMIN — INSULIN LISPRO 4 UNITS: 100 INJECTION, SOLUTION INTRAVENOUS; SUBCUTANEOUS at 08:27

## 2024-11-29 RX ADMIN — PANTOPRAZOLE SODIUM 40 MG: 40 TABLET, DELAYED RELEASE ORAL at 08:27

## 2024-11-29 RX ADMIN — IPRATROPIUM BROMIDE AND ALBUTEROL SULFATE 3 ML: 2.5; .5 SOLUTION RESPIRATORY (INHALATION) at 08:01

## 2024-11-29 RX ADMIN — LINEZOLID 600 MG: 600 INJECTION, SOLUTION INTRAVENOUS at 08:28

## 2024-11-29 RX ADMIN — IPRATROPIUM BROMIDE AND ALBUTEROL SULFATE 3 ML: 2.5; .5 SOLUTION RESPIRATORY (INHALATION) at 21:26

## 2024-11-29 RX ADMIN — DOXYCYCLINE 100 MG: 100 INJECTION, POWDER, LYOPHILIZED, FOR SOLUTION INTRAVENOUS at 01:38

## 2024-11-29 RX ADMIN — ASPIRIN 81 MG: 81 TABLET, COATED ORAL at 08:27

## 2024-11-29 RX ADMIN — INSULIN LISPRO 6 UNITS: 100 INJECTION, SOLUTION INTRAVENOUS; SUBCUTANEOUS at 12:48

## 2024-11-29 RX ADMIN — CALCIUM CARBONATE (ANTACID) CHEW TAB 500 MG 2 TABLET: 500 CHEW TAB at 13:13

## 2024-11-29 RX ADMIN — MEROPENEM 1000 MG: 1 INJECTION, POWDER, FOR SOLUTION INTRAVENOUS at 18:32

## 2024-11-29 NOTE — PROGRESS NOTES
[facility] Medicine Services  PROGRESS NOTE    Patient Name: Fracisco Mullen  : 1965  MRN: [id number]    Date of Admission: 11/15/2024  Primary Care Physician: Brandy Roberson    Subjective   Subjective     CC:  Follow up foot infection     HPI:  Patient seen resting in bed in no apparent distress. No acute events overnight per nursing. Pain is currently well controlled. Awaiting rehab placement.     Objective   Objective     Vital Signs:   Temp:  [97.2 °F (36.2 °C)-98.4 °F (36.9 °C)] 98 °F (36.7 °C)  Heart Rate:  [82-99] 86  Resp:  [16-20] 20  BP: (122-144)/(67-90) 144/90     Physical Exam:  Constitutional: No acute distress, awake, alert, pleasant   HENT: NCAT, mucous membranes moist  Respiratory: Clear to auscultation bilaterally, respiratory effort normal   Cardiovascular: RRR, no murmurs, palpable pedal pulses bilaterally, cap refill brisk   Gastrointestinal: Positive bowel sounds, soft, nontender, nondistended  Musculoskeletal: No BLE edema, left BKA stump dressing in place   Psychiatric: Appropriate affect, cooperative  Neurologic: Oriented x 3, moves all extremities, speech clear  Skin: warm, dry, no visible rash       Results Reviewed:  LAB RESULTS:      Lab 24  0641 24  0612 24  0502 24  0757 24  2125 24  2124 24  0452   WBC 7.13 8.39 10.10 10.62  --  10.93* 12.38*   HEMOGLOBIN 8.0* 8.1* 7.7* 8.2*  --  8.6* 8.9*   HEMATOCRIT 26.1* 26.5* 25.3* 27.1*  --  28.3* 28.7*   PLATELETS 437 388 366 337  --  340 298   NEUTROS ABS 3.73  --   --   --   --  8.05* 8.73*   IMMATURE GRANS (ABS) 0.07*  --   --   --   --  0.09* 0.13*   LYMPHS ABS 2.14  --   --   --   --  1.42 1.62   MONOS ABS 0.59  --   --   --   --  1.13* 1.73*   EOS ABS 0.53*  --   --   --   --  0.16 0.08   MCV 84.2 85.2 84.6 85.5  --  86.0 83.9   LACTATE  --   --   --   --   --  1.8  --    D DIMER QUANT  --   --   --   --  1.29*  --   --          Lab 24  0641  11/28/24  0612 11/27/24  0502 11/26/24  0757 11/25/24  2125 11/25/24  0540 11/24/24  0452   SODIUM 136 135* 134* 132* 133*  --  135*   POTASSIUM 4.8 4.4 4.2 4.0 4.3  --  4.1   CHLORIDE 103 102 100 97* 97*  --  100   CO2 25.0 26.0 27.0 28.0 28.0  --  26.0   ANION GAP 8.0 7.0 7.0 7.0 8.0  --  9.0   BUN 10 11 14 15 18  --  11   CREATININE 0.83 0.73* 0.82 0.81 0.93  --  0.66*   EGFR 99.6 103.5 99.9 100.3 93.4  --  106.7   GLUCOSE 230* 227* 249* 238* 176*  --  262*   CALCIUM 7.8* 7.3* 7.5* 7.4* 8.0*  --  7.9*   MAGNESIUM  --   --   --   --  2.1 2.3 1.4*   PHOSPHORUS 2.1*  --   --   --   --   --  2.6         Lab 11/29/24  0641 11/26/24  0757 11/25/24 2125   TOTAL PROTEIN  --  5.7* 5.5*   ALBUMIN 2.4* 2.5* 2.4*   GLOBULIN  --  3.2 3.1   ALT (SGPT)  --  17 21   AST (SGOT)  --  22 29   BILIRUBIN  --  0.2 0.2   ALK PHOS  --  90 87                 Lab 11/22/24  1054   ABO TYPING O   RH TYPING Positive   ANTIBODY SCREEN Negative         Lab 11/25/24 2026   PH, ARTERIAL 7.440   PCO2, ARTERIAL 44.9   PO2 ART 91.4   FIO2 80   HCO3 ART 30.5*   BASE EXCESS ART 5.7*   CARBOXYHEMOGLOBIN 1.4     Brief Urine Lab Results  (Last result in the past 365 days)        Color   Clarity   Blood   Leuk Est   Nitrite   Protein   CREAT   Urine HCG        11/15/24 1142 Yellow   Clear   Small (1+)   Negative   Negative   100 mg/dL (2+)                   Microbiology Results Abnormal       None            No radiology results from the last 24 hrs        Current medications:  Scheduled Meds:aspirin, 81 mg, Oral, Daily  doxycycline, 100 mg, Intravenous, Q12H  enoxaparin, 40 mg, Subcutaneous, Daily  finasteride, 5 mg, Oral, Nightly  insulin glargine, 30 Units, Subcutaneous, Nightly  Insulin Lispro, 14 Units, Subcutaneous, TID With Meals  insulin lispro, 2-9 Units, Subcutaneous, 4x Daily AC & at Bedtime  ipratropium-albuterol, 3 mL, Nebulization, 4x Daily - RT  meropenem, 1,000 mg, Intravenous, Q8H  metoprolol succinate XL, 50 mg, Oral,  Daily  pantoprazole, 40 mg, Oral, BID AC  pregabalin, 225 mg, Oral, BID  rOPINIRole, 0.25 mg, Oral, Nightly  sodium chloride, 10 mL, Intravenous, Q12H  sodium phosphate, 15 mmol, Intravenous, Once  tamsulosin, 0.8 mg, Oral, Nightly      Continuous Infusions:ropivacaine, , Last Rate: Stopped (11/28/24 0700)      PRN Meds:.  acetaminophen **OR** acetaminophen **OR** acetaminophen    senna-docusate sodium **AND** polyethylene glycol **AND** bisacodyl **AND** bisacodyl    calcium carbonate    Calcium Replacement - Follow Nurse / BPA Driven Protocol    dextrose    dextrose    glucagon (human recombinant)    influenza vaccine    Lidocaine HCl gel    Magnesium Standard Dose Replacement - Follow Nurse / BPA Driven Protocol    nitroglycerin    ondansetron    oxyCODONE-acetaminophen    Phosphorus Replacement - Follow Nurse / BPA Driven Protocol    Potassium Replacement - Follow Nurse / BPA Driven Protocol    sodium chloride    sodium chloride    Assessment & Plan   Assessment & Plan     Active Hospital Problems    Diagnosis  POA    **DKA (diabetic ketoacidosis) [E11.10]  Yes    Severe protein-calorie malnutrition [E43]  Yes    Non healing left heel wound [S91.302A]  Yes    Acute osteomyelitis of left foot [M86.172]  Unknown      Resolved Hospital Problems   No resolved problems to display.        Brief Hospital Course to date:  Fracisco Mullen is a 59 y.o. male with past medical history of hypertension, type 2 diabetes with insulin use, diabetic neuropathy, and chronic diabetic foot wounds who presents via EMS from home due to altered mental status and fatigue. Lab work consistent with diabetic ketoacidosis.  He was then found to have left calcaneal osteomyelitis.  Orthopedic surgery was consulted and performed left BKA on November 22.  He had an acute hypoxic event overnight on November 25 with significant urinary retention that improved after Bustos was placed.  There was concern for aspiration at that time so his antibiotics  were escalated.     This patient's problems and plans were partially entered by my partner and updated as appropriate by me 11/29/24.      Left calcaneal osteomyelitis   - Patient with chronic left heel wound and right plantar foot wound  - Admitted August 2024 for left foot cellulitis and discharged on oral antibiotics and with home wound vac following debridement, MRI left foot obtained at that time with no definite findings of osteomyelitis  - Repeat MRI left foot revealed soft tissue wound with evidence of osteomyelitis of the calcaneus and possibly an associated non-drainable abscess  -S/p left below-knee amputation by Dr. Gan on 11/22/2024 without any immediate complications  -ID following, continue Zyvox, Merrem, and Doxy to cover for pneumonia that possibly developed overnight 11/25   -Optimize pain control  -PT/OT  -AM labs      Acute hypoxia, resolved   -Likely due to pulmonary edema  -improved following diuresis      BPH  Urinary retention  - Continue home Flomax, Proscar  -Bustos anchored again; failed voiding trial. Outpatient urology f/u     DKA in setting of uncontrolled IDDM complicated by diabetic neuropathy and chronic foot wounds , improving  - Uncertain of medication compliance  - A1c 14.1%  - S/p DKA protocol including insulin drip, IV fluids, and electrolyte replacement per protocol  - Now on subcutaneous insulin, Titrate insulin as indicated.     Malnutrition  - Patient with poor oral intake, refusing supplementation  - Nutrition following     Acute toxic metabolic encephalopathy, improved  - CT head without acute abnormality  - UDS negative  - secondary to DKA     HTN  - Continue home metoprolol     RLS  - Continue home Requip     Expected Discharge Location and Transportation: likely [facility]  Expected Discharge   Expected Discharge Date: 12/2/2024; Expected Discharge Time:       VTE Prophylaxis:  Pharmacologic VTE prophylaxis orders are present.    AM-PAC 6 Clicks Score (PT): 10 (11/28/24  1933)    CODE STATUS:   Code Status and Medical Interventions: CPR (Attempt to Resuscitate); Full Support; Full code per last hosptial admission. Patient disoriented on exam with no family present at bedside. Only contact information is friend.   Ordered at: 11/15/24 1218     Level Of Support Discussed With:    Patient     Code Status (Patient has no pulse and is not breathing):    CPR (Attempt to Resuscitate)     Medical Interventions (Patient has pulse or is breathing):    Full Support     Comments:    Full code per last hosptial admission. Patient disoriented on exam with no family present at bedside. Only contact information is friend.       Chris Rosado, APRN  11/29/24

## 2024-11-29 NOTE — PROGRESS NOTES
"Fracisco Mullen  1965  [id number]    Evaluating Physician: Varun Dominique MD    Chief Complaint: fatigue    Reason for Consultation: foot infection    History of present illness:     Patient is a 59 y.o.  Yr old male with history of diabetes/hypertension, previously followed with Dr. Gatica; admitted 2023 with right foot infection, cultures with MSSA/Morganella/ESBL Klebsiella/enterococcus and strep species.had surgery at the second toe with amputation, received IV daptomycin/Invanz with general improvements at that site.  He has been left with a chronic right plantar foot wound and a chronic wound at the left heel that has turned black; He apparently was admitted to the hospital August 1 after a fall at [facility].  Noted to have urinary retention with concern for possible UTI.  In addition left heel with black discoloration/malodor and redness, empiric antibiotics initiated.  He is chronically debilitated and wheelchair bound by his report.Bustos catheter-based at admission     8/3/24  Dr Gan  \"PROCEDURE:            Left  Left      09915:             Debridement of skin and subcutaneous tissue  26684:             Wound vacuum-assisted closure\"     8/6/24 MRI and surgical findings did not confirm bone involvement, transitioned to oral agents at discharge       Readmitted November 15, 2024 with reports of appearing \"sluggish\" according to admission notes with DKA, noted to have high hemoglobin A1c and persistent/worsening left heel wound according to patient; medicine team notes mention urinary retention, acute toxic metabolic encephalopathy improved and low-grade fever. Abnormal MRI at the left foot:    Per radiology-- \"Soft tissue wound seen along the posterior heel with ill-defined fluid that extends through and disrupts the distal Achilles tendon. Underlying this area there is evidence of osteomyelitis of the posterior lateral calcaneus. There is a tiny fluid   collection here as well that may represent " "an associated nondrainable abscess.     Additionally there appears to be a nondisplaced stress fracture of the posterior calcaneus. \"    11/22 left BKA    11/26/24 increased O2 requirements with less responsive per medicine team notes overnight; subsequently better    11/29/24 sleepy at my visit, on room air;   Left LE postop pain  dull , worse with palpation, better with pain meds and not severe,  hw won't attach a numerical severity; history Per nursing staff as well     No headache photophobia or neck stiffness.  No   cough or hemoptysis.  No nausea vomiting diarrhea or abdominal pain.  No other new skin rash.  no dysuria hematuria or pyuria.       Past Medical History:   Diagnosis Date    Acute renal failure     Hx of    Obesity     Hx of    Sleep apnea     Type 2 diabetes mellitus        Past Surgical History:   Procedure Laterality Date    BACK SURGERY      lower back    BELOW KNEE AMPUTATION Left 11/22/2024    Procedure: BELOW-KNEE AMPUTATION LEFT;  Surgeon: Dru Gan Jr., MD;  Location:  [facility];  Service: Orthopedics;  Laterality: Left;    CHOLECYSTECTOMY WITH INTRAOPERATIVE CHOLANGIOGRAM N/A 1/6/2021    Procedure: CHOLECYSTECTOMY LAPAROSCOPIC INTRAOPERATIVE CHOLANGIOGRAM;  Surgeon: Juventino Hooker MD;  Location:  [facility];  Service: General;  Laterality: N/A;    ERCP N/A 1/9/2021    Procedure: ENDOSCOPIC RETROGRADE CHOLANGIOPANCREATOGRAPHY;  Surgeon: Jeremy Dowell MD;  Location: [facility];  Service: Gastroenterology;  Laterality: N/A;  with sphiincterotomy and balloon sweep with 9mm-12mm balloon    INCISION AND DRAINAGE FOOT Left 8/3/2024    Procedure: HEAL IRRIGATION AND DEBRIDEMENT LEFT;  Surgeon: Dru Gan Jr., MD;  Location:  [facility];  Service: Orthopedics;  Laterality: Left;    PLACEMENT OF WOUND VAC Left 8/3/2024    Procedure: PLACEMENT OF WOUND VAC;  Surgeon: Dru Gan Jr., MD;  Location:  [facility];  Service: Orthopedics;  Laterality: Left;       Pediatric " History   Patient Parents    Not on file     Other Topics Concern    Not on file   Social History Narrative    Not on file       family history includes Cancer in his brother, maternal grandmother, mother, and another family member; Diabetes in an other family member; Heart attack in an other family member; Heart disease in his brother and father; Hyperlipidemia in his mother and another family member; Hypertension in his mother and another family member; Obesity in an other family member.    Allergies   Allergen Reactions    Sertraline Hcl Hives     Zoloft       Medication:  Current Facility-Administered Medications   Medication Dose Route Frequency Provider Last Rate Last Admin    acetaminophen (TYLENOL) tablet 650 mg  650 mg Oral Q4H PRN Dru Gan Jr., MD   650 mg at 11/26/24 2247    Or    acetaminophen (TYLENOL) 160 MG/5ML oral solution 650 mg  650 mg Oral Q4H PRN Dru Gan Jr., MD        Or    acetaminophen (TYLENOL) suppository 650 mg  650 mg Rectal Q4H PRN Dru Gan Jr., MD   650 mg at 11/15/24 2327    aspirin EC tablet 81 mg  81 mg Oral Daily Dru Gan Jr., MD   81 mg at 11/28/24 0850    sennosides-docusate (PERICOLACE) 8.6-50 MG per tablet 2 tablet  2 tablet Oral BID PRN Dru Gan Jr., MD        And    polyethylene glycol (MIRALAX) packet 17 g  17 g Oral Daily PRN Dru Gan Jr., MD        And    bisacodyl (DULCOLAX) EC tablet 5 mg  5 mg Oral Daily PRN Dru Gan Jr., MD        And    bisacodyl (DULCOLAX) suppository 10 mg  10 mg Rectal Daily PRN Dru Gan Jr., MD        calcium carbonate (TUMS) chewable tablet 500 mg (200 mg elemental)  2 tablet Oral TID PRN Dru Gan Jr., MD   2 tablet at 11/28/24 2239    Calcium Replacement - Follow Nurse / BPA Driven Protocol   Not Applicable PRN Dru Gan Jr., MD        dextrose (D50W) (25 g/50 mL) IV injection 25 g  25 g Intravenous Q15 Min PRN Dru Gan Jr., MD   25 g at 11/25/24  2124    dextrose (GLUTOSE) oral gel 15 g  15 g Oral Q15 Min PRN Dru Gan Jr., MD   15 g at 11/25/24 2039    doxycycline (VIBRAMYCIN) 100 mg in sodium chloride 0.9 % 100 mL MBP  100 mg Intravenous Q12H Enriqueta Muir APRN   100 mg at 11/29/24 0138    Enoxaparin Sodium (LOVENOX) syringe 40 mg  40 mg Subcutaneous Daily Dru Gan Jr., MD   40 mg at 11/28/24 0851    finasteride (PROSCAR) tablet 5 mg  5 mg Oral Nightly Dru Gan Jr., MD   5 mg at 11/28/24 2055    glucagon (GLUCAGEN) injection 1 mg  1 mg Intramuscular Q15 Min PRN Dru Gan Jr., MD        influenza virus vacc split PF FLUZONE 0.5 mL  0.5 mL Intramuscular During Hospitalization Dru Gan Jr., MD        insulin glargine (LANTUS, SEMGLEE) injection 30 Units  30 Units Subcutaneous Nightly Koko Lord, DO   30 Units at 11/28/24 2142    Insulin Lispro (humaLOG) injection 14 Units  14 Units Subcutaneous TID With Meals Ana Laura Stephenson, DO   14 Units at 11/28/24 1736    Insulin Lispro (humaLOG) injection 2-9 Units  2-9 Units Subcutaneous 4x Daily AC & at Bedtime Dru Gan Jr., MD   2 Units at 11/28/24 1736    ipratropium-albuterol (DUO-NEB) nebulizer solution 3 mL  3 mL Nebulization 4x Daily - RT Enriqueta Muir APRN   3 mL at 11/28/24 2113    Lidocaine HCl gel (XYLOCAINE) urethral/mucosal syringe   Urethral PRN Dru Gan Jr., MD   1 Application at 11/26/24 0333    Linezolid (ZYVOX) 600 mg 300 mL  600 mg Intravenous Q12H Varun Dominique  mL/hr at 11/28/24 2054 600 mg at 11/28/24 2054    Magnesium Standard Dose Replacement - Follow Nurse / BPA Driven Protocol   Not Applicable PRN Dru Gan Jr., MD        meropenem (MERREM) 1,000 mg in sodium chloride 0.9 % 100 mL MBP  1,000 mg Intravenous Q8H Varun Dominique MD   1,000 mg at 11/29/24 0241    metoprolol succinate XL (TOPROL-XL) 24 hr tablet 50 mg  50 mg Oral Daily Dru Gan Jr., MD   50 mg at 11/28/24 6052     nitroglycerin (NITROSTAT) SL tablet 0.4 mg  0.4 mg Sublingual Q5 Min PRN Dru Gan Jr., MD        ondansetron (ZOFRAN) injection 4 mg  4 mg Intravenous Q6H PRN Dru Gan Jr., MD   4 mg at 11/24/24 2030    oxyCODONE-acetaminophen (PERCOCET) 5-325 MG per tablet 1 tablet  1 tablet Oral Q6H PRN Koko Lord, DO   1 tablet at 11/27/24 2113    pantoprazole (PROTONIX) EC tablet 40 mg  40 mg Oral BID AC Enriqueta Muir, APRN   40 mg at 11/28/24 1736    Phosphorus Replacement - Follow Nurse / BPA Driven Protocol   Not Applicable PRN Dru Gan Jr., MD        Potassium Replacement - Follow Nurse / BPA Driven Protocol   Not Applicable PRN Dru Gan Jr., MD        pregabalin (LYRICA) capsule 225 mg  225 mg Oral BID Enriqueta Muir, APRN   225 mg at 11/28/24 2055    rOPINIRole (REQUIP) tablet 0.25 mg  0.25 mg Oral Nightly Dru Gan Jr., MD   0.25 mg at 11/28/24 2054    ropivacaine (NAROPIN) 0.2 % infusion (INFUSYSTEM)   Peripheral Nerve Continuous Dru Gan Jr., MD   Stopped at 11/28/24 0700    sodium chloride 0.9 % flush 10 mL  10 mL Intravenous Q12H Dru Gan Jr., MD   10 mL at 11/28/24 2005    sodium chloride 0.9 % flush 10 mL  10 mL Intravenous PRN Dru Gan Jr., MD        sodium chloride 0.9 % infusion 40 mL  40 mL Intravenous PRN Dru Gan Jr., MD   40 mL at 11/28/24 1853    tamsulosin (FLOMAX) 24 hr capsule 0.8 mg  0.8 mg Oral Nightly Dru Gan Jr., MD   0.8 mg at 11/28/24 2055       Antibiotics:  Anti-Infectives (From admission, onward)      Ordered     Dose/Rate Route Frequency Start Stop    11/26/24 0749  meropenem (MERREM) 1,000 mg in sodium chloride 0.9 % 100 mL MBP        Ordering Provider: Varun Dominique MD    1,000 mg  over 3 Hours Intravenous Every 8 Hours 11/26/24 1400 12/03/24 1129    11/26/24 0750  Linezolid (ZYVOX) 600 mg 300 mL        Ordering Provider: Varun Dominique MD    600 mg  300 mL/hr over 60 Minutes  "Intravenous Every 12 Hours 11/26/24 0900 11/29/24 2059 11/26/24 0749  meropenem (MERREM) 1,000 mg in sodium chloride 0.9 % 100 mL MBP        Ordering Provider: Varun Dominique MD    1,000 mg  over 30 Minutes Intravenous Once 11/26/24 0800 11/26/24 0903    11/26/24 0024  doxycycline (VIBRAMYCIN) 100 mg in sodium chloride 0.9 % 100 mL MBP        Ordering Provider: Enriqueta Muir APRN    100 mg  over 60 Minutes Intravenous Every 12 Hours 11/26/24 0100 12/03/24 0059    11/22/24 1045  ceFAZolin 2000 mg IVPB in 100 mL NS (MBP)        Ordering Provider: Claudia Lim PA    2,000 mg  over 30 Minutes Intravenous Once 11/22/24 1047 11/22/24 1255              Review of Systems    11/29/24 per nursing also    Constitutional-- No Fever, chills or sweats.  Appetite diminished with fatigue.  Heent-- No new vision, hearing or throat complaints.  No epistaxis or oral sores.  Denies odynophagia or dysphagia.  No flashers, floaters or eye pain. No odynophagia or dysphagia. No headache, photophobia or neck stiffness.  CV-- No chest pain, palpitation or syncope  Resp-- see above  GI- No nausea, vomiting, or diarrhea.  No hematochezia, melena, or hematemesis. Denies jaundice or chronic liver disease.  -- No dysuria, hematuria, or flank pain.  Denies hesitancy, urgency or flank pain.  Lymph- no swollen lymph nodes in neck/axilla or groin.   Heme- No active bruising or bleeding; no Hx of DVT or PE.  MS-- no swelling or pain in the bones or joints of arms/legs.  No new back pain.  Neuro-- No acute focal weakness or numbness in the arms or legs.  No seizures.    Full 12 point review of systems reviewed and negative otherwise for acute complaints, except for above    Physical Exam:   Vital Signs   /67 (BP Location: Right arm, Patient Position: Lying)   Pulse 82   Temp 98.4 °F (36.9 °C) (Oral)   Resp 17   Ht 162.6 cm (64.02\")   Wt 81.4 kg (179 lb 6.4 oz)   SpO2 (!) 85%   BMI 30.78 kg/m²     GENERAL: sleepy   "   HEENT: Normocephalic, atraumatic.   No conjunctival injection. No icterus. Oropharynx clear without evidence of thrush or exudate. No evidence of periodontal disease.    NECK: Supple without nuchal rigidity. No mass.   HEART: RRR; No murmur, rubs, gallops.   LUNGS: diminished at bases  ; No dullness.  ABDOMEN: Soft, nontender, nondistended. Positive bowel sounds. No rebound or guarding. NO mass or HSM.  EXT: see below   MSK: FROM without joint effusions noted arms/legs.    SKIN: Warm and dry without cutaneous eruptions on Inspection/palpation.    NEURO: sleepy.    Right foot second toe amputation site noted, no redness/duration or warmth there.  No oral or active drainage    Left  LE surgical site no new visible redness/purulence.  No crepitus or bulla.         Laboratory Data    Results from last 7 days   Lab Units 11/28/24  0612 11/27/24  0502 11/26/24  0757   WBC 10*3/mm3 8.39 10.10 10.62   HEMOGLOBIN g/dL 8.1* 7.7* 8.2*   HEMATOCRIT % 26.5* 25.3* 27.1*   PLATELETS 10*3/mm3 388 366 337     Results from last 7 days   Lab Units 11/28/24  0612   SODIUM mmol/L 135*   POTASSIUM mmol/L 4.4   CHLORIDE mmol/L 102   CO2 mmol/L 26.0   BUN mg/dL 11   CREATININE mg/dL 0.73*   GLUCOSE mg/dL 227*   CALCIUM mg/dL 7.3*     Results from last 7 days   Lab Units 11/26/24  0757   ALK PHOS U/L 90   BILIRUBIN mg/dL 0.2   ALT (SGPT) U/L 17   AST (SGOT) U/L 22                 Estimated Creatinine Clearance: 102.4 mL/min (A) (by C-G formula based on SCr of 0.73 mg/dL (L)).      Microbiology:      Radiology:  Imaging Results (Last 72 Hours)       ** No results found for the last 72 hours. **              Impression:       --acute left heel wound infection ,  abnormal MRI as above raising concern for fluid extending into and disrupting the distal lateral Achilles tendon in addition to with evidence for osteomyelitis at the posterior lateral calcaneus in addition to possible nondisplaced stress fracture at the posterior calcaneus; High  risk for persistent/recurrent or nonhealing wounds, persistent/progressive or recurrent infection and risk for further functional/limb loss, higher level amputation etc. Surgery 8/3;  left BKA on November 22. Empiric antibiotics for now    --hypoxia; empiric abx with decreased responsiveness overnight, aspiration risk although no witnessed aspiration.  Viral panel negative.  No productive cough to send for microbiology.     --urinary retention per admission notes, some hematuria on November 15; further urologic evaluation regarding functional/anatomic assessment at discretion of medicine team with respect inpatient versus outpatient     --Diabetes, uncontrolled ; glucose control per medicine team     --diabetic neuropathy     --Chronic debility as per patient wheelchair bound     --Hx ESBL    PLAN:     --IV zyvox/merrem, doxy; anticipate tapering to oral regimen by discharge if steadily better and no new suppurative process    --Check/review labs cultures and scans    --Partial history Per nursing staff    --Discussed with microbiology    --Discussed with surgery team    --Highly complex at of issues with high risk for further serious morbidity and other serious sequela     Copied text in this note has been reviewed and is accurate as of 11/29/24.     Varun Domniique MD  11/29/2024

## 2024-11-29 NOTE — PLAN OF CARE
Problem: Adult Inpatient Plan of Care  Goal: Absence of Hospital-Acquired Illness or Injury  Intervention: Identify and Manage Fall Risk  Recent Flowsheet Documentation  Taken 11/29/2024 0600 by Mike Park RN  Safety Promotion/Fall Prevention: safety round/check completed  Taken 11/29/2024 0400 by Mike Park RN  Safety Promotion/Fall Prevention: safety round/check completed  Taken 11/29/2024 0200 by Mike Park RN  Safety Promotion/Fall Prevention: safety round/check completed  Taken 11/29/2024 0000 by Mike Park RN  Safety Promotion/Fall Prevention: safety round/check completed  Taken 11/28/2024 2200 by Mike Park RN  Safety Promotion/Fall Prevention: safety round/check completed  Taken 11/28/2024 1933 by Mike Park RN  Safety Promotion/Fall Prevention: safety round/check completed  Intervention: Prevent Skin Injury  Recent Flowsheet Documentation  Taken 11/29/2024 0600 by Mike Park RN  Body Position: position changed independently  Skin Protection: incontinence pads utilized  Taken 11/29/2024 0400 by Mike Park RN  Body Position: position changed independently  Skin Protection: incontinence pads utilized  Taken 11/29/2024 0200 by Mike Park RN  Body Position: position changed independently  Skin Protection: incontinence pads utilized  Taken 11/29/2024 0000 by Mike Park RN  Body Position: position changed independently  Skin Protection: incontinence pads utilized  Taken 11/28/2024 2200 by Mike Park RN  Body Position: position changed independently  Skin Protection: incontinence pads utilized  Taken 11/28/2024 1933 by Mike Park RN  Body Position: position changed independently  Skin Protection: incontinence pads utilized  Goal: Optimal Comfort and Wellbeing  Intervention: Provide Person-Centered Care  Recent Flowsheet Documentation  Taken 11/28/2024 1933 by Mike Park RN  Trust Relationship/Rapport:   care  explained   reassurance provided   thoughts/feelings acknowledged     Problem: Skin Injury Risk Increased  Goal: Skin Health and Integrity  Intervention: Optimize Skin Protection  Recent Flowsheet Documentation  Taken 11/29/2024 0600 by Mike Park RN  Pressure Reduction Techniques: frequent weight shift encouraged  Head of Bed (HOB) Positioning: \[facility] [place] [facility]\"" elevated  Pressure Reduction Devices:   pressure-redistributing mattress utilized   specialty bed utilized  Skin Protection: incontinence pads utilized  Taken 11/29/2024 0400 by Mike Park RN  Pressure Reduction Techniques: frequent weight shift encouraged  Head of Bed (HOB) Positioning: \[facility]\"" elevated  Pressure Reduction Devices:   pressure-redistributing mattress utilized   specialty bed utilized  Skin Protection: incontinence pads utilized  Taken 11/29/2024 0200 by Mike Park RN  Pressure Reduction Techniques: frequent weight shift encouraged  Head of Bed (HOB) Positioning: \[facility]\"" elevated  Pressure Reduction Devices:   pressure-redistributing mattress utilized   specialty bed utilized  Skin Protection: incontinence pads utilized  Taken 11/29/2024 0000 by Mike Park RN  Pressure Reduction Techniques: frequent weight shift encouraged  Head of Bed (HOB) Positioning: \[facility]\"" elevated  Pressure Reduction Devices:   pressure-redistributing mattress utilized   specialty bed utilized  Skin Protection: incontinence pads utilized  Taken 11/28/2024 2200 by Mike Park RN  Pressure Reduction Techniques: frequent weight shift encouraged  Head of Bed (HOB) Positioning: \[facility]\"" elevated  Pressure Reduction Devices:   pressure-redistributing mattress utilized   specialty bed utilized  Skin Protection: incontinence pads utilized  Taken 11/28/2024 1933 by Mike Park RN  Activity Management: activity encouraged  Pressure Reduction Techniques: frequent weight shift encouraged  Head of Bed (HOB) Positioning: \[facility]\"" elevated  Pressure Reduction Devices:    pressure-redistributing mattress utilized   specialty bed utilized  Skin Protection: incontinence pads utilized     Problem: Fall Injury Risk  Goal: Absence of Fall and Fall-Related Injury  Intervention: Promote Injury-Free Environment  Recent Flowsheet Documentation  Taken 11/29/2024 0600 by Mike Park RN  Safety Promotion/Fall Prevention: safety round/check completed  Taken 11/29/2024 0400 by Mike Park RN  Safety Promotion/Fall Prevention: safety round/check completed  Taken 11/29/2024 0200 by Mike Park RN  Safety Promotion/Fall Prevention: safety round/check completed  Taken 11/29/2024 0000 by Mike Park RN  Safety Promotion/Fall Prevention: safety round/check completed  Taken 11/28/2024 2200 by Mike Park RN  Safety Promotion/Fall Prevention: safety round/check completed  Taken 11/28/2024 1933 by Mike Park RN  Safety Promotion/Fall Prevention: safety round/check completed   Goal Outcome Evaluation:

## 2024-11-29 NOTE — PLAN OF CARE
Goal Outcome Evaluation:              Outcome Evaluation: pt pleasant. RA.VSS. ledesma in placed. dressing changed. no complaints of pain. family came to bedside today. call light within reach

## 2024-11-30 LAB
ANION GAP SERPL CALCULATED.3IONS-SCNC: 7 MMOL/L (ref 5–15)
BACTERIA SPEC AEROBE CULT: NORMAL
BACTERIA SPEC AEROBE CULT: NORMAL
BUN SERPL-MCNC: 12 MG/DL (ref 8–23)
BUN/CREAT SERPL: 15.6 (ref 7–25)
CALCIUM SPEC-SCNC: 8.1 MG/DL (ref 8.6–10.5)
CHLORIDE SERPL-SCNC: 99 MMOL/L (ref 98–107)
CO2 SERPL-SCNC: 25 MMOL/L (ref 22–29)
CREAT SERPL-MCNC: 0.77 MG/DL (ref 0.76–1.27)
DEPRECATED RDW RBC AUTO: 46.7 FL (ref 37–54)
EGFRCR SERPLBLD CKD-EPI 2021: 101.9 ML/MIN/1.73
ERYTHROCYTE [DISTWIDTH] IN BLOOD BY AUTOMATED COUNT: 15.4 % (ref 12.3–15.4)
GLUCOSE BLDC GLUCOMTR-MCNC: 119 MG/DL (ref 70–130)
GLUCOSE BLDC GLUCOMTR-MCNC: 188 MG/DL (ref 70–130)
GLUCOSE BLDC GLUCOMTR-MCNC: 227 MG/DL (ref 70–130)
GLUCOSE BLDC GLUCOMTR-MCNC: 303 MG/DL (ref 70–130)
GLUCOSE SERPL-MCNC: 239 MG/DL (ref 65–99)
HCT VFR BLD AUTO: 26.6 % (ref 37.5–51)
HGB BLD-MCNC: 8.2 G/DL (ref 13–17.7)
MCH RBC QN AUTO: 25.9 PG (ref 26.6–33)
MCHC RBC AUTO-ENTMCNC: 30.8 G/DL (ref 31.5–35.7)
MCV RBC AUTO: 83.9 FL (ref 79–97)
PLATELET # BLD AUTO: 419 10*3/MM3 (ref 140–450)
PMV BLD AUTO: 10.2 FL (ref 6–12)
POTASSIUM SERPL-SCNC: 4.9 MMOL/L (ref 3.5–5.2)
RBC # BLD AUTO: 3.17 10*6/MM3 (ref 4.14–5.8)
SODIUM SERPL-SCNC: 131 MMOL/L (ref 136–145)
WBC NRBC COR # BLD AUTO: 8.36 10*3/MM3 (ref 3.4–10.8)

## 2024-11-30 PROCEDURE — 94799 UNLISTED PULMONARY SVC/PX: CPT

## 2024-11-30 PROCEDURE — 85027 COMPLETE CBC AUTOMATED: CPT | Performed by: NURSE PRACTITIONER

## 2024-11-30 PROCEDURE — 63710000001 INSULIN LISPRO (HUMAN) PER 5 UNITS: Performed by: INTERNAL MEDICINE

## 2024-11-30 PROCEDURE — 25010000002 MEROPENEM PER 100 MG: Performed by: INTERNAL MEDICINE

## 2024-11-30 PROCEDURE — 82948 REAGENT STRIP/BLOOD GLUCOSE: CPT

## 2024-11-30 PROCEDURE — 25010000002 ENOXAPARIN PER 10 MG: Performed by: ORTHOPAEDIC SURGERY

## 2024-11-30 PROCEDURE — 94664 DEMO&/EVAL PT USE INHALER: CPT

## 2024-11-30 PROCEDURE — 25010000002 LINEZOLID 600 MG/300ML SOLUTION: Performed by: INTERNAL MEDICINE

## 2024-11-30 PROCEDURE — 80048 BASIC METABOLIC PNL TOTAL CA: CPT | Performed by: NURSE PRACTITIONER

## 2024-11-30 PROCEDURE — 99232 SBSQ HOSP IP/OBS MODERATE 35: CPT | Performed by: NURSE PRACTITIONER

## 2024-11-30 PROCEDURE — 63710000001 INSULIN GLARGINE PER 5 UNITS: Performed by: STUDENT IN AN ORGANIZED HEALTH CARE EDUCATION/TRAINING PROGRAM

## 2024-11-30 PROCEDURE — 63710000001 INSULIN LISPRO (HUMAN) PER 5 UNITS: Performed by: ORTHOPAEDIC SURGERY

## 2024-11-30 PROCEDURE — 94761 N-INVAS EAR/PLS OXIMETRY MLT: CPT

## 2024-11-30 RX ORDER — LINEZOLID 2 MG/ML
600 INJECTION, SOLUTION INTRAVENOUS EVERY 12 HOURS
Status: COMPLETED | OUTPATIENT
Start: 2024-11-30 | End: 2024-12-05

## 2024-11-30 RX ADMIN — OXYCODONE HYDROCHLORIDE AND ACETAMINOPHEN 1 TABLET: 5; 325 TABLET ORAL at 09:18

## 2024-11-30 RX ADMIN — DOXYCYCLINE 100 MG: 100 INJECTION, POWDER, LYOPHILIZED, FOR SOLUTION INTRAVENOUS at 02:03

## 2024-11-30 RX ADMIN — LINEZOLID 600 MG: 600 INJECTION, SOLUTION INTRAVENOUS at 11:25

## 2024-11-30 RX ADMIN — INSULIN LISPRO 7 UNITS: 100 INJECTION, SOLUTION INTRAVENOUS; SUBCUTANEOUS at 08:37

## 2024-11-30 RX ADMIN — METOPROLOL SUCCINATE 50 MG: 50 TABLET, EXTENDED RELEASE ORAL at 08:36

## 2024-11-30 RX ADMIN — Medication 10 ML: at 08:37

## 2024-11-30 RX ADMIN — ENOXAPARIN SODIUM 40 MG: 100 INJECTION SUBCUTANEOUS at 08:36

## 2024-11-30 RX ADMIN — IPRATROPIUM BROMIDE AND ALBUTEROL SULFATE 3 ML: 2.5; .5 SOLUTION RESPIRATORY (INHALATION) at 12:39

## 2024-11-30 RX ADMIN — Medication 10 ML: at 21:20

## 2024-11-30 RX ADMIN — MEROPENEM 1000 MG: 1 INJECTION, POWDER, FOR SOLUTION INTRAVENOUS at 04:02

## 2024-11-30 RX ADMIN — INSULIN LISPRO 14 UNITS: 100 INJECTION, SOLUTION INTRAVENOUS; SUBCUTANEOUS at 08:37

## 2024-11-30 RX ADMIN — PANTOPRAZOLE SODIUM 40 MG: 40 TABLET, DELAYED RELEASE ORAL at 18:05

## 2024-11-30 RX ADMIN — IPRATROPIUM BROMIDE AND ALBUTEROL SULFATE 3 ML: 2.5; .5 SOLUTION RESPIRATORY (INHALATION) at 16:35

## 2024-11-30 RX ADMIN — ROPINIROLE 0.25 MG: 0.5 TABLET, FILM COATED ORAL at 21:55

## 2024-11-30 RX ADMIN — INSULIN GLARGINE 30 UNITS: 100 INJECTION, SOLUTION SUBCUTANEOUS at 21:56

## 2024-11-30 RX ADMIN — DOXYCYCLINE 100 MG: 100 INJECTION, POWDER, LYOPHILIZED, FOR SOLUTION INTRAVENOUS at 13:46

## 2024-11-30 RX ADMIN — IPRATROPIUM BROMIDE AND ALBUTEROL SULFATE 3 ML: 2.5; .5 SOLUTION RESPIRATORY (INHALATION) at 21:15

## 2024-11-30 RX ADMIN — FINASTERIDE 5 MG: 5 TABLET, FILM COATED ORAL at 21:55

## 2024-11-30 RX ADMIN — LINEZOLID 600 MG: 600 INJECTION, SOLUTION INTRAVENOUS at 21:57

## 2024-11-30 RX ADMIN — PREGABALIN 225 MG: 75 CAPSULE ORAL at 21:55

## 2024-11-30 RX ADMIN — INSULIN LISPRO 2 UNITS: 100 INJECTION, SOLUTION INTRAVENOUS; SUBCUTANEOUS at 21:56

## 2024-11-30 RX ADMIN — MEROPENEM 1000 MG: 1 INJECTION, POWDER, FOR SOLUTION INTRAVENOUS at 19:58

## 2024-11-30 RX ADMIN — CALCIUM CARBONATE (ANTACID) CHEW TAB 500 MG 2 TABLET: 500 CHEW TAB at 14:11

## 2024-11-30 RX ADMIN — MEROPENEM 1000 MG: 1 INJECTION, POWDER, FOR SOLUTION INTRAVENOUS at 12:52

## 2024-11-30 RX ADMIN — INSULIN LISPRO 14 UNITS: 100 INJECTION, SOLUTION INTRAVENOUS; SUBCUTANEOUS at 12:52

## 2024-11-30 RX ADMIN — ASPIRIN 81 MG: 81 TABLET, COATED ORAL at 08:36

## 2024-11-30 RX ADMIN — PREGABALIN 225 MG: 75 CAPSULE ORAL at 08:36

## 2024-11-30 RX ADMIN — INSULIN LISPRO 4 UNITS: 100 INJECTION, SOLUTION INTRAVENOUS; SUBCUTANEOUS at 12:52

## 2024-11-30 RX ADMIN — PANTOPRAZOLE SODIUM 40 MG: 40 TABLET, DELAYED RELEASE ORAL at 08:36

## 2024-11-30 RX ADMIN — TAMSULOSIN HYDROCHLORIDE 0.8 MG: 0.4 CAPSULE ORAL at 21:55

## 2024-11-30 RX ADMIN — IPRATROPIUM BROMIDE AND ALBUTEROL SULFATE 3 ML: 2.5; .5 SOLUTION RESPIRATORY (INHALATION) at 08:53

## 2024-11-30 NOTE — PROGRESS NOTES
[facility] Medicine Services  PROGRESS NOTE    Patient Name: Fracisco Mullen  : 1965  MRN: [id number]    Date of Admission: 11/15/2024  Primary Care Physician: Brandy Roberson    Subjective   Subjective     CC:  Follow-up foot infection    HPI:  Patient seen resting in bed in no apparent distress.  No acute events overnight per nursing. Pain is currently well controlled. No complaints at this time.     Objective   Objective     Vital Signs:   Temp:  [98 °F (36.7 °C)-98.4 °F (36.9 °C)] 98 °F (36.7 °C)  Heart Rate:  [] 92  Resp:  [18] 18  BP: (127-143)/(69-92) 143/87  Flow (L/min) (Oxygen Therapy):  [2] 2     Physical Exam:  Constitutional: No acute distress, awake, alert, pleasant   HENT: NCAT, mucous membranes moist  Respiratory: Clear to auscultation bilaterally, respiratory effort normal   Cardiovascular: RRR, no murmurs, cap refill brisk   Gastrointestinal: Positive bowel sounds, soft, nontender, nondistended  Musculoskeletal: No BLE edema, left BKA stump dressing in place    Psychiatric: Appropriate affect, cooperative  Neurologic: Oriented x 3, moves all extremities, speech clear  Skin: warm, dry, no visible rash     Results Reviewed:  LAB RESULTS:      Lab 24  0603 24  0641 24  0612 24  0502 24  0757 24  2125 24  2124 24  0452   WBC 8.36 7.13 8.39 10.10 10.62  --  10.93* 12.38*   HEMOGLOBIN 8.2* 8.0* 8.1* 7.7* 8.2*  --  8.6* 8.9*   HEMATOCRIT 26.6* 26.1* 26.5* 25.3* 27.1*  --  28.3* 28.7*   PLATELETS 419 437 388 366 337  --  340 298   NEUTROS ABS  --  3.73  --   --   --   --  8.05* 8.73*   IMMATURE GRANS (ABS)  --  0.07*  --   --   --   --  0.09* 0.13*   LYMPHS ABS  --  2.14  --   --   --   --  1.42 1.62   MONOS ABS  --  0.59  --   --   --   --  1.13* 1.73*   EOS ABS  --  0.53*  --   --   --   --  0.16 0.08   MCV 83.9 84.2 85.2 84.6 85.5  --  86.0 83.9   LACTATE  --   --   --   --   --   --  1.8  --    D DIMER  QUANT  --   --   --   --   --  1.29*  --   --          Lab 11/30/24  0603 11/29/24  1838 11/29/24  0641 11/28/24  0612 11/27/24  0502 11/26/24  0757 11/25/24 2125 11/25/24  0540 11/24/24  0452   SODIUM 131*  --  136 135* 134* 132* 133*  --  135*   POTASSIUM 4.9  --  4.8 4.4 4.2 4.0 4.3  --  4.1   CHLORIDE 99  --  103 102 100 97* 97*  --  100   CO2 25.0  --  25.0 26.0 27.0 28.0 28.0  --  26.0   ANION GAP 7.0  --  8.0 7.0 7.0 7.0 8.0  --  9.0   BUN 12  --  10 11 14 15 18  --  11   CREATININE 0.77  --  0.83 0.73* 0.82 0.81 0.93  --  0.66*   EGFR 101.9  --  99.6 103.5 99.9 100.3 93.4  --  106.7   GLUCOSE 239*  --  230* 227* 249* 238* 176*  --  262*   CALCIUM 8.1*  --  7.8* 7.3* 7.5* 7.4* 8.0*  --  7.9*   MAGNESIUM  --   --   --   --   --   --  2.1 2.3 1.4*   PHOSPHORUS  --  2.6 2.1*  --   --   --   --   --  2.6         Lab 11/29/24  0641 11/26/24 0757 11/25/24 2125   TOTAL PROTEIN  --  5.7* 5.5*   ALBUMIN 2.4* 2.5* 2.4*   GLOBULIN  --  3.2 3.1   ALT (SGPT)  --  17 21   AST (SGOT)  --  22 29   BILIRUBIN  --  0.2 0.2   ALK PHOS  --  90 87                     Lab 11/25/24 2026   PH, ARTERIAL 7.440   PCO2, ARTERIAL 44.9   PO2 ART 91.4   FIO2 80   HCO3 ART 30.5*   BASE EXCESS ART 5.7*   CARBOXYHEMOGLOBIN 1.4     Brief Urine Lab Results  (Last result in the past 365 days)        Color   Clarity   Blood   Leuk Est   Nitrite   Protein   CREAT   Urine HCG        11/15/24 1142 Yellow   Clear   Small (1+)   Negative   Negative   100 mg/dL (2+)                   Microbiology Results Abnormal       None            No radiology results from the last 24 hrs        Current medications:  Scheduled Meds:aspirin, 81 mg, Oral, Daily  doxycycline, 100 mg, Intravenous, Q12H  enoxaparin, 40 mg, Subcutaneous, Daily  finasteride, 5 mg, Oral, Nightly  insulin glargine, 30 Units, Subcutaneous, Nightly  Insulin Lispro, 14 Units, Subcutaneous, TID With Meals  insulin lispro, 2-9 Units, Subcutaneous, 4x Daily AC & at  Bedtime  ipratropium-albuterol, 3 mL, Nebulization, 4x Daily - RT  Linezolid, 600 mg, Intravenous, Q12H  meropenem, 1,000 mg, Intravenous, Q8H  metoprolol succinate XL, 50 mg, Oral, Daily  pantoprazole, 40 mg, Oral, BID AC  pregabalin, 225 mg, Oral, BID  rOPINIRole, 0.25 mg, Oral, Nightly  sodium chloride, 10 mL, Intravenous, Q12H  tamsulosin, 0.8 mg, Oral, Nightly      Continuous Infusions:ropivacaine, , Last Rate: Stopped (11/28/24 0700)      PRN Meds:.  acetaminophen **OR** acetaminophen **OR** acetaminophen    senna-docusate sodium **AND** polyethylene glycol **AND** bisacodyl **AND** bisacodyl    calcium carbonate    Calcium Replacement - Follow Nurse / BPA Driven Protocol    dextrose    dextrose    glucagon (human recombinant)    influenza vaccine    Lidocaine HCl gel    Magnesium Standard Dose Replacement - Follow Nurse / BPA Driven Protocol    nitroglycerin    ondansetron    oxyCODONE-acetaminophen    Phosphorus Replacement - Follow Nurse / BPA Driven Protocol    Potassium Replacement - Follow Nurse / BPA Driven Protocol    sodium chloride    sodium chloride    Assessment & Plan   Assessment & Plan     Active Hospital Problems    Diagnosis  POA    **DKA (diabetic ketoacidosis) [E11.10]  Yes    Severe protein-calorie malnutrition [E43]  Yes    Non healing left heel wound [S91.302A]  Yes    Acute osteomyelitis of left foot [M86.172]  Unknown      Resolved Hospital Problems   No resolved problems to display.        Brief Hospital Course to date:  Fracisco ADAM Lady is a 59 y.o. male with past medical history of hypertension, type 2 diabetes with insulin use, diabetic neuropathy, and chronic diabetic foot wounds who presents via EMS from home due to altered mental status and fatigue. Lab work consistent with diabetic ketoacidosis.  He was then found to have left calcaneal osteomyelitis.  Orthopedic surgery was consulted and performed left BKA on November 22.  He had an acute hypoxic event overnight on November 25  with significant urinary retention that improved after Bustos was placed.  There was concern for aspiration at that time so his antibiotics were escalated. PT/OT evaluated and recommended IRF. CM is following for placement.      This patient's problems and plans were partially entered by my partner and updated as appropriate by me 11/30/24.     Left calcaneal osteomyelitis   - Patient with chronic left heel wound and right plantar foot wound  - Admitted August 2024 for left foot cellulitis and discharged on oral antibiotics and with home wound vac following debridement, MRI left foot obtained at that time with no definite findings of osteomyelitis  - Repeat MRI left foot revealed soft tissue wound with evidence of osteomyelitis of the calcaneus and possibly an associated non-drainable abscess  -S/p left below-knee amputation by Dr. Gan on 11/22/2024 without any immediate complications  -ID following, continue Zyvox, Merrem, and Doxy to cover for pneumonia that possibly developed overnight 11/25   -Optimize pain control  -PT/OT  -AM labs      Acute hypoxia, resolved   -Likely due to pulmonary edema  -improved following diuresis      BPH  Urinary retention  - Continue home Flomax, Proscar  -Bustos anchored again; failed voiding trial. Outpatient urology f/u     DKA in setting of uncontrolled IDDM complicated by diabetic neuropathy and chronic foot wounds , improving  - Uncertain of medication compliance  - A1c 14.1%  - S/p DKA protocol including insulin drip, IV fluids, and electrolyte replacement per protocol  - Now on subcutaneous insulin, Titrate insulin as indicated.     Malnutrition  - Patient with poor oral intake, refusing supplementation  - Nutrition following     Acute toxic metabolic encephalopathy, improved  - CT head without acute abnormality  - UDS negative  - secondary to DKA     HTN  - Continue home metoprolol     RLS  - Continue home Requip     Expected Discharge Location and Transportation: likely  snf  Expected Discharge   Expected Discharge Date: 12/2/2024; Expected Discharge Time:       VTE Prophylaxis:  Pharmacologic VTE prophylaxis orders are present.         AM-PAC 6 Clicks Score (PT): 10 (11/30/24 0500)    CODE STATUS:   Code Status and Medical Interventions: CPR (Attempt to Resuscitate); Full Support; Full code per last hosptial admission. Patient disoriented on exam with no family present at bedside. Only contact information is friend.   Ordered at: 11/15/24 1218     Level Of Support Discussed With:    Patient     Code Status (Patient has no pulse and is not breathing):    CPR (Attempt to Resuscitate)     Medical Interventions (Patient has pulse or is breathing):    Full Support     Comments:    Full code per last hosptial admission. Patient disoriented on exam with no family present at bedside. Only contact information is friend.       Chris Rosado, APRN  11/30/24

## 2024-11-30 NOTE — PROGRESS NOTES
"Fracisco Mullen  1965  [id number]    Evaluating Physician: Varun Dominique MD    Chief Complaint: fatigue    Reason for Consultation: foot infection    History of present illness:     Patient is a 59 y.o.  Yr old male with history of diabetes/hypertension, previously followed with Dr. Gatica; admitted 2023 with right foot infection, cultures with MSSA/Morganella/ESBL Klebsiella/enterococcus and strep species.had surgery at the second toe with amputation, received IV daptomycin/Invanz with general improvements at that site.  He has been left with a chronic right plantar foot wound and a chronic wound at the left heel that has turned black; He apparently was admitted to the hospital August 1 after a fall at [facility] system.  Noted to have urinary retention with concern for possible UTI.  In addition left heel with black discoloration/malodor and redness, empiric antibiotics initiated.  He is chronically debilitated and wheelchair bound by his report.Bustos catheter-based at admission     8/3/24  Dr Gan  \"PROCEDURE:            Left  Left      92950:             Debridement of skin and subcutaneous tissue  60664:             Wound vacuum-assisted closure\"     8/6/24 MRI and surgical findings did not confirm bone involvement, transitioned to oral agents at discharge       Readmitted November 15, 2024 with reports of appearing \"sluggish\" according to admission notes with DKA, noted to have high hemoglobin A1c and persistent/worsening left heel wound according to patient; medicine team notes mention urinary retention, acute toxic metabolic encephalopathy improved and low-grade fever. Abnormal MRI at the left foot:    Per radiology-- \"Soft tissue wound seen along the posterior heel with ill-defined fluid that extends through and disrupts the distal Achilles tendon. Underlying this area there is evidence of osteomyelitis of the posterior lateral calcaneus. There is a tiny fluid   collection here as well that may represent " "an associated nondrainable abscess.     Additionally there appears to be a nondisplaced stress fracture of the posterior calcaneus. \"    11/22 left BKA    11/26/24 increased O2 requirements with less responsive per medicine team notes overnight; subsequently better    11/30/24 sleepy at my visit,   Left LE postop pain  dull , worse with palpation, better with pain meds and not severe,  hw won't attach a numerical severity; history Per nursing staff as well     No headache photophobia or neck stiffness.  No   cough or hemoptysis.  No nausea vomiting diarrhea or abdominal pain.  No other new skin rash.  no dysuria hematuria or pyuria.       Past Medical History:   Diagnosis Date    Acute renal failure     Hx of    Obesity     Hx of    Sleep apnea     Type 2 diabetes mellitus        Past Surgical History:   Procedure Laterality Date    BACK SURGERY      lower back    BELOW KNEE AMPUTATION Left 11/22/2024    Procedure: BELOW-KNEE AMPUTATION LEFT;  Surgeon: Dru Gan Jr., MD;  Location:  [facility];  Service: Orthopedics;  Laterality: Left;    CHOLECYSTECTOMY WITH INTRAOPERATIVE CHOLANGIOGRAM N/A 1/6/2021    Procedure: CHOLECYSTECTOMY LAPAROSCOPIC INTRAOPERATIVE CHOLANGIOGRAM;  Surgeon: Juventino Hooker MD;  Location:  [facility];  Service: General;  Laterality: N/A;    ERCP N/A 1/9/2021    Procedure: ENDOSCOPIC RETROGRADE CHOLANGIOPANCREATOGRAPHY;  Surgeon: Jeremy Dowell MD;  [facility];  Service: Gastroenterology;  Laterality: N/A;  with sphiincterotomy and balloon sweep with 9mm-12mm balloon    INCISION AND DRAINAGE FOOT Left 8/3/2024    Procedure: HEAL IRRIGATION AND DEBRIDEMENT LEFT;  Surgeon: Dru Gan Jr., MD;  Location:  [facility];  Service: Orthopedics;  Laterality: Left;    PLACEMENT OF WOUND VAC Left 8/3/2024    Procedure: PLACEMENT OF WOUND VAC;  Surgeon: Dru Gan Jr., MD;  Location:  [facility];  Service: Orthopedics;  Laterality: Left;       Pediatric History   Patient " Parents    Not on file     Other Topics Concern    Not on file   Social History Narrative    Not on file       family history includes Cancer in his brother, maternal grandmother, mother, and another family member; Diabetes in an other family member; Heart attack in an other family member; Heart disease in his brother and father; Hyperlipidemia in his mother and another family member; Hypertension in his mother and another family member; Obesity in an other family member.    Allergies   Allergen Reactions    Sertraline Hcl Hives     Zoloft       Medication:  Current Facility-Administered Medications   Medication Dose Route Frequency Provider Last Rate Last Admin    acetaminophen (TYLENOL) tablet 650 mg  650 mg Oral Q4H PRN Dru Gan Jr., MD   650 mg at 11/26/24 2247    Or    acetaminophen (TYLENOL) 160 MG/5ML oral solution 650 mg  650 mg Oral Q4H PRN Dru Gan Jr., MD        Or    acetaminophen (TYLENOL) suppository 650 mg  650 mg Rectal Q4H PRN Dru Gan Jr., MD   650 mg at 11/15/24 2327    aspirin EC tablet 81 mg  81 mg Oral Daily Dru Gan Jr., MD   81 mg at 11/30/24 0836    sennosides-docusate (PERICOLACE) 8.6-50 MG per tablet 2 tablet  2 tablet Oral BID PRN Dru Gan Jr., MD        And    polyethylene glycol (MIRALAX) packet 17 g  17 g Oral Daily PRN Dru Gan Jr., MD        And    bisacodyl (DULCOLAX) EC tablet 5 mg  5 mg Oral Daily PRN Dru Gan Jr., MD        And    bisacodyl (DULCOLAX) suppository 10 mg  10 mg Rectal Daily PRN Dru Gan Jr., MD        calcium carbonate (TUMS) chewable tablet 500 mg (200 mg elemental)  2 tablet Oral TID PRN Dru Gan Jr., MD   2 tablet at 11/29/24 2117    Calcium Replacement - Follow Nurse / BPA Driven Protocol   Not Applicable PRN Dru Gan Jr., MD        dextrose (D50W) (25 g/50 mL) IV injection 25 g  25 g Intravenous Q15 Min PRN Dru Gan Jr., MD   25 g at 11/25/24 2124    dextrose  (GLUTOSE) oral gel 15 g  15 g Oral Q15 Min PRN Dru Gan Jr., MD   15 g at 11/25/24 2039    doxycycline (VIBRAMYCIN) 100 mg in sodium chloride 0.9 % 100 mL MBP  100 mg Intravenous Q12H Enriqueta Muir APRN   100 mg at 11/30/24 0203    Enoxaparin Sodium (LOVENOX) syringe 40 mg  40 mg Subcutaneous Daily Dru Gan Jr., MD   40 mg at 11/30/24 0836    finasteride (PROSCAR) tablet 5 mg  5 mg Oral Nightly Dru Gan Jr., MD   5 mg at 11/29/24 2106    glucagon (GLUCAGEN) injection 1 mg  1 mg Intramuscular Q15 Min PRN Dru Gan Jr., MD        influenza virus vacc split PF FLUZONE 0.5 mL  0.5 mL Intramuscular During Hospitalization Dru Gan Jr., MD        insulin glargine (LANTUS, SEMGLEE) injection 30 Units  30 Units Subcutaneous Nightly Koko Lord, DO   30 Units at 11/29/24 2107    Insulin Lispro (humaLOG) injection 14 Units  14 Units Subcutaneous TID With Meals Ana Laura Stephenson, DO   14 Units at 11/30/24 0837    Insulin Lispro (humaLOG) injection 2-9 Units  2-9 Units Subcutaneous 4x Daily AC & at Bedtime Dru Gan Jr., MD   7 Units at 11/30/24 0837    ipratropium-albuterol (DUO-NEB) nebulizer solution 3 mL  3 mL Nebulization 4x Daily - RT Enriqueta Muir APRN   3 mL at 11/29/24 2126    Lidocaine HCl gel (XYLOCAINE) urethral/mucosal syringe   Urethral PRN Dru Gan Jr., MD   1 Application at 11/26/24 0333    Magnesium Standard Dose Replacement - Follow Nurse / BPA Driven Protocol   Not Applicable PRN Dru Gan Jr., MD        meropenem (MERREM) 1,000 mg in sodium chloride 0.9 % 100 mL MBP  1,000 mg Intravenous Q8H Varun Dominique MD   1,000 mg at 11/30/24 0402    metoprolol succinate XL (TOPROL-XL) 24 hr tablet 50 mg  50 mg Oral Daily Dru Gan Jr., MD   50 mg at 11/30/24 0836    nitroglycerin (NITROSTAT) SL tablet 0.4 mg  0.4 mg Sublingual Q5 Min PRN Dru Gan Jr., MD        ondansetron (ZOFRAN) injection 4 mg  4 mg Intravenous  Q6H PRN Dru Gan Jr., MD   4 mg at 11/24/24 2030    oxyCODONE-acetaminophen (PERCOCET) 5-325 MG per tablet 1 tablet  1 tablet Oral Q6H PRN Koko Lord, DO   1 tablet at 11/27/24 2113    pantoprazole (PROTONIX) EC tablet 40 mg  40 mg Oral BID AC KathyEnriqueta riley, APRN   40 mg at 11/30/24 0836    Phosphorus Replacement - Follow Nurse / BPA Driven Protocol   Not Applicable PRN Dru Gan Jr., MD        Potassium Replacement - Follow Nurse / BPA Driven Protocol   Not Applicable PRN Dru Gan Jr., MD        pregabalin (LYRICA) capsule 225 mg  225 mg Oral BID Enriqueta Muir, APRN   225 mg at 11/30/24 0836    rOPINIRole (REQUIP) tablet 0.25 mg  0.25 mg Oral Nightly Dru Gan Jr., MD   0.25 mg at 11/29/24 2106    ropivacaine (NAROPIN) 0.2 % infusion (INFUSYSTEM)   Peripheral Nerve Continuous Dru Gan Jr., MD   Stopped at 11/28/24 0700    sodium chloride 0.9 % flush 10 mL  10 mL Intravenous Q12H Dru Gan Jr., MD   10 mL at 11/30/24 0837    sodium chloride 0.9 % flush 10 mL  10 mL Intravenous PRN Dru Gan Jr., MD        sodium chloride 0.9 % infusion 40 mL  40 mL Intravenous PRN Dru Gan Jr., MD   40 mL at 11/28/24 1853    tamsulosin (FLOMAX) 24 hr capsule 0.8 mg  0.8 mg Oral Nightly Dru Gan Jr., MD   0.8 mg at 11/29/24 2106       Antibiotics:  Anti-Infectives (From admission, onward)      Ordered     Dose/Rate Route Frequency Start Stop    11/26/24 0749  meropenem (MERREM) 1,000 mg in sodium chloride 0.9 % 100 mL MBP        Ordering Provider: Varun Dominique MD    1,000 mg  over 3 Hours Intravenous Every 8 Hours 11/26/24 1400 12/03/24 1129    11/26/24 0750  Linezolid (ZYVOX) 600 mg 300 mL        Ordering Provider: Varun Dominique MD    600 mg  300 mL/hr over 60 Minutes Intravenous Every 12 Hours 11/26/24 0900 11/29/24 0928    11/26/24 0749  meropenem (MERREM) 1,000 mg in sodium chloride 0.9 % 100 mL MBP        Ordering Provider:  "Varun Dominique MD    1,000 mg  over 30 Minutes Intravenous Once 11/26/24 0800 11/26/24 0903    11/26/24 0024  doxycycline (VIBRAMYCIN) 100 mg in sodium chloride 0.9 % 100 mL MBP        Ordering Provider: Enriqueta Muir APRN    100 mg  over 60 Minutes Intravenous Every 12 Hours 11/26/24 0100 12/03/24 0059    11/22/24 1045  ceFAZolin 2000 mg IVPB in 100 mL NS (MBP)        Ordering Provider: Claudia Lim PA    2,000 mg  over 30 Minutes Intravenous Once 11/22/24 1047 11/22/24 1255              Review of Systems    11/30/24 per nursing also    Constitutional-- No Fever, chills or sweats.  Appetite diminished with fatigue.  Heent-- No new vision, hearing or throat complaints.  No epistaxis or oral sores.  Denies odynophagia or dysphagia.  No flashers, floaters or eye pain. No odynophagia or dysphagia. No headache, photophobia or neck stiffness.  CV-- No chest pain, palpitation or syncope  Resp-- see above  GI- No nausea, vomiting, or diarrhea.  No hematochezia, melena, or hematemesis. Denies jaundice or chronic liver disease.  -- No dysuria, hematuria, or flank pain.  Denies hesitancy, urgency or flank pain.  Lymph- no swollen lymph nodes in neck/axilla or groin.   Heme- No active bruising or bleeding; no Hx of DVT or PE.  MS-- no swelling or pain in the bones or joints of arms/legs.  No new back pain.  Neuro-- No acute focal weakness or numbness in the arms or legs.  No seizures.    Full 12 point review of systems reviewed and negative otherwise for acute complaints, except for above    Physical Exam:   Vital Signs   /87 (BP Location: Left arm, Patient Position: Sitting)   Pulse 91   Temp 98 °F (36.7 °C) (Oral)   Resp 18   Ht 162.6 cm (64.02\")   Wt 79.4 kg (175 lb)   SpO2 97%   BMI 30.02 kg/m²     GENERAL: sleepy     HEENT: Normocephalic, atraumatic.   No conjunctival injection. No icterus. Oropharynx clear without evidence of thrush or exudate. No evidence of periodontal disease.    NECK: " Supple without nuchal rigidity. No mass.   HEART: RRR; No murmur, rubs, gallops.   LUNGS: diminished at bases  ; No dullness.  ABDOMEN: Soft, nontender, nondistended. Positive bowel sounds. No rebound or guarding. NO mass or HSM.  EXT: see below   MSK: FROM without joint effusions noted arms/legs.    SKIN: Warm and dry without cutaneous eruptions on Inspection/palpation.    NEURO: sleepy.    Right foot second toe amputation site noted, no redness/duration or warmth there.  No oral or active drainage    Left  LE surgical site no new visible redness/purulence.  No crepitus or bulla.         Laboratory Data    Results from last 7 days   Lab Units 11/30/24  0603 11/29/24  0641 11/28/24  0612   WBC 10*3/mm3 8.36 7.13 8.39   HEMOGLOBIN g/dL 8.2* 8.0* 8.1*   HEMATOCRIT % 26.6* 26.1* 26.5*   PLATELETS 10*3/mm3 419 437 388     Results from last 7 days   Lab Units 11/30/24  0603   SODIUM mmol/L 131*   POTASSIUM mmol/L 4.9   CHLORIDE mmol/L 99   CO2 mmol/L 25.0   BUN mg/dL 12   CREATININE mg/dL 0.77   GLUCOSE mg/dL 239*   CALCIUM mg/dL 8.1*     Results from last 7 days   Lab Units 11/26/24  0757   ALK PHOS U/L 90   BILIRUBIN mg/dL 0.2   ALT (SGPT) U/L 17   AST (SGOT) U/L 22                 Estimated Creatinine Clearance: 95.9 mL/min (by C-G formula based on SCr of 0.77 mg/dL).      Microbiology:      Radiology:  Imaging Results (Last 72 Hours)       ** No results found for the last 72 hours. **              Impression:       --acute left heel wound infection ,  abnormal MRI as above raising concern for fluid extending into and disrupting the distal lateral Achilles tendon in addition to with evidence for osteomyelitis at the posterior lateral calcaneus in addition to possible nondisplaced stress fracture at the posterior calcaneus; High risk for persistent/recurrent or nonhealing wounds, persistent/progressive or recurrent infection and risk for further functional/limb loss, higher level amputation etc. Surgery 8/3;  left BKA  on November 22. Empiric antibiotics for now    --hypoxia; empiric abx with decreased responsiveness overnight, aspiration risk although no witnessed aspiration.  Viral panel negative.  No productive cough to send for microbiology.     --urinary retention per admission notes, some hematuria on November 15; further urologic evaluation regarding functional/anatomic assessment at discretion of medicine team with respect inpatient versus outpatient     --Diabetes, uncontrolled ; glucose control per medicine team     --diabetic neuropathy     --Chronic debility as per patient wheelchair bound     --Hx ESBL    PLAN:     --IV zyvox/merrem, doxy; anticipate tapering to oral regimen by discharge if steadily better and no new suppurative process    --Check/review labs cultures and scans    --Partial history Per nursing staff    --Discussed with microbiology    --Discussed with surgery team    --Highly complex at of issues with high risk for further serious morbidity and other serious sequela     Copied text in this note has been reviewed and is accurate as of 11/30/24.     Varun Dominique MD  11/30/2024

## 2024-11-30 NOTE — PLAN OF CARE
Goal Outcome Evaluation:      VSS. NSR on tele. Pt on 2L while asleep. No acute events overnight. Only complained of heartburn relieved with PRN tums

## 2024-11-30 NOTE — PLAN OF CARE
Goal Outcome Evaluation:              Outcome Evaluation: pt pleasant. pt had some complaints of heartburn. see mar. dressings changed. LAZARUS. CY. ledesma still in place. call light within reach.

## 2024-12-01 LAB
ALBUMIN SERPL-MCNC: 2.9 G/DL (ref 3.5–5.2)
ALBUMIN/GLOB SERPL: 1.1 G/DL
ALP SERPL-CCNC: 94 U/L (ref 39–117)
ALT SERPL W P-5'-P-CCNC: 16 U/L (ref 1–41)
ANION GAP SERPL CALCULATED.3IONS-SCNC: 10 MMOL/L (ref 5–15)
AST SERPL-CCNC: 18 U/L (ref 1–40)
BASOPHILS # BLD AUTO: 0.1 10*3/MM3 (ref 0–0.2)
BASOPHILS NFR BLD AUTO: 1.1 % (ref 0–1.5)
BILIRUB SERPL-MCNC: 0.2 MG/DL (ref 0–1.2)
BUN SERPL-MCNC: 14 MG/DL (ref 8–23)
BUN/CREAT SERPL: 14.6 (ref 7–25)
CALCIUM SPEC-SCNC: 9 MG/DL (ref 8.6–10.5)
CHLORIDE SERPL-SCNC: 97 MMOL/L (ref 98–107)
CO2 SERPL-SCNC: 31 MMOL/L (ref 22–29)
CREAT SERPL-MCNC: 0.96 MG/DL (ref 0.76–1.27)
DEPRECATED RDW RBC AUTO: 47.9 FL (ref 37–54)
EGFRCR SERPLBLD CKD-EPI 2021: 89.9 ML/MIN/1.73
EOSINOPHIL # BLD AUTO: 0.62 10*3/MM3 (ref 0–0.4)
EOSINOPHIL NFR BLD AUTO: 7 % (ref 0.3–6.2)
ERYTHROCYTE [DISTWIDTH] IN BLOOD BY AUTOMATED COUNT: 15.7 % (ref 12.3–15.4)
GLOBULIN UR ELPH-MCNC: 2.7 GM/DL
GLUCOSE BLDC GLUCOMTR-MCNC: 108 MG/DL (ref 70–130)
GLUCOSE BLDC GLUCOMTR-MCNC: 203 MG/DL (ref 70–130)
GLUCOSE BLDC GLUCOMTR-MCNC: 230 MG/DL (ref 70–130)
GLUCOSE BLDC GLUCOMTR-MCNC: 234 MG/DL (ref 70–130)
GLUCOSE BLDC GLUCOMTR-MCNC: 75 MG/DL (ref 70–130)
GLUCOSE SERPL-MCNC: 41 MG/DL (ref 65–99)
HCT VFR BLD AUTO: 32.7 % (ref 37.5–51)
HGB BLD-MCNC: 9.9 G/DL (ref 13–17.7)
IMM GRANULOCYTES # BLD AUTO: 0.06 10*3/MM3 (ref 0–0.05)
IMM GRANULOCYTES NFR BLD AUTO: 0.7 % (ref 0–0.5)
LYMPHOCYTES # BLD AUTO: 3.1 10*3/MM3 (ref 0.7–3.1)
LYMPHOCYTES NFR BLD AUTO: 35.2 % (ref 19.6–45.3)
MAGNESIUM SERPL-MCNC: 1.8 MG/DL (ref 1.6–2.4)
MCH RBC QN AUTO: 25.4 PG (ref 26.6–33)
MCHC RBC AUTO-ENTMCNC: 30.3 G/DL (ref 31.5–35.7)
MCV RBC AUTO: 83.8 FL (ref 79–97)
MONOCYTES # BLD AUTO: 1.01 10*3/MM3 (ref 0.1–0.9)
MONOCYTES NFR BLD AUTO: 11.5 % (ref 5–12)
NEUTROPHILS NFR BLD AUTO: 3.92 10*3/MM3 (ref 1.7–7)
NEUTROPHILS NFR BLD AUTO: 44.5 % (ref 42.7–76)
NRBC BLD AUTO-RTO: 0 /100 WBC (ref 0–0.2)
PLATELET # BLD AUTO: 522 10*3/MM3 (ref 140–450)
PMV BLD AUTO: 10 FL (ref 6–12)
POTASSIUM SERPL-SCNC: 4.3 MMOL/L (ref 3.5–5.2)
PROT SERPL-MCNC: 5.6 G/DL (ref 6–8.5)
RBC # BLD AUTO: 3.9 10*6/MM3 (ref 4.14–5.8)
SODIUM SERPL-SCNC: 138 MMOL/L (ref 136–145)
WBC NRBC COR # BLD AUTO: 8.81 10*3/MM3 (ref 3.4–10.8)

## 2024-12-01 PROCEDURE — 25010000002 LINEZOLID 600 MG/300ML SOLUTION: Performed by: INTERNAL MEDICINE

## 2024-12-01 PROCEDURE — 63710000001 INSULIN LISPRO (HUMAN) PER 5 UNITS: Performed by: ORTHOPAEDIC SURGERY

## 2024-12-01 PROCEDURE — 97110 THERAPEUTIC EXERCISES: CPT

## 2024-12-01 PROCEDURE — 82948 REAGENT STRIP/BLOOD GLUCOSE: CPT

## 2024-12-01 PROCEDURE — 25010000002 MEROPENEM PER 100 MG: Performed by: INTERNAL MEDICINE

## 2024-12-01 PROCEDURE — 94799 UNLISTED PULMONARY SVC/PX: CPT

## 2024-12-01 PROCEDURE — 80053 COMPREHEN METABOLIC PANEL: CPT | Performed by: FAMILY MEDICINE

## 2024-12-01 PROCEDURE — 94761 N-INVAS EAR/PLS OXIMETRY MLT: CPT

## 2024-12-01 PROCEDURE — 97535 SELF CARE MNGMENT TRAINING: CPT

## 2024-12-01 PROCEDURE — 94664 DEMO&/EVAL PT USE INHALER: CPT

## 2024-12-01 PROCEDURE — 85025 COMPLETE CBC W/AUTO DIFF WBC: CPT | Performed by: FAMILY MEDICINE

## 2024-12-01 PROCEDURE — 99232 SBSQ HOSP IP/OBS MODERATE 35: CPT | Performed by: NURSE PRACTITIONER

## 2024-12-01 PROCEDURE — 63710000001 INSULIN GLARGINE PER 5 UNITS: Performed by: FAMILY MEDICINE

## 2024-12-01 PROCEDURE — 97530 THERAPEUTIC ACTIVITIES: CPT

## 2024-12-01 PROCEDURE — 83735 ASSAY OF MAGNESIUM: CPT | Performed by: FAMILY MEDICINE

## 2024-12-01 PROCEDURE — 25010000002 ENOXAPARIN PER 10 MG: Performed by: ORTHOPAEDIC SURGERY

## 2024-12-01 PROCEDURE — 63710000001 INSULIN LISPRO (HUMAN) PER 5 UNITS: Performed by: INTERNAL MEDICINE

## 2024-12-01 RX ADMIN — METOPROLOL SUCCINATE 50 MG: 50 TABLET, EXTENDED RELEASE ORAL at 09:08

## 2024-12-01 RX ADMIN — ROPINIROLE 0.25 MG: 0.5 TABLET, FILM COATED ORAL at 20:27

## 2024-12-01 RX ADMIN — FINASTERIDE 5 MG: 5 TABLET, FILM COATED ORAL at 20:27

## 2024-12-01 RX ADMIN — LINEZOLID 600 MG: 600 INJECTION, SOLUTION INTRAVENOUS at 21:17

## 2024-12-01 RX ADMIN — DOXYCYCLINE 100 MG: 100 INJECTION, POWDER, LYOPHILIZED, FOR SOLUTION INTRAVENOUS at 13:39

## 2024-12-01 RX ADMIN — MEROPENEM 1000 MG: 1 INJECTION, POWDER, FOR SOLUTION INTRAVENOUS at 04:31

## 2024-12-01 RX ADMIN — LINEZOLID 600 MG: 600 INJECTION, SOLUTION INTRAVENOUS at 09:10

## 2024-12-01 RX ADMIN — PANTOPRAZOLE SODIUM 40 MG: 40 TABLET, DELAYED RELEASE ORAL at 17:20

## 2024-12-01 RX ADMIN — ENOXAPARIN SODIUM 40 MG: 100 INJECTION SUBCUTANEOUS at 09:08

## 2024-12-01 RX ADMIN — IPRATROPIUM BROMIDE AND ALBUTEROL SULFATE 3 ML: 2.5; .5 SOLUTION RESPIRATORY (INHALATION) at 17:17

## 2024-12-01 RX ADMIN — INSULIN LISPRO 14 UNITS: 100 INJECTION, SOLUTION INTRAVENOUS; SUBCUTANEOUS at 12:21

## 2024-12-01 RX ADMIN — INSULIN LISPRO 4 UNITS: 100 INJECTION, SOLUTION INTRAVENOUS; SUBCUTANEOUS at 21:21

## 2024-12-01 RX ADMIN — MEROPENEM 1000 MG: 1 INJECTION, POWDER, FOR SOLUTION INTRAVENOUS at 20:26

## 2024-12-01 RX ADMIN — PANTOPRAZOLE SODIUM 40 MG: 40 TABLET, DELAYED RELEASE ORAL at 09:08

## 2024-12-01 RX ADMIN — ASPIRIN 81 MG: 81 TABLET, COATED ORAL at 09:08

## 2024-12-01 RX ADMIN — INSULIN LISPRO 4 UNITS: 100 INJECTION, SOLUTION INTRAVENOUS; SUBCUTANEOUS at 09:11

## 2024-12-01 RX ADMIN — IPRATROPIUM BROMIDE AND ALBUTEROL SULFATE 3 ML: 2.5; .5 SOLUTION RESPIRATORY (INHALATION) at 08:20

## 2024-12-01 RX ADMIN — IPRATROPIUM BROMIDE AND ALBUTEROL SULFATE 3 ML: 2.5; .5 SOLUTION RESPIRATORY (INHALATION) at 21:28

## 2024-12-01 RX ADMIN — Medication 10 ML: at 09:10

## 2024-12-01 RX ADMIN — Medication 10 ML: at 20:27

## 2024-12-01 RX ADMIN — INSULIN LISPRO 14 UNITS: 100 INJECTION, SOLUTION INTRAVENOUS; SUBCUTANEOUS at 09:08

## 2024-12-01 RX ADMIN — CALCIUM CARBONATE (ANTACID) CHEW TAB 500 MG 2 TABLET: 500 CHEW TAB at 13:38

## 2024-12-01 RX ADMIN — INSULIN GLARGINE 10 UNITS: 100 INJECTION, SOLUTION SUBCUTANEOUS at 21:21

## 2024-12-01 RX ADMIN — PREGABALIN 225 MG: 75 CAPSULE ORAL at 20:27

## 2024-12-01 RX ADMIN — PREGABALIN 225 MG: 75 CAPSULE ORAL at 09:08

## 2024-12-01 RX ADMIN — DOXYCYCLINE 100 MG: 100 INJECTION, POWDER, LYOPHILIZED, FOR SOLUTION INTRAVENOUS at 00:19

## 2024-12-01 RX ADMIN — INSULIN LISPRO 4 UNITS: 100 INJECTION, SOLUTION INTRAVENOUS; SUBCUTANEOUS at 12:21

## 2024-12-01 RX ADMIN — TAMSULOSIN HYDROCHLORIDE 0.8 MG: 0.4 CAPSULE ORAL at 20:27

## 2024-12-01 RX ADMIN — MEROPENEM 1000 MG: 1 INJECTION, POWDER, FOR SOLUTION INTRAVENOUS at 10:49

## 2024-12-01 NOTE — PLAN OF CARE
Goal Outcome Evaluation:  Plan of Care Reviewed With: patient        Progress: improving  Outcome Evaluation: OT promoted oob act with pt demo improved bed mob rolling side to side with sba, supine to sit with mod assist x 2 and min assist for sit to supine. He attempted sts multiple times with mod assist x 2 and cues for hand placement. Noted with lateral lean to Left and difficulty maintaining balance while standing. Dep for toileting, setup for washing face/hands, and dep for donning R offloading boot. Recommend IRF at d/c.    Anticipated Discharge Disposition (OT): inpatient rehabilitation facility

## 2024-12-01 NOTE — PROGRESS NOTES
[facility] Medicine Services  PROGRESS NOTE    Patient Name: Fracisco Mullen  : 1965  MRN: [id number]    Date of Admission: 11/15/2024  Primary Care Physician: Brandy Roberson    Subjective   Subjective     CC:  Follow-up left foot infection    HPI:  Patient seen resting in bed in no apparent distress.  No acute events overnight per nursing.  Pain is currently well-controlled.  Stump dressing advertently removed overnight.  Nursing is aware and is planning to redress the wound.  No new complaints at this time    Objective   Objective     Vital Signs:   Temp:  [98 °F (36.7 °C)-98.3 °F (36.8 °C)] 98 °F (36.7 °C)  Heart Rate:  [82-97] 92  Resp:  [18] 18  BP: (116-128)/(60-73) 122/73  Flow (L/min) (Oxygen Therapy):  [2] 2     Physical Exam:  Constitutional: No acute distress, awake, alert, pleasant  HENT: NCAT, mucous membranes moist  Respiratory: Clear to auscultation bilaterally, respiratory effort normal   Cardiovascular: RRR, no murmurs, cap refill brisk   Gastrointestinal: Positive bowel sounds, soft, nontender, nondistended  Musculoskeletal: Left BKA incision well-approximated with staples in place  Psychiatric: flat affect, cooperative  Neurologic: Oriented x 3, moves all extremities, speech clear  Skin: warm, dry, no visible rash     Results Reviewed:  LAB RESULTS:      Lab 24  0603 24  0641 24  0612 24  0502 24  0757 245 244   WBC 8.36 7.13 8.39 10.10 10.62  --  10.93*   HEMOGLOBIN 8.2* 8.0* 8.1* 7.7* 8.2*  --  8.6*   HEMATOCRIT 26.6* 26.1* 26.5* 25.3* 27.1*  --  28.3*   PLATELETS 419 437 388 366 337  --  340   NEUTROS ABS  --  3.73  --   --   --   --  8.05*   IMMATURE GRANS (ABS)  --  0.07*  --   --   --   --  0.09*   LYMPHS ABS  --  2.14  --   --   --   --  1.42   MONOS ABS  --  0.59  --   --   --   --  1.13*   EOS ABS  --  0.53*  --   --   --   --  0.16   MCV 83.9 84.2 85.2 84.6 85.5  --  86.0   LACTATE  --   --    --   --   --   --  1.8   D DIMER QUANT  --   --   --   --   --  1.29*  --          Lab 11/30/24  0603 11/29/24  1838 11/29/24  0641 11/28/24  0612 11/27/24  0502 11/26/24 0757 11/25/24 2125 11/25/24 2125 11/25/24  0540   SODIUM 131*  --  136 135* 134* 132*   < > 133*  --    POTASSIUM 4.9  --  4.8 4.4 4.2 4.0   < > 4.3  --    CHLORIDE 99  --  103 102 100 97*   < > 97*  --    CO2 25.0  --  25.0 26.0 27.0 28.0   < > 28.0  --    ANION GAP 7.0  --  8.0 7.0 7.0 7.0   < > 8.0  --    BUN 12  --  10 11 14 15   < > 18  --    CREATININE 0.77  --  0.83 0.73* 0.82 0.81   < > 0.93  --    EGFR 101.9  --  99.6 103.5 99.9 100.3   < > 93.4  --    GLUCOSE 239*  --  230* 227* 249* 238*   < > 176*  --    CALCIUM 8.1*  --  7.8* 7.3* 7.5* 7.4*   < > 8.0*  --    MAGNESIUM  --   --   --   --   --   --   --  2.1 2.3   PHOSPHORUS  --  2.6 2.1*  --   --   --   --   --   --     < > = values in this interval not displayed.         Lab 11/29/24  0641 11/26/24 0757 11/25/24 2125   TOTAL PROTEIN  --  5.7* 5.5*   ALBUMIN 2.4* 2.5* 2.4*   GLOBULIN  --  3.2 3.1   ALT (SGPT)  --  17 21   AST (SGOT)  --  22 29   BILIRUBIN  --  0.2 0.2   ALK PHOS  --  90 87                     Lab 11/25/24 2026   PH, ARTERIAL 7.440   PCO2, ARTERIAL 44.9   PO2 ART 91.4   FIO2 80   HCO3 ART 30.5*   BASE EXCESS ART 5.7*   CARBOXYHEMOGLOBIN 1.4     Brief Urine Lab Results  (Last result in the past 365 days)        Color   Clarity   Blood   Leuk Est   Nitrite   Protein   CREAT   Urine HCG        11/15/24 1142 Yellow   Clear   Small (1+)   Negative   Negative   100 mg/dL (2+)                   Microbiology Results Abnormal       None            No radiology results from the last 24 hrs        Current medications:  Scheduled Meds:aspirin, 81 mg, Oral, Daily  doxycycline, 100 mg, Intravenous, Q12H  enoxaparin, 40 mg, Subcutaneous, Daily  finasteride, 5 mg, Oral, Nightly  insulin glargine, 30 Units, Subcutaneous, Nightly  Insulin Lispro, 14 Units, Subcutaneous, TID With  Meals  insulin lispro, 2-9 Units, Subcutaneous, 4x Daily AC & at Bedtime  ipratropium-albuterol, 3 mL, Nebulization, 4x Daily - RT  Linezolid, 600 mg, Intravenous, Q12H  meropenem, 1,000 mg, Intravenous, Q8H  metoprolol succinate XL, 50 mg, Oral, Daily  pantoprazole, 40 mg, Oral, BID AC  pregabalin, 225 mg, Oral, BID  rOPINIRole, 0.25 mg, Oral, Nightly  sodium chloride, 10 mL, Intravenous, Q12H  tamsulosin, 0.8 mg, Oral, Nightly      Continuous Infusions:ropivacaine, , Last Rate: Stopped (11/28/24 0700)      PRN Meds:.  acetaminophen **OR** acetaminophen **OR** acetaminophen    senna-docusate sodium **AND** polyethylene glycol **AND** bisacodyl **AND** bisacodyl    calcium carbonate    Calcium Replacement - Follow Nurse / BPA Driven Protocol    dextrose    dextrose    glucagon (human recombinant)    influenza vaccine    Lidocaine HCl gel    Magnesium Standard Dose Replacement - Follow Nurse / BPA Driven Protocol    nitroglycerin    ondansetron    Phosphorus Replacement - Follow Nurse / BPA Driven Protocol    Potassium Replacement - Follow Nurse / BPA Driven Protocol    sodium chloride    sodium chloride    Assessment & Plan   Assessment & Plan     Active Hospital Problems    Diagnosis  POA    **DKA (diabetic ketoacidosis) [E11.10]  Yes    Severe protein-calorie malnutrition [E43]  Yes    Non healing left heel wound [S91.302A]  Yes    Acute osteomyelitis of left foot [M86.172]  Unknown      Resolved Hospital Problems   No resolved problems to display.        Brief Hospital Course to date:  Fracisco Mullen is a 59 y.o. male  with past medical history of hypertension, type 2 diabetes with insulin use, diabetic neuropathy, and chronic diabetic foot wounds who presents via EMS from home due to altered mental status and fatigue. Lab work consistent with diabetic ketoacidosis.  He was then found to have left calcaneal osteomyelitis.  Orthopedic surgery was consulted and performed left BKA on November 22.  He had an acute  hypoxic event overnight on November 25 with significant urinary retention that improved after Bustos was placed.  There was concern for aspiration at that time so his antibiotics were escalated. PT/OT evaluated and recommended IRF. CM is following for placement.      This patient's problems and plans were partially entered by my partner and updated as appropriate by me 12/01/24.     Left calcaneal osteomyelitis   - Patient with chronic left heel wound and right plantar foot wound  - Admitted August 2024 for left foot cellulitis and discharged on oral antibiotics and with home wound vac following debridement, MRI left foot obtained at that time with no definite findings of osteomyelitis  - Repeat MRI left foot revealed soft tissue wound with evidence of osteomyelitis of the calcaneus and possibly an associated non-drainable abscess  -S/p left below-knee amputation by Dr. Gan on 11/22/2024 without any immediate complications  -ID following, continue Zyvox, Merrem, and Doxy to cover for pneumonia that possibly developed overnight 11/25   -Optimize pain control  -PT/OT  -AM labs      Acute hypoxia, resolved   -Likely due to pulmonary edema  -improved following diuresis      BPH  Urinary retention  - Continue home Flomax, Proscar  -Bustos anchored again; failed voiding trial. Outpatient urology f/u     DKA in setting of uncontrolled IDDM complicated by diabetic neuropathy and chronic foot wounds , improving  - Uncertain of medication compliance  - A1c 14.1%  - S/p DKA protocol including insulin drip, IV fluids, and electrolyte replacement per protocol  - Now on subcutaneous insulin, Titrate insulin as indicated.     Malnutrition  - Patient with poor oral intake, refusing supplementation  - Nutrition following     Acute toxic metabolic encephalopathy, improved  - CT head without acute abnormality  - UDS negative  - secondary to DKA     HTN  - Continue home metoprolol     RLS  - Continue home Requip     Expected  Discharge Location and Transportation: likely snf  Expected Discharge   Expected Discharge Date: 12/2/2024; Expected Discharge Time:       VTE Prophylaxis:  Pharmacologic VTE prophylaxis orders are present.    AM-PAC 6 Clicks Score (PT): 11 (12/01/24 0906)    CODE STATUS:   Code Status and Medical Interventions: CPR (Attempt to Resuscitate); Full Support; Full code per last hosptial admission. Patient disoriented on exam with no family present at bedside. Only contact information is friend.   Ordered at: 11/15/24 1218     Level Of Support Discussed With:    Patient     Code Status (Patient has no pulse and is not breathing):    CPR (Attempt to Resuscitate)     Medical Interventions (Patient has pulse or is breathing):    Full Support     Comments:    Full code per last hosptial admission. Patient disoriented on exam with no family present at bedside. Only contact information is friend.       Chris Rosado, PATRICIA  12/01/24

## 2024-12-01 NOTE — THERAPY TREATMENT NOTE
Patient Name: Fracisco Mullen  : 1965    MRN: [id number]                              Today's Date: 2024       Admit Date: 11/15/2024    Visit Dx:     ICD-10-CM ICD-9-CM   1. S/P BKA (below knee amputation) unilateral, left  Z89.512 V49.75   2. Diabetic ketoacidosis without coma associated with type 2 diabetes mellitus  E11.10 250.12   3. Oropharyngeal dysphagia  R13.12 787.22   4. Non healing left heel wound  S91.302A 892.1   5. Acute osteomyelitis of left foot  M86.172 730.07     Patient Active Problem List   Diagnosis    HTN (hypertension)    Type 2 diabetes mellitus, with long-term current use of insulin    Diabetic retinopathy    Obesity (BMI 30.0-34.9)    Peripheral neuropathy    Asthma    Causalgia of lower limb    Chronic constipation    Compression of lumbar nerve root    GERD (gastroesophageal reflux disease)    Hyperlipidemia    IBS (irritable bowel syndrome)    RLS (restless legs syndrome)    Sleep apnea    Vitamin D deficiency    Cigar smoker    Chronic back pain    Diabetic ketoacidosis associated with type 2 diabetes mellitus    Multiple open wounds of foot    Status post cholecystectomy    Cholestatic hepatitis    Diabetic infection of left foot    Precordial pain    Tobacco use    DKA (diabetic ketoacidosis)    Non healing left heel wound    Severe protein-calorie malnutrition    Acute osteomyelitis of left foot     Past Medical History:   Diagnosis Date    Acute renal failure     Hx of    Obesity     Hx of    Sleep apnea     Type 2 diabetes mellitus      Past Surgical History:   Procedure Laterality Date    BACK SURGERY      lower back    BELOW KNEE AMPUTATION Left 2024    Procedure: BELOW-KNEE AMPUTATION LEFT;  Surgeon: Dru Gan Jr., MD;  [facility]: [facility];  Service: Orthopedics;  Laterality: Left;    CHOLECYSTECTOMY WITH INTRAOPERATIVE CHOLANGIOGRAM N/A 2021    Procedure: CHOLECYSTECTOMY LAPAROSCOPIC INTRAOPERATIVE CHOLANGIOGRAM;  Surgeon: Juventino Hooker MD;   Location:  [facility];  Service: General;  Laterality: N/A;    ERCP N/A 1/9/2021    Procedure: ENDOSCOPIC RETROGRADE CHOLANGIOPANCREATOGRAPHY;  Surgeon: Jeremy Dowell MD;  Location: [facility] ENDOSCOPY;  Service: Gastroenterology;  Laterality: N/A;  with sphiincterotomy and balloon sweep with 9mm-12mm balloon    INCISION AND DRAINAGE FOOT Left 8/3/2024    Procedure: HEAL IRRIGATION AND DEBRIDEMENT LEFT;  Surgeon: Dru Gan Jr., MD;  Location:  [facility];  Service: Orthopedics;  Laterality: Left;    PLACEMENT OF WOUND VAC Left 8/3/2024    Procedure: PLACEMENT OF WOUND VAC;  Surgeon: Dru Gan Jr., MD;  Location:  [facility];  Service: Orthopedics;  Laterality: Left;      General Information       Row Name 12/01/24 0920          OT Time and Intention    Document Type therapy note (daily note)  -     Mode of Treatment occupational therapy  -       Row Name 12/01/24 0920          General Information    Patient Profile Reviewed yes  -     Existing Precautions/Restrictions fall;weight bearing  WBAT RLE  -       Row Name 12/01/24 0920          Cognition    Orientation Status (Cognition) oriented x 3  Knew month, VC for year  -       Row Name 12/01/24 0920          Safety Issues/Impairments Affecting Functional Mobility    Impairments Affecting Function (Mobility) endurance/activity tolerance;pain;strength  -               User Key  (r) = Recorded By, (t) = Taken By, (c) = Cosigned By      Initials Name Provider Type    SW Melba Ellsworth OT Occupational Therapist                     Mobility/ADL's       Row Name 12/01/24 0920          Bed Mobility    Bed Mobility supine-sit;sit-supine;rolling left;rolling right;scooting/bridging  -SW     Rolling Left [place] (Bed Mobility) standby assist  -SW     Rolling Right [place] (Bed Mobility) standby assist  -SW     Scooting/Bridging [place] (Bed Mobility) moderate assist (50% patient effort);2 person assist  -SW     Supine-Sit [place] (Bed  Mobility) moderate assist (50% patient effort);2 person assist;verbal cues;nonverbal cues (demo/gesture)  -     Sit-Supine [place] (Bed Mobility) verbal cues;minimum assist (75% patient effort)  -     Assistive Device (Bed Mobility) bed rails;head of bed elevated;repositioning sheet  -[facility] Name 12/01/24 0920          Transfers    Transfers sit-stand transfer  -     Comment, (Transfers) Completed 3 sts  -SW       Row Name 12/01/24 0920          Bed-Chair Transfer    Bed-Chair [place] (Transfers) not tested  -     Comment, (Bed-Chair Transfer) Need mechanical lift room - not safe for t/f to and from chair with sit pivot.  -[facility] Name 12/01/24 0920          Activities of Daily Living    BADL Assessment/Intervention lower body dressing;grooming;toileting  -SW       Row Name 12/01/24 0920          Mobility    Extremity Weight-bearing Status left lower extremity;right lower extremity  -     Left Lower Extremity (Weight-bearing Status) non weight-bearing (NWB)  -     Right Lower Extremity (Weight-bearing Status) partial weight-bearing (PWB);other (see comments)  -[facility] Name 12/01/24 0920          Lower Body Dressing Assessment/Training    [place] Level (Lower Body Dressing) lower body dressing skills;socks;shoes/slippers;dependent (less than 25% patient effort)  -     Position (Lower Body Dressing) edge of bed sitting  -[facility] Name 12/01/24 0920          Grooming Assessment/Training    [place] Level (Grooming) grooming skills;wash face, hands;set up  -     Position (Grooming) sitting up in bed  -[facility] Name 12/01/24 0920          Toileting Assessment/Training    [place] Level (Toileting) perform perineal hygiene;dependent (less than 25% patient effort)  -     Position (Toileting) supine  -               User Key  (r) = Recorded By, (t) = Taken By, (c) = Cosigned By      Initials Name Provider Type    Melba Soto OT Occupational Therapist                    Obj/Interventions       Row Name 12/01/24 0920          Shoulder (Therapeutic Exercise)    Shoulder (Therapeutic Exercise) AROM (active range of motion);strengthening exercise  -     Shoulder AROM (Therapeutic Exercise) bilateral;flexion;extension;aBduction;aDduction  -     Shoulder Strengthening (Therapeutic Exercise) bilateral;flexion;extension;aBduction;aDduction;supine;10 repetitions;green;resistance band  -       Row Name 12/01/24 0920          Elbow/Forearm (Therapeutic Exercise)    Elbow/Forearm (Therapeutic Exercise) AROM (active range of motion);strengthening exercise  -     Elbow/Forearm AROM (Therapeutic Exercise) bilateral;flexion;extension;supine;10 repetitions  -     Elbow/Forearm Strengthening (Therapeutic Exercise) bilateral;flexion;extension;supine;10 repetitions;green;resistance band  -       Row Name 12/01/24 0920          Motor Skills    Therapeutic Exercise shoulder;elbow/forearm  -       Row Name 12/01/24 0920          Balance    Balance Assessment sitting static balance;sitting dynamic balance;sit to stand dynamic balance;standing static balance  -     Static Sitting Balance standby assist  -     Dynamic Sitting Balance minimal assist  -     Position, Sitting Balance unsupported  -     Sit to Stand Dynamic Balance moderate assist;2-person assist;verbal cues;non-verbal cues (demo/gesture)  -     Static Standing Balance moderate assist;2-person assist;verbal cues;non-verbal cues (demo/gesture)  -     Position/Device Used, Standing Balance supported  -     Balance Interventions sitting;standing;sit to stand;supported;static;dynamic;minimal challenge;occupation based/functional task  -               User Key  (r) = Recorded By, (t) = Taken By, (c) = Cosigned By      Initials Name Provider Type    Melba Soto OT Occupational Therapist                   Goals/Plan    No documentation.                  Clinical Impression       Row Name 12/01/24  0920          Pain Assessment    Pretreatment Pain Rating 8/10  -SW     Posttreatment Pain Rating 8/10  -SW     Pain Location residual limb  -SW     Pain Side/Orientation left;lower  -SW     Pain Management Interventions nursing notified;exercise or physical activity utilized  -SW     Response to Pain Interventions activity participation with tolerable pain;no change per patient report  -       Row Name 12/01/24 0920          Plan of Care Review    Plan of Care Reviewed With patient  -SW     Progress improving  -     Outcome Evaluation OT promoted oob act with pt demo improved bed mob rolling side to side with sba, supine to sit with mod assist x 2 and min assist for sit to supine. He attempted sts multiple times with mod assist x 2 and cues for hand placement. Noted with lateral lean to Left and difficulty maintaining balance while standing. Dep for toileting, setup for washing face/hands, and dep for donning R offloading boot. Recommend IRF at d/c.  -       Row Name 12/01/24 0920          Therapy Plan Review/Discharge Plan (OT)    Anticipated Discharge Disposition (OT) inpatient rehabilitation facility  -       Row Name 12/01/24 0920          Vital Signs    Pre Systolic BP Rehab 122  -SW     Pre Treatment Diastolic BP 73  -SW     Pretreatment Heart Rate (beats/min) 92  -SW     Pre SpO2 (%) 92  -SW     O2 Delivery Pre Treatment room air  -SW     O2 Delivery Intra Treatment room air  -SW     O2 Delivery Post Treatment room air  -SW     Pre Patient Position Supine  -SW     Intra Patient Position Standing  -SW     Post Patient Position Supine  -SW       Row Name 12/01/24 0920          Positioning and Restraints    Pre-Treatment Position in bed  -SW     Post Treatment Position bed  -SW     In Bed notified nsg;supine;fowlers;call light within reach;encouraged to call for assist;exit alarm on;side rails up x2  -SW               User Key  (r) = Recorded By, (t) = Taken By, (c) = Cosigned By      Initials Name  Provider Type    Melba Soto, OT Occupational Therapist                   Outcome Measures       Row Name 12/01/24 1029          How much help from another is currently needed...    Putting on and taking off regular lower body clothing? 2  -SW     Bathing (including washing, rinsing, and drying) 2  -SW     Toileting (which includes using toilet bed pan or urinal) 1  -SW     Putting on and taking off regular upper body clothing 3  -SW     Taking care of personal grooming (such as brushing teeth) 4  -SW     Eating meals 4  -SW     AM-PAC 6 Clicks Score (OT) 16  -       Row Name 12/01/24 0906 12/01/24 0400       How much help from another person do you currently need...    Turning from your back to your side while in flat bed without using bedrails? 3  -LS 3  -JI    Moving from lying on back to sitting on the side of a flat bed without bedrails? 2  -LS 3  -JI    Moving to and from a bed to a chair (including a wheelchair)? 2  -LS 1  -JI    Standing up from a chair using your arms (e.g., wheelchair, bedside chair)? 2  -LS 1  -JI    Climbing 3-5 steps with a railing? 1  -LS 1  -JI    To walk in hospital room? 1  -LS 1  -JI    AM-PAC 6 Clicks Score (PT) 11  -LS 10  -JI    Highest Level of Mobility Goal 4 --> Transfer to chair/commode  -LS 4 --> Transfer to chair/commode  -JI      Row Name 12/01/24 0000          How much help from another person do you currently need...    Turning from your back to your side while in flat bed without using bedrails? 3  -JI     Moving from lying on back to sitting on the side of a flat bed without bedrails? 3  -JI     Moving to and from a bed to a chair (including a wheelchair)? 1  -JI     Standing up from a chair using your arms (e.g., wheelchair, bedside chair)? 1  -JI     Climbing 3-5 steps with a railing? 1  -JI     To walk in hospital room? 1  -JI     AM-PAC 6 Clicks Score (PT) 10  -JI     Highest Level of Mobility Goal 4 --> Transfer to chair/commode  -JI       Row Name 12/01/24  1029 12/01/24 0906       Functional Assessment    Outcome Measure Options AM-PAC 6 Clicks Daily Activity (OT)  - AM-PAC 6 Clicks Basic Mobility (PT)  -              User Key  (r) = Recorded By, (t) = Taken By, (c) = Cosigned By      Initials Name Provider Type    LS Richelle Acevedo, PT Physical Therapist    Melba Soto, OT Occupational Therapist    Ion Mahoney, RN Registered Nurse                    Occupational Therapy Education       Title: PT OT SLP Therapies (Done)       Topic: Occupational Therapy (Done)       Point: ADL training (Done)       Description:   Instruct learner(s) on proper safety adaptation and remediation techniques during self care or transfers.   Instruct in proper use of assistive devices.                  Learning Progress Summary            Patient Acceptance, E, VU by  at 12/1/2024 1030    Acceptance, E,TB, VU by  at 11/28/2024 1846    Acceptance, E, VU,NR by SOLOMON at 11/28/2024 1044    Acceptance, E,D, NR by PATTIE at 11/19/2024 1352    Acceptance, E,TB, NR by MEL at 11/17/2024 1308                      Point: Home exercise program (Done)       Description:   Instruct learner(s) on appropriate technique for monitoring, assisting and/or progressing therapeutic exercises/activities.                  Learning Progress Summary            Patient Acceptance, E, VU by  at 12/1/2024 1030    Acceptance, E,TB, VU by  at 11/28/2024 1846                      Point: Precautions (Done)       Description:   Instruct learner(s) on prescribed precautions during self-care and functional transfers.                  Learning Progress Summary            Patient Acceptance, E,TB, VU by  at 11/28/2024 1846    Acceptance, E, VU,NR by TA at 11/28/2024 1044    Acceptance, E,D, NR by PATTIE at 11/19/2024 1352    Acceptance, E,TB, NR by KF at 11/17/2024 1308                      Point: Body mechanics (Done)       Description:   Instruct learner(s) on proper positioning and spine alignment during  self-care, functional mobility activities and/or exercises.                  Learning Progress Summary            Patient Acceptance, E, VU by SW at 12/1/2024 1030    Acceptance, E,TB, VU by  at 11/28/2024 1846    Acceptance, E, VU,NR by TA at 11/28/2024 1044    Acceptance, E,D, NR by JY at 11/19/2024 1352    Acceptance, E,TB, NR by KF at 11/17/2024 1308                                      User Key       Initials Effective Dates Name Provider Type Discipline    SOLOMON 06/16/21 -  Waldemar Malik OT Occupational Therapist OT    JSKYLER 06/16/21 -  Dianelys Peguero OT Occupational Therapist OT    BALBINA 06/16/21 -  Melba Ellsworth OT Occupational Therapist OT     01/23/24 -  Carlos Jennings, LUZ Registered Nurse Nurse     08/09/23 -  Jamilah Meyers OT Occupational Therapist OT                  OT Recommendation and Plan     Plan of Care Review  Plan of Care Reviewed With: patient  Progress: improving  Outcome Evaluation: OT promoted oob act with pt demo improved bed mob rolling side to side with sba, supine to sit with mod assist x 2 and min assist for sit to supine. He attempted sts multiple times with mod assist x 2 and cues for hand placement. Noted with lateral lean to Left and difficulty maintaining balance while standing. Dep for toileting, setup for washing face/hands, and dep for donning R offloading boot. Recommend IRF at d/c.     Time Calculation:         Time Calculation- OT       Row Name 12/01/24 0920             Time Calculation- OT    OT Start Time 0920  -SW      OT Received On 12/01/24  -SW         Timed Charges    83492 - OT Therapeutic Exercise Minutes 10  -SW      52606 - OT Self Care/Mgmt Minutes 15  -SW         Total Minutes    Timed Charges Total Minutes 25  -SW       Total Minutes 25  -SW                User Key  (r) = Recorded By, (t) = Taken By, (c) = Cosigned By      Initials Name Provider Type    Melba Soto OT Occupational Therapist                  Therapy Charges for Today       Code  Description Service Date Service Provider Modifiers Qty    07214216653 HC OT THER PROC EA 15 MIN 12/1/2024 Melba Ellsworth OT GO 1    [id number] HC OT SELF CARE/MGMT/TRAIN EA 15 MIN 12/1/2024 Melba Ellsworth OT GO 1                 Melba Ellsworth OT  12/1/2024

## 2024-12-01 NOTE — PLAN OF CARE
Goal Outcome Evaluation:              Outcome Evaluation: VSS; A/O x4; O2 > 90% on room air; Pt resting in bed; alarm on; call light within reach.  No needs verbalized at this time.

## 2024-12-01 NOTE — THERAPY TREATMENT NOTE
Patient Name: Fracisco Mullen  : 1965    MRN: [id number]                              Today's Date: 2024       Admit Date: 11/15/2024    Visit Dx:     ICD-10-CM ICD-9-CM   1. S/P BKA (below knee amputation) unilateral, left  Z89.512 V49.75   2. Diabetic ketoacidosis without coma associated with type 2 diabetes mellitus  E11.10 250.12   3. Oropharyngeal dysphagia  R13.12 787.22   4. Non healing left heel wound  S91.302A 892.1   5. Acute osteomyelitis of left foot  M86.172 730.07     Patient Active Problem List   Diagnosis    HTN (hypertension)    Type 2 diabetes mellitus, with long-term current use of insulin    Diabetic retinopathy    Obesity (BMI 30.0-34.9)    Peripheral neuropathy    Asthma    Causalgia of lower limb    Chronic constipation    Compression of lumbar nerve root    GERD (gastroesophageal reflux disease)    Hyperlipidemia    IBS (irritable bowel syndrome)    RLS (restless legs syndrome)    Sleep apnea    Vitamin D deficiency    Cigar smoker    Chronic back pain    Diabetic ketoacidosis associated with type 2 diabetes mellitus    Multiple open wounds of foot    Status post cholecystectomy    Cholestatic hepatitis    Diabetic infection of left foot    Precordial pain    Tobacco use    DKA (diabetic ketoacidosis)    Non healing left heel wound    Severe protein-calorie malnutrition    Acute osteomyelitis of left foot     Past Medical History:   Diagnosis Date    Acute renal failure     Hx of    Obesity     Hx of    Sleep apnea     Type 2 diabetes mellitus      Past Surgical History:   Procedure Laterality Date    BACK SURGERY      lower back    BELOW KNEE AMPUTATION Left 2024    Procedure: BELOW-KNEE AMPUTATION LEFT;  Surgeon: Dru Gan Jr., MD;  Location: [facility];  Service: Orthopedics;  Laterality: Left;    CHOLECYSTECTOMY WITH INTRAOPERATIVE CHOLANGIOGRAM N/A 2021    Procedure: CHOLECYSTECTOMY LAPAROSCOPIC INTRAOPERATIVE CHOLANGIOGRAM;  Surgeon: Juventino Hooker MD;   Location:  [facility];  Service: General;  Laterality: N/A;    ERCP N/A 1/9/2021    Procedure: ENDOSCOPIC RETROGRADE CHOLANGIOPANCREATOGRAPHY;  Surgeon: Jeremy Dowell MD;  Location: [facility] ENDOSCOPY;  Service: Gastroenterology;  Laterality: N/A;  with sphiincterotomy and balloon sweep with 9mm-12mm balloon    INCISION AND DRAINAGE FOOT Left 8/3/2024    Procedure: HEAL IRRIGATION AND DEBRIDEMENT LEFT;  Surgeon: Dru Gan Jr., MD;  Location:  [facility];  Service: Orthopedics;  Laterality: Left;    PLACEMENT OF WOUND VAC Left 8/3/2024    Procedure: PLACEMENT OF WOUND VAC;  Surgeon: Dru Gan Jr., MD;  Location:  [facility];  Service: Orthopedics;  Laterality: Left;      General Information       Row Name 12/01/24 0906          Physical Therapy Time and Intention    Document Type therapy note (daily note)  -     Mode of Treatment physical therapy  -       Row Name 12/01/24 0906          General Information    Patient Profile Reviewed other (see comments)  -     Existing Precautions/Restrictions fall   R off loading shoe; RLE WBAT in offoading shoe  -       Row Name 12/01/24 0906          Cognition    Orientation Status (Cognition) oriented x 3  knew month; vc for year  -               User Key  (r) = Recorded By, (t) = Taken By, (c) = Cosigned By      Initials Name Provider Type    Richelle Ying PT Physical Therapist                   Mobility       Row Name 12/01/24 0906          Bed Mobility    Rolling Left [place] (Bed Mobility) modified independence  -LS     Rolling Right [place] (Bed Mobility) modified independence  -LS     Scooting/Bridging [place] (Bed Mobility) modified independence  -LS     Supine-Sit [place] (Bed Mobility) verbal cues;moderate assist (50% patient effort);2 person assist  -     Sit-Supine [place] (Bed Mobility) minimum assist (75% patient effort)  -     Assistive Device (Bed Mobility) bed rails;head of bed elevated  -      Comment, (Bed Mobility) increased time and effort for mobility taskes. used bedrail to roll and assist to scoot up in bed. vcs for seqencing supine to sit assist needed to bring legs off EOB and to bring trunk upright.  -       Row Name 12/01/24 0906          Sit-Stand Transfer    Sit-Stand [place] (Transfers) verbal cues;moderate assist (50% patient effort);2 person assist  -     Assistive Device (Sit-Stand Transfers) walker, front-wheeled  -     Comment, (Sit-Stand Transfer) sit to stand X 3. unable to lift RLE  off floor. pt declined getting up to chair; pt c/o feeling weak. therapist re-wrapped residual limb with Xeroform, Kerlix, and Ace Wrap d/t MD having removed wrap.  -       Row Name 12/01/24 0906          Gait/Stairs (Locomotion)    Patient was able to Ambulate no, other medical factors prevent ambulation  -     Reason Patient was unable to Ambulate Excessive Weakness  -       Row Name 12/01/24 0906          Mobility    Right Lower Extremity (Weight-bearing Status) --  R offloading shoe  -               User Key  (r) = Recorded By, (t) = Taken By, (c) = Cosigned By      Initials Name Provider Type    LS Richelle Acevedo, PT Physical Therapist                   Obj/Interventions       Row Name 12/01/24 0906          Hip (Therapeutic Exercise)    Hip Isometrics (Therapeutic Exercise) bilateral;gluteal sets;10 repetitions  -     Hip Strengthening (Therapeutic Exercise) left;aBduction;aDduction  -       Row Name 12/01/24 0906          Knee (Therapeutic Exercise)    Knee AROM (Therapeutic Exercise) left;flexion;extension;SLR (straight leg raise);10 repetitions  -     Knee Isometrics (Therapeutic Exercise) left;gluteal sets;10 repetitions  -       Row Name 12/01/24 0906          Balance    Static Standing Balance moderate assist;2-person assist  -     Position/Device Used, Standing Balance supported;walker, front-wheeled  -LS     Balance Interventions standing;sit to  stand;supported;weight shifting activity  -LS               User Key  (r) = Recorded By, (t) = Taken By, (c) = Cosigned By      Initials Name Provider Type    Richelle Ying, PT Physical Therapist                   Goals/Plan    No documentation.                  Clinical Impression       Row Name 12/01/24 0906          Pain    Pretreatment Pain Rating 0/10 - no pain  -LS     Posttreatment Pain Rating 0/10 - no pain  -LS     Pre/Posttreatment Pain Comment --  -LS       Row Name 12/01/24 0906          Plan of Care Review    Plan of Care Reviewed With patient  -LS     Outcome Evaluation More assist needed supine to sit today; however, he was able to stand with mod A of 2 at a RW. Cont with significant weakness and would benefit from cont PT services. Recommend IPR at discharge.  -LS               User Key  (r) = Recorded By, (t) = Taken By, (c) = Cosigned By      Initials Name Provider Type    Richelle Ying, PT Physical Therapist                   Outcome Measures       Row Name 12/01/24 0906 12/01/24 0400       How much help from another person do you currently need...    Turning from your back to your side while in flat bed without using bedrails? 3  -LS 3  -JI    Moving from lying on back to sitting on the side of a flat bed without bedrails? 2  -LS 3  -JI    Moving to and from a bed to a chair (including a wheelchair)? 2  -LS 1  -JI    Standing up from a chair using your arms (e.g., wheelchair, bedside chair)? 2  -LS 1  -JI    Climbing 3-5 steps with a railing? 1  -LS 1  -JI    To walk in hospital room? 1  -LS 1  -JI    AM-PAC 6 Clicks Score (PT) 11  -LS 10  -JI    Highest Level of Mobility Goal 4 --> Transfer to chair/commode  -LS 4 --> Transfer to chair/commode  -JI      Row Name 12/01/24 0000          How much help from another person do you currently need...    Turning from your back to your side while in flat bed without using bedrails? 3  -JI     Moving from lying on back to sitting on the  side of a flat bed without bedrails? 3  -JI     Moving to and from a bed to a chair (including a wheelchair)? 1  -JI     Standing up from a chair using your arms (e.g., wheelchair, bedside chair)? 1  -JI     Climbing 3-5 steps with a railing? 1  -JI     To walk in hospital room? 1  -JI     AM-PAC 6 Clicks Score (PT) 10  -JI     Highest Level of Mobility Goal 4 --> Transfer to chair/commode  -JI       Row Name 12/01/24 1029 12/01/24 0906       Functional Assessment    Outcome Measure Options AM-PAC 6 Clicks Daily Activity (OT)  - AM-PAC 6 Clicks Basic Mobility (PT)  -              User Key  (r) = Recorded By, (t) = Taken By, (c) = Cosigned By      Initials Name Provider Type    Richelle Ying, PT Physical Therapist    Melba Soto, OT Occupational Therapist    Ion Mahoney, RN Registered Nurse                                 Physical Therapy Education       Title: PT OT SLP Therapies (Done)       Topic: Physical Therapy (Done)       Point: Mobility training (Done)       Learning Progress Summary            Patient Acceptance, E, VU,NR by  at 12/1/2024 0906    Acceptance, E,TB, VU by  at 11/28/2024 1846    Acceptance, E, VU,NR by  at 11/28/2024 1136    Acceptance, E, VU,NR by  at 11/19/2024 1824    Acceptance, E, VU,NR by  at 11/19/2024 1824    Acceptance, E,D, NR by MARCIE at 11/17/2024 1806                      Point: Home exercise program (Done)       Learning Progress Summary            Patient Acceptance, E,TB, VU by  at 11/28/2024 1846    Acceptance, E, VU,NR by  at 11/28/2024 1136    Acceptance, E, VU,NR by  at 11/19/2024 1824    Acceptance, E, VU,NR by  at 11/19/2024 1824    Acceptance, E,D, NR by DM at 11/17/2024 1806                      Point: Body mechanics (Done)       Learning Progress Summary            Patient Acceptance, E,TB, VU by  at 11/28/2024 1846    Acceptance, E, VU,NR by BA at 11/28/2024 1136    Acceptance, E, VU,NR by BA at 11/19/2024 1824    Acceptance, E,  VU,NR by  at 11/19/2024 1824    Acceptance, E,D, NR by  at 11/17/2024 1806                      Point: Precautions (Done)       Learning Progress Summary            Patient Acceptance, E,TB, VU by  at 11/28/2024 1846    Acceptance, E, VU,NR by  at 11/28/2024 1136    Acceptance, E, VU,NR by  at 11/19/2024 1824    Acceptance, E, VU,NR by  at 11/19/2024 1824    Acceptance, E,D, NR by  at 11/17/2024 1806                                      User Key       Initials Effective Dates Name Provider Type Discipline    DM 02/03/23 -  Jacklyn Gtz, PT Physical Therapist PT     07/11/23 -  Richelle Acevedo, PT Physical Therapist PT     09/21/21 -  Galilea Burkett, PT Physical Therapist PT     01/23/24 -  Carlos Jennings RN Registered Nurse Nurse                  PT Recommendation and Plan     Outcome Evaluation: More assist needed supine to sit today; however, he was able to stand with mod A of 2 at a RW. Cont with significant weakness and would benefit from cont PT services. Recommend IPR at discharge.     Time Calculation:         PT Charges       Row Name 12/01/24 0906             Time Calculation    Start Time 0906  -      PT Received On 12/01/24  -      PT Goal Re-Cert Due Date 12/08/24  -         Time Calculation- PT    Total Timed Code Minutes- PT --  -LS         Timed Charges    37947 - PT Therapeutic Exercise Minutes 10  -LS      70625 - PT Therapeutic Activity Minutes 15  -LS         Total Minutes    Timed Charges Total Minutes 25  -LS       Total Minutes 25  -LS                User Key  (r) = Recorded By, (t) = Taken By, (c) = Cosigned By      Initials Name Provider Type     Richelle Acevedo, PT Physical Therapist                  Therapy Charges for Today       Code Description Service Date Service Provider Modifiers Qty    73867390446 HC PT THER PROC EA 15 MIN 12/1/2024 Richelle Acevedo, PT GP 1    72889944144 HC PT THERAPEUTIC ACT EA 15 MIN 12/1/2024 Richelle Acevedo  Malcolm, PT GP 1            PT G-Codes  Outcome Measure Options: AM-PAC 6 Clicks Daily Activity (OT)  AM-PAC 6 Clicks Score (PT): 11  AM-PAC 6 Clicks Score (OT): 16       Richelle Acevedo, PT  12/1/2024

## 2024-12-01 NOTE — PROGRESS NOTES
"Fracisco Mullen  1965  [id number]    Evaluating Physician: Varun Dominique MD    Chief Complaint: fatigue    Reason for Consultation: foot infection    History of present illness:     Patient is a 59 y.o.  Yr old male with history of diabetes/hypertension, previously followed with Dr. Gatica; admitted 2023 with right foot infection, cultures with MSSA/Morganella/ESBL Klebsiella/enterococcus and strep species.had surgery at the second toe with amputation, received IV daptomycin/Invanz with general improvements at that site.  He has been left with a chronic right plantar foot wound and a chronic wound at the left heel that has turned black; He apparently was admitted to the hospital August 1 after a fall at [facility].  Noted to have urinary retention with concern for possible UTI.  In addition left heel with black discoloration/malodor and redness, empiric antibiotics initiated.  He is chronically debilitated and wheelchair bound by his report.Bustos catheter-based at admission     8/3/24  Dr Gan  \"PROCEDURE:            Left  Left      81998:             Debridement of skin and subcutaneous tissue  86262:             Wound vacuum-assisted closure\"     8/6/24 MRI and surgical findings did not confirm bone involvement, transitioned to oral agents at discharge       Readmitted November 15, 2024 with reports of appearing \"sluggish\" according to admission notes with DKA, noted to have high hemoglobin A1c and persistent/worsening left heel wound according to patient; medicine team notes mention urinary retention, acute toxic metabolic encephalopathy improved and low-grade fever. Abnormal MRI at the left foot:    Per radiology-- \"Soft tissue wound seen along the posterior heel with ill-defined fluid that extends through and disrupts the distal Achilles tendon. Underlying this area there is evidence of osteomyelitis of the posterior lateral calcaneus. There is a tiny fluid   collection here as well that may represent " "an associated nondrainable abscess.     Additionally there appears to be a nondisplaced stress fracture of the posterior calcaneus. \"    11/22 left BKA    11/26/24 increased O2 requirements with less responsive per medicine team notes overnight; subsequently better    12/1/24 sleepy at my visit,  no new pain;  Left LE postop pain  dull , worse with palpation, better with pain meds and not severe,  hw won't attach a numerical severity; history Per nursing staff as well     No headache photophobia or neck stiffness.  No   cough or hemoptysis.  No nausea vomiting diarrhea or abdominal pain.  No other new skin rash.  no dysuria hematuria or pyuria.       Past Medical History:   Diagnosis Date    Acute renal failure     Hx of    Obesity     Hx of    Sleep apnea     Type 2 diabetes mellitus        Past Surgical History:   Procedure Laterality Date    BACK SURGERY      lower back    BELOW KNEE AMPUTATION Left 11/22/2024    Procedure: BELOW-KNEE AMPUTATION LEFT;  Surgeon: Dru Gan Jr., MD;  Location:  [facility];  Service: Orthopedics;  Laterality: Left;    CHOLECYSTECTOMY WITH INTRAOPERATIVE CHOLANGIOGRAM N/A 1/6/2021    Procedure: CHOLECYSTECTOMY LAPAROSCOPIC INTRAOPERATIVE CHOLANGIOGRAM;  Surgeon: Juventino Hooker MD;  Location:  [facility];  Service: General;  Laterality: N/A;    ERCP N/A 1/9/2021    Procedure: ENDOSCOPIC RETROGRADE CHOLANGIOPANCREATOGRAPHY;  Surgeon: Jeremy Dowell MD;  Location[facility] END[facility]COPY;  Service: Gastroenterology;  Laterality: N/A;  with sphiincterotomy and balloon sweep with 9mm-12mm balloon    INCISION AND DRAINAGE FOOT Left 8/3/2024    Procedure: HEAL IRRIGATION AND DEBRIDEMENT LEFT;  Surgeon: Dru Gan Jr., MD;  Location:  [facility];  Service: Orthopedics;  Laterality: Left;    PLACEMENT OF WOUND VAC Left 8/3/2024    Procedure: PLACEMENT OF WOUND VAC;  Surgeon: Dru Gan Jr., MD;  Location:  [facility];  Service: Orthopedics;  Laterality: Left;       Pediatric " History   Patient Parents    Not on file     Other Topics Concern    Not on file   Social History Narrative    Not on file       family history includes Cancer in his brother, maternal grandmother, mother, and another family member; Diabetes in an other family member; Heart attack in an other family member; Heart disease in his brother and father; Hyperlipidemia in his mother and another family member; Hypertension in his mother and another family member; Obesity in an other family member.    Allergies   Allergen Reactions    Sertraline Hcl Hives     Zoloft       Medication:  Current Facility-Administered Medications   Medication Dose Route Frequency Provider Last Rate Last Admin    acetaminophen (TYLENOL) tablet 650 mg  650 mg Oral Q4H PRN Dru Gan Jr., MD   650 mg at 11/26/24 2247    Or    acetaminophen (TYLENOL) 160 MG/5ML oral solution 650 mg  650 mg Oral Q4H PRN Dru Gan Jr., MD        Or    acetaminophen (TYLENOL) suppository 650 mg  650 mg Rectal Q4H PRN Dru Gan Jr., MD   650 mg at 11/15/24 2327    aspirin EC tablet 81 mg  81 mg Oral Daily Dru Gan Jr., MD   81 mg at 12/01/24 0908    sennosides-docusate (PERICOLACE) 8.6-50 MG per tablet 2 tablet  2 tablet Oral BID PRN Dru Gan Jr., MD        And    polyethylene glycol (MIRALAX) packet 17 g  17 g Oral Daily PRN Dru Gan Jr., MD        And    bisacodyl (DULCOLAX) EC tablet 5 mg  5 mg Oral Daily PRN Dru aGn Jr., MD        And    bisacodyl (DULCOLAX) suppository 10 mg  10 mg Rectal Daily PRN Dru Gan Jr., MD        calcium carbonate (TUMS) chewable tablet 500 mg (200 mg elemental)  2 tablet Oral TID PRN Dru Gan Jr., MD   2 tablet at 11/30/24 1411    Calcium Replacement - Follow Nurse / BPA Driven Protocol   Not Applicable PRN Dru Gan Jr., MD        dextrose (D50W) (25 g/50 mL) IV injection 25 g  25 g Intravenous Q15 Min PRN Dru Gan Jr., MD   25 g at 11/25/24  2124    dextrose (GLUTOSE) oral gel 15 g  15 g Oral Q15 Min PRN Dru Gan Jr., MD   15 g at 11/25/24 2039    doxycycline (VIBRAMYCIN) 100 mg in sodium chloride 0.9 % 100 mL MBP  100 mg Intravenous Q12H Enriqueta Muir APRN   100 mg at 12/01/24 0019    Enoxaparin Sodium (LOVENOX) syringe 40 mg  40 mg Subcutaneous Daily Dru Gan Jr., MD   40 mg at 12/01/24 0908    finasteride (PROSCAR) tablet 5 mg  5 mg Oral Nightly Dru Gan Jr., MD   5 mg at 11/30/24 2155    glucagon (GLUCAGEN) injection 1 mg  1 mg Intramuscular Q15 Min PRN Dru Gan Jr., MD        influenza virus vacc split PF FLUZONE 0.5 mL  0.5 mL Intramuscular During Hospitalization Dru Gan Jr., MD        insulin glargine (LANTUS, SEMGLEE) injection 30 Units  30 Units Subcutaneous Nightly Koko Lord, DO   30 Units at 11/30/24 2156    Insulin Lispro (humaLOG) injection 14 Units  14 Units Subcutaneous TID With Meals Ana Laura Stephenson, DO   14 Units at 12/01/24 0908    Insulin Lispro (humaLOG) injection 2-9 Units  2-9 Units Subcutaneous 4x Daily AC & at Bedtime Dru Gan Jr., MD   4 Units at 12/01/24 0911    ipratropium-albuterol (DUO-NEB) nebulizer solution 3 mL  3 mL Nebulization 4x Daily - RT Enriqueta Muir APRN   3 mL at 12/01/24 0820    Lidocaine HCl gel (XYLOCAINE) urethral/mucosal syringe   Urethral PRN Dru Gan Jr., MD   1 Application at 11/26/24 0333    Linezolid (ZYVOX) 600 mg 300 mL  600 mg Intravenous Q12H Varun Dominique  mL/hr at 12/01/24 0910 600 mg at 12/01/24 0910    Magnesium Standard Dose Replacement - Follow Nurse / BPA Driven Protocol   Not Applicable PRN Dru Gan Jr., MD        meropenem (MERREM) 1,000 mg in sodium chloride 0.9 % 100 mL MBP  1,000 mg Intravenous Q8H Varun Dominique MD   1,000 mg at 12/01/24 1049    metoprolol succinate XL (TOPROL-XL) 24 hr tablet 50 mg  50 mg Oral Daily Dru Gan Jr., MD   50 mg at 12/01/24 0908     nitroglycerin (NITROSTAT) SL tablet 0.4 mg  0.4 mg Sublingual Q5 Min PRN Dru Gan Jr., MD        ondansetron (ZOFRAN) injection 4 mg  4 mg Intravenous Q6H PRN Dru Gan Jr., MD   4 mg at 11/24/24 2030    pantoprazole (PROTONIX) EC tablet 40 mg  40 mg Oral BID AC Enriqueta Muir, APRN   40 mg at 12/01/24 0908    Phosphorus Replacement - Follow Nurse / BPA Driven Protocol   Not Applicable PRN Dru Gan Jr., MD        Potassium Replacement - Follow Nurse / BPA Driven Protocol   Not Applicable PRN Dru Gan Jr., MD        pregabalin (LYRICA) capsule 225 mg  225 mg Oral BID Enriqueta Muir, APRN   225 mg at 12/01/24 0908    rOPINIRole (REQUIP) tablet 0.25 mg  0.25 mg Oral Nightly Dru Gan Jr., MD   0.25 mg at 11/30/24 2155    ropivacaine (NAROPIN) 0.2 % infusion (INFUSYSTEM)   Peripheral Nerve Continuous Dru Gan Jr., MD   Stopped at 11/28/24 0700    sodium chloride 0.9 % flush 10 mL  10 mL Intravenous Q12H Dru Gan Jr., MD   10 mL at 12/01/24 0910    sodium chloride 0.9 % flush 10 mL  10 mL Intravenous PRN Dru Gan Jr., MD        sodium chloride 0.9 % infusion 40 mL  40 mL Intravenous PRN Dru Gan Jr., MD   40 mL at 11/28/24 1853    tamsulosin (FLOMAX) 24 hr capsule 0.8 mg  0.8 mg Oral Nightly Dru Gan Jr., MD   0.8 mg at 11/30/24 2155       Antibiotics:  Anti-Infectives (From admission, onward)      Ordered     Dose/Rate Route Frequency Start Stop    11/30/24 0849  Linezolid (ZYVOX) 600 mg 300 mL        Ordering Provider: Varun Dominique MD    600 mg  300 mL/hr over 60 Minutes Intravenous Every 12 Hours 11/30/24 1000 12/03/24 2159 11/26/24 0749  meropenem (MERREM) 1,000 mg in sodium chloride 0.9 % 100 mL MBP        Ordering Provider: Varun Dominique MD    1,000 mg  over 3 Hours Intravenous Every 8 Hours 11/26/24 1400 12/03/24 1129    11/26/24 0750  Linezolid (ZYVOX) 600 mg 300 mL        Ordering Provider: Varun Dominique  "MD    600 mg  300 mL/hr over 60 Minutes Intravenous Every 12 Hours 11/26/24 0900 11/29/24 0928    11/26/24 0749  meropenem (MERREM) 1,000 mg in sodium chloride 0.9 % 100 mL MBP        Ordering Provider: Varun Dominique MD    1,000 mg  over 30 Minutes Intravenous Once 11/26/24 0800 11/26/24 0903    11/26/24 0024  doxycycline (VIBRAMYCIN) 100 mg in sodium chloride 0.9 % 100 mL MBP        Ordering Provider: Enriqueta Muir APRN    100 mg  over 60 Minutes Intravenous Every 12 Hours 11/26/24 0100 12/03/24 0059    11/22/24 1045  ceFAZolin 2000 mg IVPB in 100 mL NS (MBP)        Ordering Provider: Claudia Lim PA    2,000 mg  over 30 Minutes Intravenous Once 11/22/24 1047 11/22/24 1255              Review of Systems    12/1/24 per nursing also    Constitutional-- No Fever, chills or sweats.  Appetite diminished with fatigue.  Heent-- No new vision, hearing or throat complaints.  No epistaxis or oral sores.  Denies odynophagia or dysphagia.  No flashers, floaters or eye pain. No odynophagia or dysphagia. No headache, photophobia or neck stiffness.  CV-- No chest pain, palpitation or syncope  Resp-- see above  GI- No nausea, vomiting, or diarrhea.  No hematochezia, melena, or hematemesis. Denies jaundice or chronic liver disease.  -- No dysuria, hematuria, or flank pain.  Denies hesitancy, urgency or flank pain.  Lymph- no swollen lymph nodes in neck/axilla or groin.   Heme- No active bruising or bleeding; no Hx of DVT or PE.  MS-- no swelling or pain in the bones or joints of arms/legs.  No new back pain.  Neuro-- No acute focal weakness or numbness in the arms or legs.  No seizures.    Full 12 point review of systems reviewed and negative otherwise for acute complaints, except for above    Physical Exam:   Vital Signs   /81 (BP Location: Left arm, Patient Position: Lying)   Pulse 84   Temp 97.5 °F (36.4 °C) (Oral)   Resp 18   Ht 162.6 cm (64.02\")   Wt 80.6 kg (177 lb 12.8 oz)   SpO2 94%   BMI 30.50 " kg/m²     GENERAL: sleepy     HEENT: Normocephalic, atraumatic.   No conjunctival injection. No icterus. Oropharynx clear without evidence of thrush or exudate. No evidence of periodontal disease.    NECK: Supple without nuchal rigidity. No mass.   HEART: RRR; No murmur, rubs, gallops.   LUNGS: diminished at bases  ; No dullness.  ABDOMEN: Soft, nontender, nondistended. Positive bowel sounds. No rebound or guarding. NO mass or HSM.  EXT: see below   MSK: FROM without joint effusions noted arms/legs.    SKIN: Warm and dry without cutaneous eruptions on Inspection/palpation.    NEURO: sleepy.    Right foot second toe amputation site noted, no redness/duration or warmth there.  No oral or active drainage    Left  LE surgical site no new visible redness/purulence.  No crepitus or bulla.         Laboratory Data    Results from last 7 days   Lab Units 11/30/24  0603 11/29/24  0641 11/28/24  0612   WBC 10*3/mm3 8.36 7.13 8.39   HEMOGLOBIN g/dL 8.2* 8.0* 8.1*   HEMATOCRIT % 26.6* 26.1* 26.5*   PLATELETS 10*3/mm3 419 437 388     Results from last 7 days   Lab Units 11/30/24  0603   SODIUM mmol/L 131*   POTASSIUM mmol/L 4.9   CHLORIDE mmol/L 99   CO2 mmol/L 25.0   BUN mg/dL 12   CREATININE mg/dL 0.77   GLUCOSE mg/dL 239*   CALCIUM mg/dL 8.1*     Results from last 7 days   Lab Units 11/26/24  0757   ALK PHOS U/L 90   BILIRUBIN mg/dL 0.2   ALT (SGPT) U/L 17   AST (SGOT) U/L 22                 Estimated Creatinine Clearance: 96.6 mL/min (by C-G formula based on SCr of 0.77 mg/dL).      Microbiology:      Radiology:  Imaging Results (Last 72 Hours)       ** No results found for the last 72 hours. **              Impression:       --acute left heel wound infection ,  abnormal MRI as above raising concern for fluid extending into and disrupting the distal lateral Achilles tendon in addition to with evidence for osteomyelitis at the posterior lateral calcaneus in addition to possible nondisplaced stress fracture at the posterior  calcaneus; High risk for persistent/recurrent or nonhealing wounds, persistent/progressive or recurrent infection and risk for further functional/limb loss, higher level amputation etc. Surgery 8/3;  left BKA on November 22. Empiric antibiotics for now    --hypoxia; empiric abx with decreased responsiveness overnight, aspiration risk although no witnessed aspiration.  Viral panel negative.  No productive cough to send for microbiology.     --urinary retention per admission notes, some hematuria on November 15; further urologic evaluation regarding functional/anatomic assessment at discretion of medicine team with respect inpatient versus outpatient     --Diabetes, uncontrolled ; glucose control per medicine team     --diabetic neuropathy     --Chronic debility as per patient wheelchair bound     --Hx ESBL    PLAN:     --IV zyvox/merrem, doxy; anticipate tapering to oral regimen by discharge if steadily better and no new suppurative process    --Check/review labs cultures and scans    --Partial history Per nursing staff    --Discussed with microbiology    --Discussed with surgery team    --Highly complex at of issues with high risk for further serious morbidity and other serious sequela     Copied text in this note has been reviewed and is accurate as of 12/01/24.     Varun Dominique MD  12/1/2024

## 2024-12-01 NOTE — PLAN OF CARE
Goal Outcome Evaluation:  Plan of Care Reviewed With: patient           Outcome Evaluation: More assist needed supine to sit today; however, he was able to stand with mod A of 2 at a RW. Cont with significant weakness and would benefit from cont PT services. Recommend IPR at discharge.

## 2024-12-02 LAB
ALBUMIN SERPL-MCNC: 2.7 G/DL (ref 3.5–5.2)
ALBUMIN/GLOB SERPL: 1.1 G/DL
ALP SERPL-CCNC: 102 U/L (ref 39–117)
ALT SERPL W P-5'-P-CCNC: 13 U/L (ref 1–41)
ANION GAP SERPL CALCULATED.3IONS-SCNC: 8 MMOL/L (ref 5–15)
AST SERPL-CCNC: 13 U/L (ref 1–40)
BACTERIA SPEC ANAEROBE CULT: NORMAL
BASOPHILS # BLD AUTO: 0.05 10*3/MM3 (ref 0–0.2)
BASOPHILS NFR BLD AUTO: 0.7 % (ref 0–1.5)
BILIRUB SERPL-MCNC: <0.2 MG/DL (ref 0–1.2)
BUN SERPL-MCNC: 16 MG/DL (ref 8–23)
BUN/CREAT SERPL: 18.2 (ref 7–25)
CALCIUM SPEC-SCNC: 8.4 MG/DL (ref 8.6–10.5)
CHLORIDE SERPL-SCNC: 96 MMOL/L (ref 98–107)
CO2 SERPL-SCNC: 28 MMOL/L (ref 22–29)
CREAT SERPL-MCNC: 0.88 MG/DL (ref 0.76–1.27)
DEPRECATED RDW RBC AUTO: 47.1 FL (ref 37–54)
EGFRCR SERPLBLD CKD-EPI 2021: 97.8 ML/MIN/1.73
EOSINOPHIL # BLD AUTO: 0.36 10*3/MM3 (ref 0–0.4)
EOSINOPHIL NFR BLD AUTO: 5 % (ref 0.3–6.2)
ERYTHROCYTE [DISTWIDTH] IN BLOOD BY AUTOMATED COUNT: 15.8 % (ref 12.3–15.4)
GLOBULIN UR ELPH-MCNC: 2.5 GM/DL
GLUCOSE BLDC GLUCOMTR-MCNC: 139 MG/DL (ref 70–130)
GLUCOSE BLDC GLUCOMTR-MCNC: 186 MG/DL (ref 70–130)
GLUCOSE BLDC GLUCOMTR-MCNC: 281 MG/DL (ref 70–130)
GLUCOSE BLDC GLUCOMTR-MCNC: 305 MG/DL (ref 70–130)
GLUCOSE BLDC GLUCOMTR-MCNC: 370 MG/DL (ref 70–130)
GLUCOSE SERPL-MCNC: 255 MG/DL (ref 65–99)
HCT VFR BLD AUTO: 28.1 % (ref 37.5–51)
HGB BLD-MCNC: 8.6 G/DL (ref 13–17.7)
IMM GRANULOCYTES # BLD AUTO: 0.06 10*3/MM3 (ref 0–0.05)
IMM GRANULOCYTES NFR BLD AUTO: 0.8 % (ref 0–0.5)
LYMPHOCYTES # BLD AUTO: 1.56 10*3/MM3 (ref 0.7–3.1)
LYMPHOCYTES NFR BLD AUTO: 21.9 % (ref 19.6–45.3)
MAGNESIUM SERPL-MCNC: 1.6 MG/DL (ref 1.6–2.4)
MCH RBC QN AUTO: 25.4 PG (ref 26.6–33)
MCHC RBC AUTO-ENTMCNC: 30.6 G/DL (ref 31.5–35.7)
MCV RBC AUTO: 82.9 FL (ref 79–97)
MONOCYTES # BLD AUTO: 0.65 10*3/MM3 (ref 0.1–0.9)
MONOCYTES NFR BLD AUTO: 9.1 % (ref 5–12)
NEUTROPHILS NFR BLD AUTO: 4.45 10*3/MM3 (ref 1.7–7)
NEUTROPHILS NFR BLD AUTO: 62.5 % (ref 42.7–76)
NRBC BLD AUTO-RTO: 0 /100 WBC (ref 0–0.2)
PLATELET # BLD AUTO: 471 10*3/MM3 (ref 140–450)
PMV BLD AUTO: 10.3 FL (ref 6–12)
POTASSIUM SERPL-SCNC: 4.8 MMOL/L (ref 3.5–5.2)
PROT SERPL-MCNC: 5.2 G/DL (ref 6–8.5)
RBC # BLD AUTO: 3.39 10*6/MM3 (ref 4.14–5.8)
SODIUM SERPL-SCNC: 132 MMOL/L (ref 136–145)
WBC NRBC COR # BLD AUTO: 7.13 10*3/MM3 (ref 3.4–10.8)

## 2024-12-02 PROCEDURE — 25010000002 LINEZOLID 600 MG/300ML SOLUTION: Performed by: FAMILY MEDICINE

## 2024-12-02 PROCEDURE — 85025 COMPLETE CBC W/AUTO DIFF WBC: CPT | Performed by: FAMILY MEDICINE

## 2024-12-02 PROCEDURE — 63710000001 INSULIN LISPRO (HUMAN) PER 5 UNITS: Performed by: FAMILY MEDICINE

## 2024-12-02 PROCEDURE — 94799 UNLISTED PULMONARY SVC/PX: CPT

## 2024-12-02 PROCEDURE — 25010000002 MEROPENEM PER 100 MG: Performed by: INTERNAL MEDICINE

## 2024-12-02 PROCEDURE — 83735 ASSAY OF MAGNESIUM: CPT | Performed by: FAMILY MEDICINE

## 2024-12-02 PROCEDURE — 25010000002 LINEZOLID 600 MG/300ML SOLUTION: Performed by: INTERNAL MEDICINE

## 2024-12-02 PROCEDURE — 63710000001 INSULIN LISPRO (HUMAN) PER 5 UNITS: Performed by: ORTHOPAEDIC SURGERY

## 2024-12-02 PROCEDURE — 80053 COMPREHEN METABOLIC PANEL: CPT | Performed by: FAMILY MEDICINE

## 2024-12-02 PROCEDURE — 63710000001 INSULIN GLARGINE PER 5 UNITS: Performed by: FAMILY MEDICINE

## 2024-12-02 PROCEDURE — 94762 N-INVAS EAR/PLS OXIMTRY CONT: CPT

## 2024-12-02 PROCEDURE — 99232 SBSQ HOSP IP/OBS MODERATE 35: CPT | Performed by: FAMILY MEDICINE

## 2024-12-02 PROCEDURE — 94664 DEMO&/EVAL PT USE INHALER: CPT

## 2024-12-02 PROCEDURE — 25010000002 ENOXAPARIN PER 10 MG: Performed by: ORTHOPAEDIC SURGERY

## 2024-12-02 PROCEDURE — 82948 REAGENT STRIP/BLOOD GLUCOSE: CPT

## 2024-12-02 RX ORDER — INSULIN LISPRO 100 [IU]/ML
5 INJECTION, SOLUTION INTRAVENOUS; SUBCUTANEOUS
Status: DISCONTINUED | OUTPATIENT
Start: 2024-12-02 | End: 2024-12-04

## 2024-12-02 RX ADMIN — IPRATROPIUM BROMIDE AND ALBUTEROL SULFATE 3 ML: 2.5; .5 SOLUTION RESPIRATORY (INHALATION) at 20:12

## 2024-12-02 RX ADMIN — INSULIN LISPRO 6 UNITS: 100 INJECTION, SOLUTION INTRAVENOUS; SUBCUTANEOUS at 09:18

## 2024-12-02 RX ADMIN — DOXYCYCLINE 100 MG: 100 INJECTION, POWDER, LYOPHILIZED, FOR SOLUTION INTRAVENOUS at 13:38

## 2024-12-02 RX ADMIN — PANTOPRAZOLE SODIUM 40 MG: 40 TABLET, DELAYED RELEASE ORAL at 09:22

## 2024-12-02 RX ADMIN — Medication 10 ML: at 09:23

## 2024-12-02 RX ADMIN — IPRATROPIUM BROMIDE AND ALBUTEROL SULFATE 3 ML: 2.5; .5 SOLUTION RESPIRATORY (INHALATION) at 06:54

## 2024-12-02 RX ADMIN — MEROPENEM 1000 MG: 1 INJECTION, POWDER, FOR SOLUTION INTRAVENOUS at 03:05

## 2024-12-02 RX ADMIN — INSULIN GLARGINE 10 UNITS: 100 INJECTION, SOLUTION SUBCUTANEOUS at 21:29

## 2024-12-02 RX ADMIN — INSULIN LISPRO 2 UNITS: 100 INJECTION, SOLUTION INTRAVENOUS; SUBCUTANEOUS at 21:29

## 2024-12-02 RX ADMIN — MEROPENEM 1000 MG: 1 INJECTION, POWDER, FOR SOLUTION INTRAVENOUS at 19:48

## 2024-12-02 RX ADMIN — LINEZOLID 600 MG: 600 INJECTION, SOLUTION INTRAVENOUS at 09:21

## 2024-12-02 RX ADMIN — ASPIRIN 81 MG: 81 TABLET, COATED ORAL at 09:20

## 2024-12-02 RX ADMIN — MEROPENEM 1000 MG: 1 INJECTION, POWDER, FOR SOLUTION INTRAVENOUS at 12:39

## 2024-12-02 RX ADMIN — IPRATROPIUM BROMIDE AND ALBUTEROL SULFATE 3 ML: 2.5; .5 SOLUTION RESPIRATORY (INHALATION) at 17:06

## 2024-12-02 RX ADMIN — INSULIN LISPRO 5 UNITS: 100 INJECTION, SOLUTION INTRAVENOUS; SUBCUTANEOUS at 13:39

## 2024-12-02 RX ADMIN — ROPINIROLE 0.25 MG: 0.5 TABLET, FILM COATED ORAL at 21:29

## 2024-12-02 RX ADMIN — ACETAMINOPHEN 650 MG: 325 TABLET ORAL at 06:19

## 2024-12-02 RX ADMIN — DOXYCYCLINE 100 MG: 100 INJECTION, POWDER, LYOPHILIZED, FOR SOLUTION INTRAVENOUS at 00:53

## 2024-12-02 RX ADMIN — INSULIN LISPRO 7 UNITS: 100 INJECTION, SOLUTION INTRAVENOUS; SUBCUTANEOUS at 13:38

## 2024-12-02 RX ADMIN — FINASTERIDE 5 MG: 5 TABLET, FILM COATED ORAL at 21:29

## 2024-12-02 RX ADMIN — TAMSULOSIN HYDROCHLORIDE 0.8 MG: 0.4 CAPSULE ORAL at 21:29

## 2024-12-02 RX ADMIN — ENOXAPARIN SODIUM 40 MG: 100 INJECTION SUBCUTANEOUS at 09:19

## 2024-12-02 RX ADMIN — PREGABALIN 225 MG: 75 CAPSULE ORAL at 21:29

## 2024-12-02 RX ADMIN — METOPROLOL SUCCINATE 50 MG: 50 TABLET, EXTENDED RELEASE ORAL at 09:19

## 2024-12-02 RX ADMIN — ACETAMINOPHEN 650 MG: 325 TABLET ORAL at 19:49

## 2024-12-02 RX ADMIN — PANTOPRAZOLE SODIUM 40 MG: 40 TABLET, DELAYED RELEASE ORAL at 18:05

## 2024-12-02 RX ADMIN — INSULIN LISPRO 5 UNITS: 100 INJECTION, SOLUTION INTRAVENOUS; SUBCUTANEOUS at 18:05

## 2024-12-02 RX ADMIN — Medication 10 ML: at 21:30

## 2024-12-02 RX ADMIN — PREGABALIN 225 MG: 75 CAPSULE ORAL at 09:19

## 2024-12-02 RX ADMIN — LINEZOLID 600 MG: 600 INJECTION, SOLUTION INTRAVENOUS at 23:09

## 2024-12-02 NOTE — PLAN OF CARE
Goal Outcome Evaluation:   Pt awake all shift, new IV started that pt almost pulled out multiple times.  Dressing to Lt BKA CD&I overnight, no reinforcement needed.  Pt called out and c/o pain this morning, and verbalized improvement.  Bustos patent throughout the night.Pt friendly and talkative.  Pt requests coffee and snacks multiple times throughout the night.  Pt states everyone has been good to him here in the hospital but he is ready to go to [facility] so he can start on his journey to home.  VS stable overnight.  No s/s of distress.

## 2024-12-02 NOTE — PROGRESS NOTES
"Fracisco Mullen  1965  [id number]    Evaluating Physician: Varun Dominique MD    Chief Complaint: fatigue    Reason for Consultation: foot infection    History of present illness:     Patient is a 59 y.o.  Yr old male with history of diabetes/hypertension, previously followed with Dr. Gatica; admitted 2023 with right foot infection, cultures with MSSA/Morganella/ESBL Klebsiella/enterococcus and strep species.had surgery at the second toe with amputation, received IV daptomycin/Invanz with general improvements at that site.  He has been left with a chronic right plantar foot wound and a chronic wound at the left heel that has turned black; He apparently was admitted to the hospital August 1 after a fall at [facility].  Noted to have urinary retention with concern for possible UTI.  In addition left heel with black discoloration/malodor and redness, empiric antibiotics initiated.  He is chronically debilitated and wheelchair bound by his report.Bustos catheter-based at admission     8/3/24  Dr Gan  \"PROCEDURE:            Left  Left      38445:             Debridement of skin and subcutaneous tissue  [id number]:             Wound vacuum-assisted closure\"     8/6/24 MRI and surgical findings did not confirm bone involvement, transitioned to oral agents at discharge       Readmitted November 15, 2024 with reports of appearing \"sluggish\" according to admission notes with DKA, noted to have high hemoglobin A1c and persistent/worsening left heel wound according to patient; medicine team notes mention urinary retention, acute toxic metabolic encephalopathy improved and low-grade fever. Abnormal MRI at the left foot:    Per radiology-- \"Soft tissue wound seen along the posterior heel with ill-defined fluid that extends through and disrupts the distal Achilles tendon. Underlying this area there is evidence of osteomyelitis of the posterior lateral calcaneus. There is a tiny fluid   collection here as well that may represent " "an associated nondrainable abscess.     Additionally there appears to be a nondisplaced stress fracture of the posterior calcaneus. \"    11/22 left BKA    11/26/24 increased O2 requirements with less responsive per medicine team notes overnight; subsequently better    12/2/24 sleepy at my visit,  no new pain;  Left LE postop pain  dull , worse with palpation, better with pain meds and not severe,  hw won't attach a numerical severity; history Per nursing staff as well     No headache photophobia or neck stiffness.  No   cough or hemoptysis.  No nausea vomiting diarrhea or abdominal pain.  No other new skin rash.  no dysuria hematuria or pyuria.       Past Medical History:   Diagnosis Date    Acute renal failure     Hx of    Obesity     Hx of    Sleep apnea     Type 2 diabetes mellitus        Past Surgical History:   Procedure Laterality Date    BACK SURGERY      lower back    BELOW KNEE AMPUTATION Left 11/22/2024    Procedure: BELOW-KNEE AMPUTATION LEFT;  Surgeon: Dru Gan Jr., MD;  Location:  [facility];  Service: Orthopedics;  Laterality: Left;    CHOLECYSTECTOMY WITH INTRAOPERATIVE CHOLANGIOGRAM N/A 1/6/2021    Procedure: CHOLECYSTECTOMY LAPAROSCOPIC INTRAOPERATIVE CHOLANGIOGRAM;  Surgeon: Juventino Hooker MD;  Location:  [facility];  Service: General;  Laterality: N/A;    ERCP N/A 1/9/2021    Procedure: ENDOSCOPIC RETROGRADE CHOLANGIOPANCREATOGRAPHY;  Surgeon: Jeremy Dowell MD;  Location[facility] ENDOSCOPY;  Service: Gastroenterology;  Laterality: N/A;  with sphiincterotomy and balloon sweep with 9mm-12mm balloon    INCISION AND DRAINAGE FOOT Left 8/3/2024    Procedure: HEAL IRRIGATION AND DEBRIDEMENT LEFT;  Surgeon: Dru Gan Jr., MD;  Location:  [facility];  Service: Orthopedics;  Laterality: Left;    PLACEMENT OF WOUND VAC Left 8/3/2024    Procedure: PLACEMENT OF WOUND VAC;  Surgeon: Dru Gan Jr., MD;  Location:  [facility];  Service: Orthopedics;  Laterality: Left;       Pediatric " History   Patient Parents    Not on file     Other Topics Concern    Not on file   Social History Narrative    Not on file       family history includes Cancer in his brother, maternal grandmother, mother, and another family member; Diabetes in an other family member; Heart attack in an other family member; Heart disease in his brother and father; Hyperlipidemia in his mother and another family member; Hypertension in his mother and another family member; Obesity in an other family member.    Allergies   Allergen Reactions    Sertraline Hcl Hives     Zoloft       Medication:  Current Facility-Administered Medications   Medication Dose Route Frequency Provider Last Rate Last Admin    acetaminophen (TYLENOL) tablet 650 mg  650 mg Oral Q4H PRN Dru Gan Jr., MD   650 mg at 12/02/24 0619    Or    acetaminophen (TYLENOL) 160 MG/5ML oral solution 650 mg  650 mg Oral Q4H PRN Dru Gan Jr., MD        Or    acetaminophen (TYLENOL) suppository 650 mg  650 mg Rectal Q4H PRN Dru Gan Jr., MD   650 mg at 11/15/24 2327    aspirin EC tablet 81 mg  81 mg Oral Daily Dru Gan Jr., MD   81 mg at 12/01/24 0908    sennosides-docusate (PERICOLACE) 8.6-50 MG per tablet 2 tablet  2 tablet Oral BID PRN Dru Gan Jr., MD        And    polyethylene glycol (MIRALAX) packet 17 g  17 g Oral Daily PRN Dru Gan Jr., MD        And    bisacodyl (DULCOLAX) EC tablet 5 mg  5 mg Oral Daily PRN Dru Gan Jr., MD        And    bisacodyl (DULCOLAX) suppository 10 mg  10 mg Rectal Daily PRN Dru Gan Jr., MD        calcium carbonate (TUMS) chewable tablet 500 mg (200 mg elemental)  2 tablet Oral TID PRN Dru Gan Jr., MD   2 tablet at 12/01/24 1338    Calcium Replacement - Follow Nurse / BPA Driven Protocol   Not Applicable PRN Dru Gan Jr., MD        dextrose (D50W) (25 g/50 mL) IV injection 25 g  25 g Intravenous Q15 Min PRN Dru Gan Jr., MD   25 g at 11/25/24  2124    dextrose (GLUTOSE) oral gel 15 g  15 g Oral Q15 Min PRN Dru Gan Jr., MD   15 g at 11/25/24 2039    doxycycline (VIBRAMYCIN) 100 mg in sodium chloride 0.9 % 100 mL MBP  100 mg Intravenous Q12H Enriqueta Muir APRN   100 mg at 12/02/24 0053    Enoxaparin Sodium (LOVENOX) syringe 40 mg  40 mg Subcutaneous Daily Dru Gan Jr., MD   40 mg at 12/01/24 0908    finasteride (PROSCAR) tablet 5 mg  5 mg Oral Nightly Dru Gan Jr., MD   5 mg at 12/01/24 2027    glucagon (GLUCAGEN) injection 1 mg  1 mg Intramuscular Q15 Min PRN Dru Gan Jr., MD        influenza virus vacc split PF FLUZONE 0.5 mL  0.5 mL Intramuscular During Hospitalization Dru Gan Jr., MD        insulin glargine (LANTUS, SEMGLEE) injection 10 Units  10 Units Subcutaneous Nightly CARLA Caba, DO   10 Units at 12/01/24 2121    [Held by provider] Insulin Lispro (humaLOG) injection 14 Units  14 Units Subcutaneous TID With Meals Ana Laura Stephenson, DO   14 Units at 12/01/24 1221    Insulin Lispro (humaLOG) injection 2-9 Units  2-9 Units Subcutaneous 4x Daily AC & at Bedtime Dru Gan Jr., MD   4 Units at 12/01/24 2121    ipratropium-albuterol (DUO-NEB) nebulizer solution 3 mL  3 mL Nebulization 4x Daily - RT Enriqueta Muir APRN   3 mL at 12/02/24 0654    Lidocaine HCl gel (XYLOCAINE) urethral/mucosal syringe   Urethral PRN Dru Gan Jr., MD   1 Application at 11/26/24 0333    Linezolid (ZYVOX) 600 mg 300 mL  600 mg Intravenous Q12H Varun Dominique  mL/hr at 12/01/24 2117 600 mg at 12/01/24 2117    Magnesium Standard Dose Replacement - Follow Nurse / BPA Driven Protocol   Not Applicable PRN Dru Gan Jr., MD        meropenem (MERREM) 1,000 mg in sodium chloride 0.9 % 100 mL MBP  1,000 mg Intravenous Q8H Varun Dominique MD   1,000 mg at 12/02/24 0305    metoprolol succinate XL (TOPROL-XL) 24 hr tablet 50 mg  50 mg Oral Daily Dru Gan Jr., MD   50 mg at 12/01/24  0908    nitroglycerin (NITROSTAT) SL tablet 0.4 mg  0.4 mg Sublingual Q5 Min PRN Dru Gan Jr., MD        ondansetron (ZOFRAN) injection 4 mg  4 mg Intravenous Q6H PRN Dru Gan Jr., MD   4 mg at 11/24/24 2030    pantoprazole (PROTONIX) EC tablet 40 mg  40 mg Oral BID AC Enriqueta Muir, APRN   40 mg at 12/01/24 1720    Phosphorus Replacement - Follow Nurse / BPA Driven Protocol   Not Applicable PRN Dru Gan Jr., MD        Potassium Replacement - Follow Nurse / BPA Driven Protocol   Not Applicable PRN Dru Gan Jr., MD        pregabalin (LYRICA) capsule 225 mg  225 mg Oral BID Enriqueta Muir, APRN   225 mg at 12/01/24 2027    rOPINIRole (REQUIP) tablet 0.25 mg  0.25 mg Oral Nightly Dru Gan Jr., MD   0.25 mg at 12/01/24 2027    ropivacaine (NAROPIN) 0.2 % infusion (INFUSYSTEM)   Peripheral Nerve Continuous Dru Gan Jr., MD   Stopped at 11/28/24 0700    sodium chloride 0.9 % flush 10 mL  10 mL Intravenous Q12H Dru Gan Jr., MD   10 mL at 12/01/24 2027    sodium chloride 0.9 % flush 10 mL  10 mL Intravenous PRN Dru Gan Jr., MD        sodium chloride 0.9 % infusion 40 mL  40 mL Intravenous PRN Dru Gan Jr., MD   40 mL at 11/28/24 1853    tamsulosin (FLOMAX) 24 hr capsule 0.8 mg  0.8 mg Oral Nightly Dru Gan Jr., MD   0.8 mg at 12/01/24 2027       Antibiotics:  Anti-Infectives (From admission, onward)      Ordered     Dose/Rate Route Frequency Start Stop    11/30/24 0849  Linezolid (ZYVOX) 600 mg 300 mL        Ordering Provider: Varun Dominique MD    600 mg  300 mL/hr over 60 Minutes Intravenous Every 12 Hours 11/30/24 1000 12/03/24 2159    11/26/24 0749  meropenem (MERREM) 1,000 mg in sodium chloride 0.9 % 100 mL MBP        Ordering Provider: Varun Dominique MD    1,000 mg  over 3 Hours Intravenous Every 8 Hours 11/26/24 1400 12/03/24 1129    11/26/24 0750  Linezolid (ZYVOX) 600 mg 300 mL        Ordering Provider: Trip  "Varun ANGUIANO MD    600 mg  300 mL/hr over 60 Minutes Intravenous Every 12 Hours 11/26/24 0900 11/29/24 0928    11/26/24 0749  meropenem (MERREM) 1,000 mg in sodium chloride 0.9 % 100 mL MBP        Ordering Provider: Varun Dominique MD    1,000 mg  over 30 Minutes Intravenous Once 11/26/24 0800 11/26/24 0903    11/26/24 0024  doxycycline (VIBRAMYCIN) 100 mg in sodium chloride 0.9 % 100 mL MBP        Ordering Provider: Enriqueta Muir APRN    100 mg  over 60 Minutes Intravenous Every 12 Hours 11/26/24 0100 12/03/24 0059    11/22/24 1045  ceFAZolin 2000 mg IVPB in 100 mL NS (MBP)        Ordering Provider: Claudia Lim PA    2,000 mg  over 30 Minutes Intravenous Once 11/22/24 1047 11/22/24 1255              Review of Systems    12/2/24 per nursing also    Constitutional-- No Fever, chills or sweats.  Appetite diminished with fatigue.  Heent-- No new vision, hearing or throat complaints.  No epistaxis or oral sores.  Denies odynophagia or dysphagia.  No flashers, floaters or eye pain. No odynophagia or dysphagia. No headache, photophobia or neck stiffness.  CV-- No chest pain, palpitation or syncope  Resp-- see above  GI- No nausea, vomiting, or diarrhea.  No hematochezia, melena, or hematemesis. Denies jaundice or chronic liver disease.  -- No dysuria, hematuria, or flank pain.  Denies hesitancy, urgency or flank pain.  Lymph- no swollen lymph nodes in neck/axilla or groin.   Heme- No active bruising or bleeding; no Hx of DVT or PE.  MS-- no swelling or pain in the bones or joints of arms/legs.  No new back pain.  Neuro-- No acute focal weakness or numbness in the arms or legs.  No seizures.    Full 12 point review of systems reviewed and negative otherwise for acute complaints, except for above    Physical Exam:   Vital Signs   /86 (BP Location: Left arm, Patient Position: Lying)   Pulse 90   Temp 98.2 °F (36.8 °C) (Oral)   Resp 16   Ht 162.6 cm (64.02\")   Wt 79.5 kg (175 lb 3.2 oz)   SpO2 96%   " BMI 30.06 kg/m²     GENERAL: sleepy     HEENT: Normocephalic, atraumatic.   No conjunctival injection. No icterus. Oropharynx clear without evidence of thrush or exudate. No evidence of periodontal disease.    NECK: Supple without nuchal rigidity. No mass.   HEART: RRR; No murmur, rubs, gallops.   LUNGS: diminished at bases  ; No dullness.  ABDOMEN: Soft, nontender, nondistended. Positive bowel sounds. No rebound or guarding. NO mass or HSM.  EXT: see below   MSK: FROM without joint effusions noted arms/legs.    SKIN: Warm and dry without cutaneous eruptions on Inspection/palpation.    NEURO: sleepy.    Right foot second toe amputation site noted, no redness/duration or warmth there.  No oral or active drainage    Left  LE surgical site no new visible redness/purulence.  No crepitus or bulla.         Laboratory Data    Results from last 7 days   Lab Units 12/02/24  0725 12/01/24  1507 11/30/24  0603   WBC 10*3/mm3 7.13 8.81 8.36   HEMOGLOBIN g/dL 8.6* 9.9* 8.2*   HEMATOCRIT % 28.1* 32.7* 26.6*   PLATELETS 10*3/mm3 471* 522* 419     Results from last 7 days   Lab Units 12/01/24  1507   SODIUM mmol/L 138   POTASSIUM mmol/L 4.3   CHLORIDE mmol/L 97*   CO2 mmol/L 31.0*   BUN mg/dL 14   CREATININE mg/dL 0.96   GLUCOSE mg/dL 41*   CALCIUM mg/dL 9.0     Results from last 7 days   Lab Units 12/01/24  1507   ALK PHOS U/L 94   BILIRUBIN mg/dL 0.2   ALT (SGPT) U/L 16   AST (SGOT) U/L 18                 Estimated Creatinine Clearance: 76.9 mL/min (by C-G formula based on SCr of 0.96 mg/dL).      Microbiology:      Radiology:  Imaging Results (Last 72 Hours)       ** No results found for the last 72 hours. **              Impression:       --acute left heel wound infection ,  abnormal MRI as above raising concern for fluid extending into and disrupting the distal lateral Achilles tendon in addition to with evidence for osteomyelitis at the posterior lateral calcaneus in addition to possible nondisplaced stress fracture at the  posterior calcaneus; High risk for persistent/recurrent or nonhealing wounds, persistent/progressive or recurrent infection and risk for further functional/limb loss, higher level amputation etc. Surgery 8/3;  left BKA on November 22. Empiric antibiotics for now    --hypoxia; empiric abx ; aspiration risk although no witnessed aspiration.  Viral panel negative.  No productive cough to send for microbiology. Subsequently better     --urinary retention per admission notes, some hematuria on November 15; further urologic evaluation regarding functional/anatomic assessment at discretion of medicine team with respect inpatient versus outpatient     --Diabetes, uncontrolled ; glucose control per medicine team     --diabetic neuropathy     --Chronic debility as per patient wheelchair bound     --Hx ESBL    PLAN:     --IV zyvox/merrem, doxy; anticipate tapering to oral regimen by discharge if steadily better and no new suppurative process    --Check/review labs cultures and scans    --Partial history Per nursing staff    --Discussed with microbiology    --Discussed with surgery team    --Highly complex at of issues with high risk for further serious morbidity and other serious sequela     Copied text in this note has been reviewed and is accurate as of 12/02/24.     Varun Dominique MD  12/2/2024

## 2024-12-02 NOTE — PROGRESS NOTES
[facility] Medicine Services  PROGRESS NOTE    Patient Name: Fracisco Mullen  : 1965  MRN: [id number]    Date of Admission: 11/15/2024  Primary Care Physician: Brandy Roberson    Subjective   Subjective     CC:  Follow-up left foot infection    HPI:  Patient is a 59-year-old male seen and examined by me this a.m., he is resting comfortably in bed, he reports he has had some mild pain overnight from his left BKA but no other new acute complaints or problems.  Patient is pending hopeful rehab placement at this time, continue to follow with case management, he denies any new acute complaints or problems.    Objective   Objective     Vital Signs:   Temp:  [97.9 °F (36.6 °C)-98.2 °F (36.8 °C)] 97.9 °F (36.6 °C)  Heart Rate:  [] 96  Resp:  [16-18] 16  BP: (124-144)/(80-91) 136/81  Flow (L/min) (Oxygen Therapy):  [2] 2     Physical Exam:  Constitutional: No acute distress, awake, alert, pleasant, resting comfortably in bed, on RA  HENT: NCAT, nares patent, mucous membranes moist  Respiratory: Clear to auscultation bilaterally, no rhonchi or wheezing, respiratory effort normal   Cardiovascular: RRR, no murmurs, cap refill brisk   Gastrointestinal: Positive bowel sounds, soft, nontender, nondistended  Musculoskeletal: Left BKA, currently wrapped, dressings are C/D/I   Psychiatric: flat affect, cooperative  Neurologic: Oriented x 3, moves all extremities, speech clear  Skin: warm, dry, no visible rash     Results Reviewed:  LAB RESULTS:      Lab 24  0725 24  1507 24  0603 24  0641 24  0612 24  0757 245 24   WBC 7.13 8.81 8.36 7.13 8.39   < >  --  10.93*   HEMOGLOBIN 8.6* 9.9* 8.2* 8.0* 8.1*   < >  --  8.6*   HEMATOCRIT 28.1* 32.7* 26.6* 26.1* 26.5*   < >  --  28.3*   PLATELETS 471* 522* 419 437 388   < >  --  340   NEUTROS ABS 4.45 3.92  --  3.73  --   --   --  8.05*   IMMATURE GRANS (ABS) 0.06* 0.06*  --  0.07*  --    --   --  0.09*   LYMPHS ABS 1.56 3.10  --  2.14  --   --   --  1.42   MONOS ABS 0.65 1.01*  --  0.59  --   --   --  1.13*   EOS ABS 0.36 0.62*  --  0.53*  --   --   --  0.16   MCV 82.9 83.8 83.9 84.2 85.2   < >  --  86.0   LACTATE  --   --   --   --   --   --   --  1.8   D DIMER QUANT  --   --   --   --   --   --  1.29*  --     < > = values in this interval not displayed.         Lab 12/02/24  0725 12/01/24  1507 11/30/24  0603 11/29/24  1838 11/29/24  0641 11/28/24  0612 11/26/24 0757 11/25/24 2125   SODIUM 132* 138 131*  --  136 135*   < > 133*   POTASSIUM 4.8 4.3 4.9  --  4.8 4.4   < > 4.3   CHLORIDE 96* 97* 99  --  103 102   < > 97*   CO2 28.0 31.0* 25.0  --  25.0 26.0   < > 28.0   ANION GAP 8.0 10.0 7.0  --  8.0 7.0   < > 8.0   BUN 16 14 12  --  10 11   < > 18   CREATININE 0.88 0.96 0.77  --  0.83 0.73*   < > 0.93   EGFR 97.8 89.9 101.9  --  99.6 103.5   < > 93.4   GLUCOSE 255* 41* 239*  --  230* 227*   < > 176*   CALCIUM 8.4* 9.0 8.1*  --  7.8* 7.3*   < > 8.0*   MAGNESIUM 1.6 1.8  --   --   --   --   --  2.1   PHOSPHORUS  --   --   --  2.6 2.1*  --   --   --     < > = values in this interval not displayed.         Lab 12/02/24  0725 12/01/24  1507 11/29/24  0641 11/26/24  0757 11/25/24 2125   TOTAL PROTEIN 5.2* 5.6*  --  5.7* 5.5*   ALBUMIN 2.7* 2.9* 2.4* 2.5* 2.4*   GLOBULIN 2.5 2.7  --  3.2 3.1   ALT (SGPT) 13 16  --  17 21   AST (SGOT) 13 18  --  22 29   BILIRUBIN <0.2 0.2  --  0.2 0.2   ALK PHOS 102 94  --  90 87                     Lab 11/25/24 2026   PH, ARTERIAL 7.440   PCO2, ARTERIAL 44.9   PO2 ART 91.4   FIO2 80   HCO3 ART 30.5*   BASE EXCESS ART 5.7*   CARBOXYHEMOGLOBIN 1.4     Brief Urine Lab Results  (Last result in the past 365 days)        Color   Clarity   Blood   Leuk Est   Nitrite   Protein   CREAT   Urine HCG        11/15/24 1142 Yellow   Clear   Small (1+)   Negative   Negative   100 mg/dL (2+)                   Microbiology Results Abnormal       None            No radiology results  from the last 24 hrs        Current medications:  Scheduled Meds:aspirin, 81 mg, Oral, Daily  doxycycline, 100 mg, Intravenous, Q12H  enoxaparin, 40 mg, Subcutaneous, Daily  finasteride, 5 mg, Oral, Nightly  insulin glargine, 10 Units, Subcutaneous, Nightly  insulin lispro, 2-9 Units, Subcutaneous, 4x Daily AC & at Bedtime  Insulin Lispro, 5 Units, Subcutaneous, TID With Meals  ipratropium-albuterol, 3 mL, Nebulization, 4x Daily - RT  Linezolid, 600 mg, Intravenous, Q12H  meropenem, 1,000 mg, Intravenous, Q8H  metoprolol succinate XL, 50 mg, Oral, Daily  pantoprazole, 40 mg, Oral, BID AC  pregabalin, 225 mg, Oral, BID  rOPINIRole, 0.25 mg, Oral, Nightly  sodium chloride, 10 mL, Intravenous, Q12H  tamsulosin, 0.8 mg, Oral, Nightly      Continuous Infusions:ropivacaine, , Last Rate: Stopped (11/28/24 0700)      PRN Meds:.  acetaminophen **OR** acetaminophen **OR** acetaminophen    senna-docusate sodium **AND** polyethylene glycol **AND** bisacodyl **AND** bisacodyl    calcium carbonate    Calcium Replacement - Follow Nurse / BPA Driven Protocol    dextrose    dextrose    glucagon (human recombinant)    influenza vaccine    Lidocaine HCl gel    Magnesium Standard Dose Replacement - Follow Nurse / BPA Driven Protocol    nitroglycerin    ondansetron    Phosphorus Replacement - Follow Nurse / BPA Driven Protocol    Potassium Replacement - Follow Nurse / BPA Driven Protocol    sodium chloride    sodium chloride    Assessment & Plan   Assessment & Plan     Active Hospital Problems    Diagnosis  POA    **DKA (diabetic ketoacidosis) [E11.10]  Yes    Severe protein-calorie malnutrition [E43]  Yes    Non healing left heel wound [S91.302A]  Yes    Acute osteomyelitis of left foot [M86.172]  Unknown      Resolved Hospital Problems   No resolved problems to display.        Brief Hospital Course to date:  Fracisco ADAM Lady is a 59 y.o. male  with past medical history of hypertension, type 2 diabetes with insulin use, diabetic  neuropathy, and chronic diabetic foot wounds who presents via EMS from home due to altered mental status and fatigue. Lab work consistent with diabetic ketoacidosis.  He was then found to have left calcaneal osteomyelitis.  Orthopedic surgery was consulted and performed left BKA on November 22.  He had an acute hypoxic event overnight on November 25 with significant urinary retention that improved after Bustos was placed.  There was concern for aspiration at that time so his antibiotics were escalated. PT/OT evaluated and recommended IRF. CM is following for placement. Patient on the evening of 12/1 had some issues with hypoglycemia, thankfully asymptomatic, held scheduled meal time insulin of 14 units and will now resume on 12/2 at lower dose of 5 U with meals for now. Continue to monitor and awaiting hopeful rehab placement.      This patient's problems and plans were partially entered by my partner and updated as appropriate by me 12/02/24.     Left calcaneal osteomyelitis   - Patient with chronic left heel wound and right plantar foot wound  - Admitted August 2024 for left foot cellulitis and discharged on oral antibiotics and with home wound vac following debridement, MRI left foot obtained at that time with no definite findings of osteomyelitis  - Repeat MRI left foot revealed soft tissue wound with evidence of osteomyelitis of the calcaneus and possibly an associated non-drainable abscess  -S/p left below-knee amputation by Dr. Gan on 11/22/2024 without any immediate complications  -ID following, continue Zyvox, Merrem, and Doxy to cover for pneumonia that possibly developed overnight 11/25   - Doxycycline complete on 12/2, discussed with Dr. Dominique on 12/2, continue IV Zyvox and Merrem for a few more days then possible transition to oral regimen   -Optimize pain control  -PT/OT  - Awaiting rehab placement.      Acute hypoxia, resolved   -Likely due to pulmonary edema  -improved following diuresis       BPH  Urinary retention  - Continue home Flomax, Proscar  -Bustso anchored again; failed voiding trial. Outpatient urology f/u     DKA in setting of uncontrolled IDDM complicated by diabetic neuropathy and chronic foot wounds , improving  - Uncertain of medication compliance  - A1c 14.1%  - S/p DKA protocol including insulin drip, IV fluids, and electrolyte replacement per protocol  - Now on subcutaneous insulin, Titrate insulin as indicated.  - Patient did have issues with hypoglycemia on 12/1, held scheduled meal time insulin of 14 U, will restart at 5U with meals on 12/2      Malnutrition  - Patient with poor oral intake, refusing supplementation  - Nutrition following     Acute toxic metabolic encephalopathy, resolved  - CT head without acute abnormality  - UDS negative  - secondary to DKA     HTN  - Continue home metoprolol     RLS  - Continue home Requip     Expected Discharge Location and Transportation: SNF vs Rehab   Expected Discharge   Expected Discharge Date: 12/2/2024; Expected Discharge Time:       VTE Prophylaxis:  Pharmacologic VTE prophylaxis orders are present.    AM-PAC 6 Clicks Score (PT): 11 (12/02/24 1000)    CODE STATUS:   Code Status and Medical Interventions: CPR (Attempt to Resuscitate); Full Support; Full code per last hosptial admission. Patient disoriented on exam with no family present at bedside. Only contact information is friend.   Ordered at: 11/15/24 1218     Level Of Support Discussed With:    Patient     Code Status (Patient has no pulse and is not breathing):    CPR (Attempt to Resuscitate)     Medical Interventions (Patient has pulse or is breathing):    Full Support     Comments:    Full code per last hosptial admission. Patient disoriented on exam with no family present at bedside. Only contact information is friend.       JON Caba,   12/02/24

## 2024-12-02 NOTE — CASE MANAGEMENT/SOCIAL WORK
Continued Stay Note  [facility]     Patient Name: Fracisco Mullen  MRN: [id number]  Today's Date: 12/2/2024    Admit Date: 11/15/2024    Plan: TBD   Discharge Plan       Row Name 12/02/24 1338       Plan    Plan TBD    Patient/Family in Agreement with Plan yes    Plan Comments Spoke with patient at bedside to discuss discharge plans. Therapy recommending rehab at discharge. Patient agreeble. Spoke with Kamini at [facility] to make new referral. Patient denied having any further discharge needs or concerns at this time. CM will continue to follow and asisst with discharge planning.    Final Discharge Disposition Code 62 - inpatient rehab facility                   Discharge Codes    No documentation.                 Expected Discharge Date and Time       Expected Discharge Date Expected Discharge Time    Dec 4, 2024               Kirby Riggs RN

## 2024-12-03 LAB
ALBUMIN SERPL-MCNC: 2.6 G/DL (ref 3.5–5.2)
ALBUMIN/GLOB SERPL: 1.1 G/DL
ALP SERPL-CCNC: 87 U/L (ref 39–117)
ALT SERPL W P-5'-P-CCNC: 11 U/L (ref 1–41)
ANION GAP SERPL CALCULATED.3IONS-SCNC: 9 MMOL/L (ref 5–15)
AST SERPL-CCNC: 12 U/L (ref 1–40)
BASOPHILS # BLD AUTO: 0.08 10*3/MM3 (ref 0–0.2)
BASOPHILS NFR BLD AUTO: 1.3 % (ref 0–1.5)
BILIRUB SERPL-MCNC: <0.2 MG/DL (ref 0–1.2)
BUN SERPL-MCNC: 19 MG/DL (ref 8–23)
BUN/CREAT SERPL: 24.1 (ref 7–25)
CALCIUM SPEC-SCNC: 7.9 MG/DL (ref 8.6–10.5)
CHLORIDE SERPL-SCNC: 100 MMOL/L (ref 98–107)
CO2 SERPL-SCNC: 27 MMOL/L (ref 22–29)
CREAT SERPL-MCNC: 0.79 MG/DL (ref 0.76–1.27)
DEPRECATED RDW RBC AUTO: 47.3 FL (ref 37–54)
EGFRCR SERPLBLD CKD-EPI 2021: 101.1 ML/MIN/1.73
EOSINOPHIL # BLD AUTO: 0.44 10*3/MM3 (ref 0–0.4)
EOSINOPHIL NFR BLD AUTO: 6.9 % (ref 0.3–6.2)
ERYTHROCYTE [DISTWIDTH] IN BLOOD BY AUTOMATED COUNT: 15.8 % (ref 12.3–15.4)
GLOBULIN UR ELPH-MCNC: 2.3 GM/DL
GLUCOSE BLDC GLUCOMTR-MCNC: 129 MG/DL (ref 70–130)
GLUCOSE BLDC GLUCOMTR-MCNC: 161 MG/DL (ref 70–130)
GLUCOSE BLDC GLUCOMTR-MCNC: 213 MG/DL (ref 70–130)
GLUCOSE BLDC GLUCOMTR-MCNC: 247 MG/DL (ref 70–130)
GLUCOSE SERPL-MCNC: 268 MG/DL (ref 65–99)
HCT VFR BLD AUTO: 28 % (ref 37.5–51)
HGB BLD-MCNC: 8.6 G/DL (ref 13–17.7)
IMM GRANULOCYTES # BLD AUTO: 0.04 10*3/MM3 (ref 0–0.05)
IMM GRANULOCYTES NFR BLD AUTO: 0.6 % (ref 0–0.5)
LYMPHOCYTES # BLD AUTO: 2.11 10*3/MM3 (ref 0.7–3.1)
LYMPHOCYTES NFR BLD AUTO: 33 % (ref 19.6–45.3)
MAGNESIUM SERPL-MCNC: 1.7 MG/DL (ref 1.6–2.4)
MCH RBC QN AUTO: 25.4 PG (ref 26.6–33)
MCHC RBC AUTO-ENTMCNC: 30.7 G/DL (ref 31.5–35.7)
MCV RBC AUTO: 82.6 FL (ref 79–97)
MONOCYTES # BLD AUTO: 0.71 10*3/MM3 (ref 0.1–0.9)
MONOCYTES NFR BLD AUTO: 11.1 % (ref 5–12)
NEUTROPHILS NFR BLD AUTO: 3.02 10*3/MM3 (ref 1.7–7)
NEUTROPHILS NFR BLD AUTO: 47.1 % (ref 42.7–76)
NRBC BLD AUTO-RTO: 0 /100 WBC (ref 0–0.2)
PLATELET # BLD AUTO: 398 10*3/MM3 (ref 140–450)
PMV BLD AUTO: 9.8 FL (ref 6–12)
POTASSIUM SERPL-SCNC: 4.9 MMOL/L (ref 3.5–5.2)
PROT SERPL-MCNC: 4.9 G/DL (ref 6–8.5)
RBC # BLD AUTO: 3.39 10*6/MM3 (ref 4.14–5.8)
SODIUM SERPL-SCNC: 136 MMOL/L (ref 136–145)
WBC NRBC COR # BLD AUTO: 6.4 10*3/MM3 (ref 3.4–10.8)

## 2024-12-03 PROCEDURE — 83735 ASSAY OF MAGNESIUM: CPT | Performed by: FAMILY MEDICINE

## 2024-12-03 PROCEDURE — 25010000002 ENOXAPARIN PER 10 MG: Performed by: ORTHOPAEDIC SURGERY

## 2024-12-03 PROCEDURE — 80053 COMPREHEN METABOLIC PANEL: CPT | Performed by: FAMILY MEDICINE

## 2024-12-03 PROCEDURE — 85025 COMPLETE CBC W/AUTO DIFF WBC: CPT | Performed by: FAMILY MEDICINE

## 2024-12-03 PROCEDURE — 82948 REAGENT STRIP/BLOOD GLUCOSE: CPT

## 2024-12-03 PROCEDURE — 63710000001 INSULIN LISPRO (HUMAN) PER 5 UNITS: Performed by: FAMILY MEDICINE

## 2024-12-03 PROCEDURE — 63710000001 INSULIN GLARGINE PER 5 UNITS: Performed by: PHYSICIAN ASSISTANT

## 2024-12-03 PROCEDURE — 97530 THERAPEUTIC ACTIVITIES: CPT

## 2024-12-03 PROCEDURE — 63710000001 INSULIN LISPRO (HUMAN) PER 5 UNITS: Performed by: ORTHOPAEDIC SURGERY

## 2024-12-03 PROCEDURE — 25010000002 MEROPENEM PER 100 MG: Performed by: INTERNAL MEDICINE

## 2024-12-03 PROCEDURE — 25010000002 LINEZOLID 600 MG/300ML SOLUTION: Performed by: FAMILY MEDICINE

## 2024-12-03 PROCEDURE — 94664 DEMO&/EVAL PT USE INHALER: CPT

## 2024-12-03 PROCEDURE — 99232 SBSQ HOSP IP/OBS MODERATE 35: CPT | Performed by: PHYSICIAN ASSISTANT

## 2024-12-03 PROCEDURE — 97535 SELF CARE MNGMENT TRAINING: CPT

## 2024-12-03 PROCEDURE — 94799 UNLISTED PULMONARY SVC/PX: CPT

## 2024-12-03 RX ORDER — IPRATROPIUM BROMIDE AND ALBUTEROL SULFATE 2.5; .5 MG/3ML; MG/3ML
3 SOLUTION RESPIRATORY (INHALATION) EVERY 4 HOURS PRN
Status: DISCONTINUED | OUTPATIENT
Start: 2024-12-03 | End: 2024-12-05 | Stop reason: HOSPADM

## 2024-12-03 RX ADMIN — MEROPENEM 1000 MG: 1 INJECTION, POWDER, FOR SOLUTION INTRAVENOUS at 19:38

## 2024-12-03 RX ADMIN — Medication 10 ML: at 20:42

## 2024-12-03 RX ADMIN — MEROPENEM 1000 MG: 1 INJECTION, POWDER, FOR SOLUTION INTRAVENOUS at 03:58

## 2024-12-03 RX ADMIN — IPRATROPIUM BROMIDE AND ALBUTEROL SULFATE 3 ML: 2.5; .5 SOLUTION RESPIRATORY (INHALATION) at 13:01

## 2024-12-03 RX ADMIN — Medication 10 ML: at 09:32

## 2024-12-03 RX ADMIN — INSULIN LISPRO 2 UNITS: 100 INJECTION, SOLUTION INTRAVENOUS; SUBCUTANEOUS at 18:21

## 2024-12-03 RX ADMIN — ASPIRIN 81 MG: 81 TABLET, COATED ORAL at 09:31

## 2024-12-03 RX ADMIN — MEROPENEM 1000 MG: 1 INJECTION, POWDER, FOR SOLUTION INTRAVENOUS at 12:45

## 2024-12-03 RX ADMIN — LINEZOLID 600 MG: 600 INJECTION, SOLUTION INTRAVENOUS at 12:45

## 2024-12-03 RX ADMIN — INSULIN LISPRO 4 UNITS: 100 INJECTION, SOLUTION INTRAVENOUS; SUBCUTANEOUS at 13:00

## 2024-12-03 RX ADMIN — LINEZOLID 600 MG: 600 INJECTION, SOLUTION INTRAVENOUS at 23:25

## 2024-12-03 RX ADMIN — INSULIN LISPRO 5 UNITS: 100 INJECTION, SOLUTION INTRAVENOUS; SUBCUTANEOUS at 13:00

## 2024-12-03 RX ADMIN — ACETAMINOPHEN 650 MG: 325 TABLET ORAL at 09:53

## 2024-12-03 RX ADMIN — ACETAMINOPHEN 650 MG: 325 TABLET ORAL at 19:38

## 2024-12-03 RX ADMIN — CALCIUM CARBONATE (ANTACID) CHEW TAB 500 MG 2 TABLET: 500 CHEW TAB at 20:41

## 2024-12-03 RX ADMIN — ENOXAPARIN SODIUM 40 MG: 100 INJECTION SUBCUTANEOUS at 09:31

## 2024-12-03 RX ADMIN — METOPROLOL SUCCINATE 50 MG: 50 TABLET, EXTENDED RELEASE ORAL at 09:31

## 2024-12-03 RX ADMIN — PREGABALIN 225 MG: 75 CAPSULE ORAL at 09:31

## 2024-12-03 RX ADMIN — IPRATROPIUM BROMIDE AND ALBUTEROL SULFATE 3 ML: 2.5; .5 SOLUTION RESPIRATORY (INHALATION) at 08:30

## 2024-12-03 RX ADMIN — INSULIN GLARGINE 15 UNITS: 100 INJECTION, SOLUTION SUBCUTANEOUS at 20:40

## 2024-12-03 RX ADMIN — PREGABALIN 225 MG: 75 CAPSULE ORAL at 20:41

## 2024-12-03 RX ADMIN — PANTOPRAZOLE SODIUM 40 MG: 40 TABLET, DELAYED RELEASE ORAL at 09:31

## 2024-12-03 RX ADMIN — TAMSULOSIN HYDROCHLORIDE 0.8 MG: 0.4 CAPSULE ORAL at 20:41

## 2024-12-03 RX ADMIN — ROPINIROLE 0.25 MG: 0.5 TABLET, FILM COATED ORAL at 20:41

## 2024-12-03 RX ADMIN — INSULIN LISPRO 4 UNITS: 100 INJECTION, SOLUTION INTRAVENOUS; SUBCUTANEOUS at 09:31

## 2024-12-03 RX ADMIN — INSULIN LISPRO 5 UNITS: 100 INJECTION, SOLUTION INTRAVENOUS; SUBCUTANEOUS at 09:31

## 2024-12-03 RX ADMIN — INSULIN LISPRO 5 UNITS: 100 INJECTION, SOLUTION INTRAVENOUS; SUBCUTANEOUS at 18:21

## 2024-12-03 RX ADMIN — PANTOPRAZOLE SODIUM 40 MG: 40 TABLET, DELAYED RELEASE ORAL at 18:21

## 2024-12-03 RX ADMIN — FINASTERIDE 5 MG: 5 TABLET, FILM COATED ORAL at 20:40

## 2024-12-03 NOTE — PROGRESS NOTES
"Fracisco Mullen  1965  [id number]    Evaluating Physician: Varun Dominique MD    Chief Complaint: fatigue    Reason for Consultation: foot infection    History of present illness:     Patient is a 59 y.o.  Yr old male with history of diabetes/hypertension, previously followed with Dr. Gatica; admitted 2023 with right foot infection, cultures with MSSA/Morganella/ESBL Klebsiella/enterococcus and strep species.had surgery at the second toe with amputation, received IV daptomycin/Invanz with general improvements at that site.  He has been left with a chronic right plantar foot wound and a chronic wound at the left heel that has turned black; He apparently was admitted to the hospital August 1 after a fall at [facility].  Noted to have urinary retention with concern for possible UTI.  In addition left heel with black discoloration/malodor and redness, empiric antibiotics initiated.  He is chronically debilitated and wheelchair bound by his report.Bustos catheter-based at admission     8/3/24  Dr Gan  \"PROCEDURE:            Left  Left      88214:             Debridement of skin and subcutaneous tissue  32398:             Wound vacuum-assisted closure\"     8/6/24 MRI and surgical findings did not confirm bone involvement, transitioned to oral agents at discharge       Readmitted November 15, 2024 with reports of appearing \"sluggish\" according to admission notes with DKA, noted to have high hemoglobin A1c and persistent/worsening left heel wound according to patient; medicine team notes mention urinary retention, acute toxic metabolic encephalopathy improved and low-grade fever. Abnormal MRI at the left foot:    Per radiology-- \"Soft tissue wound seen along the posterior heel with ill-defined fluid that extends through and disrupts the distal Achilles tendon. Underlying this area there is evidence of osteomyelitis of the posterior lateral calcaneus. There is a tiny fluid   collection here as well that may represent " "an associated nondrainable abscess.     Additionally there appears to be a nondisplaced stress fracture of the posterior calcaneus. \"    11/22 left BKA    11/26/24 increased O2 requirements with less responsive per medicine team notes overnight; subsequently better    12/3/24 sleepy at my visit,  no new pain;  postop Left LE postop pain  dull , worse with palpation, better with pain meds and not severe,  he won't attach a numerical severity      No headache photophobia or neck stiffness.  No   cough or hemoptysis.  No nausea vomiting diarrhea or abdominal pain.  No other new skin rash.  no dysuria hematuria or pyuria.       Past Medical History:   Diagnosis Date    Acute renal failure     Hx of    Obesity     Hx of    Sleep apnea     Type 2 diabetes mellitus        Past Surgical History:   Procedure Laterality Date    BACK SURGERY      lower back    BELOW KNEE AMPUTATION Left 11/22/2024    Procedure: BELOW-KNEE AMPUTATION LEFT;  Surgeon: Dru Gan Jr., MD;  Location:  [facility];  Service: Orthopedics;  Laterality: Left;    CHOLECYSTECTOMY WITH INTRAOPERATIVE CHOLANGIOGRAM N/A 1/6/2021    Procedure: CHOLECYSTECTOMY LAPAROSCOPIC INTRAOPERATIVE CHOLANGIOGRAM;  Surgeon: Juventino Hooker MD;  Location:  [facility];  Service: General;  Laterality: N/A;    ERCP N/A 1/9/2021    Procedure: ENDOSCOPIC RETROGRADE CHOLANGIOPANCREATOGRAPHY;  Surgeon: Jeremy Dowell MD;  Location: [facility]COPY;  Service: Gastroenterology;  Laterality: N/A;  with sphiincterotomy and balloon sweep with 9mm-12mm balloon    INCISION AND DRAINAGE FOOT Left 8/3/2024    Procedure: HEAL IRRIGATION AND DEBRIDEMENT LEFT;  Surgeon: Dru Gan Jr., MD;  Location:  [facility];  Service: Orthopedics;  Laterality: Left;    PLACEMENT OF WOUND VAC Left 8/3/2024    Procedure: PLACEMENT OF WOUND VAC;  Surgeon: Dru Gan Jr., MD;  Location:  [facility];  Service: Orthopedics;  Laterality: Left;       Pediatric History   Patient Parents    Not " on file     Other Topics Concern    Not on file   Social History Narrative    Not on file       family history includes Cancer in his brother, maternal grandmother, mother, and another family member; Diabetes in an other family member; Heart attack in an other family member; Heart disease in his brother and father; Hyperlipidemia in his mother and another family member; Hypertension in his mother and another family member; Obesity in an other family member.    Allergies   Allergen Reactions    Sertraline Hcl Hives     Zoloft       Medication:  Current Facility-Administered Medications   Medication Dose Route Frequency Provider Last Rate Last Admin    acetaminophen (TYLENOL) tablet 650 mg  650 mg Oral Q4H PRN Dru Gan Jr., MD   650 mg at 12/02/24 1949    Or    acetaminophen (TYLENOL) 160 MG/5ML oral solution 650 mg  650 mg Oral Q4H PRN Dru Gan Jr., MD        Or    acetaminophen (TYLENOL) suppository 650 mg  650 mg Rectal Q4H PRN Dru Gan Jr., MD   650 mg at 11/15/24 2327    aspirin EC tablet 81 mg  81 mg Oral Daily Dru Gan Jr., MD   81 mg at 12/02/24 0920    sennosides-docusate (PERICOLACE) 8.6-50 MG per tablet 2 tablet  2 tablet Oral BID PRN Dru Gan Jr., MD        And    polyethylene glycol (MIRALAX) packet 17 g  17 g Oral Daily PRN Dru Gan Jr., MD        And    bisacodyl (DULCOLAX) EC tablet 5 mg  5 mg Oral Daily PRN Dru Gan Jr., MD        And    bisacodyl (DULCOLAX) suppository 10 mg  10 mg Rectal Daily PRN Dru Gan Jr., MD        calcium carbonate (TUMS) chewable tablet 500 mg (200 mg elemental)  2 tablet Oral TID PRN Dru Gan Jr., MD   2 tablet at 12/01/24 1338    Calcium Replacement - Follow Nurse / BPA Driven Protocol   Not Applicable PRN Dru Gan Jr., MD        dextrose (D50W) (25 g/50 mL) IV injection 25 g  25 g Intravenous Q15 Min PRN Dru Gan Jr., MD   25 g at 11/25/24 2124    dextrose (GLUTOSE) oral gel  15 g  15 g Oral Q15 Min PRN Dru Gan Jr., MD   15 g at 11/25/24 2039    Enoxaparin Sodium (LOVENOX) syringe 40 mg  40 mg Subcutaneous Daily Dru Gan Jr., MD   40 mg at 12/02/24 0919    finasteride (PROSCAR) tablet 5 mg  5 mg Oral Nightly Dru Gan Jr., MD   5 mg at 12/02/24 2129    glucagon (GLUCAGEN) injection 1 mg  1 mg Intramuscular Q15 Min PRN Dru Gan Jr., MD        influenza virus vacc split PF FLUZONE 0.5 mL  0.5 mL Intramuscular During Hospitalization Dru Gan Jr., MD        insulin glargine (LANTUS, SEMGLEE) injection 10 Units  10 Units Subcutaneous Nightly CARLA Caba DO   10 Units at 12/02/24 2129    Insulin Lispro (humaLOG) injection 2-9 Units  2-9 Units Subcutaneous 4x Daily AC & at Bedtime Dru Gan Jr., MD   2 Units at 12/02/24 2129    Insulin Lispro (humaLOG) injection 5 Units  5 Units Subcutaneous TID With Meals CARLA Caba DO   5 Units at 12/02/24 1805    ipratropium-albuterol (DUO-NEB) nebulizer solution 3 mL  3 mL Nebulization 4x Daily - RT Enriqueta Muir, APRN   3 mL at 12/02/24 2012    Lidocaine HCl gel (XYLOCAINE) urethral/mucosal syringe   Urethral PRN Dru Gan Jr., MD   1 Application at 11/26/24 0333    Linezolid (ZYVOX) 600 mg 300 mL  600 mg Intravenous Q12H CARLA Caba,    Stopped at 12/03/24 0009    Magnesium Standard Dose Replacement - Follow Nurse / BPA Driven Protocol   Not Applicable PRN Dru Gan Jr., MD        meropenem (MERREM) 1,000 mg in sodium chloride 0.9 % 100 mL MBP  1,000 mg Intravenous Q8H Varun Dominique MD 0 mL/hr at 12/02/24 2248 1,000 mg at 12/03/24 0358    metoprolol succinate XL (TOPROL-XL) 24 hr tablet 50 mg  50 mg Oral Daily Dru Gan Jr., MD   50 mg at 12/02/24 0919    nitroglycerin (NITROSTAT) SL tablet 0.4 mg  0.4 mg Sublingual Q5 Min PRN Dru Gan Jr., MD        ondansetron (ZOFRAN) injection 4 mg  4 mg Intravenous Q6H PRN Dru Gan Jr., MD   4 mg at  11/24/24 2030    pantoprazole (PROTONIX) EC tablet 40 mg  40 mg Oral BID AC Enriqueta Muir, APRN   40 mg at 12/02/24 1805    Phosphorus Replacement - Follow Nurse / BPA Driven Protocol   Not Applicable PRN Dru Gan Jr., MD        Potassium Replacement - Follow Nurse / BPA Driven Protocol   Not Applicable PRN Dru Gan Jr., MD        pregabalin (LYRICA) capsule 225 mg  225 mg Oral BID KathyEnriqueta riley, APRN   225 mg at 12/02/24 2129    rOPINIRole (REQUIP) tablet 0.25 mg  0.25 mg Oral Nightly Dru Gan Jr., MD   0.25 mg at 12/02/24 2129    ropivacaine (NAROPIN) 0.2 % infusion (INFUSYSTEM)   Peripheral Nerve Continuous Dru Gan Jr., MD   Stopped at 11/28/24 0700    sodium chloride 0.9 % flush 10 mL  10 mL Intravenous Q12H Dru Gan Jr., MD   10 mL at 12/02/24 2130    sodium chloride 0.9 % flush 10 mL  10 mL Intravenous PRN Dru Gan Jr., MD        sodium chloride 0.9 % infusion 40 mL  40 mL Intravenous PRN Dru Gan Jr., MD   40 mL at 11/28/24 1853    tamsulosin (FLOMAX) 24 hr capsule 0.8 mg  0.8 mg Oral Nightly Dru Gan Jr., MD   0.8 mg at 12/02/24 2129       Antibiotics:  Anti-Infectives (From admission, onward)      Ordered     Dose/Rate Route Frequency Start Stop    11/30/24 0849  Linezolid (ZYVOX) 600 mg 300 mL        Ordering Provider: CARLA Caba DO    600 mg  300 mL/hr over 60 Minutes Intravenous Every 12 Hours 11/30/24 1000 12/05/24 2159 11/26/24 0749  meropenem (MERREM) 1,000 mg in sodium chloride 0.9 % 100 mL MBP        Ordering Provider: Varun Dominique MD    1,000 mg  over 3 Hours Intravenous Every 8 Hours 11/26/24 1400 12/08/24 1129    11/26/24 0750  Linezolid (ZYVOX) 600 mg 300 mL        Ordering Provider: Varun Dominique MD    600 mg  300 mL/hr over 60 Minutes Intravenous Every 12 Hours 11/26/24 0900 11/29/24 0928    11/26/24 0749  meropenem (MERREM) 1,000 mg in sodium chloride 0.9 % 100 mL MBP        Ordering Provider:  "Varun Dominique MD    1,000 mg  over 30 Minutes Intravenous Once 11/26/24 0800 11/26/24 0903    11/26/24 0024  doxycycline (VIBRAMYCIN) 100 mg in sodium chloride 0.9 % 100 mL MBP        Ordering Provider: Enriqueta Muir APRN    100 mg  over 60 Minutes Intravenous Every 12 Hours 11/26/24 0100 12/02/24 1438    11/22/24 1045  ceFAZolin 2000 mg IVPB in 100 mL NS (MBP)        Ordering Provider: Claudia Lim PA    2,000 mg  over 30 Minutes Intravenous Once 11/22/24 1047 11/22/24 1255              Review of Systems    12/3/24 per nursing also    Constitutional-- No Fever, chills or sweats.  Appetite diminished with fatigue.  Heent-- No new vision, hearing or throat complaints.  No epistaxis or oral sores.  Denies odynophagia or dysphagia.  No flashers, floaters or eye pain. No odynophagia or dysphagia. No headache, photophobia or neck stiffness.  CV-- No chest pain, palpitation or syncope  Resp-- see above  GI- No nausea, vomiting, or diarrhea.  No hematochezia, melena, or hematemesis. Denies jaundice or chronic liver disease.  -- No dysuria, hematuria, or flank pain.  Denies hesitancy, urgency or flank pain.  Lymph- no swollen lymph nodes in neck/axilla or groin.   Heme- No active bruising or bleeding; no Hx of DVT or PE.  MS-- no swelling or pain in the bones or joints of arms/legs.  No new back pain.  Neuro-- No acute focal weakness or numbness in the arms or legs.  No seizures.    Full 12 point review of systems reviewed and negative otherwise for acute complaints, except for above    Physical Exam:   Vital Signs   /58 (BP Location: Right arm, Patient Position: Lying)   Pulse 80   Temp 97.5 °F (36.4 °C) (Temporal)   Resp 16   Ht 162.6 cm (64.02\")   Wt 79.5 kg (175 lb 3.2 oz)   SpO2 93%   BMI 30.06 kg/m²     GENERAL: sleepy     HEENT: Normocephalic, atraumatic.   No conjunctival injection. No icterus. Oropharynx clear without evidence of thrush or exudate. No evidence of periodontal disease.  "   NECK: Supple without nuchal rigidity. No mass.   HEART: RRR; No murmur, rubs, gallops.   LUNGS: diminished at bases  ; No dullness.  ABDOMEN: Soft, nontender, nondistended. Positive bowel sounds. No rebound or guarding. NO mass or HSM.  EXT: see below   MSK: FROM without joint effusions noted arms/legs.    SKIN: Warm and dry without cutaneous eruptions on Inspection/palpation.    NEURO: sleepy.    Right foot second toe amputation site noted, no redness/duration or warmth there.  No oral or active drainage    Left  LE surgical site no new visible redness/purulence.  No crepitus or bulla.         Laboratory Data    Results from last 7 days   Lab Units 12/03/24  0543 12/02/24  0725 12/01/24  1507   WBC 10*3/mm3 6.40 7.13 8.81   HEMOGLOBIN g/dL 8.6* 8.6* 9.9*   HEMATOCRIT % 28.0* 28.1* 32.7*   PLATELETS 10*3/mm3 398 471* 522*     Results from last 7 days   Lab Units 12/03/24  0543   SODIUM mmol/L 136   POTASSIUM mmol/L 4.9   CHLORIDE mmol/L 100   CO2 mmol/L 27.0   BUN mg/dL 19   CREATININE mg/dL 0.79   GLUCOSE mg/dL 268*   CALCIUM mg/dL 7.9*     Results from last 7 days   Lab Units 12/03/24  0543   ALK PHOS U/L 87   BILIRUBIN mg/dL <0.2   ALT (SGPT) U/L 11   AST (SGOT) U/L 12                 Estimated Creatinine Clearance: 93.5 mL/min (by C-G formula based on SCr of 0.79 mg/dL).      Microbiology:      Radiology:  Imaging Results (Last 72 Hours)       ** No results found for the last 72 hours. **              Impression:       --acute left heel wound infection ,  abnormal MRI as above raising concern for fluid extending into and disrupting the distal lateral Achilles tendon in addition to with evidence for osteomyelitis at the posterior lateral calcaneus in addition to possible nondisplaced stress fracture at the posterior calcaneus; High risk for persistent/recurrent or nonhealing wounds, persistent/progressive or recurrent infection and risk for further functional/limb loss, higher level amputation etc. Surgery 8/3;   left BKA on November 22. Empiric antibiotics for now    --hypoxia; empiric abx ; aspiration risk although no witnessed aspiration.  Viral panel negative.  No productive cough to send for microbiology. Subsequently better     --urinary retention per admission notes, some hematuria on November 15; further urologic evaluation regarding functional/anatomic assessment at discretion of medicine team with respect inpatient versus outpatient     --Diabetes, uncontrolled ; glucose control per medicine team     --diabetic neuropathy     --Chronic debility as per patient wheelchair bound     --Hx ESBL    PLAN:     --IV zyvox/merrem to stop soon;  anticipate off IV abx by discharge if steadily better and no new suppurative process    --s/p doxy    --Check/review labs cultures and scans    --Partial history Per nursing staff    --Discussed with microbiology    --Discussed with Dr Caba    --Highly complex at of issues with high risk for further serious morbidity and other serious sequela     Copied text in this note has been reviewed and is accurate as of 12/03/24.     Varun Dominique MD  12/3/2024

## 2024-12-03 NOTE — CASE MANAGEMENT/SOCIAL WORK
Continued Stay Note  [facility]     Patient Name: Fracisco Mullen  MRN: [id number]  Today's Date: 12/3/2024    Admit Date: 11/15/2024    Plan: [facility] pending insurance   Discharge Plan       Row Name 12/03/24 0942       Plan    Plan [facility] pending insurance    Plan Comments Kamini at [facility] stated that their MD has approved the patient. Insurance is still pending at this time. Complex CM following. x [phone]    Final Discharge Disposition Code 62 - inpatient rehab facility                   Discharge Codes    No documentation.                 Expected Discharge Date and Time       Expected Discharge Date Expected Discharge Time    Dec 4, 2024               Eileen Brandt RN

## 2024-12-03 NOTE — THERAPY TREATMENT NOTE
Patient Name: Fracisco Mullen  : 1965    MRN: [id number]                              Today's Date: 12/3/2024       Admit Date: 11/15/2024    Visit Dx:     ICD-10-CM ICD-9-CM   1. S/P BKA (below knee amputation) unilateral, left  Z89.512 V49.75   2. Diabetic ketoacidosis without coma associated with type 2 diabetes mellitus  E11.10 250.12   3. Oropharyngeal dysphagia  R13.12 787.22   4. Non healing left heel wound  S91.302A 892.1   5. Acute osteomyelitis of left foot  M86.172 730.07     Patient Active Problem List   Diagnosis    HTN (hypertension)    Type 2 diabetes mellitus, with long-term current use of insulin    Diabetic retinopathy    Obesity (BMI 30.0-34.9)    Peripheral neuropathy    Asthma    Causalgia of lower limb    Chronic constipation    Compression of lumbar nerve root    GERD (gastroesophageal reflux disease)    Hyperlipidemia    IBS (irritable bowel syndrome)    RLS (restless legs syndrome)    Sleep apnea    Vitamin D deficiency    Cigar smoker    Chronic back pain    Diabetic ketoacidosis associated with type 2 diabetes mellitus    Multiple open wounds of foot    Status post cholecystectomy    Cholestatic hepatitis    Diabetic infection of left foot    Precordial pain    Tobacco use    DKA (diabetic ketoacidosis)    Non healing left heel wound    Severe protein-calorie malnutrition    Acute osteomyelitis of left foot     Past Medical History:   Diagnosis Date    Acute renal failure     Hx of    Obesity     Hx of    Sleep apnea     Type 2 diabetes mellitus      Past Surgical History:   Procedure Laterality Date    BACK SURGERY      lower back    BELOW KNEE AMPUTATION Left 2024    Procedure: BELOW-KNEE AMPUTATION LEFT;  Surgeon: Dru Gan Jr., MD;  Location: [facility];  Service: Orthopedics;  Laterality: Left;    CHOLECYSTECTOMY WITH INTRAOPERATIVE CHOLANGIOGRAM N/A 2021    Procedure: CHOLECYSTECTOMY LAPAROSCOPIC INTRAOPERATIVE CHOLANGIOGRAM;  Surgeon: Juventino Hooker MD;   Location:  [facility];  Service: General;  Laterality: N/A;    ERCP N/A 1/9/2021    Procedure: ENDOSCOPIC RETROGRADE CHOLANGIOPANCREATOGRAPHY;  Surgeon: Jeremy Dowell MD;  Location: [facility];  Service: Gastroenterology;  Laterality: N/A;  with sphiincterotomy and balloon sweep with 9mm-12mm balloon    INCISION AND DRAINAGE FOOT Left 8/3/2024    Procedure: HEAL IRRIGATION AND DEBRIDEMENT LEFT;  Surgeon: Dru Gan Jr., MD;  Location:  [facility];  Service: Orthopedics;  Laterality: Left;    PLACEMENT OF WOUND VAC Left 8/3/2024    Procedure: PLACEMENT OF WOUND VAC;  Surgeon: Dru Gan Jr., MD;  Location:  [facility];  Service: Orthopedics;  Laterality: Left;      General Information       Row Name 12/03/24 1146          OT Time and Intention    Document Type therapy note (daily note)  -LR     Mode of Treatment occupational therapy  -LR       Row Name 12/03/24 1146          General Information    Patient Profile Reviewed yes  -LR     Existing Precautions/Restrictions fall;weight bearing;other (see comments)  RLE WBAT in off loading shoe  -LR     Barriers to Rehab medically complex;previous functional deficit;physical barrier;impaired sensation  -LR       Row Name 12/03/24 1146          Cognition    Orientation Status (Cognition) oriented x 3  -LR       Row Name 12/03/24 1146          Safety Issues/Impairments Affecting Functional Mobility    Safety Issues Affecting Function (Mobility) safety precaution awareness;awareness of need for assistance;safety precautions follow-through/compliance;insight into deficits/self-awareness;judgment;sequencing abilities  -LR     Impairments Affecting Function (Mobility) endurance/activity tolerance;pain;strength;balance;sensation/sensory awareness;postural/trunk control;coordination  -LR               User Key  (r) = Recorded By, (t) = Taken By, (c) = Cosigned By      Initials Name Provider Type    LR Bertha Holder, OT Occupational Therapist                      Mobility/ADL's       Row Name 12/03/24 1147          Bed Mobility    Bed Mobility supine-sit;rolling left;rolling right  -LR     Rolling Left [place] (Bed Mobility) minimum assist (75% patient effort);1 person assist  -LR     Rolling Right [place] (Bed Mobility) minimum assist (75% patient effort);1 person assist  -LR     Supine-Sit [place] (Bed Mobility) verbal cues;minimum assist (75% patient effort);1 person assist  -LR     Bed Mobility, Safety Issues decreased use of legs for bridging/pushing  -LR     Assistive Device (Bed Mobility) bed rails;head of bed elevated;repositioning sheet  -LR     Comment, (Bed Mobility) Increased time and effort  -LR       Row Name 12/03/24 1147          Transfers    Transfers sit-stand transfer;stand-sit transfer;bed-chair transfer  -LR     Comment, (Transfers) BUE support  -LR       Row Name 12/03/24 1147          Bed-Chair Transfer    Bed-Chair [place] (Transfers) verbal cues;nonverbal cues (demo/gesture);maximum assist (25% patient effort);2 person assist  -LR       Row Name 12/03/24 1147          Sit-Stand Transfer    Sit-Stand [place] (Transfers) verbal cues;nonverbal cues (demo/gesture);maximum assist (25% patient effort);2 person assist  -LR     Assistive Device (Sit-Stand Transfers) walker, front-wheeled;other (see comments)  BUE support  -LR     Comment, (Sit-Stand Transfer) STS x3 from EOB & progressing each time. Pt requiring max VC to maintain upright posture and footing  -LR       Row Name 12/03/24 1147          Stand-Sit Transfer    Stand-Sit [place] (Transfers) maximum assist (25% patient effort);2 person assist;nonverbal cues (demo/gesture);verbal cues  -LR     Assistive Device (Stand-Sit Transfers) other (see comments)  -LR     Comment, (Stand-Sit Transfer) VC for reaching back to chair, not to push up from FWW, and other safety cues  -LR       Row Name 12/03/24 1147          Functional Mobility    Functional Mobility- Comment Defer to  PT  -LR       Row Name 12/03/24 1147          Activities of Daily Living    BADL Assessment/Intervention toileting;grooming;upper body dressing  -LR       Row Name 12/03/24 1147          Upper Body Dressing Assessment/Training    [place] Level (Upper Body Dressing) doff;don;front opening garment;minimum assist (75% patient effort)  -LR     Position (Upper Body Dressing) long sitting  -LR       Row Name 12/03/24 1147          Grooming Assessment/Training    [place] Level (Grooming) wash face, hands;set up  -LR     Position (Grooming) supported sitting  -LR       Row Name 12/03/24 1147          Toileting Assessment/Training    [place] Level (Toileting) perform perineal hygiene;manage urinary drainage device;dependent (less than 25% patient effort)  -LR     Position (Toileting) supine  -LR     Comment, (Toileting) Performed kuldeep hygiene reclined in chair with pt rolling L<>R. Pt unaware of BM  -LR               User Key  (r) = Recorded By, (t) = Taken By, (c) = Cosigned By      Initials Name Provider Type    LR Bertha Holder OT Occupational Therapist                   Obj/Interventions       Row Name 12/03/24 1152          Balance    Balance Assessment sitting static balance;sitting dynamic balance;sit to stand dynamic balance;standing static balance;standing dynamic balance  -LR     Static Sitting Balance standby assist  -LR     Dynamic Sitting Balance contact guard  -LR     Position, Sitting Balance unsupported;sitting edge of bed  -LR     Static Standing Balance moderate assist;2-person assist;non-verbal cues (demo/gesture);verbal cues  -LR     Dynamic Standing Balance maximum assist;2-person assist;non-verbal cues (demo/gesture);verbal cues  -LR     Position/Device Used, Standing Balance supported  -LR     Balance Interventions sitting;standing;sit to stand;supported;static;dynamic;occupation based/functional task  -LR               User Key  (r) = Recorded By, (t) = Taken By, (c) = Cosigned By       Initials Name Provider Type    LR Bertha Holder OT Occupational Therapist                   Goals/Plan    No documentation.                  Clinical Impression       Row Name 12/03/24 1152          Pain Assessment    Pretreatment Pain Rating 8/10  -LR     Posttreatment Pain Rating 8/10  -LR     Pain Location residual limb  -LR     Pain Side/Orientation left;lower  -LR     Pain Management Interventions premedicated for activity;exercise or physical activity utilized  -LR     Response to Pain Interventions activity level improved;activity participation with tolerable pain  -LR       Row Name 12/03/24 1152          Plan of Care Review    Plan of Care Reviewed With patient  -LR     Progress no change  -LR     Outcome Evaluation Pt continuing to present with limited activity tolerance, stregth, balance deficits, and limited safety awareness. Pt dependent for kuldeep hygiene and set up A for grooming tasks. Maximum balance deficits noted during STS with pt requiring max Ax2. Recommend IPOT POC and IRF at D/C.  -LR       Row Name 12/03/24 1152          Therapy Plan Review/Discharge Plan (OT)    Anticipated Discharge Disposition (OT) inpatient rehabilitation facility  -LR       Row Name 12/03/24 1152          Vital Signs    O2 Delivery Pre Treatment room air  -LR     O2 Delivery Intra Treatment room air  -LR     O2 Delivery Post Treatment room air  -LR     Pre Patient Position Supine  -LR     Intra Patient Position Standing  -LR     Post Patient Position Sitting  -LR       Row Name 12/03/24 1152          Positioning and Restraints    Pre-Treatment Position in bed  -LR     Post Treatment Position chair  -LR     In Chair notified nsg;reclined;call light within reach;encouraged to call for assist;exit alarm on;waffle cushion;on mechanical lift sling;legs elevated  -LR               User Key  (r) = Recorded By, (t) = Taken By, (c) = Cosigned By      Initials Name Provider Type    LR Bertha Holder OT Occupational  Therapist                   Outcome Measures       Row Name 12/03/24 1158          How much help from another is currently needed...    Putting on and taking off regular lower body clothing? 2  -LR     Bathing (including washing, rinsing, and drying) 2  -LR     Toileting (which includes using toilet bed pan or urinal) 1  -LR     Putting on and taking off regular upper body clothing 3  -LR     Taking care of personal grooming (such as brushing teeth) 3  -LR     Eating meals 4  -LR     AM-PAC 6 Clicks Score (OT) 15  -LR       Row Name 12/03/24 1143          How much help from another person do you currently need...    Turning from your back to your side while in flat bed without using bedrails? 3  -ML     Moving from lying on back to sitting on the side of a flat bed without bedrails? 2  -ML     Moving to and from a bed to a chair (including a wheelchair)? 2  -ML     Standing up from a chair using your arms (e.g., wheelchair, bedside chair)? 2  -ML     Climbing 3-5 steps with a railing? 1  -ML     To walk in hospital room? 1  -ML     AM-PAC 6 Clicks Score (PT) 11  -ML     Highest Level of Mobility Goal 4 --> Transfer to chair/commode  -ML       Row Name 12/03/24 1158 12/03/24 1143       Functional Assessment    Outcome Measure Options AM-PAC 6 Clicks Daily Activity (OT)  -LR AM-PAC 6 Clicks Basic Mobility (PT)  -ML              User Key  (r) = Recorded By, (t) = Taken By, (c) = Cosigned By      Initials Name Provider Type    Isabel Acosta Physical Therapist    LR Bertha Holder, OT Occupational Therapist                    Occupational Therapy Education       Title: PT OT SLP Therapies (In Progress)       Topic: Occupational Therapy (In Progress)       Point: ADL training (In Progress)       Description:   Instruct learner(s) on proper safety adaptation and remediation techniques during self care or transfers.   Instruct in proper use of assistive devices.                  Learning Progress Summary             Patient Acceptance, E, NR by LR at 12/3/2024 1030    Acceptance, E, VU by SW at 12/1/2024 1030    Acceptance, E,TB, VU by SH at 11/28/2024 1846    Acceptance, E, VU,NR by TA at 11/28/2024 1044    Acceptance, E,D, NR by PATTIE at 11/19/2024 1352    Acceptance, E,TB, NR by KF at 11/17/2024 1308                      Point: Home exercise program (In Progress)       Description:   Instruct learner(s) on appropriate technique for monitoring, assisting and/or progressing therapeutic exercises/activities.                  Learning Progress Summary            Patient Acceptance, E, NR by LR at 12/3/2024 1030    Acceptance, E, VU by SW at 12/1/2024 1030    Acceptance, E,TB, VU by SH at 11/28/2024 1846                      Point: Precautions (In Progress)       Description:   Instruct learner(s) on prescribed precautions during self-care and functional transfers.                  Learning Progress Summary            Patient Acceptance, E, NR by LR at 12/3/2024 1030    Acceptance, E,TB, VU by SH at 11/28/2024 1846    Acceptance, E, VU,NR by TA at 11/28/2024 1044    Acceptance, E,D, NR by PATTIE at 11/19/2024 1352    Acceptance, E,TB, NR by KF at 11/17/2024 1308                      Point: Body mechanics (In Progress)       Description:   Instruct learner(s) on proper positioning and spine alignment during self-care, functional mobility activities and/or exercises.                  Learning Progress Summary            Patient Acceptance, E, NR by LR at 12/3/2024 1030    Acceptance, E, VU by SW at 12/1/2024 1030    Acceptance, E,TB, VU by SH at 11/28/2024 1846    Acceptance, E, VU,NR by SOLOMON at 11/28/2024 1044    Acceptance, E,D, NR by PATTIE at 11/19/2024 1352    Acceptance, E,TB, NR by KF at 11/17/2024 1308                                      User Key       Initials Effective Dates Name Provider Type Discipline    SOLOMON 06/16/21 -  Waldemar Malik, OT Occupational Therapist OT    PATTIE 06/16/21 -  Dianelys Peguero OT Occupational Therapist  OT     06/16/21 -  Melba Ellsworth OT Occupational Therapist OT     01/23/24 -  Carlos Jennings, LUZ Registered Nurse Nurse     08/09/23 -  Jamilah Meyers OT Occupational Therapist OT     10/09/24 -  Bertha Holder OT Occupational Therapist OT                  OT Recommendation and Plan     Plan of Care Review  Plan of Care Reviewed With: patient  Progress: no change  Outcome Evaluation: Pt continuing to present with limited activity tolerance, stregth, balance deficits, and limited safety awareness. Pt dependent for kuldeep hygiene and set up A for grooming tasks. Maximum balance deficits noted during STS with pt requiring max Ax2. Recommend IPOT POC and IRF at D/C.     Time Calculation:         Time Calculation- OT       Row Name 12/03/24 1200             Time Calculation- OT    OT Start Time 1030  -LR      OT Received On 12/03/24  -LR      OT Goal Re-Cert Due Date 12/08/24  -LR         Timed Charges    97192 - OT Self Care/Mgmt Minutes 13  -LR         Total Minutes    Timed Charges Total Minutes 13  -LR       Total Minutes 13  -LR                User Key  (r) = Recorded By, (t) = Taken By, (c) = Cosigned By      Initials Name Provider Type    LR Bertha Holder OT Occupational Therapist                  Therapy Charges for Today       Code Description Service Date Service Provider Modifiers Qty    39021521722 HC OT SELF CARE/MGMT/TRAIN EA 15 MIN 12/3/2024 Bertha Holder OT GO 1                 Bertha Holder OT  12/3/2024

## 2024-12-03 NOTE — PLAN OF CARE
Goal Outcome Evaluation:  Plan of Care Reviewed With: patient        Progress: no change  Outcome Evaluation: VSS on RA. A&O x4. PRN tylenol given. IV abx administered. Bustos catheter patent, draining adequate UOP. BM this evening. Pt resting well in between care.

## 2024-12-03 NOTE — PROGRESS NOTES
[facility]  PROGRESS NOTE    Patient Name: Fracisco Mullen  : 1965  MRN: [id number]    Date of Admission: 11/15/2024  Primary Care Physician: Brandy Roberson    Subjective     CC: f/u L foot infection     HPI:  Sitting in chair. Feeling well today with no new complaints. LLE pain is manageable. He is anxious for discharge to rehab.     Objective     Vital Signs:   Temp:  [97.4 °F (36.3 °C)-98.3 °F (36.8 °C)] 98 °F (36.7 °C)  Heart Rate:  [70-96] 74  Resp:  [16-17] 16  BP: (114-141)/(58-80) 141/80     Physical Exam:  Constitutional: No acute distress, awake, alert and conversant. Chronically ill appearing. Sitting upright in c hair   HENT: NCAT, mucous membranes moist  Respiratory: Clear to auscultation bilaterally, respiratory effort normal on room air   Cardiovascular: RRR  Gastrointestinal: Positive bowel sounds, soft, nontender, nondistended  Musculoskeletal: L BKA incision dressed - did not remove dressing for exam   Psychiatric: Appropriate affect, cooperative with exam  Neurologic: Oriented x 3, moves all extremities spontaneously without focal deficits, speech clear  Skin: No rashes to exposed surfaces.     Results Reviewed:  LAB RESULTS:      Lab 24  0543 24  0725 24  1507 24  0603 24  0641   WBC 6.40 7.13 8.81 8.36 7.13   HEMOGLOBIN 8.6* 8.6* 9.9* 8.2* 8.0*   HEMATOCRIT 28.0* 28.1* 32.7* 26.6* 26.1*   PLATELETS 398 471* 522* 419 437   NEUTROS ABS 3.02 4.45 3.92  --  3.73   IMMATURE GRANS (ABS) 0.04 0.06* 0.06*  --  0.07*   LYMPHS ABS 2.11 1.56 3.10  --  2.14   MONOS ABS 0.71 0.65 1.01*  --  0.59   EOS ABS 0.44* 0.36 0.62*  --  0.53*   MCV 82.6 82.9 83.8 83.9 84.2         Lab 24  0543 24  0725 24  1507 24  0603 24  1838 24  0641   SODIUM 136 132* 138 131*  --  136   POTASSIUM 4.9 4.8 4.3 4.9  --  4.8   CHLORIDE 100 96* 97* 99  --  103   CO2 27.0 28.0 31.0* 25.0  --  25.0   ANION GAP  9.0 8.0 10.0 7.0  --  8.0   BUN 19 16 14 12  --  10   CREATININE 0.79 0.88 0.96 0.77  --  0.83   EGFR 101.1 97.8 89.9 101.9  --  99.6   GLUCOSE 268* 255* 41* 239*  --  230*   CALCIUM 7.9* 8.4* 9.0 8.1*  --  7.8*   MAGNESIUM 1.7 1.6 1.8  --   --   --    PHOSPHORUS  --   --   --   --  2.6 2.1*         Lab 12/03/24  0543 12/02/24  0725 12/01/24  1507 11/29/24  0641   TOTAL PROTEIN 4.9* 5.2* 5.6*  --    ALBUMIN 2.6* 2.7* 2.9* 2.4*   GLOBULIN 2.3 2.5 2.7  --    ALT (SGPT) 11 13 16  --    AST (SGOT) 12 13 18  --    BILIRUBIN <0.2 <0.2 0.2  --    ALK PHOS 87 102 94  --      Brief Urine Lab Results  (Last result in the past 365 days)        Color   Clarity   Blood   Leuk Est   Nitrite   Protein   CREAT   Urine HCG        11/15/24 1142 Yellow   Clear   Small (1+)   Negative   Negative   100 mg/dL (2+)                 Microbiology Results Abnormal       None          No radiology results from the last 24 hrs    Current medications:  Scheduled Meds:aspirin, 81 mg, Oral, Daily  enoxaparin, 40 mg, Subcutaneous, Daily  finasteride, 5 mg, Oral, Nightly  insulin glargine, 10 Units, Subcutaneous, Nightly  insulin lispro, 2-9 Units, Subcutaneous, 4x Daily AC & at Bedtime  Insulin Lispro, 5 Units, Subcutaneous, TID With Meals  ipratropium-albuterol, 3 mL, Nebulization, 4x Daily - RT  Linezolid, 600 mg, Intravenous, Q12H  meropenem, 1,000 mg, Intravenous, Q8H  metoprolol succinate XL, 50 mg, Oral, Daily  pantoprazole, 40 mg, Oral, BID AC  pregabalin, 225 mg, Oral, BID  rOPINIRole, 0.25 mg, Oral, Nightly  sodium chloride, 10 mL, Intravenous, Q12H  tamsulosin, 0.8 mg, Oral, Nightly      Continuous Infusions:ropivacaine, , Last Rate: Stopped (11/28/24 0700)      PRN Meds:.  acetaminophen **OR** acetaminophen **OR** acetaminophen    senna-docusate sodium **AND** polyethylene glycol **AND** bisacodyl **AND** bisacodyl    calcium carbonate    Calcium Replacement - Follow Nurse / BPA Driven Protocol    dextrose    dextrose    glucagon (human  recombinant)    influenza vaccine    Lidocaine HCl gel    Magnesium Standard Dose Replacement - Follow Nurse / BPA Driven Protocol    nitroglycerin    ondansetron    Phosphorus Replacement - Follow Nurse / BPA Driven Protocol    Potassium Replacement - Follow Nurse / BPA Driven Protocol    sodium chloride    sodium chloride    Assessment & Plan     Active Hospital Problems    Diagnosis  POA    **DKA (diabetic ketoacidosis) [E11.10]  Yes    Severe protein-calorie malnutrition [E43]  Yes    Non healing left heel wound [S91.302A]  Yes    Acute osteomyelitis of left foot [M86.172]  Unknown      Resolved Hospital Problems   No resolved problems to display.     Brief Hospital Course to date:  Fracisco Mullen is a 59 y.o. male with PMH significant for HTN and poorly-controlled insulin-dependent DMII with diabetic peripheral neuropathy / chronic diabetic foot wounds. Brought to [facility] ED on 11/15/24 via EMS due to AMS and fatigue. Found to be in DKA and later found to have L calcaneal osteomyelitis. He is s/p L BKA by Dr. Gan on 11/22/24. On 11/25, he developed acute hypoxemic respiratory failure with concern for aspiration - antibiotics escalated. He developed urinary retention necessitating ledesma catheter placement.      Left calcaneal osteomyelitis   - Patient with chronic left heel wound and right plantar foot wound  - Admitted August 2024 for left foot cellulitis and discharged on oral antibiotics and with home wound vac following debridement, MRI left foot obtained at that time with no definite findings of osteomyelitis  - 11/18/24 MRI of L foot showed soft tissue wound with evidence of osteomyelitis of the calcaneus and possibly an associated non-drainable abscess  -He is s/p L BKA by Dr. Gan on 11/22/2024   - ID following. Has been on Linezolid, Merrem and Doxy (possible PNA coverage in addition to OM).   - Completed Doxycycline on 12/2. Continuing Linezolid / Merrem for now with likely  transition to PO abx soon  - PRN pain management  - PT/OT following. SNF planned pending insurance approval      Acute hypoxia, resolved   - Likely due to pulmonary edema but concern for aspiration PNA so abx escalated as above  - Improved with diuresis and now stable on room air      BPH  Acute urinary retention  - Bustos anchored on 11/25. Failed voiding trial this admission so re-anchored.   - Needs outpatient follow up with urology  - Continue Finasteride and Tamsulosin      DKA in setting of uncontrolled IDDM complicated by diabetic neuropathy and chronic foot wounds , improving  - Uncertain of medication compliance  - A1c 14.1%  - s/p DKA protocol including insulin drip, IV fluids, and electrolyte replacement per protocol  - Now on subcutaneous insulin. Due to hypoglycemia on 12/1, insulin regimen scaled back. Now hyperglycemic again  - Increase Lantus from 10 to 15 units QHS. Continue Lispro 5 units TID AC and continue SSI   - Now on subcutaneous insulin, Titrate insulin as indicated.     Malnutrition  - Patient with poor oral intake, refusing supplementation  - RD following     Acute toxic metabolic encephalopathy, resolved  - CT head without acute abnormality  - UDS negative  - Suspect secondary to DKA     HTN  - Continue home Metoprolol     RLS  - Continue home Requip     Expected Discharge Location and Transportation: SNF  Expected Discharge Expected Discharge Date: 12/4/2024; Expected Discharge Time:       VTE Prophylaxis: Pharmacologic VTE prophylaxis orders are present.    AM-PAC 6 Clicks Score (PT): 11 (12/03/24 1143)    CODE STATUS:   Code Status and Medical Interventions: CPR (Attempt to Resuscitate); Full Support; Full code per last hosptial admission. Patient disoriented on exam with no family present at bedside. Only contact information is friend.   Ordered at: 11/15/24 1218     Level Of Support Discussed With:    Patient     Code Status (Patient has no pulse and is not breathing):    CPR (Attempt  to Resuscitate)     Medical Interventions (Patient has pulse or is breathing):    Full Support     Comments:    Full code per last hosptial admission. Patient disoriented on exam with no family present at bedside. Only contact information is friend.     Mary Escalona PA-C  12/03/24

## 2024-12-03 NOTE — PLAN OF CARE
Problem: Adult Inpatient Plan of Care  Goal: Optimal Comfort and Wellbeing  Outcome: Progressing  Intervention: Monitor Pain and Promote Comfort  Recent Flowsheet Documentation  Taken 12/3/2024 1400 by Jay Dutton, RN  Pain Management Interventions: medication offered but refused  Taken 12/3/2024 0953 by Jay Dutton, RN  Pain Management Interventions: pain medication given  Taken 12/3/2024 0800 by Jay Dutton, RN  Pain Management Interventions: medication offered but refused  Intervention: Provide Person-Centered Care  Recent Flowsheet Documentation  Taken 12/3/2024 0800 by Jay Dutton, RN  Trust Relationship/Rapport:   care explained   choices provided   Goal Outcome Evaluation:  Plan of Care Reviewed With: patient        Progress: no change     Pain controlled per patient. Up in chair most of day. Plans to dc to [facility] once insurance approves. No complaints per patient. BM 12/2

## 2024-12-03 NOTE — THERAPY TREATMENT NOTE
Patient Name: Fracisco Mullen  : 1965    MRN: [id number]                              Today's Date: 12/3/2024       Admit Date: 11/15/2024    Visit Dx:     ICD-10-CM ICD-9-CM   1. S/P BKA (below knee amputation) unilateral, left  Z89.512 V49.75   2. Diabetic ketoacidosis without coma associated with type 2 diabetes mellitus  E11.10 250.12   3. Oropharyngeal dysphagia  R13.12 787.22   4. Non healing left heel wound  S91.302A 892.1   5. Acute osteomyelitis of left foot  M86.172 730.07     Patient Active Problem List   Diagnosis    HTN (hypertension)    Type 2 diabetes mellitus, with long-term current use of insulin    Diabetic retinopathy    Obesity (BMI 30.0-34.9)    Peripheral neuropathy    Asthma    Causalgia of lower limb    Chronic constipation    Compression of lumbar nerve root    GERD (gastroesophageal reflux disease)    Hyperlipidemia    IBS (irritable bowel syndrome)    RLS (restless legs syndrome)    Sleep apnea    Vitamin D deficiency    Cigar smoker    Chronic back pain    Diabetic ketoacidosis associated with type 2 diabetes mellitus    Multiple open wounds of foot    Status post cholecystectomy    Cholestatic hepatitis    Diabetic infection of left foot    Precordial pain    Tobacco use    DKA (diabetic ketoacidosis)    Non healing left heel wound    Severe protein-calorie malnutrition    Acute osteomyelitis of left foot     Past Medical History:   Diagnosis Date    Acute renal failure     Hx of    Obesity     Hx of    Sleep apnea     Type 2 diabetes mellitus      Past Surgical History:   Procedure Laterality Date    BACK SURGERY      lower back    BELOW KNEE AMPUTATION Left 2024    Procedure: BELOW-KNEE AMPUTATION LEFT;  Surgeon: Dru Gan Jr., MD;  Location: [facility];  Service: Orthopedics;  Laterality: Left;    CHOLECYSTECTOMY WITH INTRAOPERATIVE CHOLANGIOGRAM N/A 2021    Procedure: CHOLECYSTECTOMY LAPAROSCOPIC INTRAOPERATIVE CHOLANGIOGRAM;  Surgeon: Juventino Hooker MD;   Location: [facility] OR;  Service: General;  Laterality: N/A;    ERCP N/A 1/9/2021    Procedure: ENDOSCOPIC RETROGRADE CHOLANGIOPANCREATOGRAPHY;  Surgeon: Jeremy Dowell MD;  Location: [facility] ENDOSCOPY;  Service: Gastroenterology;  Laterality: N/A;  with sphiincterotomy and balloon sweep with 9mm-12mm balloon    INCISION AND DRAINAGE FOOT Left 8/3/2024    Procedure: HEAL IRRIGATION AND DEBRIDEMENT LEFT;  Surgeon: Dru Gan Jr., MD;  Location:  [facility] OR;  Service: Orthopedics;  Laterality: Left;    PLACEMENT OF WOUND VAC Left 8/3/2024    Procedure: PLACEMENT OF WOUND VAC;  Surgeon: Dru Gan Jr., MD;  Location: [facility] OR;  Service: Orthopedics;  Laterality: Left;      General Information       Row Name 12/03/24 1130          Physical Therapy Time and Intention    Document Type therapy note (daily note)  -     Mode of Treatment physical therapy  -       Row Name 12/03/24 1130          General Information    Patient Profile Reviewed yes  -ML     Existing Precautions/Restrictions fall;weight bearing  R off loading shoe; RLE WBAT in offoading shoe  -     Barriers to Rehab medically complex;previous functional deficit;physical barrier;impaired sensation  -ML       Row Name 12/03/24 1130          Cognition    Orientation Status (Cognition) oriented x 3  -ML       Row Name 12/03/24 1130          Safety Issues/Impairments Affecting Functional Mobility    Safety Issues Affecting Function (Mobility) awareness of need for assistance;insight into deficits/self-awareness;judgment;problem-solving;safety precaution awareness;safety precautions follow-through/compliance;sequencing abilities  -ML     Impairments Affecting Function (Mobility) endurance/activity tolerance;pain;strength;balance;sensation/sensory awareness;postural/trunk control  -               User Key  (r) = Recorded By, (t) = Taken By, (c) = Cosigned By      Initials Name Provider Type    ML Isabel Carey Physical Therapist                    Mobility       Row Name 12/03/24 1132          Bed Mobility    Bed Mobility supine-sit;rolling left;rolling right  -ML     Rolling Left [place] (Bed Mobility) minimum assist (75% patient effort);1 person assist  -ML     Rolling Right [place] (Bed Mobility) minimum assist (75% patient effort);1 person assist  -ML     Supine-Sit [place] (Bed Mobility) verbal cues;minimum assist (75% patient effort);1 person assist  -ML     Assistive Device (Bed Mobility) bed rails;head of bed elevated;repositioning sheet  -ML     Comment, (Bed Mobility) patient required increased time to complete supine to sit transfer  -ML       Row Name 12/03/24 1132          Bed-Chair Transfer    Bed-Chair [place] (Transfers) verbal cues;nonverbal cues (demo/gesture);maximum assist (25% patient effort);2 person assist  -ML     Assistive Device (Bed-Chair Transfers) other (see comments)  BUE support  -ML     Comment, (Bed-Chair Transfer) patient completed stand pivot transfer from EOB to chair, difficulty maintaining UE standing posture  -ML       Row Name 12/03/24 1132          Sit-Stand Transfer    Sit-Stand [place] (Transfers) verbal cues;nonverbal cues (demo/gesture);maximum assist (25% patient effort);2 person assist  -ML     Assistive Device (Sit-Stand Transfers) walker, front-wheeled;other (see comments)  BUE support  -ML     Comment, (Sit-Stand Transfer) patient completed 3x sit to stand transfers from EOB and x1 from chair, difficultly maintaining upright posture  -ML       Row Name 12/03/24 1132          Gait/Stairs (Locomotion)    [place] Level (Gait) unable to assess  -ML       Row Name 12/03/24 1132          Mobility    Extremity Weight-bearing Status left lower extremity;right lower extremity  -ML     Left Lower Extremity (Weight-bearing Status) non weight-bearing (NWB)  -ML     Right Lower Extremity (Weight-bearing Status) weight-bearing as tolerated (WBAT)  R off loading shoe; RLE WBAT in offoading shoe   -ML               User Key  (r) = Recorded By, (t) = Taken By, (c) = Cosigned By      Initials Name Provider Type    ML Isabel Carey Physical Therapist                   Obj/Interventions       Row Name 12/03/24 1139          Motor Skills    Therapeutic Exercise knee  -ML       Row Name 12/03/24 1139          Knee (Therapeutic Exercise)    Knee (Therapeutic Exercise) isometric exercises  -ML     Knee Isometrics (Therapeutic Exercise) left;quad sets;5 repetitions;5 second hold  -ML       Row Name 12/03/24 1139          Balance    Balance Assessment sitting static balance;sitting dynamic balance;standing static balance;standing dynamic balance  -ML     Static Sitting Balance standby assist  -ML     Dynamic Sitting Balance contact guard  -ML     Position, Sitting Balance unsupported;sitting edge of bed  -ML     Static Standing Balance verbal cues;non-verbal cues (demo/gesture);moderate assist;2-person assist  -ML     Dynamic Standing Balance verbal cues;non-verbal cues (demo/gesture);maximum assist;2-person assist  -ML     Position/Device Used, Standing Balance supported  -ML     Balance Interventions sitting;standing;sit to stand;supported;occupation based/functional task  -ML               User Key  (r) = Recorded By, (t) = Taken By, (c) = Cosigned By      Initials Name Provider Type    ML Isabel Carey Physical Therapist                   Goals/Plan    No documentation.                  Clinical Impression       Row Name 12/03/24 1140          Pain    Pretreatment Pain Rating 8/10  -ML     Posttreatment Pain Rating 8/10  -ML     Pain Location residual limb  -ML     Pain Side/Orientation left;lower  -ML     Pain Management Interventions exercise or physical activity utilized;positioning techniques utilized;premedicated for activity  -ML     Response to Pain Interventions activity participation with tolerable pain  -ML       Row Name 12/03/24 1140          Plan of Care Review    Plan of Care Reviewed With patient  -ML      Progress no change  -ML     Outcome Evaluation Patient progressed with transfers, however, requires maxAx2 to complete sit to stand and bed to chair transfer. Patient continues to present below baseline for mobility and at increased risk for falls. The patient would continue to benefit from skilled PT to address strength, balance and activity tolerance deficits. Continue current PT POC.  -ML       Row Name 12/03/24 1140          Positioning and Restraints    Pre-Treatment Position in bed  -ML     Post Treatment Position chair  -ML     In Chair notified nsg;reclined;call light within reach;encouraged to call for assist;exit alarm on;waffle cushion;on mechanical lift sling;legs elevated  -ML               User Key  (r) = Recorded By, (t) = Taken By, (c) = Cosigned By      Initials Name Provider Type    Isabel Acosta Physical Therapist                   Outcome Measures       Row Name 12/03/24 1143          How much help from another person do you currently need...    Turning from your back to your side while in flat bed without using bedrails? 3  -ML     Moving from lying on back to sitting on the side of a flat bed without bedrails? 2  -ML     Moving to and from a bed to a chair (including a wheelchair)? 2  -ML     Standing up from a chair using your arms (e.g., wheelchair, bedside chair)? 2  -ML     Climbing 3-5 steps with a railing? 1  -ML     To walk in hospital room? 1  -ML     AM-PAC 6 Clicks Score (PT) 11  -ML     Highest Level of Mobility Goal 4 --> Transfer to chair/commode  -ML       Row Name 12/03/24 1143          Functional Assessment    Outcome Measure Options AM-PAC 6 Clicks Basic Mobility (PT)  -ML               User Key  (r) = Recorded By, (t) = Taken By, (c) = Cosigned By      Initials Name Provider Type    Isabel Acosta Physical Therapist                                 Physical Therapy Education       Title: PT OT SLP Therapies (In Progress)       Topic: Physical Therapy (In Progress)        Point: Mobility training (In Progress)       Learning Progress Summary            Patient Acceptance, E, NR by ML at 12/3/2024 1143    Acceptance, E, VU,NR by  at 12/1/2024 0906    Acceptance, E,TB, VU by SH at 11/28/2024 1846    Acceptance, E, VU,NR by BA at 11/28/2024 1136    Acceptance, E, VU,NR by BA at 11/19/2024 1824    Acceptance, E, VU,NR by BA at 11/19/2024 1824    Acceptance, E,D, NR by DM at 11/17/2024 1806                      Point: Home exercise program (In Progress)       Learning Progress Summary            Patient Acceptance, E, NR by ML at 12/3/2024 1143    Acceptance, E,TB, VU by SH at 11/28/2024 1846    Acceptance, E, VU,NR by BA at 11/28/2024 1136    Acceptance, E, VU,NR by BA at 11/19/2024 1824    Acceptance, E, VU,NR by BA at 11/19/2024 1824    Acceptance, E,D, NR by DM at 11/17/2024 1806                      Point: Body mechanics (In Progress)       Learning Progress Summary            Patient Acceptance, E, NR by ML at 12/3/2024 1143    Acceptance, E,TB, VU by  at 11/28/2024 1846    Acceptance, E, VU,NR by BA at 11/28/2024 1136    Acceptance, E, VU,NR by BA at 11/19/2024 1824    Acceptance, E, VU,NR by BA at 11/19/2024 1824    Acceptance, E,D, NR by DM at 11/17/2024 1806                      Point: Precautions (In Progress)       Learning Progress Summary            Patient Acceptance, E, NR by ML at 12/3/2024 1143    Acceptance, E,TB, VU by SH at 11/28/2024 1846    Acceptance, E, VU,NR by BA at 11/28/2024 1136    Acceptance, E, VU,NR by BA at 11/19/2024 1824    Acceptance, E, VU,NR by BA at 11/19/2024 1824    Acceptance, E,D, NR by DM at 11/17/2024 1806                                      User Key       Initials Effective Dates Name Provider Type Discipline    DM 02/03/23 -  Jacklyn Gtz, PT Physical Therapist PT    LS 07/11/23 -  Richelle Acevedo, PT Physical Therapist PT    ML 04/22/21 -  Isabel Carey Physical Therapist PT    BA 09/21/21 -  Galilea Burkett, PT Physical  Therapist PT     01/23/24 -  Carlos Jennings RN Registered Nurse Nurse                  PT Recommendation and Plan     Progress: no change  Outcome Evaluation: Patient progressed with transfers, however, requires maxAx2 to complete sit to stand and bed to chair transfer. Patient continues to present below baseline for mobility and at increased risk for falls. The patient would continue to benefit from skilled PT to address strength, balance and activity tolerance deficits. Continue current PT POC.     Time Calculation:         PT Charges       Row Name 12/03/24 1143             Time Calculation    Start Time 1032  -ML      PT Received On 12/03/24  -ML         Timed Charges    39238 - PT Therapeutic Activity Minutes 23  -ML         Total Minutes    Timed Charges Total Minutes 23  -ML       Total Minutes 23  -ML                User Key  (r) = Recorded By, (t) = Taken By, (c) = Cosigned By      Initials Name Provider Type    Isabel Acosta Physical Therapist                  Therapy Charges for Today       Code Description Service Date Service Provider Modifiers Qty    50859964647 HC PT THERAPEUTIC ACT EA 15 MIN 12/3/2024 Isabel Carey GP 2            PT G-Codes  Outcome Measure Options: AM-PAC 6 Clicks Basic Mobility (PT)  AM-PAC 6 Clicks Score (PT): 11  AM-PAC 6 Clicks Score (OT): 16  PT Discharge Summary  Anticipated Discharge Disposition (PT): inpatient rehabilitation facility    Isabel Carey  12/3/2024

## 2024-12-03 NOTE — PLAN OF CARE
Goal Outcome Evaluation:  Plan of Care Reviewed With: patient        Progress: no change  Outcome Evaluation: Patient progressed with transfers, however, requires maxAx2 to complete sit to stand and bed to chair transfer. Patient continues to present below baseline for mobility and at increased risk for falls. The patient would continue to benefit from skilled PT to address strength, balance and activity tolerance deficits. Continue current PT POC.    Anticipated Discharge Disposition (PT): inpatient rehabilitation facility

## 2024-12-03 NOTE — PLAN OF CARE
Goal Outcome Evaluation:  Plan of Care Reviewed With: patient        Progress: no change  Outcome Evaluation: Pt continuing to present with limited activity tolerance, stregth, balance deficits, and limited safety awareness. Pt dependent for kuldeep hygiene and set up A for grooming tasks. Maximum balance deficits noted during STS with pt requiring max Ax2. Recommend IPOT POC and IRF at D/C.    Anticipated Discharge Disposition (OT): inpatient rehabilitation facility

## 2024-12-04 LAB
GLUCOSE BLDC GLUCOMTR-MCNC: 189 MG/DL (ref 70–130)
GLUCOSE BLDC GLUCOMTR-MCNC: 230 MG/DL (ref 70–130)
GLUCOSE BLDC GLUCOMTR-MCNC: 239 MG/DL (ref 70–130)
GLUCOSE BLDC GLUCOMTR-MCNC: 291 MG/DL (ref 70–130)
GLUCOSE BLDC GLUCOMTR-MCNC: 424 MG/DL (ref 70–130)

## 2024-12-04 PROCEDURE — 63710000001 INSULIN LISPRO (HUMAN) PER 5 UNITS: Performed by: PHYSICIAN ASSISTANT

## 2024-12-04 PROCEDURE — 99232 SBSQ HOSP IP/OBS MODERATE 35: CPT | Performed by: PHYSICIAN ASSISTANT

## 2024-12-04 PROCEDURE — 63710000001 INSULIN LISPRO (HUMAN) PER 5 UNITS: Performed by: ORTHOPAEDIC SURGERY

## 2024-12-04 PROCEDURE — 63710000001 INSULIN GLARGINE PER 5 UNITS: Performed by: PHYSICIAN ASSISTANT

## 2024-12-04 PROCEDURE — 25010000002 MEROPENEM PER 100 MG: Performed by: INTERNAL MEDICINE

## 2024-12-04 PROCEDURE — 25010000002 LINEZOLID 600 MG/300ML SOLUTION: Performed by: FAMILY MEDICINE

## 2024-12-04 PROCEDURE — 25010000002 ENOXAPARIN PER 10 MG: Performed by: ORTHOPAEDIC SURGERY

## 2024-12-04 PROCEDURE — 82948 REAGENT STRIP/BLOOD GLUCOSE: CPT

## 2024-12-04 PROCEDURE — 63710000001 INSULIN LISPRO (HUMAN) PER 5 UNITS: Performed by: FAMILY MEDICINE

## 2024-12-04 RX ORDER — INSULIN LISPRO 100 [IU]/ML
8 INJECTION, SOLUTION INTRAVENOUS; SUBCUTANEOUS
Status: DISCONTINUED | OUTPATIENT
Start: 2024-12-04 | End: 2024-12-05 | Stop reason: HOSPADM

## 2024-12-04 RX ADMIN — MEROPENEM 1000 MG: 1 INJECTION, POWDER, FOR SOLUTION INTRAVENOUS at 21:54

## 2024-12-04 RX ADMIN — INSULIN LISPRO 6 UNITS: 100 INJECTION, SOLUTION INTRAVENOUS; SUBCUTANEOUS at 21:23

## 2024-12-04 RX ADMIN — METOPROLOL SUCCINATE 50 MG: 50 TABLET, EXTENDED RELEASE ORAL at 08:19

## 2024-12-04 RX ADMIN — PANTOPRAZOLE SODIUM 40 MG: 40 TABLET, DELAYED RELEASE ORAL at 17:11

## 2024-12-04 RX ADMIN — INSULIN GLARGINE 20 UNITS: 100 INJECTION, SOLUTION SUBCUTANEOUS at 21:23

## 2024-12-04 RX ADMIN — FINASTERIDE 5 MG: 5 TABLET, FILM COATED ORAL at 21:24

## 2024-12-04 RX ADMIN — TAMSULOSIN HYDROCHLORIDE 0.8 MG: 0.4 CAPSULE ORAL at 21:24

## 2024-12-04 RX ADMIN — INSULIN LISPRO 4 UNITS: 100 INJECTION, SOLUTION INTRAVENOUS; SUBCUTANEOUS at 12:22

## 2024-12-04 RX ADMIN — Medication 10 ML: at 21:24

## 2024-12-04 RX ADMIN — INSULIN LISPRO 9 UNITS: 100 INJECTION, SOLUTION INTRAVENOUS; SUBCUTANEOUS at 08:20

## 2024-12-04 RX ADMIN — ASPIRIN 81 MG: 81 TABLET, COATED ORAL at 08:20

## 2024-12-04 RX ADMIN — MEROPENEM 1000 MG: 1 INJECTION, POWDER, FOR SOLUTION INTRAVENOUS at 12:21

## 2024-12-04 RX ADMIN — Medication 10 ML: at 08:20

## 2024-12-04 RX ADMIN — MEROPENEM 1000 MG: 1 INJECTION, POWDER, FOR SOLUTION INTRAVENOUS at 03:27

## 2024-12-04 RX ADMIN — ROPINIROLE 0.25 MG: 0.5 TABLET, FILM COATED ORAL at 21:24

## 2024-12-04 RX ADMIN — PANTOPRAZOLE SODIUM 40 MG: 40 TABLET, DELAYED RELEASE ORAL at 08:19

## 2024-12-04 RX ADMIN — LINEZOLID 600 MG: 600 INJECTION, SOLUTION INTRAVENOUS at 11:06

## 2024-12-04 RX ADMIN — ENOXAPARIN SODIUM 40 MG: 100 INJECTION SUBCUTANEOUS at 08:19

## 2024-12-04 RX ADMIN — PREGABALIN 225 MG: 75 CAPSULE ORAL at 21:24

## 2024-12-04 RX ADMIN — LINEZOLID 600 MG: 600 INJECTION, SOLUTION INTRAVENOUS at 21:25

## 2024-12-04 RX ADMIN — INSULIN LISPRO 8 UNITS: 100 INJECTION, SOLUTION INTRAVENOUS; SUBCUTANEOUS at 17:11

## 2024-12-04 RX ADMIN — INSULIN LISPRO 5 UNITS: 100 INJECTION, SOLUTION INTRAVENOUS; SUBCUTANEOUS at 12:22

## 2024-12-04 RX ADMIN — INSULIN LISPRO 2 UNITS: 100 INJECTION, SOLUTION INTRAVENOUS; SUBCUTANEOUS at 17:11

## 2024-12-04 RX ADMIN — INSULIN LISPRO 5 UNITS: 100 INJECTION, SOLUTION INTRAVENOUS; SUBCUTANEOUS at 08:19

## 2024-12-04 RX ADMIN — PREGABALIN 225 MG: 75 CAPSULE ORAL at 08:19

## 2024-12-04 NOTE — PLAN OF CARE
Goal Outcome Evaluation:  Plan of Care Reviewed With: patient        Progress: no change  Outcome Evaluation: VSS on RA. A&O x4. PRN tylenol and tums given. IV abx administered. L BKA and R plantar ulcer wound care performed. Bustos catheter patent, draining adequate UOP. BM this evening. Pt resting well in between care.

## 2024-12-04 NOTE — PROGRESS NOTES
"Fracisco Mullen  1965  [id number]    Evaluating Physician: Varun Dominique MD    Chief Complaint: fatigue    Reason for Consultation: foot infection    History of present illness:     Patient is a 59 y.o.  Yr old male with history of diabetes/hypertension, previously followed with Dr. Gatica; admitted 2023 with right foot infection, cultures with MSSA/Morganella/ESBL Klebsiella/enterococcus and strep species.had surgery at the second toe with amputation, received IV daptomycin/Invanz with general improvements at that site.  He has been left with a chronic right plantar foot wound and a chronic wound at the left heel that has turned black; He apparently was admitted to the hospital August 1 after a fall at [facility].  Noted to have urinary retention with concern for possible UTI.  In addition left heel with black discoloration/malodor and redness, empiric antibiotics initiated.  He is chronically debilitated and wheelchair bound by his report.Bustos catheter-based at admission     8/3/24  Dr Gan  \"PROCEDURE:            Left  Left      57900:             Debridement of skin and subcutaneous tissue  11788:             Wound vacuum-assisted closure\"     8/6/24 MRI and surgical findings did not confirm bone involvement, transitioned to oral agents at discharge       Readmitted November 15, 2024 with reports of appearing \"sluggish\" according to admission notes with DKA, noted to have high hemoglobin A1c and persistent/worsening left heel wound according to patient; medicine team notes mention urinary retention, acute toxic metabolic encephalopathy improved and low-grade fever. Abnormal MRI at the left foot:    Per radiology-- \"Soft tissue wound seen along the posterior heel with ill-defined fluid that extends through and disrupts the distal Achilles tendon. Underlying this area there is evidence of osteomyelitis of the posterior lateral calcaneus. There is a tiny fluid   collection here as well that may represent " "an associated nondrainable abscess.     Additionally there appears to be a nondisplaced stress fracture of the posterior calcaneus. \"    11/22 left BKA    11/26/24 increased O2 requirements with less responsive per medicine team notes overnight; subsequently better    12/4/24 possible [facility] per case management notes. sleepy at my visit,  no new pain;  postop Left LE postop pain  dull , worse with palpation, better with pain meds and not severe,  he won't attach a numerical severity      No headache photophobia or neck stiffness.  No   cough or hemoptysis.  No nausea vomiting diarrhea or abdominal pain.  No other new skin rash.  no dysuria hematuria or pyuria.       Past Medical History:   Diagnosis Date    Acute renal failure     Hx of    Obesity     Hx of    Sleep apnea     Type 2 diabetes mellitus        Past Surgical History:   Procedure Laterality Date    BACK SURGERY      lower back    BELOW KNEE AMPUTATION Left 11/22/2024    Procedure: BELOW-KNEE AMPUTATION LEFT;  Surgeon: Dru Gan Jr., MD;  Location:  [facility];  Service: Orthopedics;  Laterality: Left;    CHOLECYSTECTOMY WITH INTRAOPERATIVE CHOLANGIOGRAM N/A 1/6/2021    Procedure: CHOLECYSTECTOMY LAPAROSCOPIC INTRAOPERATIVE CHOLANGIOGRAM;  Surgeon: Juventino Hooker MD;  Location:  [facility];  Service: General;  Laterality: N/A;    ERCP N/A 1/9/2021    Procedure: ENDOSCOPIC RETROGRADE CHOLANGIOPANCREATOGRAPHY;  Surgeon: Jeremy Dowell MD;  Location: [facility] ENDOSCOPY;  Service: Gastroenterology;  Laterality: N/A;  with sphiincterotomy and balloon sweep with 9mm-12mm balloon    INCISION AND DRAINAGE FOOT Left 8/3/2024    Procedure: HEAL IRRIGATION AND DEBRIDEMENT LEFT;  Surgeon: Dru Gan Jr., MD;  Location:  [facility];  Service: Orthopedics;  Laterality: Left;    PLACEMENT OF WOUND VAC Left 8/3/2024    Procedure: PLACEMENT OF WOUND VAC;  Surgeon: Dru Gan Jr., MD;  Location:  [facility];  Service: Orthopedics;  Laterality: " Left;       Pediatric History   Patient Parents    Not on file     Other Topics Concern    Not on file   Social History Narrative    Not on file       family history includes Cancer in his brother, maternal grandmother, mother, and another family member; Diabetes in an other family member; Heart attack in an other family member; Heart disease in his brother and father; Hyperlipidemia in his mother and another family member; Hypertension in his mother and another family member; Obesity in an other family member.    Allergies   Allergen Reactions    Sertraline Hcl Hives     Zoloft       Medication:  Current Facility-Administered Medications   Medication Dose Route Frequency Provider Last Rate Last Admin    acetaminophen (TYLENOL) tablet 650 mg  650 mg Oral Q4H PRN Dru Gan Jr., MD   650 mg at 12/03/24 1938    aspirin EC tablet 81 mg  81 mg Oral Daily Dru Gan Jr., MD   81 mg at 12/03/24 0931    sennosides-docusate (PERICOLACE) 8.6-50 MG per tablet 2 tablet  2 tablet Oral BID PRN Dru Gan Jr., MD        And    polyethylene glycol (MIRALAX) packet 17 g  17 g Oral Daily PRN Dru Gan Jr., MD        And    bisacodyl (DULCOLAX) EC tablet 5 mg  5 mg Oral Daily PRN Dru Gan Jr., MD        And    bisacodyl (DULCOLAX) suppository 10 mg  10 mg Rectal Daily PRN Dru Gan Jr., MD        calcium carbonate (TUMS) chewable tablet 500 mg (200 mg elemental)  2 tablet Oral TID PRN Dru Gan Jr., MD   2 tablet at 12/03/24 2041    Calcium Replacement - Follow Nurse / BPA Driven Protocol   Not Applicable PRN Dru Gan Jr., MD        dextrose (D50W) (25 g/50 mL) IV injection 25 g  25 g Intravenous Q15 Min PRN Dru Gan Jr., MD   25 g at 11/25/24 2124    dextrose (GLUTOSE) oral gel 15 g  15 g Oral Q15 Min PRN Dru Gan Jr., MD   15 g at 11/25/24 2039    Enoxaparin Sodium (LOVENOX) syringe 40 mg  40 mg Subcutaneous Daily Dru Gan Jr., MD   40 mg at  12/03/24 0931    finasteride (PROSCAR) tablet 5 mg  5 mg Oral Nightly Dru Gan Jr., MD   5 mg at 12/03/24 2040    glucagon (GLUCAGEN) injection 1 mg  1 mg Intramuscular Q15 Min PRN Dur Gan Jr., MD        influenza virus vacc split PF FLUZONE 0.5 mL  0.5 mL Intramuscular During Hospitalization Dru Gan Jr., MD        insulin glargine (LANTUS, SEMGLEE) injection 15 Units  15 Units Subcutaneous Nightly Mary Escalona PA-C   15 Units at 12/03/24 2040    Insulin Lispro (humaLOG) injection 2-9 Units  2-9 Units Subcutaneous 4x Daily AC & at Bedtime Dru Gan Jr., MD   2 Units at 12/03/24 1821    Insulin Lispro (humaLOG) injection 5 Units  5 Units Subcutaneous TID With Meals CARLA Caba,    5 Units at 12/03/24 1821    ipratropium-albuterol (DUO-NEB) nebulizer solution 3 mL  3 mL Nebulization Q4H PRN Mary Escalona PA-C        Lidocaine HCl gel (XYLOCAINE) urethral/mucosal syringe   Urethral PRN Dru Gan Jr., MD   1 Application at 11/26/24 0333    Linezolid (ZYVOX) 600 mg 300 mL  600 mg Intravenous Q12H CARLA Caba,  300 mL/hr at 12/03/24 2325 600 mg at 12/03/24 2325    Magnesium Standard Dose Replacement - Follow Nurse / BPA Driven Protocol   Not Applicable PRN Dru Gan Jr., MD        meropenem (MERREM) 1,000 mg in sodium chloride 0.9 % 100 mL MBP  1,000 mg Intravenous Q8H Varun Dominique MD 0 mL/hr at 12/02/24 2248 1,000 mg at 12/04/24 0327    metoprolol succinate XL (TOPROL-XL) 24 hr tablet 50 mg  50 mg Oral Daily Dru Gan Jr., MD   50 mg at 12/03/24 0931    nitroglycerin (NITROSTAT) SL tablet 0.4 mg  0.4 mg Sublingual Q5 Min PRN Dru Gan Jr., MD        ondansetron (ZOFRAN) injection 4 mg  4 mg Intravenous Q6H PRN Dru Gan Jr., MD   4 mg at 11/24/24 2030    pantoprazole (PROTONIX) EC tablet 40 mg  40 mg Oral BID AC Enriqueta Muir, APRN   40 mg at 12/03/24 1821    Phosphorus Replacement - Follow Nurse / BPA  Driven Protocol   Not Applicable PRN Dru Gan Jr., MD        Potassium Replacement - Follow Nurse / BPA Driven Protocol   Not Applicable PRN Dru Gan Jr., MD        pregabalin (LYRICA) capsule 225 mg  225 mg Oral BID Kathyosvaldo Enriqueta AGUIRRE, APRN   225 mg at 12/03/24 2041    rOPINIRole (REQUIP) tablet 0.25 mg  0.25 mg Oral Nightly Dru Gan Jr., MD   0.25 mg at 12/03/24 2041    sodium chloride 0.9 % flush 10 mL  10 mL Intravenous Q12H Dru Gan Jr., MD   10 mL at 12/03/24 2042    sodium chloride 0.9 % flush 10 mL  10 mL Intravenous PRN Dru Gan Jr., MD        sodium chloride 0.9 % infusion 40 mL  40 mL Intravenous PRN Dru Gan Jr., MD   40 mL at 11/28/24 1853    tamsulosin (FLOMAX) 24 hr capsule 0.8 mg  0.8 mg Oral Nightly Dru Gan Jr., MD   0.8 mg at 12/03/24 2041       Antibiotics:  Anti-Infectives (From admission, onward)      Ordered     Dose/Rate Route Frequency Start Stop    11/30/24 0849  Linezolid (ZYVOX) 600 mg 300 mL        Ordering Provider: CARLA Caba DO    600 mg  300 mL/hr over 60 Minutes Intravenous Every 12 Hours 11/30/24 1000 12/05/24 2159    11/26/24 0749  meropenem (MERREM) 1,000 mg in sodium chloride 0.9 % 100 mL MBP        Ordering Provider: Varun Dominique MD    1,000 mg  over 3 Hours Intravenous Every 8 Hours 11/26/24 1400 12/08/24 1129    11/26/24 0750  Linezolid (ZYVOX) 600 mg 300 mL        Ordering Provider: Varun Dominique MD    600 mg  300 mL/hr over 60 Minutes Intravenous Every 12 Hours 11/26/24 0900 11/29/24 0928    11/26/24 0749  meropenem (MERREM) 1,000 mg in sodium chloride 0.9 % 100 mL MBP        Ordering Provider: Varun Dominique MD    1,000 mg  over 30 Minutes Intravenous Once 11/26/24 0800 11/26/24 0903    11/26/24 0024  doxycycline (VIBRAMYCIN) 100 mg in sodium chloride 0.9 % 100 mL MBP        Ordering Provider: Enriqueta Muir APRN    100 mg  over 60 Minutes Intravenous Every 12 Hours 11/26/24 0100 12/02/24  "1438    11/22/24 1045  ceFAZolin 2000 mg IVPB in 100 mL NS (MBP)        Ordering Provider: Claudia Lim PA    2,000 mg  over 30 Minutes Intravenous Once 11/22/24 1047 11/22/24 1255              Review of Systems    12/4/24 per nursing also    Constitutional-- No Fever, chills or sweats.  Appetite diminished with fatigue.  Heent-- No new vision, hearing or throat complaints.  No epistaxis or oral sores.  Denies odynophagia or dysphagia.  No flashers, floaters or eye pain. No odynophagia or dysphagia. No headache, photophobia or neck stiffness.  CV-- No chest pain, palpitation or syncope  Resp-- see above  GI- No nausea, vomiting, or diarrhea.  No hematochezia, melena, or hematemesis. Denies jaundice or chronic liver disease.  -- No dysuria, hematuria, or flank pain.  Denies hesitancy, urgency or flank pain.  Lymph- no swollen lymph nodes in neck/axilla or groin.   Heme- No active bruising or bleeding; no Hx of DVT or PE.  MS-- no swelling or pain in the bones or joints of arms/legs.  No new back pain.  Neuro-- No acute focal weakness or numbness in the arms or legs.  No seizures.    Full 12 point review of systems reviewed and negative otherwise for acute complaints, except for above    Physical Exam:   Vital Signs   /84 (BP Location: Left arm, Patient Position: Lying)   Pulse 87   Temp 97.4 °F (36.3 °C) (Oral)   Resp 16   Ht 162.6 cm (64.02\")   Wt 79.5 kg (175 lb 3.2 oz)   SpO2 91%   BMI 30.06 kg/m²     GENERAL: sleepy     HEENT: Normocephalic, atraumatic.   No conjunctival injection. No icterus. Oropharynx clear without evidence of thrush or exudate. No evidence of periodontal disease.    NECK: Supple without nuchal rigidity. No mass.   HEART: RRR; No murmur, rubs, gallops.   LUNGS: diminished at bases  ; No dullness.  ABDOMEN: Soft, nontender, nondistended. Positive bowel sounds. No rebound or guarding. NO mass or HSM.  EXT: see below   MSK: FROM without joint effusions noted arms/legs.  "   SKIN: Warm and dry without cutaneous eruptions on Inspection/palpation.    NEURO: sleepy.    Right foot second toe amputation site noted, no redness/duration or warmth there.  No oral or active drainage    Left  LE surgical site no new visible redness/purulence.  No crepitus or bulla.         Laboratory Data    Results from last 7 days   Lab Units 12/03/24  0543 12/02/24  0725 12/01/24  1507   WBC 10*3/mm3 6.40 7.13 8.81   HEMOGLOBIN g/dL 8.6* 8.6* 9.9*   HEMATOCRIT % 28.0* 28.1* 32.7*   PLATELETS 10*3/mm3 398 471* 522*     Results from last 7 days   Lab Units 12/03/24  0543   SODIUM mmol/L 136   POTASSIUM mmol/L 4.9   CHLORIDE mmol/L 100   CO2 mmol/L 27.0   BUN mg/dL 19   CREATININE mg/dL 0.79   GLUCOSE mg/dL 268*   CALCIUM mg/dL 7.9*     Results from last 7 days   Lab Units 12/03/24  0543   ALK PHOS U/L 87   BILIRUBIN mg/dL <0.2   ALT (SGPT) U/L 11   AST (SGOT) U/L 12                 Estimated Creatinine Clearance: 93.5 mL/min (by C-G formula based on SCr of 0.79 mg/dL).      Microbiology:      Radiology:  Imaging Results (Last 72 Hours)       ** No results found for the last 72 hours. **              Impression:       --acute left heel wound infection ,  abnormal MRI as above raising concern for fluid extending into and disrupting the distal lateral Achilles tendon in addition to with evidence for osteomyelitis at the posterior lateral calcaneus in addition to possible nondisplaced stress fracture at the posterior calcaneus; High risk for persistent/recurrent or nonhealing wounds, persistent/progressive or recurrent infection and risk for further functional/limb loss, higher level amputation etc. Surgery 8/3;  left BKA on November 22. Empiric antibiotics for now    --hypoxia; empiric abx ; aspiration risk although no witnessed aspiration.  Viral panel negative.  No productive cough to send for microbiology. Subsequently better     --urinary retention per admission notes, some hematuria on November 15; further  urologic evaluation regarding functional/anatomic assessment at discretion of medicine team with respect inpatient versus outpatient     --Diabetes, uncontrolled ; glucose control per medicine team     --diabetic neuropathy     --Chronic debility as per patient wheelchair bound     --Hx ESBL    PLAN:     --IV zyvox/merrem to stop soon;  anticipate off IV abx by discharge if steadily better and no new suppurative process    --s/p doxy    --Check/review labs cultures and scans    --Partial history Per nursing staff    --Discussed with microbiology    --Discussed with Dr Caba    --Highly complex at of issues with high risk for further serious morbidity and other serious sequela    --[facility] per case management notes     Copied text in this note has been reviewed and is accurate as of 12/04/24.     Varun Dominique MD  12/4/2024

## 2024-12-04 NOTE — CASE MANAGEMENT/SOCIAL WORK
Continued Stay Note  [facility]     Patient Name: Fracisco Mullen  MRN: [id number]  Today's Date: 12/4/2024    Admit Date: 11/15/2024    Plan: [facility] pending insurance   Discharge Plan       Row Name 12/04/24 1100       Plan    Plan [facility] pending insurance    Plan Comments I spoke with Kamini at [facility] today. Insurance is still pending. She is expecting a decision from insurance today. Complex CM following x [phone]    Final Discharge Disposition Code 62 - inpatient rehab facility                   Discharge Codes    No documentation.                 Expected Discharge Date and Time       Expected Discharge Date Expected Discharge Time    Dec 4, 2024               Eileen Brandt RN

## 2024-12-04 NOTE — PROGRESS NOTES
[facility] Medicine Services  PROGRESS NOTE    Patient Name: Fracisco Mullen  : 1965  MRN: [id number]    Date of Admission: 11/15/2024  Primary Care Physician: Brandy Roberson    Subjective     CC: f/u L foot infection     HPI:  In chair. No new issues. LLE pain manageable. Awaits [facility] pending insurance approval      Objective     Vital Signs:   Temp:  [97.4 °F (36.3 °C)-98 °F (36.7 °C)] 98 °F (36.7 °C)  Heart Rate:  [73-87] 83  Resp:  [16] 16  BP: (100-147)/(55-84) 126/73     Physical Exam:  Constitutional: No acute distress, awake, alert and conversant. Chronically ill appearing. Sitting upright in chair   HENT: NCAT, mucous membranes moist  Respiratory: Clear to auscultation bilaterally, respiratory effort normal on room air   Cardiovascular: RRR  Gastrointestinal: Positive bowel sounds, soft, nontender, nondistended  Musculoskeletal: L BKA incision dressed - did not remove dressing for exam   Psychiatric: Appropriate affect, cooperative with exam  Neurologic: Oriented x 3, moves all extremities spontaneously without focal deficits, speech clear  Skin: No rashes to exposed surfaces.     Results Reviewed:  LAB RESULTS:      Lab 24  0543 24  0725 24  1507 24  0603 24  0641   WBC 6.40 7.13 8.81 8.36 7.13   HEMOGLOBIN 8.6* 8.6* 9.9* 8.2* 8.0*   HEMATOCRIT 28.0* 28.1* 32.7* 26.6* 26.1*   PLATELETS 398 471* 522* 419 437   NEUTROS ABS 3.02 4.45 3.92  --  3.73   IMMATURE GRANS (ABS) 0.04 0.06* 0.06*  --  0.07*   LYMPHS ABS 2.11 1.56 3.10  --  2.14   MONOS ABS 0.71 0.65 1.01*  --  0.59   EOS ABS 0.44* 0.36 0.62*  --  0.53*   MCV 82.6 82.9 83.8 83.9 84.2         Lab 24  0543 24  0725 24  1507 24  0603 24  1838 24  0641   SODIUM 136 132* 138 131*  --  136   POTASSIUM 4.9 4.8 4.3 4.9  --  4.8   CHLORIDE 100 96* 97* 99  --  103   CO2 27.0 28.0 31.0* 25.0  --  25.0   ANION GAP 9.0 8.0 10.0 7.0  --  8.0   BUN 19 16 14  12  --  10   CREATININE 0.79 0.88 0.96 0.77  --  0.83   EGFR 101.1 97.8 89.9 101.9  --  99.6   GLUCOSE 268* 255* 41* 239*  --  230*   CALCIUM 7.9* 8.4* 9.0 8.1*  --  7.8*   MAGNESIUM 1.7 1.6 1.8  --   --   --    PHOSPHORUS  --   --   --   --  2.6 2.1*         Lab 12/03/24  0543 12/02/24  0725 12/01/24  1507 11/29/24  0641   TOTAL PROTEIN 4.9* 5.2* 5.6*  --    ALBUMIN 2.6* 2.7* 2.9* 2.4*   GLOBULIN 2.3 2.5 2.7  --    ALT (SGPT) 11 13 16  --    AST (SGOT) 12 13 18  --    BILIRUBIN <0.2 <0.2 0.2  --    ALK PHOS 87 102 94  --      Brief Urine Lab Results  (Last result in the past 365 days)        Color   Clarity   Blood   Leuk Est   Nitrite   Protein   CREAT   Urine HCG        11/15/24 1142 Yellow   Clear   Small (1+)   Negative   Negative   100 mg/dL (2+)                 Microbiology Results Abnormal       None          No radiology results from the last 24 hrs    Current medications:  Scheduled Meds:aspirin, 81 mg, Oral, Daily  enoxaparin, 40 mg, Subcutaneous, Daily  finasteride, 5 mg, Oral, Nightly  insulin glargine, 15 Units, Subcutaneous, Nightly  insulin lispro, 2-9 Units, Subcutaneous, 4x Daily AC & at Bedtime  Insulin Lispro, 5 Units, Subcutaneous, TID With Meals  Linezolid, 600 mg, Intravenous, Q12H  meropenem, 1,000 mg, Intravenous, Q8H  metoprolol succinate XL, 50 mg, Oral, Daily  pantoprazole, 40 mg, Oral, BID AC  pregabalin, 225 mg, Oral, BID  rOPINIRole, 0.25 mg, Oral, Nightly  sodium chloride, 10 mL, Intravenous, Q12H  tamsulosin, 0.8 mg, Oral, Nightly      Continuous Infusions:     PRN Meds:.  acetaminophen **OR** [DISCONTINUED] acetaminophen **OR** [DISCONTINUED] acetaminophen    senna-docusate sodium **AND** polyethylene glycol **AND** bisacodyl **AND** bisacodyl    calcium carbonate    Calcium Replacement - Follow Nurse / BPA Driven Protocol    dextrose    dextrose    glucagon (human recombinant)    influenza vaccine    ipratropium-albuterol    Lidocaine HCl gel    Magnesium Standard Dose Replacement  - Follow Nurse / BPA Driven Protocol    nitroglycerin    ondansetron    Phosphorus Replacement - Follow Nurse / BPA Driven Protocol    Potassium Replacement - Follow Nurse / BPA Driven Protocol    sodium chloride    sodium chloride    Assessment & Plan     Active Hospital Problems    Diagnosis  POA    **DKA (diabetic ketoacidosis) [E11.10]  Yes    Severe protein-calorie malnutrition [E43]  Yes    Non healing left heel wound [S91.302A]  Yes    Acute osteomyelitis of left foot [M86.172]  Unknown      Resolved Hospital Problems   No resolved problems to display.     Brief Hospital Course to date:  Fracisco Mullen is a 59 y.o. male with PMH significant for HTN and poorly-controlled insulin-dependent DMII with diabetic peripheral neuropathy / chronic diabetic foot wounds. Brought to [facility] ED on 11/15/24 via EMS due to AMS and fatigue. Found to be in DKA and later found to have L calcaneal osteomyelitis. He is s/p L BKA by Dr. Gan on 11/22/24. On 11/25, he developed acute hypoxemic respiratory failure with concern for aspiration - antibiotics escalated. He developed urinary retention necessitating ledesma catheter placement.      Left calcaneal osteomyelitis   - Patient with chronic left heel wound and right plantar foot wound  - Admitted August 2024 for left foot cellulitis and discharged on oral antibiotics and with home wound vac following debridement, MRI left foot obtained at that time with no definite findings of osteomyelitis  - 11/18/24 MRI of L foot showed soft tissue wound with evidence of osteomyelitis of the calcaneus and possibly an associated non-drainable abscess  -He is s/p L BKA by Dr. Gan on 11/22/2024   - ID following. Has been on Linezolid, Merrem and Doxy (possible PNA coverage in addition to OM).   - Completed Doxycycline on 12/2. Continuing Linezolid / Merrem for now with likely transition to PO abx soon  - PRN pain management  - PT/OT following. SNF planned pending  insurance approval      Acute hypoxia, resolved   - Likely due to pulmonary edema but concern for aspiration PNA so abx escalated as above  - Improved with diuresis and now stable on room air      BPH  Acute urinary retention  - Bustos anchored on 11/25. Failed voiding trial this admission so re-anchored.   - Needs outpatient follow up with urology  - Continue Finasteride and Tamsulosin      DKA in setting of uncontrolled IDDM complicated by diabetic neuropathy and chronic foot wounds , improving  - Uncertain of medication compliance  - A1c 14.1%  - s/p DKA protocol including insulin drip, IV fluids, and electrolyte replacement per protocol  - Now on subcutaneous insulin. Due to hypoglycemia on 12/1, insulin regimen scaled back. Now hyperglycemic again  - Increase Lantus from 15 to 20 units QHS. Increase Lispro to 8 units TID AC and continue SSI   - Now on subcutaneous insulin, Titrate insulin as indicated.     Malnutrition  - Patient with poor oral intake, refusing supplementation  - RD following     Acute toxic metabolic encephalopathy, resolved  - CT head without acute abnormality  - UDS negative  - Suspect secondary to DKA     HTN  - Continue home Metoprolol     RLS  - Continue home Requip     Expected Discharge Location and Transportation: SNF  Expected Discharge Expected Discharge Date: 12/4/2024; Expected Discharge Time:       VTE Prophylaxis: Pharmacologic VTE prophylaxis orders are present.    AM-PAC 6 Clicks Score (PT): 11 (12/04/24 0800)    CODE STATUS:   Code Status and Medical Interventions: CPR (Attempt to Resuscitate); Full Support; Full code per last hosptial admission. Patient disoriented on exam with no family present at bedside. Only contact information is friend.   Ordered at: 11/15/24 1218     Level Of Support Discussed With:    Patient     Code Status (Patient has no pulse and is not breathing):    CPR (Attempt to Resuscitate)     Medical Interventions (Patient has pulse or is breathing):     Full Support     Comments:    Full code per last hosptial admission. Patient disoriented on exam with no family present at bedside. Only contact information is friend.     Mary Escalona PA-C  12/04/24

## 2024-12-04 NOTE — PROGRESS NOTES
Fracisco Mullen       LOS: 19 days   Patient Care Team:  Brandy Roberson as PCP - General (Family Medicine)  Varun Fontenot MD as Consulting Physician (Cardiology)  Yulissa Taveras APRN as Nurse Practitioner (Cardiology)    Chief Complaint:  left calcaneal osteomyelitis    Subjective     Interval History:     Resting comfortably in bed. Pain controlled. No acute events overnight    History taken from: patient    Review of Systems:   The following systems were reviewed and negative     Gen - No fevers, chills  CV - No chest pain, palpitations  Resp - No cough, dyspnea  GI - No N/V/D, abdominal pain    Objective     Vital Signs  Vital Signs (last 24 hours)         11/19 0700  11/20 0659 11/20 0700  11/20 0858   Most Recent      Temp (°F) 97.7 -  98.2       98 (36.7) 11/19 2042    Heart Rate 90 -  103       103 11/20 0530    Resp 16 -  18       16 11/19 2042    BP 73/59 -  115/74       115/74 11/19 1425    SpO2 (%) 92 -  96       92 11/20 0530    Flow (L/min) (Oxygen Therapy)   2       2 11/20 0609              Physical Exam:  No acute distress  Nonlabored respirations  Regular rate and rhythm  Abdomen nondistended  Left lower extremity:  Dressing in place is clean dry and intact. Knee ROM is full and WNL. Sensation intact to light touch.     Results Review:     I reviewed the patient's new clinical results.    Medication Review:   Hospital Medications (active)         Dose Frequency Start End    acetaminophen (TYLENOL) 160 MG/5ML oral solution 650 mg 650 mg Every 4 Hours PRN 11/15/2024 --    Admin Instructions: If given for fever, use fever parameter: fever greater than 100.4 °F  Based on patient request - if ordered for moderate or severe pain, provider allows for administration of a medication prescribed for a lower pain scale.    Do not exceed 4 grams of acetaminophen in a 24 hr period. Max dose of 2gm for AST/ALT greater than 120 units/L.    If given for pain, use the following pain scale:   Mild Pain  "= Pain Score of 1-3, CPOT 1-2  Moderate Pain = Pain Score of 4-6, CPOT 3-4  Severe Pain = Pain Score of 7-10, CPOT 5-8    Route: Oral    Linked Group 1: Placed in \"Or\" Linked Group        acetaminophen (TYLENOL) suppository 650 mg 650 mg Every 4 Hours PRN 11/15/2024 --    Admin Instructions: If given for fever, use fever parameter: fever greater than 100.4 °F  Based on patient request - if ordered for moderate or severe pain, provider allows for administration of a medication prescribed for a lower pain scale.    Do not exceed 4 grams of acetaminophen in a 24 hr period. Max dose of 2gm for AST/ALT greater than 120 units/L.    If given for pain, use the following pain scale:   Mild Pain = Pain Score of 1-3, CPOT 1-2  Moderate Pain = Pain Score of 4-6, CPOT 3-4  Severe Pain = Pain Score of 7-10, CPOT 5-8    Route: Rectal    Linked Group 1: Placed in \"Or\" Linked Group        acetaminophen (TYLENOL) tablet 650 mg 650 mg Every 4 Hours PRN 11/15/2024 --    Admin Instructions: If given for fever, use fever parameter: fever greater than 100.4 °F  Based on patient request - if ordered for moderate or severe pain, provider allows for administration of a medication prescribed for a lower pain scale.    Do not exceed 4 grams of acetaminophen in a 24 hr period. Max dose of 2gm for AST/ALT greater than 120 units/L.    If given for pain, use the following pain scale:   Mild Pain = Pain Score of 1-3, CPOT 1-2  Moderate Pain = Pain Score of 4-6, CPOT 3-4  Severe Pain = Pain Score of 7-10, CPOT 5-8    Route: Oral    Linked Group 1: Placed in \"Or\" Linked Group        amitriptyline (ELAVIL) tablet 25 mg 25 mg 2 Times Daily 11/15/2024 --    Route: Oral    aspirin EC tablet 81 mg 81 mg Daily 11/15/2024 --    Admin Instructions: Do not crush or chew the capsules or tablets. The drug may not work as designed if the capsule or tablet is crushed or chewed. Swallow whole.  Do not exceed 4 grams of aspirin in a 24 hr period.    If given for " "pain, use the following pain scale:   Mild Pain = Pain Score of 1-3, CPOT 1-2  Moderate Pain = Pain Score of 4-6, CPOT 3-4  Severe Pain = Pain Score of 7-10, CPOT 5-8    Route: Oral    bisacodyl (DULCOLAX) EC tablet 5 mg 5 mg Daily PRN 11/15/2024 --    Admin Instructions: Use if no bowel movement after 12 hours.  Swallow whole. Do not crush, split, or chew tablet.    Route: Oral    Linked Group 2: Placed in \"And\" Linked Group        bisacodyl (DULCOLAX) suppository 10 mg 10 mg Daily PRN 11/15/2024 --    Admin Instructions: Use if no bowel movement after 12 hours.  Hold for diarrhea    Route: Rectal    Linked Group 2: Placed in \"And\" Linked Group        calcium carbonate (TUMS) chewable tablet 500 mg (200 mg elemental) 2 tablet 3 Times Daily PRN 11/18/2024 --    Admin Instructions: One tablet contains 200 mg elemental calcium.  Take with food.    Route: Oral    Calcium Replacement - Follow Nurse / BPA Driven Protocol  As Needed 11/15/2024 --    Admin Instructions: Open Order & Select \"BHS Electrolyte Replacement Protocol Algorithm\" to View Details    Route: Not Applicable    DAPTOmycin (CUBICIN) 450 mg in sodium chloride 0.9 % 50 mL IVPB 6 mg/kg × 72.3 kg Every 24 Hours 11/19/2024 12/3/2024    Admin Instructions: Caution: Look alike/sound alike drug alert.  Refrigerate. Do not shake.    Route: Intravenous    dextrose (D50W) (25 g/50 mL) IV injection 25 g 25 g Every 15 Minutes PRN 11/16/2024 --    Admin Instructions: Blood sugar less than 70; patient has IV access - Unresponsive, NPO or Unable To Safely Swallow    Route: Intravenous    dextrose (GLUTOSE) oral gel 15 g 15 g Every 15 Minutes PRN 11/16/2024 --    Admin Instructions: BS<70, Patient Alert, Is not NPO, Can safely swallow.    Route: Oral    Enoxaparin Sodium (LOVENOX) syringe 40 mg 40 mg Daily 11/15/2024 --    Admin Instructions: Give subcutaneous in abdomen only. Do not massage site after injection.    Route: Subcutaneous    ertapenem (INVanz) 1,000 mg in " sodium chloride 0.9 % 100 mL MBP 1,000 mg Every 24 Hours 11/19/2024 12/3/2024    Admin Instructions: Caution: Look alike/sound alike drug alert.    Route: Intravenous    finasteride (PROSCAR) tablet 5 mg 5 mg Nightly 11/15/2024 --    Admin Instructions: Group 2 (Pink) Hazardous Drug - Reproductive Risk Only - See Handling Guide    Route: Oral    glucagon (GLUCAGEN) injection 1 mg 1 mg Every 15 Minutes PRN 11/16/2024 --    Admin Instructions: Blood Glucose Less Than 70 - Patient Without IV Access - Unresponsive, NPO or Unable To Safely Swallow  Reconstitute powder for injection by adding 1 mL of -supplied sterile diluent or sterile water for injection to a vial containing 1 mg of the drug, to provide solutions containing 1 mg/mL. Shake vial gently to dissolve.    Route: Intramuscular    insulin glargine (LANTUS, SEMGLEE) injection 10 Units 10 Units Nightly 11/17/2024 --    Admin Instructions: Do not hold basal insulin without an order. Consider requesting a dose edit, if needed.      Route: Subcutaneous    insulin glargine (LANTUS, SEMGLEE) injection 15 Units 15 Units Daily 11/18/2024 --    Admin Instructions: Do not hold basal insulin without an order. Consider requesting a dose edit, if needed.      Route: Subcutaneous    Insulin Lispro (humaLOG) injection 2-9 Units 2-9 Units 4 Times Daily Before Meals & Nightly 11/17/2024 --    Admin Instructions: Correction Insulin - Moderate Dose (Total Insulin Dose 40-60 units/day, Average Weight Patient, Patient Taking Oral Hypoglycemic)    Blood Glucose 150-199 mg/dL - 2 units  Blood Glucose 200-249 mg/dL - 4 units  Blood Glucose 250-299 mg/dL - 6 units  Blood Glucose 300-349 mg/dL - 7 units  Blood Glucose 350-400 mg/dL - 8 units  Blood Glucose greater than 400 mg/dL - 9 units & Call Provider   Caution: Look alike/sound alike drug alert    Route: Subcutaneous    Insulin Lispro (humaLOG) injection 7 Units 7 Units 3 Times Daily With Meals 11/17/2024 --    Admin  "Instructions:  Solution should be clear. If cloudy, contact pharmacy for a new vial. Scheduled mealtime doses of this medication should be held if patient NPO.   Caution: Look alike/sound alike drug alert    Route: Subcutaneous    Magnesium Standard Dose Replacement - Follow Nurse / BPA Driven Protocol  As Needed 11/15/2024 --    Admin Instructions: Open Order & Select \"BHS Electrolyte Replacement Protocol Algorithm\" to View Details    Route: Not Applicable    metoprolol succinate XL (TOPROL-XL) 24 hr tablet 50 mg 50 mg Daily 11/15/2024 --    Admin Instructions: Hold for SBP less than 100, DBP less than 60, or heart rate less than 50    Do not crush or chew the capsules or tablets. The drug may not work as designed if the capsule or tablet is crushed or chewed. Swallow whole.  Do not crush or chew.    Route: Oral    nitroglycerin (NITROSTAT) SL tablet 0.4 mg 0.4 mg Every 5 Minutes PRN 11/15/2024 --    Admin Instructions: If Pain Unrelieved After 3 Doses Notify MD  May administer up to 3 doses per episode.    Route: Sublingual    ondansetron (ZOFRAN) injection 4 mg 4 mg Every 6 Hours PRN 11/20/2024 --    Admin Instructions: \"If multiple N/V medications ordered, use in the following order: Ondansetron, Prochlorperazine, Promethazine. Use PO unless patient refuses or patient unable to swallow.\"    Route: Intravenous    Phosphorus Replacement - Follow Nurse / BPA Driven Protocol  As Needed 11/15/2024 --    Admin Instructions: Open Order & Select \"BHS Electrolyte Replacement Protocol Algorithm\" to View Details    Route: Not Applicable    polyethylene glycol (MIRALAX) packet 17 g 17 g Daily PRN 11/15/2024 --    Admin Instructions: Use if no bowel movement after 12 hours. Mix in 6-8 ounces of water.  Use 4-8 ounces of water, tea, or juice for each 17 gram dose.    Route: Oral    Linked Group 2: Placed in \"And\" Linked Group        Potassium Replacement - Follow Nurse / BPA Driven Protocol  As Needed 11/15/2024 --    Admin " "Instructions: Open Order & Select \"BHS Electrolyte Replacement Protocol Algorithm\" to View Details    Route: Not Applicable    pregabalin (LYRICA) capsule 225 mg 225 mg 2 Times Daily 11/16/2024 --    Admin Instructions:     Route: Oral    rOPINIRole (REQUIP) tablet 0.25 mg 0.25 mg Nightly 11/15/2024 --    Admin Instructions: Caution: Look alike/sound alike drug alert    Route: Oral    sennosides-docusate (PERICOLACE) 8.6-50 MG per tablet 2 tablet 2 tablet 2 Times Daily PRN 11/15/2024 --    Admin Instructions: Start bowel management regimen if patient has not had a bowel movement after 12 hours.    Route: Oral    Linked Group 2: Placed in \"And\" Linked Group        sodium chloride 0.9 % flush 10 mL 10 mL As Needed 11/15/2024 --    Route: Intravenous    Cosign for Ordering: Accepted by Nic Hewitt MD on 11/15/2024  3:10 PM    sodium chloride 0.9 % flush 10 mL 10 mL Every 12 Hours Scheduled 11/15/2024 --    Route: Intravenous    sodium chloride 0.9 % flush 10 mL 10 mL As Needed 11/15/2024 --    Route: Intravenous    sodium chloride 0.9 % flush 10 mL 10 mL Every 12 Hours Scheduled 11/15/2024 --    Route: Intravenous    sodium chloride 0.9 % flush 10 mL 10 mL As Needed 11/15/2024 --    Route: Intravenous    sodium chloride 0.9 % infusion 40 mL 40 mL As Needed 11/15/2024 --    Admin Instructions: Following administration of an IV intermittent medication, flush line with 40mL NS at 100mL/hr.    Route: Intravenous    sodium chloride 0.9 % infusion 40 mL 40 mL As Needed 11/15/2024 --    Admin Instructions: Following administration of an IV intermittent medication, flush line with 40mL NS at 100mL/hr.    Route: Intravenous    tamsulosin (FLOMAX) 24 hr capsule 0.8 mg 0.8 mg Nightly 11/18/2024 --    Admin Instructions: Do not crush or chew the capsules or tablets. The drug may not work as designed if the capsule or tablet is crushed or chewed. Swallow whole. If patient unable to swallow whole, contact pharmacy for " alternative.    Route: Oral              Assessment & Plan   He is a 59-year-old male status post left below-knee amputation November 22, 2024.      DKA (diabetic ketoacidosis)    Non healing left heel wound    Severe protein-calorie malnutrition    Acute osteomyelitis of left foot    Nonweightbearing left lower extremity.  Follow intraoperative cultures. Drain removed 11/24/24.    He is 12 days status post left below-knee amputation.  He has no significant complaints today.  He has been accepted for admission at [facility] pending insurance approval.    To begin postoperative day three:    Daily dressing change:    Xeroform  4x4  Kerlix  Ace     Follow up in 2 weeks with Claudia Lim PA-C or Chris Barone PA-C.    [facility]  [address], [address]  [place] [facility] [place], [address]  Please schedule at [phone]    Chris Barone PA-C  12/04/24  09:13 EST

## 2024-12-04 NOTE — PLAN OF CARE
Problem: Adult Inpatient Plan of Care  Goal: Optimal Comfort and Wellbeing  Outcome: Progressing   Goal Outcome Evaluation:  Plan of Care Reviewed With: patient        Progress: no change                            Pain controlled. Up in chair. IV antibiotics infused per orders. Bustos in place.  12/4. Awaiting discharge to [facility].

## 2024-12-05 VITALS
BODY MASS INDEX: 29.91 KG/M2 | WEIGHT: 175.2 LBS | OXYGEN SATURATION: 93 % | SYSTOLIC BLOOD PRESSURE: 128 MMHG | HEIGHT: 64 IN | TEMPERATURE: 97.1 F | RESPIRATION RATE: 17 BRPM | DIASTOLIC BLOOD PRESSURE: 74 MMHG | HEART RATE: 85 BPM

## 2024-12-05 PROBLEM — N13.8 BPH WITH OBSTRUCTION/LOWER URINARY TRACT SYMPTOMS: Status: ACTIVE | Noted: 2024-12-05

## 2024-12-05 PROBLEM — N40.1 BPH WITH OBSTRUCTION/LOWER URINARY TRACT SYMPTOMS: Status: ACTIVE | Noted: 2024-12-05

## 2024-12-05 PROBLEM — E11.65 TYPE 2 DIABETES MELLITUS WITH HYPERGLYCEMIA: Status: ACTIVE | Noted: 2024-12-05

## 2024-12-05 PROBLEM — R33.8 ACUTE URINARY RETENTION: Status: ACTIVE | Noted: 2024-12-05

## 2024-12-05 PROBLEM — E11.10 DKA (DIABETIC KETOACIDOSIS): Status: RESOLVED | Noted: 2024-11-15 | Resolved: 2024-12-05

## 2024-12-05 LAB
GLUCOSE BLDC GLUCOMTR-MCNC: 216 MG/DL (ref 70–130)
GLUCOSE BLDC GLUCOMTR-MCNC: 251 MG/DL (ref 70–130)

## 2024-12-05 PROCEDURE — G0008 ADMIN INFLUENZA VIRUS VAC: HCPCS | Performed by: ORTHOPAEDIC SURGERY

## 2024-12-05 PROCEDURE — 82948 REAGENT STRIP/BLOOD GLUCOSE: CPT

## 2024-12-05 PROCEDURE — 25010000002 MEROPENEM PER 100 MG: Performed by: INTERNAL MEDICINE

## 2024-12-05 PROCEDURE — 25010000002 LINEZOLID 600 MG/300ML SOLUTION: Performed by: FAMILY MEDICINE

## 2024-12-05 PROCEDURE — 90656 IIV3 VACC NO PRSV 0.5 ML IM: CPT | Performed by: ORTHOPAEDIC SURGERY

## 2024-12-05 PROCEDURE — 25010000002 ENOXAPARIN PER 10 MG: Performed by: ORTHOPAEDIC SURGERY

## 2024-12-05 PROCEDURE — 63710000001 INSULIN LISPRO (HUMAN) PER 5 UNITS: Performed by: PHYSICIAN ASSISTANT

## 2024-12-05 PROCEDURE — 63710000001 INSULIN LISPRO (HUMAN) PER 5 UNITS: Performed by: ORTHOPAEDIC SURGERY

## 2024-12-05 PROCEDURE — 25010000002 INFLUENZA VIRUS VACC SPLIT PF 0.5 ML SUSPENSION PREFILLED SYRINGE: Performed by: ORTHOPAEDIC SURGERY

## 2024-12-05 PROCEDURE — 99239 HOSP IP/OBS DSCHRG MGMT >30: CPT | Performed by: PHYSICIAN ASSISTANT

## 2024-12-05 RX ORDER — CALCIUM CARBONATE 500 MG/1
2 TABLET, CHEWABLE ORAL 3 TIMES DAILY PRN
Start: 2024-12-05

## 2024-12-05 RX ORDER — INSULIN ASPART 100 [IU]/ML
30 INJECTION, SUSPENSION SUBCUTANEOUS
Start: 2024-12-05

## 2024-12-05 RX ORDER — ACETAMINOPHEN 325 MG/1
650 TABLET ORAL EVERY 4 HOURS PRN
Start: 2024-12-05

## 2024-12-05 RX ORDER — TAMSULOSIN HYDROCHLORIDE 0.4 MG/1
0.8 CAPSULE ORAL NIGHTLY
Start: 2024-12-05

## 2024-12-05 RX ORDER — FLUTICASONE PROPIONATE 50 MCG
2 SPRAY, SUSPENSION (ML) NASAL 2 TIMES DAILY PRN
Start: 2024-12-05

## 2024-12-05 RX ORDER — OMEPRAZOLE 40 MG/1
40 CAPSULE, DELAYED RELEASE ORAL
Start: 2024-12-05

## 2024-12-05 RX ORDER — ASPIRIN 81 MG/1
81 TABLET ORAL DAILY
Start: 2024-12-05

## 2024-12-05 RX ADMIN — MEROPENEM 1000 MG: 1 INJECTION, POWDER, FOR SOLUTION INTRAVENOUS at 03:36

## 2024-12-05 RX ADMIN — INSULIN LISPRO 8 UNITS: 100 INJECTION, SOLUTION INTRAVENOUS; SUBCUTANEOUS at 09:17

## 2024-12-05 RX ADMIN — LINEZOLID 600 MG: 600 INJECTION, SOLUTION INTRAVENOUS at 09:15

## 2024-12-05 RX ADMIN — INSULIN LISPRO 6 UNITS: 100 INJECTION, SOLUTION INTRAVENOUS; SUBCUTANEOUS at 09:15

## 2024-12-05 RX ADMIN — ENOXAPARIN SODIUM 40 MG: 100 INJECTION SUBCUTANEOUS at 09:20

## 2024-12-05 RX ADMIN — MEROPENEM 1000 MG: 1 INJECTION, POWDER, FOR SOLUTION INTRAVENOUS at 11:36

## 2024-12-05 RX ADMIN — PANTOPRAZOLE SODIUM 40 MG: 40 TABLET, DELAYED RELEASE ORAL at 09:16

## 2024-12-05 RX ADMIN — INSULIN LISPRO 8 UNITS: 100 INJECTION, SOLUTION INTRAVENOUS; SUBCUTANEOUS at 12:37

## 2024-12-05 RX ADMIN — INSULIN LISPRO 4 UNITS: 100 INJECTION, SOLUTION INTRAVENOUS; SUBCUTANEOUS at 12:37

## 2024-12-05 RX ADMIN — ASPIRIN 81 MG: 81 TABLET, COATED ORAL at 09:16

## 2024-12-05 RX ADMIN — Medication 10 ML: at 09:21

## 2024-12-05 RX ADMIN — METOPROLOL SUCCINATE 50 MG: 50 TABLET, EXTENDED RELEASE ORAL at 09:16

## 2024-12-05 RX ADMIN — INFLUENZA A VIRUS A/VICTORIA/4897/2022 IVR-238 (H1N1) ANTIGEN (FORMALDEHYDE INACTIVATED), INFLUENZA A VIRUS A/CALIFORNIA/122/2022 SAN-022 (H3N2) ANTIGEN (FORMALDEHYDE INACTIVATED), AND INFLUENZA B VIRUS B/MICHIGAN/01/2021 ANTIGEN (FORMALDEHYDE INACTIVATED) 0.5 ML: 15; 15; 15 INJECTION, SUSPENSION INTRAMUSCULAR at 13:29

## 2024-12-05 RX ADMIN — PREGABALIN 225 MG: 75 CAPSULE ORAL at 09:16

## 2024-12-05 NOTE — CASE MANAGEMENT/SOCIAL WORK
Case Management Discharge Note      Final Note: I spoke with Kamini at [facility] today. Insurance has approved from Mr Mullen to go to inpatient rehab. I updated the patient and he is in agreement with the plan. Patient's bed will be on the MED unit. RN to call report to [phone]. Kamini will pull the discharge summary out of Epic. No need to change the pharmacy in [facility] as they have their own. I have arranged for the patient to be transported by [facility] at 1430 today. Please have the patient at the [address] by 1415 to prevent transport delays.         Selected Continued Care - Admitted Since 11/15/2024       Destination Coordination complete.      Service Provider Services Address Phone Fax Patient Preferred    [facility] [address] [address] [phone] --              Durable Medical Equipment    No services have been selected for the patient.                Dialysis/Infusion    No services have been selected for the patient.                Home Medical Care    No services have been selected for the patient.                Therapy    No services have been selected for the patient.                Community Resources    No services have been selected for the patient.                Community & DME    No services have been selected for the patient.                    Transportation Services  Other: Other ([facility] van 1430)    Final Discharge Disposition Code: 62 - inpatient rehab facility

## 2024-12-05 NOTE — PLAN OF CARE
Goal Outcome Evaluation:                    Report called to nurse at Med Unit at [facility]. This nurse left my extension in case there were other questions. IV discontinued. Flu vaccine administered to Left deltoid. Pt will be brought down by 5B staff to the [address] front entrance where [facility] van will  at 1430. Juli to remain in place.

## 2024-12-05 NOTE — PLAN OF CARE
Goal Outcome Evaluation:                                          VSS on room air. Abx infusing. Skin care provided per orders. Patent ledesma. No reports of pain. BM this shift. No acute events over night. Awaiting placement/

## 2024-12-05 NOTE — DISCHARGE SUMMARY
[facility] Medicine Services  DISCHARGE SUMMARY    Patient Name: Fracisco Mullen  : 1965  MRN: [id number]    Date of Admission: 11/15/2024  9:19 AM  Date of Discharge:  2024  Primary Care Physician: Brandy Roberson MD    Consults       Date and Time Order Name Status Description    2024  7:36 AM Inpatient Vascular Surgery Consult Completed     2024  7:06 AM Inpatient Infectious Diseases Consult Completed     2024  7:06 AM Inpatient Orthopedic Surgery Consult Completed           Hospital Course     Active Hospital Problems    Diagnosis  POA    **DKA (diabetic ketoacidosis) [E11.10]  Yes    Severe protein-calorie malnutrition [E43]  Yes    Non healing left heel wound [S91.302A]  Yes    Acute osteomyelitis of left foot [M86.172]  Unknown      Resolved Hospital Problems   No resolved problems to display.      Hospital Course:  Fracisco Mullen is a 59 y.o. male with PMH significant for HTN and poorly-controlled insulin-dependent DMII with diabetic peripheral neuropathy / chronic diabetic foot wounds. Brought to [facility] ED on 11/15/24 via EMS due to AMS and fatigue. Found to be in DKA and later found to have L calcaneal osteomyelitis. He is s/p L BKA by Dr. Gan on 24. On , he developed acute hypoxemic respiratory failure with concern for aspiration - antibiotics escalated. He developed urinary retention necessitating ledesma catheter placement.      Left calcaneal osteomyelitis   - Patient with chronic left heel wound and right plantar foot wound  - Admitted 2024 for left foot cellulitis and discharged on oral antibiotics and with home wound vac following debridement, MRI left foot obtained at that time with no definite findings of osteomyelitis  - 24 MRI of L foot showed soft tissue wound with evidence of osteomyelitis of the calcaneus and possibly an associated non-drainable abscess  -He is s/p L BKA by  Dr. Gan on 11/22/2024   - ID following. Has been on Linezolid, Merrem and Doxy (possible PNA coverage in addition to OM).   - Completed Doxycycline on 12/2. Continued Linezolid / Merrem until DC. At DC, ID recommends to stop antibiotics and monitor  - PT/OT following - to [facility] today   - Follow up with Dr. Gan in 2 weeks     Acute hypoxia, resolved   - Likely due to pulmonary edema but concern for aspiration PNA so abx escalated as above  - Improved with diuresis and now stable on room air      BPH  Acute urinary retention  - Ledesma anchored on 11/25. Failed voiding trial on 11/26 so re-anchored.   - Continue Finasteride and Tamsulosin  - Exchange ledesma catheter Q30 days - due for exchange on 12/26/24. Please exchange at time of DC from [facility] if patient discharges before 12/26.   - Have referred to urology for continued outpatient monitoring / possible future voiding trial      DKA in setting of uncontrolled IDDM complicated by diabetic neuropathy and chronic foot wounds , improving  - Uncertain of medication compliance. Prescribed 70/30 insulin 60 units BID   - A1c 14.1%  - s/p DKA protocol including insulin drip, IV fluids, and electrolyte replacement per protocol  - Now on subcutaneous insulin. Due to hypoglycemia on 12/1, insulin regimen scaled back but developed hyperglycemia so titrating insulin again  - At DC, can resume home 70/30 insulin but start at 30 units BID  - If to remain on basal/bolus at [facility], recommend Lantus 30 units QHS, Lispro 10 units TID AC + SSI     Malnutrition  - RD following     Acute toxic metabolic encephalopathy, resolved  - CT head without acute abnormality  - UDS negative  - Suspect secondary to DKA     HTN  - Continue home Metoprolol     RLS  - Continue home Requip    Day of Discharge     HPI:   Sitting up in bed. Feeling well with no new complaints. Said he had multiple BMs overnight. Denies chest pain or dyspnea. No abdominal pain, nausea or vomiting     Review of  Systems  Gen- No fevers, chills  CV- No chest pain, palpitations  Resp- No cough, dyspnea  GI- No N/V/D, abd pain    Vital Signs:   Temp:  [97.5 °F (36.4 °C)-98.2 °F (36.8 °C)] 98 °F (36.7 °C)  Heart Rate:  [83-88] 84  Resp:  [16-17] 17  BP: (111-151)/(61-92) 144/80    Physical Exam:  Constitutional: No acute distress, awake, alert and conversant. Chronically ill appearing. Sitting up in bed   HENT: NCAT, mucous membranes moist  Respiratory: Clear to auscultation bilaterally, respiratory effort normal on room air   Cardiovascular: RRR  Gastrointestinal: Positive bowel sounds, soft, nontender, nondistended  : Bustos catheter in place with clear, yellow urine in bag  Musculoskeletal: L BKA incision dressed - did not remove dressing for exam   Psychiatric: Appropriate affect, cooperative with exam  Neurologic: Oriented x 3, moves all extremities spontaneously without focal deficits, speech clear  Skin: No rashes to exposed surfaces.      Pertinent  and/or Most Recent Results     LAB RESULTS:      Lab 12/03/24  0543 12/02/24  0725 12/01/24  1507 11/30/24  0603 11/29/24  0641   WBC 6.40 7.13 8.81 8.36 7.13   HEMOGLOBIN 8.6* 8.6* 9.9* 8.2* 8.0*   HEMATOCRIT 28.0* 28.1* 32.7* 26.6* 26.1*   PLATELETS 398 471* 522* 419 437   NEUTROS ABS 3.02 4.45 3.92  --  3.73   IMMATURE GRANS (ABS) 0.04 0.06* 0.06*  --  0.07*   LYMPHS ABS 2.11 1.56 3.10  --  2.14   MONOS ABS 0.71 0.65 1.01*  --  0.59   EOS ABS 0.44* 0.36 0.62*  --  0.53*   MCV 82.6 82.9 83.8 83.9 84.2         Lab 12/03/24  0543 12/02/24  0725 12/01/24  1507 11/30/24  0603 11/29/24  1838 11/29/24  0641   SODIUM 136 132* 138 131*  --  136   POTASSIUM 4.9 4.8 4.3 4.9  --  4.8   CHLORIDE 100 96* 97* 99  --  103   CO2 27.0 28.0 31.0* 25.0  --  25.0   ANION GAP 9.0 8.0 10.0 7.0  --  8.0   BUN 19 16 14 12  --  10   CREATININE 0.79 0.88 0.96 0.77  --  0.83   EGFR 101.1 97.8 89.9 101.9  --  99.6   GLUCOSE 268* 255* 41* 239*  --  230*   CALCIUM 7.9* 8.4* 9.0 8.1*  --  7.8*    MAGNESIUM 1.7 1.6 1.8  --   --   --    PHOSPHORUS  --   --   --   --  2.6 2.1*         Lab 12/03/24  0543 12/02/24  0725 12/01/24  1507 11/29/24  0641   TOTAL PROTEIN 4.9* 5.2* 5.6*  --    ALBUMIN 2.6* 2.7* 2.9* 2.4*   GLOBULIN 2.3 2.5 2.7  --    ALT (SGPT) 11 13 16  --    AST (SGOT) 12 13 18  --    BILIRUBIN <0.2 <0.2 0.2  --    ALK PHOS 87 102 94  --      Brief Urine Lab Results  (Last result in the past 365 days)        Color   Clarity   Blood   Leuk Est   Nitrite   Protein   CREAT   Urine HCG        11/15/24 1142 Yellow   Clear   Small (1+)   Negative   Negative   100 mg/dL (2+)                 Microbiology Results (last 10 days)       Procedure Component Value - Date/Time    MRSA Screen, PCR (Inpatient) - Swab, Nares [[id number]]  (Normal) Collected: 11/26/24 0832    Lab Status: Final result Specimen: Swab from Nares Updated: 11/26/24 1009     MRSA PCR Negative    Narrative:      The negative predictive value of this diagnostic test is high and should only be used to consider de-escalating anti-MRSA therapy. A positive result may indicate colonization with MRSA and must be correlated clinically.  MRSA Negative    Respiratory Panel PCR w/COVID-19(SARS-CoV-2) ALBINA/LION/VALERI/PAD/COR/MATHEW In-House, NP Swab in UTM/VTM, 2 HR TAT - Swab, Nasopharynx [[id number]]  (Normal) Collected: 11/26/24 0158    Lab Status: Final result Specimen: Swab from Nasopharynx Updated: 11/26/24 0250     ADENOVIRUS, PCR Not Detected     Coronavirus 229E Not Detected     Coronavirus HKU1 Not Detected     Coronavirus NL63 Not Detected     Coronavirus OC43 Not Detected     COVID19 Not Detected     Human Metapneumovirus Not Detected     Human Rhinovirus/Enterovirus Not Detected     Influenza A PCR Not Detected     Influenza B PCR Not Detected     Parainfluenza Virus 1 Not Detected     Parainfluenza Virus 2 Not Detected     Parainfluenza Virus 3 Not Detected     Parainfluenza Virus 4 Not Detected     RSV, PCR Not Detected     Bordetella pertussis  pcr Not Detected     Bordetella parapertussis PCR Not Detected     Chlamydophila pneumoniae PCR Not Detected     Mycoplasma pneumo by PCR Not Detected    Narrative:      In the setting of a positive respiratory panel with a viral infection PLUS a negative procalcitonin without other underlying concern for bacterial infection, consider observing off antibiotics or discontinuation of antibiotics and continue supportive care. If the respiratory panel is positive for atypical bacterial infection (Bordetella pertussis, Chlamydophila pneumoniae, or Mycoplasma pneumoniae), consider antibiotic de-escalation to target atypical bacterial infection.    Blood Culture - Blood, Hand, Left [[id number]]  (Normal) Collected: 11/25/24 2127    Lab Status: Final result Specimen: Blood from Hand, Left Updated: 11/30/24 2215     Blood Culture No growth at 5 days    Blood Culture - Blood, Arm, Right [[id number]]  (Normal) Collected: 11/25/24 2125    Lab Status: Final result Specimen: Blood from Arm, Right Updated: 11/30/24 2215     Blood Culture No growth at 5 days          CT Head Without Contrast    Result Date: 11/25/2024  CT HEAD WO CONTRAST Date of Exam: 11/25/2024 11:20 PM EST Indication: decreased LOC. Comparison: CT scan of the head dated November 15, 2024 Technique: Axial CT images were obtained of the head without contrast administration.  Automated exposure control and iterative construction methods were used. Findings: There is mild diffuse generalized atrophy. There are low-attenuation areas in the periventricular white matter consistent with chronic microvascular ischemic change. There is no mass, mass effect or midline shift. There are no abnormal extra-axial fluid collections or areas of acute hemorrhage. The paranasal sinuses are clear. The mastoid air cells are clear.     Impression: Atrophy and chronic microvascular ischemic change. No acute intracranial process. Electronically Signed: Horacio Bailey MD  11/25/2024 11:49 PM  EST  Workstation ID: [id number]    CT Angiogram Chest    Result Date: 11/25/2024  CT ANGIOGRAM CHEST Date of Exam: 11/25/2024 11:20 PM EST Indication: elevated d dimer, hypoxia. Comparison: None available. Technique: CTA of the chest was performed after the uneventful intravenous administration of 80 mL Isovue-370. Reconstructed coronal and sagittal images were also obtained. In addition, a 3-D volume rendered image was created for interpretation. Automated exposure control and iterative reconstruction methods were used. Findings: Pulmonary arteries: Adequate opacification of the pulmonary arteries. No evidence of acute pulmonary embolism. Lungs and Pleura: There are small bilateral pleural effusions. There is bilateral basilar atelectasis. There is mild right middle lobe and posterior left upper lobe atelectasis. Mediastinum/Clare: No mediastinal or hilar lymphadenopathy. Lymph nodes: No axillary or supraclavicular adenopathy. Cardiovascular: The cardiac chambers are within normal limits. The pericardium is normal. The aorta and its arch branch vessels are unremarkable. There is mild coronary artery calcific atherosclerosis.   Upper Abdomen: The stomach is distended with a large amount of fluid and food stuff. There is fluid in the esophagus to the mid esophagus consistent with reflux. There are clips from cholecystectomy. Bones and Soft Tissue: No suspicious osseous lesion.     Impression: 1.No evidence of pulmonary embolism. 2.Small bilateral pleural effusions with bilateral basilar atelectasis. 3.Fluid in the esophagus consistent with reflux. Electronically Signed: Horacio Bailey MD  11/25/2024 11:47 PM EST  Workstation ID: [id number]    XR Chest 1 View    Result Date: 11/25/2024  XR CHEST 1 VW Date of Exam: 11/25/2024 8:06 PM EST Indication: increased oxygen demand Comparison: 11/15/2024 Findings: Cardiomediastinal silhouette is unremarkable. There is left lower lobe airspace disease with small to moderate left  effusion. Correlate for signs of pneumonia. No pneumothorax. Right lung is relatively clear. No acute osseous abnormality identified.     Impression: Left lower lobe airspace disease with small to moderate left effusion. Correlate for signs of pneumonia. Electronically Signed: Nic Fonseca MD  11/25/2024 8:52 PM EST  Workstation ID: [id number]     Results for orders placed during the hospital encounter of 11/15/24    Doppler Arterial Multi Level Lower Extremity - Bilateral CAR    Interpretation Summary    Right Conclusion: The right ANNY is normal. Unable to assess digital insufficiency.    Left Conclusion: The left ANNY is normal. Normal digital pressures.    Discharge Details        Discharge Medications        New Medications        Instructions Start Date   acetaminophen 325 MG tablet  Commonly known as: TYLENOL   650 mg, Oral, Every 4 Hours PRN      calcium carbonate 500 MG chewable tablet  Commonly known as: TUMS   2 tablets, Oral, 3 Times Daily PRN      finasteride 5 MG tablet  Commonly known as: PROSCAR   5 mg, Oral, Nightly             Changes to Medications        Instructions Start Date   fluticasone 50 MCG/ACT nasal spray  Commonly known as: Flonase  What changed:   when to take this  reasons to take this   2 sprays, Nasal, 2 Times Daily PRN      NovoLOG Mix 70/30 FlexPen (70-30) 100 UNIT/ML suspension pen-injector injection  Generic drug: insulin aspart prot & aspart  What changed: how much to take   30 Units, Subcutaneous, 2 Times Daily Before Meals      omeprazole 40 MG capsule  Commonly known as: priLOSEC  What changed: when to take this   40 mg, Oral, Every Morning Before Breakfast      tamsulosin 0.4 MG capsule 24 hr capsule  Commonly known as: FLOMAX  What changed: how much to take   0.8 mg, Oral, Nightly             Continue These Medications        Instructions Start Date   aspirin 81 MG EC tablet   81 mg, Oral, Daily      B-D UF III MINI PEN NEEDLES 31G X 5 MM misc  Generic drug: Insulin Pen  Needle   Use as directed 2 times daily      B-D UF III MINI PEN NEEDLES 31G X 5 MM misc  Generic drug: Insulin Pen Needle   Use 1 pen needle as directed 2 (Two) Times a Day.      cyclobenzaprine 5 MG tablet  Commonly known as: FLEXERIL   5 mg, 3 Times Daily PRN      DULoxetine 60 MG capsule  Commonly known as: CYMBALTA   60 mg, Daily      levocetirizine 5 MG tablet  Commonly known as: XYZAL   1 tablet, Daily      metoprolol succinate XL 50 MG 24 hr tablet  Commonly known as: TOPROL-XL   50 mg, Oral, Daily      OneTouch Delica Plus [id number] misc   Use as directed to test blood sugar 3 (Three) Times a Day.      OneTouch Verio Flex System w/Device kit   Use as directed to test blood sugar 3 (Three) Times a Day.      OneTouch Verio test strip  Generic drug: glucose blood   Use as directed to test blood sugar 3 times daily      pregabalin 225 MG capsule  Commonly known as: Lyrica   225 mg, Oral, 2 Times Daily      rOPINIRole 0.25 MG tablet  Commonly known as: REQUIP   Take 1 tablet by mouth Every Night, 1 hour before bedtime.      simvastatin 20 MG tablet  Commonly known as: ZOCOR   1 tablet, Oral, Daily             Stop These Medications      albuterol sulfate  (90 Base) MCG/ACT inhaler  Commonly known as: PROVENTIL HFA;VENTOLIN HFA;PROAIR HFA     amitriptyline 25 MG tablet  Commonly known as: ELAVIL     docusate sodium 100 MG capsule  Commonly known as: COLACE     metFORMIN 1000 MG tablet  Commonly known as: GLUCOPHAGE     naloxone 4 MG/0.1ML nasal spray  Commonly known as: NARCAN     oxyCODONE-acetaminophen 5-325 MG per tablet  Commonly known as: PERCOCET     Vitamin D3 50 MCG (2000 UT) capsule            Allergies   Allergen Reactions    Sertraline Hcl Hives     Zoloft     Discharge Disposition:Skilled Nursing Facility (DC - External)    Diet:  Diet Instructions       Diet: Regular/House Diet, Diabetic Diets; Consistent Carbohydrate; Regular (IDDSI 7); Thin (IDDSI 0)      Discharge Diet:  Regular/House  Diet  Diabetic Diets       Diabetic Diet: Consistent Carbohydrate    Texture: Regular (IDDSI 7)    Fluid Consistency: Thin (IDDSI 0)          Activity:  Activity Instructions       Up WIth Assist      NWB on LLE            CODE STATUS:    Code Status and Medical Interventions: CPR (Attempt to Resuscitate); Full Support; Full code per last hosptial admission. Patient disoriented on exam with no family present at bedside. Only contact information is friend.   Ordered at: 11/15/24 1218     Level Of Support Discussed With:    Patient     Code Status (Patient has no pulse and is not breathing):    CPR (Attempt to Resuscitate)     Medical Interventions (Patient has pulse or is breathing):    Full Support     Comments:    Full code per last hosptial admission. Patient disoriented on exam with no family present at bedside. Only contact information is friend.     No future appointments.    Additional Instructions for the Follow-ups that You Need to Schedule       Discharge Follow-up with Specified Provider: Dru Gan or KENNY (ortho) in 2 weeks   As directed      To: Dru Gan or KENNY (ortho) in 2 weeks              Mary Escalona PA-C  12/05/24    Time Spent on Discharge:  I spent 40 minutes on this discharge activity which included: face-to-face encounter with the patient, reviewing the data in the system, coordination of the care with the nursing staff as well as consultants, documentation, and entering orders.

## 2024-12-05 NOTE — PROGRESS NOTES
"Fracisco Mullen  1965  [id number]    Evaluating Physician: Varun Dominique MD    Chief Complaint: fatigue    Reason for Consultation: foot infection    History of present illness:     Patient is a 59 y.o.  Yr old male with history of diabetes/hypertension, previously followed with Dr. Gatica; admitted 2023 with right foot infection, cultures with MSSA/Morganella/ESBL Klebsiella/enterococcus and strep species.had surgery at the second toe with amputation, received IV daptomycin/Invanz with general improvements at that site.  He has been left with a chronic right plantar foot wound and a chronic wound at the left heel that has turned black; He apparently was admitted to the hospital August 1 after a fall at [facility].  Noted to have urinary retention with concern for possible UTI.  In addition left heel with black discoloration/malodor and redness, empiric antibiotics initiated.  He is chronically debilitated and wheelchair bound by his report.Bustos catheter-based at admission     8/3/24  Dr Gan  \"PROCEDURE:            Left  Left      60270:             Debridement of skin and subcutaneous tissue  45571:             Wound vacuum-assisted closure\"     8/6/24 MRI and surgical findings did not confirm bone involvement, transitioned to oral agents at discharge       Readmitted November 15, 2024 with reports of appearing \"sluggish\" according to admission notes with DKA, noted to have high hemoglobin A1c and persistent/worsening left heel wound according to patient; medicine team notes mention urinary retention, acute toxic metabolic encephalopathy improved and low-grade fever. Abnormal MRI at the left foot:    Per radiology-- \"Soft tissue wound seen along the posterior heel with ill-defined fluid that extends through and disrupts the distal Achilles tendon. Underlying this area there is evidence of osteomyelitis of the posterior lateral calcaneus. There is a tiny fluid   collection here as well that may represent " "an associated nondrainable abscess.     Additionally there appears to be a nondisplaced stress fracture of the posterior calcaneus. \"    11/22 left BKA    11/26/24 increased O2 requirements with less responsive per medicine team notes overnight; subsequently better    12/5/24 seen early and sleepy; possible [place] per case management notes.   no new pain;  postop Left LE postop pain  dull , worse with palpation, better with pain meds and not severe,  he won't attach a numerical severity      No headache photophobia or neck stiffness.  No   cough or hemoptysis.  No nausea vomiting diarrhea or abdominal pain.  No other new skin rash.  no dysuria hematuria or pyuria.       Past Medical History:   Diagnosis Date    Acute renal failure     Hx of    Obesity     Hx of    Sleep apnea     Type 2 diabetes mellitus        Past Surgical History:   Procedure Laterality Date    BACK SURGERY      lower back    BELOW KNEE AMPUTATION Left 11/22/2024    Procedure: BELOW-KNEE AMPUTATION LEFT;  Surgeon: Dru Gan Jr., MD;  Location: [facility] OR;  Service: Orthopedics;  Laterality: Left;    CHOLECYSTECTOMY WITH INTRAOPERATIVE CHOLANGIOGRAM N/A 1/6/2021    Procedure: CHOLECYSTECTOMY LAPAROSCOPIC INTRAOPERATIVE CHOLANGIOGRAM;  Surgeon: Juventino Hooker MD;  Location:  [facility] OR;  Service: General;  Laterality: N/A;    ERCP N/A 1/9/2021    Procedure: ENDOSCOPIC RETROGRADE CHOLANGIOPANCREATOGRAPHY;  Surgeon: Jeremy Dowell MD;  Location: [facility] ENDOSCOPY;  Service: Gastroenterology;  Laterality: N/A;  with sphiincterotomy and balloon sweep with 9mm-12mm balloon    INCISION AND DRAINAGE FOOT Left 8/3/2024    Procedure: HEAL IRRIGATION AND DEBRIDEMENT LEFT;  Surgeon: Dru Gan Jr., MD;  Location:  [facility] OR;  Service: Orthopedics;  Laterality: Left;    PLACEMENT OF WOUND VAC Left 8/3/2024    Procedure: PLACEMENT OF WOUND VAC;  Surgeon: Dru Gan Jr., MD;  Location:  [facility] OR;  Service: Orthopedics;  Laterality: " Left;       Pediatric History   Patient Parents    Not on file     Other Topics Concern    Not on file   Social History Narrative    Not on file       family history includes Cancer in his brother, maternal grandmother, mother, and another family member; Diabetes in an other family member; Heart attack in an other family member; Heart disease in his brother and father; Hyperlipidemia in his mother and another family member; Hypertension in his mother and another family member; Obesity in an other family member.    Allergies   Allergen Reactions    Sertraline Hcl Hives     Zoloft       Medication:  Current Facility-Administered Medications   Medication Dose Route Frequency Provider Last Rate Last Admin    acetaminophen (TYLENOL) tablet 650 mg  650 mg Oral Q4H PRN Dru Gan Jr., MD   650 mg at 12/03/24 1938    aspirin EC tablet 81 mg  81 mg Oral Daily Dru Gan Jr., MD   81 mg at 12/04/24 0820    sennosides-docusate (PERICOLACE) 8.6-50 MG per tablet 2 tablet  2 tablet Oral BID PRN Dru Gan Jr., MD        And    polyethylene glycol (MIRALAX) packet 17 g  17 g Oral Daily PRN Dru Gan Jr., MD        And    bisacodyl (DULCOLAX) EC tablet 5 mg  5 mg Oral Daily PRN Dru Gan Jr., MD        And    bisacodyl (DULCOLAX) suppository 10 mg  10 mg Rectal Daily PRN Dru Gan Jr., MD        calcium carbonate (TUMS) chewable tablet 500 mg (200 mg elemental)  2 tablet Oral TID PRN Dru Gan Jr., MD   2 tablet at 12/03/24 2041    Calcium Replacement - Follow Nurse / BPA Driven Protocol   Not Applicable PRN Dru Gan Jr., MD        dextrose (D50W) (25 g/50 mL) IV injection 25 g  25 g Intravenous Q15 Min PRN Dru Gan Jr., MD   25 g at 11/25/24 2124    dextrose (GLUTOSE) oral gel 15 g  15 g Oral Q15 Min PRN Dru Gan Jr., MD   15 g at 11/25/24 2039    Enoxaparin Sodium (LOVENOX) syringe 40 mg  40 mg Subcutaneous Daily Dru Gan Jr., MD   40 mg at  12/04/24 0819    finasteride (PROSCAR) tablet 5 mg  5 mg Oral Nightly Dru Gan Jr., MD   5 mg at 12/04/24 2124    glucagon (GLUCAGEN) injection 1 mg  1 mg Intramuscular Q15 Min PRN Dru Gan Jr., MD        influenza virus vacc split PF FLUZONE 0.5 mL  0.5 mL Intramuscular During Hospitalization Dru Gan Jr., MD        insulin glargine (LANTUS, SEMGLEE) injection 20 Units  20 Units Subcutaneous Nightly Mary Escalona PA-C   20 Units at 12/04/24 2123    Insulin Lispro (humaLOG) injection 2-9 Units  2-9 Units Subcutaneous 4x Daily AC & at Bedtime Dru Gan Jr., MD   6 Units at 12/04/24 2123    Insulin Lispro (humaLOG) injection 8 Units  8 Units Subcutaneous TID With Meals Mary Escalona PA-C   8 Units at 12/04/24 1711    ipratropium-albuterol (DUO-NEB) nebulizer solution 3 mL  3 mL Nebulization Q4H PRN Mary Escalona PA-C        Lidocaine HCl gel (XYLOCAINE) urethral/mucosal syringe   Urethral PRN Dru Gan Jr., MD   1 Application at 11/26/24 0333    Linezolid (ZYVOX) 600 mg 300 mL  600 mg Intravenous Q12H CARLA Caba  mL/hr at 12/04/24 2125 600 mg at 12/04/24 2125    Magnesium Standard Dose Replacement - Follow Nurse / BPA Driven Protocol   Not Applicable PRN Dru Gan Jr., MD        meropenem (MERREM) 1,000 mg in sodium chloride 0.9 % 100 mL MBP  1,000 mg Intravenous Q8H Varnu Dominique MD 0 mL/hr at 12/02/24 2248 1,000 mg at 12/05/24 0336    metoprolol succinate XL (TOPROL-XL) 24 hr tablet 50 mg  50 mg Oral Daily Dru Gan Jr., MD   50 mg at 12/04/24 0819    nitroglycerin (NITROSTAT) SL tablet 0.4 mg  0.4 mg Sublingual Q5 Min PRN Dru Gan Jr., MD        ondansetron (ZOFRAN) injection 4 mg  4 mg Intravenous Q6H PRN Dru Gan Jr., MD   4 mg at 11/24/24 2030    pantoprazole (PROTONIX) EC tablet 40 mg  40 mg Oral BID AC Enriqueta Muir, APRN   40 mg at 12/04/24 1711    Phosphorus Replacement - Follow Nurse /  BPA Driven Protocol   Not Applicable PRN Dru Gan Jr., MD        Potassium Replacement - Follow Nurse / BPA Driven Protocol   Not Applicable PRN Dru Gan Jr., MD        pregabalin (LYRICA) capsule 225 mg  225 mg Oral BID KathyosvaldoEnriqueta, APRJACOB   225 mg at 12/04/24 2124    rOPINIRole (REQUIP) tablet 0.25 mg  0.25 mg Oral Nightly Dru Gan Jr., MD   0.25 mg at 12/04/24 2124    sodium chloride 0.9 % flush 10 mL  10 mL Intravenous Q12H Dru Gan Jr., MD   10 mL at 12/04/24 2124    sodium chloride 0.9 % flush 10 mL  10 mL Intravenous PRN Dru Gan Jr., MD        sodium chloride 0.9 % infusion 40 mL  40 mL Intravenous PRN Dru Gan Jr., MD   40 mL at 11/28/24 1853    tamsulosin (FLOMAX) 24 hr capsule 0.8 mg  0.8 mg Oral Nightly Dru Gan Jr., MD   0.8 mg at 12/04/24 2124       Antibiotics:  Anti-Infectives (From admission, onward)      Ordered     Dose/Rate Route Frequency Start Stop    11/30/24 0849  Linezolid (ZYVOX) 600 mg 300 mL        Ordering Provider: CARLA Caba DO    600 mg  300 mL/hr over 60 Minutes Intravenous Every 12 Hours 11/30/24 1000 12/05/24 2159    11/26/24 0749  meropenem (MERREM) 1,000 mg in sodium chloride 0.9 % 100 mL MBP        Ordering Provider: Varun Dominique MD    1,000 mg  over 3 Hours Intravenous Every 8 Hours 11/26/24 1400 12/08/24 1129    11/26/24 0750  Linezolid (ZYVOX) 600 mg 300 mL        Ordering Provider: Varun Dominique MD    600 mg  300 mL/hr over 60 Minutes Intravenous Every 12 Hours 11/26/24 0900 11/29/24 0928    11/26/24 0749  meropenem (MERREM) 1,000 mg in sodium chloride 0.9 % 100 mL MBP        Ordering Provider: Varun Dominique MD    1,000 mg  over 30 Minutes Intravenous Once 11/26/24 0800 11/26/24 0903    11/26/24 0024  doxycycline (VIBRAMYCIN) 100 mg in sodium chloride 0.9 % 100 mL MBP        Ordering Provider: Enriqueta Muir APRN    100 mg  over 60 Minutes Intravenous Every 12 Hours 11/26/24 0101  "12/02/24 1438    11/22/24 1045  ceFAZolin 2000 mg IVPB in 100 mL NS (MBP)        Ordering Provider: Claudia Lim PA    2,000 mg  over 30 Minutes Intravenous Once 11/22/24 1047 11/22/24 1255              Review of Systems    12/5/24 per nursing also    Constitutional-- No Fever, chills or sweats.  Appetite diminished with fatigue.  Heent-- No new vision, hearing or throat complaints.  No epistaxis or oral sores.  Denies odynophagia or dysphagia.  No flashers, floaters or eye pain. No odynophagia or dysphagia. No headache, photophobia or neck stiffness.  CV-- No chest pain, palpitation or syncope  Resp-- see above  GI- No nausea, vomiting, or diarrhea.  No hematochezia, melena, or hematemesis. Denies jaundice or chronic liver disease.  -- No dysuria, hematuria, or flank pain.  Denies hesitancy, urgency or flank pain.  Lymph- no swollen lymph nodes in neck/axilla or groin.   Heme- No active bruising or bleeding; no Hx of DVT or PE.  MS-- no swelling or pain in the bones or joints of arms/legs.  No new back pain.  Neuro-- No acute focal weakness or numbness in the arms or legs.  No seizures.    Full 12 point review of systems reviewed and negative otherwise for acute complaints, except for above    Physical Exam:   Vital Signs   /80 (BP Location: Right arm, Patient Position: Lying)   Pulse 84   Temp 98 °F (36.7 °C) (Temporal)   Resp 17   Ht 162.6 cm (64.02\")   Wt 79.5 kg (175 lb 3.2 oz)   SpO2 93%   BMI 30.06 kg/m²     GENERAL: sleepy     HEENT: Normocephalic, atraumatic.   No conjunctival injection. No icterus. Oropharynx clear without evidence of thrush or exudate. No evidence of periodontal disease.    NECK: Supple without nuchal rigidity. No mass.   HEART: RRR; No murmur, rubs, gallops.   LUNGS: diminished at bases  ; No dullness.  ABDOMEN: Soft, nontender, nondistended. Positive bowel sounds. No rebound or guarding. NO mass or HSM.  EXT: see below   MSK: FROM without joint effusions noted " arms/legs.    SKIN: Warm and dry without cutaneous eruptions on Inspection/palpation.    NEURO: sleepy.    Right foot second toe amputation site noted, no redness/duration or warmth there.  No oral or active drainage    Left  LE surgical site no new visible redness/purulence.  No crepitus or bulla.         Laboratory Data    Results from last 7 days   Lab Units 12/03/24  0543 12/02/24  0725 12/01/24  1507   WBC 10*3/mm3 6.40 7.13 8.81   HEMOGLOBIN g/dL 8.6* 8.6* 9.9*   HEMATOCRIT % 28.0* 28.1* 32.7*   PLATELETS 10*3/mm3 398 471* 522*     Results from last 7 days   Lab Units 12/03/24  0543   SODIUM mmol/L 136   POTASSIUM mmol/L 4.9   CHLORIDE mmol/L 100   CO2 mmol/L 27.0   BUN mg/dL 19   CREATININE mg/dL 0.79   GLUCOSE mg/dL 268*   CALCIUM mg/dL 7.9*     Results from last 7 days   Lab Units 12/03/24  0543   ALK PHOS U/L 87   BILIRUBIN mg/dL <0.2   ALT (SGPT) U/L 11   AST (SGOT) U/L 12                 Estimated Creatinine Clearance: 93.5 mL/min (by C-G formula based on SCr of 0.79 mg/dL).      Microbiology:      Radiology:  Imaging Results (Last 72 Hours)       ** No results found for the last 72 hours. **              Impression:       --acute left heel wound infection ,  abnormal MRI as above raising concern for fluid extending into and disrupting the distal lateral Achilles tendon in addition to with evidence for osteomyelitis at the posterior lateral calcaneus in addition to possible nondisplaced stress fracture at the posterior calcaneus; High risk for persistent/recurrent or nonhealing wounds, persistent/progressive or recurrent infection and risk for further functional/limb loss, higher level amputation etc. Surgery 8/3;  left BKA on November 22. Empiric antibiotics for now    --hypoxia; empiric abx ; aspiration risk although no witnessed aspiration.  Viral panel negative.  No productive cough to send for microbiology. Subsequently better     --urinary retention per admission notes, some hematuria on November  15; further urologic evaluation regarding functional/anatomic assessment at discretion of medicine team with respect inpatient versus outpatient     --Diabetes, uncontrolled ; glucose control per medicine team     --diabetic neuropathy     --Chronic debility as per patient wheelchair bound     --Hx ESBL    PLAN:     --IV zyvox/merrem to stop soon;  anticipate off IV abx by discharge if steadily better and no new suppurative process    --s/p doxy    --Check/review labs cultures and scans    --Partial history Per nursing staff    --Discussed with microbiology    --Discussed with Dr Caba    --Highly complex at of issues with high risk for further serious morbidity and other serious sequela    --possible  [place] per case management notes     Copied text in this note has been reviewed and is accurate as of 12/05/24.     Varun Dominique MD  12/5/2024

## 2024-12-05 NOTE — PAYOR COMM NOTE
"Ref# [id number]   Discharge Summary    SANDRA Boyer, RN  Utilization Review  Phone [phone]  Fax [phone]    [address] [address]             Fracisco Patterson \"Bill\" (59 y.o. Male)       Date of Birth   1965    Social Security Number       Address   [address] [id number]    Home Phone   [phone]    MRN   [id number]       [employer]   None    Marital Status                               Admission Date   11/15/24    Admission Type   Emergency    Admitting Provider   Lisa Vallejo MD    Attending Provider       Department, Room/Bed   [facility] 5B, N546/1       Discharge Date   2024    Discharge Disposition   Skilled Nursing Facility (DC - External)    Discharge Destination                                 Attending Provider: (none)   Allergies: Sertraline Hcl    Isolation: Contact   Infection: ESBL Klebsiella (24)   Code Status: CPR    Ht: 162.6 cm (64.02\")   Wt: 79.5 kg (175 lb 3.2 oz)    Admission Cmt: None   Principal Problem: DKA (diabetic ketoacidosis) [E11.10]                   Active Insurance as of 11/15/2024       Primary Coverage       Payor Plan Insurance Group Employer/Plan Group    [employer] [place] [employer] [place]ICA[place]        Payor Plan Address Payor Plan Phone Number Payor Plan Fax Number Effective Dates    [address] [address] [phone]  4/10/2016 - None Entered    [place] [address]         Subscriber Name Subscriber Birth Date Member ID       FRACISCO PATTERSON 1965 [id number]                     Emergency Contacts        (Rel.) Home Phone Work Phone Mobile Phone    Yael Cosme (Partner) -- -- [phone]                 Discharge Summary        Mary Escalona PA-C at 24 0943                    [facility] Services  DISCHARGE SUMMARY    Patient Name: Fracisco Patterson  : 1965  MRN: [id number]    Date of Admission: " 11/15/2024  9:19 AM  Date of Discharge:  12/5/2024  Primary Care Physician: Brandy Roberson MD    Consults       Date and Time Order Name Status Description    11/19/2024  7:36 AM Inpatient Vascular Surgery Consult Completed     11/19/2024  7:06 AM Inpatient Infectious Diseases Consult Completed     11/19/2024  7:06 AM Inpatient Orthopedic Surgery Consult Completed           Hospital Course     Active Hospital Problems    Diagnosis  POA    **DKA (diabetic ketoacidosis) [E11.10]  Yes    Severe protein-calorie malnutrition [E43]  Yes    Non healing left heel wound [S91.302A]  Yes    Acute osteomyelitis of left foot [M86.172]  Unknown      Resolved Hospital Problems   No resolved problems to display.      Hospital Course:  Fracisco Mullen is a 59 y.o. male with PMH significant for HTN and poorly-controlled insulin-dependent DMII with diabetic peripheral neuropathy / chronic diabetic foot wounds. Brought to [facility] ED on 11/15/24 via EMS due to AMS and fatigue. Found to be in DKA and later found to have L calcaneal osteomyelitis. He is s/p L BKA by Dr. Gan on 11/22/24. On 11/25, he developed acute hypoxemic respiratory failure with concern for aspiration - antibiotics escalated. He developed urinary retention necessitating ledesma catheter placement.      Left calcaneal osteomyelitis   - Patient with chronic left heel wound and right plantar foot wound  - Admitted August 2024 for left foot cellulitis and discharged on oral antibiotics and with home wound vac following debridement, MRI left foot obtained at that time with no definite findings of osteomyelitis  - 11/18/24 MRI of L foot showed soft tissue wound with evidence of osteomyelitis of the calcaneus and possibly an associated non-drainable abscess  -He is s/p L BKA by Dr. Gan on 11/22/2024   - ID following. Has been on Linezolid, Merrem and Doxy (possible PNA coverage in addition to OM).   - Completed Doxycycline on 12/2.  Continued Linezolid / Merrem until DC. At SD, ID recommends to stop antibiotics and monitor  - PT/OT following - to [facility] today   - Follow up with Dr. Gan in 2 weeks     Acute hypoxia, resolved   - Likely due to pulmonary edema but concern for aspiration PNA so abx escalated as above  - Improved with diuresis and now stable on room air      BPH  Acute urinary retention  - Ledesma anchored on 11/25. Failed voiding trial on 11/26 so re-anchored.   - Continue Finasteride and Tamsulosin  - Exchange ledesma catheter Q30 days - due for exchange on 12/26/24. Please exchange at time of DC from [facility] if patient discharges before 12/26.   - Have referred to urology for continued outpatient monitoring / possible future voiding trial      DKA in setting of uncontrolled IDDM complicated by diabetic neuropathy and chronic foot wounds , improving  - Uncertain of medication compliance. Prescribed 70/30 insulin 60 units BID   - A1c 14.1%  - s/p DKA protocol including insulin drip, IV fluids, and electrolyte replacement per protocol  - Now on subcutaneous insulin. Due to hypoglycemia on 12/1, insulin regimen scaled back but developed hyperglycemia so titrating insulin again  - At SD, can resume home 70/30 insulin but start at 30 units BID  - If to remain on basal/bolus at [facility], recommend Lantus 30 units QHS, Lispro 10 units TID AC + SSI     Malnutrition  - RD following     Acute toxic metabolic encephalopathy, resolved  - CT head without acute abnormality  - UDS negative  - Suspect secondary to DKA     HTN  - Continue home Metoprolol     RLS  - Continue home Requip    Day of Discharge     HPI:   Sitting up in bed. Feeling well with no new complaints. Said he had multiple BMs overnight. Denies chest pain or dyspnea. No abdominal pain, nausea or vomiting     Review of Systems  Gen- No fevers, chills  CV- No chest pain, palpitations  Resp- No cough, dyspnea  GI- No N/V/D, abd pain    Vital Signs:   Temp:  [97.5 °F (36.4 °C)-98.2 °F (36.8  °C)] 98 °F (36.7 °C)  Heart Rate:  [83-88] 84  Resp:  [16-17] 17  BP: (111-151)/(61-92) 144/80    Physical Exam:  Constitutional: No acute distress, awake, alert and conversant. Chronically ill appearing. Sitting up in bed   HENT: NCAT, mucous membranes moist  Respiratory: Clear to auscultation bilaterally, respiratory effort normal on room air   Cardiovascular: RRR  Gastrointestinal: Positive bowel sounds, soft, nontender, nondistended  : Bustos catheter in place with clear, yellow urine in bag  Musculoskeletal: L BKA incision dressed - did not remove dressing for exam   Psychiatric: Appropriate affect, cooperative with exam  Neurologic: Oriented x 3, moves all extremities spontaneously without focal deficits, speech clear  Skin: No rashes to exposed surfaces.      Pertinent  and/or Most Recent Results     LAB RESULTS:      Lab 12/03/24  0543 12/02/24  0725 12/01/24  1507 11/30/24  0603 11/29/24  0641   WBC 6.40 7.13 8.81 8.36 7.13   HEMOGLOBIN 8.6* 8.6* 9.9* 8.2* 8.0*   HEMATOCRIT 28.0* 28.1* 32.7* 26.6* 26.1*   PLATELETS 398 471* 522* 419 437   NEUTROS ABS 3.02 4.45 3.92  --  3.73   IMMATURE GRANS (ABS) 0.04 0.06* 0.06*  --  0.07*   LYMPHS ABS 2.11 1.56 3.10  --  2.14   MONOS ABS 0.71 0.65 1.01*  --  0.59   EOS ABS 0.44* 0.36 0.62*  --  0.53*   MCV 82.6 82.9 83.8 83.9 84.2         Lab 12/03/24  0543 12/02/24  0725 12/01/24  1507 11/30/24  0603 11/29/24  1838 11/29/24  0641   SODIUM 136 132* 138 131*  --  136   POTASSIUM 4.9 4.8 4.3 4.9  --  4.8   CHLORIDE 100 96* 97* 99  --  103   CO2 27.0 28.0 31.0* 25.0  --  25.0   ANION GAP 9.0 8.0 10.0 7.0  --  8.0   BUN 19 16 14 12  --  10   CREATININE 0.79 0.88 0.96 0.77  --  0.83   EGFR 101.1 97.8 89.9 101.9  --  99.6   GLUCOSE 268* 255* 41* 239*  --  230*   CALCIUM 7.9* 8.4* 9.0 8.1*  --  7.8*   MAGNESIUM 1.7 1.6 1.8  --   --   --    PHOSPHORUS  --   --   --   --  2.6 2.1*         Lab 12/03/24  0543 12/02/24  0725 12/01/24  1507 11/29/24  0641   TOTAL PROTEIN 4.9* 5.2*  5.6*  --    ALBUMIN 2.6* 2.7* 2.9* 2.4*   GLOBULIN 2.3 2.5 2.7  --    ALT (SGPT) 11 13 16  --    AST (SGOT) 12 13 18  --    BILIRUBIN <0.2 <0.2 0.2  --    ALK PHOS 87 102 94  --      Brief Urine Lab Results  (Last result in the past 365 days)        Color   Clarity   Blood   Leuk Est   Nitrite   Protein   CREAT   Urine HCG        11/15/24 1142 Yellow   Clear   Small (1+)   Negative   Negative   100 mg/dL (2+)                 Microbiology Results (last 10 days)       Procedure Component Value - Date/Time    MRSA Screen, PCR (Inpatient) - Swab, Nares [[id number]]  (Normal) Collected: 11/26/24 0832    Lab Status: Final result Specimen: Swab from Nares Updated: 11/26/24 1009     MRSA PCR Negative    Narrative:      The negative predictive value of this diagnostic test is high and should only be used to consider de-escalating anti-MRSA therapy. A positive result may indicate colonization with MRSA and must be correlated clinically.  MRSA Negative    Respiratory Panel PCR w/COVID-19(SARS-CoV-2) ALBINA/LION/VALERI/PAD/COR/MATHEW In-House, NP Swab in UTM/VTM, 2 HR TAT - Swab, Nasopharynx [[id number]]  (Normal) Collected: 11/26/24 0158    Lab Status: Final result Specimen: Swab from Nasopharynx Updated: 11/26/24 0250     ADENOVIRUS, PCR Not Detected     Coronavirus 229E Not Detected     Coronavirus HKU1 Not Detected     Coronavirus NL63 Not Detected     Coronavirus OC43 Not Detected     COVID19 Not Detected     Human Metapneumovirus Not Detected     Human Rhinovirus/Enterovirus Not Detected     Influenza A PCR Not Detected     Influenza B PCR Not Detected     Parainfluenza Virus 1 Not Detected     Parainfluenza Virus 2 Not Detected     Parainfluenza Virus 3 Not Detected     Parainfluenza Virus 4 Not Detected     RSV, PCR Not Detected     Bordetella pertussis pcr Not Detected     Bordetella parapertussis PCR Not Detected     Chlamydophila pneumoniae PCR Not Detected     Mycoplasma pneumo by PCR Not Detected    Narrative:      In the  setting of a positive respiratory panel with a viral infection PLUS a negative procalcitonin without other underlying concern for bacterial infection, consider observing off antibiotics or discontinuation of antibiotics and continue supportive care. If the respiratory panel is positive for atypical bacterial infection (Bordetella pertussis, Chlamydophila pneumoniae, or Mycoplasma pneumoniae), consider antibiotic de-escalation to target atypical bacterial infection.    Blood Culture - Blood, Hand, Left [[id number]]  (Normal) Collected: 11/25/24 2127    Lab Status: Final result Specimen: Blood from Hand, Left Updated: 11/30/24 2215     Blood Culture No growth at 5 days    Blood Culture - Blood, Arm, Right [[id number]]  (Normal) Collected: 11/25/24 2125    Lab Status: Final result Specimen: Blood from Arm, Right Updated: 11/30/24 2215     Blood Culture No growth at 5 days          CT Head Without Contrast    Result Date: 11/25/2024  CT HEAD WO CONTRAST Date of Exam: 11/25/2024 11:20 PM EST Indication: decreased LOC. Comparison: CT scan of the head dated November 15, 2024 Technique: Axial CT images were obtained of the head without contrast administration.  Automated exposure control and iterative construction methods were used. Findings: There is mild diffuse generalized atrophy. There are low-attenuation areas in the periventricular white matter consistent with chronic microvascular ischemic change. There is no mass, mass effect or midline shift. There are no abnormal extra-axial fluid collections or areas of acute hemorrhage. The paranasal sinuses are clear. The mastoid air cells are clear.     Impression: Atrophy and chronic microvascular ischemic change. No acute intracranial process. Electronically Signed: Horacio Bailey MD  11/25/2024 11:49 PM EST  Workstation ID: [id number]    CT Angiogram Chest    Result Date: 11/25/2024  CT ANGIOGRAM CHEST Date of Exam: 11/25/2024 11:20 PM EST Indication: elevated d dimer, hypoxia.  Comparison: None available. Technique: CTA of the chest was performed after the uneventful intravenous administration of 80 mL Isovue-370. Reconstructed coronal and sagittal images were also obtained. In addition, a 3-D volume rendered image was created for interpretation. Automated exposure control and iterative reconstruction methods were used. Findings: Pulmonary arteries: Adequate opacification of the pulmonary arteries. No evidence of acute pulmonary embolism. Lungs and Pleura: There are small bilateral pleural effusions. There is bilateral basilar atelectasis. There is mild right middle lobe and posterior left upper lobe atelectasis. Mediastinum/Clare: No mediastinal or hilar lymphadenopathy. Lymph nodes: No axillary or supraclavicular adenopathy. Cardiovascular: The cardiac chambers are within normal limits. The pericardium is normal. The aorta and its arch branch vessels are unremarkable. There is mild coronary artery calcific atherosclerosis.   Upper Abdomen: The stomach is distended with a large amount of fluid and food stuff. There is fluid in the esophagus to the mid esophagus consistent with reflux. There are clips from cholecystectomy. Bones and Soft Tissue: No suspicious osseous lesion.     Impression: 1.No evidence of pulmonary embolism. 2.Small bilateral pleural effusions with bilateral basilar atelectasis. 3.Fluid in the esophagus consistent with reflux. Electronically Signed: Horacio Bailey MD  11/25/2024 11:47 PM EST  Workstation ID: [id number]    XR Chest 1 View    Result Date: 11/25/2024  XR CHEST 1 VW Date of Exam: 11/25/2024 8:06 PM EST Indication: increased oxygen demand Comparison: 11/15/2024 Findings: Cardiomediastinal silhouette is unremarkable. There is left lower lobe airspace disease with small to moderate left effusion. Correlate for signs of pneumonia. No pneumothorax. Right lung is relatively clear. No acute osseous abnormality identified.     Impression: Left lower lobe airspace disease  with small to moderate left effusion. Correlate for signs of pneumonia. Electronically Signed: Nic Fonseca MD  11/25/2024 8:52 PM EST  Workstation ID: [id number]     Results for orders placed during the hospital encounter of 11/15/24    Doppler Arterial Multi Level Lower Extremity - Bilateral CAR    Interpretation Summary    Right Conclusion: The right ANNY is normal. Unable to assess digital insufficiency.    Left Conclusion: The left ANNY is normal. Normal digital pressures.    Discharge Details        Discharge Medications        New Medications        Instructions Start Date   acetaminophen 325 MG tablet  Commonly known as: TYLENOL   650 mg, Oral, Every 4 Hours PRN      calcium carbonate 500 MG chewable tablet  Commonly known as: TUMS   2 tablets, Oral, 3 Times Daily PRN      finasteride 5 MG tablet  Commonly known as: PROSCAR   5 mg, Oral, Nightly             Changes to Medications        Instructions Start Date   fluticasone 50 MCG/ACT nasal spray  Commonly known as: Flonase  What changed:   when to take this  reasons to take this   2 sprays, Nasal, 2 Times Daily PRN      NovoLOG Mix 70/30 FlexPen (70-30) 100 UNIT/ML suspension pen-injector injection  Generic drug: insulin aspart prot & aspart  What changed: how much to take   30 Units, Subcutaneous, 2 Times Daily Before Meals      omeprazole 40 MG capsule  Commonly known as: priLOSEC  What changed: when to take this   40 mg, Oral, Every Morning Before Breakfast      tamsulosin 0.4 MG capsule 24 hr capsule  Commonly known as: FLOMAX  What changed: how much to take   0.8 mg, Oral, Nightly             Continue These Medications        Instructions Start Date   aspirin 81 MG EC tablet   81 mg, Oral, Daily      B-D UF III MINI PEN NEEDLES 31G X 5 MM misc  Generic drug: Insulin Pen Needle   Use as directed 2 times daily      B-D UF III MINI PEN NEEDLES 31G X 5 MM misc  Generic drug: Insulin Pen Needle   Use 1 pen needle as directed 2 (Two) Times a Day.       cyclobenzaprine 5 MG tablet  Commonly known as: FLEXERIL   5 mg, 3 Times Daily PRN      DULoxetine 60 MG capsule  Commonly known as: CYMBALTA   60 mg, Daily      levocetirizine 5 MG tablet  Commonly known as: XYZAL   1 tablet, Daily      metoprolol succinate XL 50 MG 24 hr tablet  Commonly known as: TOPROL-XL   50 mg, Oral, Daily      OneTouch Delica Plus [id number] misc   Use as directed to test blood sugar 3 (Three) Times a Day.      OneTouch Verio Flex System w/Device kit   Use as directed to test blood sugar 3 (Three) Times a Day.      OneTouch Verio test strip  Generic drug: glucose blood   Use as directed to test blood sugar 3 times daily      pregabalin 225 MG capsule  Commonly known as: Lyrica   225 mg, Oral, 2 Times Daily      rOPINIRole 0.25 MG tablet  Commonly known as: REQUIP   Take 1 tablet by mouth Every Night, 1 hour before bedtime.      simvastatin 20 MG tablet  Commonly known as: ZOCOR   1 tablet, Oral, Daily             Stop These Medications      albuterol sulfate  (90 Base) MCG/ACT inhaler  Commonly known as: PROVENTIL HFA;VENTOLIN HFA;PROAIR HFA     amitriptyline 25 MG tablet  Commonly known as: ELAVIL     docusate sodium 100 MG capsule  Commonly known as: COLACE     metFORMIN 1000 MG tablet  Commonly known as: GLUCOPHAGE     naloxone 4 MG/0.1ML nasal spray  Commonly known as: NARCAN     oxyCODONE-acetaminophen 5-325 MG per tablet  Commonly known as: PERCOCET     Vitamin D3 50 MCG (2000 UT) capsule            Allergies   Allergen Reactions    Sertraline Hcl Hives     Zoloft     Discharge Disposition:Skilled Nursing Facility (DC - External)    Diet:  Diet Instructions       Diet: Regular/House Diet, Diabetic Diets; Consistent Carbohydrate; Regular (IDDSI 7); Thin (IDDSI 0)      Discharge Diet:  Regular/House Diet  Diabetic Diets       Diabetic Diet: Consistent Carbohydrate    Texture: Regular (IDDSI 7)    Fluid Consistency: Thin (IDDSI 0)          Activity:  Activity Instructions       Up  WIth Assist      NWB on LLE            CODE STATUS:    Code Status and Medical Interventions: CPR (Attempt to Resuscitate); Full Support; Full code per last hosptial admission. Patient disoriented on exam with no family present at bedside. Only contact information is friend.   Ordered at: 11/15/24 1218     Level Of Support Discussed With:    Patient     Code Status (Patient has no pulse and is not breathing):    CPR (Attempt to Resuscitate)     Medical Interventions (Patient has pulse or is breathing):    Full Support     Comments:    Full code per last hosptial admission. Patient disoriented on exam with no family present at bedside. Only contact information is friend.     No future appointments.    Additional Instructions for the Follow-ups that You Need to Schedule       Discharge Follow-up with Specified Provider: Dru Gan or KENNY (ortho) in 2 weeks   As directed      To: Dru giron PA-C (ortho) in 2 weeks              Mary Escalona PA-C  12/05/24    Time Spent on Discharge:  I spent 40 minutes on this discharge activity which included: face-to-face encounter with the patient, reviewing the data in the system, coordination of the care with the nursing staff as well as consultants, documentation, and entering orders.            Electronically signed by Mary Escalona PA-C at 12/05/24 1045       Discharge Order (From admission, onward)       Start     Ordered    12/05/24 1044  Discharge patient  Once        Expected Discharge Date: 12/05/24   Expected Discharge Time: Afternoon   Discharge Disposition: Skilled Nursing Facility (DC - External)   Physician of Record for Attribution - Please select from Treatment Team: CARLA ABREU [[id number]]   Review needed by CMO to determine Physician of Record: No      Question Answer Comment   Physician of Record for Attribution - Please select from Treatment Team CARLA ABREU    Review needed by CMO to determine Physician of Record No         12/05/24 1043

## 2024-12-06 LAB
FUNGUS WND CULT: NORMAL
MYCOBACTERIUM SPEC CULT: NORMAL
NIGHT BLUE STAIN TISS: NORMAL

## 2024-12-13 LAB
FUNGUS WND CULT: NORMAL
MYCOBACTERIUM SPEC CULT: NORMAL
NIGHT BLUE STAIN TISS: NORMAL

## 2024-12-20 LAB
FUNGUS WND CULT: NORMAL
MYCOBACTERIUM SPEC CULT: NORMAL
NIGHT BLUE STAIN TISS: NORMAL

## 2024-12-27 LAB
FUNGUS WND CULT: NORMAL
MYCOBACTERIUM SPEC CULT: NORMAL
NIGHT BLUE STAIN TISS: NORMAL

## 2025-01-03 LAB
FUNGUS WND CULT: NORMAL
MYCOBACTERIUM SPEC CULT: NORMAL
NIGHT BLUE STAIN TISS: NORMAL

## 2025-06-24 ENCOUNTER — HOSPITAL ENCOUNTER (INPATIENT)
Facility: HOSPITAL | Age: 60
LOS: 9 days | Discharge: NURSING, REHAB OR LONG-TERM CARE | End: 2025-07-04
Attending: EMERGENCY MEDICINE | Admitting: FAMILY MEDICINE
Payer: COMMERCIAL

## 2025-06-24 ENCOUNTER — APPOINTMENT (OUTPATIENT)
Dept: GENERAL RADIOLOGY | Facility: HOSPITAL | Age: 60
End: 2025-06-24
Payer: COMMERCIAL

## 2025-06-24 DIAGNOSIS — R65.20 SEVERE SEPSIS: ICD-10-CM

## 2025-06-24 DIAGNOSIS — Z89.512 HX OF LEFT BKA: ICD-10-CM

## 2025-06-24 DIAGNOSIS — A41.9 SEVERE SEPSIS: ICD-10-CM

## 2025-06-24 DIAGNOSIS — D72.825 BANDEMIA: ICD-10-CM

## 2025-06-24 DIAGNOSIS — E13.11 DIABETIC KETOACIDOSIS WITH COMA ASSOCIATED WITH OTHER SPECIFIED DIABETES MELLITUS: Primary | ICD-10-CM

## 2025-06-24 DIAGNOSIS — R46.0 POOR PERSONAL HYGIENE: ICD-10-CM

## 2025-06-24 DIAGNOSIS — R50.9 FEBRILE ILLNESS: ICD-10-CM

## 2025-06-24 DIAGNOSIS — E87.1 HYPONATREMIA: ICD-10-CM

## 2025-06-24 LAB
ARTERIAL PATENCY WRIST A: POSITIVE
ATMOSPHERIC PRESS: ABNORMAL MM[HG]
BASE EXCESS BLDA CALC-SCNC: -12.5 MMOL/L (ref 0–2)
BDY SITE: ABNORMAL
BODY TEMPERATURE: 37
CO2 BLDA-SCNC: 10.5 MMOL/L (ref 22–33)
COHGB MFR BLD: 1.5 % (ref 0–2)
EPAP: 0
HCO3 BLDA-SCNC: 10 MMOL/L (ref 20–26)
HCT VFR BLD CALC: 35.4 % (ref 38–51)
HGB BLDA-MCNC: 11.5 G/DL (ref 13.5–17.5)
INHALED O2 CONCENTRATION: 28 %
IPAP: 0
Lab: ABNORMAL
METHGB BLD QL: 0.2 % (ref 0–1.5)
MODALITY: ABNORMAL
NOTIFIED BY: ABNORMAL
NOTIFIED WHO: ABNORMAL
OXYHGB MFR BLDV: 95.2 % (ref 94–99)
PAW @ PEAK INSP FLOW SETTING VENT: 0 CMH2O
PCO2 BLDA: 16.4 MM HG (ref 35–45)
PCO2 TEMP ADJ BLD: 16.4 MM HG (ref 35–48)
PH BLDA: 7.39 PH UNITS (ref 7.35–7.45)
PH, TEMP CORRECTED: 7.39 PH UNITS
PO2 BLDA: 77.6 MM HG (ref 83–108)
PO2 TEMP ADJ BLD: 77.6 MM HG (ref 83–108)
TOTAL RATE: 0 BREATHS/MINUTE

## 2025-06-24 PROCEDURE — 84145 PROCALCITONIN (PCT): CPT | Performed by: EMERGENCY MEDICINE

## 2025-06-24 PROCEDURE — 83605 ASSAY OF LACTIC ACID: CPT | Performed by: EMERGENCY MEDICINE

## 2025-06-24 PROCEDURE — 36600 WITHDRAWAL OF ARTERIAL BLOOD: CPT

## 2025-06-24 PROCEDURE — 82805 BLOOD GASES W/O2 SATURATION: CPT

## 2025-06-24 PROCEDURE — 83036 HEMOGLOBIN GLYCOSYLATED A1C: CPT | Performed by: EMERGENCY MEDICINE

## 2025-06-24 PROCEDURE — 36415 COLL VENOUS BLD VENIPUNCTURE: CPT

## 2025-06-24 PROCEDURE — 87147 CULTURE TYPE IMMUNOLOGIC: CPT | Performed by: EMERGENCY MEDICINE

## 2025-06-24 PROCEDURE — 82375 ASSAY CARBOXYHB QUANT: CPT

## 2025-06-24 PROCEDURE — 80050 GENERAL HEALTH PANEL: CPT | Performed by: EMERGENCY MEDICINE

## 2025-06-24 PROCEDURE — 87186 SC STD MICRODIL/AGAR DIL: CPT | Performed by: EMERGENCY MEDICINE

## 2025-06-24 PROCEDURE — 87040 BLOOD CULTURE FOR BACTERIA: CPT | Performed by: EMERGENCY MEDICINE

## 2025-06-24 PROCEDURE — 93005 ELECTROCARDIOGRAM TRACING: CPT | Performed by: EMERGENCY MEDICINE

## 2025-06-24 PROCEDURE — 83735 ASSAY OF MAGNESIUM: CPT | Performed by: EMERGENCY MEDICINE

## 2025-06-24 PROCEDURE — 83930 ASSAY OF BLOOD OSMOLALITY: CPT | Performed by: EMERGENCY MEDICINE

## 2025-06-24 PROCEDURE — 84100 ASSAY OF PHOSPHORUS: CPT | Performed by: EMERGENCY MEDICINE

## 2025-06-24 PROCEDURE — 83050 HGB METHEMOGLOBIN QUAN: CPT

## 2025-06-24 PROCEDURE — 82077 ASSAY SPEC XCP UR&BREATH IA: CPT | Performed by: EMERGENCY MEDICINE

## 2025-06-24 PROCEDURE — 87077 CULTURE AEROBIC IDENTIFY: CPT | Performed by: EMERGENCY MEDICINE

## 2025-06-24 PROCEDURE — 83880 ASSAY OF NATRIURETIC PEPTIDE: CPT | Performed by: EMERGENCY MEDICINE

## 2025-06-24 PROCEDURE — 87154 CUL TYP ID BLD PTHGN 6+ TRGT: CPT | Performed by: EMERGENCY MEDICINE

## 2025-06-24 PROCEDURE — 99291 CRITICAL CARE FIRST HOUR: CPT

## 2025-06-24 PROCEDURE — 82010 KETONE BODYS QUAN: CPT | Performed by: EMERGENCY MEDICINE

## 2025-06-24 PROCEDURE — 71045 X-RAY EXAM CHEST 1 VIEW: CPT

## 2025-06-24 PROCEDURE — 84484 ASSAY OF TROPONIN QUANT: CPT | Performed by: EMERGENCY MEDICINE

## 2025-06-24 RX ORDER — SODIUM CHLORIDE 0.9 % (FLUSH) 0.9 %
10 SYRINGE (ML) INJECTION AS NEEDED
Status: DISCONTINUED | OUTPATIENT
Start: 2025-06-24 | End: 2025-07-04 | Stop reason: HOSPADM

## 2025-06-25 ENCOUNTER — APPOINTMENT (OUTPATIENT)
Dept: CT IMAGING | Facility: HOSPITAL | Age: 60
End: 2025-06-25
Payer: COMMERCIAL

## 2025-06-25 PROBLEM — E11.10 DKA (DIABETIC KETOACIDOSIS): Status: ACTIVE | Noted: 2025-06-25

## 2025-06-25 LAB
ALBUMIN SERPL-MCNC: 2.8 G/DL (ref 3.5–5.2)
ALBUMIN/GLOB SERPL: 1 G/DL
ALP SERPL-CCNC: 73 U/L (ref 39–117)
ALT SERPL W P-5'-P-CCNC: <5 U/L (ref 1–41)
AMPHET+METHAMPHET UR QL: NEGATIVE
AMPHETAMINES UR QL: NEGATIVE
ANION GAP SERPL CALCULATED.3IONS-SCNC: 10.1 MMOL/L (ref 5–15)
ANION GAP SERPL CALCULATED.3IONS-SCNC: 11 MMOL/L (ref 5–15)
ANION GAP SERPL CALCULATED.3IONS-SCNC: 12 MMOL/L (ref 5–15)
ANION GAP SERPL CALCULATED.3IONS-SCNC: 22.9 MMOL/L (ref 5–15)
ANION GAP SERPL CALCULATED.3IONS-SCNC: 27.4 MMOL/L (ref 5–15)
AST SERPL-CCNC: 12 U/L (ref 1–40)
B PARAPERT DNA SPEC QL NAA+PROBE: NOT DETECTED
B PERT DNA SPEC QL NAA+PROBE: NOT DETECTED
B-OH-BUTYR SERPL-SCNC: 6.46 MMOL/L (ref 0.02–0.27)
B-OH-BUTYR SERPL-SCNC: >9 MMOL/L (ref 0.02–0.27)
BACTERIA BLD CULT: ABNORMAL
BACTERIA ID TEST ISLT QL CULT: ABNORMAL
BACTERIA UR QL AUTO: ABNORMAL /HPF
BARBITURATES UR QL SCN: NEGATIVE
BASOPHILS # BLD AUTO: 0.09 10*3/MM3 (ref 0–0.2)
BASOPHILS NFR BLD AUTO: 0.6 % (ref 0–1.5)
BENZODIAZ UR QL SCN: NEGATIVE
BILIRUB SERPL-MCNC: 0.4 MG/DL (ref 0–1.2)
BILIRUB UR QL STRIP: NEGATIVE
BOTTLE TYPE: ABNORMAL
BUN SERPL-MCNC: 13.4 MG/DL (ref 8–23)
BUN SERPL-MCNC: 13.8 MG/DL (ref 8–23)
BUN SERPL-MCNC: 14.2 MG/DL (ref 8–23)
BUN SERPL-MCNC: 14.6 MG/DL (ref 8–23)
BUN SERPL-MCNC: 15.1 MG/DL (ref 8–23)
BUN/CREAT SERPL: 12.7 (ref 7–25)
BUN/CREAT SERPL: 13.9 (ref 7–25)
BUN/CREAT SERPL: 18.4 (ref 7–25)
BUN/CREAT SERPL: 19 (ref 7–25)
BUN/CREAT SERPL: 23.1 (ref 7–25)
BUPRENORPHINE SERPL-MCNC: NEGATIVE NG/ML
C PNEUM DNA NPH QL NAA+NON-PROBE: NOT DETECTED
CALCIUM SPEC-SCNC: 7.2 MG/DL (ref 8.6–10.5)
CALCIUM SPEC-SCNC: 7.4 MG/DL (ref 8.6–10.5)
CALCIUM SPEC-SCNC: 7.5 MG/DL (ref 8.6–10.5)
CALCIUM SPEC-SCNC: 7.7 MG/DL (ref 8.6–10.5)
CALCIUM SPEC-SCNC: 7.7 MG/DL (ref 8.6–10.5)
CANNABINOIDS SERPL QL: NEGATIVE
CHLORIDE SERPL-SCNC: 101 MMOL/L (ref 98–107)
CHLORIDE SERPL-SCNC: 104 MMOL/L (ref 98–107)
CHLORIDE SERPL-SCNC: 87 MMOL/L (ref 98–107)
CHLORIDE SERPL-SCNC: 92 MMOL/L (ref 98–107)
CHLORIDE SERPL-SCNC: 98 MMOL/L (ref 98–107)
CLARITY UR: ABNORMAL
CO2 SERPL-SCNC: 10.6 MMOL/L (ref 22–29)
CO2 SERPL-SCNC: 11.1 MMOL/L (ref 22–29)
CO2 SERPL-SCNC: 19.9 MMOL/L (ref 22–29)
CO2 SERPL-SCNC: 20 MMOL/L (ref 22–29)
CO2 SERPL-SCNC: 20 MMOL/L (ref 22–29)
COCAINE UR QL: POSITIVE
COLOR UR: YELLOW
CREAT SERPL-MCNC: 0.58 MG/DL (ref 0.76–1.27)
CREAT SERPL-MCNC: 0.77 MG/DL (ref 0.76–1.27)
CREAT SERPL-MCNC: 0.77 MG/DL (ref 0.76–1.27)
CREAT SERPL-MCNC: 1.09 MG/DL (ref 0.76–1.27)
CREAT SERPL-MCNC: 1.09 MG/DL (ref 0.76–1.27)
D-LACTATE SERPL-SCNC: 2.3 MMOL/L (ref 0.5–2)
D-LACTATE SERPL-SCNC: 2.4 MMOL/L (ref 0.5–2)
D-LACTATE SERPL-SCNC: 2.5 MMOL/L (ref 0.5–2)
D-LACTATE SERPL-SCNC: 2.6 MMOL/L (ref 0.5–2)
DEPRECATED RDW RBC AUTO: 41.5 FL (ref 37–54)
EGFRCR SERPLBLD CKD-EPI 2021: 101.9 ML/MIN/1.73
EGFRCR SERPLBLD CKD-EPI 2021: 101.9 ML/MIN/1.73
EGFRCR SERPLBLD CKD-EPI 2021: 111 ML/MIN/1.73
EGFRCR SERPLBLD CKD-EPI 2021: 77.2 ML/MIN/1.73
EGFRCR SERPLBLD CKD-EPI 2021: 77.2 ML/MIN/1.73
EOSINOPHIL # BLD AUTO: 0.01 10*3/MM3 (ref 0–0.4)
EOSINOPHIL NFR BLD AUTO: 0.1 % (ref 0.3–6.2)
ERYTHROCYTE [DISTWIDTH] IN BLOOD BY AUTOMATED COUNT: 14.1 % (ref 12.3–15.4)
ETHANOL BLD-MCNC: <10 MG/DL (ref 0–10)
FENTANYL UR-MCNC: NEGATIVE NG/ML
FLUAV RNA RESP QL NAA+PROBE: NOT DETECTED
FLUAV SUBTYP SPEC NAA+PROBE: NOT DETECTED
FLUBV RNA ISLT QL NAA+PROBE: NOT DETECTED
FLUBV RNA RESP QL NAA+PROBE: NOT DETECTED
GEN 5 1HR TROPONIN T REFLEX: 39 NG/L
GLOBULIN UR ELPH-MCNC: 2.7 GM/DL
GLUCOSE BLDC GLUCOMTR-MCNC: 158 MG/DL (ref 70–130)
GLUCOSE BLDC GLUCOMTR-MCNC: 179 MG/DL (ref 70–130)
GLUCOSE BLDC GLUCOMTR-MCNC: 203 MG/DL (ref 70–130)
GLUCOSE BLDC GLUCOMTR-MCNC: 210 MG/DL (ref 70–130)
GLUCOSE BLDC GLUCOMTR-MCNC: 221 MG/DL (ref 70–130)
GLUCOSE BLDC GLUCOMTR-MCNC: 229 MG/DL (ref 70–130)
GLUCOSE BLDC GLUCOMTR-MCNC: 231 MG/DL (ref 70–130)
GLUCOSE BLDC GLUCOMTR-MCNC: 244 MG/DL (ref 70–130)
GLUCOSE BLDC GLUCOMTR-MCNC: 247 MG/DL (ref 70–130)
GLUCOSE BLDC GLUCOMTR-MCNC: 248 MG/DL (ref 70–130)
GLUCOSE BLDC GLUCOMTR-MCNC: 264 MG/DL (ref 70–130)
GLUCOSE BLDC GLUCOMTR-MCNC: 302 MG/DL (ref 70–130)
GLUCOSE BLDC GLUCOMTR-MCNC: 309 MG/DL (ref 70–130)
GLUCOSE BLDC GLUCOMTR-MCNC: 345 MG/DL (ref 70–130)
GLUCOSE BLDC GLUCOMTR-MCNC: 403 MG/DL (ref 70–130)
GLUCOSE BLDC GLUCOMTR-MCNC: 430 MG/DL (ref 70–130)
GLUCOSE BLDC GLUCOMTR-MCNC: 458 MG/DL (ref 70–130)
GLUCOSE BLDC GLUCOMTR-MCNC: 557 MG/DL (ref 70–130)
GLUCOSE BLDC GLUCOMTR-MCNC: 82 MG/DL (ref 70–130)
GLUCOSE BLDC GLUCOMTR-MCNC: >599 MG/DL (ref 70–130)
GLUCOSE SERPL-MCNC: 258 MG/DL (ref 65–99)
GLUCOSE SERPL-MCNC: 276 MG/DL (ref 65–99)
GLUCOSE SERPL-MCNC: 409 MG/DL (ref 65–99)
GLUCOSE SERPL-MCNC: 456 MG/DL (ref 65–99)
GLUCOSE SERPL-MCNC: 97 MG/DL (ref 65–99)
GLUCOSE UR STRIP-MCNC: ABNORMAL MG/DL
HADV DNA SPEC NAA+PROBE: NOT DETECTED
HBA1C MFR BLD: 14.2 % (ref 4.8–5.6)
HCOV 229E RNA SPEC QL NAA+PROBE: NOT DETECTED
HCOV HKU1 RNA SPEC QL NAA+PROBE: NOT DETECTED
HCOV NL63 RNA SPEC QL NAA+PROBE: NOT DETECTED
HCOV OC43 RNA SPEC QL NAA+PROBE: NOT DETECTED
HCT VFR BLD AUTO: 35.1 % (ref 37.5–51)
HGB BLD-MCNC: 11.2 G/DL (ref 13–17.7)
HGB UR QL STRIP.AUTO: ABNORMAL
HMPV RNA NPH QL NAA+NON-PROBE: NOT DETECTED
HOLD SPECIMEN: NORMAL
HPIV1 RNA ISLT QL NAA+PROBE: NOT DETECTED
HPIV2 RNA SPEC QL NAA+PROBE: NOT DETECTED
HPIV3 RNA NPH QL NAA+PROBE: NOT DETECTED
HPIV4 P GENE NPH QL NAA+PROBE: NOT DETECTED
HYALINE CASTS UR QL AUTO: ABNORMAL /LPF
IMM GRANULOCYTES # BLD AUTO: 0.25 10*3/MM3 (ref 0–0.05)
IMM GRANULOCYTES NFR BLD AUTO: 1.6 % (ref 0–0.5)
KETONES UR QL STRIP: ABNORMAL
LEUKOCYTE ESTERASE UR QL STRIP.AUTO: ABNORMAL
LYMPHOCYTES # BLD AUTO: 0.58 10*3/MM3 (ref 0.7–3.1)
LYMPHOCYTES NFR BLD AUTO: 3.7 % (ref 19.6–45.3)
M PNEUMO IGG SER IA-ACNC: NOT DETECTED
MAGNESIUM SERPL-MCNC: 1.7 MG/DL (ref 1.6–2.4)
MAGNESIUM SERPL-MCNC: 1.8 MG/DL (ref 1.6–2.4)
MAGNESIUM SERPL-MCNC: 1.9 MG/DL (ref 1.6–2.4)
MCH RBC QN AUTO: 25.7 PG (ref 26.6–33)
MCHC RBC AUTO-ENTMCNC: 31.9 G/DL (ref 31.5–35.7)
MCV RBC AUTO: 80.7 FL (ref 79–97)
METHADONE UR QL SCN: NEGATIVE
MONOCYTES # BLD AUTO: 0.37 10*3/MM3 (ref 0.1–0.9)
MONOCYTES NFR BLD AUTO: 2.4 % (ref 5–12)
MRSA DNA SPEC QL NAA+PROBE: NEGATIVE
NEUTROPHILS NFR BLD AUTO: 14.25 10*3/MM3 (ref 1.7–7)
NEUTROPHILS NFR BLD AUTO: 91.6 % (ref 42.7–76)
NITRITE UR QL STRIP: NEGATIVE
NRBC BLD AUTO-RTO: 0 /100 WBC (ref 0–0.2)
NT-PROBNP SERPL-MCNC: 314 PG/ML (ref 0–900)
OPIATES UR QL: NEGATIVE
OSMOLALITY SERPL: 292 MOSM/KG (ref 275–295)
OXYCODONE UR QL SCN: NEGATIVE
PCP UR QL SCN: NEGATIVE
PH UR STRIP.AUTO: <=5 [PH] (ref 5–8)
PHOSPHATE SERPL-MCNC: 1 MG/DL (ref 2.5–4.5)
PHOSPHATE SERPL-MCNC: 1.6 MG/DL (ref 2.5–4.5)
PHOSPHATE SERPL-MCNC: 2.4 MG/DL (ref 2.5–4.5)
PHOSPHATE SERPL-MCNC: 2.9 MG/DL (ref 2.5–4.5)
PHOSPHATE SERPL-MCNC: 3 MG/DL (ref 2.5–4.5)
PLATELET # BLD AUTO: 425 10*3/MM3 (ref 140–450)
PMV BLD AUTO: 10.4 FL (ref 6–12)
POTASSIUM SERPL-SCNC: 3.4 MMOL/L (ref 3.5–5.2)
POTASSIUM SERPL-SCNC: 3.6 MMOL/L (ref 3.5–5.2)
POTASSIUM SERPL-SCNC: 3.8 MMOL/L (ref 3.5–5.2)
POTASSIUM SERPL-SCNC: 4 MMOL/L (ref 3.5–5.2)
POTASSIUM SERPL-SCNC: 4.2 MMOL/L (ref 3.5–5.2)
PROCALCITONIN SERPL-MCNC: 0.87 NG/ML (ref 0–0.25)
PROT SERPL-MCNC: 5.5 G/DL (ref 6–8.5)
PROT UR QL STRIP: ABNORMAL
QT INTERVAL: 368 MS
QTC INTERVAL: 491 MS
RBC # BLD AUTO: 4.35 10*6/MM3 (ref 4.14–5.8)
RBC # UR STRIP: ABNORMAL /HPF
REF LAB TEST METHOD: ABNORMAL
RHINOVIRUS RNA SPEC NAA+PROBE: NOT DETECTED
RSV RNA NPH QL NAA+NON-PROBE: NOT DETECTED
SARS-COV-2 RNA RESP QL NAA+PROBE: NOT DETECTED
SARS-COV-2 RNA RESP QL NAA+PROBE: NOT DETECTED
SODIUM SERPL-SCNC: 125 MMOL/L (ref 136–145)
SODIUM SERPL-SCNC: 126 MMOL/L (ref 136–145)
SODIUM SERPL-SCNC: 130 MMOL/L (ref 136–145)
SODIUM SERPL-SCNC: 131 MMOL/L (ref 136–145)
SODIUM SERPL-SCNC: 135 MMOL/L (ref 136–145)
SP GR UR STRIP: 1.02 (ref 1–1.03)
SQUAMOUS #/AREA URNS HPF: ABNORMAL /HPF
TRICYCLICS UR QL SCN: NEGATIVE
TROPONIN T % DELTA: -11
TROPONIN T NUMERIC DELTA: -5 NG/L
TROPONIN T SERPL HS-MCNC: 44 NG/L
TSH SERPL DL<=0.05 MIU/L-ACNC: 1.86 UIU/ML (ref 0.27–4.2)
UROBILINOGEN UR QL STRIP: ABNORMAL
WBC # UR STRIP: ABNORMAL /HPF
WBC NRBC COR # BLD AUTO: 15.55 10*3/MM3 (ref 3.4–10.8)
WHOLE BLOOD HOLD COAG: NORMAL
WHOLE BLOOD HOLD SPECIMEN: NORMAL

## 2025-06-25 PROCEDURE — 25810000003 SEPSIS FLUID NS 0.9 % SOLUTION: Performed by: EMERGENCY MEDICINE

## 2025-06-25 PROCEDURE — 84484 ASSAY OF TROPONIN QUANT: CPT | Performed by: EMERGENCY MEDICINE

## 2025-06-25 PROCEDURE — 25010000002 HEPARIN (PORCINE) PER 1000 UNITS: Performed by: INTERNAL MEDICINE

## 2025-06-25 PROCEDURE — 87641 MR-STAPH DNA AMP PROBE: CPT | Performed by: STUDENT IN AN ORGANIZED HEALTH CARE EDUCATION/TRAINING PROGRAM

## 2025-06-25 PROCEDURE — 82010 KETONE BODYS QUAN: CPT | Performed by: STUDENT IN AN ORGANIZED HEALTH CARE EDUCATION/TRAINING PROGRAM

## 2025-06-25 PROCEDURE — 84100 ASSAY OF PHOSPHORUS: CPT | Performed by: EMERGENCY MEDICINE

## 2025-06-25 PROCEDURE — 82948 REAGENT STRIP/BLOOD GLUCOSE: CPT

## 2025-06-25 PROCEDURE — 99291 CRITICAL CARE FIRST HOUR: CPT | Performed by: STUDENT IN AN ORGANIZED HEALTH CARE EDUCATION/TRAINING PROGRAM

## 2025-06-25 PROCEDURE — 83735 ASSAY OF MAGNESIUM: CPT | Performed by: EMERGENCY MEDICINE

## 2025-06-25 PROCEDURE — 87636 SARSCOV2 & INF A&B AMP PRB: CPT | Performed by: EMERGENCY MEDICINE

## 2025-06-25 PROCEDURE — 25010000002 CEFTRIAXONE PER 250 MG: Performed by: INTERNAL MEDICINE

## 2025-06-25 PROCEDURE — 81001 URINALYSIS AUTO W/SCOPE: CPT | Performed by: EMERGENCY MEDICINE

## 2025-06-25 PROCEDURE — 0T9B70Z DRAINAGE OF BLADDER WITH DRAINAGE DEVICE, VIA NATURAL OR ARTIFICIAL OPENING: ICD-10-PCS | Performed by: STUDENT IN AN ORGANIZED HEALTH CARE EDUCATION/TRAINING PROGRAM

## 2025-06-25 PROCEDURE — 25010000002 POTASSIUM CHLORIDE 10 MEQ/100ML SOLUTION: Performed by: STUDENT IN AN ORGANIZED HEALTH CARE EDUCATION/TRAINING PROGRAM

## 2025-06-25 PROCEDURE — 99222 1ST HOSP IP/OBS MODERATE 55: CPT | Performed by: UROLOGY

## 2025-06-25 PROCEDURE — 25010000002 CEFEPIME PER 500 MG: Performed by: STUDENT IN AN ORGANIZED HEALTH CARE EDUCATION/TRAINING PROGRAM

## 2025-06-25 PROCEDURE — 70450 CT HEAD/BRAIN W/O DYE: CPT

## 2025-06-25 PROCEDURE — 0202U NFCT DS 22 TRGT SARS-COV-2: CPT

## 2025-06-25 PROCEDURE — 80307 DRUG TEST PRSMV CHEM ANLYZR: CPT | Performed by: EMERGENCY MEDICINE

## 2025-06-25 PROCEDURE — 80048 BASIC METABOLIC PNL TOTAL CA: CPT | Performed by: EMERGENCY MEDICINE

## 2025-06-25 PROCEDURE — 25010000002 ACETAMINOPHEN 10 MG/ML SOLUTION: Performed by: EMERGENCY MEDICINE

## 2025-06-25 PROCEDURE — 25010000002 SODIUM CHLORIDE 0.9 % WITH KCL 20 MEQ 20-0.9 MEQ/L-% SOLUTION: Performed by: EMERGENCY MEDICINE

## 2025-06-25 PROCEDURE — 87086 URINE CULTURE/COLONY COUNT: CPT | Performed by: EMERGENCY MEDICINE

## 2025-06-25 PROCEDURE — 83605 ASSAY OF LACTIC ACID: CPT | Performed by: EMERGENCY MEDICINE

## 2025-06-25 PROCEDURE — 63710000001 INSULIN GLARGINE PER 5 UNITS: Performed by: INTERNAL MEDICINE

## 2025-06-25 PROCEDURE — 25010000002 PIPERACILLIN SOD-TAZOBACTAM PER 1 G: Performed by: EMERGENCY MEDICINE

## 2025-06-25 PROCEDURE — 51703 INSERT BLADDER CATH COMPLEX: CPT | Performed by: UROLOGY

## 2025-06-25 RX ORDER — POTASSIUM CHLORIDE 1500 MG/1
40 TABLET, EXTENDED RELEASE ORAL EVERY 4 HOURS
Status: COMPLETED | OUTPATIENT
Start: 2025-06-25 | End: 2025-06-26

## 2025-06-25 RX ORDER — ACETAMINOPHEN 650 MG/1
650 SUPPOSITORY RECTAL ONCE
Status: DISCONTINUED | OUTPATIENT
Start: 2025-06-25 | End: 2025-06-25

## 2025-06-25 RX ORDER — NICOTINE POLACRILEX 4 MG
15 LOZENGE BUCCAL
Status: DISCONTINUED | OUTPATIENT
Start: 2025-06-25 | End: 2025-06-25 | Stop reason: SDUPTHER

## 2025-06-25 RX ORDER — INSULIN LISPRO 100 [IU]/ML
3-14 INJECTION, SOLUTION INTRAVENOUS; SUBCUTANEOUS
Status: DISCONTINUED | OUTPATIENT
Start: 2025-06-25 | End: 2025-07-01

## 2025-06-25 RX ORDER — DEXTROSE MONOHYDRATE, SODIUM CHLORIDE, AND POTASSIUM CHLORIDE 50; 2.98; 4.5 G/1000ML; G/1000ML; G/1000ML
125 INJECTION, SOLUTION INTRAVENOUS CONTINUOUS PRN
Status: DISCONTINUED | OUTPATIENT
Start: 2025-06-25 | End: 2025-06-26

## 2025-06-25 RX ORDER — VANCOMYCIN/0.9 % SOD CHLORIDE 1.5G/250ML
20 PLASTIC BAG, INJECTION (ML) INTRAVENOUS ONCE
Status: DISCONTINUED | OUTPATIENT
Start: 2025-06-25 | End: 2025-06-25

## 2025-06-25 RX ORDER — POTASSIUM CHLORIDE 7.45 MG/ML
10 INJECTION INTRAVENOUS
Status: COMPLETED | OUTPATIENT
Start: 2025-06-25 | End: 2025-06-25

## 2025-06-25 RX ORDER — SODIUM CHLORIDE AND POTASSIUM CHLORIDE 150; 450 MG/100ML; MG/100ML
200 INJECTION, SOLUTION INTRAVENOUS CONTINUOUS PRN
Status: DISCONTINUED | OUTPATIENT
Start: 2025-06-25 | End: 2025-06-26

## 2025-06-25 RX ORDER — DEXTROSE MONOHYDRATE AND SODIUM CHLORIDE 5; .9 G/100ML; G/100ML
125 INJECTION, SOLUTION INTRAVENOUS CONTINUOUS PRN
Status: DISCONTINUED | OUTPATIENT
Start: 2025-06-25 | End: 2025-06-26

## 2025-06-25 RX ORDER — ACETAMINOPHEN 10 MG/ML
1000 INJECTION, SOLUTION INTRAVENOUS ONCE
Status: COMPLETED | OUTPATIENT
Start: 2025-06-25 | End: 2025-06-25

## 2025-06-25 RX ORDER — DEXTROSE MONOHYDRATE, SODIUM CHLORIDE, AND POTASSIUM CHLORIDE 50; 1.49; 9 G/1000ML; G/1000ML; G/1000ML
125 INJECTION, SOLUTION INTRAVENOUS CONTINUOUS PRN
Status: DISCONTINUED | OUTPATIENT
Start: 2025-06-25 | End: 2025-06-26

## 2025-06-25 RX ORDER — NICOTINE POLACRILEX 4 MG
15 LOZENGE BUCCAL
Status: DISCONTINUED | OUTPATIENT
Start: 2025-06-25 | End: 2025-07-04 | Stop reason: HOSPADM

## 2025-06-25 RX ORDER — INSULIN LISPRO 100 [IU]/ML
12 INJECTION, SOLUTION INTRAVENOUS; SUBCUTANEOUS
Status: DISCONTINUED | OUTPATIENT
Start: 2025-06-25 | End: 2025-06-29

## 2025-06-25 RX ORDER — FENTANYL/ROPIVACAINE/NS/PF 2-625MCG/1
15 PLASTIC BAG, INJECTION (ML) EPIDURAL
Status: COMPLETED | OUTPATIENT
Start: 2025-06-25 | End: 2025-06-26

## 2025-06-25 RX ORDER — DEXTROSE MONOHYDRATE 25 G/50ML
10-50 INJECTION, SOLUTION INTRAVENOUS
Status: DISCONTINUED | OUTPATIENT
Start: 2025-06-25 | End: 2025-06-26

## 2025-06-25 RX ORDER — HEPARIN SODIUM 5000 [USP'U]/ML
5000 INJECTION, SOLUTION INTRAVENOUS; SUBCUTANEOUS EVERY 8 HOURS SCHEDULED
Status: DISCONTINUED | OUTPATIENT
Start: 2025-06-25 | End: 2025-07-04 | Stop reason: HOSPADM

## 2025-06-25 RX ORDER — DEXTROSE MONOHYDRATE AND SODIUM CHLORIDE 5; .45 G/100ML; G/100ML
125 INJECTION, SOLUTION INTRAVENOUS CONTINUOUS PRN
Status: DISCONTINUED | OUTPATIENT
Start: 2025-06-25 | End: 2025-06-26

## 2025-06-25 RX ORDER — IBUPROFEN 600 MG/1
1 TABLET ORAL
Status: DISCONTINUED | OUTPATIENT
Start: 2025-06-25 | End: 2025-07-04 | Stop reason: HOSPADM

## 2025-06-25 RX ORDER — DEXTROSE MONOHYDRATE 25 G/50ML
25 INJECTION, SOLUTION INTRAVENOUS
Status: DISCONTINUED | OUTPATIENT
Start: 2025-06-25 | End: 2025-07-04 | Stop reason: HOSPADM

## 2025-06-25 RX ORDER — SODIUM CHLORIDE 9 MG/ML
200 INJECTION, SOLUTION INTRAVENOUS CONTINUOUS PRN
Status: DISCONTINUED | OUTPATIENT
Start: 2025-06-25 | End: 2025-06-26

## 2025-06-25 RX ORDER — LIDOCAINE HYDROCHLORIDE 20 MG/ML
JELLY TOPICAL
Status: COMPLETED
Start: 2025-06-25 | End: 2025-06-25

## 2025-06-25 RX ORDER — IBUPROFEN 600 MG/1
1 TABLET ORAL
Status: DISCONTINUED | OUTPATIENT
Start: 2025-06-25 | End: 2025-06-25

## 2025-06-25 RX ORDER — SODIUM CHLORIDE 450 MG/100ML
200 INJECTION, SOLUTION INTRAVENOUS CONTINUOUS PRN
Status: DISCONTINUED | OUTPATIENT
Start: 2025-06-25 | End: 2025-06-26

## 2025-06-25 RX ORDER — NOREPINEPHRINE BITARTRATE 0.03 MG/ML
.02-.3 INJECTION, SOLUTION INTRAVENOUS
Status: DISCONTINUED | OUTPATIENT
Start: 2025-06-25 | End: 2025-06-28

## 2025-06-25 RX ORDER — DEXTROSE MONOHYDRATE, SODIUM CHLORIDE, AND POTASSIUM CHLORIDE 50; 1.49; 4.5 G/1000ML; G/1000ML; G/1000ML
125 INJECTION, SOLUTION INTRAVENOUS CONTINUOUS PRN
Status: DISCONTINUED | OUTPATIENT
Start: 2025-06-25 | End: 2025-06-26

## 2025-06-25 RX ORDER — SODIUM CHLORIDE AND POTASSIUM CHLORIDE 300; 900 MG/100ML; MG/100ML
200 INJECTION, SOLUTION INTRAVENOUS CONTINUOUS PRN
Status: DISCONTINUED | OUTPATIENT
Start: 2025-06-25 | End: 2025-06-26

## 2025-06-25 RX ORDER — SODIUM CHLORIDE AND POTASSIUM CHLORIDE 150; 900 MG/100ML; MG/100ML
200 INJECTION, SOLUTION INTRAVENOUS CONTINUOUS PRN
Status: DISCONTINUED | OUTPATIENT
Start: 2025-06-25 | End: 2025-06-26

## 2025-06-25 RX ADMIN — DEXTROSE MONOHYDRATE, SODIUM CHLORIDE, AND POTASSIUM CHLORIDE 125 ML/HR: 50; 9; 1.49 INJECTION, SOLUTION INTRAVENOUS at 10:39

## 2025-06-25 RX ADMIN — INSULIN GLARGINE 25 UNITS: 100 INJECTION, SOLUTION SUBCUTANEOUS at 18:27

## 2025-06-25 RX ADMIN — INSULIN HUMAN 4.6 UNITS/HR: 1 INJECTION, SOLUTION INTRAVENOUS at 04:57

## 2025-06-25 RX ADMIN — POTASSIUM CHLORIDE 10 MEQ: 7.46 INJECTION, SOLUTION INTRAVENOUS at 08:08

## 2025-06-25 RX ADMIN — PIPERACILLIN AND TAZOBACTAM 3.38 G: 3; .375 INJECTION, POWDER, LYOPHILIZED, FOR SOLUTION INTRAVENOUS at 00:25

## 2025-06-25 RX ADMIN — CEFEPIME 2000 MG: 2 INJECTION, POWDER, FOR SOLUTION INTRAVENOUS at 03:24

## 2025-06-25 RX ADMIN — SODIUM CHLORIDE AND POTASSIUM CHLORIDE 200 ML/HR: .9; .15 SOLUTION INTRAVENOUS at 03:19

## 2025-06-25 RX ADMIN — INSULIN HUMAN 3 UNITS/HR: 1 INJECTION, SOLUTION INTRAVENOUS at 06:38

## 2025-06-25 RX ADMIN — MUPIROCIN 1 APPLICATION: 20 OINTMENT TOPICAL at 20:48

## 2025-06-25 RX ADMIN — POTASSIUM CHLORIDE 10 MEQ: 7.46 INJECTION, SOLUTION INTRAVENOUS at 05:42

## 2025-06-25 RX ADMIN — INSULIN HUMAN 3.1 UNITS/HR: 1 INJECTION, SOLUTION INTRAVENOUS at 05:44

## 2025-06-25 RX ADMIN — ACETAMINOPHEN 1000 MG: 1000 INJECTION INTRAVENOUS at 01:05

## 2025-06-25 RX ADMIN — CEFEPIME 2000 MG: 2 INJECTION, POWDER, FOR SOLUTION INTRAVENOUS at 12:32

## 2025-06-25 RX ADMIN — LIDOCAINE HYDROCHLORIDE: 20 JELLY TOPICAL at 10:05

## 2025-06-25 RX ADMIN — MUPIROCIN 1 APPLICATION: 20 OINTMENT TOPICAL at 12:32

## 2025-06-25 RX ADMIN — MUPIROCIN 1 APPLICATION: 20 OINTMENT TOPICAL at 20:43

## 2025-06-25 RX ADMIN — POTASSIUM CHLORIDE 10 MEQ: 7.46 INJECTION, SOLUTION INTRAVENOUS at 09:55

## 2025-06-25 RX ADMIN — INSULIN HUMAN 7.3 UNITS/HR: 1 INJECTION, SOLUTION INTRAVENOUS at 19:57

## 2025-06-25 RX ADMIN — SODIUM CHLORIDE 2019 ML: 9 INJECTION, SOLUTION INTRAVENOUS at 00:25

## 2025-06-25 RX ADMIN — HEPARIN SODIUM 5000 UNITS: 5000 INJECTION INTRAVENOUS; SUBCUTANEOUS at 21:40

## 2025-06-25 RX ADMIN — CEFTRIAXONE 2000 MG: 2 INJECTION, POWDER, FOR SOLUTION INTRAMUSCULAR; INTRAVENOUS at 18:26

## 2025-06-25 RX ADMIN — POTASSIUM CHLORIDE 10 MEQ: 7.46 INJECTION, SOLUTION INTRAVENOUS at 11:16

## 2025-06-25 RX ADMIN — INSULIN HUMAN 5 UNITS/HR: 1 INJECTION, SOLUTION INTRAVENOUS at 01:12

## 2025-06-25 NOTE — ED PROVIDER NOTES
[place]    EMERGENCY DEPARTMENT ENCOUNTER      Pt Name: Fracisco Mullen  MRN: [id number]  YOB: 1965  Date of evaluation: 6/24/2025  Provider: Andrew Crystal DO    CHIEF COMPLAINT       Chief Complaint   Patient presents with    Shortness of Breath         HISTORY OF PRESENT ILLNESS  (Location/Symptom, Timing/Onset, Context/Setting, Quality, Duration, Modifying Factors, Severity.)   Fracisco Mullen is a 59 y.o. male who presents to the emergency department via EMS for evaluation of altered mental state.  The patient has a extensive history, per EMS they were called to the scene of a very disheveled home but the patient was found to be unresponsive per family members.  They note the patient was found minimally responsive on their initial arrival.  They note the patient had a blood sugar over 350, family had made note that he had previously been in DKA, they are not sure if he has been taking any of his medications, EMS notes they encountered a very disheveled environment, unsure of other medical issues or conditions.  They state the patient admitted to millimeter spots of, GCS of 8, responding to painful sternal rubs and route with shallow tachypnea.  HPI severely limited      Nursing notes were reviewed.      PAST MEDICAL HISTORY     Past Medical History:   Diagnosis Date    Acute renal failure     Hx of    Obesity     Hx of    Sleep apnea     Type 2 diabetes mellitus          SURGICAL HISTORY       Past Surgical History:   Procedure Laterality Date    BACK SURGERY      lower back    BELOW KNEE AMPUTATION Left 11/22/2024    Procedure: BELOW-KNEE AMPUTATION LEFT;  Surgeon: Dru Gan Jr., MD;  [facility];  Service: Orthopedics;  Laterality: Left;    CHOLECYSTECTOMY WITH INTRAOPERATIVE CHOLANGIOGRAM N/A 1/6/2021    Procedure: CHOLECYSTECTOMY LAPAROSCOPIC INTRAOPERATIVE CHOLANGIOGRAM;  Surgeon: Juventino Hooker MD;  Location: [facility] OR;  Service: General;  Laterality: N/A;    ERCP  N/A 1/9/2021    Procedure: ENDOSCOPIC RETROGRADE CHOLANGIOPANCREATOGRAPHY;  Surgeon: Jeremy Dowell MD;  Location:  [facility];  Service: Gastroenterology;  Laterality: N/A;  with sphiincterotomy and balloon sweep with 9mm-12mm balloon    INCISION AND DRAINAGE FOOT Left 8/3/2024    Procedure: HEAL IRRIGATION AND DEBRIDEMENT LEFT;  Surgeon: Dru Gan Jr., MD;  Location:  [facility];  Service: Orthopedics;  Laterality: Left;    PLACEMENT OF WOUND VAC Left 8/3/2024    Procedure: PLACEMENT OF WOUND VAC;  Surgeon: Dru Gan Jr., MD;  Location:  [facility];  Service: Orthopedics;  Laterality: Left;         CURRENT MEDICATIONS       Current Facility-Administered Medications:     acetaminophen (OFIRMEV) injection 1,000 mg, 1,000 mg, Intravenous, Once, Andrew Crystal DO    Calcium Replacement - Follow Nurse / BPA Driven Protocol, , Not Applicable, PRN, Andrew Crystal DO    dextrose (D50W) (25 g/50 mL) IV injection 10-50 mL, 10-50 mL, Intravenous, Q15 Min PRN, Andrew Crystal DO    dextrose (GLUTOSE) oral gel 15 g, 15 g, Oral, Q15 Min PRN, Andrew Crystal DO    dextrose 5 % and sodium chloride 0.45 % infusion, 125 mL/hr, Intravenous, Continuous PRN, Andrew Crystal DO    dextrose 5 % and sodium chloride 0.45 % with KCl 20 mEq/L infusion, 125 mL/hr, Intravenous, Continuous PRNBonilla Andrew Peter, DO    dextrose 5 % and sodium chloride 0.45 % with KCl 40 mEq/L infusion, 125 mL/hr, Intravenous, Continuous PRNBonilla Andrew Peter, DO    dextrose 5 % and sodium chloride 0.9 % infusion, 125 mL/hr, Intravenous, Continuous PRNBonilla Andrew Peter, DO    dextrose 5 % and sodium chloride 0.9 % with KCl 20 mEq/L infusion, 125 mL/hr, Intravenous, Continuous PRNBonilla Andrew Peter, DO    dextrose 5% and sodium chloride 0.9% with KCl 40 mEq/L infusion, 125 mL/hr, Intravenous, Continuous PRN, Andrew Crystal DO    glucagon (GLUCAGEN) injection 1 mg, 1 mg,  Intramuscular, Q15 Min PRN, Andrew Crystal DO    insulin regular 1 unit/mL in 0.9% sodium chloride (Glucommander), 0-100 Units/hr, Intravenous, Titrated, Andrew Crystal DO    Magnesium Standard Dose Replacement - Follow Nurse / BPA Driven Protocol, , Not Applicable, PRN, Andrew Crystal DO    norepinephrine (LEVOPHED) 8 mg in 250 mL NS infusion (premix), 0.02-0.3 mcg/kg/min, Intravenous, Titrated, Andrew Crystal DO    Phosphorus Replacement - Follow Nurse / BPA Driven Protocol, , Not Applicable, PRN, Andrew Crystal DO    Potassium Replacement - Follow Nurse / BPA Driven Protocol, , Not Applicable, PRN, Andrew Crystal DO    sepsis fluid NS 0.9 % bolus 2,019 mL, 30 mL/kg (Adjusted), Intravenous, Once, Andrew Crystal DO, 2,019 mL at 06/25/25 0025    sodium chloride 0.45 % 1,000 mL with potassium chloride 40 mEq infusion, 200 mL/hr, Intravenous, Continuous PRN, Andrew Crystal DO    sodium chloride 0.45 % infusion, 200 mL/hr, Intravenous, Continuous PRN, Andrew Crystal DO    sodium chloride 0.45 % with KCl 20 mEq/L infusion, 200 mL/hr, Intravenous, Continuous PRN, Andrew Crystal DO    sodium chloride 0.9 % bolus, 1,000 mL/hr, Intravenous, Continuous, Andrew Crystal DO    sodium chloride 0.9 % flush 10 mL, 10 mL, Intravenous, PRN, Andrew Crystal DO    sodium chloride 0.9 % infusion, 200 mL/hr, Intravenous, Continuous PRN, Andrew Crystal DO    sodium chloride 0.9 % with KCl 20 mEq/L infusion, 200 mL/hr, Intravenous, Continuous PRN, Andrew Crystal DO    sodium chloride 0.9 % with KCl 40 mEq/L infusion, 200 mL/hr, Intravenous, Continuous PRN, Andrew Crystal DO    vancomycin IVPB 1500 mg in 0.9% NaCl (Premix) 500 mL, 20 mg/kg, Intravenous, Once, Andrew Crystal, DO    Current Outpatient Medications:     acetaminophen (TYLENOL) 325 MG tablet, Take 2 tablets by mouth Every 4 (Four) Hours As Needed for  "Mild Pain, Moderate Pain, Headache or Fever., Disp: , Rfl:     aspirin 81 MG EC tablet, Take 1 tablet by mouth Daily., Disp: , Rfl:     Blood Glucose Monitoring Suppl (Blood Glucose Monitor System) w/Device kit, Use as directed to test blood sugar 3 (Three) Times a Day., Disp: 1 each, Rfl: 0    calcium carbonate (TUMS) 500 MG chewable tablet, Chew 2 tablets 3 (Three) Times a Day As Needed for Indigestion or Heartburn., Disp: , Rfl:     cyclobenzaprine (FLEXERIL) 5 MG tablet, Take 1 tablet by mouth 3 (Three) Times a Day As Needed., Disp: , Rfl:     DULoxetine (CYMBALTA) 60 MG capsule, Take 1 capsule by mouth Daily., Disp: , Rfl:     finasteride (PROSCAR) 5 MG tablet, Take 1 tablet by mouth Every Night., Disp: 30 tablet, Rfl: 0    fluticasone (Flonase) 50 MCG/ACT nasal spray, Administer 2 sprays into the nostril(s) as directed by provider 2 (Two) Times a Day As Needed for Rhinitis or Allergies., Disp: , Rfl:     glucose blood test strip, Use as directed to test blood sugar 3 times daily, Disp: 100 each, Rfl: 0    insulin aspart prot & aspart (NovoLOG Mix 70/30 FlexPen) (70-30) 100 UNIT/ML suspension pen-injector injection, Inject 0.3 mL under the skin into the appropriate area as directed 2 (Two) Times a Day Before Meals., Disp: , Rfl:     Insulin Pen Needle (B-D UF III MINI PEN NEEDLES) 31G X 5 MM misc, Use as directed 2 times daily, Disp: 200 each, Rfl: 12    Insulin Pen Needle (Pen Needles 3/16\") 31G X 5 MM misc, Use 1 pen needle as directed 2 (Two) Times a Day., Disp: 100 each, Rfl: 0    Lancets misc, Use as directed to test blood sugar 3 (Three) Times a Day., Disp: 100 each, Rfl: 0    levocetirizine (XYZAL) 5 MG tablet, Take 1 tablet by mouth Daily., Disp: , Rfl:     metoprolol succinate XL (TOPROL-XL) 50 MG 24 hr tablet, Take 1 tablet by mouth Daily., Disp: 30 tablet, Rfl: 5    omeprazole (priLOSEC) 40 MG capsule, Take 1 capsule by mouth Every Morning Before Breakfast., Disp: , Rfl:     pregabalin (Lyrica) 225 " "MG capsule, Take 1 capsule by mouth 2 (Two) Times a Day., Disp: 60 capsule, Rfl: 0    rOPINIRole (REQUIP) 0.25 MG tablet, Take 1 tablet by mouth Every Night, 1 hour before bedtime., Disp: 30 tablet, Rfl: 0    simvastatin (ZOCOR) 20 MG tablet, Take 1 tablet by mouth Daily., Disp: , Rfl:     tamsulosin (FLOMAX) 0.4 MG capsule 24 hr capsule, Take 2 capsules by mouth Every Night., Disp: , Rfl:     ALLERGIES     Sertraline hcl    FAMILY HISTORY       Family History   Problem Relation Age of Onset    Diabetes Other     Hypertension Other     Cancer Other         Pancreatic cancer-first cousin    Heart attack Other         MI    Obesity Other     Hyperlipidemia Other         High blood cholesterol    Hyperlipidemia Mother     Hypertension Mother     Cancer Mother         lung cancer    Heart disease Father         MI at 75 yo    Cancer Brother         esophageal cancer with mets    Heart disease Brother         several MIs earlies in 30s    Cancer Maternal Grandmother           SOCIAL HISTORY       Social History     Socioeconomic History    Marital status:    Tobacco Use    Smoking status: Former     Types: Cigars    Smokeless tobacco: Never    Tobacco comments:     3 per month. Former cig smoker   Vaping Use    Vaping status: Never Used   Substance and Sexual Activity    Alcohol use: No     Comment: Past drank about a pint per month for 2-3 years    Drug use: No    Sexual activity: Defer         PHYSICAL EXAM       Vitals:    06/24/25 2339 06/24/25 2344 06/25/25 0000 06/25/25 0030   BP:   101/61 99/56   Pulse:   115 109   Resp:  (!) 37     Temp: (!) 102.8 °F (39.3 °C)      TempSrc: Oral      SpO2:   96% 96%   Weight: 79.5 kg (175 lb 4.3 oz)      Height: 162.6 cm (64.02\")          Physical Exam  General : Patient is somnolent, awakens to sternal rub, appears chronically ill, unkempt  HEENT: Pupils are equally round, conjunctiva clear  Cardiac: Heart regular rate, rhythm, no murmurs, rubs, or gallops  Lungs: Lungs " decreased breath sounds bilaterally, tachypnea noted  Chest wall: There is no tenderness to palpation over the chest wall or over ribs  Abdomen: Abdomen is soft, nontender, nondistended. There are no firm or pulsatile masses, no rebound rigidity or guarding  Musculoskeletal: Prior left BKA, limited movement upper and lower extremities  Neuro: Patient is somnolent, not following commands, limited neurological assessment        DIAGNOSTIC RESULTS     EKG:  All EKGs are interpreted by the Emergency Department Physician who either signs or Co-signs this chart in the absence of a cardiologist.    ECG 12 Lead Altered Mental Status   Final Result   Test Reason : Altered Mental Status   Blood Pressure :   */*   mmHG   Vent. Rate : 107 BPM     Atrial Rate : 107 BPM      P-R Int : 138 ms          QRS Dur :  82 ms       QT Int : 368 ms       P-R-T Axes :  46  11  54 degrees     QTcB Int : 491 ms      Sinus tachycardia   Septal infarct , age undetermined   Abnormal ECG   When compared with ECG of 25-Nov-2024 21:47,   Septal infarct is now present   Nonspecific T wave abnormality no longer evident in Inferior leads   Confirmed by JUSTINA BRYANT MD ([id number]) on 6/25/2025 12:55:37 AM      Referred By: ed md           Confirmed By: JUSTINA BRYANT MD          RADIOLOGY:     [x] Radiologist's Report Reviewed:  XR Chest 1 View   Final Result   No active disease.            Electronically Signed: Atul Shrestha MD     6/25/2025 12:11 AM EDT     Workstation ID: [id number]          I ordered and independently reviewed the above noted radiographic studies.      I viewed images of chest x-ray which showed no acute cardiopulmonary process per my independent interpretation.    See radiologist's dictation for official interpretation.        LABS:    I have reviewed and interpreted all of the currently available lab results from this visit (if applicable):  Results for orders placed or performed during the hospital encounter of 06/24/25    Comprehensive Metabolic Panel    Collection Time: 06/24/25 11:40 PM    Specimen: Blood   Result Value Ref Range    Glucose 456 (C) 65 - 99 mg/dL    BUN 13.8 8.0 - 23.0 mg/dL    Creatinine 1.09 0.76 - 1.27 mg/dL    Sodium 125 (L) 136 - 145 mmol/L    Potassium 4.0 3.5 - 5.2 mmol/L    Chloride 87 (L) 98 - 107 mmol/L    CO2 10.6 (L) 22.0 - 29.0 mmol/L    Calcium 7.7 (L) 8.6 - 10.5 mg/dL    Total Protein 5.5 (L) 6.0 - 8.5 g/dL    Albumin 2.8 (L) 3.5 - 5.2 g/dL    ALT (SGPT) <5 1 - 41 U/L    AST (SGOT) 12 1 - 40 U/L    Alkaline Phosphatase 73 39 - 117 U/L    Total Bilirubin 0.4 0.0 - 1.2 mg/dL    Globulin 2.7 gm/dL    A/G Ratio 1.0 g/dL    BUN/Creatinine Ratio 12.7 7.0 - 25.0    Anion Gap 27.4 (H) 5.0 - 15.0 mmol/L    eGFR 77.2 >60.0 mL/min/1.73   BNP    Collection Time: 06/24/25 11:40 PM    Specimen: Blood   Result Value Ref Range    proBNP 314.0 0.0 - 900.0 pg/mL   High Sensitivity Troponin T    Collection Time: 06/24/25 11:40 PM    Specimen: Blood   Result Value Ref Range    HS Troponin T 44 (H) <22 ng/L   Lactic Acid, Plasma    Collection Time: 06/24/25 11:40 PM    Specimen: Blood   Result Value Ref Range    Lactate 2.6 (C) 0.5 - 2.0 mmol/L   Procalcitonin    Collection Time: 06/24/25 11:40 PM    Specimen: Blood   Result Value Ref Range    Procalcitonin 0.87 (H) 0.00 - 0.25 ng/mL   Ethanol    Collection Time: 06/24/25 11:40 PM    Specimen: Blood   Result Value Ref Range    Ethanol <10 0 - 10 mg/dL   TSH Rfx On Abnormal To Free T4    Collection Time: 06/24/25 11:40 PM    Specimen: Blood   Result Value Ref Range    TSH 1.860 0.270 - 4.200 uIU/mL   CBC Auto Differential    Collection Time: 06/24/25 11:40 PM    Specimen: Blood   Result Value Ref Range    WBC 15.55 (H) 3.40 - 10.80 10*3/mm3    RBC 4.35 4.14 - 5.80 10*6/mm3    Hemoglobin 11.2 (L) 13.0 - 17.7 g/dL    Hematocrit 35.1 (L) 37.5 - 51.0 %    MCV 80.7 79.0 - 97.0 fL    MCH 25.7 (L) 26.6 - 33.0 pg    MCHC 31.9 31.5 - 35.7 g/dL    RDW 14.1 12.3 - 15.4 %    RDW-SD  41.5 37.0 - 54.0 fl    MPV 10.4 6.0 - 12.0 fL    Platelets 425 140 - 450 10*3/mm3    Neutrophil % 91.6 (H) 42.7 - 76.0 %    Lymphocyte % 3.7 (L) 19.6 - 45.3 %    Monocyte % 2.4 (L) 5.0 - 12.0 %    Eosinophil % 0.1 (L) 0.3 - 6.2 %    Basophil % 0.6 0.0 - 1.5 %    Immature Grans % 1.6 (H) 0.0 - 0.5 %    Neutrophils, Absolute 14.25 (H) 1.70 - 7.00 10*3/mm3    Lymphocytes, Absolute 0.58 (L) 0.70 - 3.10 10*3/mm3    Monocytes, Absolute 0.37 0.10 - 0.90 10*3/mm3    Eosinophils, Absolute 0.01 0.00 - 0.40 10*3/mm3    Basophils, Absolute 0.09 0.00 - 0.20 10*3/mm3    Immature Grans, Absolute 0.25 (H) 0.00 - 0.05 10*3/mm3    nRBC 0.0 0.0 - 0.2 /100 WBC   Blood Gas, Arterial With Co-Ox    Collection Time: 06/24/25 11:40 PM    Specimen: Arterial Blood   Result Value Ref Range    Site Right Brachial     Roberto's Test Positive     pH, Arterial 7.394 7.350 - 7.450 pH units    pCO2, Arterial 16.4 (C) 35.0 - 45.0 mm Hg    pO2, Arterial 77.6 (L) 83.0 - 108.0 mm Hg    HCO3, Arterial 10.0 (L) 20.0 - 26.0 mmol/L    Base Excess, Arterial -12.5 (L) 0.0 - 2.0 mmol/L    Hemoglobin, Blood Gas 11.5 (L) 13.5 - 17.5 g/dL    Hematocrit, Blood Gas 35.4 (L) 38.0 - 51.0 %    Oxyhemoglobin 95.2 94 - 99 %    Methemoglobin 0.20 0.00 - 1.50 %    Carboxyhemoglobin 1.5 0 - 2 %    CO2 Content 10.5 (L) 22 - 33 mmol/L    Temperature 37.0     Barometric Pressure for Blood Gas      Modality Nasal Cannula     FIO2 28 %    Rate 0 Breaths/minute    PIP 0 cmH2O    IPAP 0     EPAP 0     Notified Who ANNMARIE MENDOZA MD     Notified By [id number]     Notified Time 06/24/2025 23:40     pH, Temp Corrected 7.394 pH Units    pCO2, Temperature Corrected 16.4 (L) 35 - 48 mm Hg    pO2, Temperature Corrected 77.6 (L) 83 - 108 mm Hg   Green Top (Gel)    Collection Time: 06/24/25 11:40 PM   Result Value Ref Range    Extra Tube Hold for add-ons.    Lavender Top    Collection Time: 06/24/25 11:40 PM   Result Value Ref Range    Extra Tube hold for add-on    Gold Top - SST    Collection  Time: 06/24/25 11:40 PM   Result Value Ref Range    Extra Tube Hold for add-ons.    Gray Top    Collection Time: 06/24/25 11:40 PM   Result Value Ref Range    Extra Tube Hold for add-ons.    Light Blue Top    Collection Time: 06/24/25 11:40 PM   Result Value Ref Range    Extra Tube Hold for add-ons.    ECG 12 Lead Altered Mental Status    Collection Time: 06/25/25 12:39 AM   Result Value Ref Range    QT Interval 368 ms    QTC Interval 491 ms        If labs were ordered, I independently reviewed the results and considered them in treating the patient.      EMERGENCY DEPARTMENT COURSE and DIFFERENTIAL DIAGNOSIS/MDM:   Vitals:  AS OF 01:01 EDT    BP - 99/56  HR - 109  TEMP - (!) 102.8 °F (39.3 °C) (Oral)  O2 SATS - 96%      Orders placed during this visit:  Orders Placed This Encounter   Procedures    COVID PRE-OP / PRE-PROCEDURE SCREENING ORDER (NO ISOLATION) - Swab, Nasopharynx    Blood Culture - Blood,    Blood Culture - Blood,    COVID-19 and FLU A/B PCR, 1 HR TAT - Swab, Nasopharynx    XR Chest 1 View    Blood Gas, Arterial -With Co-Ox Panel: Yes    [facility] Draw    Comprehensive Metabolic Panel    Urinalysis With Microscopic If Indicated (No Culture) - Urine, Catheter    BNP    High Sensitivity Troponin T    Lactic Acid, Plasma    Procalcitonin    Ethanol    Urine Drug Screen - Urine, Clean Catch    TSH Rfx On Abnormal To Free T4    CBC Auto Differential    Beta Hydroxybutyrate Quantitative    Blood Gas, Arterial With Co-Ox    Urinalysis With Culture If Indicated - Urine, Catheter    High Sensitivity Troponin T 1Hr    STAT Lactic Acid, Reflex    Phosphorus    Magnesium    Osmolality, Serum    Hemoglobin A1c    Basic Metabolic Panel    Magnesium    Phosphorus    NPO Diet NPO Type: Strict NPO    Continuous Pulse Oximetry    Vital Signs    Strict Intake & Output    Daily Weights    Saline Lock & Maintain IV Access    Corrected Serum Sodium = Measured Sodium + [1.6 x ((Glucose - 100)/100)]    Do NOT Discontinue  Insulin Infusion Until 2 Hours After First Dose of Basal SQ Insulin    Prior to Initiating Glucommander™, Ensure All Prior Insulin Orders Are Discontinued    Do Not Start Insulin Infusion if Potassium < 3.3    Use a Dedicated Line for Insulin Infusion (If Possible).  May Use a Carrier Fluid of NS at KVO Rate if Insulin Rate is Insufficient to Maintain IV Patency.  Prime IV Line With Insulin Infusion    Glucommander Must Be Discontinued if Insulin Infusion is Discontinued.  If Insulin Infusion is Restarted, Previous Glucommander Settings Must Be Discontinued and Re-Entered From New Order    Once DKA Meets Resolution Criteria - Call Provider for Transition Orders From IV to SQ Insulin    Utilize the Start Meal Feature / Meal Bolus Feature in Glucommander if Patient Starts a Diet or Bolus Tube Feedings    Notify Provider - Insulin Infusion    RN to Release PRN POC Glucose Orders Per Glucommander    RN to Order STAT Glucose For Any POC Glucose <10 or >600    If Insulin Infusion is Paused - Follow Glucommander Instructions    DKA / HHS Patients - Phosphorus of 1 mg/dL or Higher Does NOT Require Replacement (While Insulin Infusing)    Inpatient Diabetes Educator Consult    Patient is on Glucommander    Oxygen Therapy- Nasal Cannula; Titrate 1-6 LPM Per SpO2; 90 - 95%    POC Glucose Once    POC Glucose PRN    ECG 12 Lead Altered Mental Status    Insert Peripheral IV    Insert Peripheral IV x2    ICU / CCU Bed Request ([facility] / [facility] ONLY)    Fall Precautions    CBC & Differential    Ketone Bodies, Serum (Not performed at [facility])    Green Top (Gel)    Lavender Top    Gold Top - SST    Gray Top    Light Blue Top       All labs have been independently reviewed by me.  All radiology studies have been reviewed by me and the radiologist dictating the report.  All EKG's have been independently viewed and interpreted by me.      Discussion below represents my analysis of pertinent findings related to patient's condition,  differential diagnosis, treatment plan and final disposition.    Differential diagnosis:  The differential diagnosis associated with the patient's presentation includes: Diabetic ketoacidosis, altered mental state, electrolyte abnormalities, respiratory failure, failure to thrive in adult, chronic debility    Additional sources  Discussed/ obtained information from independent historians:   [] Spouse  [] Parent  [] Family member  [] Friend  [x] EMS   [] Other:    External (non-ED) record review:   [x] Inpatient record: Patient's hospitalization from November 15 through December 5 of this past year where he was admitted for DKA, malnutrition, left heel osteomyelitis undergoing left BKA with Dr. Gan   [] Office record:   [] Outpatient record:   [] Prior Outpatient labs:   [] Prior Outpatient radiology:   [] Primary Care record:   [] Outside ED record:   [] Other:     Patient's care impacted by:   [x] Diabetes  [x] Hypertension  [] CHF  [] Hyperlipidemia  [] Coronary Artery Disease   [] COPD   [] Cancer   [] Tobacco Abuse   [] Substance Abuse    [] Other:     Care significantly affected by Social Determinants of Health (housing and economic circumstances, unemployment)    [x] Yes     [] No   If yes, Patient's care significantly limited by Social Determinants of Health including:   [] Inadequate housing   [x] Low income   [] Alcoholism and drug addiction in family   [x] Problems related to primary support group   [] Unemployment   [] Problems related to employment   [] Other Social Determinants of Health:       MEDICATIONS ADMINISTERED IN ED:  Medications   sodium chloride 0.9 % flush 10 mL (has no administration in time range)   sepsis fluid NS 0.9 % bolus 2,019 mL (2,019 mL Intravenous New Bag 6/25/25 0025)   vancomycin IVPB 1500 mg in 0.9% NaCl (Premix) 500 mL (has no administration in time range)   norepinephrine (LEVOPHED) 8 mg in 250 mL NS infusion (premix) (has no administration in time range)    acetaminophen (OFIRMEV) injection 1,000 mg (has no administration in time range)   dextrose (GLUTOSE) oral gel 15 g (has no administration in time range)   dextrose (D50W) (25 g/50 mL) IV injection 10-50 mL (has no administration in time range)   glucagon (GLUCAGEN) injection 1 mg (has no administration in time range)   sodium chloride 0.9 % bolus (has no administration in time range)   sodium chloride 0.9 % infusion (has no administration in time range)   sodium chloride 0.9 % with KCl 20 mEq/L infusion (has no administration in time range)   sodium chloride 0.9 % with KCl 40 mEq/L infusion (has no administration in time range)   dextrose 5 % and sodium chloride 0.9 % infusion (has no administration in time range)   dextrose 5 % and sodium chloride 0.9 % with KCl 20 mEq/L infusion (has no administration in time range)   dextrose 5% and sodium chloride 0.9% with KCl 40 mEq/L infusion (has no administration in time range)   sodium chloride 0.45 % infusion (has no administration in time range)   sodium chloride 0.45 % with KCl 20 mEq/L infusion (has no administration in time range)   sodium chloride 0.45 % 1,000 mL with potassium chloride 40 mEq infusion (has no administration in time range)   dextrose 5 % and sodium chloride 0.45 % infusion (has no administration in time range)   dextrose 5 % and sodium chloride 0.45 % with KCl 20 mEq/L infusion (has no administration in time range)   dextrose 5 % and sodium chloride 0.45 % with KCl 40 mEq/L infusion (has no administration in time range)   insulin regular 1 unit/mL in 0.9% sodium chloride (Glucommander) (has no administration in time range)   Potassium Replacement - Follow Nurse / BPA Driven Protocol (has no administration in time range)   Magnesium Standard Dose Replacement - Follow Nurse / BPA Driven Protocol (has no administration in time range)   Phosphorus Replacement - Follow Nurse / BPA Driven Protocol (has no administration in time range)   Calcium  Replacement - Follow Nurse / BPA Driven Protocol (has no administration in time range)   piperacillin-tazobactam (ZOSYN) 3.375 g IVPB in 100 mL NS MBP (CD) (3.375 g Intravenous New Bag 6/25/25 0025)            This is a very unfortunate disheveled 59-year-old male who presents from his home with a concern for altered mental state, the patient on initial assessment is only arousable to sternal rub upon arrival.  He does have tachypnea, concern for possible DKA as he has been poorly controlled and noncompliant in the past.  Patient is placed in ED bed #21, there is a very foul odor on the patient, EMS noted cockroaches and bedbugs, precautions were initiated.  Patient was placed on a cardiac monitor, IVs established, he started on IV fluid sepsis bolus, broad-spectrum antibiotics.  Noted to have a temperature of 102.8, heart rate in the low 100s, continue with septic workup, he was covered in feces, stool all throughout his crotch and buttock region.  There is some superficial pressure ulcers in his lower back.  We will cover the patient for possible underlying infectious etiology, urosepsis, cellulitis, started on vancomycin, Zosyn.  Lactic acid is 2.6, white count is 15.5, left shift is noted.  His creatinine is normal, sodium 125 with a glucose of 456, his bicarb is 10.6, liver function is stable.  Chest x-ray without acute pathology.  Patient's blood pressures in the 90s to low 100s.  We will continue to treat for sepsis, fluid hydration, will initiate Glucomander for his anion gap dysfunction.  His ABG with a pH of 7.39, PCO2 of 16, pO2 of 77 and a bicarb of 10.  Procalcitonin 0.87.  Patient is a critically ill, dehydrated, septic, will require close monitoring with admission to the intensive care unit.  Case discussed with intensivist Dr. Mckinnon.      PROCEDURES:  Procedures    CRITICAL CARE TIME    Total Critical Care time was 45 minutes, excluding separately reportable procedures.  Patient with severe sepsis,  septic shock, diabetic ketoacidosis, altered state requiring multiple interventions, fluid administration, monitoring, reassessments, discussions with multiple consultants and intensivist. There was a high probability of clinically significant/life threatening deterioration in the patient's condition which required my urgent intervention.      FINAL IMPRESSION      1. Diabetic ketoacidosis with coma associated with other specified diabetes mellitus    2. Severe sepsis    3. Hyponatremia          DISPOSITION/PLAN     ED Disposition       ED Disposition   Decision to Admit    Condition   --    Comment   Level of Care: Critical Care [6]   Admitting Physician: CONY MATA [[id number]]                   Comment: Please note this report has been produced using speech recognition software.      Andrew Crystal DO  Attending Emergency Physician         Andrew Crystal DO  06/25/25 0105

## 2025-06-25 NOTE — NURSING NOTE
"Reason for Wound, Ostomy and Continence (WOC) Nursing Consultation: \"LEFT HIP, PENILE/SCROTUM REDNESS (RAW), GENERAL SCAB ALL OVER\"    Patient lying on side in bed.  Family/support person not present.     Wound Assessment  Wound Type: Pressure Injury Unstageable difficult to stage at this point due to dark purple/black underlying tissue, evolving  Location: left trochanter   Measurements: 2cm x 3cm x 0.2cm  Wound Bed: moist, purple, red, and black, yellow  Wound Edges: Rolled/Closed  Periwound Skin: rash, bites   Drainage Characteristics/Odor: serosanguineous  Drainage Amount: small  Pain: Yes   Care provided: Cleansed with green wipe. Skin prep periwound. Therahoney sheet cut to fit wound bed. Covered with optifoam  Notes: Pt found down at home in poor living conditions. Body covered with what appears to be bed bug bites mixed with fungal rash. As noted, difficult to stage this wound at this time, most likely a stage three, but difficult to ascertain how deep the darkened area goes. Will treat with therahoney for autolytic debridement, antimicrobial and optimal moisture for wound bed.   Wound Image:       Recommendation(s) for management of wound:   Cleanse with NS every 3 days and prn. Skin prep periwound. Cut to fit therahoney sheet to wound bed. Cover with optifoam.     Wound Assessment  Wound Type: Arterial Ulcers to toes  Location: left toes  Pain: No   Care provided: Paint with betadine  Notes: Foot with lichenification and buildup from poor hygiene. Recommend cleansing with soap and water daily, moisturize foot but not toes-keep toes dry. Allevyn and offload heel. Pt states that he does have a podiatrist. Encouraged patient to follow with podiatrist for foot and nail care.  Wound Image:       Recommendation(s) for management of wound:     Wound Assessment  Wound Type: MASD (Moisture associated skin damage) - Intertriginous Dermatitis (ITD)/Yeast Dermatitis may have pressure component  Wound Bed: " non-blanchable and red  Drainage Amount: none  Pain: No   Care provided: Cleansed with green wipe, applied skin prep and allowed to dry. Applied new Allevyn.   Notes: This is most likely due to moisture and fungal dermatitis, but open area on left side of coccyx is deep red, will continue to monitor.   Pt is covered in red bites mixed with some fungal rash. Really no recommendation for bed bug bites except to keep clean, if pruritic, at times antihistamine or steroid cream is considered. Pt did not c/o itching to this nurse.   Wound Image:   ;['/++[    Recommendation(s) for management of wound:   -Sacrococcygeal: cleanse with green wipe when needed. Apply skin prep and allow to dry. Allevyn, change Q3-5 days and prn.    -Refer to wound care orders for specific instructions on how to treat/manage wound.  -Practice pressure injury prevention protocol.    Specialty support surface: Isolibrium       Pressure Injury Prevention Protocol (initiate for Fabian Score of 18 or less):   *Turn q 2 hr, keep heels elevated and offloaded with offloading heel boots.  *Allevn dressings to heels, sacrum/coccyx  *Follow C.A.R.E protocol if medical devices (Bipap, ledesma, Ng tube, etc) are being used.  *Reduce layers under patient (one sheet as drawsheet and two incontinence pads) to allow waffle or LIN to improve microclimate   *Raise knee-gatch before elevating HOB to reduce shearing      WOC Team will continue to follow.  Please re-consult if the wound(s) worsens.

## 2025-06-25 NOTE — PAYOR COMM NOTE
"25 Admission through ER  Fracisco Patterson  ID: [id number]   : 1965    DKA (diabetic ketoacidosis) [E11.10]   Elisa Mckinnon MD (NPI: [id number])     SANDRA Boyer, RN  Utilization Review  Phone [phone]  Fax [phone]    [address] [address]           Fracisco Patterson \"Bill\" (59 y.o. Male)       Date of Birth   1965    Social Security Number       Address   [address] [address]    Home Phone   [phone]    MRN   [id number]       Gnosticism   None    Marital Status                               Admission Date   2025    Admission Type   Emergency    Admitting Provider   Elisa Mckinnon MD    Attending Provider   Isaias De Luna MD    Department, Room/Bed   [facility] 2B ICU, N232/1       Discharge Date       Discharge Disposition       Discharge Destination                                 Attending Provider: Isaias De Luna MD    Allergies: Sertraline Hcl    Isolation: Contact   Infection: ESBL Klebsiella (24)   Code Status: CPR    Ht: 162.6 cm (64.02\")   Wt: 71.2 kg (156 lb 15.5 oz)    Admission Cmt: None   Principal Problem: None                  Active Insurance as of 2025       Primary Coverage       Payor Plan Insurance Group Employer/Plan Group    [employer] [place]ICA[place]        Payor Plan Address Payor Plan Phone Number Payor Plan Fax Number Effective Dates    [address] [address] [phone]  4/10/2016 - None Entered    [facility] [id number]         Subscriber Name Subscriber Birth Date Member ID       FRACISCO PATTERSON 1965 [id number]                     Emergency Contacts        (Rel.) Home Phone Work Phone Mobile Phone    IVAN FRANK (Partner) [phone] -- [phone]              [place]: NPI [id number] Tax ID [id number]  Insurance Information                  [facility] MEDICAID Phone: [phone]    Subscriber: Lady " "Fracisco ADAM Subscriber#: [id number]    Group#: -- Precert#: --    Authorization#: -- Effective Date: --             History & Physical        Elisa Mckinnon MD at 06/25/25 0104            INTENSIVIST   PROGRESS NOTE          SUBJECTIVE     Fracisco Schreiber" 59 y.o. male is followed for:Shortness of Breath       * No active hospital problems. *    As an Intensivist, we provide an integrated approach to the ICU patient and family, medical management of comorbid conditions, including but not limited to electrolytes, glycemic control, organ dysfunction, lead interdisciplinary rounds and coordinate the care with all other services, including those from other specialists.     HPI:  Fracisco Schreiber" is a 59 y.o. male with PMH DMII,  who presented to this Hospital on 6/24/2025 because of AMS. Patient was found by family at home minimally responsive and EMS was called. Patient found to be poorly taken care of as he had feces in his depends as well as all over his body as well as ants on him. Family states he has been hospitalized for DKA in past and hasn't been taking his meds for the past few days. In the ED, CXR negative. Labs signif for white blood count 15, Pro-Oscar 0.87, lactic acid 2.6, troponin 44, glucose 456, bicarb 10, anion gap 27.  ABG 7.3 9/16/77 on 2L NC. Given NS 30cc/kg and zosyn. DKA protocol with insulin and fluid exchanges started.  Patient seen at bedside. Awakes to verbal stimulation. Follows commands, no focal deficits but is confused. Unable to elicit history and no family bedside.      PMH: He  has a past medical history of Acute renal failure, Obesity, Sleep apnea, and Type 2 diabetes mellitus.   PSxH: He  has a past surgical history that includes Back surgery; cholecystectomy with intraoperative cholangiogram (N/A, 1/6/2021); ERCP (N/A, 1/9/2021); Incision and drainage foot (Left, 8/3/2024); PLACEMENT OF WOUND VAC (Left, 8/3/2024); and Leg amputation, lower tibia/fibula (Left, 11/22/2024). " "    Medications:  No current facility-administered medications on file prior to encounter.     Current Outpatient Medications on File Prior to Encounter   Medication Sig    acetaminophen (TYLENOL) 325 MG tablet Take 2 tablets by mouth Every 4 (Four) Hours As Needed for Mild Pain, Moderate Pain, Headache or Fever.    aspirin 81 MG EC tablet Take 1 tablet by mouth Daily.    Blood Glucose Monitoring Suppl (Blood Glucose Monitor System) w/Device kit Use as directed to test blood sugar 3 (Three) Times a Day.    calcium carbonate (TUMS) 500 MG chewable tablet Chew 2 tablets 3 (Three) Times a Day As Needed for Indigestion or Heartburn.    cyclobenzaprine (FLEXERIL) 5 MG tablet Take 1 tablet by mouth 3 (Three) Times a Day As Needed.    DULoxetine (CYMBALTA) 60 MG capsule Take 1 capsule by mouth Daily.    finasteride (PROSCAR) 5 MG tablet Take 1 tablet by mouth Every Night.    fluticasone (Flonase) 50 MCG/ACT nasal spray Administer 2 sprays into the nostril(s) as directed by provider 2 (Two) Times a Day As Needed for Rhinitis or Allergies.    glucose blood test strip Use as directed to test blood sugar 3 times daily    insulin aspart prot & aspart (NovoLOG Mix 70/30 FlexPen) (70-30) 100 UNIT/ML suspension pen-injector injection Inject 0.3 mL under the skin into the appropriate area as directed 2 (Two) Times a Day Before Meals.    Insulin Pen Needle (B-D UF III MINI PEN NEEDLES) 31G X 5 MM misc Use as directed 2 times daily    Insulin Pen Needle (Pen Needles 3/16\") 31G X 5 MM misc Use 1 pen needle as directed 2 (Two) Times a Day.    Lancets misc Use as directed to test blood sugar 3 (Three) Times a Day.    levocetirizine (XYZAL) 5 MG tablet Take 1 tablet by mouth Daily.    metoprolol succinate XL (TOPROL-XL) 50 MG 24 hr tablet Take 1 tablet by mouth Daily.    omeprazole (priLOSEC) 40 MG capsule Take 1 capsule by mouth Every Morning Before Breakfast.    pregabalin (Lyrica) 225 MG capsule Take 1 capsule by mouth 2 (Two) Times a " Day.    rOPINIRole (REQUIP) 0.25 MG tablet Take 1 tablet by mouth Every Night, 1 hour before bedtime.    simvastatin (ZOCOR) 20 MG tablet Take 1 tablet by mouth Daily.    tamsulosin (FLOMAX) 0.4 MG capsule 24 hr capsule Take 2 capsules by mouth Every Night.        Allergies: He is allergic to sertraline hcl.   FH: His family history includes Cancer in his brother, maternal grandmother, mother, and another family member; Diabetes in an other family member; Heart attack in an other family member; Heart disease in his brother and father; Hyperlipidemia in his mother and another family member; Hypertension in his mother and another family member; Obesity in an other family member.   SH: He  reports that he has quit smoking. His smoking use included cigars. He has never used smokeless tobacco. He reports that he does not drink alcohol and does not use drugs.     The patient's relevant past medical, surgical and social history were reviewed and updated in Epic as appropriate.                 Review of Systems  As described in the HPI.       OBJECTIVE     Vitals:  Temp: (!) 102.8 °F (39.3 °C) (25) Temp  Min: 102.8 °F (39.3 °C)  Max: 102.8 °F (39.3 °C)   Temp core:      BP: 108/59 (258) BP  Min: 79/55  Max: 111/61   MAP (non-invasive) Noninvasive MAP (mmHg): 75 (258) Noninvasive MAP (mmHg)  Av.4  Min: 64  Max: 75   Pulse: 100 (25 0113) Pulse  Min: 100  Max: 120   Resp: 22 (25 0115) Resp  Min: 22  Max: 37   SpO2: 97 % (25) SpO2  Min: 94 %  Max: 97 %   Device: nasal cannula (25)    Flow Rate: 2 (25) Flow (L/min) (Oxygen Therapy)  Min: 2  Max: 2         25   Weight: 79.5 kg (175 lb 4.3 oz)        Intake/Ouptut 24 hrs (7:00AM - 6:59 AM)  Intake & Output (last 2 days)       None            Medications (drips):  dextrose 5 % and sodium chloride 0.45 %  dextrose 5 % and sodium chloride 0.45 % with KCl 20 mEq/L  dextrose 5 % and sodium  chloride 0.45 % with KCl 40 mEq/L  dextrose 5 % and sodium chloride 0.9 %  dextrose 5 % and sodium chloride 0.9 % with KCl 20 mEq  dextrose 5% and sodium chloride 0.9% with KCl 40 mEq/L  insulin, Last Rate: 5 Units/hr (06/25/25 0112)  norepinephrine  sodium chloride 0.45 % 1,000 mL with potassium chloride 40 mEq infusion  sodium chloride  sodium chloride 0.45 % with KCl 20 mEq  sodium chloride  sodium chloride  sodium chloride 0.9 % with KCl 20 mEq  sodium chloride 0.9 % with KCl 40 mEq/L           Physical Examination  Telemetry:  Rhythm: sinus tachycardia (06/24/25 2345)         Constitutional:  NAD   Cardiovascular: Sinus tachy.    Respiratory: Normal breath sounds  No adventitious sounds   Abdominal:  Soft with no tenderness.   Extremities: No Edema, L BKA, multiple scratches on both extremities w/o any open wounds noted    Neurological:   No focal deficits, awakes to voice, follows commands, confused.           Lines, Drains & Airways       Active LDAs       Name Placement date Placement time Site Days    Peripheral IV Anterior;Right Wrist --  --  Wrist  --    Peripheral IV 06/24/25 2340 18 G Anterior;Distal;Left;Upper Arm 06/24/25  2340  Arm  less than 1    Peripheral IV 06/24/25 2341 18 G Right Antecubital 06/24/25  2341  Antecubital  less than 1    Urethral Catheter Coude 11/26/24  0440  -- 210                    Results Reviewed:  Laboratory  Microbiology  Radiology  Pathology    Hematology:  Results from last 7 days   Lab Units 06/24/25  2340   WBC 10*3/mm3 15.55*   HEMOGLOBIN g/dL 11.2*   MCV fL 80.7   PLATELETS 10*3/mm3 425     Results from last 7 days   Lab Units 06/24/25  2340   NEUTROS ABS 10*3/mm3 14.25*   LYMPHS ABS 10*3/mm3 0.58*   EOS ABS 10*3/mm3 0.01     Chemistry:  Estimated Creatinine Clearance: 67.7 mL/min (by C-G formula based on SCr of 1.09 mg/dL).    Results from last 7 days   Lab Units 06/24/25  2340   SODIUM mmol/L 125*   POTASSIUM mmol/L 4.0   CHLORIDE mmol/L 87*   CO2 mmol/L 10.6*  "  BUN mg/dL 13.8   CREATININE mg/dL 1.09   GLUCOSE mg/dL 456*     Results from last 7 days   Lab Units 06/24/25  2340   CALCIUM mg/dL 7.7*   MAGNESIUM mg/dL 1.9   PHOSPHORUS mg/dL 2.9       Hepatic Panel:  Results from last 7 days   Lab Units 06/24/25  2340   ALBUMIN g/dL 2.8*   TOTAL PROTEIN g/dL 5.5*   BILIRUBIN mg/dL 0.4   AST (SGOT) U/L 12   ALT (SGPT) U/L <5   ALK PHOS U/L 73        Coagulation Labs:         Cardiac Labs:  Results from last 7 days   Lab Units 06/24/25  2340   PROBNP pg/mL 314.0   HSTROP T ng/L 44*       Biomarkers:  Results from last 7 days   Lab Units 06/24/25  2340   LACTATE mmol/L 2.6*   PROCALCITONIN ng/mL 0.87*       U/A              COVID-19  Lab Results   Component Value Date    COVID19 Not Detected 06/25/2025    COVID19 Not Detected 11/26/2024    COVID19 Not Detected 01/20/2021    COVID19 Not Detected 01/05/2021    COVID19 Not Detected 01/05/2021       Arterial Blood Gases:  Results from last 7 days   Lab Units 06/24/25  2340   PH, ARTERIAL pH units 7.394   PCO2, ARTERIAL mm Hg 16.4*   PO2 ART mm Hg 77.6*   FIO2 % 28       Images:  XR Chest 1 View  Result Date: 6/25/2025  No active disease. Electronically Signed: Atul Shrestha MD  6/25/2025 12:11 AM EDT  Workstation ID: [id number]        Results: Reviewed.  I reviewed the patient's new laboratory and imaging results.  I independently reviewed the patient's new images.    Medications: Reviewed.    Assessment   A/P     Hospital:  LOS: 0 days   ICU: Patient does not have an ICU stay during this admission.       Fracisco \"Sterling\" is a 59 y.o. 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 Titrate supplemental oxygen for goal O2 sat >92% "   CXR negative    Cardiovascular  LA 2.6, repeat pending. S/p 30 cc/kg. Levophed ordered but not required currently for MAP goal >65.   Trop 44, repeat pending. . EKG reviewed, sinus tach without ischemic changes   Neuro  CTH stat   UDS pending. Ethanol negative.    TSH wnl.   GI/Hepatology  NPO until AG closes   Renal  Cr 1.0, Mg K and phos wnl. Bicarb 10 but compensated. Follow BMP Mg phos q4hr and replete per protocol.   ID/Antibiotics  Cefepime empirically. UA pending. Follow blood cultures. MRSA pending. Covid/flu negative.   Hematology  Hgb and plts wnl. SCDs for DVT prophy.   Endocrine  Type 2 diabetes.. Based on AG and hyperglycemia with hx, assuming DKA although beta hydroxy and urine studies pending. DKA protocol with insulin gtt and fluid exchanges. BMP q4hr as above.       Lab Results   Lab Value Date/Time    HGBA1C 14.20 (H) 06/24/2025 2340    HGBA1C 14.10 (H) 11/15/2024 0942     Results from last 7 days   Lab Units 06/25/25  0105   GLUCOSE mg/dL 557*       Diet: NPO Diet NPO Type: Strict NPO  Orders Placed This Encounter      Patient is on Glucommander      Advance Directives: There are no questions and answers to display.        VTE Prophylaxis:  Mechanical VTE prophylaxis orders are present.         Disposition: Admit to ICU    Plan of care and goals reviewed during interdisciplinary rounds.  I discussed the patient's findings and my recommendations with patient and nursing staff    Time: 40 minutes of critical care provided. This time excludes other billable procedures. Time does include preparation of documents, medical consultations, review of old records, and direct bedside care. Patient is at high risk for life-threatening deterioration due to Diabetes Ketoacidosis.    He has a high risk of imminent or life-threatening  deterioration, which requires the highest level of physician preparedness to intervene urgently. I devoted my full attention to the direct care of this patient for the  amount of time indicated above.     Time spent with family or surrogate(s) is included only if the patient was incapable of providing the necessary information or participating in medical decision making.        Elisa Mckinnon MD  Pulmonary Critical Care Medicine       Electronically signed by Elisa Mckinnon MD at 06/25/25 0255          Emergency Department Notes        Andrew Crystal DO at 06/24/25 2331            [facility]    EMERGENCY DEPARTMENT ENCOUNTER      Pt Name: Fracisco Mullen  MRN: [id number]  YOB: 1965  Date of evaluation: 6/24/2025  Provider: Andrew Crystal DO    CHIEF COMPLAINT       Chief Complaint   Patient presents with    Shortness of Breath         HISTORY OF PRESENT ILLNESS  (Location/Symptom, Timing/Onset, Context/Setting, Quality, Duration, Modifying Factors, Severity.)   Fracisco Mullen is a 59 y.o. male who presents to the emergency department via EMS for evaluation of altered mental state.  The patient has a extensive history, per EMS they were called to the scene of a very disheveled home but the patient was found to be unresponsive per family members.  They note the patient was found minimally responsive on their initial arrival.  They note the patient had a blood sugar over 350, family had made note that he had previously been in DKA, they are not sure if he has been taking any of his medications, EMS notes they encountered a very disheveled environment, unsure of other medical issues or conditions.  They state the patient admitted to [facility] spots of, GCS of 8, responding to painful sternal rubs and route with shallow tachypnea.  HPI severely limited      Nursing notes were reviewed.      PAST MEDICAL HISTORY     Past Medical History:   Diagnosis Date    Acute renal failure     Hx of    Obesity     Hx of    Sleep apnea     Type 2 diabetes mellitus          SURGICAL HISTORY       Past Surgical History:   Procedure Laterality Date    BACK SURGERY      lower back     BELOW KNEE AMPUTATION Left 11/22/2024    Procedure: BELOW-KNEE AMPUTATION LEFT;  Surgeon: Dru Gan Jr., MD;  Location: [facility] OR;  Service: Orthopedics;  Laterality: Left;    CHOLECYSTECTOMY WITH INTRAOPERATIVE CHOLANGIOGRAM N/A 1/6/2021    Procedure: CHOLECYSTECTOMY LAPAROSCOPIC INTRAOPERATIVE CHOLANGIOGRAM;  Surgeon: Juventino Hooker MD;  Location: [facility] OR;  Service: General;  Laterality: N/A;    ERCP N/A 1/9/2021    Procedure: ENDOSCOPIC RETROGRADE CHOLANGIOPANCREATOGRAPHY;  Surgeon: Jeremy Dowell MD;  Location: [facility] ENDOSCOPY;  Service: Gastroenterology;  Laterality: N/A;  with sphiincterotomy and balloon sweep with 9mm-12mm balloon    INCISION AND DRAINAGE FOOT Left 8/3/2024    Procedure: HEAL IRRIGATION AND DEBRIDEMENT LEFT;  Surgeon: Dru Gan Jr., MD;  Location: [facility] OR;  Service: Orthopedics;  Laterality: Left;    PLACEMENT OF WOUND VAC Left 8/3/2024    Procedure: PLACEMENT OF WOUND VAC;  Surgeon: Dru Gan Jr., MD;  Location: [facility] OR;  Service: Orthopedics;  Laterality: Left;         CURRENT MEDICATIONS       Current Facility-Administered Medications:     acetaminophen (OFIRMEV) injection 1,000 mg, 1,000 mg, Intravenous, Once, Andrew Crystal DO    Calcium Replacement - Follow Nurse / BPA Driven Protocol, , Not Applicable, PRN, Andrew Crystal DO    dextrose (D50W) (25 g/50 mL) IV injection 10-50 mL, 10-50 mL, Intravenous, Q15 Min PRNBoinlla Andrew Peter, DO    dextrose (GLUTOSE) oral gel 15 g, 15 g, Oral, Q15 Min PRNBonilla Andrew Peter, DO    dextrose 5 % and sodium chloride 0.45 % infusion, 125 mL/hr, Intravenous, Continuous PRNBonilla Andrew Peter, DO    dextrose 5 % and sodium chloride 0.45 % with KCl 20 mEq/L infusion, 125 mL/hr, Intravenous, Continuous PRNBonilla Andrew Peter, DO    dextrose 5 % and sodium chloride 0.45 % with KCl 40 mEq/L infusion, 125 mL/hr, Intravenous, Continuous PRNBonilla Andrew Peter, DO    dextrose 5 % and  sodium chloride 0.9 % infusion, 125 mL/hr, Intravenous, Continuous PRN, Andrew Crystal DO    dextrose 5 % and sodium chloride 0.9 % with KCl 20 mEq/L infusion, 125 mL/hr, Intravenous, Continuous PRN, Andrew Crystal DO    dextrose 5% and sodium chloride 0.9% with KCl 40 mEq/L infusion, 125 mL/hr, Intravenous, Continuous PRN, Andrew Crystal DO    glucagon (GLUCAGEN) injection 1 mg, 1 mg, Intramuscular, Q15 Min PRN, Andrew Crystal DO    insulin regular 1 unit/mL in 0.9% sodium chloride (Glucommander), 0-100 Units/hr, Intravenous, Titrated, Andrew Crystal DO    Magnesium Standard Dose Replacement - Follow Nurse / BPA Driven Protocol, , Not Applicable, PRN, Andrew Crystal DO    norepinephrine (LEVOPHED) 8 mg in 250 mL NS infusion (premix), 0.02-0.3 mcg/kg/min, Intravenous, Titrated, Andrew Crystal DO    Phosphorus Replacement - Follow Nurse / BPA Driven Protocol, , Not Applicable, PRN, Andrew Crystal DO    Potassium Replacement - Follow Nurse / BPA Driven Protocol, , Not Applicable, PRNBonilla Andrew Peter, DO    sepsis fluid NS 0.9 % bolus 2,019 mL, 30 mL/kg (Adjusted), Intravenous, Once, Andrew Crystal DO, 2,019 mL at 06/25/25 0025    sodium chloride 0.45 % 1,000 mL with potassium chloride 40 mEq infusion, 200 mL/hr, Intravenous, Continuous PRN, Andrew Crystal DO    sodium chloride 0.45 % infusion, 200 mL/hr, Intravenous, Continuous PRN, Andrew Crystal DO    sodium chloride 0.45 % with KCl 20 mEq/L infusion, 200 mL/hr, Intravenous, Continuous PRN, Andrew Crystal DO    sodium chloride 0.9 % bolus, 1,000 mL/hr, Intravenous, Continuous, Andrew Crystal DO    sodium chloride 0.9 % flush 10 mL, 10 mL, Intravenous, PRN, Andrew Crystal DO    sodium chloride 0.9 % infusion, 200 mL/hr, Intravenous, Continuous PRN, Andrew Crystal,     sodium chloride 0.9 % with KCl 20 mEq/L infusion, 200 mL/hr, Intravenous,  "Continuous PRN, Andrew Crystal, DO    sodium chloride 0.9 % with KCl 40 mEq/L infusion, 200 mL/hr, Intravenous, Continuous PRN, Andrew Crystal, DO    vancomycin IVPB 1500 mg in 0.9% NaCl (Premix) 500 mL, 20 mg/kg, Intravenous, Once, Andrew Crystal, DO    Current Outpatient Medications:     acetaminophen (TYLENOL) 325 MG tablet, Take 2 tablets by mouth Every 4 (Four) Hours As Needed for Mild Pain, Moderate Pain, Headache or Fever., Disp: , Rfl:     aspirin 81 MG EC tablet, Take 1 tablet by mouth Daily., Disp: , Rfl:     Blood Glucose Monitoring Suppl (Blood Glucose Monitor System) w/Device kit, Use as directed to test blood sugar 3 (Three) Times a Day., Disp: 1 each, Rfl: 0    calcium carbonate (TUMS) 500 MG chewable tablet, Chew 2 tablets 3 (Three) Times a Day As Needed for Indigestion or Heartburn., Disp: , Rfl:     cyclobenzaprine (FLEXERIL) 5 MG tablet, Take 1 tablet by mouth 3 (Three) Times a Day As Needed., Disp: , Rfl:     DULoxetine (CYMBALTA) 60 MG capsule, Take 1 capsule by mouth Daily., Disp: , Rfl:     finasteride (PROSCAR) 5 MG tablet, Take 1 tablet by mouth Every Night., Disp: 30 tablet, Rfl: 0    fluticasone (Flonase) 50 MCG/ACT nasal spray, Administer 2 sprays into the nostril(s) as directed by provider 2 (Two) Times a Day As Needed for Rhinitis or Allergies., Disp: , Rfl:     glucose blood test strip, Use as directed to test blood sugar 3 times daily, Disp: 100 each, Rfl: 0    insulin aspart prot & aspart (NovoLOG Mix 70/30 FlexPen) (70-30) 100 UNIT/ML suspension pen-injector injection, Inject 0.3 mL under the skin into the appropriate area as directed 2 (Two) Times a Day Before Meals., Disp: , Rfl:     Insulin Pen Needle (B-D UF III MINI PEN NEEDLES) 31G X 5 MM misc, Use as directed 2 times daily, Disp: 200 each, Rfl: 12    Insulin Pen Needle (Pen Needles 3/16\") 31G X 5 MM misc, Use 1 pen needle as directed 2 (Two) Times a Day., Disp: 100 each, Rfl: 0    Lancets misc, Use as " directed to test blood sugar 3 (Three) Times a Day., Disp: 100 each, Rfl: 0    levocetirizine (XYZAL) 5 MG tablet, Take 1 tablet by mouth Daily., Disp: , Rfl:     metoprolol succinate XL (TOPROL-XL) 50 MG 24 hr tablet, Take 1 tablet by mouth Daily., Disp: 30 tablet, Rfl: 5    omeprazole (priLOSEC) 40 MG capsule, Take 1 capsule by mouth Every Morning Before Breakfast., Disp: , Rfl:     pregabalin (Lyrica) 225 MG capsule, Take 1 capsule by mouth 2 (Two) Times a Day., Disp: 60 capsule, Rfl: 0    rOPINIRole (REQUIP) 0.25 MG tablet, Take 1 tablet by mouth Every Night, 1 hour before bedtime., Disp: 30 tablet, Rfl: 0    simvastatin (ZOCOR) 20 MG tablet, Take 1 tablet by mouth Daily., Disp: , Rfl:     tamsulosin (FLOMAX) 0.4 MG capsule 24 hr capsule, Take 2 capsules by mouth Every Night., Disp: , Rfl:     ALLERGIES     Sertraline hcl    FAMILY HISTORY       Family History   Problem Relation Age of Onset    Diabetes Other     Hypertension Other     Cancer Other         Pancreatic cancer-first cousin    Heart attack Other         MI    Obesity Other     Hyperlipidemia Other         High blood cholesterol    Hyperlipidemia Mother     Hypertension Mother     Cancer Mother         lung cancer    Heart disease Father         MI at 73 yo    Cancer Brother         esophageal cancer with mets    Heart disease Brother         several MIs earlies in 30s    Cancer Maternal Grandmother           SOCIAL HISTORY       Social History     Socioeconomic History    Marital status:    Tobacco Use    Smoking status: Former     Types: Cigars    Smokeless tobacco: Never    Tobacco comments:     3 per month. Former cig smoker   Vaping Use    Vaping status: Never Used   Substance and Sexual Activity    Alcohol use: No     Comment: Past drank about a pint per month for 2-3 years    Drug use: No    Sexual activity: Defer         PHYSICAL EXAM       Vitals:    06/24/25 2339 06/24/25 2344 06/25/25 0000 06/25/25 0030   BP:   101/61 99/56  "  Pulse:   115 109   Resp:  (!) 37     Temp: (!) 102.8 °F (39.3 °C)      TempSrc: Oral      SpO2:   96% 96%   Weight: 79.5 kg (175 lb 4.3 oz)      Height: 162.6 cm (64.02\")          Physical Exam  General : Patient is somnolent, awakens to sternal rub, appears chronically ill, unkempt  HEENT: Pupils are equally round, conjunctiva clear  Cardiac: Heart regular rate, rhythm, no murmurs, rubs, or gallops  Lungs: Lungs decreased breath sounds bilaterally, tachypnea noted  Chest wall: There is no tenderness to palpation over the chest wall or over ribs  Abdomen: Abdomen is soft, nontender, nondistended. There are no firm or pulsatile masses, no rebound rigidity or guarding  Musculoskeletal: Prior left BKA, limited movement upper and lower extremities  Neuro: Patient is somnolent, not following commands, limited neurological assessment        DIAGNOSTIC RESULTS     EKG:  All EKGs are interpreted by the Emergency Department Physician who either signs or Co-signs this chart in the absence of a cardiologist.    ECG 12 Lead Altered Mental Status   Final Result   Test Reason : Altered Mental Status   Blood Pressure :   */*   mmHG   Vent. Rate : 107 BPM     Atrial Rate : 107 BPM      P-R Int : 138 ms          QRS Dur :  82 ms       QT Int : 368 ms       P-R-T Axes :  46  11  54 degrees     QTcB Int : 491 ms      Sinus tachycardia   Septal infarct , age undetermined   Abnormal ECG   When compared with ECG of 25-Nov-2024 21:47,   Septal infarct is now present   Nonspecific T wave abnormality no longer evident in Inferior leads   Confirmed by JUSTINA BRYANT MD ([id number]) on 6/25/2025 12:55:37 AM      Referred By: ed md           Confirmed By: JUSTINA BRYANT MD          RADIOLOGY:     [x] Radiologist's Report Reviewed:  XR Chest 1 View   Final Result   No active disease.            Electronically Signed: Atul Shrestha MD     6/25/2025 12:11 AM EDT     Workstation ID: [id number]          I ordered and independently reviewed the above " noted radiographic studies.      I viewed images of chest x-ray which showed no acute cardiopulmonary process per my independent interpretation.    See radiologist's dictation for official interpretation.        LABS:    I have reviewed and interpreted all of the currently available lab results from this visit (if applicable):  Results for orders placed or performed during the hospital encounter of 06/24/25   Comprehensive Metabolic Panel    Collection Time: 06/24/25 11:40 PM    Specimen: Blood   Result Value Ref Range    Glucose 456 (C) 65 - 99 mg/dL    BUN 13.8 8.0 - 23.0 mg/dL    Creatinine 1.09 0.76 - 1.27 mg/dL    Sodium 125 (L) 136 - 145 mmol/L    Potassium 4.0 3.5 - 5.2 mmol/L    Chloride 87 (L) 98 - 107 mmol/L    CO2 10.6 (L) 22.0 - 29.0 mmol/L    Calcium 7.7 (L) 8.6 - 10.5 mg/dL    Total Protein 5.5 (L) 6.0 - 8.5 g/dL    Albumin 2.8 (L) 3.5 - 5.2 g/dL    ALT (SGPT) <5 1 - 41 U/L    AST (SGOT) 12 1 - 40 U/L    Alkaline Phosphatase 73 39 - 117 U/L    Total Bilirubin 0.4 0.0 - 1.2 mg/dL    Globulin 2.7 gm/dL    A/G Ratio 1.0 g/dL    BUN/Creatinine Ratio 12.7 7.0 - 25.0    Anion Gap 27.4 (H) 5.0 - 15.0 mmol/L    eGFR 77.2 >60.0 mL/min/1.73   BNP    Collection Time: 06/24/25 11:40 PM    Specimen: Blood   Result Value Ref Range    proBNP 314.0 0.0 - 900.0 pg/mL   High Sensitivity Troponin T    Collection Time: 06/24/25 11:40 PM    Specimen: Blood   Result Value Ref Range    HS Troponin T 44 (H) <22 ng/L   Lactic Acid, Plasma    Collection Time: 06/24/25 11:40 PM    Specimen: Blood   Result Value Ref Range    Lactate 2.6 (C) 0.5 - 2.0 mmol/L   Procalcitonin    Collection Time: 06/24/25 11:40 PM    Specimen: Blood   Result Value Ref Range    Procalcitonin 0.87 (H) 0.00 - 0.25 ng/mL   Ethanol    Collection Time: 06/24/25 11:40 PM    Specimen: Blood   Result Value Ref Range    Ethanol <10 0 - 10 mg/dL   TSH Rfx On Abnormal To Free T4    Collection Time: 06/24/25 11:40 PM    Specimen: Blood   Result Value Ref Range     TSH 1.860 0.270 - 4.200 uIU/mL   CBC Auto Differential    Collection Time: 06/24/25 11:40 PM    Specimen: Blood   Result Value Ref Range    WBC 15.55 (H) 3.40 - 10.80 10*3/mm3    RBC 4.35 4.14 - 5.80 10*6/mm3    Hemoglobin 11.2 (L) 13.0 - 17.7 g/dL    Hematocrit 35.1 (L) 37.5 - 51.0 %    MCV 80.7 79.0 - 97.0 fL    MCH 25.7 (L) 26.6 - 33.0 pg    MCHC 31.9 31.5 - 35.7 g/dL    RDW 14.1 12.3 - 15.4 %    RDW-SD 41.5 37.0 - 54.0 fl    MPV 10.4 6.0 - 12.0 fL    Platelets 425 140 - 450 10*3/mm3    Neutrophil % 91.6 (H) 42.7 - 76.0 %    Lymphocyte % 3.7 (L) 19.6 - 45.3 %    Monocyte % 2.4 (L) 5.0 - 12.0 %    Eosinophil % 0.1 (L) 0.3 - 6.2 %    Basophil % 0.6 0.0 - 1.5 %    Immature Grans % 1.6 (H) 0.0 - 0.5 %    Neutrophils, Absolute 14.25 (H) 1.70 - 7.00 10*3/mm3    Lymphocytes, Absolute 0.58 (L) 0.70 - 3.10 10*3/mm3    Monocytes, Absolute 0.37 0.10 - 0.90 10*3/mm3    Eosinophils, Absolute 0.01 0.00 - 0.40 10*3/mm3    Basophils, Absolute 0.09 0.00 - 0.20 10*3/mm3    Immature Grans, Absolute 0.25 (H) 0.00 - 0.05 10*3/mm3    nRBC 0.0 0.0 - 0.2 /100 WBC   Blood Gas, Arterial With Co-Ox    Collection Time: 06/24/25 11:40 PM    Specimen: Arterial Blood   Result Value Ref Range    Site Right Brachial     Roberto's Test Positive     pH, Arterial 7.394 7.350 - 7.450 pH units    pCO2, Arterial 16.4 (C) 35.0 - 45.0 mm Hg    pO2, Arterial 77.6 (L) 83.0 - 108.0 mm Hg    HCO3, Arterial 10.0 (L) 20.0 - 26.0 mmol/L    Base Excess, Arterial -12.5 (L) 0.0 - 2.0 mmol/L    Hemoglobin, Blood Gas 11.5 (L) 13.5 - 17.5 g/dL    Hematocrit, Blood Gas 35.4 (L) 38.0 - 51.0 %    Oxyhemoglobin 95.2 94 - 99 %    Methemoglobin 0.20 0.00 - 1.50 %    Carboxyhemoglobin 1.5 0 - 2 %    CO2 Content 10.5 (L) 22 - 33 mmol/L    Temperature 37.0     Barometric Pressure for Blood Gas      Modality Nasal Cannula     FIO2 28 %    Rate 0 Breaths/minute    PIP 0 cmH2O    IPAP 0     EPAP 0     Notified Denise MENDOZA MD     Notified By [id number]     Notified Time 06/24/2025  23:40     pH, Temp Corrected 7.394 pH Units    pCO2, Temperature Corrected 16.4 (L) 35 - 48 mm Hg    pO2, Temperature Corrected 77.6 (L) 83 - 108 mm Hg   Green Top (Gel)    Collection Time: 06/24/25 11:40 PM   Result Value Ref Range    Extra Tube Hold for add-ons.    Lavender Top    Collection Time: 06/24/25 11:40 PM   Result Value Ref Range    Extra Tube hold for add-on    Gold Top - SST    Collection Time: 06/24/25 11:40 PM   Result Value Ref Range    Extra Tube Hold for add-ons.    Gray Top    Collection Time: 06/24/25 11:40 PM   Result Value Ref Range    Extra Tube Hold for add-ons.    Light Blue Top    Collection Time: 06/24/25 11:40 PM   Result Value Ref Range    Extra Tube Hold for add-ons.    ECG 12 Lead Altered Mental Status    Collection Time: 06/25/25 12:39 AM   Result Value Ref Range    QT Interval 368 ms    QTC Interval 491 ms        If labs were ordered, I independently reviewed the results and considered them in treating the patient.      EMERGENCY DEPARTMENT COURSE and DIFFERENTIAL DIAGNOSIS/MDM:   Vitals:  AS OF 01:01 EDT    BP - 99/56  HR - 109  TEMP - (!) 102.8 °F (39.3 °C) (Oral)  O2 SATS - 96%      Orders placed during this visit:  Orders Placed This Encounter   Procedures    COVID PRE-OP / PRE-PROCEDURE SCREENING ORDER (NO ISOLATION) - Swab, Nasopharynx    Blood Culture - Blood,    Blood Culture - Blood,    COVID-19 and FLU A/B PCR, 1 HR TAT - Swab, Nasopharynx    XR Chest 1 View    Blood Gas, Arterial -With Co-Ox Panel: Yes    [place] Draw    Comprehensive Metabolic Panel    Urinalysis With Microscopic If Indicated (No Culture) - Urine, Catheter    BNP    High Sensitivity Troponin T    Lactic Acid, Plasma    Procalcitonin    Ethanol    Urine Drug Screen - Urine, Clean Catch    TSH Rfx On Abnormal To Free T4    CBC Auto Differential    Beta Hydroxybutyrate Quantitative    Blood Gas, Arterial With Co-Ox    Urinalysis With Culture If Indicated - Urine, Catheter    High Sensitivity Troponin T 1Hr     STAT Lactic Acid, Reflex    Phosphorus    Magnesium    Osmolality, Serum    Hemoglobin A1c    Basic Metabolic Panel    Magnesium    Phosphorus    NPO Diet NPO Type: Strict NPO    Continuous Pulse Oximetry    Vital Signs    Strict Intake & Output    Daily Weights    Saline Lock & Maintain IV Access    Corrected Serum Sodium = Measured Sodium + [1.6 x ((Glucose - 100)/100)]    Do NOT Discontinue Insulin Infusion Until 2 Hours After First Dose of Basal SQ Insulin    Prior to Initiating Glucommander™, Ensure All Prior Insulin Orders Are Discontinued    Do Not Start Insulin Infusion if Potassium < 3.3    Use a Dedicated Line for Insulin Infusion (If Possible).  May Use a Carrier Fluid of NS at KVO Rate if Insulin Rate is Insufficient to Maintain IV Patency.  Prime IV Line With Insulin Infusion    Glucommander Must Be Discontinued if Insulin Infusion is Discontinued.  If Insulin Infusion is Restarted, Previous Glucommander Settings Must Be Discontinued and Re-Entered From New Order    Once DKA Meets Resolution Criteria - Call Provider for Transition Orders From IV to SQ Insulin    Utilize the Start Meal Feature / Meal Bolus Feature in Glucommander if Patient Starts a Diet or Bolus Tube Feedings    Notify Provider - Insulin Infusion    RN to Release PRN POC Glucose Orders Per Glucommander    RN to Order STAT Glucose For Any POC Glucose <10 or >600    If Insulin Infusion is Paused - Follow Glucommander Instructions    DKA / HHS Patients - Phosphorus of 1 mg/dL or Higher Does NOT Require Replacement (While Insulin Infusing)    Inpatient Diabetes Educator Consult    Patient is on Glucommander    Oxygen Therapy- Nasal Cannula; Titrate 1-6 LPM Per SpO2; 90 - 95%    POC Glucose Once    POC Glucose PRN    ECG 12 Lead Altered Mental Status    Insert Peripheral IV    Insert Peripheral IV x2    ICU / CCU Bed Request ([facility] / [facility] / [facility] ONLY)    Fall Precautions    CBC & Differential    Ketone Bodies, Serum (Not performed at  [facility])    Green Top (Gel)    Lavender Top    Gold Top - SST    Gray Top    Light Blue Top       All labs have been independently reviewed by me.  All radiology studies have been reviewed by me and the radiologist dictating the report.  All EKG's have been independently viewed and interpreted by me.      Discussion below represents my analysis of pertinent findings related to patient's condition, differential diagnosis, treatment plan and final disposition.    Differential diagnosis:  The differential diagnosis associated with the patient's presentation includes: Diabetic ketoacidosis, altered mental state, electrolyte abnormalities, respiratory failure, failure to thrive in adult, chronic debility    Additional sources  Discussed/ obtained information from independent historians:   [] Spouse  [] Parent  [] Family member  [] Friend  [x] EMS   [] Other:    External (non-ED) record review:   [x] Inpatient record: Patient's hospitalization from November 15 through December 5 of this past year where he was admitted for DKA, malnutrition, left heel osteomyelitis undergoing left BKA with Dr. Gan   [] Office record:   [] Outpatient record:   [] Prior Outpatient labs:   [] Prior Outpatient radiology:   [] Primary Care record:   [] Outside ED record:   [] Other:     Patient's care impacted by:   [x] Diabetes  [x] Hypertension  [] CHF  [] Hyperlipidemia  [] Coronary Artery Disease   [] COPD   [] Cancer   [] Tobacco Abuse   [] Substance Abuse    [] Other:     Care significantly affected by Social Determinants of Health (housing and economic circumstances, unemployment)    [x] Yes     [] No   If yes, Patient's care significantly limited by Social Determinants of Health including:   [] Inadequate housing   [x] Low income   [] Alcoholism and drug addiction in family   [x] Problems related to primary support group   [] Unemployment   [] Problems related to employment   [] Other Social Determinants of Health:        MEDICATIONS ADMINISTERED IN ED:  Medications   sodium chloride 0.9 % flush 10 mL (has no administration in time range)   sepsis fluid NS 0.9 % bolus 2,019 mL (2,019 mL Intravenous New Bag 6/25/25 0025)   vancomycin IVPB 1500 mg in 0.9% NaCl (Premix) 500 mL (has no administration in time range)   norepinephrine (LEVOPHED) 8 mg in 250 mL NS infusion (premix) (has no administration in time range)   acetaminophen (OFIRMEV) injection 1,000 mg (has no administration in time range)   dextrose (GLUTOSE) oral gel 15 g (has no administration in time range)   dextrose (D50W) (25 g/50 mL) IV injection 10-50 mL (has no administration in time range)   glucagon (GLUCAGEN) injection 1 mg (has no administration in time range)   sodium chloride 0.9 % bolus (has no administration in time range)   sodium chloride 0.9 % infusion (has no administration in time range)   sodium chloride 0.9 % with KCl 20 mEq/L infusion (has no administration in time range)   sodium chloride 0.9 % with KCl 40 mEq/L infusion (has no administration in time range)   dextrose 5 % and sodium chloride 0.9 % infusion (has no administration in time range)   dextrose 5 % and sodium chloride 0.9 % with KCl 20 mEq/L infusion (has no administration in time range)   dextrose 5% and sodium chloride 0.9% with KCl 40 mEq/L infusion (has no administration in time range)   sodium chloride 0.45 % infusion (has no administration in time range)   sodium chloride 0.45 % with KCl 20 mEq/L infusion (has no administration in time range)   sodium chloride 0.45 % 1,000 mL with potassium chloride 40 mEq infusion (has no administration in time range)   dextrose 5 % and sodium chloride 0.45 % infusion (has no administration in time range)   dextrose 5 % and sodium chloride 0.45 % with KCl 20 mEq/L infusion (has no administration in time range)   dextrose 5 % and sodium chloride 0.45 % with KCl 40 mEq/L infusion (has no administration in time range)   insulin regular 1 unit/mL in  0.9% sodium chloride (Glucommander) (has no administration in time range)   Potassium Replacement - Follow Nurse / BPA Driven Protocol (has no administration in time range)   Magnesium Standard Dose Replacement - Follow Nurse / BPA Driven Protocol (has no administration in time range)   Phosphorus Replacement - Follow Nurse / BPA Driven Protocol (has no administration in time range)   Calcium Replacement - Follow Nurse / BPA Driven Protocol (has no administration in time range)   piperacillin-tazobactam (ZOSYN) 3.375 g IVPB in 100 mL NS MBP (CD) (3.375 g Intravenous New Bag 6/25/25 0025)            This is a very unfortunate disheveled 59-year-old male who presents from his home with a concern for altered mental state, the patient on initial assessment is only arousable to sternal rub upon arrival.  He does have tachypnea, concern for possible DKA as he has been poorly controlled and noncompliant in the past.  Patient is placed in ED bed #21, there is a very foul odor on the patient, EMS noted cockroaches and bedbugs, precautions were initiated.  Patient was placed on a cardiac monitor, IVs established, he started on IV fluid sepsis bolus, broad-spectrum antibiotics.  Noted to have a temperature of 102.8, heart rate in the low 100s, continue with septic workup, he was covered in feces, stool all throughout his crotch and buttock region.  There is some superficial pressure ulcers in his lower back.  We will cover the patient for possible underlying infectious etiology, urosepsis, cellulitis, started on vancomycin, Zosyn.  Lactic acid is 2.6, white count is 15.5, left shift is noted.  His creatinine is normal, sodium 125 with a glucose of 456, his bicarb is 10.6, liver function is stable.  Chest x-ray without acute pathology.  Patient's blood pressures in the 90s to low 100s.  We will continue to treat for sepsis, fluid hydration, will initiate Glucomander for his anion gap dysfunction.  His ABG with a pH of 7.39,  PCO2 of 16, pO2 of 77 and a bicarb of 10.  Procalcitonin 0.87.  Patient is a critically ill, dehydrated, septic, will require close monitoring with admission to the intensive care unit.  Case discussed with intensivist Dr. Mckinnon.      PROCEDURES:  Procedures    CRITICAL CARE TIME    Total Critical Care time was 45 minutes, excluding separately reportable procedures.  Patient with severe sepsis, septic shock, diabetic ketoacidosis, altered state requiring multiple interventions, fluid administration, monitoring, reassessments, discussions with multiple consultants and intensivist. There was a high probability of clinically significant/life threatening deterioration in the patient's condition which required my urgent intervention.      FINAL IMPRESSION      1. Diabetic ketoacidosis with coma associated with other specified diabetes mellitus    2. Severe sepsis    3. Hyponatremia          DISPOSITION/PLAN     ED Disposition       ED Disposition   Decision to Admit    Condition   --    Comment   Level of Care: Critical Care [6]   Admitting Physician: CONY MCKINNON [[id number]]                   Comment: Please note this report has been produced using speech recognition software.      Andrew Crystal DO  Attending Emergency Physician         Andrew Crystal DO  06/25/25 0105      Electronically signed by Andrew Crystal DO at 06/25/25 0105       Facility-Administered Medications as of 6/25/2025   Medication Dose Route Frequency Provider Last Rate Last Admin    [COMPLETED] acetaminophen (OFIRMEV) injection 1,000 mg  1,000 mg Intravenous Once Andrew Crystal DO   1,000 mg at 06/25/25 0105    Calcium Replacement - Follow Nurse / BPA Driven Protocol   Not Applicable PRN Andrew Crystal DO        [COMPLETED] cefepime 2000 mg IVPB in 100 mL NS (MBP)  2,000 mg Intravenous Once Cony Mckinnon MD   2,000 mg at 06/25/25 0324    cefepime 2000 mg IVPB in 100 mL NS (MBP)  2,000 mg Intravenous Q8H Ino  Elisa ANGUIANO MD        dextrose (D50W) (25 g/50 mL) IV injection 10-50 mL  10-50 mL Intravenous Q15 Min PRN Andrew Crystal DO        dextrose (GLUTOSE) oral gel 15 g  15 g Oral Q15 Min PRN Andrew Crystal DO        dextrose 5 % and sodium chloride 0.45 % infusion  125 mL/hr Intravenous Continuous PRN Andrew Crsytal DO        dextrose 5 % and sodium chloride 0.45 % with KCl 20 mEq/L infusion  125 mL/hr Intravenous Continuous PRN Andrew Crystal DO        dextrose 5 % and sodium chloride 0.45 % with KCl 40 mEq/L infusion  125 mL/hr Intravenous Continuous PRN Andrew Crystal DO        dextrose 5 % and sodium chloride 0.9 % infusion  125 mL/hr Intravenous Continuous PRN Andrew Crystal DO        dextrose 5 % and sodium chloride 0.9 % with KCl 20 mEq/L infusion  125 mL/hr Intravenous Continuous PRN Andrew Crystal DO        dextrose 5% and sodium chloride 0.9% with KCl 40 mEq/L infusion  125 mL/hr Intravenous Continuous PRN Andrew Crystal DO        glucagon (GLUCAGEN) injection 1 mg  1 mg Intramuscular Q15 Min PRN Andrew Crystal DO        insulin regular 1 unit/mL in 0.9% sodium chloride (Glucommander)  0-100 Units/hr Intravenous Titrated Andrew Crystal DO 2.9 mL/hr at 06/25/25 1001 2.9 Units/hr at 06/25/25 1001    [COMPLETED] Lidocaine HCl gel (XYLOCAINE) 2 % urethral/mucosal syringe  - ADS Override Pull        Given at 06/25/25 1005    Magnesium Standard Dose Replacement - Follow Nurse / BPA Driven Protocol   Not Applicable PRN Andrew Crystal DO        norepinephrine (LEVOPHED) 8 mg in 250 mL NS infusion (premix)  0.02-0.3 mcg/kg/min Intravenous Titrated PacAndrew castillo DO        Phosphorus Replacement - Follow Nurse / BPA Driven Protocol   Not Applicable PRN Andrew Crystal DO        [COMPLETED] piperacillin-tazobactam (ZOSYN) 3.375 g IVPB in 100 mL NS MBP (CD)  3.375 g Intravenous Once PacittAndrew doty DO   Stopped at  25 0115    potassium chloride 10 mEq in 100 mL IVPB  10 mEq Intravenous Q1H Elisa Mckinnon  mL/hr at 25 0955 10 mEq at 25 0955    Potassium Replacement - Follow Nurse / BPA Driven Protocol   Not Applicable PRN Andrew Cyrstal DO        [COMPLETED] sepsis fluid NS 0.9 % bolus 2,019 mL  30 mL/kg (Adjusted) Intravenous Once Andrew Crystal DO   2,019 mL at 25 0025    sodium chloride 0.45 % 1,000 mL with potassium chloride 40 mEq infusion  200 mL/hr Intravenous Continuous PRN Andrew Crystal DO        sodium chloride 0.45 % infusion  200 mL/hr Intravenous Continuous PRN Andrew Crystal DO        sodium chloride 0.45 % with KCl 20 mEq/L infusion  200 mL/hr Intravenous Continuous PRN Andrew Crystal DO        [] sodium chloride 0.9 % bolus  1,000 mL/hr Intravenous Continuous Andrew Crystal DO        sodium chloride 0.9 % flush 10 mL  10 mL Intravenous PRN Andrew Crystal DO        sodium chloride 0.9 % infusion  200 mL/hr Intravenous Continuous PRN Andrew Crystal DO        sodium chloride 0.9 % with KCl 20 mEq/L infusion  200 mL/hr Intravenous Continuous PRN Andrew Crystal  mL/hr at 25 0319 200 mL/hr at 25 0319    sodium chloride 0.9 % with KCl 40 mEq/L infusion  200 mL/hr Intravenous Continuous PRN Andrew Crystal DO         Lab Results (all)       Procedure Component Value Units Date/Time    POC Glucose Once [[id number]]  (Abnormal) Collected: 2552    Specimen: Blood Updated: 25 0953     Glucose 248 mg/dL     Basic Metabolic Panel [[id number]]  (Abnormal) Collected: 25 0830    Specimen: Blood Updated: 25     Glucose 276 mg/dL      BUN 14.6 mg/dL      Creatinine 0.77 mg/dL      Sodium 130 mmol/L      Potassium 3.8 mmol/L      Comment: Slight hemolysis detected by analyzer. Result may be falsely elevated.        Chloride 98 mmol/L      CO2 20.0 mmol/L      Calcium  7.4 mg/dL      BUN/Creatinine Ratio 19.0     Anion Gap 12.0 mmol/L      eGFR 101.9 mL/min/1.73     Narrative:      GFR Categories in Chronic Kidney Disease (CKD)              GFR Category          GFR (mL/min/1.73)    Interpretation  G1                    90 or greater        Normal or high (1)  G2                    60-89                Mild decrease (1)  G3a                   45-59                Mild to moderate decrease  G3b                   30-44                Moderate to severe decrease  G4                    15-29                Severe decrease  G5                    14 or less           Kidney failure    (1)In the absence of evidence of kidney disease, neither GFR category G1 or G2 fulfill the criteria for CKD.    eGFR calculation 2021 CKD-EPI creatinine equation, which does not include race as a factor    Magnesium [[id number]]  (Normal) Collected: 06/25/25 0830    Specimen: Blood Updated: 06/25/25 0931     Magnesium 1.9 mg/dL     Phosphorus [[id number]]  (Abnormal) Collected: 06/25/25 0830    Specimen: Blood Updated: 06/25/25 0931     Phosphorus 2.4 mg/dL     Respiratory Panel PCR w/COVID-19(SARS-CoV-2) [facility] In-House, NP Swab in UTM/VTM, 2 HR TAT - Swab, Nasopharynx [[id number]] Collected: 06/25/25 0810    Specimen: Swab from Nasopharynx Updated: 06/25/25 0913    MRSA Screen, PCR (Inpatient) - Swab, Nares [[id number]] Collected: 06/25/25 0810    Specimen: Swab from Nares Updated: 06/25/25 0913    Beta Hydroxybutyrate Quantitative [[id number]]  (Abnormal) Collected: 06/25/25 0310    Specimen: Blood Updated: 06/25/25 0901     Beta-Hydroxybutyrate Quant 6.460 mmol/L     Narrative:      In the assessment of possible diabetic ketoacidosis, the test should be interpreted along with other clinical and laboratory findings.  A level greater than 1 mmol/L should require further evaluation and levels of more than 3 mmol/L require immediate medical review.    POC Glucose Once [[id number]]  (Abnormal)  Collected: 06/25/25 0850    Specimen: Blood Updated: 06/25/25 0851     Glucose 247 mg/dL     POC Glucose Once [[id number]]  (Abnormal) Collected: 06/25/25 0734    Specimen: Blood Updated: 06/25/25 0738     Glucose 264 mg/dL     STAT Lactic Acid, Reflex [[id number]]  (Abnormal) Collected: 06/25/25 0606    Specimen: Blood Updated: 06/25/25 0708     Lactate 2.3 mmol/L      Comment: Falsely depressed results may occur on samples drawn from patients receiving N-Acetylcysteine (NAC) or Metamizole.       POC Glucose Once [[id number]]  (Abnormal) Collected: 06/25/25 0107    Specimen: Blood Updated: 06/25/25 0700     Glucose >599 mg/dL     POC Glucose Once [[id number]]  (Abnormal) Collected: 06/25/25 0637    Specimen: Blood Updated: 06/25/25 0644     Glucose 302 mg/dL     POC Glucose Once [[id number]]  (Abnormal) Collected: 06/25/25 0541    Specimen: Blood Updated: 06/25/25 0643     Glucose 309 mg/dL     POC Glucose Once [[id number]]  (Abnormal) Collected: 06/25/25 0457    Specimen: Blood Updated: 06/25/25 0458     Glucose 430 mg/dL     STAT Lactic Acid, Reflex [[id number]]  (Abnormal) Collected: 06/25/25 0310    Specimen: Blood Updated: 06/25/25 0409     Lactate 2.5 mmol/L      Comment: Falsely depressed results may occur on samples drawn from patients receiving N-Acetylcysteine (NAC) or Metamizole.  Confirmed/called       Basic Metabolic Panel [[id number]]  (Abnormal) Collected: 06/25/25 0310    Specimen: Blood Updated: 06/25/25 0408     Glucose 409 mg/dL      Comment: Confirmed/called        BUN 15.1 mg/dL      Creatinine 1.09 mg/dL      Sodium 126 mmol/L      Potassium 3.4 mmol/L      Chloride 92 mmol/L      CO2 11.1 mmol/L      Calcium 7.7 mg/dL      BUN/Creatinine Ratio 13.9     Anion Gap 22.9 mmol/L      eGFR 77.2 mL/min/1.73     Narrative:      GFR Categories in Chronic Kidney Disease (CKD)              GFR Category          GFR (mL/min/1.73)    Interpretation  G1                    90 or greater        Normal or high  (1)  G2                    60-89                Mild decrease (1)  G3a                   45-59                Mild to moderate decrease  G3b                   30-44                Moderate to severe decrease  G4                    15-29                Severe decrease  G5                    14 or less           Kidney failure    (1)In the absence of evidence of kidney disease, neither GFR category G1 or G2 fulfill the criteria for CKD.    eGFR calculation 2021 CKD-EPI creatinine equation, which does not include race as a factor    Magnesium [[id number]]  (Normal) Collected: 06/25/25 0310    Specimen: Blood Updated: 06/25/25 0405     Magnesium 1.9 mg/dL     High Sensitivity Troponin T 1Hr [[id number]]  (Abnormal) Collected: 06/25/25 0310    Specimen: Blood Updated: 06/25/25 0403     HS Troponin T 39 ng/L      Troponin T Numeric Delta -5 ng/L      Troponin T % Delta -11    Narrative:      High Sensitive Troponin T Reference Range:  <14.0 ng/L- Negative Female for AMI  <22.0 ng/L- Negative Male for AMI  >=14 - Abnormal Female indicating possible myocardial injury.  >=22 - Abnormal Male indicating possible myocardial injury.   Clinicians would have to utilize clinical acumen, EKG, Troponin, and serial changes to determine if it is an Acute Myocardial Infarction or myocardial injury due to an underlying chronic condition.         Phosphorus [[id number]]  (Normal) Collected: 06/25/25 0310    Specimen: Blood Updated: 06/25/25 0403     Phosphorus 3.0 mg/dL     POC Glucose Once [[id number]]  (Abnormal) Collected: 06/25/25 0330    Specimen: Blood Updated: 06/25/25 0331     Glucose 345 mg/dL     POC Glucose Once [[id number]]  (Abnormal) Collected: 06/25/25 0208    Specimen: Blood Updated: 06/25/25 0209     Glucose 458 mg/dL     POC Glucose Once [[id number]]  (Abnormal) Collected: 06/25/25 0208    Specimen: Blood Updated: 06/25/25 0209     Glucose 403 mg/dL     Osmolality, Serum [[id number]]  (Normal) Collected: 06/24/25 2346     Specimen: Blood Updated: 06/25/25 0152     Osmolality 292 mOsm/kg     COVID PRE-OP / PRE-PROCEDURE SCREENING ORDER (NO ISOLATION) - Swab, Nasopharynx [[id number]]  (Normal) Collected: 06/25/25 0040    Specimen: Swab from Nasopharynx Updated: 06/25/25 0119    Narrative:      The following orders were created for panel order COVID PRE-OP / PRE-PROCEDURE SCREENING ORDER (NO ISOLATION) - Swab, Nasopharynx.  Procedure                               Abnormality         Status                     ---------                               -----------         ------                     COVID-19 and FLU A/B PCR...[[id number]]  Normal              Final result                 Please view results for these tests on the individual orders.    COVID-19 and FLU A/B PCR, 1 HR TAT - Swab, Nasopharynx [[id number]]  (Normal) Collected: 06/25/25 0040    Specimen: Swab from Nasopharynx Updated: 06/25/25 0119     COVID19 Not Detected     Influenza A PCR Not Detected     Influenza B PCR Not Detected    Narrative:      Fact sheet for providers: https://www.fda.gov/media/465280/download    Fact sheet for patients: https://www.fda.gov/media/678508/download    Test performed by PCR.    Hemoglobin A1c [[id number]]  (Abnormal) Collected: 06/24/25 2340    Specimen: Blood Updated: 06/25/25 0117     Hemoglobin A1C 14.20 %     Narrative:      Hemoglobin A1C Ranges:    Increased Risk for Diabetes  5.7% to 6.4%  Diabetes                     >= 6.5%  Diabetic Goal                < 7.0%    Phosphorus [[id number]]  (Normal) Collected: 06/24/25 2340    Specimen: Blood Updated: 06/25/25 0115     Phosphorus 2.9 mg/dL     Magnesium [[id number]]  (Normal) Collected: 06/24/25 2340    Specimen: Blood Updated: 06/25/25 0115     Magnesium 1.9 mg/dL     POC Glucose Once [[id number]]  (Abnormal) Collected: 06/25/25 0105    Specimen: Blood Updated: 06/25/25 0108     Glucose 557 mg/dL     Comprehensive Metabolic Panel [[id number]]  (Abnormal) Collected: 06/24/25 2340     Specimen: Blood Updated: 06/25/25 0044     Glucose 456 mg/dL      Comment: Confirmed/called        BUN 13.8 mg/dL      Creatinine 1.09 mg/dL      Sodium 125 mmol/L      Potassium 4.0 mmol/L      Chloride 87 mmol/L      CO2 10.6 mmol/L      Calcium 7.7 mg/dL      Total Protein 5.5 g/dL      Albumin 2.8 g/dL      ALT (SGPT) <5 U/L      AST (SGOT) 12 U/L      Alkaline Phosphatase 73 U/L      Total Bilirubin 0.4 mg/dL      Globulin 2.7 gm/dL      Comment: Calculated Result        A/G Ratio 1.0 g/dL      BUN/Creatinine Ratio 12.7     Anion Gap 27.4 mmol/L      eGFR 77.2 mL/min/1.73     Narrative:      GFR Categories in Chronic Kidney Disease (CKD)              GFR Category          GFR (mL/min/1.73)    Interpretation  G1                    90 or greater        Normal or high (1)  G2                    60-89                Mild decrease (1)  G3a                   45-59                Mild to moderate decrease  G3b                   30-44                Moderate to severe decrease  G4                    15-29                Severe decrease  G5                    14 or less           Kidney failure    (1)In the absence of evidence of kidney disease, neither GFR category G1 or G2 fulfill the criteria for CKD.    eGFR calculation 2021 CKD-EPI creatinine equation, which does not include race as a factor    Lactic Acid, Plasma [[id number]]  (Abnormal) Collected: 06/24/25 2340    Specimen: Blood Updated: 06/25/25 0043     Lactate 2.6 mmol/L      Comment: Falsely depressed results may occur on samples drawn from patients receiving N-Acetylcysteine (NAC) or Metamizole.  Confirmed/called       Procalcitonin [[id number]]  (Abnormal) Collected: 06/24/25 2340    Specimen: Blood Updated: 06/25/25 0037     Procalcitonin 0.87 ng/mL     Narrative:      As a Marker for Sepsis (Non-Neonates):    1. <0.5 ng/mL represents a low risk of severe sepsis and/or septic shock.  2. >2 ng/mL represents a high risk of severe sepsis and/or septic  "shock.    As a Marker for Lower Respiratory Tract Infections that require antibiotic therapy:    PCT on Admission    Antibiotic Therapy       6-12 Hrs later    >0.5                Strongly Recommended  >0.25 - <0.5        Recommended   0.1 - 0.25          Discouraged              Remeasure/reassess PCT  <0.1                Strongly Discouraged     Remeasure/reassess PCT    As 28 day mortality risk marker: \"Change in Procalcitonin Result\" (>80% or <=80%) if Day 0 (or Day 1) and Day 4 values are available. Refer to http://www.[facility]cal[facility]    Change in PCT <=80%  A decrease of PCT levels below or equal to 80% defines a positive change in PCT test result representing a higher risk for 28-day all-cause mortality of patients diagnosed with severe sepsis for septic shock.    Change in PCT >80%  A decrease of PCT levels of more than 80% defines a negative change in PCT result representing a lower risk for 28-day all-cause mortality of patients diagnosed with severe sepsis or septic shock.       TSH Rfx On Abnormal To Free T4 [[id number]]  (Normal) Collected: 06/24/25 2340    Specimen: Blood Updated: 06/25/25 0037     TSH 1.860 uIU/mL     BNP [[id number]]  (Normal) Collected: 06/24/25 2340    Specimen: Blood Updated: 06/25/25 0037     proBNP 314.0 pg/mL     Narrative:      This assay is used as an aid in the diagnosis of individuals suspected of having heart failure. It can be used as an aid in the diagnosis of acute decompensated heart failure (ADHF) in patients presenting with signs and symptoms of ADHF to the emergency department (ED). In addition, NT-proBNP of <300 pg/mL indicates ADHF is not likely.    Age Range Result Interpretation  NT-proBNP Concentration (pg/mL:      <50             Positive            >450                   Gray                 300-450                    Negative             <300    50-75           Positive            >900                  Gray                300-900                  " Negative            <300      >75             Positive            >1800                  Gray                300-1800                  Negative            <300    High Sensitivity Troponin T [[id number]]  (Abnormal) Collected: 06/24/25 2340    Specimen: Blood Updated: 06/25/25 0037     HS Troponin T 44 ng/L     Narrative:      High Sensitive Troponin T Reference Range:  <14.0 ng/L- Negative Female for AMI  <22.0 ng/L- Negative Male for AMI  >=14 - Abnormal Female indicating possible myocardial injury.  >=22 - Abnormal Male indicating possible myocardial injury.   Clinicians would have to utilize clinical acumen, EKG, Troponin, and serial changes to determine if it is an Acute Myocardial Infarction or myocardial injury due to an underlying chronic condition.         Ethanol [[id number]]  (Normal) Collected: 06/24/25 2340    Specimen: Blood Updated: 06/25/25 0037     Ethanol <10 mg/dL     Narrative:      Not for legal purposes.    Blood Culture - Blood, Wrist, Left [[id number]] Collected: 06/24/25 2348    Specimen: Blood from Wrist, Left Updated: 06/25/25 0026    Blood Culture - Blood, Arm, Left [[id number]] Collected: 06/24/25 2348    Specimen: Blood from Arm, Left Updated: 06/25/25 0026    [place] Draw [[id number]] Collected: 06/24/25 2340    Specimen: Blood Updated: 06/25/25 0015    Narrative:      The following orders were created for panel order [place] Draw.  Procedure                               Abnormality         Status                     ---------                               -----------         ------                     Green Top (Gel)[[id number]]                                  Final result               Lavender Top[[id number]]                                     Final result               Gold Top - SST[[id number]]                                   Final result               Multani Top[[id number]]                                         Final result               Light Blue Top[[id number]]                                    Final result                 Please view results for these tests on the individual orders.    Green Top (Gel) [[id number]] Collected: 06/24/25 2340    Specimen: Blood Updated: 06/25/25 0015     Extra Tube Hold for add-ons.     Comment: Auto resulted.       Lavender Top [[id number]] Collected: 06/24/25 2340    Specimen: Blood Updated: 06/25/25 0015     Extra Tube hold for add-on     Comment: Auto resulted       Gold Top - SST [[id number]] Collected: 06/24/25 2340    Specimen: Blood Updated: 06/25/25 0015     Extra Tube Hold for add-ons.     Comment: Auto resulted.       Multani Top [[id number]] Collected: 06/24/25 2340    Specimen: Blood Updated: 06/25/25 0015     Extra Tube Hold for add-ons.     Comment: Auto resulted.       Light Blue Top [[id number]] Collected: 06/24/25 2340    Specimen: Blood Updated: 06/25/25 0015     Extra Tube Hold for add-ons.     Comment: Auto resulted       CBC & Differential [[id number]]  (Abnormal) Collected: 06/24/25 2340    Specimen: Blood Updated: 06/25/25 0011    Narrative:      The following orders were created for panel order CBC & Differential.  Procedure                               Abnormality         Status                     ---------                               -----------         ------                     CBC Auto Differential[[id number]]        Abnormal            Final result                 Please view results for these tests on the individual orders.    CBC Auto Differential [[id number]]  (Abnormal) Collected: 06/24/25 2340    Specimen: Blood Updated: 06/25/25 0011     WBC 15.55 10*3/mm3      RBC 4.35 10*6/mm3      Hemoglobin 11.2 g/dL      Hematocrit 35.1 %      MCV 80.7 fL      MCH 25.7 pg      MCHC 31.9 g/dL      RDW 14.1 %      RDW-SD 41.5 fl      MPV 10.4 fL      Platelets 425 10*3/mm3      Neutrophil % 91.6 %      Lymphocyte % 3.7 %      Monocyte % 2.4 %      Eosinophil % 0.1 %      Basophil % 0.6 %      Immature Grans % 1.6 %      Neutrophils, Absolute 14.25  10*3/mm3      Lymphocytes, Absolute 0.58 10*3/mm3      Monocytes, Absolute 0.37 10*3/mm3      Eosinophils, Absolute 0.01 10*3/mm3      Basophils, Absolute 0.09 10*3/mm3      Immature Grans, Absolute 0.25 10*3/mm3      nRBC 0.0 /100 WBC     Ketone Bodies, Serum (Not performed at [place]) [[id number]] Collected: 06/24/25 2340    Specimen: Blood Updated: 06/25/25 0007    Narrative:      The following orders were created for panel order Ketone Bodies, Serum (Not performed at [place]).  Procedure                               Abnormality         Status                     ---------                               -----------         ------                     Beta Hydroxybutyrate Arvind...[[id number]]                      In process                   Please view results for these tests on the individual orders.    Beta Hydroxybutyrate Quantitative [[id number]] Collected: 06/24/25 2340    Specimen: Blood Updated: 06/25/25 0007    Blood Gas, Arterial With Co-Ox [[id number]]  (Abnormal) Collected: 06/24/25 2340    Specimen: Arterial Blood Updated: 06/24/25 2341     Site Right Brachial     Roberto's Test Positive     pH, Arterial 7.394 pH units      pCO2, Arterial 16.4 mm Hg      Comment: 85 Value below critical limit        pO2, Arterial 77.6 mm Hg      Comment: 84 Value below reference range        HCO3, Arterial 10.0 mmol/L      Base Excess, Arterial -12.5 mmol/L      Hemoglobin, Blood Gas 11.5 g/dL      Comment: 84 Value below reference range        Hematocrit, Blood Gas 35.4 %      Oxyhemoglobin 95.2 %      Methemoglobin 0.20 %      Carboxyhemoglobin 1.5 %      CO2 Content 10.5 mmol/L      Temperature 37.0     Barometric Pressure for Blood Gas --     Comment: N/A        Modality Nasal Cannula     FIO2 28 %      Rate 0 Breaths/minute      PIP 0 cmH2O      Comment: Meter: [id number]     :  [id number]        IPAP 0     EPAP 0     Notified Denise MENDOZA MD     Notified By [id number]     Notified Time 06/24/2025 23:40      pH, Temp Corrected 7.394 pH Units      pCO2, Temperature Corrected 16.4 mm Hg      pO2, Temperature Corrected 77.6 mm Hg           Imaging Results (All)       Procedure Component Value Units Date/Time    CT Head Without Contrast [[id number]] Collected: 06/25/25 0516     Updated: 06/25/25 0522    Narrative:      CT HEAD WO CONTRAST    Date of Exam: 6/25/2025 4:53 AM EDT    Indication: AMS, found down at home.    Comparison: CT scan of the head dated November 25, 2024    Technique: Axial CT images were obtained of the head without contrast administration.  Automated exposure control and iterative construction methods were used.      Findings:  There is moderate diffuse generalized atrophy. There are low-attenuation areas in the periventricular white matter consistent with chronic microvascular ischemic change. There is no mass, mass effect or midline shift. There are no abnormal extra-axial   fluid collections or areas of acute hemorrhage. The paranasal sinuses are clear. The mastoid air cells are clear.          Impression:      Impression:  Atrophy and chronic microvascular ischemic change. No acute intracranial process.            Electronically Signed: Horacio Bailey MD    6/25/2025 5:19 AM EDT    Workstation ID: [id number]    XR Chest 1 View [[id number]] Collected: 06/25/25 0011     Updated: 06/25/25 0015    Narrative:      XR CHEST 1 VW    Date of Exam: 6/24/2025 11:46 PM EDT    Indication: ams    Comparison: 11/25/2024    Findings:  Cardiac and mediastinal contours are normal. Pulmonary vascularity is normal. The lungs are clear. No pneumothorax.      Impression:      No active disease.        Electronically Signed: Atul Shrestha MD    6/25/2025 12:11 AM EDT    Workstation ID: [id number]          Physician Progress Notes (all)    No notes of this type exist for this encounter.       Consult Notes (all)    No notes of this type exist for this encounter.

## 2025-06-25 NOTE — PLAN OF CARE
Goal Outcome Evaluation:     Resolution criteria met for discontinuation of Glucommander. SQ lantus 25 units given at 18:30, night shift to stop insulin infusion and discontinue Glucommander protocol at 20:30 this evening. Pt is alert and oriented x4, talkative and pleasant. Vitals stable, occasional sleep apnea with SpO2 drop into 80s, 2 lpm nasal canula applied PRN. Bustos placed by urology with purulent urine output, specimens sent. IVs removed/replaced per protocol. WOC recs in for numerous skin lesions and pressure ulcers.
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
## 2025-06-25 NOTE — CONSULTS
[facility]   HISTORY AND PHYSICAL    Patient Name: Fracisco Mullen  : 1965  MRN: [id number]  Primary Care Physician:  Brandy Roberson MD  Date of admission: 2025    Subjective   Subjective     Chief Complaint: difficult ledesma    HPI:    Fracisco Mullen is a 59 y.o. male currently admitted for management of DKA. He has had difficulty urinating and not been able to void since last night. A ledesma catheter was attempted to be placed by nursing staff but due to phimosis unable to place.         Review of Systems   The following systems were reviewed and negative;  constitution, eyes, ENT, respiratory, cardiovascular, gastrointestinal, genitourinary, integument, breast, hematologic / lymphatic, musculoskeletal, neurological, behavioral/psych, endocrine, and allergies / immunologic.    Personal History     Past Medical History:   Diagnosis Date    Acute renal failure     Hx of    Obesity     Hx of    Sleep apnea     Type 2 diabetes mellitus        Past Surgical History:   Procedure Laterality Date    BACK SURGERY      lower back    BELOW KNEE AMPUTATION Left 2024    Procedure: BELOW-KNEE AMPUTATION LEFT;  Surgeon: Dru Gan Jr., MD;  Location: [facility] OR;  Service: Orthopedics;  Laterality: Left;    CHOLECYSTECTOMY WITH INTRAOPERATIVE CHOLANGIOGRAM N/A 2021    Procedure: CHOLECYSTECTOMY LAPAROSCOPIC INTRAOPERATIVE CHOLANGIOGRAM;  Surgeon: Juventino Hooker MD;  Location: [facility] OR;  Service: General;  Laterality: N/A;    ERCP N/A 2021    Procedure: ENDOSCOPIC RETROGRADE CHOLANGIOPANCREATOGRAPHY;  Surgeon: Jeremy Dowell MD;  Location: [facility] ENDOSCOPY;  Service: Gastroenterology;  Laterality: N/A;  with sphiincterotomy and balloon sweep with 9mm-12mm balloon    INCISION AND DRAINAGE FOOT Left 8/3/2024    Procedure: HEAL IRRIGATION AND DEBRIDEMENT LEFT;  Surgeon: Dru Gan Jr., MD;  Location: [facility] OR;  Service: Orthopedics;  Laterality: Left;    PLACEMENT OF WOUND  VAC Left 8/3/2024    Procedure: PLACEMENT OF WOUND VAC;  Surgeon: Dru Gan Jr., MD;  Location: [facility];  Service: Orthopedics;  Laterality: Left;       Family History: family history includes Cancer in his brother, maternal grandmother, mother, and another family member; Diabetes in an other family member; Heart attack in an other family member; Heart disease in his brother and father; Hyperlipidemia in his mother and another family member; Hypertension in his mother and another family member; Obesity in an other family member. Otherwise pertinent FHx was reviewed and not pertinent to current issue.    Social History:  reports that he has quit smoking. His smoking use included cigars. He has never used smokeless tobacco. He reports that he does not drink alcohol and does not use drugs.    Home Medications:  DULoxetine, Insulin Pen Needle, OneTouch Delica Plus [id number], OneTouch Verio Flex System, acetaminophen, aspirin, calcium carbonate, cyclobenzaprine, finasteride, fluticasone, glucose blood, insulin aspart prot & aspart, levocetirizine, metoprolol succinate XL, omeprazole, pregabalin, rOPINIRole, simvastatin, and tamsulosin      Allergies:  Allergies   Allergen Reactions    Sertraline Hcl Hives     Zoloft       Objective   Objective     Vitals:   Temp:  [97.6 °F (36.4 °C)-102.8 °F (39.3 °C)] 97.6 °F (36.4 °C)  Heart Rate:  [] 84  Resp:  [16-37] 16  BP: ()/(54-83) 96/65  Flow (L/min) (Oxygen Therapy):  [2] 2  Physical Exam    Constitutional: Awake in bed, alert    Eyes: PERRLA, sclerae anicteric, no conjunctival injection   HENT: Normocephalic, atraumatic, mucous membranes moist   Neck: Supple, trachea midline   Respiratory:Equal chest rise, non-labored respirations    Cardiovascular: RRR, palpable radial pulses bilaterally   Gastrointestinal: Soft, nontender, non-distended, no flank pain to palpation    Musculoskeletal: No bilateral ankle edema, no clubbing or cyanosis to  extremities   Genitourinary: chronic irritation of the penis and scrotum, likely changes related to urine dribbling. Severe phimosis, unable to retract foreskin to see any part of glans.    Psychiatric: Appropriate affect, cooperative   Neurologic: Oriented x 3, Cranial Nerves grossly intact, speech clear   Skin: No rashes     Result Review    Result Review:  I have personally reviewed the results from the time of this admission to 6/25/2025 15:08 EDT and agree with these findings:  [x]  Laboratory  []  Microbiology  []  Radiology  []  EKG/Telemetry   []  Cardiology/Vascular   []  Pathology  []  Old records  []  Other:    Most notable findings include:   Cr 0.77    Assessment & Plan   Assessment / Plan     Brief Patient Summary:  Fracisco ADAM Lady is a 59 y.o. male who is admitted for management of DKA and found to be in urinary retention. Urology was consulted for catheter placement due to severe phimosis. A 14Fr catheter was able to be placed without significant difficulty with return of 750cc of purulent urine.     Active Hospital Problems:  Active Hospital Problems    Diagnosis     DKA (diabetic ketoacidosis)      Plan:   -Continue ledesma catheter; primary team can perform voiding trial prior to discharge  -The patient would benefit from circumcision vs dorsal slit; will have him follow up in the Urology clinic to discuss this further  -Recommend obtaining urine culture due to purulent urine and starting on empiric antibiotics for UTI  -Please contact Urology for questions or concerns.      VTE Prophylaxis:  Mechanical VTE prophylaxis orders are present.        CODE STATUS:    Code Status (Patient has no pulse and is not breathing): CPR (Attempt to Resuscitate)  Medical Interventions (Patient has pulse or is breathing): Full Support      Electronically signed by Eleazar Pham MD, 06/25/25, 3:08 PM EDT.

## 2025-06-25 NOTE — PROGRESS NOTES
Pulmonary/Critical Care Follow-up     LOS: 0 days   Patient Care Team:  Brandy Roberson MD as PCP - General (Family Medicine)  Varun Fontenot MD as Consulting Physician (Cardiology)  Yulissa Taveras APRN as Nurse Practitioner (Cardiology)      Chief Complaint   Patient presents with    Shortness of Breath     Subjective     History reviewed and updated in EMR as indicated.    Interval History:     Patient is overall feeling better.  Anion gap has closed.  Blood culture grew Klebsiella.    History taken from: Patient, chart, staff    PMH/FH/Social History were reviewed and updated appropriately in the electronic medical record.     Review of Systems:    Review of 14 systems was completed with positives and pertinent negatives noted in the subjective section.  All other systems reviewed and are negative.         Objective     Vital Signs  Temp:  [97.5 °F (36.4 °C)-102.8 °F (39.3 °C)] 97.5 °F (36.4 °C)  Heart Rate:  [] 84  Resp:  [16-37] 16  BP: ()/(54-83) 96/65  06/24 0701 - 06/25 0700  In: 783.1 [I.V.:683.1]  Out: -   Body mass index is 26.93 kg/m².     IV drips:  dextrose 5 % and sodium chloride 0.45 %  dextrose 5 % and sodium chloride 0.45 % with KCl 20 mEq/L  dextrose 5 % and sodium chloride 0.45 % with KCl 40 mEq/L  dextrose 5 % and sodium chloride 0.9 %  dextrose 5 % and sodium chloride 0.9 % with KCl 20 mEq, Last Rate: 125 mL/hr (06/25/25 1039)  dextrose 5% and sodium chloride 0.9% with KCl 40 mEq/L  insulin, Last Rate: 11.2 Units/hr (06/25/25 1708)  norepinephrine  sodium chloride 0.45 % 1,000 mL with potassium chloride 40 mEq infusion  sodium chloride  sodium chloride 0.45 % with KCl 20 mEq  sodium chloride  sodium chloride 0.9 % with KCl 20 mEq, Last Rate: 200 mL/hr (06/25/25 0319)  sodium chloride 0.9 % with KCl 40 mEq/L       Physical Exam:     Constitutional:   Alert, in no acute distress   Head:   Normocephalic, atraumatic   Eyes:           Lids and lashes normal, conjunctivae  and sclerae normal.  PER   ENMT:  Ears appear intact with no abnormalities noted     Lips normal.     Neck:  Trachea midline, no JVD   Lungs/Resp:    Normal effort, symmetric chest rise, no crepitus, clear to      auscultation bilaterally.               Heart/CV:   Regular rhythm and normal rate, no murmur   Abdomen/GI:    Soft, nontender, nondistended   :    Deferred   Extremities/MSK:  No clubbing or cyanosis.  No edema.  Patient is 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   Pulses:  Pulses palpable and equal bilaterally   Skin:  No bleeding, bruising or rash.  Multiple skin lesions.   Heme/Lymph:  No cervical or supraclavicular adenopathy.   Neurologic:    Psychiatric:    Moves all extremities with no obvious focal motor deficit.  Cranial nerves 2 - 12 grossly intact  Non-agitated, normal affect.    The above physical exam findings were reviewed and reflect my exam findings as of today's exam.   Electronically signed by:  Isaias De Luan MD  06/25/25  17:29 EDT      Results Review:     I reviewed the patient's new clinical results.   Results from last 7 days   Lab Units 06/25/25  1222 06/25/25  0830 06/25/25  0310 06/24/25  2340   SODIUM mmol/L 131* 130* 126* 125*   POTASSIUM mmol/L 4.2 3.8 3.4* 4.0   CHLORIDE mmol/L 101 98 92* 87*   CO2 mmol/L 19.9* 20.0* 11.1* 10.6*   BUN mg/dL 14.2 14.6 15.1 13.8   CREATININE mg/dL 0.77 0.77 1.09 1.09   CALCIUM mg/dL 7.5* 7.4* 7.7* 7.7*   BILIRUBIN mg/dL  --   --   --  0.4   ALK PHOS U/L  --   --   --  73   ALT (SGPT) U/L  --   --   --  <5   AST (SGOT) U/L  --   --   --  12   GLUCOSE mg/dL 258* 276* 409* 456*     Results from last 7 days   Lab Units 06/24/25  2340   WBC 10*3/mm3 15.55*   HEMOGLOBIN g/dL 11.2*   HEMATOCRIT % 35.1*   PLATELETS 10*3/mm3 425     Results from last 7 days   Lab Units 06/24/25  2340   PH, ARTERIAL pH units 7.394   PO2 ART mm Hg 77.6*   PCO2, ARTERIAL mm Hg 16.4*   HCO3 ART mmol/L 10.0*     Results from last 7  days   Lab Units 06/25/25  1222 06/25/25  0830 06/25/25  0310   MAGNESIUM mg/dL 1.8 1.9 1.9   PHOSPHORUS mg/dL 1.6* 2.4* 3.0       I reviewed the patient's new imaging including images and reports.    Medication Review:   cefepime, 2,000 mg, Intravenous, Q8H  mupirocin, 1 Application, Each Nare, BID      dextrose 5 % and sodium chloride 0.45 %, 125 mL/hr  dextrose 5 % and sodium chloride 0.45 % with KCl 20 mEq/L, 125 mL/hr  dextrose 5 % and sodium chloride 0.45 % with KCl 40 mEq/L, 125 mL/hr  dextrose 5 % and sodium chloride 0.9 %, 125 mL/hr  dextrose 5 % and sodium chloride 0.9 % with KCl 20 mEq, 125 mL/hr, Last Rate: 125 mL/hr (06/25/25 1039)  dextrose 5% and sodium chloride 0.9% with KCl 40 mEq/L, 125 mL/hr  insulin, 0-100 Units/hr, Last Rate: 11.2 Units/hr (06/25/25 1708)  norepinephrine, 0.02-0.3 mcg/kg/min  sodium chloride 0.45 % 1,000 mL with potassium chloride 40 mEq infusion, 200 mL/hr  sodium chloride, 200 mL/hr  sodium chloride 0.45 % with KCl 20 mEq, 200 mL/hr  sodium chloride, 200 mL/hr  sodium chloride 0.9 % with KCl 20 mEq, 200 mL/hr, Last Rate: 200 mL/hr (06/25/25 0319)  sodium chloride 0.9 % with KCl 40 mEq/L, 200 mL/hr        Assessment & Plan         DKA (diabetic ketoacidosis)    59 y.o. with history of of T2DM, PAD status post left AKA and right second toe amputation, ERIKA, presented to [facility] on 6/24/2025 with AMS.  Labs concerning for glucose of 456, anion gap of 27 and patient was admitted to the ICU with DKA.  Unable to place Bustos catheter initially and urology placed.  Urine concerning for UTI.  Blood culture positive for Klebsiella.    Plan:  1.  For sepsis due to urinary tract infection with Klebsiella: Continue antibiotics and will change cefepime to ceftriaxone.  Adequate hemodynamics currently so will monitor.  2.  For DKA: Transition insulin drip to subcutaneous protocol.  3.  For poor living conditions: Consult .  4.  For DVT prophylaxis: Subcutaneous heparin.    Patient  is critically ill secondary to sepsis and DKA and has high risk of life-threatening decompensation in condition.   As such, the patient requires continuous monitoring and frequent reassessment for consideration of adjustment in management to minimize this risk.  I personally reassessed the patient multiple times today.    Critical care time : 42 minutes spent by me personally and independently.(This excludes time spent performing separately reportable procedures and services).  Critical care time includes high complexity decision making to assess, manipulate, and support vital organ system failure in this individual who has impairment of one or more vital organ systems such that there is a high probability of imminent or life threatening deterioration in the patient’s condition.    Electronically signed by:    Isaias De Luna MD  06/25/25  17:29 EDT      *. Please note that portions of this note were completed with [employer] - a voice recognition program.

## 2025-06-25 NOTE — H&P
"INTENSIVIST   PROGRESS NOTE          SUBJECTIVE     Fracisco Jewell\" 59 y.o. male is followed for:Shortness of Breath       * No active hospital problems. *    As an Intensivist, we provide an integrated approach to the ICU patient and family, medical management of comorbid conditions, including but not limited to electrolytes, glycemic control, organ dysfunction, lead interdisciplinary rounds and coordinate the care with all other services, including those from other specialists.     HPI:  Fracisco Schreiber" is a 59 y.o. male with PMH DMII,  who presented to this Hospital on 6/24/2025 because of AMS. Patient was found by family at home minimally responsive and EMS was called. Patient found to be poorly taken care of as he had feces in his depends as well as all over his body as well as ants on him. Family states he has been hospitalized for DKA in past and hasn't been taking his meds for the past few days. In the ED, CXR negative. Labs signif for white blood count 15, Pro-Oscar 0.87, lactic acid 2.6, troponin 44, glucose 456, bicarb 10, anion gap 27.  ABG 7.3 9/16/77 on 2L NC. Given NS 30cc/kg and zosyn. DKA protocol with insulin and fluid exchanges started.  Patient seen at bedside. Awakes to verbal stimulation. Follows commands, no focal deficits but is confused. Unable to elicit history and no family bedside.      PMH: He  has a past medical history of Acute renal failure, Obesity, Sleep apnea, and Type 2 diabetes mellitus.   PSxH: He  has a past surgical history that includes Back surgery; cholecystectomy with intraoperative cholangiogram (N/A, 1/6/2021); ERCP (N/A, 1/9/2021); Incision and drainage foot (Left, 8/3/2024); PLACEMENT OF WOUND VAC (Left, 8/3/2024); and Leg amputation, lower tibia/fibula (Left, 11/22/2024).     Medications:  No current facility-administered medications on file prior to encounter.     Current Outpatient Medications on File Prior to Encounter   Medication Sig    acetaminophen (TYLENOL) 325 MG " "tablet Take 2 tablets by mouth Every 4 (Four) Hours As Needed for Mild Pain, Moderate Pain, Headache or Fever.    aspirin 81 MG EC tablet Take 1 tablet by mouth Daily.    Blood Glucose Monitoring Suppl (Blood Glucose Monitor System) w/Device kit Use as directed to test blood sugar 3 (Three) Times a Day.    calcium carbonate (TUMS) 500 MG chewable tablet Chew 2 tablets 3 (Three) Times a Day As Needed for Indigestion or Heartburn.    cyclobenzaprine (FLEXERIL) 5 MG tablet Take 1 tablet by mouth 3 (Three) Times a Day As Needed.    DULoxetine (CYMBALTA) 60 MG capsule Take 1 capsule by mouth Daily.    finasteride (PROSCAR) 5 MG tablet Take 1 tablet by mouth Every Night.    fluticasone (Flonase) 50 MCG/ACT nasal spray Administer 2 sprays into the nostril(s) as directed by provider 2 (Two) Times a Day As Needed for Rhinitis or Allergies.    glucose blood test strip Use as directed to test blood sugar 3 times daily    insulin aspart prot & aspart (NovoLOG Mix 70/30 FlexPen) (70-30) 100 UNIT/ML suspension pen-injector injection Inject 0.3 mL under the skin into the appropriate area as directed 2 (Two) Times a Day Before Meals.    Insulin Pen Needle (B-D UF III MINI PEN NEEDLES) 31G X 5 MM misc Use as directed 2 times daily    Insulin Pen Needle (Pen Needles 3/16\") 31G X 5 MM misc Use 1 pen needle as directed 2 (Two) Times a Day.    Lancets misc Use as directed to test blood sugar 3 (Three) Times a Day.    levocetirizine (XYZAL) 5 MG tablet Take 1 tablet by mouth Daily.    metoprolol succinate XL (TOPROL-XL) 50 MG 24 hr tablet Take 1 tablet by mouth Daily.    omeprazole (priLOSEC) 40 MG capsule Take 1 capsule by mouth Every Morning Before Breakfast.    pregabalin (Lyrica) 225 MG capsule Take 1 capsule by mouth 2 (Two) Times a Day.    rOPINIRole (REQUIP) 0.25 MG tablet Take 1 tablet by mouth Every Night, 1 hour before bedtime.    simvastatin (ZOCOR) 20 MG tablet Take 1 tablet by mouth Daily.    tamsulosin (FLOMAX) 0.4 MG " capsule 24 hr capsule Take 2 capsules by mouth Every Night.        Allergies: He is allergic to sertraline hcl.   FH: His family history includes Cancer in his brother, maternal grandmother, mother, and another family member; Diabetes in an other family member; Heart attack in an other family member; Heart disease in his brother and father; Hyperlipidemia in his mother and another family member; Hypertension in his mother and another family member; Obesity in an other family member.   SH: He  reports that he has quit smoking. His smoking use included cigars. He has never used smokeless tobacco. He reports that he does not drink alcohol and does not use drugs.     The patient's relevant past medical, surgical and social history were reviewed and updated in Epic as appropriate.                 Review of Systems  As described in the HPI.       OBJECTIVE     Vitals:  Temp: (!) 102.8 °F (39.3 °C) (25) Temp  Min: 102.8 °F (39.3 °C)  Max: 102.8 °F (39.3 °C)   Temp core:      BP: 108/59 (258) BP  Min: 79/55  Max: 111/61   MAP (non-invasive) Noninvasive MAP (mmHg): 75 (25) Noninvasive MAP (mmHg)  Av.4  Min: 64  Max: 75   Pulse: 100 (25 0113) Pulse  Min: 100  Max: 120   Resp: 22 (255) Resp  Min: 22  Max: 37   SpO2: 97 % (253) SpO2  Min: 94 %  Max: 97 %   Device: nasal cannula (25)    Flow Rate: 2 (25) Flow (L/min) (Oxygen Therapy)  Min: 2  Max: 2         25   Weight: 79.5 kg (175 lb 4.3 oz)        Intake/Ouptut 24 hrs (7:00AM - 6:59 AM)  Intake & Output (last 2 days)       None            Medications (drips):  dextrose 5 % and sodium chloride 0.45 %  dextrose 5 % and sodium chloride 0.45 % with KCl 20 mEq/L  dextrose 5 % and sodium chloride 0.45 % with KCl 40 mEq/L  dextrose 5 % and sodium chloride 0.9 %  dextrose 5 % and sodium chloride 0.9 % with KCl 20 mEq  dextrose 5% and sodium chloride 0.9% with KCl 40 mEq/L  insulin, Last  Rate: 5 Units/hr (06/25/25 0112)  norepinephrine  sodium chloride 0.45 % 1,000 mL with potassium chloride 40 mEq infusion  sodium chloride  sodium chloride 0.45 % with KCl 20 mEq  sodium chloride  sodium chloride  sodium chloride 0.9 % with KCl 20 mEq  sodium chloride 0.9 % with KCl 40 mEq/L           Physical Examination  Telemetry:  Rhythm: sinus tachycardia (06/24/25 2345)         Constitutional:  NAD   Cardiovascular: Sinus tachy.    Respiratory: Normal breath sounds  No adventitious sounds   Abdominal:  Soft with no tenderness.   Extremities: No Edema, L BKA, multiple scratches on both extremities w/o any open wounds noted    Neurological:   No focal deficits, awakes to voice, follows commands, confused.           Lines, Drains & Airways       Active LDAs       Name Placement date Placement time Site Days    Peripheral IV Anterior;Right Wrist --  --  Wrist  --    Peripheral IV 06/24/25 2340 18 G Anterior;Distal;Left;Upper Arm 06/24/25  2340  Arm  less than 1    Peripheral IV 06/24/25 2341 18 G Right Antecubital 06/24/25  2341  Antecubital  less than 1    Urethral Catheter Coude 11/26/24  0440  -- 210                    Results Reviewed:  Laboratory  Microbiology  Radiology  Pathology    Hematology:  Results from last 7 days   Lab Units 06/24/25  2340   WBC 10*3/mm3 15.55*   HEMOGLOBIN g/dL 11.2*   MCV fL 80.7   PLATELETS 10*3/mm3 425     Results from last 7 days   Lab Units 06/24/25  2340   NEUTROS ABS 10*3/mm3 14.25*   LYMPHS ABS 10*3/mm3 0.58*   EOS ABS 10*3/mm3 0.01     Chemistry:  Estimated Creatinine Clearance: 67.7 mL/min (by C-G formula based on SCr of 1.09 mg/dL).    Results from last 7 days   Lab Units 06/24/25  2340   SODIUM mmol/L 125*   POTASSIUM mmol/L 4.0   CHLORIDE mmol/L 87*   CO2 mmol/L 10.6*   BUN mg/dL 13.8   CREATININE mg/dL 1.09   GLUCOSE mg/dL 456*     Results from last 7 days   Lab Units 06/24/25  2340   CALCIUM mg/dL 7.7*   MAGNESIUM mg/dL 1.9   PHOSPHORUS mg/dL 2.9       Hepatic  "Panel:  Results from last 7 days   Lab Units 06/24/25  2340   ALBUMIN g/dL 2.8*   TOTAL PROTEIN g/dL 5.5*   BILIRUBIN mg/dL 0.4   AST (SGOT) U/L 12   ALT (SGPT) U/L <5   ALK PHOS U/L 73        Coagulation Labs:         Cardiac Labs:  Results from last 7 days   Lab Units 06/24/25  2340   PROBNP pg/mL 314.0   HSTROP T ng/L 44*       Biomarkers:  Results from last 7 days   Lab Units 06/24/25  2340   LACTATE mmol/L 2.6*   PROCALCITONIN ng/mL 0.87*       U/A              COVID-19  Lab Results   Component Value Date    COVID19 Not Detected 06/25/2025    COVID19 Not Detected 11/26/2024    COVID19 Not Detected 01/20/2021    COVID19 Not Detected 01/05/2021    COVID19 Not Detected 01/05/2021       Arterial Blood Gases:  Results from last 7 days   Lab Units 06/24/25  2340   PH, ARTERIAL pH units 7.394   PCO2, ARTERIAL mm Hg 16.4*   PO2 ART mm Hg 77.6*   FIO2 % 28       Images:  XR Chest 1 View  Result Date: 6/25/2025  No active disease. Electronically Signed: Atul Shrestha MD  6/25/2025 12:11 AM EDT  Workstation ID: [id number]        Results: Reviewed.  I reviewed the patient's new laboratory and imaging results.  I independently reviewed the patient's new images.    Medications: Reviewed.    Assessment    A/P     Hospital:  LOS: 0 days   ICU: Patient does not have an ICU stay during this admission.       Fracisco \"Sterling\" is a 59 y.o. 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 Titrate supplemental oxygen for goal O2 sat >92%   CXR negative    Cardiovascular  LA 2.6, repeat pending. S/p 30 cc/kg. Levophed ordered but not required currently for MAP goal >65.   Trop 44, repeat pending. . EKG reviewed, sinus tach " without ischemic changes   Neuro  CTH stat   UDS pending. Ethanol negative.    TSH wnl.   GI/Hepatology  NPO until AG closes   Renal  Cr 1.0, Mg K and phos wnl. Bicarb 10 but compensated. Follow BMP Mg phos q4hr and replete per protocol.   ID/Antibiotics  Cefepime empirically. UA pending. Follow blood cultures. MRSA pending. Covid/flu negative.   Hematology  Hgb and plts wnl. SCDs for DVT prophy.   Endocrine  Type 2 diabetes.. Based on AG and hyperglycemia with hx, assuming DKA although beta hydroxy and urine studies pending. DKA protocol with insulin gtt and fluid exchanges. BMP q4hr as above.       Lab Results   Lab Value Date/Time    HGBA1C 14.20 (H) 06/24/2025 2340    HGBA1C 14.10 (H) 11/15/2024 0942     Results from last 7 days   Lab Units 06/25/25  0105   GLUCOSE mg/dL 557*       Diet: NPO Diet NPO Type: Strict NPO  Orders Placed This Encounter      Patient is on Glucommander      Advance Directives: There are no questions and answers to display.        VTE Prophylaxis:  Mechanical VTE prophylaxis orders are present.         Disposition: Admit to ICU    Plan of care and goals reviewed during interdisciplinary rounds.  I discussed the patient's findings and my recommendations with patient and nursing staff    Time: 40 minutes of critical care provided. This time excludes other billable procedures. Time does include preparation of documents, medical consultations, review of old records, and direct bedside care. Patient is at high risk for life-threatening deterioration due to Diabetes Ketoacidosis.    He has a high risk of imminent or life-threatening  deterioration, which requires the highest level of physician preparedness to intervene urgently. I devoted my full attention to the direct care of this patient for the amount of time indicated above.     Time spent with family or surrogate(s) is included only if the patient was incapable of providing the necessary information or participating in medical decision  making.        Elisa Mckinnon MD  Pulmonary Critical Care Medicine

## 2025-06-26 LAB
ANION GAP SERPL CALCULATED.3IONS-SCNC: 10 MMOL/L (ref 5–15)
ANION GAP SERPL CALCULATED.3IONS-SCNC: 11 MMOL/L (ref 5–15)
ANION GAP SERPL CALCULATED.3IONS-SCNC: 8 MMOL/L (ref 5–15)
ANION GAP SERPL CALCULATED.3IONS-SCNC: 8.6 MMOL/L (ref 5–15)
ANION GAP SERPL CALCULATED.3IONS-SCNC: 9.1 MMOL/L (ref 5–15)
BACTERIA SPEC AEROBE CULT: NO GROWTH
BUN SERPL-MCNC: 10.3 MG/DL (ref 8–23)
BUN SERPL-MCNC: 12.9 MG/DL (ref 8–23)
BUN SERPL-MCNC: 7.3 MG/DL (ref 8–23)
BUN SERPL-MCNC: 8.2 MG/DL (ref 8–23)
BUN SERPL-MCNC: 8.9 MG/DL (ref 8–23)
BUN/CREAT SERPL: 13 (ref 7–25)
BUN/CREAT SERPL: 16.5 (ref 7–25)
BUN/CREAT SERPL: 17.1 (ref 7–25)
BUN/CREAT SERPL: 19.1 (ref 7–25)
BUN/CREAT SERPL: 23 (ref 7–25)
CALCIUM SPEC-SCNC: 7 MG/DL (ref 8.6–10.5)
CALCIUM SPEC-SCNC: 7.2 MG/DL (ref 8.6–10.5)
CALCIUM SPEC-SCNC: 7.6 MG/DL (ref 8.6–10.5)
CHLORIDE SERPL-SCNC: 100 MMOL/L (ref 98–107)
CHLORIDE SERPL-SCNC: 101 MMOL/L (ref 98–107)
CHLORIDE SERPL-SCNC: 102 MMOL/L (ref 98–107)
CO2 SERPL-SCNC: 19 MMOL/L (ref 22–29)
CO2 SERPL-SCNC: 20.9 MMOL/L (ref 22–29)
CO2 SERPL-SCNC: 21 MMOL/L (ref 22–29)
CO2 SERPL-SCNC: 22 MMOL/L (ref 22–29)
CO2 SERPL-SCNC: 22.4 MMOL/L (ref 22–29)
CREAT SERPL-MCNC: 0.48 MG/DL (ref 0.76–1.27)
CREAT SERPL-MCNC: 0.54 MG/DL (ref 0.76–1.27)
CREAT SERPL-MCNC: 0.54 MG/DL (ref 0.76–1.27)
CREAT SERPL-MCNC: 0.56 MG/DL (ref 0.76–1.27)
CREAT SERPL-MCNC: 0.56 MG/DL (ref 0.76–1.27)
EGFRCR SERPLBLD CKD-EPI 2021: 112.1 ML/MIN/1.73
EGFRCR SERPLBLD CKD-EPI 2021: 112.1 ML/MIN/1.73
EGFRCR SERPLBLD CKD-EPI 2021: 113.4 ML/MIN/1.73
EGFRCR SERPLBLD CKD-EPI 2021: 113.4 ML/MIN/1.73
EGFRCR SERPLBLD CKD-EPI 2021: 117.5 ML/MIN/1.73
GLUCOSE BLDC GLUCOMTR-MCNC: 114 MG/DL (ref 70–130)
GLUCOSE BLDC GLUCOMTR-MCNC: 171 MG/DL (ref 70–130)
GLUCOSE BLDC GLUCOMTR-MCNC: 176 MG/DL (ref 70–130)
GLUCOSE BLDC GLUCOMTR-MCNC: 229 MG/DL (ref 70–130)
GLUCOSE BLDC GLUCOMTR-MCNC: 72 MG/DL (ref 70–130)
GLUCOSE BLDC GLUCOMTR-MCNC: 74 MG/DL (ref 70–130)
GLUCOSE BLDC GLUCOMTR-MCNC: 93 MG/DL (ref 70–130)
GLUCOSE SERPL-MCNC: 147 MG/DL (ref 65–99)
GLUCOSE SERPL-MCNC: 207 MG/DL (ref 65–99)
GLUCOSE SERPL-MCNC: 223 MG/DL (ref 65–99)
GLUCOSE SERPL-MCNC: 84 MG/DL (ref 65–99)
GLUCOSE SERPL-MCNC: 92 MG/DL (ref 65–99)
MAGNESIUM SERPL-MCNC: 1.5 MG/DL (ref 1.6–2.4)
MAGNESIUM SERPL-MCNC: 1.6 MG/DL (ref 1.6–2.4)
MAGNESIUM SERPL-MCNC: 1.6 MG/DL (ref 1.6–2.4)
MAGNESIUM SERPL-MCNC: 1.7 MG/DL (ref 1.6–2.4)
MAGNESIUM SERPL-MCNC: 1.8 MG/DL (ref 1.6–2.4)
PHOSPHATE SERPL-MCNC: 1.6 MG/DL (ref 2.5–4.5)
PHOSPHATE SERPL-MCNC: 1.8 MG/DL (ref 2.5–4.5)
PHOSPHATE SERPL-MCNC: 2 MG/DL (ref 2.5–4.5)
PHOSPHATE SERPL-MCNC: 2.2 MG/DL (ref 2.5–4.5)
PHOSPHATE SERPL-MCNC: 2.6 MG/DL (ref 2.5–4.5)
POTASSIUM SERPL-SCNC: 3.9 MMOL/L (ref 3.5–5.2)
POTASSIUM SERPL-SCNC: 4.2 MMOL/L (ref 3.5–5.2)
POTASSIUM SERPL-SCNC: 4.4 MMOL/L (ref 3.5–5.2)
POTASSIUM SERPL-SCNC: 4.5 MMOL/L (ref 3.5–5.2)
POTASSIUM SERPL-SCNC: 4.7 MMOL/L (ref 3.5–5.2)
SODIUM SERPL-SCNC: 131 MMOL/L (ref 136–145)
SODIUM SERPL-SCNC: 132 MMOL/L (ref 136–145)
SODIUM SERPL-SCNC: 132 MMOL/L (ref 136–145)

## 2025-06-26 PROCEDURE — 82948 REAGENT STRIP/BLOOD GLUCOSE: CPT

## 2025-06-26 PROCEDURE — 99232 SBSQ HOSP IP/OBS MODERATE 35: CPT | Performed by: INTERNAL MEDICINE

## 2025-06-26 PROCEDURE — 83735 ASSAY OF MAGNESIUM: CPT | Performed by: EMERGENCY MEDICINE

## 2025-06-26 PROCEDURE — 25010000002 HEPARIN (PORCINE) PER 1000 UNITS: Performed by: INTERNAL MEDICINE

## 2025-06-26 PROCEDURE — 84100 ASSAY OF PHOSPHORUS: CPT | Performed by: EMERGENCY MEDICINE

## 2025-06-26 PROCEDURE — 63710000001 INSULIN GLARGINE PER 5 UNITS: Performed by: INTERNAL MEDICINE

## 2025-06-26 PROCEDURE — 25010000002 CEFTRIAXONE PER 250 MG: Performed by: INTERNAL MEDICINE

## 2025-06-26 PROCEDURE — 84100 ASSAY OF PHOSPHORUS: CPT | Performed by: INTERNAL MEDICINE

## 2025-06-26 PROCEDURE — 80048 BASIC METABOLIC PNL TOTAL CA: CPT | Performed by: EMERGENCY MEDICINE

## 2025-06-26 PROCEDURE — 25010000002 MAGNESIUM SULFATE 2 GM/50ML SOLUTION: Performed by: INTERNAL MEDICINE

## 2025-06-26 PROCEDURE — 25810000003 SODIUM CHLORIDE 0.9 % SOLUTION: Performed by: INTERNAL MEDICINE

## 2025-06-26 PROCEDURE — 25810000003 SODIUM CHLORIDE 0.9 % SOLUTION 250 ML FLEX CONT: Performed by: INTERNAL MEDICINE

## 2025-06-26 PROCEDURE — 63710000001 INSULIN LISPRO (HUMAN) PER 5 UNITS: Performed by: INTERNAL MEDICINE

## 2025-06-26 RX ORDER — DEXTROSE MONOHYDRATE, SODIUM CHLORIDE, AND POTASSIUM CHLORIDE 50; 1.49; 4.5 G/1000ML; G/1000ML; G/1000ML
75 INJECTION, SOLUTION INTRAVENOUS CONTINUOUS
Status: DISPENSED | OUTPATIENT
Start: 2025-06-26 | End: 2025-06-27

## 2025-06-26 RX ORDER — MAGNESIUM SULFATE HEPTAHYDRATE 40 MG/ML
2 INJECTION, SOLUTION INTRAVENOUS
Status: COMPLETED | OUTPATIENT
Start: 2025-06-26 | End: 2025-06-27

## 2025-06-26 RX ADMIN — CEFTRIAXONE 2000 MG: 2 INJECTION, POWDER, FOR SOLUTION INTRAMUSCULAR; INTRAVENOUS at 18:39

## 2025-06-26 RX ADMIN — HEPARIN SODIUM 5000 UNITS: 5000 INJECTION INTRAVENOUS; SUBCUTANEOUS at 06:10

## 2025-06-26 RX ADMIN — INSULIN LISPRO 12 UNITS: 100 INJECTION, SOLUTION INTRAVENOUS; SUBCUTANEOUS at 17:45

## 2025-06-26 RX ADMIN — MUPIROCIN 1 APPLICATION: 20 OINTMENT TOPICAL at 08:17

## 2025-06-26 RX ADMIN — MAGNESIUM SULFATE HEPTAHYDRATE 2 G: 40 INJECTION, SOLUTION INTRAVENOUS at 20:48

## 2025-06-26 RX ADMIN — MAGNESIUM SULFATE HEPTAHYDRATE 2 G: 40 INJECTION, SOLUTION INTRAVENOUS at 22:41

## 2025-06-26 RX ADMIN — POTASSIUM PHOSPHATE, MONOBASIC POTASSIUM PHOSPHATE, DIBASIC 15 MMOL: 224; 236 INJECTION, SOLUTION, CONCENTRATE INTRAVENOUS at 06:10

## 2025-06-26 RX ADMIN — INSULIN LISPRO 5 UNITS: 100 INJECTION, SOLUTION INTRAVENOUS; SUBCUTANEOUS at 08:09

## 2025-06-26 RX ADMIN — INSULIN LISPRO 12 UNITS: 100 INJECTION, SOLUTION INTRAVENOUS; SUBCUTANEOUS at 12:01

## 2025-06-26 RX ADMIN — POTASSIUM CHLORIDE 40 MEQ: 1500 TABLET, EXTENDED RELEASE ORAL at 00:22

## 2025-06-26 RX ADMIN — HEPARIN SODIUM 5000 UNITS: 5000 INJECTION INTRAVENOUS; SUBCUTANEOUS at 14:49

## 2025-06-26 RX ADMIN — INSULIN LISPRO 3 UNITS: 100 INJECTION, SOLUTION INTRAVENOUS; SUBCUTANEOUS at 12:01

## 2025-06-26 RX ADMIN — MUPIROCIN 1 APPLICATION: 20 OINTMENT TOPICAL at 20:48

## 2025-06-26 RX ADMIN — POTASSIUM CHLORIDE, DEXTROSE MONOHYDRATE AND SODIUM CHLORIDE 75 ML/HR: 150; 5; 450 INJECTION, SOLUTION INTRAVENOUS at 21:55

## 2025-06-26 RX ADMIN — INSULIN GLARGINE 25 UNITS: 100 INJECTION, SOLUTION SUBCUTANEOUS at 08:09

## 2025-06-26 RX ADMIN — POTASSIUM CHLORIDE 40 MEQ: 1500 TABLET, EXTENDED RELEASE ORAL at 02:55

## 2025-06-26 RX ADMIN — HEPARIN SODIUM 5000 UNITS: 5000 INJECTION INTRAVENOUS; SUBCUTANEOUS at 22:42

## 2025-06-26 RX ADMIN — INSULIN LISPRO 12 UNITS: 100 INJECTION, SOLUTION INTRAVENOUS; SUBCUTANEOUS at 08:10

## 2025-06-26 RX ADMIN — POTASSIUM PHOSPHATE, MONOBASIC POTASSIUM PHOSPHATE, DIBASIC 15 MMOL: 224; 236 INJECTION, SOLUTION, CONCENTRATE INTRAVENOUS at 00:22

## 2025-06-26 RX ADMIN — SODIUM PHOSPHATE, MONOBASIC, MONOHYDRATE AND SODIUM PHOSPHATE, DIBASIC, ANHYDROUS 15 MMOL: 142; 276 INJECTION, SOLUTION INTRAVENOUS at 14:49

## 2025-06-26 RX ADMIN — MAGNESIUM SULFATE HEPTAHYDRATE 2 G: 40 INJECTION, SOLUTION INTRAVENOUS at 16:45

## 2025-06-26 RX ADMIN — POTASSIUM PHOSPHATE, MONOBASIC POTASSIUM PHOSPHATE, DIBASIC 15 MMOL: 224; 236 INJECTION, SOLUTION, CONCENTRATE INTRAVENOUS at 02:55

## 2025-06-26 NOTE — PROGRESS NOTES
Pulmonary/Critical Care Follow-up     LOS: 1 day   Patient Care Team:  Brandy Roberson MD as PCP - General (Family Medicine)  Varun Fontenot MD as Consulting Physician (Cardiology)  Yulissa Taveras APRN as Nurse Practitioner (Cardiology)      Chief Complaint   Patient presents with    Shortness of Breath     Subjective     History reviewed and updated in EMR as indicated.    Interval History:     Patient is overall feeling better.  Tolerated transition to subcutaneous insulin protocol.  No new complaints.  Patient seen by wound care for pressure injury.    History taken from: Patient, chart, staff    PMH/FH/Social History were reviewed and updated appropriately in the electronic medical record.     Review of Systems:    Review of 14 systems was completed with positives and pertinent negatives noted in the subjective section.  All other systems reviewed and are negative.         Objective     Vital Signs  Temp:  [97.1 °F (36.2 °C)-97.8 °F (36.6 °C)] 97.4 °F (36.3 °C)  Heart Rate:  [83-97] 93  Resp:  [16-17] 17  BP: ()/(53-85) 119/67  06/25 0701 - 06/26 0700  In: 1742.8 [P.O.:100; I.V.:1642.8]  Out: 1425 [Urine:1425]  Body mass index is 26.43 kg/m².     IV drips:  dextrose 5 % and sodium chloride 0.45 %  dextrose 5 % and sodium chloride 0.45 % with KCl 20 mEq/L  dextrose 5 % and sodium chloride 0.45 % with KCl 40 mEq/L  dextrose 5 % and sodium chloride 0.9 %  dextrose 5 % and sodium chloride 0.9 % with KCl 20 mEq, Last Rate: Stopped (06/25/25 2048)  dextrose 5% and sodium chloride 0.9% with KCl 40 mEq/L  norepinephrine, Last Rate: Stopped (06/26/25 0700)       Physical Exam:     Constitutional:   Alert, in no acute distress   Head:   Normocephalic, atraumatic   Eyes:           Lids and lashes normal, conjunctivae and sclerae normal.  PER   ENMT:  Ears appear intact with no abnormalities noted     Lips normal.     Neck:  Trachea midline, no JVD   Lungs/Resp:    Normal effort, symmetric chest rise,  no crepitus, clear to      auscultation bilaterally.               Heart/CV:   Regular rhythm and normal rate, no murmur   Abdomen/GI:    Soft, nontender, nondistended   :    Deferred   Extremities/MSK:  No clubbing or cyanosis.  No edema.  Patient is 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   Pulses:  Pulses palpable and equal bilaterally   Skin:  No bleeding, bruising or rash.  Multiple skin lesions.   Heme/Lymph:  No cervical or supraclavicular adenopathy.   Neurologic:    Psychiatric:    Moves all extremities with no obvious focal motor deficit.  Cranial nerves 2 - 12 grossly intact  Non-agitated, normal affect.    The above physical exam findings were reviewed and reflect my exam findings as of today's exam.   Electronically signed by:  Isaias De Luna MD  06/26/25  19:26 EDT      Results Review:     I reviewed the patient's new clinical results.   Results from last 7 days   Lab Units 06/26/25  1601 06/26/25  1234 06/26/25  0819 06/25/25  0310 06/24/25  2340   SODIUM mmol/L 131* 131* 132*   < > 125*   POTASSIUM mmol/L 4.5 4.7 4.4   < > 4.0   CHLORIDE mmol/L 100 101 101   < > 87*   CO2 mmol/L 22.4 20.9* 21.0*   < > 10.6*   BUN mg/dL 8.2 8.9 10.3   < > 13.8   CREATININE mg/dL 0.48* 0.54* 0.54*   < > 1.09   CALCIUM mg/dL 7.6* 7.6* 7.2*   < > 7.7*   BILIRUBIN mg/dL  --   --   --   --  0.4   ALK PHOS U/L  --   --   --   --  73   ALT (SGPT) U/L  --   --   --   --  <5   AST (SGOT) U/L  --   --   --   --  12   GLUCOSE mg/dL 92 147* 223*   < > 456*    < > = values in this interval not displayed.     Results from last 7 days   Lab Units 06/24/25  2340   WBC 10*3/mm3 15.55*   HEMOGLOBIN g/dL 11.2*   HEMATOCRIT % 35.1*   PLATELETS 10*3/mm3 425     Results from last 7 days   Lab Units 06/24/25  2340   PH, ARTERIAL pH units 7.394   PO2 ART mm Hg 77.6*   PCO2, ARTERIAL mm Hg 16.4*   HCO3 ART mmol/L 10.0*     Results from last 7 days   Lab Units 06/26/25  1601 06/26/25  1234  06/26/25  0819   MAGNESIUM mg/dL 1.6 1.5* 1.6   PHOSPHORUS mg/dL 2.2* 1.6* 2.0*       I reviewed the patient's new imaging including images and reports.    Medication Review:   cefTRIAXone, 2,000 mg, Intravenous, Q24H  heparin (porcine), 5,000 Units, Subcutaneous, Q8H  insulin glargine, 25 Units, Subcutaneous, Q12H  Insulin Lispro, 12 Units, Subcutaneous, TID With Meals  insulin lispro, 3-14 Units, Subcutaneous, 4x Daily AC & at Bedtime  magnesium sulfate, 2 g, Intravenous, Q2H  mupirocin, 1 Application, Each Nare, BID      dextrose 5 % and sodium chloride 0.45 %, 125 mL/hr  dextrose 5 % and sodium chloride 0.45 % with KCl 20 mEq/L, 125 mL/hr  dextrose 5 % and sodium chloride 0.45 % with KCl 40 mEq/L, 125 mL/hr  dextrose 5 % and sodium chloride 0.9 %, 125 mL/hr  dextrose 5 % and sodium chloride 0.9 % with KCl 20 mEq, 125 mL/hr, Last Rate: Stopped (06/25/25 2048)  dextrose 5% and sodium chloride 0.9% with KCl 40 mEq/L, 125 mL/hr  norepinephrine, 0.02-0.3 mcg/kg/min, Last Rate: Stopped (06/26/25 0700)        Assessment & Plan         DKA (diabetic ketoacidosis)    59 y.o. with history of of T2DM, PAD status post left AKA and right second toe amputation, ERIKA, presented to [facility] with AMS.  Labs concerning for glucose of 456, anion gap of 27 and patient was admitted to the ICU with DKA.  Unable to place Bustos catheter initially and urology placed.  Urine concerning for UTI.  Blood culture positive for Klebsiella.    Urine culture shows no growth to date but unable to initially get urine as required urology to place Bustos catheter.  UA was suggestive of UTI and I suspect this was primary source of infection although patient also has wounds.    Plan:  1.  For sepsis due to urinary tract infection with Klebsiella: Continue ceftriaxone.  Adequate hemodynamics currently so will monitor.  2.  For DKA: Tolerating subcutaneous protocol.  3.  For poor living conditions: Consult .  4.  For DVT  prophylaxis: Subcutaneous heparin.  5.  For pressure wounds: Wound care following.    Okay to telemetry.    Electronically signed by:    Isaias De Luna MD  06/26/25  19:26 EDT      *. Please note that portions of this note were completed with [employer] - a voice recognition program.

## 2025-06-26 NOTE — CASE MANAGEMENT/SOCIAL WORK
Continued Stay Note  [facility]     Patient Name: Fracisco Mullen  MRN: [id number]  Today's Date: 6/26/2025    Admit Date: 6/24/2025    Plan: IPR/APS Referral   Discharge Plan       Row Name 06/26/25 1632       Plan    Plan IPR/APS Referral    Plan Comments Received consult due to poor social situation/living conditions  I met with Mr. Mullen at the bedside earlier today, he was sitting up eating lunch.  Alert, oriented and engaged throughout conversation.  Reports he lives with SO, Yael Cosme, and daughter in [facility]. States SO takes good care of him at home and he has no concerns regarding her ability to continue to provide care-does not report any type of abuse or neglect.  States he is basically bedbound and requires assistance with ADL's-does have a prosthesis at home however does not know how to use.  Agreeable to short-term rehab at [facility] to learn-reports staff were very nice to him when he was previously there.  Appears patient/SO have limited income/limited ability to arrange for the resources needed to meet their needs, (i.e., report limited amount of food at times, do not have ability to pay rent/utilities at times); questionable ability to reach out to the [place] [facility] regarding home infestation-do not seem to have an idea this needs to be done.  I will f/u with Housing [employer] to see if any options or if they are aware of situation.  I did make a referral to [facility] Adult Protective Services due to patient's bedbound status/limited mobility with dependence on others for basic needs/vulnerable adult at risk for continued decline in health; poor living situation with limited ability to access needed resources.  Discussed with nursing.  Basilia Spicer, Ext. [phone]                   Discharge Codes    No documentation.                       AARON Chacon

## 2025-06-26 NOTE — PLAN OF CARE
Goal Outcome Evaluation:               Contact precautions were discontinued. New IV was placed in Left proximal forearm. Pt is increasing oral intake and has had an output of 610 ml. He is positive 1300 ml for the shift. Phos and Mag are being replaced.

## 2025-06-26 NOTE — CONSULTS
Consult for diabetes education received per DKA consult. Chart reviewed. Pt was seen at bedside today. Permission given for visit.       Pt is a 60 yo male with a hx of T2DM. He is currently admitted due to DKA. Review with pt. His medication regimen, diet, SMBG. He states that he checks his BG levels at home and they run about 400. He states he takes his medication as prescribed. He also states that he does not eat out and his girlfriend cooks for him. Urge pt that he needs to take the correct dose of medication and inform his physician if his levels are consistently above 180.\    Discussed and taught patient about type 2 diabetes self-management, risk factors, and importance of blood glucose control to reduce complications. Target blood glucose readings and A1c goals per ADA were reviewed. Reviewed with patient current A1c 14.2 and discussed its significance.       Reviewed the following ADA survival skill concepts with pt:     Meal planning: Discussed pts current eating pattern and potential strategies to help improve it.  Suggested “the plate method” as a potential healthy eating plan and reviewed this with pt.      Safe medication administration: Reviewed pts current medication regimen. Pt taking 70/30, 30units BID Encouraged pt to take medications as prescribed and contact provider or pharmacist if they are experiencing side effects.      Monitoring (timing and technique): Encouraged pt to monitor blood sugar at home BID and to call PCP if blood sugar is trending high. Discussed benefits of SMBG/CGMS to help guide pt and provider decision making. Encouraged to keep record of blood glucose readings to take to follow up appointment with PCP.     Prevention and treatment of hypoglycemia and hyperglycemia: Signs, symptoms, and treatment of hypoglycemia and hyperglycemia discussed with pt. Reviewed prevention strategies such as consistent eating pattern and taking medications as directed.  Pt is able to teach back  "appropriate treatment of hypoglycemia using the rule of 15s after receiving instruction.      Sick day management: Reviewed general sick day guidelines with pt, including drinking plenty of water, keeping simple carbs handy such as jell-o or popsicles, checking blood glucose more often (every 2-4 hours or as directed by provider), and if/when ketone testing is appropriate. Encouraged pt to make a sick-day kit at home.      Physical activity: Reviewed benefits of physical activity for diabetes management and overall health. Encouraged pt to begin in 10 min increments to build up to recommended 150 minute per week after speaking with doctor about which activities may be right for them.      Follow-up care: Reviewed importance of ongoing follow-up with provider. Reviewed importance of annual physical exams, annual eye exams, regular dental exams, daily foot examination, and encouraged patient to discuss immunization needs with provider. Pt encouraged to attend scheduled appointments. Provided information about outpatient diabetes education classes at [facility].      Provided patient with copy of [facility]'s \"Life with Diabetes\" handout.       Thank you for this consult.      Total time spent reviewing chart, preparing education/materials, providing education at bedside, and coordinating care approx 30 minutes.     "

## 2025-06-27 LAB
BACTERIA BLD CULT: ABNORMAL
BOTTLE TYPE: ABNORMAL
GLUCOSE BLDC GLUCOMTR-MCNC: 206 MG/DL (ref 70–130)
GLUCOSE BLDC GLUCOMTR-MCNC: 258 MG/DL (ref 70–130)
GLUCOSE BLDC GLUCOMTR-MCNC: 491 MG/DL (ref 70–130)
GLUCOSE BLDC GLUCOMTR-MCNC: 71 MG/DL (ref 70–130)
GLUCOSE BLDC GLUCOMTR-MCNC: 81 MG/DL (ref 70–130)
GLUCOSE BLDC GLUCOMTR-MCNC: 98 MG/DL (ref 70–130)
MAGNESIUM SERPL-MCNC: 2.7 MG/DL (ref 1.6–2.4)
PHOSPHATE SERPL-MCNC: 2.7 MG/DL (ref 2.5–4.5)

## 2025-06-27 PROCEDURE — 25010000002 CEFTRIAXONE PER 250 MG: Performed by: INTERNAL MEDICINE

## 2025-06-27 PROCEDURE — 63710000001 INSULIN GLARGINE PER 5 UNITS: Performed by: INTERNAL MEDICINE

## 2025-06-27 PROCEDURE — 97166 OT EVAL MOD COMPLEX 45 MIN: CPT

## 2025-06-27 PROCEDURE — 97162 PT EVAL MOD COMPLEX 30 MIN: CPT

## 2025-06-27 PROCEDURE — 63710000001 INSULIN GLARGINE PER 5 UNITS: Performed by: NURSE PRACTITIONER

## 2025-06-27 PROCEDURE — 63710000001 INSULIN LISPRO (HUMAN) PER 5 UNITS: Performed by: INTERNAL MEDICINE

## 2025-06-27 PROCEDURE — 99232 SBSQ HOSP IP/OBS MODERATE 35: CPT | Performed by: INTERNAL MEDICINE

## 2025-06-27 PROCEDURE — 83735 ASSAY OF MAGNESIUM: CPT | Performed by: INTERNAL MEDICINE

## 2025-06-27 PROCEDURE — 25010000002 HEPARIN (PORCINE) PER 1000 UNITS: Performed by: INTERNAL MEDICINE

## 2025-06-27 PROCEDURE — 82948 REAGENT STRIP/BLOOD GLUCOSE: CPT

## 2025-06-27 RX ADMIN — INSULIN LISPRO 8 UNITS: 100 INJECTION, SOLUTION INTRAVENOUS; SUBCUTANEOUS at 07:46

## 2025-06-27 RX ADMIN — HEPARIN SODIUM 5000 UNITS: 5000 INJECTION INTRAVENOUS; SUBCUTANEOUS at 15:05

## 2025-06-27 RX ADMIN — MUPIROCIN 1 APPLICATION: 20 OINTMENT TOPICAL at 10:00

## 2025-06-27 RX ADMIN — INSULIN GLARGINE 25 UNITS: 100 INJECTION, SOLUTION SUBCUTANEOUS at 09:38

## 2025-06-27 RX ADMIN — CEFTRIAXONE 2000 MG: 2 INJECTION, POWDER, FOR SOLUTION INTRAMUSCULAR; INTRAVENOUS at 20:18

## 2025-06-27 RX ADMIN — HEPARIN SODIUM 5000 UNITS: 5000 INJECTION INTRAVENOUS; SUBCUTANEOUS at 06:12

## 2025-06-27 RX ADMIN — HEPARIN SODIUM 5000 UNITS: 5000 INJECTION INTRAVENOUS; SUBCUTANEOUS at 21:52

## 2025-06-27 RX ADMIN — INSULIN LISPRO 5 UNITS: 100 INJECTION, SOLUTION INTRAVENOUS; SUBCUTANEOUS at 12:25

## 2025-06-27 RX ADMIN — INSULIN GLARGINE 12 UNITS: 100 INJECTION, SOLUTION SUBCUTANEOUS at 21:52

## 2025-06-27 RX ADMIN — INSULIN LISPRO 12 UNITS: 100 INJECTION, SOLUTION INTRAVENOUS; SUBCUTANEOUS at 12:25

## 2025-06-27 RX ADMIN — MUPIROCIN 1 APPLICATION: 20 OINTMENT TOPICAL at 21:53

## 2025-06-27 RX ADMIN — INSULIN LISPRO 12 UNITS: 100 INJECTION, SOLUTION INTRAVENOUS; SUBCUTANEOUS at 07:47

## 2025-06-27 NOTE — THERAPY EVALUATION
Patient Name: Fracisco Mullen  : 1965    MRN: [id number]                              Today's Date: 2025       Admit Date: 2025    Visit Dx:     ICD-10-CM ICD-9-CM   1. Diabetic ketoacidosis with coma associated with other specified diabetes mellitus  E13.11 250.32   2. Severe sepsis  A41.9 038.9    R65.20 995.92   3. Hyponatremia  E87.1 276.1   4. Poor personal hygiene  R46.0 V40.39   5. Hx of left BKA  Z89.512 V49.75   6. Febrile illness  R50.9 780.60   7. Bandemia  D72.825 288.66     Patient Active Problem List   Diagnosis    HTN (hypertension)    Type 2 diabetes mellitus, with long-term current use of insulin    Diabetic retinopathy    Obesity (BMI 30.0-34.9)    Peripheral neuropathy    Asthma    Causalgia of lower limb    Chronic constipation    Compression of lumbar nerve root    GERD (gastroesophageal reflux disease)    Hyperlipidemia    IBS (irritable bowel syndrome)    RLS (restless legs syndrome)    Sleep apnea    Vitamin D deficiency    Cigar smoker    Chronic back pain    Diabetic ketoacidosis associated with type 2 diabetes mellitus    Multiple open wounds of foot    Status post cholecystectomy    Cholestatic hepatitis    Diabetic infection of left foot    Precordial pain    Tobacco use    Non healing left heel wound    Severe protein-calorie malnutrition    Acute osteomyelitis of left foot    Type 2 diabetes mellitus with hyperglycemia    BPH with obstruction/lower urinary tract symptoms    Acute urinary retention    DKA (diabetic ketoacidosis)     Past Medical History:   Diagnosis Date    Acute renal failure     Hx of    Obesity     Hx of    Sleep apnea     Type 2 diabetes mellitus      Past Surgical History:   Procedure Laterality Date    BACK SURGERY      lower back    BELOW KNEE AMPUTATION Left 2024    Procedure: BELOW-KNEE AMPUTATION LEFT;  Surgeon: Dru Gan Jr., MD;  Location: [facility];  Service: Orthopedics;  Laterality: Left;    CHOLECYSTECTOMY WITH  INTRAOPERATIVE CHOLANGIOGRAM N/A 1/6/2021    Procedure: CHOLECYSTECTOMY LAPAROSCOPIC INTRAOPERATIVE CHOLANGIOGRAM;  Surgeon: Juventino Hooker MD;  Location:  [facility];  Service: General;  Laterality: N/A;    ERCP N/A 1/9/2021    Procedure: ENDOSCOPIC RETROGRADE CHOLANGIOPANCREATOGRAPHY;  Surgeon: Jeremy Dowell MD;  Location: [facility];  Service: Gastroenterology;  Laterality: N/A;  with sphiincterotomy and balloon sweep with 9mm-12mm balloon    INCISION AND DRAINAGE FOOT Left 8/3/2024    Procedure: HEAL IRRIGATION AND DEBRIDEMENT LEFT;  Surgeon: Dru Gan Jr., MD;  Location:  [facility];  Service: Orthopedics;  Laterality: Left;    PLACEMENT OF WOUND VAC Left 8/3/2024    Procedure: PLACEMENT OF WOUND VAC;  Surgeon: Dru Gan Jr., MD;  Location:  [facility];  Service: Orthopedics;  Laterality: Left;      General Information       Row Name 06/27/25 1528          Physical Therapy Time and Intention    Document Type evaluation  -TT     Mode of Treatment physical therapy;co-treatment  -TT       Row Name 06/27/25 1528          General Information    Patient Profile Reviewed yes  -TT     Prior Level of Function mod assist:;max assist:;all household mobility;w/c or scooter;transfer;ADL's  pivot to/from w/c, owns prosthetic though not using, hosp bed, BSC, RW  -TT     Existing Precautions/Restrictions fall;other (see comments)  remote L BKA  -TT     Barriers to Rehab medically complex;previous functional deficit  -TT       Row Name 06/27/25 1528          Living Environment    Current Living Arrangements home  -TT     People in Home child(anai), adult;significant other  -TT       Row Name 06/27/25 1528          Home Main Entrance    Number of Stairs, Main Entrance none  -TT       Row Name 06/27/25 1528          Stairs Within Home, Primary    Number of Stairs, Within Home, Primary none  -TT       Row Name 06/27/25 1528          Cognition    Orientation Status (Cognition) oriented to;person;place  -TT        Row Name 06/27/25 1528          Safety Issues/Impairments Affecting Functional Mobility    Safety Issues Affecting Function (Mobility) ability to follow commands;awareness of need for assistance;insight into deficits/self-awareness;safety precaution awareness;safety precautions follow-through/compliance;sequencing abilities  -TT     Impairments Affecting Function (Mobility) balance;endurance/activity tolerance;strength;postural/trunk control  -TT               User Key  (r) = Recorded By, (t) = Taken By, (c) = Cosigned By      Initials Name Provider Type    TT Claudia Buchanan PT Physical Therapist                   Mobility       Row Name 06/27/25 1529          Bed Mobility    Bed Mobility supine-sit;sit-supine  -TT     Supine-Sit [place] (Bed Mobility) dependent (less than 25% patient effort);2 person assist;verbal cues  -TT     Sit-Supine [place] (Bed Mobility) dependent (less than 25% patient effort);2 person assist;verbal cues  -TT     Assistive Device (Bed Mobility) bed rails;head of bed elevated;repositioning sheet  -TT     Comment, (Bed Mobility) Cues for sequencing, improved intiation and use of BUE to propel trunk to upright position. Inconsistent effort observed; noting dizziness w/ positional change. Total assist to advance hips towards EOB in prep for balance activities.  -TT       Row Name 06/27/25 1529          Transfers    Comment, (Transfers) Deferred d/t poor trunk control and significant L lateral lean.  -TT       Row Name 06/27/25 1529          Gait/Stairs (Locomotion)    Patient was able to Ambulate no, other medical factors prevent ambulation  -TT     Reason Patient was unable to Ambulate Excessive Weakness  -TT               User Key  (r) = Recorded By, (t) = Taken By, (c) = Cosigned By      Initials Name Provider Type    TT Claudia Buchanan PT Physical Therapist                   Obj/Interventions       Row Name 06/27/25 1531          Range of Motion Comprehensive    General  Range of Motion lower extremity range of motion deficits identified  -TT     Comment, General Range of Motion R hip AAROM WFL, R knee lacking ~10 degrees terminal extension, R ankle PROM lacking ~5 degrees from neutral. Of note, ROM assessment of RLE limited by pain. LLE ROM WFL s/p BKA.  -TT       Row Name 06/27/25 1531          Strength Comprehensive (MMT)    General Manual Muscle Testing (MMT) Assessment lower extremity strength deficits identified  -TT     Comment, General Manual Muscle Testing (MMT) Assessment RLE grossly 2+/5, LLE grossly 3/5 s/p BKA  -TT       Row Name 06/27/25 1531          Motor Skills    Therapeutic Exercise hip;knee;ankle  -TT       Row Name 06/27/25 1531          Hip (Therapeutic Exercise)    Hip (Therapeutic Exercise) strengthening exercise  -TT     Hip Strengthening (Therapeutic Exercise) right;flexion;extension;10 repetitions;supine  assisted  -TT       Row Name 06/27/25 1531          Knee (Therapeutic Exercise)    Knee (Therapeutic Exercise) strengthening exercise  -TT     Knee Strengthening (Therapeutic Exercise) right;flexion;extension;10 repetitions;supine;other (see comments)  LLE modified SLR  -TT       Row Name 06/27/25 1531          Ankle (Therapeutic Exercise)    Ankle (Therapeutic Exercise) strengthening exercise  -TT     Ankle Strengthening (Therapeutic Exercise) right;dorsiflexion;plantarflexion;10 repetitions  assisted  -TT       Row Name 06/27/25 1531          Balance    Balance Assessment sitting static balance;sitting dynamic balance  -TT     Static Sitting Balance dependent  -TT     Dynamic Sitting Balance dependent  -TT     Position, Sitting Balance sitting edge of bed  -TT     Balance Interventions sitting;static;dynamic;supported;core stability exercise;weight shifting activity  -TT     Comment, Balance demonstrating increased L lateral lean requiring maximal verbal/tactile cues for UE placement and support. Limited carryover to cues and poor effort noted.  Dizziness reported at EOB, BP w/o significant changes. Tolerating 1-2 minutes at EOB this date prior to request to return to supine.  -TT       Row Name 06/27/25 1531          Sensory Assessment (Somatosensory)    Sensory Assessment (Somatosensory) other (see comments)  pt noting LE sensation at baseline; did not further describe  -TT               User Key  (r) = Recorded By, (t) = Taken By, (c) = Cosigned By      Initials Name Provider Type    TT Claudia Buchanan PT Physical Therapist                   Goals/Plan       Row Name 06/27/25 1539          Bed Mobility Goal 1 (PT)    Activity/Assistive Device (Bed Mobility Goal 1, PT) sit to supine/supine to sit  -TT     [employer] Level/Cues Needed (Bed Mobility Goal 1, PT) contact guard required  -TT     Time Frame (Bed Mobility Goal 1, PT) short term goal (STG);3 days  -TT     Progress/Outcomes (Bed Mobility Goal 1, PT) new goal  -TT       Row Name 06/27/25 1539          Transfer Goal 1 (PT)    Activity/Assistive Device (Transfer Goal 1, PT) sit-to-stand/stand-to-sit;bed-to-chair/chair-to-bed  -TT     [employer] Level/Cues Needed (Transfer Goal 1, PT) minimum assist (75% or more patient effort)  -TT     Time Frame (Transfer Goal 1, PT) long term goal (LTG);10 days  -TT       Row Name 06/27/25 1539          Therapy Assessment/Plan (PT)    Planned Therapy Interventions (PT) balance training;bed mobility training;home exercise program;neuromuscular re-education;patient/family education;postural re-education;ROM (range of motion);strengthening;stretching;transfer training;wheelchair management/propulsion training  -TT               User Key  (r) = Recorded By, (t) = Taken By, (c) = Cosigned By      Initials Name Provider Type    TT Claudia Buchanan PT Physical Therapist                   Clinical Impression       Row Name 06/27/25 1536          Pain    Pain Side/Orientation generalized  -TT     Pain Management Interventions activity modification encouraged;exercise  "or physical activity utilized;positioning techniques utilized  -TT     Response to Pain Interventions intervention effective per patient report;activity participation with tolerable pain  -TT     Pre/Posttreatment Pain Comment Pt noting pain \"all over\" at baseline; did not rate numerically or further describe.  -TT       Row Name 06/27/25 1536          Plan of Care Review    Plan of Care Reviewed With patient  -TT     Progress no change  -TT     Outcome Evaluation PT initial evaluation completed. Pt presenting w/ balance deficits, poor trunk control, significant generalized wakness, decreased functional endurance and increased falls risk compared to reported baseline warranting continued skilled IP PT interventions to promote return to PLOF. PT recommending SNF upon d/c based on current presentation.  -TT       Row Name 06/27/25 1536          Therapy Assessment/Plan (PT)    Patient/Family Therapy Goals Statement (PT) None stated.  -TT     Rehab Potential (PT) limited  -TT     Criteria for Skilled Interventions Met (PT) yes;meets criteria;skilled treatment is necessary  -TT     Therapy Frequency (PT) daily  -TT     Predicted Duration of Therapy Intervention (PT) 10 days  -TT       Row Name 06/27/25 1536          Vital Signs    Pre Systolic BP Rehab 106  -TT     Pre Treatment Diastolic BP 64  -TT     Post Systolic BP Rehab 123  -TT     Post Treatment Diastolic BP 75  -TT     Pretreatment Heart Rate (beats/min) 90  -TT     Posttreatment Heart Rate (beats/min) 92  -TT     Pre SpO2 (%) 95  -TT     O2 Delivery Pre Treatment room air  -TT     O2 Delivery Intra Treatment room air  -TT     Post SpO2 (%) 95  -TT     O2 Delivery Post Treatment room air  -TT     Pre Patient Position Supine  -TT     Intra Patient Position Sitting  -TT     Post Patient Position Supine  -TT       Row Name 06/27/25 1536          Positioning and Restraints    Pre-Treatment Position in bed  -TT     Post Treatment Position bed  -TT     In Bed notified " nsg;fowlers;call light within reach;encouraged to call for assist;exit alarm on;side rails up x3;RUE elevated;LUE elevated;side lying right;legs elevated  -TT               User Key  (r) = Recorded By, (t) = Taken By, (c) = Cosigned By      Initials Name Provider Type    TT Claudia Buchanan, PT Physical Therapist                   Outcome Measures       Row Name 06/27/25 1540          How much help from another person do you currently need...    Turning from your back to your side while in flat bed without using bedrails? 1  -TT     Moving from lying on back to sitting on the side of a flat bed without bedrails? 1  -TT     Moving to and from a bed to a chair (including a wheelchair)? 1  -TT     Standing up from a chair using your arms (e.g., wheelchair, bedside chair)? 1  -TT     Climbing 3-5 steps with a railing? 1  -TT     To walk in hospital room? 1  -TT     AM-PAC 6 Clicks Score (PT) 6  -TT     Highest Level of Mobility Goal Bed Activities/Dependent Transfer-2  -TT       Row Name 06/27/25 1540 06/27/25 1445       Functional Assessment    Outcome Measure Options AM-PAC 6 Clicks Basic Mobility (PT)  -TT AM-PAC 6 Clicks Daily Activity (OT)  -CS              User Key  (r) = Recorded By, (t) = Taken By, (c) = Cosigned By      Initials Name Provider Type    Mariel Davis OT Occupational Therapist    TT Claudia Buchanan, PT Physical Therapist                                 Physical Therapy Education       Title: PT OT SLP Therapies (In Progress)       Topic: Physical Therapy (In Progress)       Point: Mobility training (In Progress)       Learning Progress Summary            Patient Acceptance, E, NR by TT at 6/27/2025 1541                      Point: Home exercise program (Not Started)       Learner Progress:  Not documented in this visit.              Point: Body mechanics (In Progress)       Learning Progress Summary            Patient Acceptance, E, NR by TT at 6/27/2025 1541                      Point:  Precautions (In Progress)       Learning Progress Summary            Patient Acceptance, E, NR by TT at 6/27/2025 1541                                      User Key       Initials Effective Dates Name Provider Type Discipline    TT 05/30/25 -  Claudia Buchanan PT Physical Therapist PT                  PT Recommendation and Plan  Planned Therapy Interventions (PT): balance training, bed mobility training, home exercise program, neuromuscular re-education, patient/family education, postural re-education, ROM (range of motion), strengthening, stretching, transfer training, wheelchair management/propulsion training  Progress: no change  Outcome Evaluation: PT initial evaluation completed. Pt presenting w/ balance deficits, poor trunk control, significant generalized wakness, decreased functional endurance and increased falls risk compared to reported baseline warranting continued skilled IP PT interventions to promote return to PLOF. PT recommending SNF upon d/c based on current presentation.     Time Calculation:   PT Evaluation Complexity  History, PT Evaluation Complexity: 3 or more personal factors and/or comorbidities  Examination of Body Systems (PT Eval Complexity): total of 3 or more elements  Clinical Presentation (PT Evaluation Complexity): evolving  Clinical Decision Making (PT Evaluation Complexity): moderate complexity  Overall Complexity (PT Evaluation Complexity): moderate complexity     PT Charges       Row Name 06/27/25 1541             Time Calculation    Start Time 1410  -TT      PT Received On 06/27/25  -TT      PT Goal Re-Cert Due Date 07/07/25  -TT         Untimed Charges    PT Eval/Re-eval Minutes 52  -TT         Total Minutes    Untimed Charges Total Minutes 52  -TT       Total Minutes 52  -TT                User Key  (r) = Recorded By, (t) = Taken By, (c) = Cosigned By      Initials Name Provider Type    TT Claudia Buchanan PT Physical Therapist                  Therapy Charges for Today        Code Description Service Date Service Provider Modifiers Qty    88493051713 HC PT EVAL MOD COMPLEXITY 4 6/27/2025 Claudia Buchanan, PT GP 1            PT G-Codes  Outcome Measure Options: AM-PAC 6 Clicks Basic Mobility (PT)  AM-PAC 6 Clicks Score (PT): 6  AM-PAC 6 Clicks Score (OT): 9  PT Discharge Summary  Anticipated Discharge Disposition (PT): skilled nursing facility    Claudia Buchanan PT  6/27/2025

## 2025-06-27 NOTE — PLAN OF CARE
Goal Outcome Evaluation:  Plan of Care Reviewed With: patient        Progress: no change  Outcome Evaluation: OT eval complete. Pt presents w/ significant generalized weakness and poor functional endurance warranting cont skilled IPOT POC to promote return to PLOF. Recommend pt DC to SNF based on current level of performance.    Anticipated Discharge Disposition (OT): skilled nursing facility

## 2025-06-27 NOTE — THERAPY EVALUATION
Patient Name: Fracisco Mullen  : 1965    MRN: [id number]                              Today's Date: 2025       Admit Date: 2025    Visit Dx:     ICD-10-CM ICD-9-CM   1. Diabetic ketoacidosis with coma associated with other specified diabetes mellitus  E13.11 250.32   2. Severe sepsis  A41.9 038.9    R65.20 995.92   3. Hyponatremia  E87.1 276.1   4. Poor personal hygiene  R46.0 V40.39   5. Hx of left BKA  Z89.512 V49.75   6. Febrile illness  R50.9 780.60   7. Bandemia  D72.825 288.66     Patient Active Problem List   Diagnosis    HTN (hypertension)    Type 2 diabetes mellitus, with long-term current use of insulin    Diabetic retinopathy    Obesity (BMI 30.0-34.9)    Peripheral neuropathy    Asthma    Causalgia of lower limb    Chronic constipation    Compression of lumbar nerve root    GERD (gastroesophageal reflux disease)    Hyperlipidemia    IBS (irritable bowel syndrome)    RLS (restless legs syndrome)    Sleep apnea    Vitamin D deficiency    Cigar smoker    Chronic back pain    Diabetic ketoacidosis associated with type 2 diabetes mellitus    Multiple open wounds of foot    Status post cholecystectomy    Cholestatic hepatitis    Diabetic infection of left foot    Precordial pain    Tobacco use    Non healing left heel wound    Severe protein-calorie malnutrition    Acute osteomyelitis of left foot    Type 2 diabetes mellitus with hyperglycemia    BPH with obstruction/lower urinary tract symptoms    Acute urinary retention    DKA (diabetic ketoacidosis)     Past Medical History:   Diagnosis Date    Acute renal failure     Hx of    Obesity     Hx of    Sleep apnea     Type 2 diabetes mellitus      Past Surgical History:   Procedure Laterality Date    BACK SURGERY      lower back    BELOW KNEE AMPUTATION Left 2024    Procedure: BELOW-KNEE AMPUTATION LEFT;  Surgeon: Dru Gan Jr., MD;  Location: [facility];  Service: Orthopedics;  Laterality: Left;    CHOLECYSTECTOMY WITH  INTRAOPERATIVE CHOLANGIOGRAM N/A 1/6/2021    Procedure: CHOLECYSTECTOMY LAPAROSCOPIC INTRAOPERATIVE CHOLANGIOGRAM;  Surgeon: Juventino Hooker MD;  Location:  [facility];  Service: General;  Laterality: N/A;    ERCP N/A 1/9/2021    Procedure: ENDOSCOPIC RETROGRADE CHOLANGIOPANCREATOGRAPHY;  Surgeon: Jeremy Dowell MD;  Location: [facility];  Service: Gastroenterology;  Laterality: N/A;  with sphiincterotomy and balloon sweep with 9mm-12mm balloon    INCISION AND DRAINAGE FOOT Left 8/3/2024    Procedure: HEAL IRRIGATION AND DEBRIDEMENT LEFT;  Surgeon: Dru Gan Jr., MD;  Location:  [facility];  Service: Orthopedics;  Laterality: Left;    PLACEMENT OF WOUND VAC Left 8/3/2024    Procedure: PLACEMENT OF WOUND VAC;  Surgeon: Dru Gan Jr., MD;  Location:  [facility];  Service: Orthopedics;  Laterality: Left;      General Information       Row Name 06/27/25 1440          OT Time and Intention    Document Type evaluation  -CS     Mode of Treatment occupational therapy  -CS       Row Name 06/27/25 1440          General Information    Patient Profile Reviewed yes  -CS     Prior Level of Function mod assist:;max assist:;all household mobility;w/c or scooter;transfer;ADL's  pivot to/from w/c, owns prosthetic though not using, hosp bed, BSC, RW  -CS     Existing Precautions/Restrictions fall;other (see comments)  remote L BKA  -CS     Barriers to Rehab medically complex;previous functional deficit  -CS       Row Name 06/27/25 1440          Living Environment    Current Living Arrangements home  -CS     People in Home child(anai), adult;significant other  -CS       Row Name 06/27/25 1440          Home Main Entrance    Number of Stairs, Main Entrance none  -CS       Row Name 06/27/25 1440          Stairs Within Home, Primary    Number of Stairs, Within Home, Primary none  -CS       Row Name 06/27/25 1440          Cognition    Orientation Status (Cognition) oriented to;person;place  -CS       Row Name 06/27/25  1440          Safety Issues/Impairments Affecting Functional Mobility    Impairments Affecting Function (Mobility) balance;endurance/activity tolerance;strength;postural/trunk control  -               User Key  (r) = Recorded By, (t) = Taken By, (c) = Cosigned By      Initials Name Provider Type    CS Mariel Riggs OT Occupational Therapist                     Mobility/ADL's       Row Name 06/27/25 1441          Bed Mobility    Bed Mobility supine-sit;sit-supine  -CS     Supine-Sit [place] (Bed Mobility) dependent (less than 25% patient effort);2 person assist;verbal cues  -CS     Sit-Supine [place] (Bed Mobility) dependent (less than 25% patient effort);2 person assist;verbal cues  -CS     Assistive Device (Bed Mobility) bed rails;head of bed elevated;repositioning sheet  -       Row Name 06/27/25 1441          Transfers    Comment, (Transfers) deferred d/t poor trunk control  -       Row Name 06/27/25 1441          Activities of Daily Living    BADL Assessment/Intervention lower body dressing;grooming  -       Row Name 06/27/25 [facility]          Lower Body Dressing Assessment/Training    [place] Level (Lower Body Dressing) don;socks;dependent (less than 25% patient effort)  -CS     Position (Lower Body Dressing) supine  -       Row Name 06/27/25 1441          Grooming Assessment/Training    [place] Level (Grooming) wash face, hands;set up  -CS     Position (Grooming) sitting up in bed  -               User Key  (r) = Recorded By, (t) = Taken By, (c) = Cosigned By      Initials Name Provider Type    Mariel Davis OT Occupational Therapist                   Obj/Interventions       Row Name 06/27/25 1442          Sensory Assessment (Somatosensory)    Sensory Assessment (Somatosensory) UE sensation intact  -       Row Name 06/27/25 1442          Range of Motion Comprehensive    General Range of Motion bilateral upper extremity ROM WFL  -       Row Name 06/27/25 1442           Strength Comprehensive (MMT)    Comment, General Manual Muscle Testing (MMT) Assessment BUE grossly 3/5  -CS       Row Name 06/27/25 1442          Shoulder (Therapeutic Exercise)    Shoulder (Therapeutic Exercise) AAROM (active assistive range of motion)  -CS     Shoulder AAROM (Therapeutic Exercise) bilateral;flexion;extension;horizontal aBduction/aDduction;aBduction;aDduction;10 repetitions  -CS       Row Name 06/27/25 1442          Elbow/Forearm (Therapeutic Exercise)    Elbow/Forearm (Therapeutic Exercise) AAROM (active assistive range of motion)  -CS     Elbow/Forearm AAROM (Therapeutic Exercise) bilateral;extension;flexion;10 repetitions  -CS       Row Name 06/27/25 1442          Motor Skills    Therapeutic Exercise shoulder;elbow/forearm  -CS       Row Name 06/27/25 1442          Balance    Balance Assessment sitting static balance;sitting dynamic balance  -CS     Static Sitting Balance dependent  -CS     Dynamic Sitting Balance dependent  -CS     Position, Sitting Balance sitting edge of bed  -CS     Balance Interventions sitting;static;dynamic;dynamic reaching;weight shifting activity  -CS               User Key  (r) = Recorded By, (t) = Taken By, (c) = Cosigned By      Initials Name Provider Type    CS Mariel Riggs, OT Occupational Therapist                   Goals/Plan       Row Name 06/27/25 1444          Transfer Goal 1 (OT)    Activity/Assistive Device (Transfer Goal 1, OT) bed-to-chair/chair-to-bed;toilet  -CS     [place] Level/Cues Needed (Transfer Goal 1, OT) maximum assist (25-49% patient effort)  -CS     Time Frame (Transfer Goal 1, OT) long term goal (LTG);10 days  -CS     Progress/Outcome (Transfer Goal 1, OT) goal ongoing  -CS       Row Name 06/27/25 1444          Dressing Goal 1 (OT)    Activity/Device (Dressing Goal 1, OT) lower body dressing  -CS     [place]/Cues Needed (Dressing Goal 1, OT) moderate assist (50-74% patient effort)  -CS     Time Frame (Dressing Goal 1, OT) long  term goal (LTG);10 days  -CS     Strategies/Barriers (Dressing Goal 1, OT) don/doff sock w/ AAD  -CS     Progress/Outcome (Dressing Goal 1, OT) goal ongoing  -CS       Row Name 06/27/25 1444          Grooming Goal 1 (OT)    Activity/Device (Grooming Goal 1, OT) hair care;wash face, hands  -CS     [place] (Grooming Goal 1, OT) minimum assist (75% or more patient effort)  -CS     Time Frame (Grooming Goal 1, OT) short term goal (STG);5 days  -CS     Strategies/Barriers (Grooming Goal 1, OT) unsupported sitting  -CS     Progress/Outcome (Grooming Goal 1, OT) goal ongoing  -CS       Row Name 06/27/25 1444          Therapy Assessment/Plan (OT)    Planned Therapy Interventions (OT) activity tolerance training;adaptive equipment training;BADL retraining;functional balance retraining;occupation/activity based interventions;ROM/therapeutic exercise;strengthening exercise;transfer/mobility retraining;patient/caregiver education/training  -CS               User Key  (r) = Recorded By, (t) = Taken By, (c) = Cosigned By      Initials Name Provider Type    CS Mariel Riggs, OT Occupational Therapist                   Clinical Impression       Row Name 06/27/25 1443          Pain Assessment    Pain Side/Orientation generalized  -CS     Pain Management Interventions activity modification encouraged;exercise or physical activity utilized;positioning techniques utilized  -CS     Response to Pain Interventions intervention effective per patient report;activity participation with tolerable pain  -CS     Additional Documentation Pain Scale: FACES Pre/Post-Treatment (Group)  -CS       [facility] Name 06/27/25 1443          Pain Scale: FACES Pre/Post-Treatment    Pain: FACES Scale, Pretreatment 2-->hurts little bit  -CS     Posttreatment Pain Rating 2-->hurts little bit  -CS       Row Name 06/27/25 1443          Plan of Care Review    Plan of Care Reviewed With patient  -CS     Progress no change  -CS     Outcome Evaluation OT tomasa  complete. Pt presents w/ significant generalized weakness and poor functional endurance warranting cont skilled IPOT POC to promote return to PLOF. Recommend pt DC to SNF based on current level of performance.  -CS       Row Name 06/27/25 1443          Therapy Assessment/Plan (OT)    Patient/Family Therapy Goal Statement (OT) Return to PLOF  -CS     Rehab Potential (OT) good  -CS     Criteria for Skilled Therapeutic Interventions Met (OT) yes;skilled treatment is necessary  -CS     Therapy Frequency (OT) daily  -CS     Predicted Duration of Therapy Intervention (OT) 10 days  -CS       Row Name 06/27/25 1443          Therapy Plan Review/Discharge Plan (OT)    Anticipated Discharge Disposition (OT) skilled nursing facility  -CS       Row Name 06/27/25 1443          Vital Signs    Pre Systolic BP Rehab 106  -CS     Pre Treatment Diastolic BP 64  -CS     Post Systolic BP Rehab 123  -CS     Post Treatment Diastolic BP 75  -CS     Pretreatment Heart Rate (beats/min) 90  -CS     Posttreatment Heart Rate (beats/min) 92  -CS     Pre SpO2 (%) 95  -CS     O2 Delivery Pre Treatment room air  -CS     Post SpO2 (%) 95  -CS     O2 Delivery Post Treatment room air  -CS     Pre Patient Position Supine  -CS     Intra Patient Position Sitting  -CS     Post Patient Position Supine  -CS       Row Name 06/27/25 1443          Positioning and Restraints    Pre-Treatment Position in bed  -CS     Post Treatment Position bed  -CS     In Bed notified nsg;fowlers;encouraged to call for assist;call light within reach;exit alarm on;side rails up x3;RUE elevated;LUE elevated;side lying right  -CS               User Key  (r) = Recorded By, (t) = Taken By, (c) = Cosigned By      Initials Name Provider Type    CS Mariel Riggs, OT Occupational Therapist                   Outcome Measures       Row Name 06/27/25 1445          How much help from another is currently needed...    Putting on and taking off regular lower body clothing? 1  -CS     Bathing  (including washing, rinsing, and drying) 1  -CS     Toileting (which includes using toilet bed pan or urinal) 1  -CS     Putting on and taking off regular upper body clothing 2  -CS     Taking care of personal grooming (such as brushing teeth) 2  -CS     Eating meals 2  -CS     AM-PAC 6 Clicks Score (OT) 9  -CS       Row Name 06/27/25 1445          Functional Assessment    Outcome Measure Options AM-PAC 6 Clicks Daily Activity (OT)  -CS               User Key  (r) = Recorded By, (t) = Taken By, (c) = Cosigned By      Initials Name Provider Type    CS Mariel Riggs OT Occupational Therapist                    Occupational Therapy Education       Title: PT OT SLP Therapies (In Progress)       Topic: Occupational Therapy (In Progress)       Point: ADL training (In Progress)       Learning Progress Summary            Patient Acceptance, E, NR by CS at 6/27/2025 1446                      Point: Home exercise program (In Progress)       Learning Progress Summary            Patient Acceptance, E, NR by CS at 6/27/2025 1446                      Point: Precautions (In Progress)       Learning Progress Summary            Patient Acceptance, E, NR by CS at 6/27/2025 1446                      Point: Body mechanics (In Progress)       Learning Progress Summary            Patient Acceptance, E, NR by CS at 6/27/2025 1446                                      User Key       Initials Effective Dates Name Provider Type Discipline     09/02/21 -  Mariel Riggs OT Occupational Therapist OT                  OT Recommendation and Plan  Planned Therapy Interventions (OT): activity tolerance training, adaptive equipment training, BADL retraining, functional balance retraining, occupation/activity based interventions, ROM/therapeutic exercise, strengthening exercise, transfer/mobility retraining, patient/caregiver education/training  Therapy Frequency (OT): daily  Plan of Care Review  Plan of Care Reviewed With: patient  Progress: no  change  Outcome Evaluation: OT eval complete. Pt presents w/ significant generalized weakness and poor functional endurance warranting cont skilled IPOT POC to promote return to PLOF. Recommend pt DC to SNF based on current level of performance.     Time Calculation:   Evaluation Complexity (OT)  Review Occupational Profile/Medical/Therapy History Complexity: expanded/moderate complexity  Assessment, Occupational Performance/Identification of Deficit Complexity: 5 or more performance deficits  Clinical Decision Making Complexity (OT): detailed assessment/moderate complexity  Overall Complexity of Evaluation (OT): moderate complexity     Time Calculation- OT       Row Name 06/27/25 1446             Time Calculation- OT    OT Start Time 1401  -CS      OT Received On 06/27/25  -CS      OT Goal Re-Cert Due Date 07/07/25  -CS         Untimed Charges    OT Eval/Re-eval Minutes 48  -CS         Total Minutes    Untimed Charges Total Minutes 48  -CS       Total Minutes 48  -CS                User Key  (r) = Recorded By, (t) = Taken By, (c) = Cosigned By      Initials Name Provider Type    CS Mariel Riggs OT Occupational Therapist                  Therapy Charges for Today       Code Description Service Date Service Provider Modifiers Qty    [id number] HC OT EVAL MOD COMPLEXITY 4 6/27/2025 Mariel Riggs OT GO 1                 Mariel Riggs OT  6/27/2025

## 2025-06-27 NOTE — CONSULTS
"          Clinical Nutrition Assessment     Patient Name: Fracisco Mullen  YOB: 1965  MRN: [id number]  Date of Encounter: 06/27/25 10:33 EDT  Admission date: 6/24/2025  Reason for Visit: Consult, Nonhealing wound or pressure ulcer    Assessment   Nutrition Assessment   Admission Diagnosis:  DKA (diabetic ketoacidosis) [E11.10]    Problem List:    DKA (diabetic ketoacidosis)      PMH:   He  has a past medical history of Acute renal failure, Obesity, Sleep apnea, and Type 2 diabetes mellitus.    PSH:  He  has a past surgical history that includes Back surgery; cholecystectomy with intraoperative cholangiogram (N/A, 1/6/2021); ERCP (N/A, 1/9/2021); Incision and drainage foot (Left, 8/3/2024); PLACEMENT OF WOUND VAC (Left, 8/3/2024); and Leg amputation, lower tibia/fibula (Left, 11/22/2024).    Applicable Nutrition History:     Anthropometrics     Height: Height: 162.6 cm (64.02\")  Last Filed Weight: Weight: 70.5 kg (155 lb 6.8 oz) (06/27/25 0610)  Method: Weight Method: Bed scale  BMI: BMI (Calculated): 26.7    UBW:  ~150 lb per pt, no prior reliable weight hx in emr  Weight change: No significant changes    Nutrition Focused Physical Exam    Date:  6/27       Unable to perform due to Pt unable to participate at time of visit     Subjective   Reported/Observed/Food/Nutrition Related History:     Patient reports good appetite and no nutritional concerns. He is agreeable to protein supplement for wound healing. He has a stage I and an unstageable PI per WOC RN note. He voices understanding benefit supplement and importance overall good nutrition/protein intake for wound healing and strength. Plan for d/c to rehab. Pt denies further dietary needs/preferences or nutritional questions/concerns at this time, NKFA.     Current Nutrition Prescription   PO: Diet: Cardiac, Diabetic; Healthy Heart (2-3 Na+); Consistent Carbohydrate; Fluid Consistency: Thin (IDDSI 0)  Oral Nutrition Supplement:   Intake: Insufficient " data 63% X 4 meals documented    Assessment & Plan   Nutrition Diagnosis   Date:  6/27            Updated:    Problem Increased nutrient needs    Etiology For wound healing   Signs/Symptoms Stage I and unstageable PI   Status: New    Goal:   Nutrition to support treatment, Provide information regarding MNT therapy and Maintain intake    Nutrition Intervention      Follow treatment progress, Care plan reviewed, Advise alternate selection, Advised available snacks, Interview for preferences, Menu provided, Encourage intake, Supplement provided, Education provided for wound healing    Encourage PO as tolerated.  [employer] in lemonade 1x/day.    Monitoring/Evaluation:   Per protocol, I&O, PO intake, Supplement intake, Weight, GI status, Symptoms, POC/GOC    Rachelle Zamora RD, CNSC  Time Spent: 25m

## 2025-06-27 NOTE — PROGRESS NOTES
Pulmonary/Critical Care Follow-up     LOS: 2 days   Patient Care Team:  Brandy Roberson MD as PCP - General (Family Medicine)  Varun Fontenot MD as Consulting Physician (Cardiology)  Yulissa Taveras APRN as Nurse Practitioner (Cardiology)      Chief Complaint   Patient presents with    Shortness of Breath     Subjective     History reviewed and updated in EMR as indicated.    Interval History:     Patient is overall feeling better.  Blood sugars were high this a.m as long-acting insulin was held last night due to a blood sugar in the 70s.  No new complaints.      History taken from: Patient, chart, staff    PMH/FH/Social History were reviewed and updated appropriately in the electronic medical record.     Review of Systems:    Review of 14 systems was completed with positives and pertinent negatives noted in the subjective section.  All other systems reviewed and are negative.         Objective     Vital Signs  Temp:  [97.6 °F (36.4 °C)-97.9 °F (36.6 °C)] 97.9 °F (36.6 °C)  Heart Rate:  [81-93] 91  Resp:  [18] 18  BP: ()/() 106/64  06/26 0701 - 06/27 0700  In: 2888.8 [P.O.:1749; I.V.:939.8]  Out: 1275 [Urine:1275]  Body mass index is 26.66 kg/m².     IV drips:  norepinephrine, Last Rate: Stopped (06/26/25 0700)       Physical Exam:     Constitutional:   Alert, in no acute distress   Head:   Normocephalic, atraumatic   Eyes:           Lids and lashes normal, conjunctivae and sclerae normal.  PER   ENMT:  Ears appear intact with no abnormalities noted     Lips normal.     Neck:  Trachea midline, no JVD   Lungs/Resp:    Normal effort, symmetric chest rise, no crepitus, clear to      auscultation bilaterally.               Heart/CV:   Regular rhythm and normal rate, no murmur   Abdomen/GI:    Soft, nontender, nondistended   :    Deferred   Extremities/MSK:  No clubbing or cyanosis.  No edema.  Patient is 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   Pulses:   Pulses palpable and equal bilaterally   Skin:  No bleeding, bruising or rash.  Multiple skin lesions.   Heme/Lymph:  No cervical or supraclavicular adenopathy.   Neurologic:    Psychiatric:    Moves all extremities with no obvious focal motor deficit.  Cranial nerves 2 - 12 grossly intact  Non-agitated, normal affect.    The above physical exam findings were reviewed and reflect my exam findings as of today's exam.   Electronically signed by:  Isaias De Luna MD  06/27/25  16:00 EDT      Results Review:     I reviewed the patient's new clinical results.   Results from last 7 days   Lab Units 06/26/25  2030 06/26/25  1601 06/26/25  1234 06/25/25  0310 06/24/25  2340   SODIUM mmol/L 131* 131* 131*   < > 125*   POTASSIUM mmol/L 4.2 4.5 4.7   < > 4.0   CHLORIDE mmol/L 101 100 101   < > 87*   CO2 mmol/L 22.0 22.4 20.9*   < > 10.6*   BUN mg/dL 7.3* 8.2 8.9   < > 13.8   CREATININE mg/dL 0.56* 0.48* 0.54*   < > 1.09   CALCIUM mg/dL 7.6* 7.6* 7.6*   < > 7.7*   BILIRUBIN mg/dL  --   --   --   --  0.4   ALK PHOS U/L  --   --   --   --  73   ALT (SGPT) U/L  --   --   --   --  <5   AST (SGOT) U/L  --   --   --   --  12   GLUCOSE mg/dL 84 92 147*   < > 456*    < > = values in this interval not displayed.     Results from last 7 days   Lab Units 06/24/25  2340   WBC 10*3/mm3 15.55*   HEMOGLOBIN g/dL 11.2*   HEMATOCRIT % 35.1*   PLATELETS 10*3/mm3 425     Results from last 7 days   Lab Units 06/24/25  2340   PH, ARTERIAL pH units 7.394   PO2 ART mm Hg 77.6*   PCO2, ARTERIAL mm Hg 16.4*   HCO3 ART mmol/L 10.0*     Results from last 7 days   Lab Units 06/27/25  0359 06/26/25  2328 06/26/25  2030 06/26/25  1601   MAGNESIUM mg/dL 2.7*  --  1.8 1.6   PHOSPHORUS mg/dL  --  2.7 2.6 2.2*       I reviewed the patient's new imaging including images and reports.    Medication Review:   cefTRIAXone, 2,000 mg, Intravenous, Q24H  heparin (porcine), 5,000 Units, Subcutaneous, Q8H  insulin glargine, 25 Units, Subcutaneous, Daily  Insulin Lispro,  12 Units, Subcutaneous, TID With Meals  insulin lispro, 3-14 Units, Subcutaneous, 4x Daily AC & at Bedtime  mupirocin, 1 Application, Each Nare, BID      norepinephrine, 0.02-0.3 mcg/kg/min, Last Rate: Stopped (06/26/25 0700)        Assessment & Plan         DKA (diabetic ketoacidosis)    59 y.o. with history of of T2DM, PAD status post left AKA and right second toe amputation, ERIKA, presented to [facility] on 6/24/2025 with AMS.  Labs concerning for glucose of 456, anion gap of 27 and patient was admitted to the ICU with DKA.  Unable to place Bustos catheter initially and urology placed.  Urine concerning for UTI.  Blood culture positive for Klebsiella.    Urine culture shows no growth to date but unable to initially get urine as required urology to place Bustos catheter.  UA was suggestive of UTI and I suspect this was primary source of infection although patient also has wounds.    Plan:  1.  For sepsis due to urinary tract infection with Klebsiella: Continue ceftriaxone.  Adequate hemodynamics currently so will monitor.  2.  For DKA: Tolerating subcutaneous protocol.  High blood sugar this morning as yesterday evening's long-acting dose was discontinued.  I went ahead and ordered Lantus 12 units at night (dose was previously 25 units), and will continue 25 units daily in the a.m.  Continue scheduled lispro with meals and correction as needed.  3.  For poor living conditions: Consult .  4.  For DVT prophylaxis: Subcutaneous heparin.  5.  For pressure wounds: Wound care following.    Okay to telemetry.    Electronically signed by:    Isaias De Luna MD  06/27/25  16:00 EDT      *. Please note that portions of this note were completed with Me[employer] - a voice recognition program.

## 2025-06-27 NOTE — PLAN OF CARE
Goal Outcome Evaluation:  Plan of Care Reviewed With: patient        Progress: no change  Outcome Evaluation: PT initial evaluation completed. Pt presenting w/ balance deficits, poor trunk control, significant generalized wakness, decreased functional endurance and increased falls risk compared to reported baseline warranting continued skilled IP PT interventions to promote return to PLOF. PT recommending SNF upon d/c based on current presentation.    Anticipated Discharge Disposition (PT): skilled nursing facility

## 2025-06-28 ENCOUNTER — APPOINTMENT (OUTPATIENT)
Dept: CARDIOLOGY | Facility: HOSPITAL | Age: 60
End: 2025-06-28
Payer: COMMERCIAL

## 2025-06-28 LAB
ANION GAP SERPL CALCULATED.3IONS-SCNC: 11.1 MMOL/L (ref 5–15)
AORTIC DIMENSIONLESS INDEX: 0.68 (DI)
AV MEAN PRESS GRAD SYS DOP V1V2: 3 MMHG
AV VMAX SYS DOP: 115 CM/SEC
BASOPHILS # BLD AUTO: 0.08 10*3/MM3 (ref 0–0.2)
BASOPHILS NFR BLD AUTO: 0.8 % (ref 0–1.5)
BH CV ECHO MEAS - AO MAX PG: 5.3 MMHG
BH CV ECHO MEAS - AO ROOT DIAM: 3.4 CM
BH CV ECHO MEAS - AO V2 VTI: 29.1 CM
BH CV ECHO MEAS - AVA(I,D): 2.13 CM2
BH CV ECHO MEAS - EDV(CUBED): 64 ML
BH CV ECHO MEAS - EDV(MOD-SP2): 36.5 ML
BH CV ECHO MEAS - EDV(MOD-SP4): 51.2 ML
BH CV ECHO MEAS - EF(MOD-SP2): 57.8 %
BH CV ECHO MEAS - EF(MOD-SP4): 69.9 %
BH CV ECHO MEAS - ESV(CUBED): 9.3 ML
BH CV ECHO MEAS - ESV(MOD-SP2): 15.4 ML
BH CV ECHO MEAS - ESV(MOD-SP4): 15.4 ML
BH CV ECHO MEAS - FS: 47.5 %
BH CV ECHO MEAS - IVS/LVPW: 1.22 CM
BH CV ECHO MEAS - IVSD: 1.1 CM
BH CV ECHO MEAS - LA DIMENSION: 2.8 CM
BH CV ECHO MEAS - LAT PEAK E' VEL: 12.3 CM/SEC
BH CV ECHO MEAS - LV DIASTOLIC VOL/BSA (35-75): 29.2 CM2
BH CV ECHO MEAS - LV MASS(C)D: 127.1 GRAMS
BH CV ECHO MEAS - LV MAX PG: 2.9 MMHG
BH CV ECHO MEAS - LV MEAN PG: 1 MMHG
BH CV ECHO MEAS - LV SYSTOLIC VOL/BSA (12-30): 8.8 CM2
BH CV ECHO MEAS - LV V1 MAX: 84.6 CM/SEC
BH CV ECHO MEAS - LV V1 VTI: 19.7 CM
BH CV ECHO MEAS - LVIDD: 4 CM
BH CV ECHO MEAS - LVIDS: 2.1 CM
BH CV ECHO MEAS - LVOT AREA: 3.1 CM2
BH CV ECHO MEAS - LVOT DIAM: 2 CM
BH CV ECHO MEAS - LVPWD: 0.9 CM
BH CV ECHO MEAS - MED PEAK E' VEL: 7.5 CM/SEC
BH CV ECHO MEAS - MV A MAX VEL: 94.9 CM/SEC
BH CV ECHO MEAS - MV DEC SLOPE: 356 CM/SEC2
BH CV ECHO MEAS - MV DEC TIME: 0.23 SEC
BH CV ECHO MEAS - MV E MAX VEL: 79.2 CM/SEC
BH CV ECHO MEAS - MV E/A: 0.83
BH CV ECHO MEAS - MV MAX PG: 3.9 MMHG
BH CV ECHO MEAS - MV MEAN PG: 1 MMHG
BH CV ECHO MEAS - MV V2 VTI: 33.3 CM
BH CV ECHO MEAS - MVA(VTI): 1.86 CM2
BH CV ECHO MEAS - PA ACC TIME: 0.12 SEC
BH CV ECHO MEAS - PA V2 MAX: 85 CM/SEC
BH CV ECHO MEAS - SV(LVOT): 61.9 ML
BH CV ECHO MEAS - SV(MOD-SP2): 21.1 ML
BH CV ECHO MEAS - SV(MOD-SP4): 35.8 ML
BH CV ECHO MEAS - SVI(LVOT): 35.3 ML/M2
BH CV ECHO MEAS - SVI(MOD-SP2): 12 ML/M2
BH CV ECHO MEAS - SVI(MOD-SP4): 20.4 ML/M2
BH CV ECHO MEAS - TAPSE (>1.6): 1.84 CM
BH CV ECHO MEASUREMENTS AVERAGE E/E' RATIO: 8
BH CV ECHO SHUNT ASSESSMENT PERFORMED (HIDDEN SCRIPTING): 1
BH CV VAS BP RIGHT ARM: NORMAL MMHG
BH CV XLRA - RV BASE: 2.7 CM
BH CV XLRA - RV LENGTH: 4.9 CM
BH CV XLRA - RV MID: 2.6 CM
BH CV XLRA - TDI S': 16.8 CM/SEC
BUN SERPL-MCNC: 4.8 MG/DL (ref 8–23)
BUN/CREAT SERPL: 10.2 (ref 7–25)
CALCIUM SPEC-SCNC: 8.3 MG/DL (ref 8.6–10.5)
CHLORIDE SERPL-SCNC: 98 MMOL/L (ref 98–107)
CO2 SERPL-SCNC: 25.9 MMOL/L (ref 22–29)
CREAT SERPL-MCNC: 0.47 MG/DL (ref 0.76–1.27)
DEPRECATED RDW RBC AUTO: 44 FL (ref 37–54)
EGFRCR SERPLBLD CKD-EPI 2021: 118.2 ML/MIN/1.73
EOSINOPHIL # BLD AUTO: 0.19 10*3/MM3 (ref 0–0.4)
EOSINOPHIL NFR BLD AUTO: 2 % (ref 0.3–6.2)
ERYTHROCYTE [DISTWIDTH] IN BLOOD BY AUTOMATED COUNT: 14.8 % (ref 12.3–15.4)
GLUCOSE BLDC GLUCOMTR-MCNC: 162 MG/DL (ref 70–130)
GLUCOSE BLDC GLUCOMTR-MCNC: 233 MG/DL (ref 70–130)
GLUCOSE BLDC GLUCOMTR-MCNC: 70 MG/DL (ref 70–130)
GLUCOSE BLDC GLUCOMTR-MCNC: 93 MG/DL (ref 70–130)
GLUCOSE SERPL-MCNC: 74 MG/DL (ref 65–99)
HCT VFR BLD AUTO: 38.6 % (ref 37.5–51)
HGB BLD-MCNC: 12.1 G/DL (ref 13–17.7)
IMM GRANULOCYTES # BLD AUTO: 0.08 10*3/MM3 (ref 0–0.05)
IMM GRANULOCYTES NFR BLD AUTO: 0.8 % (ref 0–0.5)
LEFT ATRIUM VOLUME INDEX: 13.7 ML/M2
LV EF BIPLANE MOD: 64.5 %
LYMPHOCYTES # BLD AUTO: 2.33 10*3/MM3 (ref 0.7–3.1)
LYMPHOCYTES NFR BLD AUTO: 24.6 % (ref 19.6–45.3)
MAGNESIUM SERPL-MCNC: 2 MG/DL (ref 1.6–2.4)
MCH RBC QN AUTO: 25.7 PG (ref 26.6–33)
MCHC RBC AUTO-ENTMCNC: 31.3 G/DL (ref 31.5–35.7)
MCV RBC AUTO: 82 FL (ref 79–97)
MONOCYTES # BLD AUTO: 1.04 10*3/MM3 (ref 0.1–0.9)
MONOCYTES NFR BLD AUTO: 11 % (ref 5–12)
NEUTROPHILS NFR BLD AUTO: 5.76 10*3/MM3 (ref 1.7–7)
NEUTROPHILS NFR BLD AUTO: 60.8 % (ref 42.7–76)
NRBC BLD AUTO-RTO: 0 /100 WBC (ref 0–0.2)
PLATELET # BLD AUTO: 416 10*3/MM3 (ref 140–450)
PMV BLD AUTO: 10.7 FL (ref 6–12)
POTASSIUM SERPL-SCNC: 3.9 MMOL/L (ref 3.5–5.2)
RBC # BLD AUTO: 4.71 10*6/MM3 (ref 4.14–5.8)
SODIUM SERPL-SCNC: 135 MMOL/L (ref 136–145)
WBC NRBC COR # BLD AUTO: 9.48 10*3/MM3 (ref 3.4–10.8)

## 2025-06-28 PROCEDURE — 25010000002 CEFTRIAXONE PER 250 MG: Performed by: INTERNAL MEDICINE

## 2025-06-28 PROCEDURE — 63710000001 INSULIN GLARGINE PER 5 UNITS: Performed by: INTERNAL MEDICINE

## 2025-06-28 PROCEDURE — 99233 SBSQ HOSP IP/OBS HIGH 50: CPT

## 2025-06-28 PROCEDURE — 82948 REAGENT STRIP/BLOOD GLUCOSE: CPT

## 2025-06-28 PROCEDURE — 25010000002 HEPARIN (PORCINE) PER 1000 UNITS: Performed by: INTERNAL MEDICINE

## 2025-06-28 PROCEDURE — 83735 ASSAY OF MAGNESIUM: CPT

## 2025-06-28 PROCEDURE — 93306 TTE W/DOPPLER COMPLETE: CPT | Performed by: INTERNAL MEDICINE

## 2025-06-28 PROCEDURE — 93306 TTE W/DOPPLER COMPLETE: CPT

## 2025-06-28 PROCEDURE — 63710000001 INSULIN LISPRO (HUMAN) PER 5 UNITS: Performed by: INTERNAL MEDICINE

## 2025-06-28 PROCEDURE — 80048 BASIC METABOLIC PNL TOTAL CA: CPT

## 2025-06-28 PROCEDURE — 85025 COMPLETE CBC W/AUTO DIFF WBC: CPT

## 2025-06-28 RX ADMIN — HEPARIN SODIUM 5000 UNITS: 5000 INJECTION INTRAVENOUS; SUBCUTANEOUS at 05:51

## 2025-06-28 RX ADMIN — INSULIN GLARGINE 12 UNITS: 100 INJECTION, SOLUTION SUBCUTANEOUS at 20:52

## 2025-06-28 RX ADMIN — CEFTRIAXONE 2000 MG: 2 INJECTION, POWDER, FOR SOLUTION INTRAMUSCULAR; INTRAVENOUS at 20:50

## 2025-06-28 RX ADMIN — INSULIN LISPRO 3 UNITS: 100 INJECTION, SOLUTION INTRAVENOUS; SUBCUTANEOUS at 09:16

## 2025-06-28 RX ADMIN — INSULIN LISPRO 5 UNITS: 100 INJECTION, SOLUTION INTRAVENOUS; SUBCUTANEOUS at 20:52

## 2025-06-28 RX ADMIN — INSULIN LISPRO 12 UNITS: 100 INJECTION, SOLUTION INTRAVENOUS; SUBCUTANEOUS at 09:17

## 2025-06-28 RX ADMIN — HEPARIN SODIUM 5000 UNITS: 5000 INJECTION INTRAVENOUS; SUBCUTANEOUS at 20:28

## 2025-06-28 RX ADMIN — INSULIN GLARGINE 25 UNITS: 100 INJECTION, SOLUTION SUBCUTANEOUS at 09:16

## 2025-06-28 NOTE — PROGRESS NOTES
[facility] Medicine Services  PROGRESS NOTE    Patient Name: Fracisco Mullen  : 1965  MRN: [id number]    Date of Admission: 2025  Primary Care Physician: Brandy Roberson MD    Subjective   Subjective     CC:  DKA and septic shock    HPI:  Patient doing very well this a.m., no complaints other than back pain, came out of the ICU, sugars seem better controlled however had some borderline lows      Objective   Objective     Vital Signs:   Temp:  [97.7 °F (36.5 °C)-98.1 °F (36.7 °C)] 98.1 °F (36.7 °C)  Heart Rate:  [] 104  Resp:  [16-18] 16  BP: ()/(60-93) 156/90  Flow (L/min) (Oxygen Therapy):  [2] 2     Physical Exam:  Constitutional: No acute distress, awake, alert  HENT: NCAT, mucous membranes moist  Respiratory: Clear to auscultation bilaterally, respiratory effort normal   Cardiovascular: RRR, no murmurs, rubs, or gallops  Gastrointestinal: Positive bowel sounds, soft, nontender, nondistended  Musculoskeletal: Patient is 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   Psychiatric: Appropriate affect, cooperative  Neurologic: Oriented x 3, strength symmetric in all extremities, Cranial Nerves grossly intact to confrontation, speech clear  Skin: No rashes     Results Reviewed:  LAB RESULTS:      Lab 25  1222 25  0606 25  0310 25  2340   WBC  --   --   --  15.55*   HEMOGLOBIN  --   --   --  11.2*   HEMATOCRIT  --   --   --  35.1*   PLATELETS  --   --   --  425   NEUTROS ABS  --   --   --  14.25*   IMMATURE GRANS (ABS)  --   --   --  0.25*   LYMPHS ABS  --   --   --  0.58*   MONOS ABS  --   --   --  0.37   EOS ABS  --   --   --  0.01   MCV  --   --   --  80.7   PROCALCITONIN  --   --   --  0.87*   LACTATE 2.4* 2.3* 2.5* 2.6*   HSTROP T  --   --  39* 44*         Lab 25  0359 25  2328 25  2030 25  1601 25  1234 25  0819 25  0328 25  0310 25  2340    SODIUM  --   --  131* 131* 131* 132* 132*   < > 125*   POTASSIUM  --   --  4.2 4.5 4.7 4.4 3.9   < > 4.0   CHLORIDE  --   --  101 100 101 101 102   < > 87*   CO2  --   --  22.0 22.4 20.9* 21.0* 19.0*   < > 10.6*   ANION GAP  --   --  8.0 8.6 9.1 10.0 11.0   < > 27.4*   BUN  --   --  7.3* 8.2 8.9 10.3 12.9   < > 13.8   CREATININE  --   --  0.56* 0.48* 0.54* 0.54* 0.56*   < > 1.09   EGFR  --   --  112.1 117.5 113.4 113.4 112.1   < > 77.2   GLUCOSE  --   --  84 92 147* 223* 207*   < > 456*   CALCIUM  --   --  7.6* 7.6* 7.6* 7.2* 7.0*   < > 7.7*   MAGNESIUM 2.7*  --  1.8 1.6 1.5* 1.6 1.7   < > 1.9   PHOSPHORUS  --  2.7 2.6 2.2* 1.6* 2.0* 1.8*   < > 2.9   HEMOGLOBIN A1C  --   --   --   --   --   --   --   --  14.20*   TSH  --   --   --   --   --   --   --   --  1.860    < > = values in this interval not displayed.         Lab 06/24/25 2340   TOTAL PROTEIN 5.5*   ALBUMIN 2.8*   GLOBULIN 2.7   ALT (SGPT) <5   AST (SGOT) 12   BILIRUBIN 0.4   ALK PHOS 73         Lab 06/25/25  0310 06/24/25 2340   PROBNP  --  314.0   HSTROP T 39* 44*                 Lab 06/24/25 2340   PH, ARTERIAL 7.394   PCO2, ARTERIAL 16.4*   PO2 ART 77.6*   FIO2 28   HCO3 ART 10.0*   BASE EXCESS ART -12.5*   CARBOXYHEMOGLOBIN 1.5     Brief Urine Lab Results  (Last result in the past 365 days)        Color   Clarity   Blood   Leuk Est   Nitrite   Protein   CREAT   Urine HCG        06/25/25 1015 Yellow   Turbid   Large (3+)   Moderate (2+)   Negative   >=300 mg/dL (3+)                   Microbiology Results Abnormal       Procedure Component Value - Date/Time    Blood Culture - Blood, Arm, Left [[id number]]  (Abnormal) Collected: 06/24/25 5130    Lab Status: Preliminary result Specimen: Blood from Arm, Left Updated: 06/28/25 0704     Blood Culture Staphylococcus epidermidis     Isolated from Aerobic and Anaerobic Bottles     Gram Stain Anaerobic Bottle Gram positive cocci in groups      Aerobic Bottle Gram positive cocci in groups    Narrative:       Less than seven (7) mL's of blood was collected.  Insufficient quantity may yield false negative results.    Blood Culture - Blood, Wrist, Left [[id number]]  (Abnormal)  (Susceptibility) Collected: 06/24/25 2348    Lab Status: Preliminary result Specimen: Blood from Wrist, Left Updated: 06/28/25 0700     Blood Culture Escherichia coli     Isolated from Aerobic and Anaerobic Bottles     Gram Stain Aerobic Bottle Gram negative bacilli      Anaerobic Bottle Gram negative bacilli      Aerobic Bottle Gram positive cocci    Narrative:      Less than seven (7) mL's of blood was collected.  Insufficient quantity may yield false negative results.    Organism under investigation.      Susceptibility        Escherichia coli      RONAK      Amoxicillin + Clavulanate Susceptible      Ampicillin Susceptible      Ampicillin + Sulbactam Susceptible      Cefazolin (Non Urine) Susceptible      Cefepime Susceptible      Ceftazidime Susceptible      Ceftriaxone Susceptible      Cefuroxime axetil Susceptible      Gentamicin Susceptible      Levofloxacin Susceptible      Piperacillin + Tazobactam Susceptible      Trimethoprim + Sulfamethoxazole Susceptible                       Susceptibility Comments       Escherichia coli    With the exception of urinary-sourced infections, aminoglycosides should not be used as monotherapy.               Blood Culture ID, PCR - Blood, Arm, Left [[id number]]  (Abnormal) Collected: 06/24/25 2348    Lab Status: Final result Specimen: Blood from Arm, Left Updated: 06/27/25 0423     BCID, PCR Staph spp, not aureus or lugdunensis. Identification by BCID2 PCR.     BOTTLE TYPE Aerobic Bottle    Blood Culture ID, PCR - Blood, Wrist, Left [[id number]]  (Abnormal) Collected: 06/24/25 2348    Lab Status: Final result Specimen: Blood from Wrist, Left Updated: 06/25/25 1603     BCID, PCR Staph spp, not aureus or lugdunensis. Identification by BCID2 PCR.     BCID, PCR 2 Klebsiella pneumoniae group. Identification by BCID2  PCR.     BOTTLE TYPE Aerobic Bottle    Narrative:      No resistance genes detected.            No radiology results from the last 24 hrs        Current medications:  Scheduled Meds:cefTRIAXone, 2,000 mg, Intravenous, Q24H  heparin (porcine), 5,000 Units, Subcutaneous, Q8H  insulin glargine, 12 Units, Subcutaneous, Nightly  insulin glargine, 25 Units, Subcutaneous, Daily  Insulin Lispro, 12 Units, Subcutaneous, TID With Meals  insulin lispro, 3-14 Units, Subcutaneous, 4x Daily AC & at Bedtime  mupirocin, 1 Application, Each Nare, BID      Continuous Infusions:norepinephrine, 0.02-0.3 mcg/kg/min, Last Rate: Stopped (06/26/25 0700)      PRN Meds:.  Calcium Replacement - Follow Nurse / BPA Driven Protocol    dextrose    dextrose    glucagon (human recombinant)    Magnesium Standard Dose Replacement - Follow Nurse / BPA Driven Protocol    Phosphorus Replacement - Follow Nurse / BPA Driven Protocol    Potassium Replacement - Follow Nurse / BPA Driven Protocol    sodium chloride    Assessment & Plan   Assessment & Plan     Active Hospital Problems    Diagnosis  POA    DKA (diabetic ketoacidosis) [E11.10]  Yes      Resolved Hospital Problems   No resolved problems to display.        Brief Hospital Course to date:  Fracisco Mullen is a 59 y.o. male with history of of T2DM, PAD status post left AKA and right second toe amputation, ERIKA, presented to [facility] on 6/24/2025 with AMS.  Labs concerning for glucose of 456, anion gap of 27 and patient was admitted to the ICU with DKA.  Unable to place Bustos catheter initially and urology placed.  Urine concerning for UTI.  Blood culture positive for Klebsiella.     Urine culture shows no growth to date but unable to initially get urine as required urology to place Bustos catheter.  UA was suggestive of UTI and it is suspected this was primary source of infection although patient also has wounds.    Sepsis  E. coli and Klebsiella bacteremia  Likely urinary source in the setting of UTI due  to Klebsiella particularly with blood cultures being positive for E. coli and Klebsiella  Currently on Rocephin 2 g, would continue this for now  Will consult ID in a.m.  Check echo TTE    DKA  HbA1c 6/24/2025 14.2  Remains on 25 units Lantus a.m. and 12 units at nighttime with Premeal 12 units and SSI, sugars are well-controlled at this time  Tolerating diet well  No labs from this a.m., will recheck    Poor living conditions   and case management on board    Expected Discharge Location and Transportation: SNF  Expected Discharge TBD  Expected discharge date/ time has not been documented.     VTE Prophylaxis:  Pharmacologic & mechanical VTE prophylaxis orders are present.     Total time spent: Time Spent: Time Spent: 50 minutes  Time spent includes time reviewing chart, face-to-face time, counseling patient/family/caregiver, ordering medications/tests/procedures, communicating with other health care professionals, documenting clinical information in the electronic health record, and coordination of care.      AM-PAC 6 Clicks Score (PT): 6 (06/27/25 1540)    CODE STATUS:   Code Status and Medical Interventions: CPR (Attempt to Resuscitate); Full Support   Ordered at: 06/25/25 0129     Code Status (Patient has no pulse and is not breathing):    CPR (Attempt to Resuscitate)     Medical Interventions (Patient has pulse or is breathing):    Full Support       Sander Snowden MD  06/28/25

## 2025-06-28 NOTE — PLAN OF CARE
Problem: Adult Inpatient Plan of Care  Goal: Plan of Care Review  Outcome: Progressing  Flowsheets (Taken 6/28/2025 0611)  Progress: improving  Plan of Care Reviewed With: patient  Goal: Patient-Specific Goal (Individualized)  Outcome: Progressing  Goal: Absence of Hospital-Acquired Illness or Injury  Outcome: Progressing  Intervention: Identify and Manage Fall Risk  Recent Flowsheet Documentation  Taken 6/28/2025 0600 by Karen Irby, LUZ  Safety Promotion/Fall Prevention:   activity supervised   assistive device/personal items within reach   clutter free environment maintained   fall prevention program maintained   nonskid shoes/slippers when out of bed   room organization consistent   safety round/check completed  Taken 6/28/2025 0400 by Karen Irby, RN  Safety Promotion/Fall Prevention:   activity supervised   assistive device/personal items within reach   clutter free environment maintained   fall prevention program maintained   nonskid shoes/slippers when out of bed   room organization consistent   safety round/check completed  Taken 6/28/2025 0200 by Karen Irby, RN  Safety Promotion/Fall Prevention:   activity supervised   assistive device/personal items within reach   clutter free environment maintained   fall prevention program maintained   nonskid shoes/slippers when out of bed   room organization consistent   safety round/check completed  Taken 6/28/2025 0000 by Karen Irby, RN  Safety Promotion/Fall Prevention:   activity supervised   assistive device/personal items within reach   clutter free environment maintained   fall prevention program maintained   nonskid shoes/slippers when out of bed   room organization consistent   safety round/check completed  Taken 6/27/2025 2200 by Karen Irby, RN  Safety Promotion/Fall Prevention:   activity supervised   assistive device/personal items within reach   clutter free environment maintained   fall prevention program maintained   nonskid shoes/slippers when out of  bed   room organization consistent   safety round/check completed  Taken 6/27/2025 2000 by Karen Irby RN  Safety Promotion/Fall Prevention:   activity supervised   assistive device/personal items within reach   clutter free environment maintained   fall prevention program maintained   nonskid shoes/slippers when out of bed   room organization consistent   safety round/check completed  Intervention: Prevent Skin Injury  Recent Flowsheet Documentation  Taken 6/28/2025 0600 by Karen Irby RN  Body Position:   position changed independently   right   side-lying  Skin Protection:   incontinence pads utilized   silicone foam dressing in place   skin sealant/moisture barrier applied   transparent dressing maintained  Taken 6/28/2025 0400 by Karen Irby RN  Body Position:   position changed independently   supine  Skin Protection:   incontinence pads utilized   silicone foam dressing in place   skin sealant/moisture barrier applied   transparent dressing maintained  Taken 6/28/2025 0200 by Karen Irby RN  Body Position:   left   side-lying  Skin Protection:   incontinence pads utilized   silicone foam dressing in place   skin sealant/moisture barrier applied   transparent dressing maintained  Taken 6/28/2025 0000 by Karen Irby RN  Body Position:   position changed independently   right   side-lying  Skin Protection:   incontinence pads utilized   silicone foam dressing in place   skin sealant/moisture barrier applied   transparent dressing maintained  Taken 6/27/2025 2200 by Karen Irby RN  Body Position:   left   side-lying   position changed independently  Skin Protection:   incontinence pads utilized   silicone foam dressing in place   skin sealant/moisture barrier applied   transparent dressing maintained  Taken 6/27/2025 2000 by Karen Irby RN  Body Position: supine  Skin Protection:   incontinence pads utilized   silicone foam dressing in place   skin sealant/moisture barrier applied   transparent dressing  maintained  Intervention: Prevent and Manage VTE (Venous Thromboembolism) Risk  Recent Flowsheet Documentation  Taken 6/27/2025 2000 by Karen Irby RN  VTE Prevention/Management: (heparin sq) other (see comments)  Intervention: Prevent Infection  Recent Flowsheet Documentation  Taken 6/28/2025 0600 by Karen Irby RN  Infection Prevention:   cohorting utilized   environmental surveillance performed   equipment surfaces disinfected   hand hygiene promoted   personal protective equipment utilized   rest/sleep promoted   single patient room provided  Taken 6/28/2025 0400 by Karen Irby RN  Infection Prevention:   cohorting utilized   environmental surveillance performed   equipment surfaces disinfected   hand hygiene promoted   personal protective equipment utilized   rest/sleep promoted   single patient room provided  Taken 6/28/2025 0200 by Karen Irby RN  Infection Prevention:   cohorting utilized   environmental surveillance performed   equipment surfaces disinfected   hand hygiene promoted   personal protective equipment utilized   rest/sleep promoted   single patient room provided  Taken 6/28/2025 0000 by Karen Irby RN  Infection Prevention:   cohorting utilized   environmental surveillance performed   equipment surfaces disinfected   hand hygiene promoted   personal protective equipment utilized   rest/sleep promoted   single patient room provided  Taken 6/27/2025 2200 by Karen Irby RN  Infection Prevention:   cohorting utilized   environmental surveillance performed   equipment surfaces disinfected   hand hygiene promoted   personal protective equipment utilized   rest/sleep promoted   single patient room provided  Taken 6/27/2025 2000 by Karen Irby RN  Infection Prevention:   cohorting utilized   environmental surveillance performed   equipment surfaces disinfected   hand hygiene promoted   personal protective equipment utilized   rest/sleep promoted   single patient room provided  Goal: Optimal Comfort and  Wellbeing  Outcome: Progressing  Intervention: Provide Person-Centered Care  Recent Flowsheet Documentation  Taken 6/27/2025 2000 by Karen Irby RN  Trust Relationship/Rapport:   care explained   choices provided   emotional support provided   empathic listening provided   questions answered   questions encouraged   reassurance provided   thoughts/feelings acknowledged  Goal: Readiness for Transition of Care  Outcome: Progressing     Problem: Violence Risk or Actual  Goal: Anger and Impulse Control  Outcome: Progressing  Intervention: Minimize Safety Risk  Recent Flowsheet Documentation  Taken 6/28/2025 0600 by Karen Irby RN  Enhanced Safety Measures:   bed alarm set   room near unit station  Taken 6/28/2025 0400 by Karen Irby RN  Enhanced Safety Measures:   bed alarm set   room near unit station  Taken 6/28/2025 0200 by Karen Irby RN  Enhanced Safety Measures:   bed alarm set   room near unit station  Taken 6/28/2025 0000 by Karen Irby RN  Enhanced Safety Measures:   bed alarm set   room near unit station  Taken 6/27/2025 2200 by Karen Irby RN  Enhanced Safety Measures:   bed alarm set   room near unit station  Taken 6/27/2025 2000 by Karen Irby RN  Sensory Stimulation Regulation: care clustered  Enhanced Safety Measures:   bed alarm set   room near unit station  Intervention: Promote Self-Control  Recent Flowsheet Documentation  Taken 6/27/2025 2000 by Karen Irby RN  Supportive Measures:   active listening utilized   verbalization of feelings encouraged  Environmental Support:   calm environment promoted   environmental consistency promoted     Problem: Sepsis/Septic Shock  Goal: Optimal Coping  Outcome: Progressing  Intervention: Support Patient and Family Response  Recent Flowsheet Documentation  Taken 6/27/2025 2000 by Karen Irby RN  Supportive Measures:   active listening utilized   verbalization of feelings encouraged  Goal: Absence of Bleeding  Outcome: Progressing  Goal: Absence of Infection Signs  and Symptoms  Outcome: Progressing  Intervention: Initiate Sepsis Management  Recent Flowsheet Documentation  Taken 6/28/2025 0600 by Karen Irby RN  Infection Prevention:   cohorting utilized   environmental surveillance performed   equipment surfaces disinfected   hand hygiene promoted   personal protective equipment utilized   rest/sleep promoted   single patient room provided  Taken 6/28/2025 0400 by Karen Ibry RN  Infection Prevention:   cohorting utilized   environmental surveillance performed   equipment surfaces disinfected   hand hygiene promoted   personal protective equipment utilized   rest/sleep promoted   single patient room provided  Taken 6/28/2025 0200 by Karen Irby RN  Infection Prevention:   cohorting utilized   environmental surveillance performed   equipment surfaces disinfected   hand hygiene promoted   personal protective equipment utilized   rest/sleep promoted   single patient room provided  Taken 6/28/2025 0000 by Karen Irby RN  Infection Prevention:   cohorting utilized   environmental surveillance performed   equipment surfaces disinfected   hand hygiene promoted   personal protective equipment utilized   rest/sleep promoted   single patient room provided  Taken 6/27/2025 2200 by Karen Irby RN  Infection Prevention:   cohorting utilized   environmental surveillance performed   equipment surfaces disinfected   hand hygiene promoted   personal protective equipment utilized   rest/sleep promoted   single patient room provided  Taken 6/27/2025 2000 by Karen Irby RN  Infection Prevention:   cohorting utilized   environmental surveillance performed   equipment surfaces disinfected   hand hygiene promoted   personal protective equipment utilized   rest/sleep promoted   single patient room provided  Intervention: Promote Recovery  Recent Flowsheet Documentation  Taken 6/28/2025 0600 by Karen Irby RN  Activity Management: activity encouraged  Taken 6/28/2025 0400 by Karen Irby RN  Activity  Management: activity encouraged  Taken 6/28/2025 0200 by Karen Irby RN  Activity Management: activity encouraged  Taken 6/28/2025 0000 by Karen Irby RN  Activity Management: activity encouraged  Taken 6/27/2025 2200 by Karen Irby RN  Activity Management: activity encouraged  Taken 6/27/2025 2000 by Karen Irby RN  Activity Management: activity encouraged  Airway/Ventilation Management: pulmonary hygiene promoted  Sleep/Rest Enhancement:   consistent schedule promoted   natural light exposure provided  Goal: Optimal Nutrition Delivery  Outcome: Progressing     Problem: Skin Injury Risk Increased  Goal: Skin Health and Integrity  Outcome: Progressing  Intervention: Optimize Skin Protection  Recent Flowsheet Documentation  Taken 6/28/2025 0600 by Karen Irby RN  Activity Management: activity encouraged  Pressure Reduction Techniques:   frequent weight shift encouraged   heels elevated off bed   pressure points protected   weight shift assistance provided  Head of Bed (HOB) Positioning: HOB elevated  Pressure Reduction Devices:   pressure-redistributing mattress utilized   heel offloading device utilized   foam padding utilized  Skin Protection:   incontinence pads utilized   silicone foam dressing in place   skin sealant/moisture barrier applied   transparent dressing maintained  Taken 6/28/2025 0400 by Karen Irby RN  Activity Management: activity encouraged  Pressure Reduction Techniques:   frequent weight shift encouraged   heels elevated off bed   pressure points protected   weight shift assistance provided  Head of Bed (HOB) Positioning: HOB elevated  Pressure Reduction Devices:   pressure-redistributing mattress utilized   heel offloading device utilized   foam padding utilized  Skin Protection:   incontinence pads utilized   silicone foam dressing in place   skin sealant/moisture barrier applied   transparent dressing maintained  Taken 6/28/2025 0200 by Karen Irby RN  Activity Management: activity  encouraged  Pressure Reduction Techniques:   frequent weight shift encouraged   heels elevated off bed   pressure points protected   weight shift assistance provided  Head of Bed (HOB) Positioning: HOB elevated  Pressure Reduction Devices:   pressure-redistributing mattress utilized   heel offloading device utilized   foam padding utilized  Skin Protection:   incontinence pads utilized   silicone foam dressing in place   skin sealant/moisture barrier applied   transparent dressing maintained  Taken 6/28/2025 0000 by Karen Irby RN  Activity Management: activity encouraged  Pressure Reduction Techniques:   frequent weight shift encouraged   heels elevated off bed   pressure points protected   weight shift assistance provided  Head of Bed ([facility]) Positioning: HOB elevated  Pressure Reduction Devices:   pressure-redistributing mattress utilized   heel offloading device utilized   foam padding utilized  Skin Protection:   incontinence pads utilized   silicone foam dressing in place   skin sealant/moisture barrier applied   transparent dressing maintained  Taken 6/27/2025 2200 by Karen Irby RN  Activity Management: activity encouraged  Pressure Reduction Techniques:   frequent weight shift encouraged   heels elevated off bed   pressure points protected   weight shift assistance provided  Head of Bed ([facility]) Positioning: [facility] elevated  Pressure Reduction Devices:   pressure-redistributing mattress utilized   heel offloading device utilized   foam padding utilized  Skin Protection:   incontinence pads utilized   silicone foam dressing in place   skin sealant/moisture barrier applied   transparent dressing maintained  Taken 6/27/2025 2000 by Karen Irby RN  Activity Management: activity encouraged  Pressure Reduction Techniques:   frequent weight shift encouraged   heels elevated off bed   pressure points protected   weight shift assistance provided  Head of Bed ([facility]) Positioning: HOB elevated  Pressure Reduction Devices:    pressure-redistributing mattress utilized   heel offloading device utilized   foam padding utilized  Skin Protection:   incontinence pads utilized   silicone foam dressing in place   skin sealant/moisture barrier applied   transparent dressing maintained     Problem: Fall Injury Risk  Goal: Absence of Fall and Fall-Related Injury  Outcome: Progressing  Intervention: Identify and Manage Contributors  Recent Flowsheet Documentation  Taken 6/27/2025 2000 by Karen Irby RN  Medication Review/Management: medications reviewed  Intervention: Promote Injury-Free Environment  Recent Flowsheet Documentation  Taken 6/28/2025 0600 by Karen Irby RN  Safety Promotion/Fall Prevention:   activity supervised   assistive device/personal items within reach   clutter free environment maintained   fall prevention program maintained   nonskid shoes/slippers when out of bed   room organization consistent   safety round/check completed  Taken 6/28/2025 0400 by Karen Irby RN  Safety Promotion/Fall Prevention:   activity supervised   assistive device/personal items within reach   clutter free environment maintained   fall prevention program maintained   nonskid shoes/slippers when out of bed   room organization consistent   safety round/check completed  Taken 6/28/2025 0200 by Karen Irby RN  Safety Promotion/Fall Prevention:   activity supervised   assistive device/personal items within reach   clutter free environment maintained   fall prevention program maintained   nonskid shoes/slippers when out of bed   room organization consistent   safety round/check completed  Taken 6/28/2025 0000 by Karen Irby RN  Safety Promotion/Fall Prevention:   activity supervised   assistive device/personal items within reach   clutter free environment maintained   fall prevention program maintained   nonskid shoes/slippers when out of bed   room organization consistent   safety round/check completed  Taken 6/27/2025 2200 by Karen Irby RN  Safety  Promotion/Fall Prevention:   activity supervised   assistive device/personal items within reach   clutter free environment maintained   fall prevention program maintained   nonskid shoes/slippers when out of bed   room organization consistent   safety round/check completed  Taken 6/27/2025 2000 by Karen Irby RN  Safety Promotion/Fall Prevention:   activity supervised   assistive device/personal items within reach   clutter free environment maintained   fall prevention program maintained   nonskid shoes/slippers when out of bed   room organization consistent   safety round/check completed     Problem: Urinary Retention  Goal: Effective Urinary Elimination  Outcome: Progressing   Goal Outcome Evaluation:  Plan of Care Reviewed With: patient        Progress: improving

## 2025-06-29 LAB
ANION GAP SERPL CALCULATED.3IONS-SCNC: 8 MMOL/L (ref 5–15)
BACTERIA SPEC AEROBE CULT: ABNORMAL
BUN SERPL-MCNC: 5.9 MG/DL (ref 8–23)
BUN/CREAT SERPL: 14 (ref 7–25)
CALCIUM SPEC-SCNC: 7.7 MG/DL (ref 8.6–10.5)
CHLORIDE SERPL-SCNC: 97 MMOL/L (ref 98–107)
CO2 SERPL-SCNC: 28 MMOL/L (ref 22–29)
CREAT SERPL-MCNC: 0.42 MG/DL (ref 0.76–1.27)
DEPRECATED RDW RBC AUTO: 43.8 FL (ref 37–54)
EGFRCR SERPLBLD CKD-EPI 2021: 122.3 ML/MIN/1.73
ERYTHROCYTE [DISTWIDTH] IN BLOOD BY AUTOMATED COUNT: 14.8 % (ref 12.3–15.4)
GLUCOSE BLDC GLUCOMTR-MCNC: 112 MG/DL (ref 70–130)
GLUCOSE BLDC GLUCOMTR-MCNC: 135 MG/DL (ref 70–130)
GLUCOSE BLDC GLUCOMTR-MCNC: 187 MG/DL (ref 70–130)
GLUCOSE BLDC GLUCOMTR-MCNC: 249 MG/DL (ref 70–130)
GLUCOSE BLDC GLUCOMTR-MCNC: 64 MG/DL (ref 70–130)
GLUCOSE BLDC GLUCOMTR-MCNC: 66 MG/DL (ref 70–130)
GLUCOSE BLDC GLUCOMTR-MCNC: 84 MG/DL (ref 70–130)
GLUCOSE SERPL-MCNC: 237 MG/DL (ref 65–99)
GRAM STN SPEC: ABNORMAL
HCT VFR BLD AUTO: 31.6 % (ref 37.5–51)
HGB BLD-MCNC: 10 G/DL (ref 13–17.7)
ISOLATED FROM: ABNORMAL
MAGNESIUM SERPL-MCNC: 1.8 MG/DL (ref 1.6–2.4)
MCH RBC QN AUTO: 26 PG (ref 26.6–33)
MCHC RBC AUTO-ENTMCNC: 31.6 G/DL (ref 31.5–35.7)
MCV RBC AUTO: 82.1 FL (ref 79–97)
PLATELET # BLD AUTO: 350 10*3/MM3 (ref 140–450)
PMV BLD AUTO: 10.9 FL (ref 6–12)
POTASSIUM SERPL-SCNC: 3.8 MMOL/L (ref 3.5–5.2)
RBC # BLD AUTO: 3.85 10*6/MM3 (ref 4.14–5.8)
SODIUM SERPL-SCNC: 133 MMOL/L (ref 136–145)
WBC NRBC COR # BLD AUTO: 7.12 10*3/MM3 (ref 3.4–10.8)

## 2025-06-29 PROCEDURE — 80048 BASIC METABOLIC PNL TOTAL CA: CPT

## 2025-06-29 PROCEDURE — 63710000001 INSULIN LISPRO (HUMAN) PER 5 UNITS

## 2025-06-29 PROCEDURE — 99232 SBSQ HOSP IP/OBS MODERATE 35: CPT

## 2025-06-29 PROCEDURE — 25010000002 CEFTRIAXONE PER 250 MG: Performed by: INTERNAL MEDICINE

## 2025-06-29 PROCEDURE — 63710000001 INSULIN LISPRO (HUMAN) PER 5 UNITS: Performed by: INTERNAL MEDICINE

## 2025-06-29 PROCEDURE — 82948 REAGENT STRIP/BLOOD GLUCOSE: CPT

## 2025-06-29 PROCEDURE — 85027 COMPLETE CBC AUTOMATED: CPT

## 2025-06-29 PROCEDURE — 25010000002 HEPARIN (PORCINE) PER 1000 UNITS: Performed by: INTERNAL MEDICINE

## 2025-06-29 PROCEDURE — 83735 ASSAY OF MAGNESIUM: CPT

## 2025-06-29 PROCEDURE — 63710000001 INSULIN GLARGINE PER 5 UNITS: Performed by: INTERNAL MEDICINE

## 2025-06-29 PROCEDURE — 63710000001 INSULIN GLARGINE PER 5 UNITS

## 2025-06-29 RX ORDER — INSULIN LISPRO 100 [IU]/ML
15 INJECTION, SOLUTION INTRAVENOUS; SUBCUTANEOUS
Status: DISCONTINUED | OUTPATIENT
Start: 2025-06-29 | End: 2025-06-30

## 2025-06-29 RX ORDER — METOPROLOL SUCCINATE 50 MG/1
50 TABLET, EXTENDED RELEASE ORAL DAILY
Status: DISCONTINUED | OUTPATIENT
Start: 2025-06-29 | End: 2025-07-01

## 2025-06-29 RX ORDER — TAMSULOSIN HYDROCHLORIDE 0.4 MG/1
0.8 CAPSULE ORAL NIGHTLY
Status: DISCONTINUED | OUTPATIENT
Start: 2025-06-29 | End: 2025-07-04 | Stop reason: HOSPADM

## 2025-06-29 RX ORDER — ATORVASTATIN CALCIUM 10 MG/1
10 TABLET, FILM COATED ORAL DAILY
Status: DISCONTINUED | OUTPATIENT
Start: 2025-06-29 | End: 2025-07-04 | Stop reason: HOSPADM

## 2025-06-29 RX ORDER — ROPINIROLE 0.5 MG/1
0.25 TABLET, FILM COATED ORAL NIGHTLY
Status: DISCONTINUED | OUTPATIENT
Start: 2025-06-29 | End: 2025-07-04 | Stop reason: HOSPADM

## 2025-06-29 RX ORDER — ASPIRIN 81 MG/1
81 TABLET ORAL DAILY
Status: DISCONTINUED | OUTPATIENT
Start: 2025-06-29 | End: 2025-07-04 | Stop reason: HOSPADM

## 2025-06-29 RX ORDER — PANTOPRAZOLE SODIUM 40 MG/1
40 TABLET, DELAYED RELEASE ORAL
Status: DISCONTINUED | OUTPATIENT
Start: 2025-06-30 | End: 2025-07-04 | Stop reason: HOSPADM

## 2025-06-29 RX ORDER — FINASTERIDE 5 MG/1
5 TABLET, FILM COATED ORAL NIGHTLY
Status: DISCONTINUED | OUTPATIENT
Start: 2025-06-29 | End: 2025-07-04 | Stop reason: HOSPADM

## 2025-06-29 RX ORDER — DULOXETIN HYDROCHLORIDE 60 MG/1
60 CAPSULE, DELAYED RELEASE ORAL DAILY
Status: DISCONTINUED | OUTPATIENT
Start: 2025-06-29 | End: 2025-07-04 | Stop reason: HOSPADM

## 2025-06-29 RX ORDER — CYCLOBENZAPRINE HCL 10 MG
5 TABLET ORAL 3 TIMES DAILY PRN
Status: DISCONTINUED | OUTPATIENT
Start: 2025-06-29 | End: 2025-07-04 | Stop reason: HOSPADM

## 2025-06-29 RX ADMIN — CEFTRIAXONE 2000 MG: 2 INJECTION, POWDER, FOR SOLUTION INTRAMUSCULAR; INTRAVENOUS at 17:30

## 2025-06-29 RX ADMIN — INSULIN LISPRO 5 UNITS: 100 INJECTION, SOLUTION INTRAVENOUS; SUBCUTANEOUS at 20:44

## 2025-06-29 RX ADMIN — ROPINIROLE HYDROCHLORIDE 0.25 MG: 0.5 TABLET, FILM COATED ORAL at 20:10

## 2025-06-29 RX ADMIN — ASPIRIN 81 MG: 81 TABLET, COATED ORAL at 11:55

## 2025-06-29 RX ADMIN — HEPARIN SODIUM 5000 UNITS: 5000 INJECTION INTRAVENOUS; SUBCUTANEOUS at 20:12

## 2025-06-29 RX ADMIN — INSULIN LISPRO 3 UNITS: 100 INJECTION, SOLUTION INTRAVENOUS; SUBCUTANEOUS at 08:42

## 2025-06-29 RX ADMIN — ATORVASTATIN CALCIUM 10 MG: 10 TABLET ORAL at 11:56

## 2025-06-29 RX ADMIN — Medication 15 G: at 17:01

## 2025-06-29 RX ADMIN — HEPARIN SODIUM 5000 UNITS: 5000 INJECTION INTRAVENOUS; SUBCUTANEOUS at 05:29

## 2025-06-29 RX ADMIN — PREGABALIN 225 MG: 150 CAPSULE ORAL at 12:37

## 2025-06-29 RX ADMIN — METOPROLOL SUCCINATE 50 MG: 50 TABLET, EXTENDED RELEASE ORAL at 11:55

## 2025-06-29 RX ADMIN — INSULIN LISPRO 12 UNITS: 100 INJECTION, SOLUTION INTRAVENOUS; SUBCUTANEOUS at 08:42

## 2025-06-29 RX ADMIN — INSULIN GLARGINE 25 UNITS: 100 INJECTION, SOLUTION SUBCUTANEOUS at 08:41

## 2025-06-29 RX ADMIN — TAMSULOSIN HYDROCHLORIDE 0.8 MG: 0.4 CAPSULE ORAL at 20:10

## 2025-06-29 RX ADMIN — PREGABALIN 225 MG: 150 CAPSULE ORAL at 20:10

## 2025-06-29 RX ADMIN — INSULIN LISPRO 15 UNITS: 100 INJECTION, SOLUTION INTRAVENOUS; SUBCUTANEOUS at 11:42

## 2025-06-29 RX ADMIN — CYCLOBENZAPRINE HYDROCHLORIDE 5 MG: 10 TABLET, FILM COATED ORAL at 12:37

## 2025-06-29 RX ADMIN — INSULIN GLARGINE 15 UNITS: 100 INJECTION, SOLUTION SUBCUTANEOUS at 20:44

## 2025-06-29 RX ADMIN — MUPIROCIN 1 APPLICATION: 20 OINTMENT TOPICAL at 09:49

## 2025-06-29 RX ADMIN — HEPARIN SODIUM 5000 UNITS: 5000 INJECTION INTRAVENOUS; SUBCUTANEOUS at 14:22

## 2025-06-29 RX ADMIN — DULOXETINE 60 MG: 60 CAPSULE, DELAYED RELEASE ORAL at 11:55

## 2025-06-29 RX ADMIN — FINASTERIDE 5 MG: 5 TABLET, FILM COATED ORAL at 20:10

## 2025-06-29 NOTE — PLAN OF CARE
Problem: Adult Inpatient Plan of Care  Goal: Plan of Care Review  Outcome: Progressing  Flowsheets (Taken 6/29/2025 0418)  Progress: improving  Plan of Care Reviewed With: patient  Goal: Patient-Specific Goal (Individualized)  Outcome: Progressing  Goal: Absence of Hospital-Acquired Illness or Injury  Outcome: Progressing  Intervention: Identify and Manage Fall Risk  Recent Flowsheet Documentation  Taken 6/29/2025 0400 by Karen Irby, LUZ  Safety Promotion/Fall Prevention:   activity supervised   assistive device/personal items within reach   clutter free environment maintained   fall prevention program maintained   nonskid shoes/slippers when out of bed   room organization consistent   safety round/check completed  Taken 6/29/2025 0200 by Karen Irby, RN  Safety Promotion/Fall Prevention:   activity supervised   assistive device/personal items within reach   clutter free environment maintained   fall prevention program maintained   nonskid shoes/slippers when out of bed   room organization consistent   safety round/check completed  Taken 6/29/2025 0000 by Karen Irby, RN  Safety Promotion/Fall Prevention:   activity supervised   assistive device/personal items within reach   clutter free environment maintained   fall prevention program maintained   nonskid shoes/slippers when out of bed   room organization consistent   safety round/check completed  Taken 6/28/2025 2200 by Karen Irby, RN  Safety Promotion/Fall Prevention:   activity supervised   assistive device/personal items within reach   clutter free environment maintained   fall prevention program maintained   nonskid shoes/slippers when out of bed   room organization consistent   safety round/check completed  Taken 6/28/2025 2000 by Karen Iryb, RN  Safety Promotion/Fall Prevention:   activity supervised   assistive device/personal items within reach   clutter free environment maintained   fall prevention program maintained   nonskid shoes/slippers when out of  bed   room organization consistent   safety round/check completed  Intervention: Prevent Skin Injury  Recent Flowsheet Documentation  Taken 6/29/2025 0400 by Karen Irby RN  Body Position: position changed independently  Skin Protection:   incontinence pads utilized   silicone foam dressing in place   skin sealant/moisture barrier applied   transparent dressing maintained  Taken 6/29/2025 0200 by Karen Irby RN  Body Position:   position changed independently   semi-prone  Skin Protection:   incontinence pads utilized   silicone foam dressing in place   skin sealant/moisture barrier applied   transparent dressing maintained  Taken 6/29/2025 0000 by Karen Irby RN  Body Position:   position changed independently   right   side-lying  Skin Protection:   incontinence pads utilized   silicone foam dressing in place   skin sealant/moisture barrier applied   transparent dressing maintained  Taken 6/28/2025 2200 by Karen Irby RN  Body Position:   position changed independently   semi-prone  Skin Protection:   incontinence pads utilized   silicone foam dressing in place   skin sealant/moisture barrier applied   transparent dressing maintained  Taken 6/28/2025 2000 by Karen Irby RN  Body Position:   position changed independently   right   side-lying  Skin Protection:   incontinence pads utilized   silicone foam dressing in place   skin sealant/moisture barrier applied   transparent dressing maintained  Intervention: Prevent and Manage VTE (Venous Thromboembolism) Risk  Recent Flowsheet Documentation  Taken 6/28/2025 2000 by Karen Irby RN  VTE Prevention/Management: (heparin sq) other (see comments)  Intervention: Prevent Infection  Recent Flowsheet Documentation  Taken 6/29/2025 0400 by Karen Irby RN  Infection Prevention:   cohorting utilized   environmental surveillance performed   equipment surfaces disinfected   hand hygiene promoted   personal protective equipment utilized   rest/sleep promoted   single patient  room provided  Taken 6/29/2025 0200 by Karen Irby RN  Infection Prevention:   cohorting utilized   environmental surveillance performed   equipment surfaces disinfected   hand hygiene promoted   personal protective equipment utilized   rest/sleep promoted   single patient room provided  Taken 6/29/2025 0000 by Karen Irby RN  Infection Prevention:   cohorting utilized   environmental surveillance performed   equipment surfaces disinfected   hand hygiene promoted   personal protective equipment utilized   rest/sleep promoted   single patient room provided  Taken 6/28/2025 2200 by Karen Irby RN  Infection Prevention:   cohorting utilized   environmental surveillance performed   equipment surfaces disinfected   hand hygiene promoted   personal protective equipment utilized   rest/sleep promoted   single patient room provided  Taken 6/28/2025 2000 by Karen Irby RN  Infection Prevention:   cohorting utilized   environmental surveillance performed   equipment surfaces disinfected   hand hygiene promoted   personal protective equipment utilized   rest/sleep promoted   single patient room provided  Goal: Optimal Comfort and Wellbeing  Outcome: Progressing  Intervention: Provide Person-Centered Care  Recent Flowsheet Documentation  Taken 6/28/2025 2000 by Karen Irby RN  Trust Relationship/Rapport:   care explained   choices provided   emotional support provided   empathic listening provided   questions answered   questions encouraged   reassurance provided   thoughts/feelings acknowledged  Goal: Readiness for Transition of Care  Outcome: Progressing     Problem: Violence Risk or Actual  Goal: Anger and Impulse Control  Outcome: Progressing  Intervention: Minimize Safety Risk  Recent Flowsheet Documentation  Taken 6/29/2025 0400 by Karen Irby RN  Enhanced Safety Measures:   bed alarm set   room near unit station  Taken 6/29/2025 0200 by Karen Irby RN  Enhanced Safety Measures:   bed alarm set   room near unit  station  Taken 6/29/2025 0000 by Karen Irby RN  Enhanced Safety Measures:   bed alarm set   room near unit station  Taken 6/28/2025 2200 by Karen Irby RN  Enhanced Safety Measures:   bed alarm set   room near unit station  Taken 6/28/2025 2000 by Karen Irby RN  Sensory Stimulation Regulation: care clustered  Enhanced Safety Measures:   bed alarm set   room near unit station  Intervention: Promote Self-Control  Recent Flowsheet Documentation  Taken 6/28/2025 2000 by Karen Irby RN  Supportive Measures: active listening utilized  Environmental Support:   calm environment promoted   environmental consistency promoted     Problem: Sepsis/Septic Shock  Goal: Optimal Coping  Outcome: Progressing  Intervention: Support Patient and Family Response  Recent Flowsheet Documentation  Taken 6/28/2025 2000 by Karen Irby RN  Supportive Measures: active listening utilized  Goal: Absence of Bleeding  Outcome: Progressing  Goal: Absence of Infection Signs and Symptoms  Outcome: Progressing  Intervention: Initiate Sepsis Management  Recent Flowsheet Documentation  Taken 6/29/2025 0400 by Karen Irby RN  Infection Prevention:   cohorting utilized   environmental surveillance performed   equipment surfaces disinfected   hand hygiene promoted   personal protective equipment utilized   rest/sleep promoted   single patient room provided  Taken 6/29/2025 0200 by Karen Irby RN  Infection Prevention:   cohorting utilized   environmental surveillance performed   equipment surfaces disinfected   hand hygiene promoted   personal protective equipment utilized   rest/sleep promoted   single patient room provided  Taken 6/29/2025 0000 by Karen Irby RN  Infection Prevention:   cohorting utilized   environmental surveillance performed   equipment surfaces disinfected   hand hygiene promoted   personal protective equipment utilized   rest/sleep promoted   single patient room provided  Taken 6/28/2025 2200 by Karen Irby RN  Infection  Prevention:   cohorting utilized   environmental surveillance performed   equipment surfaces disinfected   hand hygiene promoted   personal protective equipment utilized   rest/sleep promoted   single patient room provided  Taken 6/28/2025 2000 by Karen Irby RN  Infection Prevention:   cohorting utilized   environmental surveillance performed   equipment surfaces disinfected   hand hygiene promoted   personal protective equipment utilized   rest/sleep promoted   single patient room provided  Intervention: Promote Recovery  Recent Flowsheet Documentation  Taken 6/29/2025 0400 by Karen Irby RN  Activity Management: activity encouraged  Taken 6/29/2025 0200 by Karen Irby RN  Activity Management: activity encouraged  Taken 6/29/2025 0000 by Karen Irby RN  Activity Management: activity encouraged  Taken 6/28/2025 2200 by Karen Irby RN  Activity Management: activity encouraged  Taken 6/28/2025 2000 by Karen Irby RN  Activity Management: activity encouraged  Sleep/Rest Enhancement:   consistent schedule promoted   natural light exposure provided  Goal: Optimal Nutrition Delivery  Outcome: Progressing     Problem: Skin Injury Risk Increased  Goal: Skin Health and Integrity  Outcome: Progressing  Intervention: Optimize Skin Protection  Recent Flowsheet Documentation  Taken 6/29/2025 0400 by Karen Irby RN  Activity Management: activity encouraged  Pressure Reduction Techniques:   frequent weight shift encouraged   heels elevated off bed   pressure points protected   weight shift assistance provided  Head of Bed (HOB) Positioning: HOB elevated  Pressure Reduction Devices:   pressure-redistributing mattress utilized   heel offloading device utilized   foam padding utilized  Skin Protection:   incontinence pads utilized   silicone foam dressing in place   skin sealant/moisture barrier applied   transparent dressing maintained  Taken 6/29/2025 0200 by Karen Irby RN  Activity Management: activity encouraged  Pressure  Reduction Techniques:   frequent weight shift encouraged   heels elevated off bed   pressure points protected   weight shift assistance provided  Head of Bed ([facility]) Positioning: [facility] elevated  Pressure Reduction Devices:   pressure-redistributing mattress utilized   heel offloading device utilized   foam padding utilized  Skin Protection:   incontinence pads utilized   silicone foam dressing in place   skin sealant/moisture barrier applied   transparent dressing maintained  Taken 6/29/2025 0000 by Karen Irby RN  Activity Management: activity encouraged  Pressure Reduction Techniques:   frequent weight shift encouraged   heels elevated off bed   pressure points protected   weight shift assistance provided  Head of Bed ([facility]) Positioning: [facility] elevated  Pressure Reduction Devices:   pressure-redistributing mattress utilized   heel offloading device utilized   foam padding utilized  Skin Protection:   incontinence pads utilized   silicone foam dressing in place   skin sealant/moisture barrier applied   transparent dressing maintained  Taken 6/28/2025 2200 by Karen Irby RN  Activity Management: activity encouraged  Pressure Reduction Techniques:   frequent weight shift encouraged   heels elevated off bed   pressure points protected   weight shift assistance provided  Head of Bed ([facility]) Positioning: [facility] elevated  Pressure Reduction Devices:   pressure-redistributing mattress utilized   heel offloading device utilized   foam padding utilized  Skin Protection:   incontinence pads utilized   silicone foam dressing in place   skin sealant/moisture barrier applied   transparent dressing maintained  Taken 6/28/2025 2000 by Karen Irby RN  Activity Management: activity encouraged  Pressure Reduction Techniques:   frequent weight shift encouraged   heels elevated off bed   pressure points protected   weight shift assistance provided  Head of Bed ([facility]) Positioning: [facility] elevated  Pressure Reduction Devices:   pressure-redistributing  mattress utilized   heel offloading device utilized   foam padding utilized  Skin Protection:   incontinence pads utilized   silicone foam dressing in place   skin sealant/moisture barrier applied   transparent dressing maintained     Problem: Fall Injury Risk  Goal: Absence of Fall and Fall-Related Injury  Outcome: Progressing  Intervention: Promote Injury-Free Environment  Recent Flowsheet Documentation  Taken 6/29/2025 0400 by Karen Irby, LUZ  Safety Promotion/Fall Prevention:   activity supervised   assistive device/personal items within reach   clutter free environment maintained   fall prevention program maintained   nonskid shoes/slippers when out of bed   room organization consistent   safety round/check completed  Taken 6/29/2025 0200 by Karen Irby, LUZ  Safety Promotion/Fall Prevention:   activity supervised   assistive device/personal items within reach   clutter free environment maintained   fall prevention program maintained   nonskid shoes/slippers when out of bed   room organization consistent   safety round/check completed  Taken 6/29/2025 0000 by Karen Irby, RN  Safety Promotion/Fall Prevention:   activity supervised   assistive device/personal items within reach   clutter free environment maintained   fall prevention program maintained   nonskid shoes/slippers when out of bed   room organization consistent   safety round/check completed  Taken 6/28/2025 2200 by Karen Irby, RN  Safety Promotion/Fall Prevention:   activity supervised   assistive device/personal items within reach   clutter free environment maintained   fall prevention program maintained   nonskid shoes/slippers when out of bed   room organization consistent   safety round/check completed  Taken 6/28/2025 2000 by Karen Irby, RN  Safety Promotion/Fall Prevention:   activity supervised   assistive device/personal items within reach   clutter free environment maintained   fall prevention program maintained   nonskid shoes/slippers when out  of bed   room organization consistent   safety round/check completed     Problem: Urinary Retention  Goal: Effective Urinary Elimination  Outcome: Progressing   Goal Outcome Evaluation:  Plan of Care Reviewed With: patient        Progress: improving

## 2025-06-29 NOTE — PROGRESS NOTES
[facility] Medicine Services  PROGRESS NOTE    Patient Name: Fracisco Mullen  : 1965  MRN: [id number]    Date of Admission: 2025  Primary Care Physician: Brandy Roberson MD    Subjective   Subjective     CC:  DKA and septic shock    HPI:  Very labile blood sugars, doing well this a.m., continues to endorse back pain which appears to be chronic for him, not very ambulatory at baseline.  Adjusting insulin regimen at this time.  Remains on antibiotics.      Objective   Objective     Vital Signs:   Temp:  [97.6 °F (36.4 °C)-98.3 °F (36.8 °C)] 98.3 °F (36.8 °C)  Heart Rate:  [] 96  Resp:  [16-20] 20  BP: (108-125)/(70-89) 125/70  Flow (L/min) (Oxygen Therapy):  [3] 3     Physical Exam:  Constitutional: No acute distress, awake, alert  HENT: NCAT, mucous membranes moist  Respiratory: Clear to auscultation bilaterally, respiratory effort normal   Cardiovascular: RRR, no murmurs, rubs, or gallops  Gastrointestinal: Positive bowel sounds, soft, nontender, nondistended  Musculoskeletal: Patient is 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   Psychiatric: Appropriate affect, cooperative  Neurologic: Oriented x 3, strength symmetric in all extremities, Cranial Nerves grossly intact to confrontation, speech clear  Skin: No rashes     Results Reviewed:  LAB RESULTS:      Lab 25  0743 25  1159 25  1222 25  0606 25  0310 25  2340   WBC 7.12 9.48  --   --   --  15.55*   HEMOGLOBIN 10.0* 12.1*  --   --   --  11.2*   HEMATOCRIT 31.6* 38.6  --   --   --  35.1*   PLATELETS 350 416  --   --   --  425   NEUTROS ABS  --  5.76  --   --   --  14.25*   IMMATURE GRANS (ABS)  --  0.08*  --   --   --  0.25*   LYMPHS ABS  --  2.33  --   --   --  0.58*   MONOS ABS  --  1.04*  --   --   --  0.37   EOS ABS  --  0.19  --   --   --  0.01   MCV 82.1 82.0  --   --   --  80.7   PROCALCITONIN  --   --   --   --   --  0.87*    LACTATE  --   --  2.4* 2.3* 2.5* 2.6*   HSTROP T  --   --   --   --  39* 44*         Lab 06/29/25  0743 06/28/25  1159 06/27/25  0359 06/26/25  2328 06/26/25  2030 06/26/25  1601 06/26/25  1234 06/26/25  0819 06/25/25  0310 06/24/25  2340   SODIUM 133* 135*  --   --  131* 131* 131* 132*   < > 125*   POTASSIUM 3.8 3.9  --   --  4.2 4.5 4.7 4.4   < > 4.0   CHLORIDE 97* 98  --   --  101 100 101 101   < > 87*   CO2 28.0 25.9  --   --  22.0 22.4 20.9* 21.0*   < > 10.6*   ANION GAP 8.0 11.1  --   --  8.0 8.6 9.1 10.0   < > 27.4*   BUN 5.9* 4.8*  --   --  7.3* 8.2 8.9 10.3   < > 13.8   CREATININE 0.42* 0.47*  --   --  0.56* 0.48* 0.54* 0.54*   < > 1.09   EGFR 122.3 118.2  --   --  112.1 117.5 113.4 113.4   < > 77.2   GLUCOSE 237* 74  --   --  84 92 147* 223*   < > 456*   CALCIUM 7.7* 8.3*  --   --  7.6* 7.6* 7.6* 7.2*   < > 7.7*   MAGNESIUM 1.8 2.0 2.7*  --  1.8 1.6 1.5* 1.6   < > 1.9   PHOSPHORUS  --   --   --  2.7 2.6 2.2* 1.6* 2.0*   < > 2.9   HEMOGLOBIN A1C  --   --   --   --   --   --   --   --   --  14.20*   TSH  --   --   --   --   --   --   --   --   --  1.860    < > = values in this interval not displayed.         Lab 06/24/25  2340   TOTAL PROTEIN 5.5*   ALBUMIN 2.8*   GLOBULIN 2.7   ALT (SGPT) <5   AST (SGOT) 12   BILIRUBIN 0.4   ALK PHOS 73         Lab 06/25/25  0310 06/24/25  2340   PROBNP  --  314.0   HSTROP T 39* 44*                 Lab 06/24/25  2340   PH, ARTERIAL 7.394   PCO2, ARTERIAL 16.4*   PO2 ART 77.6*   FIO2 28   HCO3 ART 10.0*   BASE EXCESS ART -12.5*   CARBOXYHEMOGLOBIN 1.5     Brief Urine Lab Results  (Last result in the past 365 days)        Color   Clarity   Blood   Leuk Est   Nitrite   Protein   CREAT   Urine HCG        06/25/25 1015 Yellow   Turbid   Large (3+)   Moderate (2+)   Negative   >=300 mg/dL (3+)                   Microbiology Results Abnormal       Procedure Component Value - Date/Time    Blood Culture - Blood, Wrist, Left [[id number]]  (Abnormal)  (Susceptibility) Collected:  06/24/25 2348    Lab Status: Final result Specimen: Blood from Wrist, Left Updated: 06/29/25 0633     Blood Culture Escherichia coli     Isolated from Aerobic and Anaerobic Bottles     Blood Culture Staphylococcus hominis ssp hominis     Isolated from Aerobic Bottle     Gram Stain Aerobic Bottle Gram negative bacilli      Anaerobic Bottle Gram negative bacilli      Aerobic Bottle Gram positive cocci    Narrative:      Less than seven (7) mL's of blood was collected.  Insufficient quantity may yield false negative results.    Staphylococcus hominis ssp hominis: Probable contaminant requires clinical correlation, susceptibility not performed unless requested by physician.      Susceptibility        Escherichia coli      RONAK      Amoxicillin + Clavulanate Susceptible      Ampicillin Susceptible      Ampicillin + Sulbactam Susceptible      Cefazolin (Non Urine) Susceptible      Cefepime Susceptible      Ceftazidime Susceptible      Ceftriaxone Susceptible      Cefuroxime axetil Susceptible      Gentamicin Susceptible      Levofloxacin Susceptible      Piperacillin + Tazobactam Susceptible      Trimethoprim + Sulfamethoxazole Susceptible                       Susceptibility Comments       Escherichia coli    With the exception of urinary-sourced infections, aminoglycosides should not be used as monotherapy.               Blood Culture - Blood, Arm, Left [[id number]]  (Abnormal) Collected: 06/24/25 2348    Lab Status: Final result Specimen: Blood from Arm, Left Updated: 06/29/25 0633     Blood Culture Staphylococcus epidermidis     Isolated from Aerobic and Anaerobic Bottles     Gram Stain Anaerobic Bottle Gram positive cocci in groups      Aerobic Bottle Gram positive cocci in groups    Narrative:      Less than seven (7) mL's of blood was collected.  Insufficient quantity may yield false negative results.    Probable contaminant requires clinical correlation, susceptibility not performed unless requested by physician.       Blood Culture ID, PCR - Blood, Arm, Left [[id number]]  (Abnormal) Collected: 06/24/25 2348    Lab Status: Final result Specimen: Blood from Arm, Left Updated: 06/27/25 0423     BCID, PCR Staph spp, not aureus or lugdunensis. Identification by BCID2 PCR.     BOTTLE TYPE Aerobic Bottle    Blood Culture ID, PCR - Blood, Wrist, Left [[id number]]  (Abnormal) Collected: 06/24/25 2348    Lab Status: Final result Specimen: Blood from Wrist, Left Updated: 06/25/25 1603     BCID, PCR Staph spp, not aureus or lugdunensis. Identification by BCID2 PCR.     BCID, PCR 2 Klebsiella pneumoniae group. Identification by BCID2 PCR.     BOTTLE TYPE Aerobic Bottle    Narrative:      No resistance genes detected.            No radiology results from the last 24 hrs    Results for orders placed during the hospital encounter of 06/24/25    Adult Transthoracic Echo Complete W/ Cont if Necessary Per Protocol    Interpretation Summary    Left ventricular systolic function is normal. Left ventricular ejection fraction appears to be 66 - 70%.    Saline test results are negative.      Current medications:  Scheduled Meds:cefTRIAXone, 2,000 mg, Intravenous, Q24H  heparin (porcine), 5,000 Units, Subcutaneous, Q8H  insulin glargine, 12 Units, Subcutaneous, Nightly  insulin glargine, 25 Units, Subcutaneous, Daily  Insulin Lispro, 12 Units, Subcutaneous, TID With Meals  insulin lispro, 3-14 Units, Subcutaneous, 4x Daily AC & at Bedtime  mupirocin, 1 Application, Each Nare, BID      Continuous Infusions:     PRN Meds:.  Calcium Replacement - Follow Nurse / BPA Driven Protocol    dextrose    dextrose    glucagon (human recombinant)    Magnesium Standard Dose Replacement - Follow Nurse / BPA Driven Protocol    Phosphorus Replacement - Follow Nurse / BPA Driven Protocol    Potassium Replacement - Follow Nurse / BPA Driven Protocol    sodium chloride    Assessment & Plan   Assessment & Plan     Active Hospital Problems    Diagnosis  POA    DKA (diabetic  ketoacidosis) [E11.10]  Yes      Resolved Hospital Problems   No resolved problems to display.        Brief Hospital Course to date:  Fracisco ADAM Lady is a 59 y.o. male with history of of T2DM, PAD status post left AKA and right second toe amputation, ERIKA, presented to [facility] on 6/24/2025 with AMS.  Labs concerning for glucose of 456, anion gap of 27 and patient was admitted to the ICU with DKA.  Unable to place Bustos catheter initially and urology placed.  Urine concerning for UTI.  Blood culture positive for Klebsiella.     Urine culture shows no growth to date but unable to initially get urine as required urology to place Bustos catheter.  UA was suggestive of UTI and it is suspected this was primary source of infection although patient also has wounds.    Severe sepsis  E. coli and Klebsiella bacteremia  Likely urinary source in the setting of UTI due to Klebsiella particularly with blood cultures being positive for E. coli and Klebsiella  Currently on Rocephin 2 g, would continue this for now  Will consult ID in a.m.  Echo TTE this admission showed EF of 66 to 70%, saline agitated test was negative, no evidence of vegetation, trace TR.    DKA  T2DM  HbA1c 6/24/2025 14.2  Remains on 25 units Lantus a.m. and 15 units at nighttime with Premeal 15 units and SSI,   Tolerating diet well  No labs from this a.m., will recheck    Poor living conditions   and case management on board    Pressure wounds  Wound care following  Will need close glucose control to allow for wound healing    PAD s/p left AKA and right second toe amputation  Resume home medications with aspirin, statin    BPH  Continue home medications with finasteride and Flomax    Restless leg syndrome  Continue home meds with ropinirole    Peripheral neuropathy  Continue home Lyrica    HLD  Continue home statin    GERD  Continue home PPI    HTN  Continue home Toprol          Expected Discharge Location and Transportation: SNF  Expected Discharge  TBD  Expected discharge date/ time has not been documented.     VTE Prophylaxis:  Pharmacologic & mechanical VTE prophylaxis orders are present.    Total time spent: Time Spent: Time Spent: 40 minutes  Time spent includes time reviewing chart, face-to-face time, counseling patient/family/caregiver, ordering medications/tests/procedures, communicating with other health care professionals, documenting clinical information in the electronic health record, and coordination of care.        AM-PAC 6 Clicks Score (PT): 11 (06/28/25 0800)    CODE STATUS:   Code Status and Medical Interventions: CPR (Attempt to Resuscitate); Full Support   Ordered at: 06/25/25 0129     Code Status (Patient has no pulse and is not breathing):    CPR (Attempt to Resuscitate)     Medical Interventions (Patient has pulse or is breathing):    Full Support       Sander Snowden MD  06/29/25

## 2025-06-30 LAB
ANION GAP SERPL CALCULATED.3IONS-SCNC: 8.7 MMOL/L (ref 5–15)
BUN SERPL-MCNC: 15.9 MG/DL (ref 8–23)
BUN/CREAT SERPL: 21.8 (ref 7–25)
CALCIUM SPEC-SCNC: 8 MG/DL (ref 8.6–10.5)
CHLORIDE SERPL-SCNC: 101 MMOL/L (ref 98–107)
CO2 SERPL-SCNC: 25.3 MMOL/L (ref 22–29)
CREAT SERPL-MCNC: 0.73 MG/DL (ref 0.76–1.27)
DEPRECATED RDW RBC AUTO: 46.5 FL (ref 37–54)
EGFRCR SERPLBLD CKD-EPI 2021: 103.5 ML/MIN/1.73
ERYTHROCYTE [DISTWIDTH] IN BLOOD BY AUTOMATED COUNT: 15.4 % (ref 12.3–15.4)
GLUCOSE BLDC GLUCOMTR-MCNC: 125 MG/DL (ref 70–130)
GLUCOSE BLDC GLUCOMTR-MCNC: 128 MG/DL (ref 70–130)
GLUCOSE BLDC GLUCOMTR-MCNC: 156 MG/DL (ref 70–130)
GLUCOSE BLDC GLUCOMTR-MCNC: 192 MG/DL (ref 70–130)
GLUCOSE BLDC GLUCOMTR-MCNC: 69 MG/DL (ref 70–130)
GLUCOSE BLDC GLUCOMTR-MCNC: 74 MG/DL (ref 70–130)
GLUCOSE BLDC GLUCOMTR-MCNC: 77 MG/DL (ref 70–130)
GLUCOSE BLDC GLUCOMTR-MCNC: 90 MG/DL (ref 70–130)
GLUCOSE BLDC GLUCOMTR-MCNC: 95 MG/DL (ref 70–130)
GLUCOSE SERPL-MCNC: 133 MG/DL (ref 65–99)
HCT VFR BLD AUTO: 32.3 % (ref 37.5–51)
HGB BLD-MCNC: 9.8 G/DL (ref 13–17.7)
MAGNESIUM SERPL-MCNC: 1.8 MG/DL (ref 1.6–2.4)
MCH RBC QN AUTO: 25.6 PG (ref 26.6–33)
MCHC RBC AUTO-ENTMCNC: 30.3 G/DL (ref 31.5–35.7)
MCV RBC AUTO: 84.3 FL (ref 79–97)
PLATELET # BLD AUTO: 371 10*3/MM3 (ref 140–450)
PMV BLD AUTO: 10.8 FL (ref 6–12)
POTASSIUM SERPL-SCNC: 4.7 MMOL/L (ref 3.5–5.2)
RBC # BLD AUTO: 3.83 10*6/MM3 (ref 4.14–5.8)
SODIUM SERPL-SCNC: 135 MMOL/L (ref 136–145)
WBC NRBC COR # BLD AUTO: 13.32 10*3/MM3 (ref 3.4–10.8)

## 2025-06-30 PROCEDURE — 83735 ASSAY OF MAGNESIUM: CPT

## 2025-06-30 PROCEDURE — 99232 SBSQ HOSP IP/OBS MODERATE 35: CPT

## 2025-06-30 PROCEDURE — 99232 SBSQ HOSP IP/OBS MODERATE 35: CPT | Performed by: UROLOGY

## 2025-06-30 PROCEDURE — 25810000003 SODIUM CHLORIDE 0.9 % SOLUTION

## 2025-06-30 PROCEDURE — 80048 BASIC METABOLIC PNL TOTAL CA: CPT

## 2025-06-30 PROCEDURE — 25010000002 HEPARIN (PORCINE) PER 1000 UNITS: Performed by: INTERNAL MEDICINE

## 2025-06-30 PROCEDURE — 97530 THERAPEUTIC ACTIVITIES: CPT

## 2025-06-30 PROCEDURE — 63710000001 INSULIN GLARGINE PER 5 UNITS: Performed by: INTERNAL MEDICINE

## 2025-06-30 PROCEDURE — 85027 COMPLETE CBC AUTOMATED: CPT

## 2025-06-30 PROCEDURE — 82948 REAGENT STRIP/BLOOD GLUCOSE: CPT

## 2025-06-30 PROCEDURE — 63710000001 INSULIN LISPRO (HUMAN) PER 5 UNITS

## 2025-06-30 PROCEDURE — 63710000001 INSULIN LISPRO (HUMAN) PER 5 UNITS: Performed by: INTERNAL MEDICINE

## 2025-06-30 PROCEDURE — 97535 SELF CARE MNGMENT TRAINING: CPT

## 2025-06-30 PROCEDURE — 63710000001 INSULIN GLARGINE PER 5 UNITS

## 2025-06-30 PROCEDURE — 25010000002 CEFTRIAXONE PER 250 MG: Performed by: INTERNAL MEDICINE

## 2025-06-30 RX ORDER — INSULIN LISPRO 100 [IU]/ML
10 INJECTION, SOLUTION INTRAVENOUS; SUBCUTANEOUS
Status: DISCONTINUED | OUTPATIENT
Start: 2025-06-30 | End: 2025-07-01

## 2025-06-30 RX ORDER — LIDOCAINE 4 G/G
1 PATCH TOPICAL
Status: DISCONTINUED | OUTPATIENT
Start: 2025-06-30 | End: 2025-07-04 | Stop reason: HOSPADM

## 2025-06-30 RX ADMIN — HEPARIN SODIUM 5000 UNITS: 5000 INJECTION INTRAVENOUS; SUBCUTANEOUS at 21:06

## 2025-06-30 RX ADMIN — PREGABALIN 225 MG: 150 CAPSULE ORAL at 08:50

## 2025-06-30 RX ADMIN — DEXTROSE MONOHYDRATE 25 G: 25 INJECTION, SOLUTION INTRAVENOUS at 15:10

## 2025-06-30 RX ADMIN — INSULIN LISPRO 15 UNITS: 100 INJECTION, SOLUTION INTRAVENOUS; SUBCUTANEOUS at 08:51

## 2025-06-30 RX ADMIN — INSULIN LISPRO 3 UNITS: 100 INJECTION, SOLUTION INTRAVENOUS; SUBCUTANEOUS at 20:45

## 2025-06-30 RX ADMIN — Medication 10 ML: at 08:53

## 2025-06-30 RX ADMIN — ATORVASTATIN CALCIUM 10 MG: 10 TABLET ORAL at 08:50

## 2025-06-30 RX ADMIN — INSULIN GLARGINE 15 UNITS: 100 INJECTION, SOLUTION SUBCUTANEOUS at 21:06

## 2025-06-30 RX ADMIN — LIDOCAINE 1 PATCH: 4 PATCH TOPICAL at 11:56

## 2025-06-30 RX ADMIN — HEPARIN SODIUM 5000 UNITS: 5000 INJECTION INTRAVENOUS; SUBCUTANEOUS at 05:04

## 2025-06-30 RX ADMIN — ROPINIROLE HYDROCHLORIDE 0.25 MG: 0.5 TABLET, FILM COATED ORAL at 20:46

## 2025-06-30 RX ADMIN — FINASTERIDE 5 MG: 5 TABLET, FILM COATED ORAL at 20:46

## 2025-06-30 RX ADMIN — DULOXETINE 60 MG: 60 CAPSULE, DELAYED RELEASE ORAL at 08:50

## 2025-06-30 RX ADMIN — PREGABALIN 225 MG: 150 CAPSULE ORAL at 20:46

## 2025-06-30 RX ADMIN — INSULIN GLARGINE 25 UNITS: 100 INJECTION, SOLUTION SUBCUTANEOUS at 08:51

## 2025-06-30 RX ADMIN — HEPARIN SODIUM 5000 UNITS: 5000 INJECTION INTRAVENOUS; SUBCUTANEOUS at 13:02

## 2025-06-30 RX ADMIN — TAMSULOSIN HYDROCHLORIDE 0.8 MG: 0.4 CAPSULE ORAL at 20:46

## 2025-06-30 RX ADMIN — INSULIN LISPRO 3 UNITS: 100 INJECTION, SOLUTION INTRAVENOUS; SUBCUTANEOUS at 17:02

## 2025-06-30 RX ADMIN — ASPIRIN 81 MG: 81 TABLET, COATED ORAL at 08:51

## 2025-06-30 RX ADMIN — CEFTRIAXONE 2000 MG: 2 INJECTION, POWDER, FOR SOLUTION INTRAMUSCULAR; INTRAVENOUS at 18:14

## 2025-06-30 RX ADMIN — PANTOPRAZOLE SODIUM 40 MG: 40 TABLET, DELAYED RELEASE ORAL at 05:04

## 2025-06-30 RX ADMIN — SODIUM CHLORIDE 500 ML: 9 INJECTION, SOLUTION INTRAVENOUS at 11:26

## 2025-06-30 NOTE — THERAPY TREATMENT NOTE
Patient Name: Fracisco Mullen  : 1965    MRN: [id number]                              Today's Date: 2025       Admit Date: 2025    Visit Dx:     ICD-10-CM ICD-9-CM   1. Diabetic ketoacidosis with coma associated with other specified diabetes mellitus  E13.11 250.32   2. Severe sepsis  A41.9 038.9    R65.20 995.92   3. Hyponatremia  E87.1 276.1   4. Poor personal hygiene  R46.0 V40.39   5. Hx of left BKA  Z89.512 V49.75   6. Febrile illness  R50.9 780.60   7. Bandemia  D72.825 288.66     Patient Active Problem List   Diagnosis    HTN (hypertension)    Type 2 diabetes mellitus, with long-term current use of insulin    Diabetic retinopathy    Obesity (BMI 30.0-34.9)    Peripheral neuropathy    Asthma    Causalgia of lower limb    Chronic constipation    Compression of lumbar nerve root    GERD (gastroesophageal reflux disease)    Hyperlipidemia    IBS (irritable bowel syndrome)    RLS (restless legs syndrome)    Sleep apnea    Vitamin D deficiency    Cigar smoker    Chronic back pain    Diabetic ketoacidosis associated with type 2 diabetes mellitus    Multiple open wounds of foot    Status post cholecystectomy    Cholestatic hepatitis    Diabetic infection of left foot    Precordial pain    Tobacco use    Non healing left heel wound    Severe protein-calorie malnutrition    Acute osteomyelitis of left foot    Type 2 diabetes mellitus with hyperglycemia    BPH with obstruction/lower urinary tract symptoms    Acute urinary retention    DKA (diabetic ketoacidosis)     Past Medical History:   Diagnosis Date    Acute renal failure     Hx of    Obesity     Hx of    Sleep apnea     Type 2 diabetes mellitus      Past Surgical History:   Procedure Laterality Date    BACK SURGERY      lower back    BELOW KNEE AMPUTATION Left 2024    Procedure: BELOW-KNEE AMPUTATION LEFT;  Surgeon: Dru Gan Jr., MD;  Location: [facility];  Service: Orthopedics;  Laterality: Left;    CHOLECYSTECTOMY WITH  INTRAOPERATIVE CHOLANGIOGRAM N/A 1/6/2021    Procedure: CHOLECYSTECTOMY LAPAROSCOPIC INTRAOPERATIVE CHOLANGIOGRAM;  Surgeon: Juventino Hooker MD;  Location: [facility] OR;  Service: General;  Laterality: N/A;    ERCP N/A 1/9/2021    Procedure: ENDOSCOPIC RETROGRADE CHOLANGIOPANCREATOGRAPHY;  Surgeon: Jeremy Dowell MD;  Location: [facility] ENDOSCOPY;  Service: Gastroenterology;  Laterality: N/A;  with sphiincterotomy and balloon sweep with 9mm-12mm balloon    INCISION AND DRAINAGE FOOT Left 8/3/2024    Procedure: HEAL IRRIGATION AND DEBRIDEMENT LEFT;  Surgeon: Dru Gan Jr., MD;  Location:  [facility] OR;  Service: Orthopedics;  Laterality: Left;    PLACEMENT OF WOUND VAC Left 8/3/2024    Procedure: PLACEMENT OF WOUND VAC;  Surgeon: Dru Gan Jr., MD;  Location: [facility] OR;  Service: Orthopedics;  Laterality: Left;      General Information       Row Name 06/30/25 1525          OT Time and Intention    Document Type therapy note (daily note)  -     Mode of Treatment occupational therapy  -       Row Name 06/30/25 1525          General Information    Patient Profile Reviewed yes  -MARIE     Existing Precautions/Restrictions fall;other (see comments)  Remote L BKA; Bustos catheter  -MARIE     Barriers to Rehab medically complex;previous functional deficit  -       Row Name 06/30/25 1525          Cognition    Orientation Status (Cognition) oriented to;person;place;verbal cues/prompts needed for orientation;time  Pt unable to recall the month but reported the year correctly  -       Row Name 06/30/25 1525          Safety Issues/Impairments Affecting Functional Mobility    Safety Issues Affecting Function (Mobility) ability to follow commands;awareness of need for assistance;insight into deficits/self-awareness;judgment;problem-solving;safety precaution awareness;safety precautions follow-through/compliance;sequencing abilities  -MARIE     Impairments Affecting Function (Mobility)  balance;coordination;endurance/activity tolerance;motor control;motor planning;postural/trunk control;strength  -MARIE     Comment, Safety Issues/Impairments (Mobility) extremely lethargic this date, required increased time to fully alert  -               User Key  (r) = Recorded By, (t) = Taken By, (c) = Cosigned By      Initials Name Provider Type    Dianelys Burciaga OT Occupational Therapist                     Mobility/ADL's       Row Name 06/30/25 1527          Bed Mobility    Bed Mobility rolling left;rolling right;supine-sit;sit-supine;scooting/bridging  -MARIE     Rolling Left [place] (Bed Mobility) maximum assist (25% patient effort);1 person assist;verbal cues  -MARIE     Rolling Right [place] (Bed Mobility) maximum assist (25% patient effort);1 person assist;verbal cues;nonverbal cues (demo/gesture)  -MARIE     Scooting/Bridging [place] (Bed Mobility) moderate assist (50% patient effort);verbal cues;nonverbal cues (demo/gesture);1 person assist  -MARIE     Supine-Sit [place] (Bed Mobility) maximum assist (25% patient effort);1 person assist;verbal cues;nonverbal cues (demo/gesture)  -MARIE     Sit-Supine [place] (Bed Mobility) dependent (less than 25% patient effort);1 person assist;verbal cues;nonverbal cues (demo/gesture)  -MARIE     Assistive Device (Bed Mobility) bed rails;head of bed elevated;repositioning sheet  -MARIE     Comment, (Bed Mobility) Pt rolled L/R for pericare and linen change; denied dizziness upon position change to sit EOB; ModA for sitting balance, demonstrating strong L lateral lean while seated EOB  -MARIE       Row Name 06/30/25 1527          Transfers    Comment, (Transfers) Unsafe to attempt d/t poor trunk control and delayed processing of commands  -MARIE       Row Name 06/30/25 1527          Activities of Daily Living    BADL Assessment/Intervention lower body dressing;grooming;toileting  -       Row Name 06/30/25 1527          Hygiene Care    Oral Care patient refused  intervention  -MARIE       Row Name 06/30/25 1527          Lower Body Dressing Assessment/Training    [place] Level (Lower Body Dressing) don;socks;dependent (less than 25% patient effort)  -MARIE     Position (Lower Body Dressing) supine  -MARIE       Row Name 06/30/25 1527          Grooming Assessment/Training    [place] Level (Grooming) wash face, hands;minimum assist (75% patient effort)  -MARIE     Position (Grooming) sitting up in bed  -MARIE       Row Name 06/30/25 1527          Toileting Assessment/Training    [place] Level (Toileting) perform perineal hygiene;dependent (less than 25% patient effort)  -MARIE     Position (Toileting) supine  -MARIE     Comment, (Toileting) following bowel incontinence  -MARIE               User Key  (r) = Recorded By, (t) = Taken By, (c) = Cosigned By      Initials Name Provider Type    Dianelys Burciaga OT Occupational Therapist                   Obj/Interventions       Row Name 06/30/25 1533          Shoulder (Therapeutic Exercise)    Shoulder (Therapeutic Exercise) AAROM (active assistive range of motion)  -MARIE     Shoulder AAROM (Therapeutic Exercise) bilateral;flexion;extension;3 repetitions;other (see comments);supine  using towel roll with max cues to maintain   -MARIE       Row Name 06/30/25 1533          Balance    Balance Assessment sitting static balance;sitting dynamic balance  -MARIE     Static Sitting Balance moderate assist;verbal cues;non-verbal cues (demo/gesture)  -MARIE     Dynamic Sitting Balance verbal cues;dependent;non-verbal cues (demo/gesture)  -MARIE     Position, Sitting Balance unsupported;sitting edge of bed  -MARIE     Balance Interventions sitting;static;occupation based/functional task  -MARIE     Comment, Balance ModA for sitting balance at EOB with strong L lateral lean, progressing to brief periods of CGA  -MARIE               User Key  (r) = Recorded By, (t) = Taken By, (c) = Cosigned By      Initials Name Provider Type    Dianelys Burciaga OT Occupational Therapist                    Goals/Plan    No documentation.                  Clinical Impression       Row Name 06/30/25 1535          Pain Scale: FACES Pre/Post-Treatment    Pain: FACES Scale, Pretreatment 0-->no hurt  -MARIE     Posttreatment Pain Rating 0-->no hurt  -MARIE       Row Name 06/30/25 153          Plan of Care Review    Plan of Care Reviewed With patient  -MAIRE     Progress no change  -MARIE     Outcome Evaluation Pt's ADL participation continues to be limited by significant weakness, poor trunk control, and lethargy. Pt ModA for sitting balance at EOB, no reports of dizziness with positional changes. Continue to recommend SNF at discharge.  -MARIE       Row Name 06/30/25 1534          Therapy Plan Review/Discharge Plan (OT)    Anticipated Discharge Disposition (OT) skilled nursing facility  -MARIE       Row Name 06/30/25 1533          Vital Signs    O2 Delivery Pre Treatment room air  -MARIE     O2 Delivery Intra Treatment room air  -MARIE     O2 Delivery Post Treatment room air  -MARIE     Pre Patient Position Supine  -MARIE     Intra Patient Position Sitting  -MARIE     Post Patient Position Supine  -MARIE       Row Name 06/30/25 1538          Positioning and Restraints    Pre-Treatment Position in bed  -MARIE     Post Treatment Position bed  -MARIE     In Bed notified nsg;fowlers;call light within reach;encouraged to call for assist;exit alarm on;side rails up x3  -MARIE               User Key  (r) = Recorded By, (t) = Taken By, (c) = Cosigned By      Initials Name Provider Type    Dianelys Burciaga, JAZMIN Occupational Therapist                   Outcome Measures       Row Name 06/30/25 3568          How much help from another is currently needed...    Putting on and taking off regular lower body clothing? 1  -MARIE     Bathing (including washing, rinsing, and drying) 1  -MARIE     Toileting (which includes using toilet bed pan or urinal) 1  -MARIE     Putting on and taking off regular upper body clothing 1  -MARIE     Taking care of personal grooming (such as  brushing teeth) 2  -MARIE     Eating meals 3  -MARIE     AM-PAC 6 Clicks Score (OT) 9  -MARIE       Row Name 06/30/25 0800          How much help from another person do you currently need...    Turning from your back to your side while in flat bed without using bedrails? 4  -AF     Moving from lying on back to sitting on the side of a flat bed without bedrails? 3  -AF     Moving to and from a bed to a chair (including a wheelchair)? 2  -AF     Standing up from a chair using your arms (e.g., wheelchair, bedside chair)? 2  -AF     Climbing 3-5 steps with a railing? 1  -AF     To walk in hospital room? 2  -AF     AM-PAC 6 Clicks Score (PT) 14  -AF     Highest Level of Mobility Goal Move to Chair/Commode-4  -AF       Row Name 06/30/25 1540          Functional Assessment    Outcome Measure Options AM-PAC 6 Clicks Daily Activity (OT)  -MARIE               User Key  (r) = Recorded By, (t) = Taken By, (c) = Cosigned By      Initials Name Provider Type    Alejandra Motta RN Registered Nurse    Dianelys Burciaga, JAZMIN Occupational Therapist                    Occupational Therapy Education       Title: PT OT SLP Therapies (In Progress)       Topic: Occupational Therapy (In Progress)       Point: ADL training (In Progress)       Learning Progress Summary            Patient Acceptance, E, NL,NR by MARIE at 6/30/2025 1540    Comment: OT POC; benefits of activity    Acceptance, E, NR by CS at 6/27/2025 1446                      Point: Home exercise program (In Progress)       Learning Progress Summary            Patient Acceptance, E, NR by CS at 6/27/2025 1446                      Point: Precautions (In Progress)       Learning Progress Summary            Patient Acceptance, E, NR by CS at 6/27/2025 1446                      Point: Body mechanics (In Progress)       Learning Progress Summary            Patient Acceptance, E, NR by CS at 6/27/2025 1446                                      User Key       Initials Effective Dates Name Provider  Type Discipline     09/02/21 -  Mariel Riggs OT Occupational Therapist OT     06/16/21 -  Dianelys Fraser OT Occupational Therapist OT                  OT Recommendation and Plan     Plan of Care Review  Plan of Care Reviewed With: patient  Progress: no change  Outcome Evaluation: Pt's ADL participation continues to be limited by significant weakness, poor trunk control, and lethargy. Pt ModA for sitting balance at EOB, no reports of dizziness with positional changes. Continue to recommend SNF at discharge.     Time Calculation:         Time Calculation- OT       Row Name 06/30/25 1420             Time Calculation- OT    OT Start Time 1420  -MARIE      OT Received On 06/30/25  -MARIE         Timed Charges    45211 - OT Therapeutic Activity Minutes 12  -MARIE      98862 - OT Self Care/Mgmt Minutes 12  -MARIE         Total Minutes    Timed Charges Total Minutes 24  -MARIE       Total Minutes 24  -MARIE                User Key  (r) = Recorded By, (t) = Taken By, (c) = Cosigned By      Initials Name Provider Type    MARIE Dianelys Fraser OT Occupational Therapist                  Therapy Charges for Today       Code Description Service Date Service Provider Modifiers Qty    62095263840 HC OT THERAPEUTIC ACT EA 15 MIN 6/30/2025 Dianelys Fraser OT GO 1    44977172695 HC OT SELF CARE/MGMT/TRAIN EA 15 MIN 6/30/2025 Dianelys Fraser OT GO 1                 Dianelys Fraser OT  6/30/2025
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
## 2025-06-30 NOTE — CASE MANAGEMENT/SOCIAL WORK
Continued Stay Note  [facility]     Patient Name: Fracisco Mullen  MRN: [id number]  Today's Date: 6/30/2025    Admit Date: 6/24/2025    Plan: [facility]; referral given; needs insurance authorization   Discharge Plan       Row Name 06/30/25 0945       Plan    Plan [facility]; referral given; needs insurance authorization    Patient/Family in Agreement with Plan yes    Plan Comments I met with the patient at the bedside. He transferred out of the ICU on Friday afternoon. Patient stated he was going to [facility] before going home. Did not see that a referral had been made. Made referral to [facility]. Patient has Medicaid which only has acute rehab benefits. IV Rocephin being given at this time. CM following.    Final Discharge Disposition Code 62 - inpatient rehab facility                   Discharge Codes    No documentation.                       Eileen Brandt RN

## 2025-06-30 NOTE — CONSULTS
"Fracisco Mullen  1965  [id number]    Date of Consult: 6/30/2025    Admit Date:  6/24/2025      Requesting Provider: Dr Snowden  Evaluating Physician: Varun Dominique MD    Chief Complaint: fatigue    Reason for Consultation: bacteremia    History of present illness:     Patient is a 59 y.o.  Yr old male with history of diabetes/hypertension, previously followed with Dr. Gatica; admitted 2023 with right foot infection, cultures with MSSA/Morganella/ESBL Klebsiella/enterococcus and strep species.had surgery at the second toe with amputation, received IV daptomycin/Invanz with general improvements at that site.   admitted to the hospital August 1 2024 after a fall at [facility].  Noted to have urinary retention with concern for possible UTI.  In addition left heel with black discoloration/malodor and redness, empiric antibiotics initiated.    chronically debilitated and wheelchair bound by his report.      8/3/24  Dr Gan  \"PROCEDURE:            Left  Left      64801:             Debridement of skin and subcutaneous tissue  06359:             Wound vacuum-assisted closure\"     8/6/24 MRI and surgical findings did not confirm bone involvement, transitioned to oral agents at discharge         Readmitted November 15, 2024 with reports of appearing \"sluggish\" according to admission notes with DKA, noted to have high hemoglobin A1c and persistent/worsening left heel wound according to patient; medicine team notes mention urinary retention, acute toxic metabolic encephalopathy improved and low-grade fever. Abnormal MRI at the left foot:     Per radiology-- \"Soft tissue wound seen along the posterior heel with ill-defined fluid that extends through and disrupts the distal Achilles tendon. Underlying this area there is evidence of osteomyelitis of the posterior lateral calcaneus. There is a tiny fluid   collection here as well that may represent an associated nondrainable abscess.     Additionally there appears to be a " "nondisplaced stress fracture of the posterior calcaneus. \"     11/22 left BKA     11/26/24 increased O2 requirements ; finished empiric treatment for respiratory tract infection    Readmitted to [facility] June 24, 2025 to ICU with mental status change per admission notes, found minimally responsive; treated for acute DKA and concern for sepsis.  Had been given empiric antibiotics, urinalysis morning after admission with pyuria/hematuria, 3+ bacteria but also squamous epithelial cells and culture negative but antibiotic modified.  Blood culture from admission subsequently with E. Coli in 1 set.  Also coag negative staph 2 different species in 2 different sets raising concern for coag negative staph has contaminants.  Antibiotics tapered to ceftriaxone.  Drug screen with cocaine    Generally fatigued and tired.  Foleycatheter remains.  No headache photophobia or neck stiffness. No shortness of breath cough or hemoptysis.  Denies nausea vomiting diarrhea or abdominal pain.  No other new skin rash.  Denies any new open wounds or active drainage from his lower extremities    He reports inability to urinate admission, prior history attention.  Incontinence.    No raw or undercooked food.  No unpasteurized milk or milk products.  No animal insect or arthropod exposure.  No tick bites.  No outdoor camping or hunting exposure.  No travel exposure.  No ill exposure.  No history of TB or TB exposure.   Denies a history of MRSA/VRE and no history of C. difficile or  KPC  organisms.    Past Medical History:   Diagnosis Date    Acute renal failure     Hx of    Obesity     Hx of    Sleep apnea     Type 2 diabetes mellitus        Past Surgical History:   Procedure Laterality Date    BACK SURGERY      lower back    BELOW KNEE AMPUTATION Left 11/22/2024    Procedure: BELOW-KNEE AMPUTATION LEFT;  Surgeon: Dru Gan Jr., MD;  Location: [facility];  Service: Orthopedics;  Laterality: Left;    CHOLECYSTECTOMY WITH " INTRAOPERATIVE CHOLANGIOGRAM N/A 1/6/2021    Procedure: CHOLECYSTECTOMY LAPAROSCOPIC INTRAOPERATIVE CHOLANGIOGRAM;  Surgeon: Juventino Hooker MD;  Location: [facility] OR;  Service: General;  Laterality: N/A;    ERCP N/A 1/9/2021    Procedure: ENDOSCOPIC RETROGRADE CHOLANGIOPANCREATOGRAPHY;  Surgeon: Jeremy Dowell MD;  Location: [facility] ENDOSCOPY;  Service: Gastroenterology;  Laterality: N/A;  with sphiincterotomy and balloon sweep with 9mm-12mm balloon    INCISION AND DRAINAGE FOOT Left 8/3/2024    Procedure: HEAL IRRIGATION AND DEBRIDEMENT LEFT;  Surgeon: Dru Gan Jr., MD;  Location: [facility] OR;  Service: Orthopedics;  Laterality: Left;    PLACEMENT OF WOUND VAC Left 8/3/2024    Procedure: PLACEMENT OF WOUND VAC;  Surgeon: Dru Gan Jr., MD;  Location: [facility] OR;  Service: Orthopedics;  Laterality: Left;       Pediatric History   Patient Parents    Not on file     Other Topics Concern    Not on file   Social History Narrative    Not on file       family history includes Cancer in his brother, maternal grandmother, mother, and another family member; Diabetes in an other family member; Heart attack in an other family member; Heart disease in his brother and father; Hyperlipidemia in his mother and another family member; Hypertension in his mother and another family member; Obesity in an other family member.    Allergies   Allergen Reactions    Sertraline Hcl Hives     Zoloft       Medication:  Current Facility-Administered Medications   Medication Dose Route Frequency Provider Last Rate Last Admin    aspirin EC tablet 81 mg  81 mg Oral Daily Sander Snowden MD   81 mg at 06/29/25 1155    atorvastatin (LIPITOR) tablet 10 mg  10 mg Oral Daily Sander Snowden MD   10 mg at 06/29/25 1156    Calcium Replacement - Follow Nurse / BPA Driven Protocol   Not Applicable PRN Isaias De Luna MD        cefTRIAXone (ROCEPHIN) 2,000 mg in sodium chloride 0.9 % 100 mL MBP  2,000 mg Intravenous Q24H Annamarie  Isaias DENT  mL/hr at 06/29/25 1730 2,000 mg at 06/29/25 1730    cyclobenzaprine (FLEXERIL) tablet 5 mg  5 mg Oral TID PRN Sander Snowden MD   5 mg at 06/29/25 1237    dextrose (D50W) (25 g/50 mL) IV injection 25 g  25 g Intravenous Q15 Min PRN Isaias De Luna MD        dextrose (GLUTOSE) oral gel 15 g  15 g Oral Q15 Min PRN Isaias De Luna MD   15 g at 06/29/25 1701    DULoxetine (CYMBALTA) DR capsule 60 mg  60 mg Oral Daily Sander Snowden MD   60 mg at 06/29/25 1155    finasteride (PROSCAR) tablet 5 mg  5 mg Oral Nightly Sander Snowden MD   5 mg at 06/29/25 2010    glucagon (GLUCAGEN) injection 1 mg  1 mg Intramuscular Q15 Min PRN Isaias De Luna MD        heparin (porcine) 5000 UNIT/ML injection 5,000 Units  5,000 Units Subcutaneous Q8H Isaias De Luna MD   5,000 Units at 06/30/25 0504    insulin glargine (LANTUS, SEMGLEE) injection 15 Units  15 Units Subcutaneous Nightly Sander Snowden MD   15 Units at 06/29/25 2044    insulin glargine (LANTUS, SEMGLEE) injection 25 Units  25 Units Subcutaneous Daily Isaias De Luna MD   25 Units at 06/29/25 0841    Insulin Lispro (humaLOG) injection 15 Units  15 Units Subcutaneous TID With Meals Sander Snowden MD   15 Units at 06/29/25 1142    Insulin Lispro (humaLOG) injection 3-14 Units  3-14 Units Subcutaneous 4x Daily AC & at Bedtime Isaias De Luna MD   5 Units at 06/29/25 2044    Magnesium Standard Dose Replacement - Follow Nurse / BPA Driven Protocol   Not Applicable PRN Isaias De Luna MD        metoprolol succinate XL (TOPROL-XL) 24 hr tablet 50 mg  50 mg Oral Daily Sander Snowden MD   50 mg at 06/29/25 1155    mupirocin (BACTROBAN) 2 % nasal ointment 1 Application  1 Application Each Nare BID Isaias De Luna MD   1 Application at 06/29/25 0949    pantoprazole (PROTONIX) EC tablet 40 mg  40 mg Oral Q AM Sander Snowden MD   40 mg at 06/30/25 0504    Phosphorus Replacement - Follow Nurse / BPA Driven Protocol   Not  Applicable PRN Isaias De Luna MD        Potassium Replacement - Follow Nurse / BPA Driven Protocol   Not Applicable PRN Isaias De Luna MD        pregabalin (LYRICA) capsule 225 mg  225 mg Oral BID Sander Snowden MD   225 mg at 06/29/25 2010    rOPINIRole (REQUIP) tablet 0.25 mg  0.25 mg Oral Nightly Sander Snowden MD   0.25 mg at 06/29/25 2010    sodium chloride 0.9 % flush 10 mL  10 mL Intravenous PRN Isaias De Luna MD        tamsulosin (FLOMAX) 24 hr capsule 0.8 mg  0.8 mg Oral Nightly Sander Snowden MD   0.8 mg at 06/29/25 2010       Antibiotics:  Anti-Infectives (From admission, onward)      Ordered     Dose/Rate Route Frequency Start Stop    06/30/25 0748  vancomycin (VANCOCIN) 1,000 mg in sodium chloride 0.9 % 250 mL IVPB-VTB  Status:  Discontinued        Ordering Provider: Varun Dominique MD    15 mg/kg × 70.5 kg  250 mL/hr over 60 Minutes Intravenous Once 06/30/25 0845 06/30/25 0749    06/30/25 0748  Pharmacy to dose vancomycin  Status:  Discontinued        Ordering Provider: Varun Dominique MD     Not Applicable Continuous PRN 06/30/25 0747 06/30/25 0752    06/25/25 1748  cefTRIAXone (ROCEPHIN) 2,000 mg in sodium chloride 0.9 % 100 mL MBP        Ordering Provider: Isaias De Luna MD    2,000 mg  200 mL/hr over 30 Minutes Intravenous Every 24 Hours 06/25/25 1845 07/02/25 1844    06/25/25 0252  cefepime 2000 mg IVPB in 100 mL NS (MBP)  Status:  Discontinued        Ordering Provider: Elisa Mckinnon MD    2,000 mg  over 4 Hours Intravenous Every 8 Hours 06/25/25 1200 06/25/25 1748    06/25/25 0252  cefepime 2000 mg IVPB in 100 mL NS (MBP)        Ordering Provider: Elisa Mckinnon MD    2,000 mg  over 30 Minutes Intravenous Once 06/25/25 0345 06/25/25 0354    06/25/25 0000  vancomycin IVPB 1500 mg in 0.9% NaCl (Premix) 500 mL  Status:  Discontinued        Ordering Provider: Andrew Crystal,     20 mg/kg × 79.5 kg  333.3 mL/hr over 90 Minutes Intravenous Once 06/25/25  "0016 06/25/25 0329    06/25/25 0000  piperacillin-tazobactam (ZOSYN) 3.375 g IVPB in 100 mL NS MBP (CD)        Ordering Provider: Andrew Crystal, DO    3.375 g  over 30 Minutes Intravenous Once 06/25/25 0016 06/25/25 0115              Review of Systems    Constitutional-- No Fever, chills or sweats.  Appetite diminished with fatigue.  Heent-- No new vision, hearing or throat complaints.  No epistaxis or oral sores.  Denies odynophagia or dysphagia.  No flashers, floaters or eye pain. No odynophagia or dysphagia. No headache, photophobia or neck stiffness.  CV-- No chest pain, palpitation or syncope  Resp-- No SOB/cough/Hemoptysis  GI- No nausea, vomiting, or diarrhea.  No hematochezia, melena, or hematemesis. Denies jaundice or chronic liver disease.  -- see above  Lymph- no swollen lymph nodes in neck/axilla or groin.   Heme- No active bruising or bleeding; no Hx of DVT or PE.  MS-- no swelling or pain in the bones or joints of arms/legs.  No new back pain.  Neuro-- No acute focal weakness or numbness in the arms or legs.  No seizures.    Full 12 point review of systems reviewed and negative otherwise for acute complaints, except for Above    Physical Exam:   Vital Signs   BP 92/61 (BP Location: Left arm, Patient Position: Lying)   Pulse 78   Temp 98.2 °F (36.8 °C) (Oral)   Resp 16   Ht 162.6 cm (64.02\")   Wt 70.5 kg (155 lb 6.8 oz)   SpO2 96%   BMI 26.66 kg/m²     GENERAL: Awake, in no acute distress.   HEENT: Normocephalic, atraumatic.  no conjunctival injection. No icterus. Oropharynx clear without evidence of thrush or exudate. No evidence of periodontal disease.    NECK: Supple without nuchal rigidity. No mass.  LYMPH: No cervical, axillary or inguinal lymphadenopathy.  HEART: RRR; No murmur, rubs, gallops.   LUNGS: Clear to auscultation bilaterally without wheezing, rales, rhonchi. Normal respiratory effort. Nonlabored. No dullness.  ABDOMEN: Soft, nontender, nondistended. Positive bowel " sounds. No rebound or guarding. NO mass or HSM.  EXT:  lower extremity with multifocal excoriation/crust but no obvious redness/induration or focal tenderness.  No open wound or active drainage  : Genitalia generally unremarkable.  With  Bustos catheter.  MSK: FROM without joint effusions noted arms/legs.    SKIN: Warm and dry without cutaneous eruptions on Inspection/palpation.    NEURO: generally poor insight.  Moves extremities.        Laboratory Data    Results from last 7 days   Lab Units 06/30/25  0437 06/29/25  0743 06/28/25  1159   WBC 10*3/mm3 13.32* 7.12 9.48   HEMOGLOBIN g/dL 9.8* 10.0* 12.1*   HEMATOCRIT % 32.3* 31.6* 38.6   PLATELETS 10*3/mm3 371 350 416     Results from last 7 days   Lab Units 06/30/25  0437   SODIUM mmol/L 135*   POTASSIUM mmol/L 4.7   CHLORIDE mmol/L 101   CO2 mmol/L 25.3   BUN mg/dL 15.9   CREATININE mg/dL 0.73*   GLUCOSE mg/dL 133*   CALCIUM mg/dL 8.0*     Results from last 7 days   Lab Units 06/24/25  2340   ALK PHOS U/L 73   BILIRUBIN mg/dL 0.4   ALT (SGPT) U/L <5   AST (SGOT) U/L 12               Estimated Creatinine Clearance: 106 mL/min (A) (by C-G formula based on SCr of 0.73 mg/dL (L)).      Microbiology:      Radiology:  Imaging Results (Last 72 Hours)       ** No results found for the last 72 hours. **              Impression:     --acute E. Coli sepsis, primary concern for urinary tract source as per prior notes.  Had abnormal urine day after admission albeit culture negative but antibiotic modified.  Blood cultures from admission with E. Coli and abnormal urine raise concern for urinary source.  Otherwise chest exam nonfocal, abdomen benign and no other specific skin/soft tissue source at present.  Finished treatment and then monitor    --blood cultures with  different species coag-negative staph in each set raising concern for contaminants.    --Acute hematuria/pyuria, treated as UTI, urinary culture after antibiotic modification.history urinary retention.  Urology to  see    --DKA; diabetes uncontrolled at admission.  Glucose control per medicine team    --Drug screen positive for cocaine    --Acute sepsis at presentation as per prior notes.    PLAN: Thank you for asking us to see Fracisco ADAM Lady, I recommend the following:    --IV ceftriaxone    --Check/review labs cultures and scans    --Partial history Per nursing staff    --Discussed with microbiology    --Highly complex at of issues with high risk for further serious morbidity and other serious sequela    --Discussed with multidisciplinary team/nursing regarding complex at of issues, importance of anti-microbial Stewardship etc.    This visit included the following complex service elements:  Complex medical decision-making associated with antimicrobial prescribing.  Counseled patients, family members, and/or caregivers regarding antimicrobial stewardship and antibiotic resistance.  Counseled patients, family members and/or caregivers regarding infection prevention.  Communicated with the clinical microbiology lab.       Varun Dominique MD  6/30/2025

## 2025-06-30 NOTE — PROGRESS NOTES
[facility] Medicine Services  PROGRESS NOTE    Patient Name: Fracisco Mullen  : 1965  MRN: [id number]    Date of Admission: 2025  Primary Care Physician: Brandy Roberson MD    Subjective   Subjective     CC:  DKA and septic shock    HPI:  Overnight patient's blood sugars did go down, have been dealing with very labile blood sugars, adjusting insulin regimen at this time.  Soft blood pressure this a.m., giving IV fluids.      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   Psychiatric: Appropriate affect, cooperative  Neurologic: Oriented x 3, strength symmetric in all extremities, Cranial Nerves grossly intact to confrontation, speech clear  Skin: No rashes     Results Reviewed:  LAB RESULTS:      Lab 25  0437 25  0743 25  1159 25  1222 25  0606 25  0310 25  2340   WBC 13.32* 7.12 9.48  --   --   --  15.55*   HEMOGLOBIN 9.8* 10.0* 12.1*  --   --   --  11.2*   HEMATOCRIT 32.3* 31.6* 38.6  --   --   --  35.1*   PLATELETS 371 350 416  --   --   --  425   NEUTROS ABS  --   --  5.76  --   --   --  14.25*   IMMATURE GRANS (ABS)  --   --  0.08*  --   --   --  0.25*   LYMPHS ABS  --   --  2.33  --   --   --  0.58*   MONOS ABS  --   --  1.04*  --   --   --  0.37   EOS ABS  --   --  0.19  --   --   --  0.01   MCV 84.3 82.1 82.0  --   --   --  80.7   PROCALCITONIN  --   --   --   --   --   --  0.87*    LACTATE  --   --   --  2.4* 2.3* 2.5* 2.6*   HSTROP T  --   --   --   --   --  39* 44*         Lab 06/30/25  0437 06/29/25  0743 06/28/25  1159 06/27/25  0359 06/26/25  2328 06/26/25  2030 06/26/25  1601 06/26/25  1234 06/26/25  0819 06/25/25  0310 06/24/25  2340   SODIUM 135* 133* 135*  --   --  131* 131* 131* 132*   < > 125*   POTASSIUM 4.7 3.8 3.9  --   --  4.2 4.5 4.7 4.4   < > 4.0   CHLORIDE 101 97* 98  --   --  101 100 101 101   < > 87*   CO2 25.3 28.0 25.9  --   --  22.0 22.4 20.9* 21.0*   < > 10.6*   ANION GAP 8.7 8.0 11.1  --   --  8.0 8.6 9.1 10.0   < > 27.4*   BUN 15.9 5.9* 4.8*  --   --  7.3* 8.2 8.9 10.3   < > 13.8   CREATININE 0.73* 0.42* 0.47*  --   --  0.56* 0.48* 0.54* 0.54*   < > 1.09   EGFR 103.5 122.3 118.2  --   --  112.1 117.5 113.4 113.4   < > 77.2   GLUCOSE 133* 237* 74  --   --  84 92 147* 223*   < > 456*   CALCIUM 8.0* 7.7* 8.3*  --   --  7.6* 7.6* 7.6* 7.2*   < > 7.7*   MAGNESIUM 1.8 1.8 2.0 2.7*  --  1.8 1.6 1.5* 1.6   < > 1.9   PHOSPHORUS  --   --   --   --  2.7 2.6 2.2* 1.6* 2.0*   < > 2.9   HEMOGLOBIN A1C  --   --   --   --   --   --   --   --   --   --  14.20*   TSH  --   --   --   --   --   --   --   --   --   --  1.860    < > = values in this interval not displayed.         Lab 06/24/25  2340   TOTAL PROTEIN 5.5*   ALBUMIN 2.8*   GLOBULIN 2.7   ALT (SGPT) <5   AST (SGOT) 12   BILIRUBIN 0.4   ALK PHOS 73         Lab 06/25/25  0310 06/24/25  2340   PROBNP  --  314.0   HSTROP T 39* 44*                 Lab 06/24/25  2340   PH, ARTERIAL 7.394   PCO2, ARTERIAL 16.4*   PO2 ART 77.6*   FIO2 28   HCO3 ART 10.0*   BASE EXCESS ART -12.5*   CARBOXYHEMOGLOBIN 1.5     Brief Urine Lab Results  (Last result in the past 365 days)        Color   Clarity   Blood   Leuk Est   Nitrite   Protein   CREAT   Urine HCG        06/25/25 1015 Yellow   Turbid   Large (3+)   Moderate (2+)   Negative   >=300 mg/dL (3+)                   Microbiology Results Abnormal       Procedure Component Value - Date/Time     Blood Culture - Blood, Wrist, Left [[id number]]  (Abnormal)  (Susceptibility) Collected: 06/24/25 2348    Lab Status: Final result Specimen: Blood from Wrist, Left Updated: 06/29/25 0633     Blood Culture Escherichia coli     Isolated from Aerobic and Anaerobic Bottles     Blood Culture Staphylococcus hominis ssp hominis     Isolated from Aerobic Bottle     Gram Stain Aerobic Bottle Gram negative bacilli      Anaerobic Bottle Gram negative bacilli      Aerobic Bottle Gram positive cocci    Narrative:      Less than seven (7) mL's of blood was collected.  Insufficient quantity may yield false negative results.    Staphylococcus hominis ssp hominis: Probable contaminant requires clinical correlation, susceptibility not performed unless requested by physician.      Susceptibility        Escherichia coli      RONAK      Amoxicillin + Clavulanate Susceptible      Ampicillin Susceptible      Ampicillin + Sulbactam Susceptible      Cefazolin (Non Urine) Susceptible      Cefepime Susceptible      Ceftazidime Susceptible      Ceftriaxone Susceptible      Cefuroxime axetil Susceptible      Gentamicin Susceptible      Levofloxacin Susceptible      Piperacillin + Tazobactam Susceptible      Trimethoprim + Sulfamethoxazole Susceptible                       Susceptibility Comments       Escherichia coli    With the exception of urinary-sourced infections, aminoglycosides should not be used as monotherapy.               Blood Culture - Blood, Arm, Left [[id number]]  (Abnormal) Collected: 06/24/25 2348    Lab Status: Final result Specimen: Blood from Arm, Left Updated: 06/29/25 0633     Blood Culture Staphylococcus epidermidis     Isolated from Aerobic and Anaerobic Bottles     Gram Stain Anaerobic Bottle Gram positive cocci in groups      Aerobic Bottle Gram positive cocci in groups    Narrative:      Less than seven (7) mL's of blood was collected.  Insufficient quantity may yield false negative results.    Probable contaminant  requires clinical correlation, susceptibility not performed unless requested by physician.      Blood Culture ID, PCR - Blood, Arm, Left [[id number]]  (Abnormal) Collected: 06/24/25 2348    Lab Status: Final result Specimen: Blood from Arm, Left Updated: 06/27/25 0423     BCID, PCR Staph spp, not aureus or lugdunensis. Identification by BCID2 PCR.     BOTTLE TYPE Aerobic Bottle    Blood Culture ID, PCR - Blood, Wrist, Left [[id number]]  (Abnormal) Collected: 06/24/25 2348    Lab Status: Final result Specimen: Blood from Wrist, Left Updated: 06/25/25 1603     BCID, PCR Staph spp, not aureus or lugdunensis. Identification by BCID2 PCR.     BCID, PCR 2 Klebsiella pneumoniae group. Identification by BCID2 PCR.     BOTTLE TYPE Aerobic Bottle    Narrative:      No resistance genes detected.            No radiology results from the last 24 hrs    Results for orders placed during the hospital encounter of 06/24/25    Adult Transthoracic Echo Complete W/ Cont if Necessary Per Protocol    Interpretation Summary    Left ventricular systolic function is normal. Left ventricular ejection fraction appears to be 66 - 70%.    Saline test results are negative.      Current medications:  Scheduled Meds:aspirin, 81 mg, Oral, Daily  atorvastatin, 10 mg, Oral, Daily  cefTRIAXone, 2,000 mg, Intravenous, Q24H  DULoxetine, 60 mg, Oral, Daily  finasteride, 5 mg, Oral, Nightly  heparin (porcine), 5,000 Units, Subcutaneous, Q8H  insulin glargine, 15 Units, Subcutaneous, Nightly  insulin glargine, 25 Units, Subcutaneous, Daily  Insulin Lispro, 15 Units, Subcutaneous, TID With Meals  insulin lispro, 3-14 Units, Subcutaneous, 4x Daily AC & at Bedtime  metoprolol succinate XL, 50 mg, Oral, Daily  pantoprazole, 40 mg, Oral, Q AM  pregabalin, 225 mg, Oral, BID  rOPINIRole, 0.25 mg, Oral, Nightly  tamsulosin, 0.8 mg, Oral, Nightly      Continuous Infusions:     PRN Meds:.  Calcium Replacement - Follow Nurse / BPA Driven Protocol    cyclobenzaprine     dextrose    dextrose    glucagon (human recombinant)    Magnesium Standard Dose Replacement - Follow Nurse / BPA Driven Protocol    Phosphorus Replacement - Follow Nurse / BPA Driven Protocol    Potassium Replacement - Follow Nurse / BPA Driven Protocol    sodium chloride    Assessment & Plan   Assessment & Plan     Active Hospital Problems    Diagnosis  POA    DKA (diabetic ketoacidosis) [E11.10]  Yes    Severe protein-calorie malnutrition [E43]  Yes    Tobacco use [Z72.0]  Yes    RLS (restless legs syndrome) [G25.81]  Yes    GERD (gastroesophageal reflux disease) [K21.9]  Yes    Hyperlipidemia [E78.5]  Yes    Type 2 diabetes mellitus, with long-term current use of insulin [E11.9, Z79.4]  Not Applicable    Peripheral neuropathy [G62.9]  Yes    HTN (hypertension) [I10]  Yes      Resolved Hospital Problems   No resolved problems to display.        Brief Hospital Course to date:  Fracisco ADAM Lady is a 59 y.o. male with history of of T2DM, PAD status post left AKA and right second toe amputation, ERIKA, presented to [facility] on 6/24/2025 with AMS.  Labs concerning for glucose of 456, anion gap of 27 and patient was admitted to the ICU with DKA.  Unable to place Bustos catheter initially and urology placed.  Urine concerning for UTI.  Blood culture positive for Klebsiella.     Urine culture shows no growth to date but unable to initially get urine as required urology to place Bustos catheter.  UA was suggestive of UTI and it is suspected this was primary source of infection although patient also has wounds.    Severe sepsis  E. coli and Klebsiella bacteremia  New leukocytosis  Likely urinary source in the setting of UTI due to Klebsiella particularly with blood cultures being positive for E. coli and Klebsiella  Currently on Rocephin 2 g, would continue this for now  ID on board, recommend continuation of IV Rocephin  Echo TTE this admission showed EF of 66 to 70%, saline agitated test was negative, no evidence of vegetation,  trace TR.  Remains on antibiotics, will see lab trend tomorrow    DKA  T2DM  HbA1c 6/24/2025 14.2  Remains on 20 units Lantus a.m. and 15 units at nighttime with Premeal 10 units and SSI,   Tolerating diet well    Poor living conditions   and case management on board    Pressure wounds  Wound care following  Will need close glucose control to allow for wound healing    PAD s/p left AKA and right second toe amputation  Resume home medications with aspirin, statin    BPH  Continue home medications with finasteride and Flomax    Restless leg syndrome  Continue home meds with ropinirole    Peripheral neuropathy  Continue home Lyrica    HLD  Continue home statin    GERD  Continue home PPI    HTN  Continue home Toprol    Expected Discharge Location and Transportation: Inpatient rehab  Expected Discharge TBD  Expected discharge date/ time has not been documented.     VTE Prophylaxis:  Pharmacologic & mechanical VTE prophylaxis orders are present.    Total time spent: Time Spent: Time Spent: 40 minutes  Time spent includes time reviewing chart, face-to-face time, counseling patient/family/caregiver, ordering medications/tests/procedures, communicating with other health care professionals, documenting clinical information in the electronic health record, and coordination of care.        AM-PAC 6 Clicks Score (PT): 14 (06/29/25 2000)    CODE STATUS:   Code Status and Medical Interventions: CPR (Attempt to Resuscitate); Full Support   Ordered at: 06/25/25 0129     Code Status (Patient has no pulse and is not breathing):    CPR (Attempt to Resuscitate)     Medical Interventions (Patient has pulse or is breathing):    Full Support       Sander Snowden MD  06/30/25

## 2025-06-30 NOTE — CASE MANAGEMENT/SOCIAL WORK
Continued Stay Note   Person     Patient Name: Fracisco Mullen  MRN: [id number]  Today's Date: 6/30/2025    Admit Date: 6/24/2025    Plan: [facility]; referral given; needs insurance authorization   Discharge Plan       Row Name 06/30/25 1434       Plan    Plan Comments Email sent to therapy requesting updated therapy notes at [facility]'s request. CM following.                   Discharge Codes    No documentation.                       Eileen Brandt RN

## 2025-06-30 NOTE — PLAN OF CARE
Problem: Adult Inpatient Plan of Care  Goal: Plan of Care Review  Outcome: Progressing  Flowsheets (Taken 6/30/2025 0446)  Progress: improving  Plan of Care Reviewed With: patient  Goal: Patient-Specific Goal (Individualized)  Outcome: Progressing  Goal: Absence of Hospital-Acquired Illness or Injury  Outcome: Progressing  Intervention: Identify and Manage Fall Risk  Recent Flowsheet Documentation  Taken 6/30/2025 0400 by Karen Irby, LUZ  Safety Promotion/Fall Prevention:   activity supervised   assistive device/personal items within reach   clutter free environment maintained   fall prevention program maintained   nonskid shoes/slippers when out of bed   room organization consistent   safety round/check completed  Taken 6/30/2025 0200 by Karen Irby, LUZ  Safety Promotion/Fall Prevention:   activity supervised   assistive device/personal items within reach   clutter free environment maintained   fall prevention program maintained   nonskid shoes/slippers when out of bed   room organization consistent   safety round/check completed  Taken 6/30/2025 0000 by Karen Irby, RN  Safety Promotion/Fall Prevention:   activity supervised   assistive device/personal items within reach   clutter free environment maintained   fall prevention program maintained   nonskid shoes/slippers when out of bed   room organization consistent   safety round/check completed  Taken 6/29/2025 2200 by Karen Irby, RN  Safety Promotion/Fall Prevention:   activity supervised   assistive device/personal items within reach   clutter free environment maintained   fall prevention program maintained   nonskid shoes/slippers when out of bed   room organization consistent   safety round/check completed  Taken 6/29/2025 2000 by Karen rIby, RN  Safety Promotion/Fall Prevention:   activity supervised   assistive device/personal items within reach   clutter free environment maintained   fall prevention program maintained   nonskid shoes/slippers when out of  bed   room organization consistent   safety round/check completed  Intervention: Prevent Skin Injury  Recent Flowsheet Documentation  Taken 6/30/2025 0400 by Karen Irby RN  Body Position:   supine   position changed independently  Skin Protection:   incontinence pads utilized   silicone foam dressing in place   skin sealant/moisture barrier applied   transparent dressing maintained  Taken 6/30/2025 0200 by Karen Irby RN  Body Position: position maintained  Skin Protection:   incontinence pads utilized   silicone foam dressing in place   skin sealant/moisture barrier applied   transparent dressing maintained  Taken 6/30/2025 0000 by Karen Irby RN  Body Position:   position changed independently   right   side-lying  Skin Protection:   incontinence pads utilized   silicone foam dressing in place   skin sealant/moisture barrier applied   transparent dressing maintained  Taken 6/29/2025 2200 by Karen Irby RN  Body Position:   position changed independently   supine  Skin Protection:   incontinence pads utilized   silicone foam dressing in place   skin sealant/moisture barrier applied   transparent dressing maintained  Taken 6/29/2025 2000 by Karen Irby RN  Body Position:   position changed independently   right   side-lying  Intervention: Prevent Infection  Recent Flowsheet Documentation  Taken 6/30/2025 0400 by Karen Irby RN  Infection Prevention:   cohorting utilized   environmental surveillance performed   equipment surfaces disinfected   hand hygiene promoted   personal protective equipment utilized   rest/sleep promoted   single patient room provided  Taken 6/30/2025 0200 by Karen Irby RN  Infection Prevention:   cohorting utilized   environmental surveillance performed   equipment surfaces disinfected   hand hygiene promoted   personal protective equipment utilized   rest/sleep promoted   single patient room provided  Taken 6/30/2025 0000 by Karen Irby RN  Infection Prevention:   cohorting utilized    environmental surveillance performed   equipment surfaces disinfected   hand hygiene promoted   personal protective equipment utilized   rest/sleep promoted   single patient room provided  Taken 6/29/2025 2200 by Karen Irby RN  Infection Prevention:   cohorting utilized   environmental surveillance performed   equipment surfaces disinfected   hand hygiene promoted   personal protective equipment utilized   rest/sleep promoted   single patient room provided  Taken 6/29/2025 2000 by Karen Irby RN  Infection Prevention:   cohorting utilized   environmental surveillance performed   equipment surfaces disinfected   hand hygiene promoted   personal protective equipment utilized   rest/sleep promoted   single patient room provided  Goal: Optimal Comfort and Wellbeing  Outcome: Progressing  Intervention: Provide Person-Centered Care  Recent Flowsheet Documentation  Taken 6/29/2025 2000 by Karen Irby RN  Trust Relationship/Rapport:   care explained   choices provided   emotional support provided   empathic listening provided   questions answered   questions encouraged   reassurance provided   thoughts/feelings acknowledged  Goal: Readiness for Transition of Care  Outcome: Progressing     Problem: Violence Risk or Actual  Goal: Anger and Impulse Control  Outcome: Progressing  Intervention: Minimize Safety Risk  Recent Flowsheet Documentation  Taken 6/30/2025 0400 by Karen Irby RN  Enhanced Safety Measures:   bed alarm set   room near unit station  Taken 6/30/2025 0200 by Karen Irby RN  Enhanced Safety Measures:   bed alarm set   room near unit station  Taken 6/30/2025 0000 by Karen Irby RN  Enhanced Safety Measures:   bed alarm set   room near unit station  Taken 6/29/2025 2200 by Karen Irby RN  Enhanced Safety Measures:   bed alarm set   room near unit station  Taken 6/29/2025 2000 by Karen Irby RN  Sensory Stimulation Regulation: care clustered  Enhanced Safety Measures:   bed alarm set   room near unit  station  Intervention: Promote Self-Control  Recent Flowsheet Documentation  Taken 6/29/2025 2000 by Karen Irby RN  Supportive Measures:   active listening utilized   verbalization of feelings encouraged  Environmental Support:   calm environment promoted   environmental consistency promoted   personal routine supported     Problem: Sepsis/Septic Shock  Goal: Optimal Coping  Outcome: Progressing  Intervention: Support Patient and Family Response  Recent Flowsheet Documentation  Taken 6/29/2025 2000 by Karen Irby RN  Supportive Measures:   active listening utilized   verbalization of feelings encouraged  Goal: Absence of Bleeding  Outcome: Progressing  Goal: Absence of Infection Signs and Symptoms  Outcome: Progressing  Intervention: Initiate Sepsis Management  Recent Flowsheet Documentation  Taken 6/30/2025 0400 by Karen Irby RN  Infection Prevention:   cohorting utilized   environmental surveillance performed   equipment surfaces disinfected   hand hygiene promoted   personal protective equipment utilized   rest/sleep promoted   single patient room provided  Taken 6/30/2025 0200 by Karen Irby RN  Infection Prevention:   cohorting utilized   environmental surveillance performed   equipment surfaces disinfected   hand hygiene promoted   personal protective equipment utilized   rest/sleep promoted   single patient room provided  Taken 6/30/2025 0000 by Karen Irby RN  Infection Prevention:   cohorting utilized   environmental surveillance performed   equipment surfaces disinfected   hand hygiene promoted   personal protective equipment utilized   rest/sleep promoted   single patient room provided  Taken 6/29/2025 2200 by Karen Irby RN  Infection Prevention:   cohorting utilized   environmental surveillance performed   equipment surfaces disinfected   hand hygiene promoted   personal protective equipment utilized   rest/sleep promoted   single patient room provided  Taken 6/29/2025 2000 by Karen Irby  RN  Infection Prevention:   cohorting utilized   environmental surveillance performed   equipment surfaces disinfected   hand hygiene promoted   personal protective equipment utilized   rest/sleep promoted   single patient room provided  Intervention: Promote Recovery  Recent Flowsheet Documentation  Taken 6/30/2025 0400 by Karen Irby RN  Activity Management: activity encouraged  Taken 6/30/2025 0200 by Karen Irby RN  Activity Management: activity encouraged  Taken 6/30/2025 0000 by Karen Irby RN  Activity Management: activity encouraged  Taken 6/29/2025 2200 by Karen Irby RN  Activity Management: activity encouraged  Taken 6/29/2025 2000 by Karen Irby RN  Activity Management: activity encouraged  Sleep/Rest Enhancement:   consistent schedule promoted   natural light exposure provided  Goal: Optimal Nutrition Delivery  Outcome: Progressing     Problem: Skin Injury Risk Increased  Goal: Skin Health and Integrity  Outcome: Progressing  Intervention: Optimize Skin Protection  Recent Flowsheet Documentation  Taken 6/30/2025 0400 by Karen Irby RN  Activity Management: activity encouraged  Pressure Reduction Techniques:   frequent weight shift encouraged   heels elevated off bed   pressure points protected   weight shift assistance provided  Head of Bed (HOB) Positioning: HOB elevated  Pressure Reduction Devices:   pressure-redistributing mattress utilized   heel offloading device utilized   foam padding utilized  Skin Protection:   incontinence pads utilized   silicone foam dressing in place   skin sealant/moisture barrier applied   transparent dressing maintained  Taken 6/30/2025 0200 by Karen Irby RN  Activity Management: activity encouraged  Pressure Reduction Techniques:   frequent weight shift encouraged   heels elevated off bed   pressure points protected   weight shift assistance provided  Head of Bed (HOB) Positioning: HOB elevated  Pressure Reduction Devices:   pressure-redistributing mattress utilized    heel offloading device utilized   foam padding utilized  Skin Protection:   incontinence pads utilized   silicone foam dressing in place   skin sealant/moisture barrier applied   transparent dressing maintained  Taken 6/30/2025 0000 by Karen Irby RN  Activity Management: activity encouraged  Pressure Reduction Techniques:   frequent weight shift encouraged   heels elevated off bed   pressure points protected   weight shift assistance provided  Head of Bed (HOB) Positioning: HOB elevated  Pressure Reduction Devices:   pressure-redistributing mattress utilized   heel offloading device utilized   foam padding utilized  Skin Protection:   incontinence pads utilized   silicone foam dressing in place   skin sealant/moisture barrier applied   transparent dressing maintained  Taken 6/29/2025 2200 by Karen Irby RN  Activity Management: activity encouraged  Pressure Reduction Techniques:   frequent weight shift encouraged   heels elevated off bed   pressure points protected   weight shift assistance provided  Head of Bed (HOB) Positioning: HOB elevated  Pressure Reduction Devices:   pressure-redistributing mattress utilized   heel offloading device utilized   foam padding utilized  Skin Protection:   incontinence pads utilized   silicone foam dressing in place   skin sealant/moisture barrier applied   transparent dressing maintained  Taken 6/29/2025 2000 by Karen Irby RN  Activity Management: activity encouraged  Head of Bed (HOB) Positioning: HOB elevated     Problem: Fall Injury Risk  Goal: Absence of Fall and Fall-Related Injury  Outcome: Progressing  Intervention: Identify and Manage Contributors  Recent Flowsheet Documentation  Taken 6/29/2025 2000 by Karen Irby RN  Medication Review/Management: medications reviewed  Self-Care Promotion:   independence encouraged   BADL personal objects within reach   BADL personal routines maintained  Intervention: Promote Injury-Free Environment  Recent Flowsheet  Documentation  Taken 6/30/2025 0400 by Karen Irby, LUZ  Safety Promotion/Fall Prevention:   activity supervised   assistive device/personal items within reach   clutter free environment maintained   fall prevention program maintained   nonskid shoes/slippers when out of bed   room organization consistent   safety round/check completed  Taken 6/30/2025 0200 by Karen Irby, LUZ  Safety Promotion/Fall Prevention:   activity supervised   assistive device/personal items within reach   clutter free environment maintained   fall prevention program maintained   nonskid shoes/slippers when out of bed   room organization consistent   safety round/check completed  Taken 6/30/2025 0000 by Karen Irby RN  Safety Promotion/Fall Prevention:   activity supervised   assistive device/personal items within reach   clutter free environment maintained   fall prevention program maintained   nonskid shoes/slippers when out of bed   room organization consistent   safety round/check completed  Taken 6/29/2025 2200 by Karen Irby, RN  Safety Promotion/Fall Prevention:   activity supervised   assistive device/personal items within reach   clutter free environment maintained   fall prevention program maintained   nonskid shoes/slippers when out of bed   room organization consistent   safety round/check completed  Taken 6/29/2025 2000 by Karen Irby, RN  Safety Promotion/Fall Prevention:   activity supervised   assistive device/personal items within reach   clutter free environment maintained   fall prevention program maintained   nonskid shoes/slippers when out of bed   room organization consistent   safety round/check completed     Problem: Urinary Retention  Goal: Effective Urinary Elimination  Outcome: Progressing   Goal Outcome Evaluation:  Plan of Care Reviewed With: patient        Progress: improving

## 2025-06-30 NOTE — PLAN OF CARE
Goal Outcome Evaluation:  Plan of Care Reviewed With: patient        Progress: no change  Outcome Evaluation: Pt's ADL participation continues to be limited by significant weakness, poor trunk control, and lethargy. Pt ModA for sitting balance at EOB, no reports of dizziness with positional changes. Continue to recommend SNF at discharge.    Anticipated Discharge Disposition (OT): skilled nursing facility

## 2025-06-30 NOTE — PROGRESS NOTES
[facility]   Urology Progress Note    Patient Name: Fracisco Mullen  : 1965  MRN: [id number]  Primary Care Physician:  Brandy Roberson MD  Date of admission: 2025    Subjective   Subjective     Chief Complaint: urinary retention    HPI:  Urology contacted again due to ongoing urinary retention. Bustos catheter remains in place.     Review of Systems   The following systems were reviewed and negative;  constitution, eyes, ENT, respiratory, cardiovascular, gastrointestinal, genitourinary, integument, breast, hematologic / lymphatic, musculoskeletal, neurological, behavioral/psych, endocrine, and allergies / immunologic.    Objective   Objective     Vitals:   Temp:  [97.9 °F (36.6 °C)-98.2 °F (36.8 °C)] 97.9 °F (36.6 °C)  Heart Rate:  [77-83] 83  Resp:  [16-18] 18  BP: ()/(51-88) 85/51  Physical Exam    Constitutional: Awake in bed, alert   Eyes: PERRLA, sclerae anicteric, no conjunctival injection   HENT: Normocephalic, atraumatic, mucous membranes moist   Neck: Supple, trachea midline   Respiratory: Equal chest rise, non-labored respirations    Cardiovascular: RRR, palpable radial pulses bilaterally   Gastrointestinal: Soft, nontender, non-distended   Musculoskeletal: No lower extremity edema bilaterally, no clubbing or cyanosis to extremities   Psychiatric: Appropriate affect, cooperative   Neurologic: Oriented x 3,  Cranial Nerves grossly intact, speech clear   Skin: No rashes     Result Review    Result Review:  I have personally reviewed the results from the time of this admission to 2025 14:24 EDT and agree with these findings:  [x]  Laboratory  []  Microbiology  []  Radiology  []  EKG/Telemetry   []  Cardiology/Vascular   []  Pathology  []  Old records  []  Other:    Most notable findings include:   Cr 0.73  WBC 13    Assessment & Plan   Assessment / Plan     Brief Patient Summary:  Fracisco Mullen is a 59 y.o. male who is admitted for management of DKA and found to be in  urinary retention. Urology was consulted for catheter placement due to severe phimosis. A 14Fr catheter was able to be placed without significant difficulty with return of 750cc of purulent urine.     Active Hospital Problems:  Active Hospital Problems    Diagnosis     DKA (diabetic ketoacidosis)     Severe protein-calorie malnutrition     Tobacco use     RLS (restless legs syndrome)     GERD (gastroesophageal reflux disease)     Hyperlipidemia     Type 2 diabetes mellitus, with long-term current use of insulin     Peripheral neuropathy     HTN (hypertension)        Plan:     -Continue ledesma catheter; primary team can perform voiding trial prior to discharge  -The patient would benefit from circumcision vs dorsal slit; will have him follow up in the Urology clinic to discuss this further  -Recommend obtaining urine culture due to purulent urine and starting on empiric antibiotics for UTI  -Please contact Urology for questions or concerns.         VTE Prophylaxis:  Pharmacologic & mechanical VTE prophylaxis orders are present.        CODE STATUS:   Code Status (Patient has no pulse and is not breathing): CPR (Attempt to Resuscitate)  Medical Interventions (Patient has pulse or is breathing): Full Support      Electronically signed by Eleazar Pham MD, 06/30/25, 2:24 PM EDT.

## 2025-07-01 ENCOUNTER — DOCUMENTATION (OUTPATIENT)
Dept: SOCIAL WORK | Facility: HOSPITAL | Age: 60
End: 2025-07-01
Payer: COMMERCIAL

## 2025-07-01 LAB
ANION GAP SERPL CALCULATED.3IONS-SCNC: 6 MMOL/L (ref 5–15)
BUN SERPL-MCNC: 17.8 MG/DL (ref 8–23)
BUN/CREAT SERPL: 39.6 (ref 7–25)
CALCIUM SPEC-SCNC: 7.9 MG/DL (ref 8.6–10.5)
CHLORIDE SERPL-SCNC: 103 MMOL/L (ref 98–107)
CO2 SERPL-SCNC: 26 MMOL/L (ref 22–29)
CREAT SERPL-MCNC: 0.45 MG/DL (ref 0.76–1.27)
DEPRECATED RDW RBC AUTO: 46.4 FL (ref 37–54)
EGFRCR SERPLBLD CKD-EPI 2021: 119.8 ML/MIN/1.73
ERYTHROCYTE [DISTWIDTH] IN BLOOD BY AUTOMATED COUNT: 15.3 % (ref 12.3–15.4)
GLUCOSE BLDC GLUCOMTR-MCNC: 102 MG/DL (ref 70–130)
GLUCOSE BLDC GLUCOMTR-MCNC: 135 MG/DL (ref 70–130)
GLUCOSE BLDC GLUCOMTR-MCNC: 93 MG/DL (ref 70–130)
GLUCOSE BLDC GLUCOMTR-MCNC: 96 MG/DL (ref 70–130)
GLUCOSE SERPL-MCNC: 123 MG/DL (ref 65–99)
HCT VFR BLD AUTO: 32.1 % (ref 37.5–51)
HGB BLD-MCNC: 9.7 G/DL (ref 13–17.7)
MAGNESIUM SERPL-MCNC: 1.7 MG/DL (ref 1.6–2.4)
MCH RBC QN AUTO: 25.4 PG (ref 26.6–33)
MCHC RBC AUTO-ENTMCNC: 30.2 G/DL (ref 31.5–35.7)
MCV RBC AUTO: 84 FL (ref 79–97)
PLATELET # BLD AUTO: 366 10*3/MM3 (ref 140–450)
PMV BLD AUTO: 9.9 FL (ref 6–12)
POTASSIUM SERPL-SCNC: 4.2 MMOL/L (ref 3.5–5.2)
RBC # BLD AUTO: 3.82 10*6/MM3 (ref 4.14–5.8)
SODIUM SERPL-SCNC: 135 MMOL/L (ref 136–145)
WBC NRBC COR # BLD AUTO: 8.73 10*3/MM3 (ref 3.4–10.8)

## 2025-07-01 PROCEDURE — 87493 C DIFF AMPLIFIED PROBE: CPT | Performed by: PHYSICIAN ASSISTANT

## 2025-07-01 PROCEDURE — 85027 COMPLETE CBC AUTOMATED: CPT

## 2025-07-01 PROCEDURE — 99232 SBSQ HOSP IP/OBS MODERATE 35: CPT | Performed by: PHYSICIAN ASSISTANT

## 2025-07-01 PROCEDURE — 97110 THERAPEUTIC EXERCISES: CPT

## 2025-07-01 PROCEDURE — 25010000002 HEPARIN (PORCINE) PER 1000 UNITS: Performed by: INTERNAL MEDICINE

## 2025-07-01 PROCEDURE — 63710000001 INSULIN LISPRO (HUMAN) PER 5 UNITS: Performed by: PHYSICIAN ASSISTANT

## 2025-07-01 PROCEDURE — 25010000002 MAGNESIUM SULFATE 4 GM/100ML SOLUTION: Performed by: PHYSICIAN ASSISTANT

## 2025-07-01 PROCEDURE — 25010000002 CEFTRIAXONE PER 250 MG: Performed by: INTERNAL MEDICINE

## 2025-07-01 PROCEDURE — 80048 BASIC METABOLIC PNL TOTAL CA: CPT

## 2025-07-01 PROCEDURE — 63710000001 INSULIN LISPRO (HUMAN) PER 5 UNITS

## 2025-07-01 PROCEDURE — 97535 SELF CARE MNGMENT TRAINING: CPT

## 2025-07-01 PROCEDURE — 97530 THERAPEUTIC ACTIVITIES: CPT

## 2025-07-01 PROCEDURE — 87449 NOS EACH ORGANISM AG IA: CPT | Performed by: PHYSICIAN ASSISTANT

## 2025-07-01 PROCEDURE — 63710000001 INSULIN GLARGINE PER 5 UNITS: Performed by: PHYSICIAN ASSISTANT

## 2025-07-01 PROCEDURE — 83735 ASSAY OF MAGNESIUM: CPT

## 2025-07-01 PROCEDURE — 82948 REAGENT STRIP/BLOOD GLUCOSE: CPT

## 2025-07-01 RX ORDER — METOPROLOL SUCCINATE 25 MG/1
25 TABLET, EXTENDED RELEASE ORAL DAILY
Status: DISCONTINUED | OUTPATIENT
Start: 2025-07-01 | End: 2025-07-04 | Stop reason: HOSPADM

## 2025-07-01 RX ORDER — INSULIN LISPRO 100 [IU]/ML
2-7 INJECTION, SOLUTION INTRAVENOUS; SUBCUTANEOUS
Status: DISCONTINUED | OUTPATIENT
Start: 2025-07-01 | End: 2025-07-04 | Stop reason: HOSPADM

## 2025-07-01 RX ORDER — INSULIN LISPRO 100 [IU]/ML
8 INJECTION, SOLUTION INTRAVENOUS; SUBCUTANEOUS
Status: DISCONTINUED | OUTPATIENT
Start: 2025-07-01 | End: 2025-07-04 | Stop reason: HOSPADM

## 2025-07-01 RX ORDER — MAGNESIUM SULFATE HEPTAHYDRATE 40 MG/ML
4 INJECTION, SOLUTION INTRAVENOUS ONCE
Status: COMPLETED | OUTPATIENT
Start: 2025-07-01 | End: 2025-07-01

## 2025-07-01 RX ADMIN — INSULIN LISPRO 10 UNITS: 100 INJECTION, SOLUTION INTRAVENOUS; SUBCUTANEOUS at 08:32

## 2025-07-01 RX ADMIN — FINASTERIDE 5 MG: 5 TABLET, FILM COATED ORAL at 21:10

## 2025-07-01 RX ADMIN — DULOXETINE 60 MG: 60 CAPSULE, DELAYED RELEASE ORAL at 08:28

## 2025-07-01 RX ADMIN — ASPIRIN 81 MG: 81 TABLET, COATED ORAL at 08:29

## 2025-07-01 RX ADMIN — ATORVASTATIN CALCIUM 10 MG: 10 TABLET ORAL at 08:28

## 2025-07-01 RX ADMIN — METOPROLOL SUCCINATE 25 MG: 25 TABLET, EXTENDED RELEASE ORAL at 08:29

## 2025-07-01 RX ADMIN — PANTOPRAZOLE SODIUM 40 MG: 40 TABLET, DELAYED RELEASE ORAL at 05:16

## 2025-07-01 RX ADMIN — LIDOCAINE 1 PATCH: 4 PATCH TOPICAL at 08:41

## 2025-07-01 RX ADMIN — HEPARIN SODIUM 5000 UNITS: 5000 INJECTION INTRAVENOUS; SUBCUTANEOUS at 05:16

## 2025-07-01 RX ADMIN — INSULIN GLARGINE 18 UNITS: 100 INJECTION, SOLUTION SUBCUTANEOUS at 08:32

## 2025-07-01 RX ADMIN — INSULIN LISPRO 8 UNITS: 100 INJECTION, SOLUTION INTRAVENOUS; SUBCUTANEOUS at 18:03

## 2025-07-01 RX ADMIN — CYCLOBENZAPRINE HYDROCHLORIDE 5 MG: 10 TABLET, FILM COATED ORAL at 08:48

## 2025-07-01 RX ADMIN — HEPARIN SODIUM 5000 UNITS: 5000 INJECTION INTRAVENOUS; SUBCUTANEOUS at 14:17

## 2025-07-01 RX ADMIN — PREGABALIN 225 MG: 150 CAPSULE ORAL at 08:29

## 2025-07-01 RX ADMIN — HEPARIN SODIUM 5000 UNITS: 5000 INJECTION INTRAVENOUS; SUBCUTANEOUS at 21:09

## 2025-07-01 RX ADMIN — CEFTRIAXONE 2000 MG: 2 INJECTION, POWDER, FOR SOLUTION INTRAMUSCULAR; INTRAVENOUS at 18:05

## 2025-07-01 RX ADMIN — INSULIN LISPRO 10 UNITS: 100 INJECTION, SOLUTION INTRAVENOUS; SUBCUTANEOUS at 12:48

## 2025-07-01 RX ADMIN — ROPINIROLE HYDROCHLORIDE 0.25 MG: 0.5 TABLET, FILM COATED ORAL at 21:09

## 2025-07-01 RX ADMIN — Medication 10 ML: at 21:10

## 2025-07-01 RX ADMIN — TAMSULOSIN HYDROCHLORIDE 0.8 MG: 0.4 CAPSULE ORAL at 21:10

## 2025-07-01 RX ADMIN — MAGNESIUM SULFATE IN WATER FOR 4 G: 40 INJECTION INTRAVENOUS at 08:29

## 2025-07-01 NOTE — PLAN OF CARE
Goal Outcome Evaluation:  Plan of Care Reviewed With: patient        Progress: improving  Outcome Evaluation: Pt demonstrated increased activity tolerance this date, more alert and engaged in therapeutic process this date. Pt Min to ModA for rolling in bed for Dep hygiene and sling placement, transferred to chair via mechanical lift. Pt completed grooming with SHAVER while UIC, gave good effort for UE HEP. Continue to recommend IPR at discharge.    Anticipated Discharge Disposition (OT): skilled nursing facility

## 2025-07-01 NOTE — PROGRESS NOTES
Continued Stay Note  [facility]     Patient Name: Fracisco Mullen  MRN: [id number]  Today's Date: 7/1/2025    Admit Date: 6/24/2025    Plan: [facility]; referral given; needs insurance authorization   Discharge Plan       Row Name 07/01/25 1236       Plan    Plan Comments Adult protection report was accepted for invenstigation.                   Discharge Codes    No documentation.                       AARON Davis

## 2025-07-01 NOTE — NURSING NOTE
"Follow-Up for Wound, Ostomy and Continence (WOC) Nursing Consultation: \"LEFT HIP, PENILE/SCROTUM REDNESS (RAW), GENERAL SCAB ALL OVER\"     Patient lying on side in bed.  Family/support person not present.      Wound Assessment  Wound Type: Pressure Injury Stage 2  Location: left trochanter           Measurements: 1.5cm x 2.5cm x 0.1cm  Wound Bed: moist, pink, white  Wound Edges: Rolled/Closed  Periwound Skin: faded rash, bites   Drainage Characteristics/Odor: serosanguineous  Drainage Amount: scant  Pain: Yes   Care provided: Cleansed with vashe soak for 5 minutes. Skin prep periwound. Therahoney sheet cut to fit wound bed. Covered with optifoam  Notes: Wound has improved considerably, stage 2 with epithelial eyelets visible. Continue same treatment. Pt encouraged to stay off this side as much as possible-he relates that he usually lies on left side. Reviewed reason offloading is so important.  Wound Image:        Recommendation(s) for management of wound:   Cleanse with NS every 3 days and prn. Skin prep periwound. Cut to fit therahoney sheet to wound bed. Cover with optifoam.      Wound Assessment  Wound Type: MASD (Moisture associated skin damage) - Intertriginous Dermatitis (ITD)/Yeast Dermatitis may have pressure component  Wound Bed: non-blanchable and red  Drainage Amount: none  Pain: No   Care provided: Cleansed with green wipe, applied skin prep and allowed to dry. Applied new Allevyn.   Notes:  Darkened area to right sacrococcygeal area still present, nonblanchable, more easily visualized now that rash and bites have resolved. Care done as above and encouraged to offload.  Wound Image:     Recommendation(s) for management of wound:   -Sacrococcygeal: cleanse with green wipe when needed. Apply skin prep and allow to dry. Allevyn, change Q3-5 days and prn.     -Refer to wound care orders for specific instructions on how to treat/manage wound.  -Practice pressure injury prevention protocol.     Specialty " support surface: Isotour.  Will order LIN pump #6 for patient.    [facility] ISOTOUR PUMP # 6 ordered for patient.    Please implement pressure injury prevention protocol. Specialty beds do not replace turning/offloading.    At discharge, please wipe down pump with appropriate cleaning wipes and notify Charge RN and Transport to have [employer] ISOTOUR PUMP returned to storage. Thank you.     Pressure Injury Prevention Protocol (initiate for Fabian Score of 18 or less):   *Turn q 2 hr, keep heels elevated and offloaded with offloading heel boots.  *Allevn dressings to heels, sacrum/coccyx  *Follow C.A.R.E protocol if medical devices (Bipap, ledesma, Ng tube, etc) are being used.  *Reduce layers under patient (one sheet as drawsheet and two incontinence pads) to allow waffle or LIN to improve microclimate   *Raise knee-gatch before elevating HOB to reduce shearing       WOC Team will continue to follow.  Please re-consult if the wound(s) worsens.

## 2025-07-01 NOTE — PAYOR COMM NOTE
"Fracisco Mullen \"Bill\" (59 y.o. Male)       Date of Birth   1965    Social Security Number       Address   [address] [address]    Home Phone   [phone]    MRN   [id number]       Worship   None    Marital Status                               Admission Date   6/24/2025    Admission Type   Emergency    Admitting Provider   Sander Snowden MD    Attending Provider   Sander Snowden MD    Department, Room/Bed   [facility] 4G, S449/1       Discharge Date       Discharge Disposition       Discharge Destination                                 Attending Provider: Sander Snowden MD    Allergies: Sertraline Hcl    Isolation: None   Infection: None   Code Status: CPR    Ht: 162.6 cm (64.02\")   Wt: 70.5 kg (155 lb 6.8 oz)    Admission Cmt: None   Principal Problem: None                  Active Insurance as of 6/24/2025       Primary Coverage       Payor Plan Insurance Group Employer/Plan Group    [employer] [place] [employer] [place]        Payor Plan Address Payor Plan Phone Number Payor Plan Fax Number Effective Dates    [address] [address] [phone]  4/10/2016 - None Entered    [facility] [id number]         Subscriber Name Subscriber Birth Date Member ID       FRACISCO MULLEN 1965 [id number]                     Emergency Contacts        (Rel.) Home Phone Work Phone Mobile Phone    ZACIVAN (Partner) [phone] -- [phone]              [place]: NPI [id number] Tax ID [id number]  Insurance Information                  [facility]/WELLCARE MEDICAID Phone: [phone]    Subscriber: Fracisco Mullen Subscriber#: [id number]    Group#: -- Precert#: [id number]    Authorization#: [id number] Effective Date: --          Current Facility-Administered Medications   Medication Dose Route Frequency Provider Last Rate Last Admin    aspirin EC tablet 81 mg  81 mg Oral Daily Sander Snowden MD   81 mg at 07/01/25 0829    atorvastatin (LIPITOR) " tablet 10 mg  10 mg Oral Daily Sander Snowden MD   10 mg at 07/01/25 0828    Calcium Replacement - Follow Nurse / BPA Driven Protocol   Not Applicable PRN Isaias De Luna MD        cefTRIAXone (ROCEPHIN) 2,000 mg in sodium chloride 0.9 % 100 mL MBP  2,000 mg Intravenous Q24H Varun Dominique  mL/hr at 06/30/25 1814 2,000 mg at 06/30/25 1814    cyclobenzaprine (FLEXERIL) tablet 5 mg  5 mg Oral TID PRN Sander Snowden MD   5 mg at 07/01/25 0848    dextrose (D50W) (25 g/50 mL) IV injection 25 g  25 g Intravenous Q15 Min PRN Isaias De Luna MD   25 g at 06/30/25 1510    dextrose (GLUTOSE) oral gel 15 g  15 g Oral Q15 Min PRN Isaias De Luna MD   15 g at 06/29/25 1701    DULoxetine (CYMBALTA) DR capsule 60 mg  60 mg Oral Daily Sander Snwoden MD   60 mg at 07/01/25 0828    finasteride (PROSCAR) tablet 5 mg  5 mg Oral Nightly Sander Snowden MD   5 mg at 06/30/25 2046    glucagon (GLUCAGEN) injection 1 mg  1 mg Intramuscular Q15 Min PRN Isaias De Luna MD        heparin (porcine) 5000 UNIT/ML injection 5,000 Units  5,000 Units Subcutaneous Q8H Isaias De Luna MD   5,000 Units at 07/01/25 0516    insulin glargine (LANTUS, SEMGLEE) injection 18 Units  18 Units Subcutaneous Q12H Mary Escalona PA-C   18 Units at 07/01/25 0832    Insulin Lispro (humaLOG) injection 10 Units  10 Units Subcutaneous TID With Meals Sander Snowden MD   10 Units at 07/01/25 0832    Insulin Lispro (humaLOG) injection 2-7 Units  2-7 Units Subcutaneous 4x Daily AC & at Bedtime Mary Escalona PA-C        Lidocaine 4 % 1 patch  1 patch Transdermal Q24H Sander Snowden MD   1 patch at 07/01/25 0841    Magnesium Standard Dose Replacement - Follow Nurse / BPA Driven Protocol   Not Applicable PRN Isaias De Luna MD        metoprolol succinate XL (TOPROL-XL) 24 hr tablet 25 mg  25 mg Oral Daily Mary Escalona, KENNY   25 mg at 07/01/25 0829    pantoprazole (PROTONIX) EC tablet 40 mg  40 mg  Oral Q AM Sander Snowden MD   40 mg at 07/01/25 0516    Phosphorus Replacement - Follow Nurse / BPA Driven Protocol   Not Applicable PRN Isaias De Luna MD        Potassium Replacement - Follow Nurse / BPA Driven Protocol   Not Applicable PRN Isaias De Luna MD        pregabalin (LYRICA) capsule 225 mg  225 mg Oral BID Sander Snowden MD   225 mg at 07/01/25 0829    rOPINIRole (REQUIP) tablet 0.25 mg  0.25 mg Oral Nightly Sander Snowden MD   0.25 mg at 06/30/25 2046    sodium chloride 0.9 % flush 10 mL  10 mL Intravenous PRN Isaias De Luna MD   10 mL at 06/30/25 0853    tamsulosin (FLOMAX) 24 hr capsule 0.8 mg  0.8 mg Oral Nightly Sander Snowden MD   0.8 mg at 06/30/25 2046     Lab Results (last 24 hours)       Procedure Component Value Units Date/Time    POC Glucose Once [[id number]]  (Abnormal) Collected: 07/01/25 0712    Specimen: Blood Updated: 07/01/25 0714     Glucose 135 mg/dL     Magnesium [[id number]]  (Normal) Collected: 07/01/25 0525    Specimen: Blood Updated: 07/01/25 0613     Magnesium 1.7 mg/dL     Basic Metabolic Panel [[id number]]  (Abnormal) Collected: 07/01/25 0525    Specimen: Blood Updated: 07/01/25 0613     Glucose 123 mg/dL      BUN 17.8 mg/dL      Creatinine 0.45 mg/dL      Sodium 135 mmol/L      Potassium 4.2 mmol/L      Chloride 103 mmol/L      CO2 26.0 mmol/L      Calcium 7.9 mg/dL      BUN/Creatinine Ratio 39.6     Anion Gap 6.0 mmol/L      eGFR 119.8 mL/min/1.73     Narrative:      GFR Categories in Chronic Kidney Disease (CKD)              GFR Category          GFR (mL/min/1.73)    Interpretation  G1                    90 or greater        Normal or high (1)  G2                    60-89                Mild decrease (1)  G3a                   45-59                Mild to moderate decrease  G3b                   30-44                Moderate to severe decrease  G4                    15-29                Severe decrease  G5                    14 or less            Kidney failure    (1)In the absence of evidence of kidney disease, neither GFR category G1 or G2 fulfill the criteria for CKD.    eGFR calculation 2021 CKD-EPI creatinine equation, which does not include race as a factor    CBC (No Diff) [[id number]]  (Abnormal) Collected: 07/01/25 0525    Specimen: Blood Updated: 07/01/25 0544     WBC 8.73 10*3/mm3      RBC 3.82 10*6/mm3      Hemoglobin 9.7 g/dL      Hematocrit 32.1 %      MCV 84.0 fL      MCH 25.4 pg      MCHC 30.2 g/dL      RDW 15.3 %      RDW-SD 46.4 fl      MPV 9.9 fL      Platelets 366 10*3/mm3     POC Glucose Once [[id number]]  (Abnormal) Collected: 06/30/25 2007    Specimen: Blood Updated: 06/30/25 2008     Glucose 192 mg/dL     POC Glucose Once [[id number]]  (Abnormal) Collected: 06/30/25 1614    Specimen: Blood Updated: 06/30/25 1629     Glucose 156 mg/dL     POC Glucose Once [[id number]]  (Normal) Collected: 06/30/25 1534    Specimen: Blood Updated: 06/30/25 1535     Glucose 90 mg/dL     POC Glucose Once [[id number]]  (Abnormal) Collected: 06/30/25 1506    Specimen: Blood Updated: 06/30/25 1507     Glucose 69 mg/dL     POC Glucose Once [[id number]]  (Normal) Collected: 06/30/25 1505    Specimen: Blood Updated: 06/30/25 1507     Glucose 74 mg/dL     POC Glucose Once [[id number]]  (Normal) Collected: 06/30/25 1212    Specimen: Blood Updated: 06/30/25 1213     Glucose 95 mg/dL     POC Glucose Once [[id number]]  (Normal) Collected: 06/30/25 1119    Specimen: Blood Updated: 06/30/25 1127     Glucose 77 mg/dL           Imaging Results (Last 24 Hours)       ** No results found for the last 24 hours. **          Orders (last 24 hrs)        Start     Ordered    07/01/25 0900  insulin glargine (LANTUS, SEMGLEE) injection 20 Units  Daily,   Status:  Discontinued         06/30/25 1248    07/01/25 0900  insulin glargine (LANTUS, SEMGLEE) injection 18 Units  Every 12 Hours Scheduled         07/01/25 0742    07/01/25 0900  metoprolol succinate XL (TOPROL-XL) 24 hr tablet 25 mg   Daily         07/01/25 0744    07/01/25 0830  magnesium sulfate 4g/100mL (PREMIX) infusion  Once         07/01/25 0741    07/01/25 0830  Insulin Lispro (humaLOG) injection 2-7 Units  4 Times Daily Before Meals & Nightly         07/01/25 0743    07/01/25 0715  POC Glucose Once  PROCEDURE ONCE        Comments: Complete no more than 45 minutes prior to patient eating      07/01/25 0712    06/30/25 2008  POC Glucose Once  PROCEDURE ONCE        Comments: Complete no more than 45 minutes prior to patient eating      06/30/25 2007    06/30/25 1630  POC Glucose Once  PROCEDURE ONCE        Comments: Complete no more than 45 minutes prior to patient eating      06/30/25 1614    06/30/25 1536  POC Glucose Once  PROCEDURE ONCE        Comments: Complete no more than 45 minutes prior to patient eating      06/30/25 1534    06/30/25 1508  POC Glucose Once  PROCEDURE ONCE        Comments: Complete no more than 45 minutes prior to patient eating      06/30/25 1505    06/30/25 1508  POC Glucose Once  PROCEDURE ONCE        Comments: Complete no more than 45 minutes prior to patient eating      06/30/25 1506    06/30/25 1230  Lidocaine 4 % 1 patch  Every 24 Hours Scheduled         06/30/25 1130    06/30/25 1224  Inpatient Urology Consult  Once        Specialty:  Urology  Provider:  Chetan Masterson MD    06/30/25 1224    06/30/25 1215  sodium chloride 0.9 % bolus 500 mL  Once         06/30/25 1124    06/30/25 1214  POC Glucose Once  PROCEDURE ONCE        Comments: Complete no more than 45 minutes prior to patient eating      06/30/25 1212    06/30/25 1200  Insulin Lispro (humaLOG) injection 10 Units  3 Times Daily With Meals         06/30/25 1104    06/30/25 1128  POC Glucose Once  PROCEDURE ONCE        Comments: Complete no more than 45 minutes prior to patient eating      06/30/25 1119    06/30/25 0600  pantoprazole (PROTONIX) EC tablet 40 mg  Every Early Morning         06/29/25 1141    06/29/25 2100  insulin glargine (LANTUS,  SEMGLEE) injection 15 Units  Nightly,   Status:  Discontinued         06/29/25 1007    06/29/25 2100  finasteride (PROSCAR) tablet 5 mg  Nightly         06/29/25 1141    06/29/25 2100  rOPINIRole (REQUIP) tablet 0.25 mg  Nightly         06/29/25 1141    06/29/25 2100  tamsulosin (FLOMAX) 24 hr capsule 0.8 mg  Nightly         06/29/25 1141    06/29/25 1230  aspirin EC tablet 81 mg  Daily         06/29/25 1141    06/29/25 1230  DULoxetine (CYMBALTA) DR capsule 60 mg  Daily         06/29/25 1141    06/29/25 1230  metoprolol succinate XL (TOPROL-XL) 24 hr tablet 50 mg  Daily,   Status:  Discontinued         06/29/25 1141    06/29/25 1230  pregabalin (LYRICA) capsule 225 mg  2 Times Daily         06/29/25 1141    06/29/25 1230  atorvastatin (LIPITOR) tablet 10 mg  Daily         06/29/25 1141    06/29/25 1200  Insulin Lispro (humaLOG) injection 15 Units  3 Times Daily With Meals,   Status:  Discontinued         06/29/25 1007    06/29/25 1140  cyclobenzaprine (FLEXERIL) tablet 5 mg  3 Times Daily PRN         06/29/25 1141    06/29/25 0600  Magnesium  Daily       06/28/25 1126    06/29/25 0600  Basic Metabolic Panel  Daily       06/28/25 1126    06/29/25 0600  CBC (No Diff)  Daily       06/28/25 1126    06/27/25 1200  Dietary Nutrition Supplements Other (see comment); Prosource in lemonade  Daily With Lunch       06/27/25 1034    06/27/25 0105  insulin glargine (LANTUS, SEMGLEE) injection 25 Units  Daily,   Status:  Discontinued         06/27/25 0105    06/25/25 2200  POC Glucose 4x Daily Before Meals & at Bedtime  4 Times Daily Before Meals & at Bedtime      Comments: Complete no more than 45 minutes prior to patient eating      06/25/25 1740    06/25/25 2200  heparin (porcine) 5000 UNIT/ML injection 5,000 Units  Every 8 Hours Scheduled         06/25/25 1749    06/25/25 1845  cefTRIAXone (ROCEPHIN) 2,000 mg in sodium chloride 0.9 % 100 mL MBP  Every 24 Hours         06/25/25 1748    06/25/25 1830  Insulin Lispro (humaLOG)  injection 3-14 Units  4 Times Daily Before Meals & Nightly,   Status:  Discontinued         25 1740    25 1800  Skin Care  2 Times Daily       25 1456    25 1800  Wound Care  2 Times Daily       25 1456    25 1740  Potassium Replacement - Follow Nurse / BPA Driven Protocol  As Needed         25 1740    25 1735  dextrose (GLUTOSE) oral gel 15 g  Every 15 Minutes PRN         25 1740    25 1735  dextrose (D50W) (25 g/50 mL) IV injection 25 g  Every 15 Minutes PRN         25 1740    25 1735  glucagon (GLUCAGEN) injection 1 mg  Every 15 Minutes PRN         25 1740    25 1735  Magnesium Standard Dose Replacement - Follow Nurse / BPA Driven Protocol  As Needed         25 1740    25 1735  Phosphorus Replacement - Follow Nurse / BPA Driven Protocol  As Needed         25 1740    25 1735  Calcium Replacement - Follow Nurse / BPA Driven Protocol  As Needed         25 1740    25 1208  Daily CHG Bath While in ICU  Daily      Comments: Use for admission bath & daily bath for duration of critical care stay    25 1207    25 2332  sodium chloride 0.9 % flush 10 mL  As Needed         25 2334    Unscheduled  Oxygen Therapy- Nasal Cannula; Titrate 1-6 LPM Per SpO2; 90 - 95%  Continuous PRN       25 2334    Unscheduled  Follow Hypoglycemia Standing Orders For Blood Glucose <70 & Notify Provider of Treatment  As Needed      Comments: Follow Hypoglycemia Orders As Outlined in Process Instructions (Open Order Report to View Full Instructions)  Notify Provider Any Time Hypoglycemia Treatment is Administered    25 1740                     Physician Progress Notes (last 24 hours)        Eleazar Pham MD at 25 1424           [facility]   Urology Progress Note    Patient Name: Fracisco Mullen  : 1965  MRN: [id number]  Primary Care Physician:  Brandy Roberson MD  Date  of admission: 6/24/2025    Subjective   Subjective     Chief Complaint: urinary retention    HPI:  Urology contacted again due to ongoing urinary retention. Bustos catheter remains in place.     Review of Systems   The following systems were reviewed and negative;  constitution, eyes, ENT, respiratory, cardiovascular, gastrointestinal, genitourinary, integument, breast, hematologic / lymphatic, musculoskeletal, neurological, behavioral/psych, endocrine, and allergies / immunologic.    Objective   Objective     Vitals:   Temp:  [97.9 °F (36.6 °C)-98.2 °F (36.8 °C)] 97.9 °F (36.6 °C)  Heart Rate:  [77-83] 83  Resp:  [16-18] 18  BP: ()/(51-88) 85/51  Physical Exam    Constitutional: Awake in bed, alert   Eyes: PERRLA, sclerae anicteric, no conjunctival injection   HENT: Normocephalic, atraumatic, mucous membranes moist   Neck: Supple, trachea midline   Respiratory: Equal chest rise, non-labored respirations    Cardiovascular: RRR, palpable radial pulses bilaterally   Gastrointestinal: Soft, nontender, non-distended   Musculoskeletal: No lower extremity edema bilaterally, no clubbing or cyanosis to extremities   Psychiatric: Appropriate affect, cooperative   Neurologic: Oriented x 3,  Cranial Nerves grossly intact, speech clear   Skin: No rashes     Result Review    Result Review:  I have personally reviewed the results from the time of this admission to 6/30/2025 14:24 EDT and agree with these findings:  [x]  Laboratory  []  Microbiology  []  Radiology  []  EKG/Telemetry   []  Cardiology/Vascular   []  Pathology  []  Old records  []  Other:    Most notable findings include:   Cr 0.73  WBC 13    Assessment & Plan   Assessment / Plan     Brief Patient Summary:  Fracisco ADAM Lady is a 59 y.o. male who is admitted for management of DKA and found to be in urinary retention. Urology was consulted for catheter placement due to severe phimosis. A 14Fr catheter was able to be placed without significant difficulty with return  of 750cc of purulent urine.     Active Hospital Problems:  Active Hospital Problems    Diagnosis     DKA (diabetic ketoacidosis)     Severe protein-calorie malnutrition     Tobacco use     RLS (restless legs syndrome)     GERD (gastroesophageal reflux disease)     Hyperlipidemia     Type 2 diabetes mellitus, with long-term current use of insulin     Peripheral neuropathy     HTN (hypertension)        Plan:     -Continue ledesma catheter; primary team can perform voiding trial prior to discharge  -The patient would benefit from circumcision vs dorsal slit; will have him follow up in the Urology clinic to discuss this further  -Recommend obtaining urine culture due to purulent urine and starting on empiric antibiotics for UTI  -Please contact Urology for questions or concerns.         VTE Prophylaxis:  Pharmacologic & mechanical VTE prophylaxis orders are present.        CODE STATUS:   Code Status (Patient has no pulse and is not breathing): CPR (Attempt to Resuscitate)  Medical Interventions (Patient has pulse or is breathing): Full Support      Electronically signed by Eleazar Pham MD, 25, 2:24 PM EDT.               Electronically signed by Eleazar Pham MD at 25 1427       Sander Snowden MD at 25 1049              [facility] Medicine Services  PROGRESS NOTE    Patient Name: Fracisco Mullen  : 1965  MRN: [id number]    Date of Admission: 2025  Primary Care Physician: Brandy Roberson MD    Subjective   Subjective     CC:  DKA and septic shock    HPI:  Overnight patient's blood sugars did go down, have been dealing with very labile blood sugars, adjusting insulin regimen at this time.  Soft blood pressure this a.m., giving IV fluids.      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   Psychiatric: Appropriate affect, cooperative  Neurologic: Oriented x 3, strength symmetric in all extremities, Cranial Nerves grossly intact to confrontation, speech clear  Skin: No rashes     Results Reviewed:  LAB RESULTS:      Lab 06/30/25 0437 06/29/25  0743 06/28/25  1159 06/25/25  1222 06/25/25  0606 06/25/25  0310 06/24/25  2340   WBC 13.32* 7.12 9.48  --   --   --  15.55*   HEMOGLOBIN 9.8* 10.0* 12.1*  --   --   --  11.2*   HEMATOCRIT 32.3* 31.6* 38.6  --   --   --  35.1*   PLATELETS 371 350 416  --   --   --  425   NEUTROS ABS  --   --  5.76  --   --   --  14.25*   IMMATURE GRANS (ABS)  --   --  0.08*  --   --   --  0.25*   LYMPHS ABS  --   --  2.33  --   --   --  0.58*   MONOS ABS  --   --  1.04*  --   --   --  0.37   EOS ABS  --   --  0.19  --   --   --  0.01   MCV 84.3 82.1 82.0  --   --   --  80.7   PROCALCITONIN  --   --   --   --   --   --  0.87*   LACTATE  --   --   --  2.4* 2.3* 2.5* 2.6*   HSTROP T  --   --   --   --   --  39* 44*         Lab 06/30/25 0437 06/29/25  0743 06/28/25  1159 06/27/25  0359 06/26/25  2328 06/26/25  2030 06/26/25  1601 06/26/25  1234 06/26/25  0819 06/25/25  0310 06/24/25  2340   SODIUM 135* 133* 135*  --   --  131* 131* 131* 132*   < > 125*   POTASSIUM 4.7 3.8 3.9  --   --  4.2 4.5 4.7 4.4   < > 4.0   CHLORIDE 101 97* 98  --   --  101 100 101 101   < > 87*   CO2 25.3 28.0 25.9  --   --  22.0 22.4 20.9* 21.0*   < > 10.6*   ANION GAP 8.7 8.0 11.1  --   --  8.0 8.6 9.1 10.0   < > 27.4*   BUN 15.9 5.9* 4.8*  --   --  7.3* 8.2 8.9 10.3   < > 13.8   CREATININE 0.73* 0.42* 0.47*  --   --  0.56* 0.48* 0.54*  0.54*   < > 1.09   EGFR 103.5 122.3 118.2  --   --  112.1 117.5 113.4 113.4   < > 77.2   GLUCOSE 133* 237* 74  --   --  84 92 147* 223*   < > 456*   CALCIUM 8.0* 7.7* 8.3*  --   --  7.6* 7.6* 7.6* 7.2*   < > 7.7*   MAGNESIUM 1.8 1.8 2.0 2.7*  --  1.8 1.6 1.5* 1.6   < > 1.9   PHOSPHORUS  --   --   --   --  2.7 2.6 2.2* 1.6* 2.0*   < > 2.9   HEMOGLOBIN A1C  --   --   --   --   --   --   --   --   --   --  14.20*   TSH  --   --   --   --   --   --   --   --   --   --  1.860    < > = values in this interval not displayed.         Lab 06/24/25  2340   TOTAL PROTEIN 5.5*   ALBUMIN 2.8*   GLOBULIN 2.7   ALT (SGPT) <5   AST (SGOT) 12   BILIRUBIN 0.4   ALK PHOS 73         Lab 06/25/25  0310 06/24/25  2340   PROBNP  --  314.0   HSTROP T 39* 44*                 Lab 06/24/25  2340   PH, ARTERIAL 7.394   PCO2, ARTERIAL 16.4*   PO2 ART 77.6*   FIO2 28   HCO3 ART 10.0*   BASE EXCESS ART -12.5*   CARBOXYHEMOGLOBIN 1.5     Brief Urine Lab Results  (Last result in the past 365 days)        Color   Clarity   Blood   Leuk Est   Nitrite   Protein   CREAT   Urine HCG        06/25/25 1015 Yellow   Turbid   Large (3+)   Moderate (2+)   Negative   >=300 mg/dL (3+)                   Microbiology Results Abnormal       Procedure Component Value - Date/Time    Blood Culture - Blood, Wrist, Left [[id number]]  (Abnormal)  (Susceptibility) Collected: 06/24/25 2348    Lab Status: Final result Specimen: Blood from Wrist, Left Updated: 06/29/25 0623     Blood Culture Escherichia coli     Isolated from Aerobic and Anaerobic Bottles     Blood Culture Staphylococcus hominis ssp hominis     Isolated from Aerobic Bottle     Gram Stain Aerobic Bottle Gram negative bacilli      Anaerobic Bottle Gram negative bacilli      Aerobic Bottle Gram positive cocci    Narrative:      Less than seven (7) mL's of blood was collected.  Insufficient quantity may yield false negative results.    Staphylococcus hominis ssp hominis: Probable contaminant requires clinical  correlation, susceptibility not performed unless requested by physician.      Susceptibility        Escherichia coli      RONAK      Amoxicillin + Clavulanate Susceptible      Ampicillin Susceptible      Ampicillin + Sulbactam Susceptible      Cefazolin (Non Urine) Susceptible      Cefepime Susceptible      Ceftazidime Susceptible      Ceftriaxone Susceptible      Cefuroxime axetil Susceptible      Gentamicin Susceptible      Levofloxacin Susceptible      Piperacillin + Tazobactam Susceptible      Trimethoprim + Sulfamethoxazole Susceptible                       Susceptibility Comments       Escherichia coli    With the exception of urinary-sourced infections, aminoglycosides should not be used as monotherapy.               Blood Culture - Blood, Arm, Left [[id number]]  (Abnormal) Collected: 06/24/25 2348    Lab Status: Final result Specimen: Blood from Arm, Left Updated: 06/29/25 0633     Blood Culture Staphylococcus epidermidis     Isolated from Aerobic and Anaerobic Bottles     Gram Stain Anaerobic Bottle Gram positive cocci in groups      Aerobic Bottle Gram positive cocci in groups    Narrative:      Less than seven (7) mL's of blood was collected.  Insufficient quantity may yield false negative results.    Probable contaminant requires clinical correlation, susceptibility not performed unless requested by physician.      Blood Culture ID, PCR - Blood, Arm, Left [[id number]]  (Abnormal) Collected: 06/24/25 2348    Lab Status: Final result Specimen: Blood from Arm, Left Updated: 06/27/25 0423     BCID, PCR Staph spp, not aureus or lugdunensis. Identification by BCID2 PCR.     BOTTLE TYPE Aerobic Bottle    Blood Culture ID, PCR - Blood, Wrist, Left [[id number]]  (Abnormal) Collected: 06/24/25 2348    Lab Status: Final result Specimen: Blood from Wrist, Left Updated: 06/25/25 1603     BCID, PCR Staph spp, not aureus or lugdunensis. Identification by BCID2 PCR.     BCID, PCR 2 Klebsiella pneumoniae group. Identification  by BCID2 PCR.     BOTTLE TYPE Aerobic Bottle    Narrative:      No resistance genes detected.            No radiology results from the last 24 hrs    Results for orders placed during the hospital encounter of 06/24/25    Adult Transthoracic Echo Complete W/ Cont if Necessary Per Protocol    Interpretation Summary    Left ventricular systolic function is normal. Left ventricular ejection fraction appears to be 66 - 70%.    Saline test results are negative.      Current medications:  Scheduled Meds:aspirin, 81 mg, Oral, Daily  atorvastatin, 10 mg, Oral, Daily  cefTRIAXone, 2,000 mg, Intravenous, Q24H  DULoxetine, 60 mg, Oral, Daily  finasteride, 5 mg, Oral, Nightly  heparin (porcine), 5,000 Units, Subcutaneous, Q8H  insulin glargine, 15 Units, Subcutaneous, Nightly  insulin glargine, 25 Units, Subcutaneous, Daily  Insulin Lispro, 15 Units, Subcutaneous, TID With Meals  insulin lispro, 3-14 Units, Subcutaneous, 4x Daily AC & at Bedtime  metoprolol succinate XL, 50 mg, Oral, Daily  pantoprazole, 40 mg, Oral, Q AM  pregabalin, 225 mg, Oral, BID  rOPINIRole, 0.25 mg, Oral, Nightly  tamsulosin, 0.8 mg, Oral, Nightly      Continuous Infusions:     PRN Meds:.  Calcium Replacement - Follow Nurse / BPA Driven Protocol    cyclobenzaprine    dextrose    dextrose    glucagon (human recombinant)    Magnesium Standard Dose Replacement - Follow Nurse / BPA Driven Protocol    Phosphorus Replacement - Follow Nurse / BPA Driven Protocol    Potassium Replacement - Follow Nurse / BPA Driven Protocol    sodium chloride    Assessment & Plan   Assessment & Plan     Active Hospital Problems    Diagnosis  POA    DKA (diabetic ketoacidosis) [E11.10]  Yes    Severe protein-calorie malnutrition [E43]  Yes    Tobacco use [Z72.0]  Yes    RLS (restless legs syndrome) [G25.81]  Yes    GERD (gastroesophageal reflux disease) [K21.9]  Yes    Hyperlipidemia [E78.5]  Yes    Type 2 diabetes mellitus, with long-term current use of insulin [E11.9, Z79.4]   Not Applicable    Peripheral neuropathy [G62.9]  Yes    HTN (hypertension) [I10]  Yes      Resolved Hospital Problems   No resolved problems to display.        Brief Hospital Course to date:  Fracisco ADAM Lady is a 59 y.o. male with history of of T2DM, PAD status post left AKA and right second toe amputation, ERIKA, presented to [facility] on 6/24/2025 with AMS.  Labs concerning for glucose of 456, anion gap of 27 and patient was admitted to the ICU with DKA.  Unable to place Bustos catheter initially and urology placed.  Urine concerning for UTI.  Blood culture positive for Klebsiella.     Urine culture shows no growth to date but unable to initially get urine as required urology to place Bustos catheter.  UA was suggestive of UTI and it is suspected this was primary source of infection although patient also has wounds.    Severe sepsis  E. coli and Klebsiella bacteremia  New leukocytosis  Likely urinary source in the setting of UTI due to Klebsiella particularly with blood cultures being positive for E. coli and Klebsiella  Currently on Rocephin 2 g, would continue this for now  ID on board, recommend continuation of IV Rocephin  Echo TTE this admission showed EF of 66 to 70%, saline agitated test was negative, no evidence of vegetation, trace TR.  Remains on antibiotics, will see lab trend tomorrow    DKA  T2DM  HbA1c 6/24/2025 14.2  Remains on 20 units Lantus a.m. and 15 units at nighttime with Premeal 10 units and SSI,   Tolerating diet well    Poor living conditions   and case management on board    Pressure wounds  Wound care following  Will need close glucose control to allow for wound healing    PAD s/p left AKA and right second toe amputation  Resume home medications with aspirin, statin    BPH  Continue home medications with finasteride and Flomax    Restless leg syndrome  Continue home meds with ropinirole    Peripheral neuropathy  Continue home Lyrica    HLD  Continue home statin    GERD  Continue home  PPI    HTN  Continue home Toprol    Expected Discharge Location and Transportation: Inpatient rehab  Expected Discharge TBD  Expected discharge date/ time has not been documented.     VTE Prophylaxis:  Pharmacologic & mechanical VTE prophylaxis orders are present.    Total time spent: Time Spent: Time Spent: 40 minutes  Time spent includes time reviewing chart, face-to-face time, counseling patient/family/caregiver, ordering medications/tests/procedures, communicating with other health care professionals, documenting clinical information in the electronic health record, and coordination of care.        AM-PAC 6 Clicks Score (PT): 14 (06/29/25 2000)    CODE STATUS:   Code Status and Medical Interventions: CPR (Attempt to Resuscitate); Full Support   Ordered at: 06/25/25 0129     Code Status (Patient has no pulse and is not breathing):    CPR (Attempt to Resuscitate)     Medical Interventions (Patient has pulse or is breathing):    Full Support       Sander Snowden MD  06/30/25        Electronically signed by Sander Snowden MD at 06/30/25 1248       Consult Notes (last 24 hours)  Notes from 06/30/25 1036 through 07/01/25 1036   No notes of this type exist for this encounter.

## 2025-07-01 NOTE — PROGRESS NOTES
[facility] Medicine Services  PROGRESS NOTE    Patient Name: Fracisco Mullen  : 1965  MRN: [id number]    Date of Admission: 2025  Primary Care Physician: Brandy Roberson MD    Subjective     CC: f/u DKA, UTI/bacteremia with septic shock     HPI:  In bed. Feeling well with no specific complaints. He is hopeful for [facility]     Objective     Vital Signs:   Temp:  [97.8 °F (36.6 °C)-98.2 °F (36.8 °C)] 98 °F (36.7 °C)  Heart Rate:  [80-90] 85  Resp:  [16-18] 16  BP: (106-140)/(66-82) 113/68     Physical Exam:  Constitutional: No acute distress, awake, alert and conversant. Laying in bed. Chronically ill-appearing and debilitated   HENT: NCAT, mucous membranes moist  Respiratory: Clear to auscultation bilaterally, normal respiratory effort on room air    Cardiovascular: RRR  Gastrointestinal: Positive bowel sounds, soft, nontender, nondistended  Musculoskeletal: L BKA stump intact. R 2nd digit amputation   Psychiatric: Appropriate affect, cooperative with exam  Neurologic: Oriented x 3, moves all extremities spontaneously without focal deficits, speech clear    Results Reviewed:  LAB RESULTS:      Lab 25  0525 25  0437 25  0743 25  1159 25  1222 25  0606 25  0310 25  2340   WBC 8.73 13.32* 7.12 9.48  --   --   --  15.55*   HEMOGLOBIN 9.7* 9.8* 10.0* 12.1*  --   --   --  11.2*   HEMATOCRIT 32.1* 32.3* 31.6* 38.6  --   --   --  35.1*   PLATELETS 366 371 350 416  --   --   --  425   NEUTROS ABS  --   --   --  5.76  --   --   --  14.25*   IMMATURE GRANS (ABS)  --   --   --  0.08*  --   --   --  0.25*   LYMPHS ABS  --   --   --  2.33  --   --   --  0.58*   MONOS ABS  --   --   --  1.04*  --   --   --  0.37   EOS ABS  --   --   --  0.19  --   --   --  0.01   MCV 84.0 84.3 82.1 82.0  --   --   --  80.7   PROCALCITONIN  --   --   --   --   --   --   --  0.87*   LACTATE  --   --   --   --  2.4* 2.3* 2.5* 2.6*   HSTROP T  --   --   --    --   --   --  39* 44*         Lab 07/01/25  0525 06/30/25  0437 06/29/25  0743 06/28/25  1159 06/27/25  0359 06/26/25  2328 06/26/25  2030 06/26/25  1601 06/26/25  1234 06/26/25  0819 06/25/25  0310 06/24/25  2340   SODIUM 135* 135* 133* 135*  --   --  131* 131* 131* 132*   < > 125*   POTASSIUM 4.2 4.7 3.8 3.9  --   --  4.2 4.5 4.7 4.4   < > 4.0   CHLORIDE 103 101 97* 98  --   --  101 100 101 101   < > 87*   CO2 26.0 25.3 28.0 25.9  --   --  22.0 22.4 20.9* 21.0*   < > 10.6*   ANION GAP 6.0 8.7 8.0 11.1  --   --  8.0 8.6 9.1 10.0   < > 27.4*   BUN 17.8 15.9 5.9* 4.8*  --   --  7.3* 8.2 8.9 10.3   < > 13.8   CREATININE 0.45* 0.73* 0.42* 0.47*  --   --  0.56* 0.48* 0.54* 0.54*   < > 1.09   EGFR 119.8 103.5 122.3 118.2  --   --  112.1 117.5 113.4 113.4   < > 77.2   GLUCOSE 123* 133* 237* 74  --   --  84 92 147* 223*   < > 456*   CALCIUM 7.9* 8.0* 7.7* 8.3*  --   --  7.6* 7.6* 7.6* 7.2*   < > 7.7*   MAGNESIUM 1.7 1.8 1.8 2.0 2.7*  --  1.8 1.6 1.5* 1.6   < > 1.9   PHOSPHORUS  --   --   --   --   --  2.7 2.6 2.2* 1.6* 2.0*   < > 2.9   HEMOGLOBIN A1C  --   --   --   --   --   --   --   --   --   --   --  14.20*   TSH  --   --   --   --   --   --   --   --   --   --   --  1.860    < > = values in this interval not displayed.         Lab 06/24/25  2340   TOTAL PROTEIN 5.5*   ALBUMIN 2.8*   GLOBULIN 2.7   ALT (SGPT) <5   AST (SGOT) 12   BILIRUBIN 0.4   ALK PHOS 73         Lab 06/25/25  0310 06/24/25  2340   PROBNP  --  314.0   HSTROP T 39* 44*         Lab 06/24/25 2340   PH, ARTERIAL 7.394   PCO2, ARTERIAL 16.4*   PO2 ART 77.6*   FIO2 28   HCO3 ART 10.0*   BASE EXCESS ART -12.5*   CARBOXYHEMOGLOBIN 1.5     Brief Urine Lab Results  (Last result in the past 365 days)        Color   Clarity   Blood   Leuk Est   Nitrite   Protein   CREAT   Urine HCG        06/25/25 1015 Yellow   Turbid   Large (3+)   Moderate (2+)   Negative   >=300 mg/dL (3+)                 Microbiology Results Abnormal       Procedure Component Value -  Date/Time    Blood Culture - Blood, Wrist, Left [[id number]]  (Abnormal)  (Susceptibility) Collected: 06/24/25 2348    Lab Status: Final result Specimen: Blood from Wrist, Left Updated: 06/29/25 0633     Blood Culture Escherichia coli     Isolated from Aerobic and Anaerobic Bottles     Blood Culture Staphylococcus hominis ssp hominis     Isolated from Aerobic Bottle     Gram Stain Aerobic Bottle Gram negative bacilli      Anaerobic Bottle Gram negative bacilli      Aerobic Bottle Gram positive cocci    Narrative:      Less than seven (7) mL's of blood was collected.  Insufficient quantity may yield false negative results.    Staphylococcus hominis ssp hominis: Probable contaminant requires clinical correlation, susceptibility not performed unless requested by physician.      Susceptibility        Escherichia coli      RONAK      Amoxicillin + Clavulanate Susceptible      Ampicillin Susceptible      Ampicillin + Sulbactam Susceptible      Cefazolin (Non Urine) Susceptible      Cefepime Susceptible      Ceftazidime Susceptible      Ceftriaxone Susceptible      Cefuroxime axetil Susceptible      Gentamicin Susceptible      Levofloxacin Susceptible      Piperacillin + Tazobactam Susceptible      Trimethoprim + Sulfamethoxazole Susceptible                       Susceptibility Comments       Escherichia coli    With the exception of urinary-sourced infections, aminoglycosides should not be used as monotherapy.               Blood Culture - Blood, Arm, Left [[id number]]  (Abnormal) Collected: 06/24/25 2348    Lab Status: Final result Specimen: Blood from Arm, Left Updated: 06/29/25 0633     Blood Culture Staphylococcus epidermidis     Isolated from Aerobic and Anaerobic Bottles     Gram Stain Anaerobic Bottle Gram positive cocci in groups      Aerobic Bottle Gram positive cocci in groups    Narrative:      Less than seven (7) mL's of blood was collected.  Insufficient quantity may yield false negative results.    Probable  contaminant requires clinical correlation, susceptibility not performed unless requested by physician.      Blood Culture ID, PCR - Blood, Arm, Left [[id number]]  (Abnormal) Collected: 06/24/25 2348    Lab Status: Final result Specimen: Blood from Arm, Left Updated: 06/27/25 0423     BCID, PCR Staph spp, not aureus or lugdunensis. Identification by BCID2 PCR.     BOTTLE TYPE Aerobic Bottle    Blood Culture ID, PCR - Blood, Wrist, Left [[id number]]  (Abnormal) Collected: 06/24/25 2348    Lab Status: Final result Specimen: Blood from Wrist, Left Updated: 06/25/25 1603     BCID, PCR Staph spp, not aureus or lugdunensis. Identification by BCID2 PCR.     BCID, PCR 2 Klebsiella pneumoniae group. Identification by BCID2 PCR.     BOTTLE TYPE Aerobic Bottle    Narrative:      No resistance genes detected.          No radiology results from the last 24 hrs    Results for orders placed during the hospital encounter of 06/24/25    Adult Transthoracic Echo Complete W/ Cont if Necessary Per Protocol 06/28/2025  6:13 PM    Interpretation Summary    Left ventricular systolic function is normal. Left ventricular ejection fraction appears to be 66 - 70%.    Saline test results are negative.    Current medications:  Scheduled Meds:aspirin, 81 mg, Oral, Daily  atorvastatin, 10 mg, Oral, Daily  cefTRIAXone, 2,000 mg, Intravenous, Q24H  DULoxetine, 60 mg, Oral, Daily  finasteride, 5 mg, Oral, Nightly  heparin (porcine), 5,000 Units, Subcutaneous, Q8H  insulin glargine, 18 Units, Subcutaneous, Q12H  Insulin Lispro, 10 Units, Subcutaneous, TID With Meals  insulin lispro, 2-7 Units, Subcutaneous, 4x Daily AC & at Bedtime  Lidocaine, 1 patch, Transdermal, Q24H  metoprolol succinate XL, 25 mg, Oral, Daily  pantoprazole, 40 mg, Oral, Q AM  pregabalin, 225 mg, Oral, BID  rOPINIRole, 0.25 mg, Oral, Nightly  tamsulosin, 0.8 mg, Oral, Nightly      Continuous Infusions:     PRN Meds:.  Calcium Replacement - Follow Nurse / BPA Driven Protocol     cyclobenzaprine    dextrose    dextrose    glucagon (human recombinant)    Magnesium Standard Dose Replacement - Follow Nurse / BPA Driven Protocol    Phosphorus Replacement - Follow Nurse / BPA Driven Protocol    Potassium Replacement - Follow Nurse / BPA Driven Protocol    sodium chloride    Assessment & Plan     Active Hospital Problems    Diagnosis  POA    DKA (diabetic ketoacidosis) [E11.10]  Yes    Severe protein-calorie malnutrition [E43]  Yes    Tobacco use [Z72.0]  Yes    RLS (restless legs syndrome) [G25.81]  Yes    GERD (gastroesophageal reflux disease) [K21.9]  Yes    Hyperlipidemia [E78.5]  Yes    Type 2 diabetes mellitus, with long-term current use of insulin [E11.9, Z79.4]  Not Applicable    Peripheral neuropathy [G62.9]  Yes    HTN (hypertension) [I10]  Yes      Resolved Hospital Problems   No resolved problems to display.     Brief Hospital Course to date:  Fracisco Mullen is a 59 y.o. 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 Has also struggled with BPH with LUTS / urinary retention (discharged to [facility] on 12/5/24 with reported removal at [facility] and no subsequent replacement).     Since his amputation, he has been effectively bedbound but can get in wheelchair/motorized scooter. He resides with his girlfriend and 20yo daughter. Presented to [facility] ED on 6/24/2025 for evaluation of altered mental status. Found to be in DKA on arrival. Nursing unable to place Bustos catheter - urology had to place. UA was concerning for UTI. Blood cultures returned positive for Klebsiela and E. Coli. Urine culture NGTD but urine sample delayed due to difficult catheter placement - patient received IV antibiotics prior to urine sample collection.     Severe sepsis, resolved   E. coli and Klebsiella bacteremia  New leukocytosis  Likely urinary source in the  setting of UTI due to Klebsiella particularly with blood cultures being positive for E. coli and Klebsiella  ID following - on IV Ceftriaxone 2g daily   TTE this admission showed EF of 66 to 70%, saline agitated test was negative, no evidence of vegetation, trace TR.    BPH with urinary retention  Admitted to [facility] 11/15 - failed voiding trial to discharged to [facility] with Bustos and on Finasteride/Tamsulosin. Appears has not filled Rx for Tamsulosin/Finasteride since February 2025  He reports Bustos was removed at [facility] and has not ever been replaced   Continue Bustos catheter  Restarted Tamsulosin and Finasteride this admission   Did not attend urology follow up - will re-refer    DKA  T2DM  A1c 14.2%  At home, on 70/30 insulin. He is unsure how many units he is taking. At first, told me 20-25 units in AM, later told me 45 units. Consistently told me 60 units at night. Per last DC summary, was on 60 units BID  Historically unable to afford basal / bolus insulin   Some issues with hypoglycemia  Decrease Lantus 18 units BID, reduce Lispro to 8 units TID AC + SSI    Pressure wounds  Wound care following  Will need close glucose control to allow for wound healing    PAD s/p left AKA and right second toe amputation  Continue ASA / statin     Restless leg syndrome  Continue home meds with ropinirole    Peripheral neuropathy  Continue home Lyrica    HLD  Continue home statin    GERD  Continue home PPI    HTN  Continue home Toprol    Poor living conditions   / case management on board    Expected Discharge Location and Transportation: [facility] pending   Expected Discharge Expected Discharge Date: 7/3/2025; Expected Discharge Time:      VTE Prophylaxis:Pharmacologic & mechanical VTE prophylaxis orders are present.    AM-PAC 6 Clicks Score (PT): 15 (07/01/25 0800)    CODE STATUS:   Code Status and Medical Interventions: CPR (Attempt to Resuscitate); Full Support   Ordered at: 06/25/25 0129     Code Status (Patient has no  pulse and is not breathing):    CPR (Attempt to Resuscitate)     Medical Interventions (Patient has pulse or is breathing):    Full Support     Mary Escalona PA-C  07/01/25

## 2025-07-01 NOTE — PROGRESS NOTES
Continued Stay Note  [facility]     Patient Name: Fracisco Mullen  MRN: [id number]  Today's Date: 7/1/2025    Admit Date: 6/24/2025    Plan: [facility]; referral given; needs insurance authorization   Discharge Plan      A report was made to adult protection at [employer] Id # [id number].                  Discharge Codes    No documentation.                       AARON Davis

## 2025-07-01 NOTE — PLAN OF CARE
Goal Outcome Evaluation:  Plan of Care Reviewed With: patient        Progress: improving  Outcome Evaluation: pleasant; a/o x 3 (time); VSS, RA; no acute changes to report; all skin breakdown prevention measures in place; no acute changes to report; ledesma care completed; will continue POC               Problem: Adult Inpatient Plan of Care  Goal: Absence of Hospital-Acquired Illness or Injury  Intervention: Identify and Manage Fall Risk  Recent Flowsheet Documentation  Taken 7/1/2025 0400 by Waldemar Harkins, RN  Safety Promotion/Fall Prevention:   assistive device/personal items within reach   clutter free environment maintained   activity supervised   fall prevention program maintained   nonskid shoes/slippers when out of bed   room organization consistent   safety round/check completed  Taken 7/1/2025 0200 by Waldemar Harkins, RN  Safety Promotion/Fall Prevention:   activity supervised   assistive device/personal items within reach   clutter free environment maintained   fall prevention program maintained   nonskid shoes/slippers when out of bed   room organization consistent   safety round/check completed  Taken 7/1/2025 0000 by Waldemar Harkins, RN  Safety Promotion/Fall Prevention:   activity supervised   assistive device/personal items within reach   clutter free environment maintained   fall prevention program maintained   nonskid shoes/slippers when out of bed   room organization consistent   safety round/check completed  Taken 6/30/2025 2200 by Waldemar Harkins, RN  Safety Promotion/Fall Prevention:   activity supervised   assistive device/personal items within reach   clutter free environment maintained   fall prevention program maintained   nonskid shoes/slippers when out of bed   room organization consistent   safety round/check completed  Taken 6/30/2025 1939 by Waldemar Harkins, RN  Safety Promotion/Fall Prevention:   activity supervised   assistive device/personal items within reach    clutter free environment maintained   fall prevention program maintained   nonskid shoes/slippers when out of bed   room organization consistent   safety round/check completed     Problem: Adult Inpatient Plan of Care  Goal: Absence of Hospital-Acquired Illness or Injury  Intervention: Prevent Skin Injury  Recent Flowsheet Documentation  Taken 7/1/2025 0400 by Waldemar Harkins RN  Body Position: position maintained  Skin Protection:   incontinence pads utilized   silicone foam dressing in place   transparent dressing maintained  Taken 7/1/2025 0200 by Waldemar Harkins RN  Body Position:   turned   left  Skin Protection:   incontinence pads utilized   silicone foam dressing in place   transparent dressing maintained  Taken 7/1/2025 0000 by Waldemar Harkins RN  Body Position:   turned   right  Skin Protection:   incontinence pads utilized   silicone foam dressing in place   skin sealant/moisture barrier applied   transparent dressing maintained  Taken 6/30/2025 2200 by Waldemar Harkins RN  Body Position:   position maintained   supine  Skin Protection:   silicone foam dressing in place   transparent dressing maintained   incontinence pads utilized  Taken 6/30/2025 1939 by Waldemar Harkins RN  Body Position: position maintained  Skin Protection:   incontinence pads utilized   silicone foam dressing in place   transparent dressing maintained   skin sealant/moisture barrier applied   drying agents applied

## 2025-07-01 NOTE — THERAPY TREATMENT NOTE
Patient Name: Fracisco Mullen  : 1965    MRN: [id number]                              Today's Date: 2025       Admit Date: 2025    Visit Dx:     ICD-10-CM ICD-9-CM   1. Diabetic ketoacidosis with coma associated with other specified diabetes mellitus  E13.11 250.32   2. Severe sepsis  A41.9 038.9    R65.20 995.92   3. Hyponatremia  E87.1 276.1   4. Poor personal hygiene  R46.0 V40.39   5. Hx of left BKA  Z89.512 V49.75   6. Febrile illness  R50.9 780.60   7. Bandemia  D72.825 288.66     Patient Active Problem List   Diagnosis    HTN (hypertension)    Type 2 diabetes mellitus, with long-term current use of insulin    Diabetic retinopathy    Obesity (BMI 30.0-34.9)    Peripheral neuropathy    Asthma    Causalgia of lower limb    Chronic constipation    Compression of lumbar nerve root    GERD (gastroesophageal reflux disease)    Hyperlipidemia    IBS (irritable bowel syndrome)    RLS (restless legs syndrome)    Sleep apnea    Vitamin D deficiency    Cigar smoker    Chronic back pain    Diabetic ketoacidosis associated with type 2 diabetes mellitus    Multiple open wounds of foot    Status post cholecystectomy    Cholestatic hepatitis    Diabetic infection of left foot    Precordial pain    Tobacco use    Non healing left heel wound    Severe protein-calorie malnutrition    Acute osteomyelitis of left foot    Type 2 diabetes mellitus with hyperglycemia    BPH with obstruction/lower urinary tract symptoms    Acute urinary retention    DKA (diabetic ketoacidosis)     Past Medical History:   Diagnosis Date    Acute renal failure     Hx of    Obesity     Hx of    Sleep apnea     Type 2 diabetes mellitus      Past Surgical History:   Procedure Laterality Date    BACK SURGERY      lower back    BELOW KNEE AMPUTATION Left 2024    Procedure: BELOW-KNEE AMPUTATION LEFT;  Surgeon: Dru Gan Jr., MD;  Location: [facility];  Service: Orthopedics;  Laterality: Left;    CHOLECYSTECTOMY WITH INTRAOPERATIVE  CHOLANGIOGRAM N/A 1/6/2021    Procedure: CHOLECYSTECTOMY LAPAROSCOPIC INTRAOPERATIVE CHOLANGIOGRAM;  Surgeon: Juventino Hooker MD;  Location: [facility] OR;  Service: General;  Laterality: N/A;    ERCP N/A 1/9/2021    Procedure: ENDOSCOPIC RETROGRADE CHOLANGIOPANCREATOGRAPHY;  Surgeon: Jeremy Dowell MD;  Location: [facility] ENDOSCOPY;  Service: Gastroenterology;  Laterality: N/A;  with sphiincterotomy and balloon sweep with 9mm-12mm balloon    INCISION AND DRAINAGE FOOT Left 8/3/2024    Procedure: HEAL IRRIGATION AND DEBRIDEMENT LEFT;  Surgeon: Dru Gan Jr., MD;  Location:  [facility];  Service: Orthopedics;  Laterality: Left;    PLACEMENT OF WOUND VAC Left 8/3/2024    Procedure: PLACEMENT OF WOUND VAC;  Surgeon: Dru Gan Jr., MD;  Location: [facility] OR;  Service: Orthopedics;  Laterality: Left;      General Information       Row Name 07/01/25 1403          OT Time and Intention    Document Type therapy note (daily note)  -MARIE     Mode of Treatment occupational therapy  -MARIE       Row Name 07/01/25 1403          General Information    Patient Profile Reviewed yes  -MARIE     Existing Precautions/Restrictions fall;oxygen therapy device and L/min;other (see comments)  Remote L BKA; Bustos catheter  -MARIE     Barriers to Rehab medically complex;previous functional deficit;physical barrier  -MARIE       Row Name 07/01/25 1403          Cognition    Orientation Status (Cognition) oriented x 3  -MARIE       Row Name 07/01/25 1403          Safety Issues/Impairments Affecting Functional Mobility    Safety Issues Affecting Function (Mobility) awareness of need for assistance;insight into deficits/self-awareness;judgment;problem-solving;safety precaution awareness;safety precautions follow-through/compliance;sequencing abilities  -MARIE     Impairments Affecting Function (Mobility) balance;coordination;endurance/activity tolerance;motor control;motor planning;pain;postural/trunk control;range of motion (ROM);shortness of  breath;strength  -               User Key  (r) = Recorded By, (t) = Taken By, (c) = Cosigned By      Initials Name Provider Type    Dianelys Burciaga OT Occupational Therapist                     Mobility/ADL's       Row Name 07/01/25 1404          Bed Mobility    Bed Mobility rolling left;rolling right  -MARIE     Rolling Left [place] (Bed Mobility) minimum assist (75% patient effort);1 person assist;verbal cues;nonverbal cues (demo/gesture)  -MARIE     Rolling Right [place] (Bed Mobility) moderate assist (50% patient effort);1 person assist;verbal cues;nonverbal cues (demo/gesture)  -MARIE     Comment, (Bed Mobility) Pt rolled L/R for pericare and sling placement  -       Row Name 07/01/25 1404          Transfers    Transfers bed-chair transfer  -       Row Name 07/01/25 1404          Bed-Chair Transfer    Bed-Chair [place] (Transfers) dependent (less than 25% patient effort);2 person assist;verbal cues;nonverbal cues (demo/gesture)  -     Assistive Device (Bed-Chair Transfers) lift device  -MARIE     Comment, (Bed-Chair Transfer) well-tolerated  -       Row Name 07/01/25 1404          Sit-Stand Transfer    Sit-Stand [place] (Transfers) unable to assess  -       Row Name 07/01/25 1404          Activities of Daily Living    BADL Assessment/Intervention lower body dressing;grooming;toileting  -       Row Name 07/01/25 1404          Lower Body Dressing Assessment/Training    [place] Level (Lower Body Dressing) don;socks;dependent (less than 25% patient effort)  -MARIE     Position (Lower Body Dressing) supine  -       Row Name 07/01/25 1404          Grooming Assessment/Training    [place] Level (Grooming) wash face, hands;set up  -MARIE     Position (Grooming) supported sitting  -       Row Name 07/01/25 1404          Toileting Assessment/Training    [place] Level (Toileting) perform perineal hygiene;dependent (less than 25% patient effort)  -MARIE     Position (Toileting) supine  -MARIE      Comment, (Toileting) following bowel incontinene  -MARIE               User Key  (r) = Recorded By, (t) = Taken By, (c) = Cosigned By      Initials Name Provider Type    Dianelys Burciaga OT Occupational Therapist                   Obj/Interventions       Row Name 07/01/25 1407          Shoulder (Therapeutic Exercise)    Shoulder (Therapeutic Exercise) AROM (active range of motion)  -MARIE     Shoulder AAROM (Therapeutic Exercise) bilateral;flexion;extension;sitting;10 repetitions;other (see comments)  using towel roll with cues for form  -MARIE       Row Name 07/01/25 1407          Motor Skills    Therapeutic Exercise shoulder  -MARIE       Row Name 07/01/25 1407          Balance    Balance Assessment sitting static balance;sitting dynamic balance  -MARIE     Static Sitting Balance contact guard  -MARIE     Dynamic Sitting Balance minimal assist;verbal cues;non-verbal cues (demo/gesture)  -MARIE     Position, Sitting Balance unsupported;sitting in chair  -MARIE               User Key  (r) = Recorded By, (t) = Taken By, (c) = Cosigned By      Initials Name Provider Type    Dianelys Burciaga OT Occupational Therapist                   Goals/Plan    No documentation.                  Clinical Impression       Row Name 07/01/25 1408          Pain Assessment    Pretreatment Pain Rating 0/10 - no pain  -MARIE     Posttreatment Pain Rating 0/10 - no pain  -MARIE     Pain Location back  -MARIE     Pain Management Interventions exercise or physical activity utilized;positioning techniques utilized  -MARIE     Response to Pain Interventions activity participation with tolerable pain;no change per patient report  -       Row Name 07/01/25 1408          Plan of Care Review    Plan of Care Reviewed With patient  -MARIE     Progress improving  -MARIE     Outcome Evaluation Pt demonstrated increased activity tolerance this date, more alert and engaged in therapeutic process this date. Pt Min to ModA for rolling in bed for Dep hygiene and sling placement, transferred  to chair via mechanical lift. Pt completed grooming with SHAVER while UIC, gave good effort for UE HEP. Continue to recommend IPR at discharge.  -MARIE       Row Name 07/01/25 1408          Vital Signs    Post Systolic BP Rehab 85   -MARIE     Post Treatment Diastolic BP 50  -MARIE     O2 Delivery Pre Treatment nasal cannula  -MARIE     O2 Delivery Intra Treatment nasal cannula  -MARIE     O2 Delivery Post Treatment nasal cannula  -MARIE     Pre Patient Position Supine  -MARIE     Intra Patient Position Side Lying  -MARIE     Post Patient Position Sitting  -MARIE       Row Name 07/01/25 1408          Positioning and Restraints    Pre-Treatment Position in bed  -MARIE     Post Treatment Position chair  -MARIE     In Chair notified nsg;reclined;call light within reach;encouraged to call for assist;exit alarm on;waffle cushion;on mechanical lift sling;legs elevated;R heel elevated  -MARIE               User Key  (r) = Recorded By, (t) = Taken By, (c) = Cosigned By      Initials Name Provider Type    Dianelys Burciaga, OT Occupational Therapist                   Outcome Measures       Row Name 07/01/25 1413          How much help from another is currently needed...    Putting on and taking off regular lower body clothing? 1  -MARIE     Bathing (including washing, rinsing, and drying) 2  -MARIE     Toileting (which includes using toilet bed pan or urinal) 1  -MARIE     Putting on and taking off regular upper body clothing 2  -MARIE     Taking care of personal grooming (such as brushing teeth) 2  -MARIE     Eating meals 3  -MARIE     AM-PAC 6 Clicks Score (OT) 11  -MARIE       Row Name 07/01/25 1404 07/01/25 0800       How much help from another person do you currently need...    Turning from your back to your side while in flat bed without using bedrails? 3  -ML 4  -MG    Moving from lying on back to sitting on the side of a flat bed without bedrails? 2  -ML 3  -MG    Moving to and from a bed to a chair (including a wheelchair)? 1  -ML 2  -MG    Standing up from a chair using your  arms (e.g., wheelchair, bedside chair)? 1  -ML 2  -MG    Climbing 3-5 steps with a railing? 1  -ML 1  -MG    To walk in hospital room? 1  -ML 3  -MG    AM-PAC 6 Clicks Score (PT) 9  -ML 15  -MG    Highest Level of Mobility Goal Sit at Edge of Bed-3  -ML Move to Chair/Commode-4  -MG      Row Name 07/01/25 1413 07/01/25 1404       Functional Assessment    Outcome Measure Options AM-PAC 6 Clicks Daily Activity (OT)  -MARIE AM-PAC 6 Clicks Basic Mobility (PT)  -ML              User Key  (r) = Recorded By, (t) = Taken By, (c) = Cosigned By      Initials Name Provider Type    MG Sydni Pantoja, RN Registered Nurse    Dianelys Burciaga OT Occupational Therapist    Isabel Acosta Physical Therapist                    Occupational Therapy Education       Title: PT OT SLP Therapies (In Progress)       Topic: Occupational Therapy (In Progress)       Point: ADL training (Done)       Learning Progress Summary            Patient Acceptance, E, DU,NR by  at 7/1/2025 1414    Comment: OT POC; UE HEP    Acceptance, E, NL,NR by MARIE at 6/30/2025 1540    Comment: OT POC; benefits of activity                      Point: Home exercise program (Done)       Learning Progress Summary            Patient Acceptance, E, DU,NR by MARIE at 7/1/2025 1414    Comment: OT POC; UE HEP                                      User Key       Initials Effective Dates Name Provider Type [facility] 06/16/21 -  Dianelys Fraser, OT Occupational Therapist OT                  OT Recommendation and Plan     Plan of Care Review  Plan of Care Reviewed With: patient  Progress: improving  Outcome Evaluation: Pt demonstrated increased activity tolerance this date, more alert and engaged in therapeutic process this date. Pt Min to ModA for rolling in bed for Dep hygiene and sling placement, transferred to chair via mechanical lift. Pt completed grooming with SHAVER while UIC, gave good effort for UE HEP. Continue to recommend IPR at discharge.     Time Calculation:          Time Calculation- OT       Row Name 07/01/25 1304             Time Calculation- OT    OT Start Time 1304  -MARIE      OT Received On 07/01/25  -MARIE         Timed Charges    68798 - OT Therapeutic Exercise Minutes 12  -MARIE      26298 - OT Self Care/Mgmt Minutes 12  -MARIE         Total Minutes    Timed Charges Total Minutes 24  -MARIE       Total Minutes 24  -MARIE                User Key  (r) = Recorded By, (t) = Taken By, (c) = Cosigned By      Initials Name Provider Type    MARIE Diaenlys Fraser OT Occupational Therapist                  Therapy Charges for Today       Code Description Service Date Service Provider Modifiers Qty    84478757681 HC OT THERAPEUTIC ACT EA 15 MIN 6/30/2025 Dianelys Fraser OT GO 1    41217106965 HC OT SELF CARE/MGMT/TRAIN EA 15 MIN 6/30/2025 Dianelys Fraser OT GO 1    64800331991 HC OT THER PROC EA 15 MIN 7/1/2025 Dianelys Fraser OT GO 1    90376686449 HC OT SELF CARE/MGMT/TRAIN EA 15 MIN 7/1/2025 Dianelys Fraser OT GO 1                 Dianelys Fraser OT  7/1/2025

## 2025-07-01 NOTE — PROGRESS NOTES
"Fracisco Mullen  1965  [id number]    Evaluating Physician: Varun Dominique MD    Chief Complaint: fatigue    Reason for Consultation: bacteremia    History of present illness:     Patient is a 59 y.o.  Yr old male with history of diabetes/hypertension, previously followed with Dr. Gatica; admitted 2023 with right foot infection, cultures with MSSA/Morganella/ESBL Klebsiella/enterococcus and strep species.had surgery at the second toe with amputation, received IV daptomycin/Invanz with general improvements at that site.   admitted to the hospital August 1 2024 after a fall at [facility].  Noted to have urinary retention with concern for possible UTI.  In addition left heel with black discoloration/malodor and redness, empiric antibiotics initiated.    chronically debilitated and wheelchair bound by his report.      8/3/24  Dr Gan  \"PROCEDURE:            Left  Left      56730:             Debridement of skin and subcutaneous tissue  [id number]:             Wound vacuum-assisted closure\"     8/6/24 MRI and surgical findings did not confirm bone involvement, transitioned to oral agents at discharge         Readmitted November 15, 2024 with reports of appearing \"sluggish\" according to admission notes with DKA, noted to have high hemoglobin A1c and persistent/worsening left heel wound according to patient; medicine team notes mention urinary retention, acute toxic metabolic encephalopathy improved and low-grade fever. Abnormal MRI at the left foot:     Per radiology-- \"Soft tissue wound seen along the posterior heel with ill-defined fluid that extends through and disrupts the distal Achilles tendon. Underlying this area there is evidence of osteomyelitis of the posterior lateral calcaneus. There is a tiny fluid   collection here as well that may represent an associated nondrainable abscess.     Additionally there appears to be a nondisplaced stress fracture of the posterior calcaneus. \"     11/22 left BKA     11/26/24 " increased O2 requirements ; finished empiric treatment for respiratory tract infection    Readmitted to [facility] June 24, 2025 to ICU with mental status change per admission notes, found minimally responsive; treated for acute DKA and concern for sepsis.  Had been given empiric antibiotics, urinalysis morning after admission with pyuria/hematuria, 3+ bacteria but also squamous epithelial cells and culture negative but antibiotic modified.  Blood culture from admission subsequently with E. Coli in 1 set.  Also coag negative staph 2 different species in 2 different sets raising concern for coag negative staph has contaminants.  Antibiotics tapered to ceftriaxone.  Drug screen with cocaine    6/30 urology consult noted    7/1/25 fatigued ; No headache photophobia or neck stiffness. No shortness of breath cough or hemoptysis.  Denies nausea vomiting diarrhea or abdominal pain.  No other new skin rash.  Denies any new open wounds or active drainage from his lower extremities    He reports inability to urinate admission, prior history attention.  Incontinence.       Past Medical History:   Diagnosis Date    Acute renal failure     Hx of    Obesity     Hx of    Sleep apnea     Type 2 diabetes mellitus        Past Surgical History:   Procedure Laterality Date    BACK SURGERY      lower back    BELOW KNEE AMPUTATION Left 11/22/2024    Procedure: BELOW-KNEE AMPUTATION LEFT;  Surgeon: Dru Gan Jr., MD;  Location: [facility] OR;  Service: Orthopedics;  Laterality: Left;    CHOLECYSTECTOMY WITH INTRAOPERATIVE CHOLANGIOGRAM N/A 1/6/2021    Procedure: CHOLECYSTECTOMY LAPAROSCOPIC INTRAOPERATIVE CHOLANGIOGRAM;  Surgeon: Juventino Hooker MD;  Location: [facility] OR;  Service: General;  Laterality: N/A;    ERCP N/A 1/9/2021    Procedure: ENDOSCOPIC RETROGRADE CHOLANGIOPANCREATOGRAPHY;  Surgeon: Jeremy Dowell MD;  Location: [facility] ENDOSCOPY;  Service: Gastroenterology;  Laterality: N/A;  with sphiincterotomy and  balloon sweep with 9mm-12mm balloon    INCISION AND DRAINAGE FOOT Left 8/3/2024    Procedure: HEAL IRRIGATION AND DEBRIDEMENT LEFT;  Surgeon: Dru Gan Jr., MD;  Location: [facility] OR;  Service: Orthopedics;  Laterality: Left;    PLACEMENT OF WOUND VAC Left 8/3/2024    Procedure: PLACEMENT OF WOUND VAC;  Surgeon: Dru Gan Jr., MD;  Location: [facility] OR;  Service: Orthopedics;  Laterality: Left;       Pediatric History   Patient Parents    Not on file     Other Topics Concern    Not on file   Social History Narrative    Not on file       family history includes Cancer in his brother, maternal grandmother, mother, and another family member; Diabetes in an other family member; Heart attack in an other family member; Heart disease in his brother and father; Hyperlipidemia in his mother and another family member; Hypertension in his mother and another family member; Obesity in an other family member.    Allergies   Allergen Reactions    Sertraline Hcl Hives     Zoloft       Medication:  Current Facility-Administered Medications   Medication Dose Route Frequency Provider Last Rate Last Admin    aspirin EC tablet 81 mg  81 mg Oral Daily Sadner Snowden MD   81 mg at 06/30/25 0851    atorvastatin (LIPITOR) tablet 10 mg  10 mg Oral Daily Sander Snowden MD   10 mg at 06/30/25 0850    Calcium Replacement - Follow Nurse / BPA Driven Protocol   Not Applicable PRN Isaias De Luna MD        cefTRIAXone (ROCEPHIN) 2,000 mg in sodium chloride 0.9 % 100 mL MBP  2,000 mg Intravenous Q24H Varun Dominique  mL/hr at 06/30/25 1814 2,000 mg at 06/30/25 1814    cyclobenzaprine (FLEXERIL) tablet 5 mg  5 mg Oral TID PRN Sander Snowden MD   5 mg at 06/29/25 1237    dextrose (D50W) (25 g/50 mL) IV injection 25 g  25 g Intravenous Q15 Min PRN Isaias De Luna MD   25 g at 06/30/25 1510    dextrose (GLUTOSE) oral gel 15 g  15 g Oral Q15 Min PRN Isaias De Luna MD   15 g at 06/29/25 1701    DULoxetine  (CYMBALTA) DR capsule 60 mg  60 mg Oral Daily Sander Snowden MD   60 mg at 06/30/25 0850    finasteride (PROSCAR) tablet 5 mg  5 mg Oral Nightly Sander Snowden MD   5 mg at 06/30/25 2046    glucagon (GLUCAGEN) injection 1 mg  1 mg Intramuscular Q15 Min PRN Isaias De Luna MD        heparin (porcine) 5000 UNIT/ML injection 5,000 Units  5,000 Units Subcutaneous Q8H Isaias De Luna MD   5,000 Units at 07/01/25 0516    insulin glargine (LANTUS, SEMGLEE) injection 18 Units  18 Units Subcutaneous Q12H Mary Escalona PA-C        Insulin Lispro (humaLOG) injection 10 Units  10 Units Subcutaneous TID With Meals Sander Snowden MD        Insulin Lispro (humaLOG) injection 2-7 Units  2-7 Units Subcutaneous 4x Daily AC & at Bedtime Mary Escalona PA-C        Lidocaine 4 % 1 patch  1 patch Transdermal Q24H Sander Snowden MD   1 patch at 06/30/25 1156    Magnesium Standard Dose Replacement - Follow Nurse / BPA Driven Protocol   Not Applicable PRN Isaias De Luna MD        magnesium sulfate 4g/100mL (PREMIX) infusion  4 g Intravenous Once Mary Escalona PA-C        metoprolol succinate XL (TOPROL-XL) 24 hr tablet 25 mg  25 mg Oral Daily Mary Escalona PA-C        pantoprazole (PROTONIX) EC tablet 40 mg  40 mg Oral Q AM Sander Snowden MD   40 mg at 07/01/25 0516    Phosphorus Replacement - Follow Nurse / BPA Driven Protocol   Not Applicable PRN Isaias De Luna MD        Potassium Replacement - Follow Nurse / BPA Driven Protocol   Not Applicable PRN Isaias De Luna MD        pregabalin (LYRICA) capsule 225 mg  225 mg Oral BID Sander Snowden MD   225 mg at 06/30/25 2046    rOPINIRole (REQUIP) tablet 0.25 mg  0.25 mg Oral Nightly Sander Snowden MD   0.25 mg at 06/30/25 2046    sodium chloride 0.9 % flush 10 mL  10 mL Intravenous PRN Isaias De Luna MD   10 mL at 06/30/25 0853    tamsulosin (FLOMAX) 24 hr capsule 0.8 mg  0.8 mg Oral Nightly Sander Snowden,  MD   0.8 mg at 06/30/25 2046       Antibiotics:  Anti-Infectives (From admission, onward)      Ordered     Dose/Rate Route Frequency Start Stop    06/30/25 0748  vancomycin (VANCOCIN) 1,000 mg in sodium chloride 0.9 % 250 mL IVPB-VTB  Status:  Discontinued        Ordering Provider: Varun Dominique MD    15 mg/kg × 70.5 kg  250 mL/hr over 60 Minutes Intravenous Once 06/30/25 0845 06/30/25 0749    06/30/25 0748  Pharmacy to dose vancomycin  Status:  Discontinued        Ordering Provider: Varun Dominique MD     Not Applicable Continuous PRN 06/30/25 0747 06/30/25 0752    06/25/25 1748  cefTRIAXone (ROCEPHIN) 2,000 mg in sodium chloride 0.9 % 100 mL MBP        Ordering Provider: Varun Dominique MD    2,000 mg  200 mL/hr over 30 Minutes Intravenous Every 24 Hours 06/25/25 1845 07/05/25 1844    06/25/25 0252  cefepime 2000 mg IVPB in 100 mL NS (MBP)  Status:  Discontinued        Ordering Provider: Elisa Mckinnon MD    2,000 mg  over 4 Hours Intravenous Every 8 Hours 06/25/25 1200 06/25/25 1748    06/25/25 0252  cefepime 2000 mg IVPB in 100 mL NS (MBP)        Ordering Provider: Elisa Mckinnon MD    2,000 mg  over 30 Minutes Intravenous Once 06/25/25 0345 06/25/25 0354    06/25/25 0000  vancomycin IVPB 1500 mg in 0.9% NaCl (Premix) 500 mL  Status:  Discontinued        Ordering Provider: Andrew Crystal DO    20 mg/kg × 79.5 kg  333.3 mL/hr over 90 Minutes Intravenous Once 06/25/25 0016 06/25/25 0329    06/25/25 0000  piperacillin-tazobactam (ZOSYN) 3.375 g IVPB in 100 mL NS MBP (CD)        Ordering Provider: Andrew Crystal DO    3.375 g  over 30 Minutes Intravenous Once 06/25/25 0016 06/25/25 0115              Review of Systems    7/1/25     Constitutional-- No Fever, chills or sweats.  Appetite diminished with fatigue.  Heent-- No new vision, hearing or throat complaints.  No epistaxis or oral sores.  Denies odynophagia or dysphagia.  No flashers, floaters or eye pain. No odynophagia or  "dysphagia. No headache, photophobia or neck stiffness.  CV-- No chest pain, palpitation or syncope  Resp-- No SOB/cough/Hemoptysis  GI- No nausea, vomiting, or diarrhea.  No hematochezia, melena, or hematemesis. Denies jaundice or chronic liver disease.  -- see above  Lymph- no swollen lymph nodes in neck/axilla or groin.   Heme- No active bruising or bleeding; no Hx of DVT or PE.  MS-- no swelling or pain in the bones or joints of arms/legs.  No new back pain.  Neuro-- No acute focal weakness or numbness in the arms or legs.  No seizures.    Full 12 point review of systems reviewed and negative otherwise for acute complaints, except for Above    Physical Exam:   Vital Signs   /66 (BP Location: Right arm, Patient Position: Lying)   Pulse 84   Temp 97.8 °F (36.6 °C) (Oral)   Resp 16   Ht 162.6 cm (64.02\")   Wt 70.5 kg (155 lb 6.8 oz)   SpO2 96%   BMI 26.66 kg/m²     GENERAL: sleepy, in no acute distress.   HEENT: Normocephalic, atraumatic.  no conjunctival injection. No icterus. Oropharynx clear without evidence of thrush or exudate. No evidence of periodontal disease.    NECK: Supple without nuchal rigidity. No mass.  LYMPH: No cervical, axillary or inguinal lymphadenopathy.  HEART: RRR; No murmur, rubs, gallops.   LUNGS: Clear to auscultation bilaterally without wheezing, rales, rhonchi. Normal respiratory effort. Nonlabored. No dullness.  ABDOMEN: Soft, nontender, nondistended. Positive bowel sounds. No rebound or guarding. NO mass or HSM.  EXT:  lower extremity with multifocal excoriation/crust but no obvious redness/induration or focal tenderness.  No open wound or active drainage  MSK: FROM without joint effusions noted arms/legs.    SKIN: Warm and dry without cutaneous eruptions on Inspection/palpation.    NEURO: generally poor insight.  Moves extremities.        Laboratory Data    Results from last 7 days   Lab Units 07/01/25  0525 06/30/25  0437 06/29/25  0743   WBC 10*3/mm3 8.73 13.32* 7.12 "   HEMOGLOBIN g/dL 9.7* 9.8* 10.0*   HEMATOCRIT % 32.1* 32.3* 31.6*   PLATELETS 10*3/mm3 366 371 350     Results from last 7 days   Lab Units 07/01/25  0525   SODIUM mmol/L 135*   POTASSIUM mmol/L 4.2   CHLORIDE mmol/L 103   CO2 mmol/L 26.0   BUN mg/dL 17.8   CREATININE mg/dL 0.45*   GLUCOSE mg/dL 123*   CALCIUM mg/dL 7.9*     Results from last 7 days   Lab Units 06/24/25  2340   ALK PHOS U/L 73   BILIRUBIN mg/dL 0.4   ALT (SGPT) U/L <5   AST (SGOT) U/L 12               Estimated Creatinine Clearance: 171.9 mL/min (A) (by C-G formula based on SCr of 0.45 mg/dL (L)).      Microbiology:      Radiology:  Imaging Results (Last 72 Hours)       ** No results found for the last 72 hours. **              Impression:     --acute E. Coli sepsis, primary concern for urinary tract source as per prior notes.  Had abnormal urine day after admission albeit culture negative but antibiotic modified.  Blood cultures from admission with E. Coli and abnormal urine raise concern for urinary source.  Otherwise chest exam nonfocal, abdomen benign and no other specific skin/soft tissue source at present.  Finished treatment and then monitor    --blood cultures with  different species coag-negative staph in each set raising concern for contaminants.    --Acute hematuria/pyuria, treated as UTI, urinary culture after antibiotic modification.history urinary retention.  Urology to see    --DKA; diabetes uncontrolled at admission.  Glucose control per medicine team    --Drug screen positive for cocaine    --Acute sepsis at presentation as per prior notes.    PLAN:      --IV ceftriaxone; final duration to depend on clinical course and potentially disposition.  If [facility], possibly continue there with peripheral IV and we will monitor his progress there    --Check/review labs cultures and scans    --Partial history Per nursing staff    --Discussed with microbiology    --Highly complex at of issues with high risk for further serious morbidity  and other serious sequela    --Discussed with multidisciplinary team/nursing regarding complex at of issues, importance of anti-microbial Stewardship etc.    This visit included the following complex service elements:  Complex medical decision-making associated with antimicrobial prescribing.  Managed infection treatment protocol associated with transitions of care for this complex patient.  Counseled patients, family members, and/or caregivers regarding antimicrobial stewardship and antibiotic resistance.  Counseled patients, family members and/or caregivers regarding infection prevention.  Communicated with the clinical microbiology lab.       Varun Dominique MD  7/1/2025

## 2025-07-01 NOTE — THERAPY TREATMENT NOTE
Patient Name: Fracisco Mullen  : 1965    MRN: [id number]                              Today's Date: 2025       Admit Date: 2025    Visit Dx:     ICD-10-CM ICD-9-CM   1. Diabetic ketoacidosis with coma associated with other specified diabetes mellitus  E13.11 250.32   2. Severe sepsis  A41.9 038.9    R65.20 995.92   3. Hyponatremia  E87.1 276.1   4. Poor personal hygiene  R46.0 V40.39   5. Hx of left BKA  Z89.512 V49.75   6. Febrile illness  R50.9 780.60   7. Bandemia  D72.825 288.66     Patient Active Problem List   Diagnosis    HTN (hypertension)    Type 2 diabetes mellitus, with long-term current use of insulin    Diabetic retinopathy    Obesity (BMI 30.0-34.9)    Peripheral neuropathy    Asthma    Causalgia of lower limb    Chronic constipation    Compression of lumbar nerve root    GERD (gastroesophageal reflux disease)    Hyperlipidemia    IBS (irritable bowel syndrome)    RLS (restless legs syndrome)    Sleep apnea    Vitamin D deficiency    Cigar smoker    Chronic back pain    Diabetic ketoacidosis associated with type 2 diabetes mellitus    Multiple open wounds of foot    Status post cholecystectomy    Cholestatic hepatitis    Diabetic infection of left foot    Precordial pain    Tobacco use    Non healing left heel wound    Severe protein-calorie malnutrition    Acute osteomyelitis of left foot    Type 2 diabetes mellitus with hyperglycemia    BPH with obstruction/lower urinary tract symptoms    Acute urinary retention    DKA (diabetic ketoacidosis)     Past Medical History:   Diagnosis Date    Acute renal failure     Hx of    Obesity     Hx of    Sleep apnea     Type 2 diabetes mellitus      Past Surgical History:   Procedure Laterality Date    BACK SURGERY      lower back    BELOW KNEE AMPUTATION Left 2024    Procedure: BELOW-KNEE AMPUTATION LEFT;  Surgeon: Dru Gan Jr., MD;  Location: [facility];  Service: Orthopedics;  Laterality: Left;    CHOLECYSTECTOMY WITH INTRAOPERATIVE  CHOLANGIOGRAM N/A 1/6/2021    Procedure: CHOLECYSTECTOMY LAPAROSCOPIC INTRAOPERATIVE CHOLANGIOGRAM;  Surgeon: Juventino Hooker MD;  Location: [facility] OR;  Service: General;  Laterality: N/A;    ERCP N/A 1/9/2021    Procedure: ENDOSCOPIC RETROGRADE CHOLANGIOPANCREATOGRAPHY;  Surgeon: Jeremy Dowell MD;  Location: [facility] ENDOSCOPY;  Service: Gastroenterology;  Laterality: N/A;  with sphiincterotomy and balloon sweep with 9mm-12mm balloon    INCISION AND DRAINAGE FOOT Left 8/3/2024    Procedure: HEAL IRRIGATION AND DEBRIDEMENT LEFT;  Surgeon: Dru Gan Jr., MD;  Location:  [facility] OR;  Service: Orthopedics;  Laterality: Left;    PLACEMENT OF WOUND VAC Left 8/3/2024    Procedure: PLACEMENT OF WOUND VAC;  Surgeon: Dru Gan Jr., MD;  Location: [facility] OR;  Service: Orthopedics;  Laterality: Left;      General Information       Row Name 07/01/25 1356          Physical Therapy Time and Intention    Document Type therapy note (daily note)  -ML     Mode of Treatment physical therapy  -ML       Row Name 07/01/25 [facility]6          General Information    Patient Profile Reviewed yes  -ML     Existing Precautions/Restrictions fall;other (see comments)  Remote L BKA; Bustos catheter  -ML     Barriers to Rehab medically complex;previous functional deficit;physical barrier  -ML       Row Name 07/01/25 1356          Cognition    Orientation Status (Cognition) oriented x 3  -ML       Row Name 07/01/25 [facility]          Safety Issues/Impairments Affecting Functional Mobility    Safety Issues Affecting Function (Mobility) awareness of need for assistance;insight into deficits/self-awareness;safety precaution awareness;safety precautions follow-through/compliance;sequencing abilities;friction/shear risk  -ML     Impairments Affecting Function (Mobility) balance;coordination;endurance/activity tolerance;motor control;motor planning;postural/trunk control;strength  -ML               User Key  (r) = Recorded By, (t) = Taken By, (c)  = Cosigned By      Initials Name Provider Type    Isabel Acosta Physical Therapist                   Mobility       Row Name 07/01/25 1356          Bed Mobility    Bed Mobility rolling left;rolling right  -ML     Rolling Left [place] (Bed Mobility) minimum assist (75% patient effort);1 person assist;verbal cues  -ML     Rolling Right [place] (Bed Mobility) verbal cues;moderate assist (50% patient effort);1 person assist  -ML     Assistive Device (Bed Mobility) bed rails;head of bed elevated;repositioning sheet  -ML     Comment, (Bed Mobility) patient rolled L/R in bed in order to be cleaned and have lift sling placed  -ML       Row Name 07/01/25 1356          Bed-Chair Transfer    Bed-Chair Fallon (Transfers) verbal cues;dependent (less than 25% patient effort);2 person assist  -ML     Assistive Device (Bed-Chair Transfers) lift device  -ML       Row Name 07/01/25 1356          Sit-Stand Transfer    Sit-Stand Fallon (Transfers) unable to assess  -ML               User Key  (r) = Recorded By, (t) = Taken By, (c) = Cosigned By      Initials Name Provider Type    Isabel Acosta Physical Therapist                   Obj/Interventions       Row Name 07/01/25 1357          Motor Skills    Therapeutic Exercise knee  -ML       Row Name 07/01/25 1357          Knee (Therapeutic Exercise)    Knee Strengthening (Therapeutic Exercise) right;LAQ (long arc quad);10 repetitions  -ML       Row Name 07/01/25 1357          Balance    Balance Assessment sitting static balance;sitting dynamic balance  -ML     Static Sitting Balance contact guard  -ML     Dynamic Sitting Balance minimal assist;1-person assist;verbal cues;non-verbal cues (demo/gesture)  -ML     Position, Sitting Balance unsupported;sitting in chair  -ML     Balance Interventions sitting;static;dynamic;weight shifting activity  -ML     Comment, Balance patient completed anterior/posterior and lateral weight shifts  -ML               User Key  (r) =  Recorded By, (t) = Taken By, (c) = Cosigned By      Initials Name Provider Type    ML Isabel Carey Physical Therapist                   Goals/Plan    No documentation.                  Clinical Impression       Row Name 07/01/25 1401          Pain    Pretreatment Pain Rating 10/10  -ML     Posttreatment Pain Rating 10/10  -ML     Pain Location back  -ML     Pain Side/Orientation generalized  -ML     Pain Management Interventions exercise or physical activity utilized;positioning techniques utilized  -ML     Response to Pain Interventions no change per patient report;activity participation with tolerable pain  -ML       Row Name 07/01/25 1401          Plan of Care Review    Plan of Care Reviewed With patient  -ML     Progress improving  -ML     Outcome Evaluation Patient progressed with bed mobility and required less assistance for static/dynamic balance. Patient continues to present below baseline for mobility and would continue to benefit from skilled PT to address strength, balance and activity tolerance deficits.  -ML       Row Name 07/01/25 1401          Positioning and Restraints    Pre-Treatment Position in bed  -ML     Post Treatment Position chair  -ML     In Chair notified nsg;sitting;call light within reach;encouraged to call for assist;with OT;waffle cushion;on mechanical lift sling  -ML               User Key  (r) = Recorded By, (t) = Taken By, (c) = Cosigned By      Initials Name Provider Type    ML Isabel Carey Physical Therapist                   Outcome Measures       Row Name 07/01/25 1404 07/01/25 0800       How much help from another person do you currently need...    Turning from your back to your side while in flat bed without using bedrails? 3  -ML 4  -MG    Moving from lying on back to sitting on the side of a flat bed without bedrails? 2  -ML 3  -MG    Moving to and from a bed to a chair (including a wheelchair)? 1  -ML 2  -MG    Standing up from a chair using your arms (e.g., wheelchair,  bedside chair)? 1  -ML 2  -MG    Climbing 3-5 steps with a railing? 1  -ML 1  -MG    To walk in hospital room? 1  -ML 3  -MG    AM-PAC 6 Clicks Score (PT) 9  -ML 15  -MG    Highest Level of Mobility Goal Sit at Edge of Bed-3  -ML Move to Chair/Commode-4  -MG      Row Name 07/01/25 1404          Functional Assessment    Outcome Measure Options AM-PAC 6 Clicks Basic Mobility (PT)  -ML               User Key  (r) = Recorded By, (t) = Taken By, (c) = Cosigned By      Initials Name Provider Type    MG Sydni Pantoja, RN Registered Nurse    Isabel Acosta Physical Therapist                                 Physical Therapy Education       Title: PT OT SLP Therapies (In Progress)       Topic: Physical Therapy (In Progress)       Point: Mobility training (Done)       Learning Progress Summary            Patient Acceptance, E, VU,NR by ML at 7/1/2025 1404    Acceptance, E, NR by TT at 6/27/2025 1541                      Point: Home exercise program (Not Started)       Learner Progress:  Not documented in this visit.              Point: Body mechanics (Done)       Learning Progress Summary            Patient Acceptance, E, VU,NR by ML at 7/1/2025 1404    Acceptance, E, NR by TT at 6/27/2025 1541                      Point: Precautions (Done)       Learning Progress Summary            Patient Acceptance, E, VU,NR by ML at 7/1/2025 1404    Acceptance, E, NR by TT at 6/27/2025 1541                                      User Key       Initials Effective Dates Name Provider Type Discipline     04/22/21 -  Isabel Carey Physical Therapist PT    TT 05/30/25 -  Claudia Buchanan PT Physical Therapist PT                  PT Recommendation and Plan     Progress: improving  Outcome Evaluation: Patient progressed with bed mobility and required less assistance for static/dynamic balance. Patient continues to present below baseline for mobility and would continue to benefit from skilled PT to address strength, balance and activity  tolerance deficits.     Time Calculation:         PT Charges       Row Name 07/01/25 1405             Time Calculation    Start Time 1302  -ML      PT Received On 07/01/25  -ML         Timed Charges    70273 - PT Therapeutic Activity Minutes 15  -ML         Total Minutes    Timed Charges Total Minutes 15  -ML       Total Minutes 15  -ML                User Key  (r) = Recorded By, (t) = Taken By, (c) = Cosigned By      Initials Name Provider Type    Isabel Acosta Physical Therapist                  Therapy Charges for Today       Code Description Service Date Service Provider Modifiers Qty    92332083961 HC PT THERAPEUTIC ACT EA 15 MIN 7/1/2025 Isabel Carey GP 1            PT G-Codes  Outcome Measure Options: AM-PAC 6 Clicks Basic Mobility (PT)  AM-PAC 6 Clicks Score (PT): 9  AM-PAC 6 Clicks Score (OT): 9  PT Discharge Summary  Anticipated Discharge Disposition (PT): skilled nursing facility    Isabel Carey  7/1/2025

## 2025-07-01 NOTE — PLAN OF CARE
Goal Outcome Evaluation:  Plan of Care Reviewed With: patient        Progress: improving  Outcome Evaluation: Patient progressed with bed mobility and required less assistance for static/dynamic sitting balance. Patient continues to present below baseline for mobility and would continue to benefit from skilled PT to address strength, balance and activity tolerance deficits.    Anticipated Discharge Disposition (PT): skilled nursing facility

## 2025-07-02 PROBLEM — A04.72 CLOSTRIDIUM DIFFICILE DIARRHEA: Status: ACTIVE | Noted: 2025-07-02

## 2025-07-02 LAB
ANION GAP SERPL CALCULATED.3IONS-SCNC: 10.4 MMOL/L (ref 5–15)
BUN SERPL-MCNC: 18.6 MG/DL (ref 8–23)
BUN/CREAT SERPL: 33.2 (ref 7–25)
C DIFF GDH + TOXINS A+B STL QL IA.RAPID: NEGATIVE
C DIFF TOX GENS STL QL NAA+PROBE: DETECTED
CALCIUM SPEC-SCNC: 8.4 MG/DL (ref 8.6–10.5)
CHLORIDE SERPL-SCNC: 104 MMOL/L (ref 98–107)
CO2 SERPL-SCNC: 21.6 MMOL/L (ref 22–29)
CREAT SERPL-MCNC: 0.56 MG/DL (ref 0.76–1.27)
DEPRECATED RDW RBC AUTO: 46.3 FL (ref 37–54)
EGFRCR SERPLBLD CKD-EPI 2021: 112.1 ML/MIN/1.73
ERYTHROCYTE [DISTWIDTH] IN BLOOD BY AUTOMATED COUNT: 15.1 % (ref 12.3–15.4)
GLUCOSE BLDC GLUCOMTR-MCNC: 112 MG/DL (ref 70–130)
GLUCOSE BLDC GLUCOMTR-MCNC: 153 MG/DL (ref 70–130)
GLUCOSE BLDC GLUCOMTR-MCNC: 175 MG/DL (ref 70–130)
GLUCOSE BLDC GLUCOMTR-MCNC: 191 MG/DL (ref 70–130)
GLUCOSE SERPL-MCNC: 111 MG/DL (ref 65–99)
HCT VFR BLD AUTO: 34.1 % (ref 37.5–51)
HGB BLD-MCNC: 10.4 G/DL (ref 13–17.7)
MAGNESIUM SERPL-MCNC: 2.1 MG/DL (ref 1.6–2.4)
MCH RBC QN AUTO: 25.7 PG (ref 26.6–33)
MCHC RBC AUTO-ENTMCNC: 30.5 G/DL (ref 31.5–35.7)
MCV RBC AUTO: 84.4 FL (ref 79–97)
PLATELET # BLD AUTO: 435 10*3/MM3 (ref 140–450)
PMV BLD AUTO: 10.6 FL (ref 6–12)
POTASSIUM SERPL-SCNC: 4.4 MMOL/L (ref 3.5–5.2)
RBC # BLD AUTO: 4.04 10*6/MM3 (ref 4.14–5.8)
SODIUM SERPL-SCNC: 136 MMOL/L (ref 136–145)
WBC NRBC COR # BLD AUTO: 11.17 10*3/MM3 (ref 3.4–10.8)

## 2025-07-02 PROCEDURE — 63710000001 INSULIN LISPRO (HUMAN) PER 5 UNITS: Performed by: PHYSICIAN ASSISTANT

## 2025-07-02 PROCEDURE — 63710000001 INSULIN GLARGINE PER 5 UNITS: Performed by: PHYSICIAN ASSISTANT

## 2025-07-02 PROCEDURE — 25010000002 CEFTRIAXONE PER 250 MG: Performed by: INTERNAL MEDICINE

## 2025-07-02 PROCEDURE — 80048 BASIC METABOLIC PNL TOTAL CA: CPT

## 2025-07-02 PROCEDURE — 85027 COMPLETE CBC AUTOMATED: CPT

## 2025-07-02 PROCEDURE — 83735 ASSAY OF MAGNESIUM: CPT

## 2025-07-02 PROCEDURE — 25010000002 HEPARIN (PORCINE) PER 1000 UNITS: Performed by: INTERNAL MEDICINE

## 2025-07-02 PROCEDURE — 82948 REAGENT STRIP/BLOOD GLUCOSE: CPT

## 2025-07-02 PROCEDURE — 99232 SBSQ HOSP IP/OBS MODERATE 35: CPT | Performed by: FAMILY MEDICINE

## 2025-07-02 RX ORDER — VANCOMYCIN HYDROCHLORIDE 125 MG/1
125 CAPSULE ORAL ONCE
Status: COMPLETED | OUTPATIENT
Start: 2025-07-02 | End: 2025-07-02

## 2025-07-02 RX ORDER — VANCOMYCIN HYDROCHLORIDE 125 MG/1
125 CAPSULE ORAL EVERY 6 HOURS SCHEDULED
Status: DISCONTINUED | OUTPATIENT
Start: 2025-07-02 | End: 2025-07-04 | Stop reason: HOSPADM

## 2025-07-02 RX ADMIN — HEPARIN SODIUM 5000 UNITS: 5000 INJECTION INTRAVENOUS; SUBCUTANEOUS at 21:46

## 2025-07-02 RX ADMIN — PREGABALIN 225 MG: 150 CAPSULE ORAL at 08:44

## 2025-07-02 RX ADMIN — VANCOMYCIN HYDROCHLORIDE 125 MG: 125 CAPSULE ORAL at 11:27

## 2025-07-02 RX ADMIN — ASPIRIN 81 MG: 81 TABLET, COATED ORAL at 08:45

## 2025-07-02 RX ADMIN — PREGABALIN 225 MG: 150 CAPSULE ORAL at 21:45

## 2025-07-02 RX ADMIN — INSULIN GLARGINE 18 UNITS: 100 INJECTION, SOLUTION SUBCUTANEOUS at 08:44

## 2025-07-02 RX ADMIN — VANCOMYCIN HYDROCHLORIDE 125 MG: 125 CAPSULE ORAL at 16:58

## 2025-07-02 RX ADMIN — INSULIN LISPRO 2 UNITS: 100 INJECTION, SOLUTION INTRAVENOUS; SUBCUTANEOUS at 21:45

## 2025-07-02 RX ADMIN — FINASTERIDE 5 MG: 5 TABLET, FILM COATED ORAL at 21:46

## 2025-07-02 RX ADMIN — ROPINIROLE HYDROCHLORIDE 0.25 MG: 0.5 TABLET, FILM COATED ORAL at 21:46

## 2025-07-02 RX ADMIN — INSULIN LISPRO 8 UNITS: 100 INJECTION, SOLUTION INTRAVENOUS; SUBCUTANEOUS at 08:44

## 2025-07-02 RX ADMIN — DULOXETINE 60 MG: 60 CAPSULE, DELAYED RELEASE ORAL at 08:44

## 2025-07-02 RX ADMIN — HEPARIN SODIUM 5000 UNITS: 5000 INJECTION INTRAVENOUS; SUBCUTANEOUS at 05:11

## 2025-07-02 RX ADMIN — METOPROLOL SUCCINATE 25 MG: 25 TABLET, EXTENDED RELEASE ORAL at 08:44

## 2025-07-02 RX ADMIN — INSULIN LISPRO 2 UNITS: 100 INJECTION, SOLUTION INTRAVENOUS; SUBCUTANEOUS at 16:57

## 2025-07-02 RX ADMIN — HEPARIN SODIUM 5000 UNITS: 5000 INJECTION INTRAVENOUS; SUBCUTANEOUS at 14:06

## 2025-07-02 RX ADMIN — CEFTRIAXONE 2000 MG: 2 INJECTION, POWDER, FOR SOLUTION INTRAMUSCULAR; INTRAVENOUS at 16:58

## 2025-07-02 RX ADMIN — INSULIN LISPRO 8 UNITS: 100 INJECTION, SOLUTION INTRAVENOUS; SUBCUTANEOUS at 16:58

## 2025-07-02 RX ADMIN — VANCOMYCIN HYDROCHLORIDE 125 MG: 125 CAPSULE ORAL at 23:54

## 2025-07-02 RX ADMIN — INSULIN LISPRO 8 UNITS: 100 INJECTION, SOLUTION INTRAVENOUS; SUBCUTANEOUS at 11:27

## 2025-07-02 RX ADMIN — ATORVASTATIN CALCIUM 10 MG: 10 TABLET ORAL at 08:44

## 2025-07-02 RX ADMIN — TAMSULOSIN HYDROCHLORIDE 0.8 MG: 0.4 CAPSULE ORAL at 21:45

## 2025-07-02 RX ADMIN — INSULIN GLARGINE 18 UNITS: 100 INJECTION, SOLUTION SUBCUTANEOUS at 21:45

## 2025-07-02 RX ADMIN — INSULIN LISPRO 2 UNITS: 100 INJECTION, SOLUTION INTRAVENOUS; SUBCUTANEOUS at 11:25

## 2025-07-02 RX ADMIN — PANTOPRAZOLE SODIUM 40 MG: 40 TABLET, DELAYED RELEASE ORAL at 05:12

## 2025-07-02 NOTE — CASE MANAGEMENT/SOCIAL WORK
Continued Stay Note  [facility]     Patient Name: Fracisco Mullen  MRN: [id number]  Today's Date: 7/2/2025    Admit Date: 6/24/2025    Plan: [facility] pending insurance decision   Discharge Plan       Row Name 07/02/25 1137       Plan    Plan [facility] pending insurance decision    Plan Comments Per Jenny at  [facility], their MD has accepted and insurance pre cert has started. CM following.    Final Discharge Disposition Code 62 - inpatient rehab facility                   Discharge Codes    No documentation.                 Expected Discharge Date and Time       Expected Discharge Date Expected Discharge Time    Jul 3, 2025               Eileen Brandt RN

## 2025-07-02 NOTE — PROGRESS NOTES
"Fracisco Mullen  1965  [id number]    Evaluating Physician: Varun Dominique MD    Chief Complaint: fatigue    Reason for Consultation: bacteremia    History of present illness:     Patient is a 59 y.o.  Yr old male with history of diabetes/hypertension, previously followed with Dr. Gatica; admitted 2023 with right foot infection, cultures with MSSA/Morganella/ESBL Klebsiella/enterococcus and strep species.had surgery at the second toe with amputation, received IV daptomycin/Invanz with general improvements at that site.   admitted to the hospital August 1 2024 after a fall at [facility].  Noted to have urinary retention with concern for possible UTI.  In addition left heel with black discoloration/malodor and redness, empiric antibiotics initiated.    chronically debilitated and wheelchair bound by his report.      8/3/24  Dr Gan  \"PROCEDURE:            Left  Left      57592:             Debridement of skin and subcutaneous tissue  02996:             Wound vacuum-assisted closure\"     8/6/24 MRI and surgical findings did not confirm bone involvement, transitioned to oral agents at discharge         Readmitted November 15, 2024 with reports of appearing \"sluggish\" according to admission notes with DKA, noted to have high hemoglobin A1c and persistent/worsening left heel wound according to patient; medicine team notes mention urinary retention, acute toxic metabolic encephalopathy improved and low-grade fever. Abnormal MRI at the left foot:     Per radiology-- \"Soft tissue wound seen along the posterior heel with ill-defined fluid that extends through and disrupts the distal Achilles tendon. Underlying this area there is evidence of osteomyelitis of the posterior lateral calcaneus. There is a tiny fluid   collection here as well that may represent an associated nondrainable abscess.     Additionally there appears to be a nondisplaced stress fracture of the posterior calcaneus. \"     11/22 left BKA     11/26/24 " increased O2 requirements ; finished empiric treatment for respiratory tract infection    Readmitted to [facility] June 24, 2025 to ICU with mental status change per admission notes, found minimally responsive; treated for acute DKA and concern for sepsis.  Had been given empiric antibiotics, urinalysis morning after admission with pyuria/hematuria, 3+ bacteria but also squamous epithelial cells and culture negative but antibiotic modified.  Blood culture from admission subsequently with E. Coli in 1 set.  Also coag negative staph 2 different species in 2 different sets raising concern for coag negative staph has contaminants.  Antibiotics tapered to ceftriaxone.  Drug screen with cocaine    6/30 urology consult noted    7/2/25 he hopes for [facility];  apparently with diarrhea overnight and CDiff assay ordered per nursing; no vomiting; fatigued ; No headache photophobia or neck stiffness. No shortness of breath cough or hemoptysis.  Denies abdominal pain.  No other new skin rash.  Denies any new open wounds or active drainage from his lower extremities    He reports inability to urinate admission, prior history attention.  Incontinence.       Past Medical History:   Diagnosis Date    Acute renal failure     Hx of    Obesity     Hx of    Sleep apnea     Type 2 diabetes mellitus        Past Surgical History:   Procedure Laterality Date    BACK SURGERY      lower back    BELOW KNEE AMPUTATION Left 11/22/2024    Procedure: BELOW-KNEE AMPUTATION LEFT;  Surgeon: Dru Gan Jr., MD;  Location: [facility] OR;  Service: Orthopedics;  Laterality: Left;    CHOLECYSTECTOMY WITH INTRAOPERATIVE CHOLANGIOGRAM N/A 1/6/2021    Procedure: CHOLECYSTECTOMY LAPAROSCOPIC INTRAOPERATIVE CHOLANGIOGRAM;  Surgeon: Juventino Hooker MD;  Location: [facility] OR;  Service: General;  Laterality: N/A;    ERCP N/A 1/9/2021    Procedure: ENDOSCOPIC RETROGRADE CHOLANGIOPANCREATOGRAPHY;  Surgeon: Jeremy Dowell MD;  Location: [facility]  ENDOSCOPY;  Service: Gastroenterology;  Laterality: N/A;  with sphiincterotomy and balloon sweep with 9mm-12mm balloon    INCISION AND DRAINAGE FOOT Left 8/3/2024    Procedure: HEAL IRRIGATION AND DEBRIDEMENT LEFT;  Surgeon: Dru Gan Jr., MD;  Location: [facility] OR;  Service: Orthopedics;  Laterality: Left;    PLACEMENT OF WOUND VAC Left 8/3/2024    Procedure: PLACEMENT OF WOUND VAC;  Surgeon: Dru Gan Jr., MD;  Location: [facility] OR;  Service: Orthopedics;  Laterality: Left;       Pediatric History   Patient Parents    Not on file     Other Topics Concern    Not on file   Social History Narrative    Not on file       family history includes Cancer in his brother, maternal grandmother, mother, and another family member; Diabetes in an other family member; Heart attack in an other family member; Heart disease in his brother and father; Hyperlipidemia in his mother and another family member; Hypertension in his mother and another family member; Obesity in an other family member.    Allergies   Allergen Reactions    Sertraline Hcl Hives     Zoloft       Medication:  Current Facility-Administered Medications   Medication Dose Route Frequency Provider Last Rate Last Admin    aspirin EC tablet 81 mg  81 mg Oral Daily Sander Snowden MD   81 mg at 07/01/25 0829    atorvastatin (LIPITOR) tablet 10 mg  10 mg Oral Daily Sander Snowden MD   10 mg at 07/01/25 0828    Calcium Replacement - Follow Nurse / BPA Driven Protocol   Not Applicable PRN Isaias De Luna MD        cefTRIAXone (ROCEPHIN) 2,000 mg in sodium chloride 0.9 % 100 mL MBP  2,000 mg Intravenous Q24H Varun Dominique  mL/hr at 07/01/25 1805 2,000 mg at 07/01/25 1805    cyclobenzaprine (FLEXERIL) tablet 5 mg  5 mg Oral TID PRN Sander Snowden MD   5 mg at 07/01/25 0848    dextrose (D50W) (25 g/50 mL) IV injection 25 g  25 g Intravenous Q15 Min PRN Isaias De Luna MD   25 g at 06/30/25 1510    dextrose (GLUTOSE) oral gel 15 g  15  g Oral Q15 Min PRN Isaias De Luna MD   15 g at 06/29/25 1701    DULoxetine (CYMBALTA) DR capsule 60 mg  60 mg Oral Daily Sander Snowden MD   60 mg at 07/01/25 0828    finasteride (PROSCAR) tablet 5 mg  5 mg Oral Nightly Sander Snowden MD   5 mg at 07/01/25 2110    glucagon (GLUCAGEN) injection 1 mg  1 mg Intramuscular Q15 Min PRN Isaias De Luna MD        heparin (porcine) 5000 UNIT/ML injection 5,000 Units  5,000 Units Subcutaneous Q8H Isaias De Luna MD   5,000 Units at 07/02/25 0511    insulin glargine (LANTUS, SEMGLEE) injection 18 Units  18 Units Subcutaneous Q12H Mary Escalona PA-C   18 Units at 07/01/25 0832    Insulin Lispro (humaLOG) injection 2-7 Units  2-7 Units Subcutaneous 4x Daily AC & at Bedtime Mary Escalona PA-C        Insulin Lispro (humaLOG) injection 8 Units  8 Units Subcutaneous TID With Meals Mary Escalona PA-C   8 Units at 07/01/25 1803    Lidocaine 4 % 1 patch  1 patch Transdermal Q24H Sander Snowden MD   1 patch at 07/01/25 0841    Magnesium Standard Dose Replacement - Follow Nurse / BPA Driven Protocol   Not Applicable Isaias Espino MD        metoprolol succinate XL (TOPROL-XL) 24 hr tablet 25 mg  25 mg Oral Daily Mary Escalona PA-C   25 mg at 07/01/25 0829    pantoprazole (PROTONIX) EC tablet 40 mg  40 mg Oral Q AM Sander Snowden MD   40 mg at 07/02/25 0512    Phosphorus Replacement - Follow Nurse / BPA Driven Protocol   Not Applicable PRIsaias Medrano MD        Potassium Replacement - Follow Nurse / BPA Driven Protocol   Not Applicable PRIsaias Medrano MD        pregabalin (LYRICA) capsule 225 mg  225 mg Oral BID Sander Snowden MD   225 mg at 07/01/25 0829    rOPINIRole (REQUIP) tablet 0.25 mg  0.25 mg Oral Nightly Sander Snowden MD   0.25 mg at 07/01/25 2109    sodium chloride 0.9 % flush 10 mL  10 mL Intravenous PRN Isaias De Luna MD   10 mL at 07/01/25 2110    tamsulosin (FLOMAX) 24 hr  capsule 0.8 mg  0.8 mg Oral Nightly Sander Snowden MD   0.8 mg at 07/01/25 2110       Antibiotics:  Anti-Infectives (From admission, onward)      Ordered     Dose/Rate Route Frequency Start Stop    06/30/25 0748  vancomycin (VANCOCIN) 1,000 mg in sodium chloride 0.9 % 250 mL IVPB-VTB  Status:  Discontinued        Ordering Provider: Varun Dominique MD    15 mg/kg × 70.5 kg  250 mL/hr over 60 Minutes Intravenous Once 06/30/25 0845 06/30/25 0749    06/30/25 0748  Pharmacy to dose vancomycin  Status:  Discontinued        Ordering Provider: Varun Dominique MD     Not Applicable Continuous PRN 06/30/25 0747 06/30/25 0752    06/25/25 1748  cefTRIAXone (ROCEPHIN) 2,000 mg in sodium chloride 0.9 % 100 mL MBP        Ordering Provider: Varun Dominique MD    2,000 mg  200 mL/hr over 30 Minutes Intravenous Every 24 Hours 06/25/25 1845 07/05/25 1844    06/25/25 0252  cefepime 2000 mg IVPB in 100 mL NS (MBP)  Status:  Discontinued        Ordering Provider: Elisa Mckinnon MD    2,000 mg  over 4 Hours Intravenous Every 8 Hours 06/25/25 1200 06/25/25 1748    06/25/25 0252  cefepime 2000 mg IVPB in 100 mL NS (MBP)        Ordering Provider: Elisa Mckinnon MD    2,000 mg  over 30 Minutes Intravenous Once 06/25/25 0345 06/25/25 0354    06/25/25 0000  vancomycin IVPB 1500 mg in 0.9% NaCl (Premix) 500 mL  Status:  Discontinued        Ordering Provider: Andrew Crystal DO    20 mg/kg × 79.5 kg  333.3 mL/hr over 90 Minutes Intravenous Once 06/25/25 0016 06/25/25 0329    06/25/25 0000  piperacillin-tazobactam (ZOSYN) 3.375 g IVPB in 100 mL NS MBP (CD)        Ordering Provider: Andrew Crystal DO    3.375 g  over 30 Minutes Intravenous Once 06/25/25 0016 06/25/25 0115              Review of Systems    7/2/25     Constitutional-- No Fever, chills or sweats.  Appetite diminished with fatigue.  Heent-- No new vision, hearing or throat complaints.  No epistaxis or oral sores.  Denies odynophagia or dysphagia.  No  "flashers, floaters or eye pain. No odynophagia or dysphagia. No headache, photophobia or neck stiffness.  CV-- No chest pain, palpitation or syncope  Resp-- No SOB/cough/Hemoptysis  GI-  No hematochezia, melena, or hematemesis. Denies jaundice or chronic liver disease.  -- see above  Lymph- no swollen lymph nodes in neck/axilla or groin.   Heme- No active bruising or bleeding; no Hx of DVT or PE.  MS-- no swelling or pain in the bones or joints of arms/legs.  No new back pain.  Neuro-- No acute focal weakness or numbness in the arms or legs.  No seizures.    Full 12 point review of systems reviewed and negative otherwise for acute complaints, except for Above    Physical Exam:   Vital Signs   /78 (BP Location: Right arm, Patient Position: Lying)   Pulse 85   Temp 98.2 °F (36.8 °C) (Oral)   Resp 16   Ht 162.6 cm (64.02\")   Wt 70.5 kg (155 lb 6.8 oz)   SpO2 97%   BMI 26.66 kg/m²     GENERAL: sleepy, in no acute distress.   HEENT: Normocephalic, atraumatic.  no conjunctival injection. No icterus. Oropharynx clear without evidence of thrush or exudate. No evidence of periodontal disease.    NECK: Supple without nuchal rigidity. No mass.  LYMPH: No cervical, axillary or inguinal lymphadenopathy.  HEART: RRR; No murmur, rubs, gallops.   LUNGS: diminished at bases, Clear to auscultation bilaterally without wheezing, rales, rhonchi. Normal respiratory effort. Nonlabored. No dullness.  ABDOMEN: Soft, nontender, nondistended. Positive bowel sounds. No rebound or guarding. NO mass or HSM.  EXT:  lower extremity with multifocal excoriation/crust but no obvious redness/induration or focal tenderness.  No open wound or active drainage  MSK: FROM without joint effusions noted arms/legs.    SKIN: Warm and dry without cutaneous eruptions on Inspection/palpation.    NEURO: generally poor insight.  Moves extremities.        Laboratory Data    Results from last 7 days   Lab Units 07/01/25  0525 06/30/25  0437 " 06/29/25  0743   WBC 10*3/mm3 8.73 13.32* 7.12   HEMOGLOBIN g/dL 9.7* 9.8* 10.0*   HEMATOCRIT % 32.1* 32.3* 31.6*   PLATELETS 10*3/mm3 366 371 350     Results from last 7 days   Lab Units 07/01/25  0525   SODIUM mmol/L 135*   POTASSIUM mmol/L 4.2   CHLORIDE mmol/L 103   CO2 mmol/L 26.0   BUN mg/dL 17.8   CREATININE mg/dL 0.45*   GLUCOSE mg/dL 123*   CALCIUM mg/dL 7.9*                     Estimated Creatinine Clearance: 171.9 mL/min (A) (by C-G formula based on SCr of 0.45 mg/dL (L)).      Microbiology:      Radiology:  Imaging Results (Last 72 Hours)       ** No results found for the last 72 hours. **              Impression:     --acute E. Coli sepsis, primary concern for urinary tract source as per prior notes.  Had abnormal urine day after admission albeit culture negative but antibiotic modified.  Blood cultures from admission with E. Coli and abnormal urine raise concern for urinary source.  Otherwise chest exam nonfocal, abdomen benign and no other specific skin/soft tissue source at present.  Finished treatment and then monitor    --blood cultures with  different species coag-negative staph in each set raising concern for contaminants.    --Acute hematuria/pyuria, treated as UTI, urinary culture after antibiotic modification.history urinary retention.  Urology to see    --DKA; diabetes uncontrolled at admission.  Glucose control per medicine team    --Drug screen positive for cocaine    --Acute sepsis at presentation as per prior notes.    PLAN:      --IV ceftriaxone to 7/7 then stop (doses could be done intramuscular to complete course if lose peripheral IV access); notes indicate potential disposition to [facility].       --Check/review labs cultures and scans    --Partial history Per nursing staff    --Discussed with microbiology    --Highly complex at of issues with high risk for further serious morbidity and other serious sequela    --Discussed with multidisciplinary team/nursing regarding complex at of  issues, importance of anti-microbial Stewardship etc.    This visit included the following complex service elements:  Complex medical decision-making associated with antimicrobial prescribing.  Managed infection treatment protocol associated with transitions of care for this complex patient.  Counseled patients, family members, and/or caregivers regarding antimicrobial stewardship and antibiotic resistance.  Counseled patients, family members and/or caregivers regarding infection prevention.  Communicated with the clinical microbiology lab.       I am out of town until July 8.  Call my partners if needed sooner      Varun Dominique MD  7/2/2025

## 2025-07-02 NOTE — PROGRESS NOTES
[facility] PROGRESS NOTE    Patient Name: Fracisco Mullen  : 1965  MRN: [id number]    Date of Admission: 2025  Primary Care Physician: Brandy Roberson MD    Subjective   Subjective     CC:  diarrhea    HPI:  Patient continues to have some watery bowel movements and fecal maintenance in place.  C. difficile was positive.  Discussed with Dr. Rosario and started oral Vanc.  Also has Bustos in place and will do bladder training today      Objective   Objective     Vital Signs:   Temp:  [98 °F (36.7 °C)-98.7 °F (37.1 °C)] 98 °F (36.7 °C)  Heart Rate:  [80-93] 92  Resp:  [16-20] 18  BP: ()/(60-86) 118/75     Physical Exam:  Constitutional: No acute distress, awake, alert  HENT: NCAT, mucous membranes moist  Respiratory: Clear to auscultation , respiratory effort normal   Cardiovascular: RRR  Gastrointestinal: Positive bowel sounds, soft, nontender, nondistended  Musculoskeletal: Left BKA noted  Psychiatric: Appropriate affect, cooperative  Neurologic: Oriented x 3, strength symmetric in all extremities, Cranial Nerves grossly intact to confrontation, speech clear  Skin: No rashes      Results Reviewed:  LAB RESULTS:      Lab 25  0708 25  0743 25  1159   WBC 11.17* 8.73 13.32* 7.12 9.48   HEMOGLOBIN 10.4* 9.7* 9.8* 10.0* 12.1*   HEMATOCRIT 34.1* 32.1* 32.3* 31.6* 38.6   PLATELETS 435 366 371 350 416   NEUTROS ABS  --   --   --   --  5.76   IMMATURE GRANS (ABS)  --   --   --   --  0.08*   LYMPHS ABS  --   --   --   --  2.33   MONOS ABS  --   --   --   --  1.04*   EOS ABS  --   --   --   --  0.19   MCV 84.4 84.0 84.3 82.1 82.0         Lab 25  0708 25  0525 257 25  0743 25  1159 25  0359 25  2328 25  2030 25  1601 25  1234 25  0819   SODIUM 136 135* 135* 133* 135*  --   --  131* 131* 131* 132*   POTASSIUM 4.4 4.2 4.7 3.8 3.9  --   --  4.2 4.5 4.7 4.4   CHLORIDE 104 103 101 97*  98  --   --  101 100 101 101   CO2 21.6* 26.0 25.3 28.0 25.9  --   --  22.0 22.4 20.9* 21.0*   ANION GAP 10.4 6.0 8.7 8.0 11.1  --   --  8.0 8.6 9.1 10.0   BUN 18.6 17.8 15.9 5.9* 4.8*  --   --  7.3* 8.2 8.9 10.3   CREATININE 0.56* 0.45* 0.73* 0.42* 0.47*  --   --  0.56* 0.48* 0.54* 0.54*   EGFR 112.1 119.8 103.5 122.3 118.2  --   --  112.1 117.5 113.4 113.4   GLUCOSE 111* 123* 133* 237* 74  --   --  84 92 147* 223*   CALCIUM 8.4* 7.9* 8.0* 7.7* 8.3*  --   --  7.6* 7.6* 7.6* 7.2*   MAGNESIUM 2.1 1.7 1.8 1.8 2.0   < >  --  1.8 1.6 1.5* 1.6   PHOSPHORUS  --   --   --   --   --   --  2.7 2.6 2.2* 1.6* 2.0*    < > = values in this interval not displayed.                         Brief Urine Lab Results  (Last result in the past 365 days)        Color   Clarity   Blood   Leuk Est   Nitrite   Protein   CREAT   Urine HCG        06/25/25 1015 Yellow   Turbid   Large (3+)   Moderate (2+)   Negative   >=300 mg/dL (3+)                   Microbiology Results Abnormal       Procedure Component Value - Date/Time    Clostridioides difficile Toxin - Stool, Per Rectum [[id number]]  (Abnormal) Collected: 07/01/25 2109    Lab Status: Final result Specimen: Stool from Per Rectum Updated: 07/02/25 0906    Narrative:      The following orders were created for panel order Clostridioides difficile Toxin - Stool, Per Rectum.  Procedure                               Abnormality         Status                     ---------                               -----------         ------                     Clostridioides difficile...[[id number]]  Abnormal            Final result                 Please view results for these tests on the individual orders.    Clostridioides difficile Toxin, PCR - Stool, Per Rectum [[id number]]  (Abnormal) Collected: 07/01/25 2109    Lab Status: Final result Specimen: Stool from Per Rectum Updated: 07/02/25 0906     Toxigenic C. difficile by PCR Detected    Narrative:      DNA from a toxigenic strain of C.difficile has  been detected.    Blood Culture - Blood, Wrist, Left [[id number]]  (Abnormal)  (Susceptibility) Collected: 06/24/25 2348    Lab Status: Final result Specimen: Blood from Wrist, Left Updated: 06/29/25 0633     Blood Culture Escherichia coli     Isolated from Aerobic and Anaerobic Bottles     Blood Culture Staphylococcus hominis ssp hominis     Isolated from Aerobic Bottle     Gram Stain Aerobic Bottle Gram negative bacilli      Anaerobic Bottle Gram negative bacilli      Aerobic Bottle Gram positive cocci    Narrative:      Less than seven (7) mL's of blood was collected.  Insufficient quantity may yield false negative results.    Staphylococcus hominis ssp hominis: Probable contaminant requires clinical correlation, susceptibility not performed unless requested by physician.      Susceptibility        Escherichia coli      RONAK      Amoxicillin + Clavulanate Susceptible      Ampicillin Susceptible      Ampicillin + Sulbactam Susceptible      Cefazolin (Non Urine) Susceptible      Cefepime Susceptible      Ceftazidime Susceptible      Ceftriaxone Susceptible      Cefuroxime axetil Susceptible      Gentamicin Susceptible      Levofloxacin Susceptible      Piperacillin + Tazobactam Susceptible      Trimethoprim + Sulfamethoxazole Susceptible                       Susceptibility Comments       Escherichia coli    With the exception of urinary-sourced infections, aminoglycosides should not be used as monotherapy.               Blood Culture - Blood, Arm, Left [[id number]]  (Abnormal) Collected: 06/24/25 2348    Lab Status: Final result Specimen: Blood from Arm, Left Updated: 06/29/25 0633     Blood Culture Staphylococcus epidermidis     Isolated from Aerobic and Anaerobic Bottles     Gram Stain Anaerobic Bottle Gram positive cocci in groups      Aerobic Bottle Gram positive cocci in groups    Narrative:      Less than seven (7) mL's of blood was collected.  Insufficient quantity may yield false negative results.    Probable  contaminant requires clinical correlation, susceptibility not performed unless requested by physician.      Blood Culture ID, PCR - Blood, Arm, Left [[id number]]  (Abnormal) Collected: 06/24/25 2348    Lab Status: Final result Specimen: Blood from Arm, Left Updated: 06/27/25 0423     BCID, PCR Staph spp, not aureus or lugdunensis. Identification by BCID2 PCR.     BOTTLE TYPE Aerobic Bottle    Blood Culture ID, PCR - Blood, Wrist, Left [[id number]]  (Abnormal) Collected: 06/24/25 2348    Lab Status: Final result Specimen: Blood from Wrist, Left Updated: 06/25/25 1603     BCID, PCR Staph spp, not aureus or lugdunensis. Identification by BCID2 PCR.     BCID, PCR 2 Klebsiella pneumoniae group. Identification by BCID2 PCR.     BOTTLE TYPE Aerobic Bottle    Narrative:      No resistance genes detected.            No radiology results from the last 24 hrs    Results for orders placed during the hospital encounter of 06/24/25    Adult Transthoracic Echo Complete W/ Cont if Necessary Per Protocol 06/28/2025  6:13 PM    Interpretation Summary    Left ventricular systolic function is normal. Left ventricular ejection fraction appears to be 66 - 70%.    Saline test results are negative.      Current medications:  Scheduled Meds:aspirin, 81 mg, Oral, Daily  atorvastatin, 10 mg, Oral, Daily  cefTRIAXone, 2,000 mg, Intravenous, Q24H  DULoxetine, 60 mg, Oral, Daily  finasteride, 5 mg, Oral, Nightly  heparin (porcine), 5,000 Units, Subcutaneous, Q8H  insulin glargine, 18 Units, Subcutaneous, Q12H  insulin lispro, 2-7 Units, Subcutaneous, 4x Daily AC & at Bedtime  Insulin Lispro, 8 Units, Subcutaneous, TID With Meals  Lidocaine, 1 patch, Transdermal, Q24H  metoprolol succinate XL, 25 mg, Oral, Daily  pantoprazole, 40 mg, Oral, Q AM  pregabalin, 225 mg, Oral, BID  rOPINIRole, 0.25 mg, Oral, Nightly  tamsulosin, 0.8 mg, Oral, Nightly  vancomycin, 125 mg, Oral, Q6H      Continuous Infusions:   PRN Meds:.  Calcium Replacement - Follow  Nurse / BPA Driven Protocol    cyclobenzaprine    dextrose    dextrose    glucagon (human recombinant)    Magnesium Standard Dose Replacement - Follow Nurse / BPA Driven Protocol    Phosphorus Replacement - Follow Nurse / BPA Driven Protocol    Potassium Replacement - Follow Nurse / BPA Driven Protocol    sodium chloride    Assessment & Plan   Assessment & Plan     Active Hospital Problems    Diagnosis  POA    DKA (diabetic ketoacidosis) [E11.10]  Yes    Severe protein-calorie malnutrition [E43]  Yes    Tobacco use [Z72.0]  Yes    RLS (restless legs syndrome) [G25.81]  Yes    GERD (gastroesophageal reflux disease) [K21.9]  Yes    Hyperlipidemia [E78.5]  Yes    Type 2 diabetes mellitus, with long-term current use of insulin [E11.9, Z79.4]  Not Applicable    Peripheral neuropathy [G62.9]  Yes    HTN (hypertension) [I10]  Yes      Resolved Hospital Problems   No resolved problems to display.        Brief Hospital Course to date:  Fracisco Mullen is a 59 y.o. 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 Has also struggled with BPH with LUTS / urinary retention (discharged to [facility] on 12/5/24 with reported removal at [facility] and no subsequent replacement).      Since his amputation, he has been effectively bedbound but can get in wheelchair/motorized scooter. He resides with his girlfriend and 22yo daughter. Presented to [facility] ED on 6/24/2025 for evaluation of altered mental status. Found to be in DKA on arrival. Nursing unable to place Bustos catheter - urology had to place. UA was concerning for UTI. Blood cultures returned positive for Klebsiela and E. Coli. Urine culture NGTD but urine sample delayed due to difficult catheter placement - patient received IV antibiotics prior to urine sample collection.      Severe sepsis, resolved   E. coli and Klebsiella  bacteremia  New leukocytosis  Likely urinary source in the setting of UTI due to Klebsiella particularly with blood cultures being positive for E. coli and Klebsiella  ID following - on IV Ceftriaxone 2g daily   TTE this admission showed EF of 66 to 70%, saline agitated test was negative, no evidence of vegetation, trace TR.    C. difficile colitis  Started vancomycin 7/2/2025     BPH with urinary retention  Admitted to [facility] 11/15 - failed voiding trial to discharged to [facility] with Bustos and on Finasteride/Tamsulosin. Appears has not filled Rx for Tamsulosin/Finasteride since February 2025  He reports Bustos was removed at [facility] and has not ever been replaced   Continue Bustos catheter, bladder training started for 7-25  Restarted Tamsulosin and Finasteride this admission   Did not attend urology follow up - will re-refer     DKA  T2DM  A1c 14.2%  Adjusting insulin, unclear home regimen     Pressure wounds  Wound care following  Will need close glucose control to allow for wound healing     PAD s/p left BKA and right second toe amputation  Continue ASA / statin      Restless leg syndrome  Continue home meds with ropinirole     Peripheral neuropathy    HLD     HTN     Poor living conditions   / case management on board      Expected Discharge Location and Transportation: rehab  Expected Discharge   Expected Discharge Date: 7/3/2025; Expected Discharge Time:      VTE Prophylaxis:  Pharmacologic & mechanical VTE prophylaxis orders are present.         AM-PAC 6 Clicks Score (PT): 9 (07/01/25 1249)    CODE STATUS:   Code Status and Medical Interventions: CPR (Attempt to Resuscitate); Full Support   Ordered at: 06/25/25 0129     Code Status (Patient has no pulse and is not breathing):    CPR (Attempt to Resuscitate)     Medical Interventions (Patient has pulse or is breathing):    Full Support       Corrina Mckeon MD  07/02/25

## 2025-07-02 NOTE — CASE MANAGEMENT/SOCIAL WORK
Continued Stay Note  [facility]     Patient Name: Fracisco Mullen  MRN: [id number]  Today's Date: 7/2/2025    Admit Date: 6/24/2025    Plan: [facility] acute   Discharge Plan       Row Name 07/02/25 1539       Plan    Plan [facility] acute    Plan Comments Insurance has approved for this patient to go to [facility] per Jenny. Auth is good until 7-10-25. Cannot go to  with FMS in place. CM following.    Final Discharge Disposition Code 62 - inpatient rehab facility                   Discharge Codes    No documentation.                 Expected Discharge Date and Time       Expected Discharge Date Expected Discharge Time    Jul 3, 2025               Eileen Brandt RN

## 2025-07-03 LAB
ANION GAP SERPL CALCULATED.3IONS-SCNC: 9 MMOL/L (ref 5–15)
BUN SERPL-MCNC: 17.4 MG/DL (ref 8–23)
BUN/CREAT SERPL: 35.5 (ref 7–25)
CALCIUM SPEC-SCNC: 8.1 MG/DL (ref 8.6–10.5)
CHLORIDE SERPL-SCNC: 103 MMOL/L (ref 98–107)
CO2 SERPL-SCNC: 26 MMOL/L (ref 22–29)
CREAT SERPL-MCNC: 0.49 MG/DL (ref 0.76–1.27)
DEPRECATED RDW RBC AUTO: 46.1 FL (ref 37–54)
EGFRCR SERPLBLD CKD-EPI 2021: 116.8 ML/MIN/1.73
ERYTHROCYTE [DISTWIDTH] IN BLOOD BY AUTOMATED COUNT: 15.1 % (ref 12.3–15.4)
GLUCOSE BLDC GLUCOMTR-MCNC: 110 MG/DL (ref 70–130)
GLUCOSE BLDC GLUCOMTR-MCNC: 133 MG/DL (ref 70–130)
GLUCOSE BLDC GLUCOMTR-MCNC: 148 MG/DL (ref 70–130)
GLUCOSE BLDC GLUCOMTR-MCNC: 181 MG/DL (ref 70–130)
GLUCOSE SERPL-MCNC: 164 MG/DL (ref 65–99)
HCT VFR BLD AUTO: 32.2 % (ref 37.5–51)
HGB BLD-MCNC: 9.9 G/DL (ref 13–17.7)
MAGNESIUM SERPL-MCNC: 1.9 MG/DL (ref 1.6–2.4)
MCH RBC QN AUTO: 25.7 PG (ref 26.6–33)
MCHC RBC AUTO-ENTMCNC: 30.7 G/DL (ref 31.5–35.7)
MCV RBC AUTO: 83.6 FL (ref 79–97)
PLATELET # BLD AUTO: 390 10*3/MM3 (ref 140–450)
PMV BLD AUTO: 10.3 FL (ref 6–12)
POTASSIUM SERPL-SCNC: 4.3 MMOL/L (ref 3.5–5.2)
RBC # BLD AUTO: 3.85 10*6/MM3 (ref 4.14–5.8)
SODIUM SERPL-SCNC: 138 MMOL/L (ref 136–145)
WBC NRBC COR # BLD AUTO: 11.15 10*3/MM3 (ref 3.4–10.8)

## 2025-07-03 PROCEDURE — 25010000002 CEFTRIAXONE PER 250 MG: Performed by: FAMILY MEDICINE

## 2025-07-03 PROCEDURE — 85027 COMPLETE CBC AUTOMATED: CPT

## 2025-07-03 PROCEDURE — 97530 THERAPEUTIC ACTIVITIES: CPT

## 2025-07-03 PROCEDURE — 83735 ASSAY OF MAGNESIUM: CPT

## 2025-07-03 PROCEDURE — 63710000001 INSULIN LISPRO (HUMAN) PER 5 UNITS: Performed by: PHYSICIAN ASSISTANT

## 2025-07-03 PROCEDURE — 82948 REAGENT STRIP/BLOOD GLUCOSE: CPT

## 2025-07-03 PROCEDURE — 97116 GAIT TRAINING THERAPY: CPT

## 2025-07-03 PROCEDURE — 63710000001 INSULIN GLARGINE PER 5 UNITS: Performed by: PHYSICIAN ASSISTANT

## 2025-07-03 PROCEDURE — 99232 SBSQ HOSP IP/OBS MODERATE 35: CPT | Performed by: FAMILY MEDICINE

## 2025-07-03 PROCEDURE — 80048 BASIC METABOLIC PNL TOTAL CA: CPT

## 2025-07-03 PROCEDURE — 25010000002 HEPARIN (PORCINE) PER 1000 UNITS: Performed by: INTERNAL MEDICINE

## 2025-07-03 RX ORDER — L.ACID,PARA/B.BIFIDUM/S.THERM 8B CELL
1 CAPSULE ORAL DAILY
Status: DISCONTINUED | OUTPATIENT
Start: 2025-07-03 | End: 2025-07-04 | Stop reason: HOSPADM

## 2025-07-03 RX ADMIN — METOPROLOL SUCCINATE 25 MG: 25 TABLET, EXTENDED RELEASE ORAL at 08:47

## 2025-07-03 RX ADMIN — HEPARIN SODIUM 5000 UNITS: 5000 INJECTION INTRAVENOUS; SUBCUTANEOUS at 21:46

## 2025-07-03 RX ADMIN — ROPINIROLE HYDROCHLORIDE 0.25 MG: 0.5 TABLET, FILM COATED ORAL at 21:47

## 2025-07-03 RX ADMIN — VANCOMYCIN HYDROCHLORIDE 125 MG: 125 CAPSULE ORAL at 17:34

## 2025-07-03 RX ADMIN — FINASTERIDE 5 MG: 5 TABLET, FILM COATED ORAL at 21:47

## 2025-07-03 RX ADMIN — VANCOMYCIN HYDROCHLORIDE 125 MG: 125 CAPSULE ORAL at 11:42

## 2025-07-03 RX ADMIN — INSULIN LISPRO 8 UNITS: 100 INJECTION, SOLUTION INTRAVENOUS; SUBCUTANEOUS at 17:34

## 2025-07-03 RX ADMIN — DULOXETINE 60 MG: 60 CAPSULE, DELAYED RELEASE ORAL at 08:47

## 2025-07-03 RX ADMIN — ASPIRIN 81 MG: 81 TABLET, COATED ORAL at 08:47

## 2025-07-03 RX ADMIN — INSULIN GLARGINE 18 UNITS: 100 INJECTION, SOLUTION SUBCUTANEOUS at 21:47

## 2025-07-03 RX ADMIN — TAMSULOSIN HYDROCHLORIDE 0.8 MG: 0.4 CAPSULE ORAL at 21:46

## 2025-07-03 RX ADMIN — CEFTRIAXONE 2000 MG: 2 INJECTION, POWDER, FOR SOLUTION INTRAMUSCULAR; INTRAVENOUS at 17:34

## 2025-07-03 RX ADMIN — ATORVASTATIN CALCIUM 10 MG: 10 TABLET ORAL at 08:48

## 2025-07-03 RX ADMIN — PREGABALIN 225 MG: 150 CAPSULE ORAL at 21:47

## 2025-07-03 RX ADMIN — INSULIN GLARGINE 18 UNITS: 100 INJECTION, SOLUTION SUBCUTANEOUS at 08:46

## 2025-07-03 RX ADMIN — Medication 1 CAPSULE: at 11:42

## 2025-07-03 RX ADMIN — LIDOCAINE 1 PATCH: 4 PATCH TOPICAL at 08:46

## 2025-07-03 RX ADMIN — PANTOPRAZOLE SODIUM 40 MG: 40 TABLET, DELAYED RELEASE ORAL at 05:44

## 2025-07-03 RX ADMIN — INSULIN LISPRO 8 UNITS: 100 INJECTION, SOLUTION INTRAVENOUS; SUBCUTANEOUS at 08:47

## 2025-07-03 RX ADMIN — INSULIN LISPRO 8 UNITS: 100 INJECTION, SOLUTION INTRAVENOUS; SUBCUTANEOUS at 11:41

## 2025-07-03 RX ADMIN — HEPARIN SODIUM 5000 UNITS: 5000 INJECTION INTRAVENOUS; SUBCUTANEOUS at 05:44

## 2025-07-03 RX ADMIN — INSULIN LISPRO 2 UNITS: 100 INJECTION, SOLUTION INTRAVENOUS; SUBCUTANEOUS at 08:47

## 2025-07-03 RX ADMIN — PREGABALIN 225 MG: 150 CAPSULE ORAL at 08:48

## 2025-07-03 RX ADMIN — HEPARIN SODIUM 5000 UNITS: 5000 INJECTION INTRAVENOUS; SUBCUTANEOUS at 16:21

## 2025-07-03 RX ADMIN — VANCOMYCIN HYDROCHLORIDE 125 MG: 125 CAPSULE ORAL at 05:44

## 2025-07-03 NOTE — THERAPY TREATMENT NOTE
Patient Name: Fracisco Mullen  : 1965    MRN: [id number]                              Today's Date: 7/3/2025       Admit Date: 2025    Visit Dx:     ICD-10-CM ICD-9-CM   1. Diabetic ketoacidosis with coma associated with other specified diabetes mellitus  E13.11 250.32   2. Severe sepsis  A41.9 038.9    R65.20 995.92   3. Hyponatremia  E87.1 276.1   4. Poor personal hygiene  R46.0 V40.39   5. Hx of left BKA  Z89.512 V49.75   6. Febrile illness  R50.9 780.60   7. Bandemia  D72.825 288.66     Patient Active Problem List   Diagnosis    HTN (hypertension)    Type 2 diabetes mellitus, with long-term current use of insulin    Diabetic retinopathy    Obesity (BMI 30.0-34.9)    Peripheral neuropathy    Asthma    Causalgia of lower limb    Chronic constipation    Compression of lumbar nerve root    GERD (gastroesophageal reflux disease)    Hyperlipidemia    IBS (irritable bowel syndrome)    RLS (restless legs syndrome)    Sleep apnea    Vitamin D deficiency    Cigar smoker    Chronic back pain    Diabetic ketoacidosis associated with type 2 diabetes mellitus    Multiple open wounds of foot    Status post cholecystectomy    Cholestatic hepatitis    Diabetic infection of left foot    Precordial pain    Tobacco use    Non healing left heel wound    Severe protein-calorie malnutrition    Acute osteomyelitis of left foot    Type 2 diabetes mellitus with hyperglycemia    BPH with obstruction/lower urinary tract symptoms    Acute urinary retention    DKA (diabetic ketoacidosis)    Clostridium difficile diarrhea     Past Medical History:   Diagnosis Date    Acute renal failure     Hx of    Obesity     Hx of    Sleep apnea     Type 2 diabetes mellitus      Past Surgical History:   Procedure Laterality Date    BACK SURGERY      lower back    BELOW KNEE AMPUTATION Left 2024    Procedure: BELOW-KNEE AMPUTATION LEFT;  Surgeon: Dru Gan Jr., MD;  Location: [facility];  Service: Orthopedics;  Laterality: Left;     CHOLECYSTECTOMY WITH INTRAOPERATIVE CHOLANGIOGRAM N/A 1/6/2021    Procedure: CHOLECYSTECTOMY LAPAROSCOPIC INTRAOPERATIVE CHOLANGIOGRAM;  Surgeon: Juventino Hooker MD;  Location: [facility] OR;  Service: General;  Laterality: N/A;    ERCP N/A 1/9/2021    Procedure: ENDOSCOPIC RETROGRADE CHOLANGIOPANCREATOGRAPHY;  Surgeon: Jeremy Dowell MD;  Location: [facility] ENDOSCOPY;  Service: Gastroenterology;  Laterality: N/A;  with sphiincterotomy and balloon sweep with 9mm-12mm balloon    INCISION AND DRAINAGE FOOT Left 8/3/2024    Procedure: HEAL IRRIGATION AND DEBRIDEMENT LEFT;  Surgeon: Dru Gan Jr., MD;  Location: [facility] OR;  Service: Orthopedics;  Laterality: Left;    PLACEMENT OF WOUND VAC Left 8/3/2024    Procedure: PLACEMENT OF WOUND VAC;  Surgeon: Dru Gan Jr., MD;  Location: [facility] OR;  Service: Orthopedics;  Laterality: Left;      General Information       Row Name 07/03/25 1705          Physical Therapy Time and Intention    Document Type therapy note (daily note)  -     Mode of Treatment physical therapy;co-treatment  -       Row Name 07/03/25 1705          General Information    Patient Profile Reviewed yes  -BA     Existing Precautions/Restrictions fall;other (see comments)  remote L BKA with LLE prosthesis; remote R 2nd toe amputation; ledesma catheter; FMS  -BA     Barriers to Rehab medically complex;previous functional deficit  -       Row Name 07/03/25 6298          Cognition    Orientation Status (Cognition) oriented to;person;time;verbal cues/prompts needed for orientation;place;other (see comments)  Initially reported he was at [facility], but able to correct with cues.  Oriented to year, but not month.  -       Row Name 07/03/25 1705          Safety Issues/Impairments Affecting Functional Mobility    Safety Issues Affecting Function (Mobility) awareness of need for assistance;insight into deficits/self-awareness;judgment;positioning of assistive device;problem-solving;safety precaution  awareness;safety precautions follow-through/compliance;sequencing abilities  -     Impairments Affecting Function (Mobility) balance;coordination;endurance/activity tolerance;motor control;motor planning;pain;postural/trunk control;range of motion (ROM);strength  -     Comment, Safety Issues/Impairments (Mobility) Decreased processing with slowed responses.  -               User Key  (r) = Recorded By, (t) = Taken By, (c) = Cosigned By      Initials Name Provider Type     Galilea Burkett, PT Physical Therapist                   Mobility       Row Name 07/03/25 1710          Bed Mobility    Bed Mobility rolling left;rolling right  -     Rolling Left [place] (Bed Mobility) maximum assist (25% patient effort);2 person assist;verbal cues;nonverbal cues (demo/gesture)  -     Rolling Right [place] (Bed Mobility) minimum assist (75% patient effort);2 person assist;verbal cues;nonverbal cues (demo/gesture)  -     Assistive Device (Bed Mobility) bed rails;head of bed elevated;repositioning sheet  -     Comment, (Bed Mobility) Increased time and effort.  Rolling to both L and R side for dep pericare and lift sling placement.  VCs/TCs for sequencing and hand placement.  -       Row Name 07/03/25 1710          Bed-Chair Transfer    Bed-Chair [place] (Transfers) dependent (less than 25% patient effort);2 person assist;verbal cues;nonverbal cues (demo/gesture)  -     Assistive Device (Bed-Chair Transfers) lift device  -     Comment, (Bed-Chair Transfer) Good toleration of mechanical lift from bed to chair.  VCs for hand placement.  -       Row Name 07/03/25 1710          Sit-Stand Transfer    Sit-Stand [place] (Transfers) maximum assist (25% patient effort);2 person assist;verbal cues;nonverbal cues (demo/gesture)  -     Assistive Device (Sit-Stand Transfers) walker, front-wheeled  -BA     Comment, (Sit-Stand Transfer) LLE prosthesis donned while sitting in chair.  STS x 2 reps  from chair.  VCs/TCs for optimal pre-positioning with BLE set-up, sequencing, hand placement, and upright posture.  Reported no dizziness.  -       Row Name 07/03/25 1710          Gait/Stairs (Locomotion)    [place] Level (Gait) maximum assist (25% patient effort);2 person assist;1 person to manage equipment;verbal cues;other (see comments)  close chair follow  -     Assistive Device (Gait) walker, front-wheeled  -     Patient was able to Ambulate yes  -BA     Distance in Feet (Gait) 3  -BA     Deviations/Abnormal Patterns (Gait) bilateral deviations;cristal decreased;gait speed decreased;stride length decreased;weight shifting decreased;festinating/shuffling;base of support, narrow  -BA     Bilateral Gait Deviations forward flexed posture;heel strike decreased  -     Comment, (Gait/Stairs) Close chair follow for safety.  Short, shuffled steps and slow pace.  Difficulty with weight shifting and advancing BLE.  R ankle appeared to exhibit increased inversion.  Poor walker management and tends to push FWW too far out in front.  VCs/TCs for sequencing of steps, lateral weight shifting, improved stride length, walker management/steering, optimal walker positioning with keeping FWW closer to body, and upright posture.  Gait distance limited by fatigue and weakness.  -               User Key  (r) = Recorded By, (t) = Taken By, (c) = Cosigned By      Initials Name Provider Type    Galilea Mckeon, PT Physical Therapist                   Obj/Interventions       Row Name 07/03/25 2133          Balance    Balance Assessment sitting static balance;sitting dynamic balance;standing static balance;standing dynamic balance  -     Static Sitting Balance minimal assist;contact guard;verbal cues  -     Dynamic Sitting Balance moderate assist;minimal assist;verbal cues  -     Position, Sitting Balance unsupported;sitting in chair  -     Static Standing Balance maximum assist;2-person assist;verbal cues   -BA     Dynamic Standing Balance maximum assist;2-person assist;1-person assist;verbal cues  -BA     Position/Device Used, Standing Balance supported;walker, front-wheeled  -BA     Balance Interventions sitting;sit to stand;standing;supported;static;dynamic;occupation based/functional task  -BA     Comment, Balance Increased unsteadiness with standing and ambulation activity with FWW and LLE prosthesis donned.  Near BLE buckling noted.  Increased risk for falls.  -BA               User Key  (r) = Recorded By, (t) = Taken By, (c) = Cosigned By      Initials Name Provider Type    BA Galilea Burkett, PT Physical Therapist                   Goals/Plan    No documentation.                  Clinical Impression       Row Name 07/03/25 1720          Pain    Pretreatment Pain Rating 8/10  -BA     Posttreatment Pain Rating 8/10  -BA     Pain Location back  -     Pain Side/Orientation generalized  -     Pain Management Interventions exercise or physical activity utilized;activity modification encouraged;positioning techniques utilized;nursing notified  -BA     Response to Pain Interventions activity participation with tolerable pain  -BA       Row Name 07/03/25 1720          Plan of Care Review    Plan of Care Reviewed With patient  -BA     Progress improving  -BA     Outcome Evaluation Good effort and participation throughout.  Improving fxl mobility noted by progressed to STS and ambulation today with LLE prosthesis donned.  STS and ambulated 3ft with FWW and maxAx2, +1 with close chair follow for safety.  Con to present below baseline with weakness, decreased balance, gait instability, decreased fxl endurance, and back pain limiting indep with mobility and ability to safely navigate home environment.  Will con to benefit from skilled IP PT to improve deficits and promote return to PLOF.  Once medically appropraite, rec SNF upon d/c for best fxl outcome.  -BA       Row Name 07/03/25 1720          Vital Signs    Pre  Systolic BP Rehab 116  taken prior to PT arrival  -BA     Pre Treatment Diastolic BP 69  -BA     Post Systolic BP Rehab 101  -BA     Post Treatment Diastolic BP 69  -BA     Pretreatment Heart Rate (beats/min) 85  -BA     Pre SpO2 (%) 96  -BA     O2 Delivery Pre Treatment room air  -BA     O2 Delivery Intra Treatment room air  -BA     Post SpO2 (%) 97  -BA     O2 Delivery Post Treatment room air  -BA     Pre Patient Position Supine  -BA     Post Patient Position Sitting  -BA       Row Name 07/03/25 1720          Positioning and Restraints    Pre-Treatment Position in bed  -BA     Post Treatment Position chair  -BA     In Chair notified nsg;reclined;call light within reach;encouraged to call for assist;exit alarm on;waffle cushion;on mechanical lift sling;legs elevated  Positioning wedge placed behind pt's L side/trunk and pillow placed underneath pt's R side for pressure relief and offloading.  -BA               User Key  (r) = Recorded By, (t) = Taken By, (c) = Cosigned By      Initials Name Provider Type    Galilea Mckeon, PT Physical Therapist                   Outcome Measures       Row Name 07/03/25 1727          How much help from another person do you currently need...    Turning from your back to your side while in flat bed without using bedrails? 2  -BA     Moving from lying on back to sitting on the side of a flat bed without bedrails? 2  -BA     Moving to and from a bed to a chair (including a wheelchair)? 1  -BA     Standing up from a chair using your arms (e.g., wheelchair, bedside chair)? 2  -BA     Climbing 3-5 steps with a railing? 1  -BA     To walk in hospital room? 2  -BA     AM-PAC 6 Clicks Score (PT) 10  -BA     Highest Level of Mobility Goal Move to Chair/Commode-4  -BA       Row Name 07/03/25 1727 07/03/25 1634       Functional Assessment    Outcome Measure Options AM-PAC 6 Clicks Basic Mobility (PT)  -BA AM-PAC 6 Clicks Daily Activity (OT)  -SA              User Key  (r) = Recorded By,  (t) = Taken By, (c) = Cosigned By      Initials Name Provider Type     Galilea Burkett, PT Physical Therapist    Ivet Cruz OT Occupational Therapist                                 Physical Therapy Education       Title: PT OT SLP Therapies (In Progress)       Topic: Physical Therapy (In Progress)       Point: Mobility training (Done)       Learning Progress Summary            Patient Acceptance, E, VU,NR by  at 7/3/2025 1727    Acceptance, E, VU,NR by ML at 7/1/2025 1404    Acceptance, E, NR by TT at 6/27/2025 1541                      Point: Home exercise program (Not Started)       Learner Progress:  Not documented in this visit.              Point: Body mechanics (Done)       Learning Progress Summary            Patient Acceptance, E, VU,NR by  at 7/3/2025 1727    Acceptance, E, VU,NR by ML at 7/1/2025 1404    Acceptance, E, NR by TT at 6/27/2025 1541                      Point: Precautions (Done)       Learning Progress Summary            Patient Acceptance, E, VU,NR by  at 7/3/2025 1727    Acceptance, E, VU,NR by ML at 7/1/2025 1404    Acceptance, E, NR by TT at 6/27/2025 1541                                      User Key       Initials Effective Dates Name Provider Type Discipline     04/22/21 -  Isabel Carey Physical Therapist PT     09/21/21 -  Galilea Burkett, PT Physical Therapist PT    TT 05/30/25 -  Claudia Buchanan PT Physical Therapist PT                  PT Recommendation and Plan     Progress: improving  Outcome Evaluation: Good effort and participation throughout.  Improving fxl mobility noted by progressed to STS and ambulation today with LLE prosthesis donned.  STS and ambulated 3ft with FWW and maxAx2, +1 with close chair follow for safety.  Con to present below baseline with weakness, decreased balance, gait instability, decreased fxl endurance, and back pain limiting indep with mobility and ability to safely navigate home environment.  Will con to benefit from  skilled IP PT to improve deficits and promote return to PLOF.  Once medically appropraite, rec SNF upon d/c for best fxl outcome.     Time Calculation:         PT Charges       Row Name 07/03/25 1728             Time Calculation    Start Time 1536  -BA      PT Received On 07/03/25  -BA         Time Calculation- PT    Total Timed Code Minutes- PT 38 minute(s)  -BA         Timed Charges    31975 - Gait Training Minutes  8  -BA      07653 - PT Therapeutic Activity Minutes 30  -BA         Total Minutes    Timed Charges Total Minutes 38  -BA       Total Minutes 38  -BA                User Key  (r) = Recorded By, (t) = Taken By, (c) = Cosigned By      Initials Name Provider Type    Galilea Mckeon, PT Physical Therapist                  Therapy Charges for Today       Code Description Service Date Service Provider Modifiers Qty    02691222106 HC GAIT TRAINING EA 15 MIN 7/3/2025 Galilea Burkett, PT GP 1    [id number] HC PT THERAPEUTIC ACT EA 15 MIN 7/3/2025 Galilea Burkett, PT GP 2            PT G-Codes  Outcome Measure Options: AM-PAC 6 Clicks Basic Mobility (PT)  AM-PAC 6 Clicks Score (PT): 10  AM-PAC 6 Clicks Score (OT): 11  PT Discharge Summary  Anticipated Discharge Disposition (PT): skilled nursing facility    Galilea Burkett PT  7/3/2025

## 2025-07-03 NOTE — CONSULTS
"          Clinical Nutrition Assessment     Patient Name: Fracisco Mullen  YOB: 1965  MRN: [id number]  Date of Encounter: 07/03/25 13:27 EDT  Admission date: 6/24/2025  Reason for Visit: Follow-up protocol    Assessment   Nutrition Assessment   Admission Diagnosis:  DKA (diabetic ketoacidosis) [E11.10]    Problem List:    HTN (hypertension)    Type 2 diabetes mellitus, with long-term current use of insulin    Peripheral neuropathy    GERD (gastroesophageal reflux disease)    Hyperlipidemia    RLS (restless legs syndrome)    Tobacco use    Severe protein-calorie malnutrition    DKA (diabetic ketoacidosis)    Clostridium difficile diarrhea      PMH:   He  has a past medical history of Acute renal failure, Obesity, Sleep apnea, and Type 2 diabetes mellitus.    PSH:  He  has a past surgical history that includes Back surgery; cholecystectomy with intraoperative cholangiogram (N/A, 1/6/2021); ERCP (N/A, 1/9/2021); Incision and drainage foot (Left, 8/3/2024); PLACEMENT OF WOUND VAC (Left, 8/3/2024); and Leg amputation, lower tibia/fibula (Left, 11/22/2024).    Applicable Nutrition History:     Anthropometrics     Height: Height: 162.6 cm (64.02\")  Last Filed Weight: Weight: 70.5 kg (155 lb 6.8 oz) (06/27/25 0610)  Method: Weight Method: Bed scale  BMI: BMI (Calculated): 26.7    UBW:  ~150 lb per pt, no prior reliable weight hx in emr  Weight change: No significant changes    Nutrition Focused Physical Exam    Date:  6/27       Unable to perform due to Pt unable to participate at time of visit     Subjective   Reported/Observed/Food/Nutrition Related History:     7/3.  Patient sleeping at time of visit, lunch tray ~75% consumed. RN reports patient doing well with PO intakes.    6/27  Patient reports good appetite and no nutritional concerns. He is agreeable to protein supplement for wound healing. He has a stage I and an unstageable PI per WOC RN note. He voices understanding benefit supplement and importance " overall good nutrition/protein intake for wound healing and strength. Plan for d/c to rehab. Pt denies further dietary needs/preferences or nutritional questions/concerns at this time, NKFA.     Current Nutrition Prescription   PO: Diet: Cardiac, Diabetic; Healthy Heart (2-3 Na+); Consistent Carbohydrate; Fluid Consistency: Thin (IDDSI 0)  Oral Nutrition Supplement: Prosource lemonade  Intake: 6 days 79% X 7 meals documented    Assessment & Plan   Nutrition Diagnosis   Date:  6/27            Updated:    Problem Increased nutrient needs    Etiology For wound healing   Signs/Symptoms Stage I and unstageable PI   Status: New    Goal:   Nutrition to support treatment and Maintain intake    Nutrition Intervention      Follow treatment progress, Care plan reviewed, Encourage intake, Supplement provided for wound healing    Encourage PO as tolerated.  Prosource in lemonade 1x/day.    Monitoring/Evaluation:   Per protocol, I&O, PO intake, Supplement intake, Weight, GI status, Symptoms, POC/GOC    Nicole Thorne RD  Time Spent: 25m

## 2025-07-03 NOTE — PROGRESS NOTES
You have lady PROGRESS NOTE    Patient Name: Fracisco Mullen  : 1965  MRN: [id number]    Date of Admission: 2025  Primary Care Physician: Brandy Roberson MD    Subjective   Subjective     CC:  diarrhea    HPI:   - Patient continues to have some watery bowel movements and fecal maintenance in place.  C. difficile was positive.  Discussed with Dr. Rosario and started oral Vanc.  Also has Bustos in place and will do bladder training today    7/3 -patient doing better and stools are improved with soft stool in the fecal maintenance tube.  Plan to remove it today.  [facility] and we will plan to send him tomorrow after discontinuation of fecal maintenance system.  Patient denies new concerns.  Bustos remains in place and discussed with bedside nursing difficulty in inserting was due to significant edema at the urethral meatus which has persisted.  Complicated by patient is not circumcised.      Objective   Objective     Vital Signs:   Temp:  [97.6 °F (36.4 °C)-98.5 °F (36.9 °C)] 97.6 °F (36.4 °C)  Heart Rate:  [85-98] 86  Resp:  [16-18] 18  BP: (103-122)/(57-76) 119/71     Physical Exam:  Constitutional: No acute distress, awake, alert  HENT: NCAT, mucous membranes moist  Respiratory: Clear to auscultation bilat, respiratory effort normal   Cardiovascular: RRR  Gastrointestinal: Positive bowel sounds, soft, nontender, nondistended  Musculoskeletal: Left BKA noted  Psychiatric: Appropriate affect, cooperative  Neurologic: Oriented x 3, strength symmetric in all extremities, Cranial Nerves grossly intact to confrontation, speech clear  Skin: No rashes      Results Reviewed:  LAB RESULTS:      Lab 25  0604 25  0708 25  0525 25  0437 25  0743 25  1159   WBC 11.15* 11.17* 8.73 13.32* 7.12 9.48   HEMOGLOBIN 9.9* 10.4* 9.7* 9.8* 10.0* 12.1*   HEMATOCRIT 32.2* 34.1* 32.1* 32.3* 31.6* 38.6   PLATELETS 390 435 366 371 350 416   NEUTROS ABS  --   --   --    --   --  5.76   IMMATURE GRANS (ABS)  --   --   --   --   --  0.08*   LYMPHS ABS  --   --   --   --   --  2.33   MONOS ABS  --   --   --   --   --  1.04*   EOS ABS  --   --   --   --   --  0.19   MCV 83.6 84.4 84.0 84.3 82.1 82.0         Lab 07/03/25  0604 07/02/25  0708 07/01/25  0525 06/30/25  0437 06/29/25  0743 06/27/25  0359 06/26/25  2328 06/26/25  2030 06/26/25  1601 06/26/25  1234   SODIUM 138 136 135* 135* 133*   < >  --  131* 131* 131*   POTASSIUM 4.3 4.4 4.2 4.7 3.8   < >  --  4.2 4.5 4.7   CHLORIDE 103 104 103 101 97*   < >  --  101 100 101   CO2 26.0 21.6* 26.0 25.3 28.0   < >  --  22.0 22.4 20.9*   ANION GAP 9.0 10.4 6.0 8.7 8.0   < >  --  8.0 8.6 9.1   BUN 17.4 18.6 17.8 15.9 5.9*   < >  --  7.3* 8.2 8.9   CREATININE 0.49* 0.56* 0.45* 0.73* 0.42*   < >  --  0.56* 0.48* 0.54*   EGFR 116.8 112.1 119.8 103.5 122.3   < >  --  112.1 117.5 113.4   GLUCOSE 164* 111* 123* 133* 237*   < >  --  84 92 147*   CALCIUM 8.1* 8.4* 7.9* 8.0* 7.7*   < >  --  7.6* 7.6* 7.6*   MAGNESIUM 1.9 2.1 1.7 1.8 1.8   < >  --  1.8 1.6 1.5*   PHOSPHORUS  --   --   --   --   --   --  2.7 2.6 2.2* 1.6*    < > = values in this interval not displayed.                         Brief Urine Lab Results  (Last result in the past 365 days)        Color   Clarity   Blood   Leuk Est   Nitrite   Protein   CREAT   Urine HCG        06/25/25 1015 Yellow   Turbid   Large (3+)   Moderate (2+)   Negative   >=300 mg/dL (3+)                   Microbiology Results Abnormal       Procedure Component Value - Date/Time    Clostridioides difficile Toxin - Stool, Per Rectum [[id number]]  (Abnormal) Collected: 07/01/25 2109    Lab Status: Final result Specimen: Stool from Per Rectum Updated: 07/02/25 0906    Narrative:      The following orders were created for panel order Clostridioides difficile Toxin - Stool, Per Rectum.  Procedure                               Abnormality         Status                     ---------                                -----------         ------                     Clostridioides difficile...[[id number]]  Abnormal            Final result                 Please view results for these tests on the individual orders.    Clostridioides difficile Toxin, PCR - Stool, Per Rectum [[id number]]  (Abnormal) Collected: 07/01/25 2109    Lab Status: Final result Specimen: Stool from Per Rectum Updated: 07/02/25 0906     Toxigenic C. difficile by PCR Detected    Narrative:      DNA from a toxigenic strain of C.difficile has been detected.    Blood Culture - Blood, Wrist, Left [[id number]]  (Abnormal)  (Susceptibility) Collected: 06/24/25 2348    Lab Status: Final result Specimen: Blood from Wrist, Left Updated: 06/29/25 0633     Blood Culture Escherichia coli     Isolated from Aerobic and Anaerobic Bottles     Blood Culture Staphylococcus hominis ssp hominis     Isolated from Aerobic Bottle     Gram Stain Aerobic Bottle Gram negative bacilli      Anaerobic Bottle Gram negative bacilli      Aerobic Bottle Gram positive cocci    Narrative:      Less than seven (7) mL's of blood was collected.  Insufficient quantity may yield false negative results.    Staphylococcus hominis ssp hominis: Probable contaminant requires clinical correlation, susceptibility not performed unless requested by physician.      Susceptibility        Escherichia coli      RONAK      Amoxicillin + Clavulanate Susceptible      Ampicillin Susceptible      Ampicillin + Sulbactam Susceptible      Cefazolin (Non Urine) Susceptible      Cefepime Susceptible      Ceftazidime Susceptible      Ceftriaxone Susceptible      Cefuroxime axetil Susceptible      Gentamicin Susceptible      Levofloxacin Susceptible      Piperacillin + Tazobactam Susceptible      Trimethoprim + Sulfamethoxazole Susceptible                       Susceptibility Comments       Escherichia coli    With the exception of urinary-sourced infections, aminoglycosides should not be used as monotherapy.               Blood  Culture - Blood, Arm, Left [[id number]]  (Abnormal) Collected: 06/24/25 2348    Lab Status: Final result Specimen: Blood from Arm, Left Updated: 06/29/25 0633     Blood Culture Staphylococcus epidermidis     Isolated from Aerobic and Anaerobic Bottles     Gram Stain Anaerobic Bottle Gram positive cocci in groups      Aerobic Bottle Gram positive cocci in groups    Narrative:      Less than seven (7) mL's of blood was collected.  Insufficient quantity may yield false negative results.    Probable contaminant requires clinical correlation, susceptibility not performed unless requested by physician.      Blood Culture ID, PCR - Blood, Arm, Left [[id number]]  (Abnormal) Collected: 06/24/25 2348    Lab Status: Final result Specimen: Blood from Arm, Left Updated: 06/27/25 0423     BCID, PCR Staph spp, not aureus or lugdunensis. Identification by BCID2 PCR.     BOTTLE TYPE Aerobic Bottle    Blood Culture ID, PCR - Blood, Wrist, Left [[id number]]  (Abnormal) Collected: 06/24/25 2348    Lab Status: Final result Specimen: Blood from Wrist, Left Updated: 06/25/25 1603     BCID, PCR Staph spp, not aureus or lugdunensis. Identification by BCID2 PCR.     BCID, PCR 2 Klebsiella pneumoniae group. Identification by BCID2 PCR.     BOTTLE TYPE Aerobic Bottle    Narrative:      No resistance genes detected.            No radiology results from the last 24 hrs    Results for orders placed during the hospital encounter of 06/24/25    Adult Transthoracic Echo Complete W/ Cont if Necessary Per Protocol 06/28/2025  6:13 PM    Interpretation Summary    Left ventricular systolic function is normal. Left ventricular ejection fraction appears to be 66 - 70%.    Saline test results are negative.      Current medications:  Scheduled Meds:aspirin, 81 mg, Oral, Daily  atorvastatin, 10 mg, Oral, Daily  cefTRIAXone, 2,000 mg, Intravenous, Q24H  DULoxetine, 60 mg, Oral, Daily  finasteride, 5 mg, Oral, Nightly  heparin (porcine), 5,000 Units,  Subcutaneous, Q8H  insulin glargine, 18 Units, Subcutaneous, Q12H  insulin lispro, 2-7 Units, Subcutaneous, 4x Daily AC & at Bedtime  Insulin Lispro, 8 Units, Subcutaneous, TID With Meals  lactobacillus acidophilus, 1 capsule, Oral, Daily  Lidocaine, 1 patch, Transdermal, Q24H  metoprolol succinate XL, 25 mg, Oral, Daily  pantoprazole, 40 mg, Oral, Q AM  pregabalin, 225 mg, Oral, BID  rOPINIRole, 0.25 mg, Oral, Nightly  tamsulosin, 0.8 mg, Oral, Nightly  vancomycin, 125 mg, Oral, Q6H      Continuous Infusions:   PRN Meds:.  Calcium Replacement - Follow Nurse / BPA Driven Protocol    cyclobenzaprine    dextrose    dextrose    glucagon (human recombinant)    Magnesium Standard Dose Replacement - Follow Nurse / BPA Driven Protocol    Phosphorus Replacement - Follow Nurse / BPA Driven Protocol    Potassium Replacement - Follow Nurse / BPA Driven Protocol    sodium chloride    Assessment & Plan   Assessment & Plan     Active Hospital Problems    Diagnosis  POA    Clostridium difficile diarrhea [A04.72]  No    DKA (diabetic ketoacidosis) [E11.10]  Yes    Severe protein-calorie malnutrition [E43]  Yes    Tobacco use [Z72.0]  Yes    RLS (restless legs syndrome) [G25.81]  Yes    GERD (gastroesophageal reflux disease) [K21.9]  Yes    Hyperlipidemia [E78.5]  Yes    Type 2 diabetes mellitus, with long-term current use of insulin [E11.9, Z79.4]  Not Applicable    Peripheral neuropathy [G62.9]  Yes    HTN (hypertension) [I10]  Yes      Resolved Hospital Problems   No resolved problems to display.        Brief Hospital Course to date:  Fracisco Mullen is a 59 y.o. 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 Has also struggled with BPH with LUTS / urinary retention (discharged to [facility] on 12/5/24 with reported removal at [facility] and no subsequent replacement).       Since his amputation, he has been effectively bedbound but can get in wheelchair/motorized scooter. He resides with his girlfriend and 22yo daughter. Presented to [facility] ED on 6/24/2025 for evaluation of altered mental status. Found to be in DKA on arrival. Nursing unable to place Bustos catheter - urology had to place. UA was concerning for UTI. Blood cultures returned positive for Klebsiela and E. Coli. Urine culture NGTD but urine sample delayed due to difficult catheter placement - patient received IV antibiotics prior to urine sample collection.      Severe sepsis, resolved   E. coli and Klebsiella bacteremia  New leukocytosis  Likely urinary source in the setting of UTI due to Klebsiella particularly with blood cultures being positive for E. coli and Klebsiella  ID following - on IV Ceftriaxone 2g daily   TTE this admission showed EF of 66 to 70%, saline agitated test was negative, no evidence of vegetation, trace TR.    C. difficile colitis  Started vancomycin 7/2/2025  Discontinue fecal maintenance system 7/3     BPH with urinary retention  Admitted to [facility] 11/15 - failed voiding trial to discharged to [facility] with Bustos and on Finasteride/Tamsulosin. Appears has not filled Rx for Tamsulosin/Finasteride since February 2025  He reports Bustos was removed at [facility] and has not ever been replaced   Continue Bustos catheter, bladder training started for 7-25  Restarted Tamsulosin and Finasteride this admission   Did not attend urology follow up - will re-refer  Bustos insertion complicated by periurethral edema and uncircumcised     DKA  T2DM  A1c 14.2%  Adjusting insulin, unclear home regimen     Pressure wounds  Wound care following  Will need close glucose control to allow for wound healing     PAD s/p left BKA and right second toe amputation  Continue ASA / statin      Restless leg syndrome  Continue home meds with ropinirole     Peripheral neuropathy    HLD     HTN     Poor living conditions   / case  management on board      Expected Discharge Location and Transportation: rehab  Expected Discharge   Expected Discharge Date: 7/4/2025; Expected Discharge Time:      VTE Prophylaxis:  Pharmacologic & mechanical VTE prophylaxis orders are present.         AM-PAC 6 Clicks Score (PT): 9 (07/02/25 2001)    CODE STATUS:   Code Status and Medical Interventions: CPR (Attempt to Resuscitate); Full Support   Ordered at: 06/25/25 0129     Code Status (Patient has no pulse and is not breathing):    CPR (Attempt to Resuscitate)     Medical Interventions (Patient has pulse or is breathing):    Full Support       Corrina Mckeon MD  07/03/25

## 2025-07-03 NOTE — CASE MANAGEMENT/SOCIAL WORK
Case Management Discharge Note      Final Note: Patient has a bed on 7-4-25 at [facility] on [address]. RN will need to call report to the facility at [phone] and fax the discharge summary to [phone]. CM has filled out the PCS and faxed it to EMS. Hard copy on chart for EMS team. No need to change the pharmacy. After report is called, RN will need to dispatch EMS. Address for the facility is in this note. [facility] would like this patient to be in their building before noon tomorrow. MD today aware of the request.    Jenny at [facility] requested updated therapy notes. I have asked therapy to see him today. If there are changes to this discharge plan from [facility], they will notify CM. CM will provide an update to the patient and care team.      Selected Continued Care - Admitted Since 6/24/2025       Destination Coordination complete.      Service Provider Services Address Phone Fax Patient Preferred    [facility] [address] [place] [address] [phone] --              Durable Medical Equipment    No services have been selected for the patient.                Dialysis/Infusion    No services have been selected for the patient.                Home Medical Care    No services have been selected for the patient.                Therapy    No services have been selected for the patient.                Community Resources    No services have been selected for the patient.                Community & DME    No services have been selected for the patient.                    Transportation Services  Transportation: Ambulance  Ambulance: [facility]    Final Discharge Disposition Code: 62 - inpatient rehab facility

## 2025-07-03 NOTE — PLAN OF CARE
Goal Outcome Evaluation:           Progress: improving  Outcome Evaluation: Pt with good effort and participation today. Pt continues to present below functional baseline secondary to weakness, impaired balance, and decreased activity tolerance. Pt dependent to transfer to chair via mechanical lift, however, was able to stand 2x and take 3 steps forward with RWx, Max Ax2, verbal cues, and close chair follow. Pt would continue to benefit from skilled IPOT services to improve fxnl status. Continue current OT POC.    Anticipated Discharge Disposition (OT): skilled nursing facility

## 2025-07-03 NOTE — PLAN OF CARE
Goal Outcome Evaluation:  Plan of Care Reviewed With: patient        Progress: improving  Outcome Evaluation: Good effort and participation throughout.  Improving fxl mobility noted by progressed to STS and ambulation today with LLE prosthesis donned.  STS and ambulated 3ft with FWW and maxAx2, +1 with close chair follow for safety.  Con to present below baseline with weakness, decreased balance, gait instability, decreased fxl endurance, and back pain limiting indep with mobility and ability to safely navigate home environment.  Will con to benefit from skilled IP PT to improve deficits and promote return to PLOF.  Once medically appropraite, rec SNF upon d/c for best fxl outcome.    Anticipated Discharge Disposition (PT): skilled nursing facility

## 2025-07-03 NOTE — THERAPY TREATMENT NOTE
Patient Name: Fracisco Mullen  : 1965    MRN: [id number]                              Today's Date: 7/3/2025       Admit Date: 2025    Visit Dx:     ICD-10-CM ICD-9-CM   1. Diabetic ketoacidosis with coma associated with other specified diabetes mellitus  E13.11 250.32   2. Severe sepsis  A41.9 038.9    R65.20 995.92   3. Hyponatremia  E87.1 276.1   4. Poor personal hygiene  R46.0 V40.39   5. Hx of left BKA  Z89.512 V49.75   6. Febrile illness  R50.9 780.60   7. Bandemia  D72.825 288.66     Patient Active Problem List   Diagnosis    HTN (hypertension)    Type 2 diabetes mellitus, with long-term current use of insulin    Diabetic retinopathy    Obesity (BMI 30.0-34.9)    Peripheral neuropathy    Asthma    Causalgia of lower limb    Chronic constipation    Compression of lumbar nerve root    GERD (gastroesophageal reflux disease)    Hyperlipidemia    IBS (irritable bowel syndrome)    RLS (restless legs syndrome)    Sleep apnea    Vitamin D deficiency    Cigar smoker    Chronic back pain    Diabetic ketoacidosis associated with type 2 diabetes mellitus    Multiple open wounds of foot    Status post cholecystectomy    Cholestatic hepatitis    Diabetic infection of left foot    Precordial pain    Tobacco use    Non healing left heel wound    Severe protein-calorie malnutrition    Acute osteomyelitis of left foot    Type 2 diabetes mellitus with hyperglycemia    BPH with obstruction/lower urinary tract symptoms    Acute urinary retention    DKA (diabetic ketoacidosis)    Clostridium difficile diarrhea     Past Medical History:   Diagnosis Date    Acute renal failure     Hx of    Obesity     Hx of    Sleep apnea     Type 2 diabetes mellitus      Past Surgical History:   Procedure Laterality Date    BACK SURGERY      lower back    BELOW KNEE AMPUTATION Left 2024    Procedure: BELOW-KNEE AMPUTATION LEFT;  Surgeon: Dru Gan Jr., MD;  Location: [facility];  Service: Orthopedics;  Laterality: Left;     CHOLECYSTECTOMY WITH INTRAOPERATIVE CHOLANGIOGRAM N/A 1/6/2021    Procedure: CHOLECYSTECTOMY LAPAROSCOPIC INTRAOPERATIVE CHOLANGIOGRAM;  Surgeon: Juventino Hooker MD;  Location: [facility] OR;  Service: General;  Laterality: N/A;    ERCP N/A 1/9/2021    Procedure: ENDOSCOPIC RETROGRADE CHOLANGIOPANCREATOGRAPHY;  Surgeon: Jeremy Dowell MD;  Location: [facility] ENDOSCOPY;  Service: Gastroenterology;  Laterality: N/A;  with sphiincterotomy and balloon sweep with 9mm-12mm balloon    INCISION AND DRAINAGE FOOT Left 8/3/2024    Procedure: HEAL IRRIGATION AND DEBRIDEMENT LEFT;  Surgeon: Dru Gan Jr., MD;  Location: [facility] OR;  Service: Orthopedics;  Laterality: Left;    PLACEMENT OF WOUND VAC Left 8/3/2024    Procedure: PLACEMENT OF WOUND VAC;  Surgeon: Dru Gan Jr., MD;  Location: [facility] OR;  Service: Orthopedics;  Laterality: Left;      General Information       Row Name 07/03/25 1625          OT Time and Intention    Document Type therapy note (daily note)  -SA     Mode of Treatment co-treatment;occupational therapy  -       Row Name 07/03/25 1626          General Information    Patient Profile Reviewed yes  -SA     Existing Precautions/Restrictions fall;other (see comments)  Hx of L BKA; TITO ledesma  -     Barriers to Rehab medically complex;previous functional deficit  -SA       Row Name 07/03/25 1627          Cognition    Orientation Status (Cognition) oriented to;person;place;time;verbal cues/prompts needed for orientation;other (see comments)  Oriented to current year, required vc for current month; stated he was at [facility], however, corrected with cues  -SA       Row Name 07/03/25 1629          Safety Issues/Impairments Affecting Functional Mobility    Safety Issues Affecting Function (Mobility) awareness of need for assistance;insight into deficits/self-awareness;judgment;positioning of assistive device;problem-solving;safety precaution awareness;safety precautions  follow-through/compliance;sequencing abilities  -     Impairments Affecting Function (Mobility) balance;coordination;endurance/activity tolerance;motor control;motor planning;pain;postural/trunk control;range of motion (ROM);shortness of breath;strength  -     Comment, Safety Issues/Impairments (Mobility) Alert, following commands; delayed processing  -               User Key  (r) = Recorded By, (t) = Taken By, (c) = Cosigned By      Initials Name Provider Type     Ivet Toscano OT Occupational Therapist                     Mobility/ADL's       Row Name 07/03/25 1626          Bed Mobility    Bed Mobility rolling left;rolling right  -     Rolling Left [place] (Bed Mobility) verbal cues;nonverbal cues (demo/gesture);maximum assist (25% patient effort);2 person assist  -     Rolling Right [place] (Bed Mobility) verbal cues;nonverbal cues (demo/gesture);minimum assist (75% patient effort);2 person assist  -     Bed Mobility, Safety Issues decreased use of legs for bridging/pushing;impaired trunk control for bed mobility  -     Assistive Device (Bed Mobility) bed rails;head of bed elevated;repositioning sheet  -     Comment, (Bed Mobility) Pt rolled L and R for pericare/hygiene and sling placement; vc for hand placement on bed rails  -       Row Name 07/03/25 1626          Transfers    Transfers bed-chair transfer;sit-stand transfer;stand-sit transfer  -       Row Name 07/03/25 1626          Bed-Chair Transfer    Bed-Chair [place] (Transfers) dependent (less than 25% patient effort);2 person assist;verbal cues;nonverbal cues (demo/gesture)  -     Assistive Device (Bed-Chair Transfers) lift device  -     Comment, (Bed-Chair Transfer) Tolerated tx to chair well, denied dizziness after transfer  -       Row Name 07/03/25 1626          Sit-Stand Transfer    Sit-Stand [place] (Transfers) verbal cues;nonverbal cues (demo/gesture);maximum assist (25% patient effort);2 person  assist  -     Assistive Device (Sit-Stand Transfers) walker, front-wheeled  -SA     Comment, (Sit-Stand Transfer) 2x from chair with RWx, Max Ax2, verbal cues; prosthesis donned  -       Row Name 07/03/25 1626          Stand-Sit Transfer    Stand-Sit [place] (Transfers) verbal cues;maximum assist (25% patient effort);2 person assist  -     Assistive Device (Stand-Sit Transfers) walker, front-wheeled  -SA     Comment, (Stand-Sit Transfer) VC for hand placement  -[facility] Name 07/03/25 1626          Functional Mobility    Functional Mobility- Ind. Level maximum assist (25% patient effort);2 person assist required;verbal cues required  -     Functional Mobility- Device walker, front-wheeled  -SA     Functional Mobility-Distance (Feet) 3  -SA     Functional Mobility- Comment Pt ambulated 3 ft with RWx, Max Ax2, and close chair follow; prosthesis donned  -[facility] Name 07/03/25 1626          Activities of Daily Living    BADL Assessment/Intervention upper body dressing;lower body dressing;toileting  -SA       Row Name 07/03/25 1626          Lower Body Dressing Assessment/Training    [place] Level (Lower Body Dressing) don;socks;dependent (less than 25% patient effort);other (see comments)  dependent to don prosthesis while sitting in chair  -SA     Position (Lower Body Dressing) supported sitting  -[facility] Name 07/03/25 1626          Toileting Assessment/Training    [place] Level (Toileting) perform perineal hygiene;dependent (less than 25% patient effort)  -SA     Position (Toileting) supine  -SA       Row Name 07/03/25 1626          Upper Body Dressing Assessment/Training    [place] Level (Upper Body Dressing) don;front opening garment;dependent (less than 25% patient effort)  -SA     Position (Upper Body Dressing) supine  -SA               User Key  (r) = Recorded By, (t) = Taken By, (c) = Cosigned By      Initials Name Provider Type    Ivet Cruz OT Occupational  Therapist                   Obj/Interventions       Row Name 07/03/25 1630          Balance    Balance Assessment sitting static balance;sitting dynamic balance;sit to stand dynamic balance;standing static balance;standing dynamic balance  -SA     Static Sitting Balance contact guard  -SA     Dynamic Sitting Balance minimal assist  -SA     Position, Sitting Balance unsupported;sitting in chair  -SA     Static Standing Balance maximum assist;2-person assist  -SA     Dynamic Standing Balance maximum assist;2-person assist  -SA     Position/Device Used, Standing Balance walker, front-wheeled  -SA     Balance Interventions sitting;standing;sit to stand;supported;static;dynamic;minimal challenge  -SA     Comment, Balance Able to perform anterior/posterior weight shifts in chair with CGA; Max Ax2 for standing balance  -SA               User Key  (r) = Recorded By, (t) = Taken By, (c) = Cosigned By      Initials Name Provider Type    SA Ivet Toscano OT Occupational Therapist                   Goals/Plan    No documentation.                  Clinical Impression       Row Name 07/03/25 1631          Pain Assessment    Pretreatment Pain Rating 8/10  -SA     Posttreatment Pain Rating 8/10  -SA     Pain Location back  -SA     Pain Side/Orientation generalized  -SA     Pain Management Interventions activity modification encouraged;positioning techniques utilized;exercise or physical activity utilized;nursing notified  -     Response to Pain Interventions activity participation with tolerable pain  -SA       Row Name 07/03/25 1631          Plan of Care Review    Progress improving  -     Outcome Evaluation Pt with good effort and participation today. Pt continues to present below functional baseline secondary to weakness, impaired balance, and decreased activity tolerance. Pt dependent to transfer to chair via mechanical lift, however, was able to stand 2x and take 3 steps forward with RWx, Max Ax2, verbal cues, and  close chair follow. Pt would continue to benefit from skilled IPOT services to improve fxnl status. Continue current OT POC.  -       Row Name 07/03/25 1631          Therapy Plan Review/Discharge Plan (OT)    Anticipated Discharge Disposition (OT) [facility] nursing facility  -       Row Name 07/03/25 1631          Vital Signs    Pre Systolic BP Rehab 116  -SA     Pre Treatment Diastolic BP 69  -SA     Post Systolic BP Rehab 101  -SA     Post Treatment Diastolic BP 69  -SA     Posttreatment Heart Rate (beats/min) 114  -SA     Post SpO2 (%) 93  -SA     O2 Delivery Post Treatment room air  -SA     Pre Patient Position Supine  -SA     Post Patient Position Sitting  -SA       Row Name 07/03/25 1631          Positioning and Restraints    Pre-Treatment Position in bed  -SA     Post Treatment Position chair  -SA     In Chair notified nsg;reclined;sitting;call light within reach;encouraged to call for assist;exit alarm on;waffle cushion;on mechanical lift sling  -SA               User Key  (r) = Recorded By, (t) = Taken By, (c) = Cosigned By      Initials Name Provider Type    Ivet Cruz OT Occupational Therapist                   Outcome Measures       Row Name 07/03/25 1634          How much help from another is currently needed...    Putting on and taking off regular lower body clothing? 1  -SA     Bathing (including washing, rinsing, and drying) 2  -SA     Toileting (which includes using toilet bed pan or urinal) 1  -SA     Putting on and taking off regular upper body clothing 2  -SA     Taking care of personal grooming (such as brushing teeth) 2  -SA     Eating meals 3  -SA     AM-PAC 6 Clicks Score (OT) 11  -SA       [facility] Name 07/03/25 1634          Functional Assessment    Outcome Measure Options AM-PAC 6 Clicks Daily Activity (OT)  -               User Key  (r) = Recorded By, (t) = Taken By, (c) = Cosigned By      Initials Name Provider Type    Ivet Cruz OT Occupational Therapist                     Occupational Therapy Education       Title: PT OT SLP Therapies (In Progress)       Topic: Occupational Therapy (In Progress)       Point: ADL training (In Progress)       Learning Progress Summary            Patient Acceptance, E, NR by  at 7/3/2025 1634    Acceptance, E, DU,NR by MARIE at 7/1/2025 1414    Comment: OT POC; UE HEP    Acceptance, E, NL,NR by MARIE at 6/30/2025 1540    Comment: OT POC; benefits of activity                      Point: Home exercise program (Done)       Learning Progress Summary            Patient Acceptance, E, DU,NR by MARIE at 7/1/2025 1414    Comment: OT POC; UE HEP                      Point: Body mechanics (In Progress)       Learning Progress Summary            Patient Acceptance, E, NR by SA at 7/3/2025 1634                                      User Key       Initials Effective Dates Name Provider Type Discipline     06/16/21 -  Dianelys Fraser OT Occupational Therapist OT     04/16/25 -  Ivet Toscano OT Occupational Therapist OT                  OT Recommendation and Plan     Plan of Care Review  Progress: improving  Outcome Evaluation: Pt with good effort and participation today. Pt continues to present below functional baseline secondary to weakness, impaired balance, and decreased activity tolerance. Pt dependent to transfer to chair via mechanical lift, however, was able to stand 2x and take 3 steps forward with RWx, Max Ax2, verbal cues, and close chair follow. Pt would continue to benefit from skilled IPOT services to improve fxnl status. Continue current OT POC.     Time Calculation:         Time Calculation- OT       Row Name 07/03/25 6685             Time Calculation- OT    OT Start Time 1443  -SA      OT Received On 07/03/25  -SA      OT Goal Re-Cert Due Date 07/07/25  -SA         Timed Charges    48400 - OT Therapeutic Activity Minutes 20  -SA         Total Minutes    Timed Charges Total Minutes 20  -SA       Total Minutes 20  -SA                User  Key  (r) = Recorded By, (t) = Taken By, (c) = Cosigned By      Initials Name Provider Type     Ivet Toscano OT Occupational Therapist                  Therapy Charges for Today       Code Description Service Date Service Provider Modifiers Qty    [id number]  OT THERAPEUTIC ACT EA 15 MIN 7/3/2025 Ivet Toscano OT GO 1                 Ivet Toscano OT  7/3/2025

## 2025-07-04 VITALS
HEART RATE: 98 BPM | DIASTOLIC BLOOD PRESSURE: 78 MMHG | HEIGHT: 64 IN | OXYGEN SATURATION: 97 % | RESPIRATION RATE: 18 BRPM | BODY MASS INDEX: 26.53 KG/M2 | WEIGHT: 155.42 LBS | SYSTOLIC BLOOD PRESSURE: 121 MMHG | TEMPERATURE: 97.6 F

## 2025-07-04 PROBLEM — B96.1 BACTEREMIA DUE TO KLEBSIELLA PNEUMONIAE: Status: ACTIVE | Noted: 2025-07-04

## 2025-07-04 PROBLEM — E11.10 DKA (DIABETIC KETOACIDOSIS): Status: RESOLVED | Noted: 2025-06-25 | Resolved: 2025-07-01

## 2025-07-04 PROBLEM — R78.81 BACTEREMIA DUE TO KLEBSIELLA PNEUMONIAE: Status: ACTIVE | Noted: 2025-07-04

## 2025-07-04 LAB
ANION GAP SERPL CALCULATED.3IONS-SCNC: 8 MMOL/L (ref 5–15)
BUN SERPL-MCNC: 23.3 MG/DL (ref 8–23)
BUN/CREAT SERPL: 33.3 (ref 7–25)
CALCIUM SPEC-SCNC: 8.5 MG/DL (ref 8.6–10.5)
CHLORIDE SERPL-SCNC: 103 MMOL/L (ref 98–107)
CO2 SERPL-SCNC: 27 MMOL/L (ref 22–29)
CREAT SERPL-MCNC: 0.7 MG/DL (ref 0.76–1.27)
DEPRECATED RDW RBC AUTO: 46.9 FL (ref 37–54)
EGFRCR SERPLBLD CKD-EPI 2021: 104.8 ML/MIN/1.73
ERYTHROCYTE [DISTWIDTH] IN BLOOD BY AUTOMATED COUNT: 15.4 % (ref 12.3–15.4)
GLUCOSE BLDC GLUCOMTR-MCNC: 173 MG/DL (ref 70–130)
GLUCOSE BLDC GLUCOMTR-MCNC: 98 MG/DL (ref 70–130)
GLUCOSE SERPL-MCNC: 190 MG/DL (ref 65–99)
HCT VFR BLD AUTO: 31.7 % (ref 37.5–51)
HGB BLD-MCNC: 9.6 G/DL (ref 13–17.7)
MAGNESIUM SERPL-MCNC: 1.8 MG/DL (ref 1.6–2.4)
MCH RBC QN AUTO: 25.5 PG (ref 26.6–33)
MCHC RBC AUTO-ENTMCNC: 30.3 G/DL (ref 31.5–35.7)
MCV RBC AUTO: 84.3 FL (ref 79–97)
PLATELET # BLD AUTO: 396 10*3/MM3 (ref 140–450)
PMV BLD AUTO: 10.3 FL (ref 6–12)
POTASSIUM SERPL-SCNC: 4.2 MMOL/L (ref 3.5–5.2)
RBC # BLD AUTO: 3.76 10*6/MM3 (ref 4.14–5.8)
SODIUM SERPL-SCNC: 138 MMOL/L (ref 136–145)
WBC NRBC COR # BLD AUTO: 11.12 10*3/MM3 (ref 3.4–10.8)

## 2025-07-04 PROCEDURE — 85027 COMPLETE CBC AUTOMATED: CPT

## 2025-07-04 PROCEDURE — 99239 HOSP IP/OBS DSCHRG MGMT >30: CPT | Performed by: FAMILY MEDICINE

## 2025-07-04 PROCEDURE — 63710000001 INSULIN LISPRO (HUMAN) PER 5 UNITS: Performed by: PHYSICIAN ASSISTANT

## 2025-07-04 PROCEDURE — 80048 BASIC METABOLIC PNL TOTAL CA: CPT

## 2025-07-04 PROCEDURE — 25010000002 HEPARIN (PORCINE) PER 1000 UNITS: Performed by: INTERNAL MEDICINE

## 2025-07-04 PROCEDURE — 82948 REAGENT STRIP/BLOOD GLUCOSE: CPT

## 2025-07-04 PROCEDURE — 63710000001 INSULIN GLARGINE PER 5 UNITS: Performed by: PHYSICIAN ASSISTANT

## 2025-07-04 PROCEDURE — 83735 ASSAY OF MAGNESIUM: CPT

## 2025-07-04 RX ORDER — METOPROLOL SUCCINATE 25 MG/1
25 TABLET, EXTENDED RELEASE ORAL DAILY
Start: 2025-07-04

## 2025-07-04 RX ORDER — LIDOCAINE 4 G/G
1 PATCH TOPICAL
Start: 2025-07-04

## 2025-07-04 RX ORDER — L.ACID,PARA/B.BIFIDUM/S.THERM 8B CELL
1 CAPSULE ORAL DAILY
Start: 2025-07-04

## 2025-07-04 RX ORDER — VANCOMYCIN HYDROCHLORIDE 125 MG/1
125 CAPSULE ORAL EVERY 6 HOURS SCHEDULED
Qty: 34 CAPSULE | Refills: 0 | Status: SHIPPED | OUTPATIENT
Start: 2025-07-04 | End: 2025-07-13

## 2025-07-04 RX ORDER — INSULIN LISPRO 100 [IU]/ML
2-7 INJECTION, SOLUTION INTRAVENOUS; SUBCUTANEOUS
Start: 2025-07-04

## 2025-07-04 RX ORDER — INSULIN LISPRO 100 [IU]/ML
8 INJECTION, SOLUTION INTRAVENOUS; SUBCUTANEOUS
Start: 2025-07-04

## 2025-07-04 RX ADMIN — PANTOPRAZOLE SODIUM 40 MG: 40 TABLET, DELAYED RELEASE ORAL at 05:52

## 2025-07-04 RX ADMIN — INSULIN LISPRO 2 UNITS: 100 INJECTION, SOLUTION INTRAVENOUS; SUBCUTANEOUS at 08:33

## 2025-07-04 RX ADMIN — ATORVASTATIN CALCIUM 10 MG: 10 TABLET ORAL at 08:33

## 2025-07-04 RX ADMIN — VANCOMYCIN HYDROCHLORIDE 125 MG: 125 CAPSULE ORAL at 00:01

## 2025-07-04 RX ADMIN — VANCOMYCIN HYDROCHLORIDE 125 MG: 125 CAPSULE ORAL at 05:52

## 2025-07-04 RX ADMIN — HEPARIN SODIUM 5000 UNITS: 5000 INJECTION INTRAVENOUS; SUBCUTANEOUS at 05:52

## 2025-07-04 RX ADMIN — INSULIN GLARGINE 18 UNITS: 100 INJECTION, SOLUTION SUBCUTANEOUS at 08:33

## 2025-07-04 RX ADMIN — PREGABALIN 225 MG: 150 CAPSULE ORAL at 10:17

## 2025-07-04 RX ADMIN — CYCLOBENZAPRINE HYDROCHLORIDE 5 MG: 10 TABLET, FILM COATED ORAL at 08:33

## 2025-07-04 RX ADMIN — INSULIN LISPRO 8 UNITS: 100 INJECTION, SOLUTION INTRAVENOUS; SUBCUTANEOUS at 08:33

## 2025-07-04 RX ADMIN — LIDOCAINE 1 PATCH: 4 PATCH TOPICAL at 08:32

## 2025-07-04 RX ADMIN — METOPROLOL SUCCINATE 25 MG: 25 TABLET, EXTENDED RELEASE ORAL at 08:33

## 2025-07-04 RX ADMIN — ASPIRIN 81 MG: 81 TABLET, COATED ORAL at 08:34

## 2025-07-04 RX ADMIN — Medication 10 ML: at 08:35

## 2025-07-04 RX ADMIN — Medication 1 CAPSULE: at 08:33

## 2025-07-04 RX ADMIN — DULOXETINE 60 MG: 60 CAPSULE, DELAYED RELEASE ORAL at 08:34

## 2025-07-04 NOTE — CASE MANAGEMENT/SOCIAL WORK
Case Management Discharge Note      Final Note: CM check with April with [facility]. He can discharge today. Patient can discharge to Spinal Cord unit today. Nurse to call report to [phone]. CM faxed the d/c summary to [phone]. EMS dispatched requested.         Selected Continued Care - Admitted Since 6/24/2025       Destination Coordination complete.      Service Provider Services Address Phone Fax Patient Preferred    [facility] [address] [address] [phone] --              Durable Medical Equipment    No services have been selected for the patient.                Dialysis/Infusion    No services have been selected for the patient.                Home Medical Care    No services have been selected for the patient.                Therapy    No services have been selected for the patient.                Community Resources    No services have been selected for the patient.                Community & DME    No services have been selected for the patient.                    Transportation Services  Transportation: Ambulance  Ambulance: [facility]    Final Discharge Disposition Code: 62 - inpatient rehab facility

## 2025-07-04 NOTE — DISCHARGE SUMMARY
[facility] Medicine Services  DISCHARGE SUMMARY    Patient Name: Fracisco Mullen  : 1965  MRN: [id number]    Date of Admission: 2025 11:24 PM  Date of Discharge:  25    Primary Care Physician: Brandy Roberson MD    Consults       Date and Time Order Name Status Description    2025 12:24 PM Inpatient Urology Consult      2025 11:23 AM Inpatient Infectious Diseases Consult Completed     2025  6:50 AM Inpatient Urology Consult Completed             Hospital Course     Presenting Problem: DKA    Active Hospital Problems    Diagnosis  POA   • **Bacteremia due to Klebsiella pneumoniae [R78.81, B96.1]  Yes   • Clostridium difficile diarrhea [A04.72]  No   • Severe protein-calorie malnutrition [E43]  Yes   • Tobacco use [Z72.0]  Yes   • RLS (restless legs syndrome) [G25.81]  Yes   • GERD (gastroesophageal reflux disease) [K21.9]  Yes   • Hyperlipidemia [E78.5]  Yes   • Type 2 diabetes mellitus, with long-term current use of insulin [E11.9, Z79.4]  Not Applicable   • Peripheral neuropathy [G62.9]  Yes   • HTN (hypertension) [I10]  Yes      Resolved Hospital Problems    Diagnosis Date Resolved POA   • DKA (diabetic ketoacidosis) [E11.10] 2025 Yes          Hospital Course:  Fracisco Mullen is a 59 y.o. 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 Has also struggled with BPH with LUTS / urinary retention (discharged to [facility] on 24 with reported removal at [facility] and no subsequent replacement).      Since his amputation, he has been effectively bedbound but can get in wheelchair/motorized scooter. He resides with his girlfriend and 22yo daughter. Presented to [facility] ED on 2025 for evaluation of altered mental status. Found to be in DKA on arrival. Nursing unable to place Bustos  catheter - urology had to place. UA was concerning for UTI. Blood cultures returned positive for Klebsiela and E. Coli. Urine culture NGTD but urine sample delayed due to difficult catheter placement - patient received IV antibiotics prior to urine sample collection.      Severe sepsis, resolved   E. coli and Klebsiella bacteremia  New leukocytosis  Likely urinary source in the setting of UTI due to Klebsiella particularly with blood cultures being positive for E. coli and Klebsiella  ID following - on IV Ceftriaxone 2g daily (plan 10 day course)  TTE this admission showed EF of 66 to 70%, saline agitated test was negative, no evidence of vegetation, trace TR.     C. difficile colitis  Started vancomycin 7/2/2025  Discontinued fecal maintenance system 7/3, symptoms improved     BPH with urinary retention/Balanitis/Posthitis  Admitted to [facility] 11/15 - failed voiding trial to discharged to [facility] with Ledesma and on Finasteride/Tamsulosin. Appears has not filled Rx for Tamsulosin/Finasteride since February 2025  He reports Ledesma was removed at [facility] and had not ever been replaced   Continue Ledesma catheter, bladder training started 7-2 but pt reports he does not feel any urge to void while ledesma is clamped  Restarted Tamsulosin and Finasteride this admission   Did not attend urology follow up - will re-refer  Ledesma insertion complicated by periurethral edema and uncircumcised, complicated by balanitis and posthitis requiring Urology to place ledesma here.     DKA  T2DM  A1c 14.2%  Doing better on long acting, mealtime and sliding scale     Pressure wounds  Wound care following  Will need close glucose control to allow for wound healing     PAD s/p left BKA and right second toe amputation  Continue ASA / statin      Restless leg syndrome  Continue home meds with ropinirole     Peripheral neuropathy     HLD     HTN     Poor living conditions   / case management on board      Discharge Follow Up Recommendations for  outpatient labs/diagnostics:   Followup with pcp 2 weeks to review meds and check blood sugar  Followup with ID 2 weeks for the bacteremia    Day of Discharge     HPI:   No new concerns, going to [facility] today    Review of Systems  Only 1 BM yesterday after starting vanc    Vital Signs:   Temp:  [97.6 °F (36.4 °C)-98 °F (36.7 °C)] 97.6 °F (36.4 °C)  Heart Rate:  [85-98] 98  Resp:  [18] 18  BP: (101-119)/(65-71) 115/65      Physical Exam:  Constitutional: No acute distress, awake, alert  HENT: NCAT, mucous membranes moist  Respiratory: Clear to auscultation bilat, respiratory effort normal   Cardiovascular: RRR  Gastrointestinal: Positive bowel sounds, soft, nontender, nondistended  Musculoskeletal: Left BKA noted  Psychiatric: Appropriate affect, cooperative  Neurologic: Oriented x 3, strength symmetric in all extremities, Cranial Nerves grossly intact to confrontation, speech clear  Skin: No rashes       Pertinent  and/or Most Recent Results     LAB RESULTS:      Lab 07/04/25  0505 07/03/25  0604 07/02/25  0708 07/01/25  0525 06/30/25  0437 06/29/25  0743 06/28/25  1159   WBC 11.12* 11.15* 11.17* 8.73 13.32*   < > 9.48   HEMOGLOBIN 9.6* 9.9* 10.4* 9.7* 9.8*   < > 12.1*   HEMATOCRIT 31.7* 32.2* 34.1* 32.1* 32.3*   < > 38.6   PLATELETS 396 390 435 366 371   < > 416   NEUTROS ABS  --   --   --   --   --   --  5.76   IMMATURE GRANS (ABS)  --   --   --   --   --   --  0.08*   LYMPHS ABS  --   --   --   --   --   --  2.33   MONOS ABS  --   --   --   --   --   --  1.04*   EOS ABS  --   --   --   --   --   --  0.19   MCV 84.3 83.6 84.4 84.0 84.3   < > 82.0    < > = values in this interval not displayed.         Lab 07/04/25  0505 07/03/25  0604 07/02/25  0708 07/01/25  0525 06/30/25  0437   SODIUM 138 138 136 135* 135*   POTASSIUM 4.2 4.3 4.4 4.2 4.7   CHLORIDE 103 103 104 103 101   CO2 27.0 26.0 21.6* 26.0 25.3   ANION GAP 8.0 9.0 10.4 6.0 8.7   BUN 23.3* 17.4 18.6 17.8 15.9   CREATININE 0.70* 0.49* 0.56* 0.45*  0.73*   EGFR 104.8 116.8 112.1 119.8 103.5   GLUCOSE 190* 164* 111* 123* 133*   CALCIUM 8.5* 8.1* 8.4* 7.9* 8.0*   MAGNESIUM 1.8 1.9 2.1 1.7 1.8                         Brief Urine Lab Results  (Last result in the past 365 days)        Color   Clarity   Blood   Leuk Est   Nitrite   Protein   CREAT   Urine HCG        06/25/25 1015 Yellow   Turbid   Large (3+)   Moderate (2+)   Negative   >=300 mg/dL (3+)                 Microbiology Results (last 10 days)       Procedure Component Value - Date/Time    Clostridioides difficile Toxin - Stool, Per Rectum [[id number]]  (Abnormal) Collected: 07/01/25 2109    Lab Status: Final result Specimen: Stool from Per Rectum Updated: 07/02/25 0906    Narrative:      The following orders were created for panel order Clostridioides difficile Toxin - Stool, Per Rectum.  Procedure                               Abnormality         Status                     ---------                               -----------         ------                     Clostridioides difficile...[[id number]]  Abnormal            Final result                 Please view results for these tests on the individual orders.    Clostridioides difficile Toxin, PCR - Stool, Per Rectum [[id number]]  (Abnormal) Collected: 07/01/25 2109    Lab Status: Final result Specimen: Stool from Per Rectum Updated: 07/02/25 0906     Toxigenic C. difficile by PCR Detected    Narrative:      DNA from a toxigenic strain of C.difficile has been detected.    Clostridioides difficile toxin Ag, Reflex - Stool, Per Rectum [[id number]]  (Normal) Collected: 07/01/25 2109    Lab Status: Final result Specimen: Stool from Per Rectum Updated: 07/02/25 1033     C.diff Toxin Ag Negative    Narrative:      DNA from a toxigenic strain of C.difficile was detected, although the free toxin itself was not detected. These findings are consistent with C.difficile colonization and may not reflect actual C.difficile infection. Clinical correlation needed.    Urine  Culture - Urine, Urine, Catheter [[id number]]  (Normal) Collected: 06/25/25 1015    Lab Status: Final result Specimen: Urine, Catheter Updated: 06/26/25 1228     Urine Culture No growth    Respiratory Panel PCR w/COVID-19(SARS-CoV-2) ALBINA/LION/VALERI/PAD/COR/MATHEW In-House, NP Swab in UTM/VTM, 2 HR TAT - Swab, Nasopharynx [[id number]]  (Normal) Collected: 06/25/25 0810    Lab Status: Final result Specimen: Swab from Nasopharynx Updated: 06/25/25 1009     ADENOVIRUS, PCR Not Detected     Coronavirus 229E Not Detected     Coronavirus HKU1 Not Detected     Coronavirus NL63 Not Detected     Coronavirus OC43 Not Detected     COVID19 Not Detected     Human Metapneumovirus Not Detected     Human Rhinovirus/Enterovirus Not Detected     Influenza A PCR Not Detected     Influenza B PCR Not Detected     Parainfluenza Virus 1 Not Detected     Parainfluenza Virus 2 Not Detected     Parainfluenza Virus 3 Not Detected     Parainfluenza Virus 4 Not Detected     RSV, PCR Not Detected     Bordetella pertussis pcr Not Detected     Bordetella parapertussis PCR Not Detected     Chlamydophila pneumoniae PCR Not Detected     Mycoplasma pneumo by PCR Not Detected    Narrative:      In the setting of a positive respiratory panel with a viral infection PLUS a negative procalcitonin without other underlying concern for bacterial infection, consider observing off antibiotics or discontinuation of antibiotics and continue supportive care. If the respiratory panel is positive for atypical bacterial infection (Bordetella pertussis, Chlamydophila pneumoniae, or Mycoplasma pneumoniae), consider antibiotic de-escalation to target atypical bacterial infection.    MRSA Screen, PCR (Inpatient) - Swab, Nares [[id number]]  (Normal) Collected: 06/25/25 0810    Lab Status: Final result Specimen: Swab from Nares Updated: 06/25/25 1043     MRSA PCR Negative    Narrative:      The negative predictive value of this diagnostic test is high and should only be used to  consider de-escalating anti-MRSA therapy. A positive result may indicate colonization with MRSA and must be correlated clinically.  MRSA Negative    COVID PRE-OP / PRE-PROCEDURE SCREENING ORDER (NO ISOLATION) - Swab, Nasopharynx [[id number]]  (Normal) Collected: 06/25/25 0040    Lab Status: Final result Specimen: Swab from Nasopharynx Updated: 06/25/25 0119    Narrative:      The following orders were created for panel order COVID PRE-OP / PRE-PROCEDURE SCREENING ORDER (NO ISOLATION) - Swab, Nasopharynx.  Procedure                               Abnormality         Status                     ---------                               -----------         ------                     COVID-19 and FLU A/B PCR...[[id number]]  Normal              Final result                 Please view results for these tests on the individual orders.    COVID-19 and FLU A/B PCR, 1 HR TAT - Swab, Nasopharynx [[id number]]  (Normal) Collected: 06/25/25 0040    Lab Status: Final result Specimen: Swab from Nasopharynx Updated: 06/25/25 0119     COVID19 Not Detected     Influenza A PCR Not Detected     Influenza B PCR Not Detected    Narrative:      Fact sheet for providers: https://www.fda.gov/media/425198/download    Fact sheet for patients: https://www.fda.gov/media/727750/download    Test performed by PCR.    Blood Culture - Blood, Wrist, Left [[id number]]  (Abnormal)  (Susceptibility) Collected: 06/24/25 6960    Lab Status: Final result Specimen: Blood from Wrist, Left Updated: 06/29/25 0633     Blood Culture Escherichia coli     Isolated from Aerobic and Anaerobic Bottles     Blood Culture Staphylococcus hominis ssp hominis     Isolated from Aerobic Bottle     Gram Stain Aerobic Bottle Gram negative bacilli      Anaerobic Bottle Gram negative bacilli      Aerobic Bottle Gram positive cocci    Narrative:      Less than seven (7) mL's of blood was collected.  Insufficient quantity may yield false negative results.    Staphylococcus hominis ssp  hominis: Probable contaminant requires clinical correlation, susceptibility not performed unless requested by physician.      Susceptibility        Escherichia coli      RONAK      Amoxicillin + Clavulanate Susceptible      Ampicillin Susceptible      Ampicillin + Sulbactam Susceptible      Cefazolin (Non Urine) Susceptible      Cefepime Susceptible      Ceftazidime Susceptible      Ceftriaxone Susceptible      Cefuroxime axetil Susceptible      Gentamicin Susceptible      Levofloxacin Susceptible      Piperacillin + Tazobactam Susceptible      Trimethoprim + Sulfamethoxazole Susceptible                       Susceptibility Comments       Escherichia coli    With the exception of urinary-sourced infections, aminoglycosides should not be used as monotherapy.               Blood Culture - Blood, Arm, Left [[id number]]  (Abnormal) Collected: 06/24/25 2348    Lab Status: Final result Specimen: Blood from Arm, Left Updated: 06/29/25 0636     Blood Culture Staphylococcus epidermidis     Isolated from Aerobic and Anaerobic Bottles     Gram Stain Anaerobic Bottle Gram positive cocci in groups      Aerobic Bottle Gram positive cocci in groups    Narrative:      Less than seven (7) mL's of blood was collected.  Insufficient quantity may yield false negative results.    Probable contaminant requires clinical correlation, susceptibility not performed unless requested by physician.      Blood Culture ID, PCR - Blood, Wrist, Left [[id number]]  (Abnormal) Collected: 06/24/25 2348    Lab Status: Final result Specimen: Blood from Wrist, Left Updated: 06/25/25 1603     BCID, PCR Staph spp, not aureus or lugdunensis. Identification by BCID2 PCR.     BCID, PCR 2 Klebsiella pneumoniae group. Identification by BCID2 PCR.     BOTTLE TYPE Aerobic Bottle    Narrative:      No resistance genes detected.    Blood Culture ID, PCR - Blood, Arm, Left [[id number]]  (Abnormal) Collected: 06/24/25 2348    Lab Status: Final result Specimen: Blood from  Arm, Left Updated: 06/27/25 0423     BCID, PCR Staph spp, not aureus or lugdunensis. Identification by BCID2 PCR.     BOTTLE TYPE Aerobic Bottle            CT Head Without Contrast  Result Date: 6/25/2025  CT HEAD WO CONTRAST Date of Exam: 6/25/2025 4:53 AM EDT Indication: AMS, found down at home. Comparison: CT scan of the head dated November 25, 2024 Technique: Axial CT images were obtained of the head without contrast administration.  Automated exposure control and iterative construction methods were used. Findings: There is moderate diffuse generalized atrophy. There are low-attenuation areas in the periventricular white matter consistent with chronic microvascular ischemic change. There is no mass, mass effect or midline shift. There are no abnormal extra-axial fluid collections or areas of acute hemorrhage. The paranasal sinuses are clear. The mastoid air cells are clear.     Impression: Atrophy and chronic microvascular ischemic change. No acute intracranial process. Electronically Signed: Horacio Bailey MD  6/25/2025 5:19 AM EDT  Workstation ID: [id number]    XR Chest 1 View  Result Date: 6/25/2025  XR CHEST 1 VW Date of Exam: 6/24/2025 11:46 PM EDT Indication: ams Comparison: 11/25/2024 Findings: Cardiac and mediastinal contours are normal. Pulmonary vascularity is normal. The lungs are clear. No pneumothorax.     No active disease. Electronically Signed: Atul Shrestha MD  6/25/2025 12:11 AM EDT  Workstation ID: [id number]      Results for orders placed during the hospital encounter of 11/15/24    Doppler Arterial Multi Level Lower Extremity - Bilateral CAR 11/19/2024  4:35 PM    Interpretation Summary  •  Right Conclusion: The right ANNY is normal. Unable to assess digital insufficiency.  •  Left Conclusion: The left ANNY is normal. Normal digital pressures.      Results for orders placed during the hospital encounter of 11/15/24    Doppler Arterial Multi Level Lower Extremity - Bilateral CAR 11/19/2024  4:35  PM    Interpretation Summary  •  Right Conclusion: The right ANNY is normal. Unable to assess digital insufficiency.  •  Left Conclusion: The left ANNY is normal. Normal digital pressures.      Results for orders placed during the hospital encounter of 06/24/25    Adult Transthoracic Echo Complete W/ Cont if Necessary Per Protocol 06/28/2025  6:13 PM    Interpretation Summary  •  Left ventricular systolic function is normal. Left ventricular ejection fraction appears to be 66 - 70%.  •  Saline test results are negative.      Plan for Follow-up of Pending Labs/Results: nothing pending      Discharge Details        Discharge Medications        New Medications        Instructions Start Date   cefTRIAXone 2,000 mg in sodium chloride 0.9 % 100 mL IVPB   2,000 mg, Intravenous, Every 24 Hours      insulin glargine 100 UNIT/ML injection  Commonly known as: LANTUS, SEMGLEE   18 Units, Subcutaneous, Every 12 Hours Scheduled      Insulin Lispro 100 UNIT/ML injection  Commonly known as: humaLOG   2-7 Units, Subcutaneous, 4 Times Daily Before Meals & Nightly      Insulin Lispro 100 UNIT/ML injection  Commonly known as: humaLOG   8 Units, Subcutaneous, 3 Times Daily With Meals      lactobacillus acidophilus capsule capsule   1 capsule, Oral, Daily      Lidocaine 4 %   1 patch, Transdermal, Every 24 Hours Scheduled, Remove & Discard patch within 12 hours or as directed by MD      vancomycin 125 MG capsule  Commonly known as: VANCOCIN   125 mg, Oral, Every 6 Hours Scheduled             Changes to Medications        Instructions Start Date   metoprolol succinate XL 25 MG 24 hr tablet  Commonly known as: TOPROL-XL  What changed:   medication strength  how much to take   25 mg, Oral, Daily             Continue These Medications        Instructions Start Date   acetaminophen 325 MG tablet  Commonly known as: TYLENOL   650 mg, Oral, Every 4 Hours PRN      aspirin 81 MG EC tablet   81 mg, Oral, Daily      B-D UF III MINI PEN NEEDLES 31G X  5 MM misc  Generic drug: Insulin Pen Needle   Use as directed 2 times daily      B-D UF III MINI PEN NEEDLES 31G X 5 MM misc  Generic drug: Insulin Pen Needle   Use 1 pen needle as directed 2 (Two) Times a Day.      calcium carbonate 500 MG chewable tablet  Commonly known as: TUMS   2 tablets, Oral, 3 Times Daily PRN      cyclobenzaprine 5 MG tablet  Commonly known as: FLEXERIL   5 mg, 3 Times Daily PRN      DULoxetine 60 MG capsule  Commonly known as: CYMBALTA   60 mg, Daily      finasteride 5 MG tablet  Commonly known as: PROSCAR   5 mg, Oral, Nightly      fluticasone 50 MCG/ACT nasal spray  Commonly known as: Flonase   2 sprays, Nasal, 2 Times Daily PRN      levocetirizine 5 MG tablet  Commonly known as: XYZAL   1 tablet, Daily      omeprazole 40 MG capsule  Commonly known as: priLOSEC   40 mg, Oral, Every Morning Before Breakfast      OneTouch Delica Plus [id number] misc   Use as directed to test blood sugar 3 (Three) Times a Day.      OneTouch Verio Flex System w/Device kit   Use as directed to test blood sugar 3 (Three) Times a Day.      OneTouch Verio test strip  Generic drug: glucose blood   Use as directed to test blood sugar 3 times daily      pregabalin 225 MG capsule  Commonly known as: Lyrica   225 mg, Oral, 2 Times Daily      rOPINIRole 0.25 MG tablet  Commonly known as: REQUIP   Take 1 tablet by mouth Every Night, 1 hour before bedtime.      simvastatin 20 MG tablet  Commonly known as: ZOCOR   1 tablet, Oral, Daily      tamsulosin 0.4 MG capsule 24 hr capsule  Commonly known as: FLOMAX   0.8 mg, Oral, Nightly             Stop These Medications      NovoLOG Mix 70/30 FlexPen (70-30) 100 UNIT/ML suspension pen-injector injection  Generic drug: insulin aspart prot & aspart              Allergies   Allergen Reactions   • Sertraline Hcl Hives     Zoloft         Discharge Disposition:  Rehab Facility or Unit (DC - External)    Diet:  Hospital:  Diet Order   Procedures   • Diet: Cardiac, Diabetic; Healthy  Heart (2-3 Na+); Consistent Carbohydrate; Fluid Consistency: Thin (IDDSI 0)       Diet Instructions       Diet: Diabetic Diets, Cardiac Diets; Healthy Heart (2-3 Na+); Thin (IDDSI 0); Consistent Carbohydrate      Discharge Diet:  Diabetic Diets  Cardiac Diets       Cardiac Diet: Healthy Heart (2-3 Na+)    Fluid Consistency: Thin (IDDSI 0)    Diabetic Diet: Consistent Carbohydrate             Activity:  Activity Instructions       Activity as Tolerated              Restrictions or Other Recommendations:  none       CODE STATUS:    Code Status and Medical Interventions: CPR (Attempt to Resuscitate); Full Support   Ordered at: 06/25/25 0129     Code Status (Patient has no pulse and is not breathing):    CPR (Attempt to Resuscitate)     Medical Interventions (Patient has pulse or is breathing):    Full Support       No future appointments.    Additional Instructions for the Follow-ups that You Need to Schedule       Discharge Follow-up with PCP   As directed       Currently Documented PCP:    Brandy Roberson MD    PCP Phone Number:    [phone]     Follow Up Details: 2 weeks review meds, check blood sugars        Discharge Follow-up with Specified Provider: Infectious Disease followup bacteremia; 2 Weeks   As directed      To: Infectious Disease followup bacteremia   Follow Up: 2 Weeks                      Corrina Mckeon MD  07/04/25      Time Spent on Discharge:  I spent  42  minutes on this discharge activity which included: face-to-face encounter with the patient, reviewing the data in the system, coordination of the care with the nursing staff as well as consultants, documentation, and entering orders.

## 2025-07-04 NOTE — DISCHARGE PLACEMENT REQUEST
"Fracisco Patterson \"Bill\" (59 y.o. Male)       Date of Birth   1965    Social Security Number       Address   [address] [address]    Home Phone   [phone]    MRN   [id number]       Mandaen   None    Marital Status                               Admission Date   2025    Admission Type   Emergency    Admitting Provider   Corrina Mckeon MD    Attending Provider   Corrina Mckeon MD    Department, Room/Bed   [address], S449/1       Discharge Date       Discharge Disposition   Rehab Facility or Unit (DC - External)    Discharge Destination                                 Attending Provider: Corrina Mckeon MD    Allergies: Sertraline Hcl    Isolation: Spore   Infection: C.difficile (25)   Code Status: CPR    Ht: 162.6 cm (64.02\")   Wt: 70.5 kg (155 lb 6.8 oz)    Admission Cmt: None   Principal Problem: Bacteremia due to Klebsiella pneumoniae [R78.81,B96.1]                   Active Insurance as of 2025       Primary Coverage       Payor Plan Insurance Group Employer/Plan Group    [employer] [place] [employer] [place]ICA[place]        Payor Plan Address Payor Plan Phone Number Payor Plan Fax Number Effective Dates    [address] [address] [phone]  4/10/2016 - None Entered    [place] [address]         Subscriber Name Subscriber Birth Date Member ID       FRACISCO PATTERSON 1965 [id number]                     Emergency Contacts        (Rel.) Home Phone Work Phone Mobile Phone    IVAN FRANK (Partner) [phone] -- [phone]                   Discharge Summary        Corrina Mckeon MD at 25 0547              [facility] Medicine Services  DISCHARGE SUMMARY    Patient Name: Fracisco Patterson  : 1965  MRN: [id number]    Date of Admission: 2025 11:24 PM  Date of Discharge:  25    Primary Care Physician: Brandy Roberson MD    Consults       Date and Time Order Name Status Description "    6/30/2025 12:24 PM Inpatient Urology Consult      6/29/2025 11:23 AM Inpatient Infectious Diseases Consult Completed     6/25/2025  6:50 AM Inpatient Urology Consult Completed             Hospital Course     Presenting Problem: DKA    Active Hospital Problems    Diagnosis  POA    **Bacteremia due to Klebsiella pneumoniae [R78.81, B96.1]  Yes    Clostridium difficile diarrhea [A04.72]  No    Severe protein-calorie malnutrition [E43]  Yes    Tobacco use [Z72.0]  Yes    RLS (restless legs syndrome) [G25.81]  Yes    GERD (gastroesophageal reflux disease) [K21.9]  Yes    Hyperlipidemia [E78.5]  Yes    Type 2 diabetes mellitus, with long-term current use of insulin [E11.9, Z79.4]  Not Applicable    Peripheral neuropathy [G62.9]  Yes    HTN (hypertension) [I10]  Yes      Resolved Hospital Problems    Diagnosis Date Resolved POA    DKA (diabetic ketoacidosis) [E11.10] 07/01/2025 Yes          Hospital Course:  Fracisco Mullen is a 59 y.o. 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 Has also struggled with BPH with LUTS / urinary retention (discharged to [facility] on 12/5/24 with reported removal at [facility] and no subsequent replacement).      Since his amputation, he has been effectively bedbound but can get in wheelchair/motorized scooter. He resides with his girlfriend and 22yo daughter. Presented to [facility] ED on 6/24/2025 for evaluation of altered mental status. Found to be in DKA on arrival. Nursing unable to place Bustos catheter - urology had to place. UA was concerning for UTI. Blood cultures returned positive for Klebsiela and E. Coli. Urine culture NGTD but urine sample delayed due to difficult catheter placement - patient received IV antibiotics prior to urine sample collection.      Severe sepsis, resolved   E. coli and Klebsiella bacteremia  New  leukocytosis  Likely urinary source in the setting of UTI due to Klebsiella particularly with blood cultures being positive for E. coli and Klebsiella  ID following - on IV Ceftriaxone 2g daily (plan 10 day course)  TTE this admission showed EF of 66 to 70%, saline agitated test was negative, no evidence of vegetation, trace TR.     C. difficile colitis  Started vancomycin 7/2/2025  Discontinued fecal maintenance system 7/3, symptoms improved     BPH with urinary retention/Balanitis/Posthitis  Admitted to [facility] 11/15 - failed voiding trial to discharged to [facility] with Ledesma and on Finasteride/Tamsulosin. Appears has not filled Rx for Tamsulosin/Finasteride since February 2025  He reports Ledesma was removed at [facility] and had not ever been replaced   Continue Ledesma catheter, bladder training started 7-2 but pt reports he does not feel any urge to void while ledesma is clamped  Restarted Tamsulosin and Finasteride this admission   Did not attend urology follow up - will re-refer  Ledesma insertion complicated by periurethral edema and uncircumcised, complicated by balanitis and posthitis requiring Urology to place ledesma here.     DKA  T2DM  A1c 14.2%  Doing better on long acting, mealtime and sliding scale     Pressure wounds  Wound care following  Will need close glucose control to allow for wound healing     PAD s/p left BKA and right second toe amputation  Continue ASA / statin      Restless leg syndrome  Continue home meds with ropinirole     Peripheral neuropathy     HLD     HTN     Poor living conditions   / case management on board      Discharge Follow Up Recommendations for outpatient labs/diagnostics:   Followup with pcp 2 weeks to review meds and check blood sugar  Followup with ID 2 weeks for the bacteremia    Day of Discharge     HPI:   No new concerns, going to [facility] today    Review of Systems  Only 1 BM yesterday after starting vanc    Vital Signs:   Temp:  [97.6 °F (36.4 °C)-98 °F (36.7 °C)]  97.6 °F (36.4 °C)  Heart Rate:  [85-98] 98  Resp:  [18] 18  BP: (101-119)/(65-71) 115/65      Physical Exam:  Constitutional: No acute distress, awake, alert  HENT: NCAT, mucous membranes moist  Respiratory: Clear to auscultation bilat, respiratory effort normal   Cardiovascular: RRR  Gastrointestinal: Positive bowel sounds, soft, nontender, nondistended  Musculoskeletal: Left BKA noted  Psychiatric: Appropriate affect, cooperative  Neurologic: Oriented x 3, strength symmetric in all extremities, Cranial Nerves grossly intact to confrontation, speech clear  Skin: No rashes       Pertinent  and/or Most Recent Results     LAB RESULTS:      Lab 07/04/25  0505 07/03/25  0604 07/02/25  0708 07/01/25  0525 06/30/25  0437 06/29/25  0743 06/28/25  1159   WBC 11.12* 11.15* 11.17* 8.73 13.32*   < > 9.48   HEMOGLOBIN 9.6* 9.9* 10.4* 9.7* 9.8*   < > 12.1*   HEMATOCRIT 31.7* 32.2* 34.1* 32.1* 32.3*   < > 38.6   PLATELETS 396 390 435 366 371   < > 416   NEUTROS ABS  --   --   --   --   --   --  5.76   IMMATURE GRANS (ABS)  --   --   --   --   --   --  0.08*   LYMPHS ABS  --   --   --   --   --   --  2.33   MONOS ABS  --   --   --   --   --   --  1.04*   EOS ABS  --   --   --   --   --   --  0.19   MCV 84.3 83.6 84.4 84.0 84.3   < > 82.0    < > = values in this interval not displayed.         Lab 07/04/25  0505 07/03/25  0604 07/02/25  0708 07/01/25  0525 06/30/25  0437   SODIUM 138 138 136 135* 135*   POTASSIUM 4.2 4.3 4.4 4.2 4.7   CHLORIDE 103 103 104 103 101   CO2 27.0 26.0 21.6* 26.0 25.3   ANION GAP 8.0 9.0 10.4 6.0 8.7   BUN 23.3* 17.4 18.6 17.8 15.9   CREATININE 0.70* 0.49* 0.56* 0.45* 0.73*   EGFR 104.8 116.8 112.1 119.8 103.5   GLUCOSE 190* 164* 111* 123* 133*   CALCIUM 8.5* 8.1* 8.4* 7.9* 8.0*   MAGNESIUM 1.8 1.9 2.1 1.7 1.8                         Brief Urine Lab Results  (Last result in the past 365 days)        Color   Clarity   Blood   Leuk Est   Nitrite   Protein   CREAT   Urine HCG        06/25/25 1015 Yellow    Turbid   Large (3+)   Moderate (2+)   Negative   >=300 mg/dL (3+)                 Microbiology Results (last 10 days)       Procedure Component Value - Date/Time    Clostridioides difficile Toxin - Stool, Per Rectum [[id number]]  (Abnormal) Collected: 07/01/25 2109    Lab Status: Final result Specimen: Stool from Per Rectum Updated: 07/02/25 0906    Narrative:      The following orders were created for panel order Clostridioides difficile Toxin - Stool, Per Rectum.  Procedure                               Abnormality         Status                     ---------                               -----------         ------                     Clostridioides difficile...[[id number]]  Abnormal            Final result                 Please view results for these tests on the individual orders.    Clostridioides difficile Toxin, PCR - Stool, Per Rectum [[id number]]  (Abnormal) Collected: 07/01/25 2109    Lab Status: Final result Specimen: Stool from Per Rectum Updated: 07/02/25 0906     Toxigenic C. difficile by PCR Detected    Narrative:      DNA from a toxigenic strain of C.difficile has been detected.    Clostridioides difficile toxin Ag, Reflex - Stool, Per Rectum [[id number]]  (Normal) Collected: 07/01/25 2109    Lab Status: Final result Specimen: Stool from Per Rectum Updated: 07/02/25 1033     C.diff Toxin Ag Negative    Narrative:      DNA from a toxigenic strain of C.difficile was detected, although the free toxin itself was not detected. These findings are consistent with C.difficile colonization and may not reflect actual C.difficile infection. Clinical correlation needed.    Urine Culture - Urine, Urine, Catheter [[id number]]  (Normal) Collected: 06/25/25 1015    Lab Status: Final result Specimen: Urine, Catheter Updated: 06/26/25 1228     Urine Culture No growth    Respiratory Panel PCR w/COVID-19(SARS-CoV-2) ALBINA/LION/VALEIR/PAD/COR/MATHEW In-House, NP Swab in UTM/VTM, 2 HR TAT - Swab, Nasopharynx [[id number]]  (Normal)  Collected: 06/25/25 0810    Lab Status: Final result Specimen: Swab from Nasopharynx Updated: 06/25/25 1009     ADENOVIRUS, PCR Not Detected     Coronavirus 229E Not Detected     Coronavirus HKU1 Not Detected     Coronavirus NL63 Not Detected     Coronavirus OC43 Not Detected     COVID19 Not Detected     Human Metapneumovirus Not Detected     Human Rhinovirus/Enterovirus Not Detected     Influenza A PCR Not Detected     Influenza B PCR Not Detected     Parainfluenza Virus 1 Not Detected     Parainfluenza Virus 2 Not Detected     Parainfluenza Virus 3 Not Detected     Parainfluenza Virus 4 Not Detected     RSV, PCR Not Detected     Bordetella pertussis pcr Not Detected     Bordetella parapertussis PCR Not Detected     Chlamydophila pneumoniae PCR Not Detected     Mycoplasma pneumo by PCR Not Detected    Narrative:      In the setting of a positive respiratory panel with a viral infection PLUS a negative procalcitonin without other underlying concern for bacterial infection, consider observing off antibiotics or discontinuation of antibiotics and continue supportive care. If the respiratory panel is positive for atypical bacterial infection (Bordetella pertussis, Chlamydophila pneumoniae, or Mycoplasma pneumoniae), consider antibiotic de-escalation to target atypical bacterial infection.    MRSA Screen, PCR (Inpatient) - Swab, Nares [[id number]]  (Normal) Collected: 06/25/25 0810    Lab Status: Final result Specimen: Swab from Nares Updated: 06/25/25 1043     MRSA PCR Negative    Narrative:      The negative predictive value of this diagnostic test is high and should only be used to consider de-escalating anti-MRSA therapy. A positive result may indicate colonization with MRSA and must be correlated clinically.  MRSA Negative    COVID PRE-OP / PRE-PROCEDURE SCREENING ORDER (NO ISOLATION) - Swab, Nasopharynx [[id number]]  (Normal) Collected: 06/25/25 0040    Lab Status: Final result Specimen: Swab from Nasopharynx  Updated: 06/25/25 0119    Narrative:      The following orders were created for panel order COVID PRE-OP / PRE-PROCEDURE SCREENING ORDER (NO ISOLATION) - Swab, Nasopharynx.  Procedure                               Abnormality         Status                     ---------                               -----------         ------                     COVID-19 and FLU A/B PCR...[[id number]]  Normal              Final result                 Please view results for these tests on the individual orders.    COVID-19 and FLU A/B PCR, 1 HR TAT - Swab, Nasopharynx [[id number]]  (Normal) Collected: 06/25/25 0040    Lab Status: Final result Specimen: Swab from Nasopharynx Updated: 06/25/25 0119     COVID19 Not Detected     Influenza A PCR Not Detected     Influenza B PCR Not Detected    Narrative:      Fact sheet for providers: https://www.fda.gov/media/681733/download    Fact sheet for patients: https://www.fda.gov/media/143018/download    Test performed by PCR.    Blood Culture - Blood, Wrist, Left [[id number]]  (Abnormal)  (Susceptibility) Collected: 06/24/25 2348    Lab Status: Final result Specimen: Blood from Wrist, Left Updated: 06/29/25 0633     Blood Culture Escherichia coli     Isolated from Aerobic and Anaerobic Bottles     Blood Culture Staphylococcus hominis ssp hominis     Isolated from Aerobic Bottle     Gram Stain Aerobic Bottle Gram negative bacilli      Anaerobic Bottle Gram negative bacilli      Aerobic Bottle Gram positive cocci    Narrative:      Less than seven (7) mL's of blood was collected.  Insufficient quantity may yield false negative results.    Staphylococcus hominis ssp hominis: Probable contaminant requires clinical correlation, susceptibility not performed unless requested by physician.      Susceptibility        Escherichia coli      RONAK      Amoxicillin + Clavulanate Susceptible      Ampicillin Susceptible      Ampicillin + Sulbactam Susceptible      Cefazolin (Non Urine) Susceptible      Cefepime  Susceptible      Ceftazidime Susceptible      Ceftriaxone Susceptible      Cefuroxime axetil Susceptible      Gentamicin Susceptible      Levofloxacin Susceptible      Piperacillin + Tazobactam Susceptible      Trimethoprim + Sulfamethoxazole Susceptible                       Susceptibility Comments       Escherichia coli    With the exception of urinary-sourced infections, aminoglycosides should not be used as monotherapy.               Blood Culture - Blood, Arm, Left [[id number]]  (Abnormal) Collected: 06/24/25 2348    Lab Status: Final result Specimen: Blood from Arm, Left Updated: 06/29/25 0633     Blood Culture Staphylococcus epidermidis     Isolated from Aerobic and Anaerobic Bottles     Gram Stain Anaerobic Bottle Gram positive cocci in groups      Aerobic Bottle Gram positive cocci in groups    Narrative:      Less than seven (7) mL's of blood was collected.  Insufficient quantity may yield false negative results.    Probable contaminant requires clinical correlation, susceptibility not performed unless requested by physician.      Blood Culture ID, PCR - Blood, Wrist, Left [[id number]]  (Abnormal) Collected: 06/24/25 2348    Lab Status: Final result Specimen: Blood from Wrist, Left Updated: 06/25/25 1603     BCID, PCR Staph spp, not aureus or lugdunensis. Identification by BCID2 PCR.     BCID, PCR 2 Klebsiella pneumoniae group. Identification by BCID2 PCR.     BOTTLE TYPE Aerobic Bottle    Narrative:      No resistance genes detected.    Blood Culture ID, PCR - Blood, Arm, Left [[id number]]  (Abnormal) Collected: 06/24/25 2348    Lab Status: Final result Specimen: Blood from Arm, Left Updated: 06/27/25 0423     BCID, PCR Staph spp, not aureus or lugdunensis. Identification by BCID2 PCR.     BOTTLE TYPE Aerobic Bottle            CT Head Without Contrast  Result Date: 6/25/2025  CT HEAD WO CONTRAST Date of Exam: 6/25/2025 4:53 AM EDT Indication: AMS, found down at home. Comparison: CT scan of the head dated  November 25, 2024 Technique: Axial CT images were obtained of the head without contrast administration.  Automated exposure control and iterative construction methods were used. Findings: There is moderate diffuse generalized atrophy. There are low-attenuation areas in the periventricular white matter consistent with chronic microvascular ischemic change. There is no mass, mass effect or midline shift. There are no abnormal extra-axial fluid collections or areas of acute hemorrhage. The paranasal sinuses are clear. The mastoid air cells are clear.     Impression: Atrophy and chronic microvascular ischemic change. No acute intracranial process. Electronically Signed: Horacio Bailey MD  6/25/2025 5:19 AM EDT  Workstation ID: [id number]    XR Chest 1 View  Result Date: 6/25/2025  XR CHEST 1 VW Date of Exam: 6/24/2025 11:46 PM EDT Indication: ams Comparison: 11/25/2024 Findings: Cardiac and mediastinal contours are normal. Pulmonary vascularity is normal. The lungs are clear. No pneumothorax.     No active disease. Electronically Signed: Atul Shrestha MD  6/25/2025 12:11 AM EDT  Workstation ID: [id number]      Results for orders placed during the hospital encounter of 11/15/24    Doppler Arterial Multi Level Lower Extremity - Bilateral CAR 11/19/2024  4:35 PM    Interpretation Summary    Right Conclusion: The right ANNY is normal. Unable to assess digital insufficiency.    Left Conclusion: The left ANNY is normal. Normal digital pressures.      Results for orders placed during the hospital encounter of 11/15/24    Doppler Arterial Multi Level Lower Extremity - Bilateral CAR 11/19/2024  4:35 PM    Interpretation Summary    Right Conclusion: The right ANNY is normal. Unable to assess digital insufficiency.    Left Conclusion: The left ANNY is normal. Normal digital pressures.      Results for orders placed during the hospital encounter of 06/24/25    Adult Transthoracic Echo Complete W/ Cont if Necessary Per Protocol 06/28/2025   6:13 PM    Interpretation Summary    Left ventricular systolic function is normal. Left ventricular ejection fraction appears to be 66 - 70%.    Saline test results are negative.      Plan for Follow-up of Pending Labs/Results: nothing pending      Discharge Details        Discharge Medications        New Medications        Instructions Start Date   cefTRIAXone 2,000 mg in sodium chloride 0.9 % 100 mL IVPB   2,000 mg, Intravenous, Every 24 Hours      insulin glargine 100 UNIT/ML injection  Commonly known as: LANTUS, SEMGLEE   18 Units, Subcutaneous, Every 12 Hours Scheduled      Insulin Lispro 100 UNIT/ML injection  Commonly known as: humaLOG   2-7 Units, Subcutaneous, 4 Times Daily Before Meals & Nightly      Insulin Lispro 100 UNIT/ML injection  Commonly known as: humaLOG   8 Units, Subcutaneous, 3 Times Daily With Meals      lactobacillus acidophilus capsule capsule   1 capsule, Oral, Daily      Lidocaine 4 %   1 patch, Transdermal, Every 24 Hours Scheduled, Remove & Discard patch within 12 hours or as directed by MD      vancomycin 125 MG capsule  Commonly known as: VANCOCIN   125 mg, Oral, Every 6 Hours Scheduled             Changes to Medications        Instructions Start Date   metoprolol succinate XL 25 MG 24 hr tablet  Commonly known as: TOPROL-XL  What changed:   medication strength  how much to take   25 mg, Oral, Daily             Continue These Medications        Instructions Start Date   acetaminophen 325 MG tablet  Commonly known as: TYLENOL   650 mg, Oral, Every 4 Hours PRN      aspirin 81 MG EC tablet   81 mg, Oral, Daily      B-D UF III MINI PEN NEEDLES 31G X 5 MM misc  Generic drug: Insulin Pen Needle   Use as directed 2 times daily      B-D UF III MINI PEN NEEDLES 31G X 5 MM misc  Generic drug: Insulin Pen Needle   Use 1 pen needle as directed 2 (Two) Times a Day.      calcium carbonate 500 MG chewable tablet  Commonly known as: TUMS   2 tablets, Oral, 3 Times Daily PRN      cyclobenzaprine 5 MG  tablet  Commonly known as: FLEXERIL   5 mg, 3 Times Daily PRN      DULoxetine 60 MG capsule  Commonly known as: CYMBALTA   60 mg, Daily      finasteride 5 MG tablet  Commonly known as: PROSCAR   5 mg, Oral, Nightly      fluticasone 50 MCG/ACT nasal spray  Commonly known as: Flonase   2 sprays, Nasal, 2 Times Daily PRN      levocetirizine 5 MG tablet  Commonly known as: XYZAL   1 tablet, Daily      omeprazole 40 MG capsule  Commonly known as: priLOSEC   40 mg, Oral, Every Morning Before Breakfast      OneTouch Delica Plus [id number] misc   Use as directed to test blood sugar 3 (Three) Times a Day.      OneTouch Verio Flex System w/Device kit   Use as directed to test blood sugar 3 (Three) Times a Day.      OneTouch Verio test strip  Generic drug: glucose blood   Use as directed to test blood sugar 3 times daily      pregabalin 225 MG capsule  Commonly known as: Lyrica   225 mg, Oral, 2 Times Daily      rOPINIRole 0.25 MG tablet  Commonly known as: REQUIP   Take 1 tablet by mouth Every Night, 1 hour before bedtime.      simvastatin 20 MG tablet  Commonly known as: ZOCOR   1 tablet, Oral, Daily      tamsulosin 0.4 MG capsule 24 hr capsule  Commonly known as: FLOMAX   0.8 mg, Oral, Nightly             Stop These Medications      NovoLOG Mix 70/30 FlexPen (70-30) 100 UNIT/ML suspension pen-injector injection  Generic drug: insulin aspart prot & aspart              Allergies   Allergen Reactions    Sertraline Hcl Hives     Zoloft         Discharge Disposition:  Rehab Facility or Unit (DC - External)    Diet:  Hospital:  Diet Order   Procedures    Diet: Cardiac, Diabetic; Healthy Heart (2-3 Na+); Consistent Carbohydrate; Fluid Consistency: Thin (IDDSI 0)       Diet Instructions       Diet: Diabetic Diets, Cardiac Diets; Healthy Heart (2-3 Na+); Thin (IDDSI 0); Consistent Carbohydrate      Discharge Diet:  Diabetic Diets  Cardiac Diets       Cardiac Diet: Healthy Heart (2-3 Na+)    Fluid Consistency: Thin (IDDSI 0)     Diabetic Diet: Consistent Carbohydrate             Activity:  Activity Instructions       Activity as Tolerated              Restrictions or Other Recommendations:  none       CODE STATUS:    Code Status and Medical Interventions: CPR (Attempt to Resuscitate); Full Support   Ordered at: 06/25/25 0129     Code Status (Patient has no pulse and is not breathing):    CPR (Attempt to Resuscitate)     Medical Interventions (Patient has pulse or is breathing):    Full Support       No future appointments.    Additional Instructions for the Follow-ups that You Need to Schedule       Discharge Follow-up with PCP   As directed       Currently Documented PCP:    Brandy Roberson MD    PCP Phone Number:    [phone]     Follow Up Details: 2 weeks review meds, check blood sugars        Discharge Follow-up with Specified Provider: Infectious Disease followup bacteremia; 2 Weeks   As directed      To: Infectious Disease followup bacteremia   Follow Up: 2 Weeks                      Corrina Mckeon MD  07/04/25      Time Spent on Discharge:  I spent  42  minutes on this discharge activity which included: face-to-face encounter with the patient, reviewing the data in the system, coordination of the care with the nursing staff as well as consultants, documentation, and entering orders.            Electronically signed by Corrina Mckeon MD at 07/04/25 0821

## 2025-07-04 NOTE — DISCHARGE PLACEMENT REQUEST
"Fracisco Patterson \"Bill\" (59 y.o. Male)       Date of Birth   1965    Social Security Number       Address   [address] [address]    Home Phone   [phone]    MRN   [id number]       Sabianist   None    Marital Status                               Admission Date   2025    Admission Type   Emergency    Admitting Provider   Corrina Mckeon MD    Attending Provider   Corrina Mckeon MD    Department, Room/Bed   [address], S449/1       Discharge Date       Discharge Disposition   Rehab Facility or Unit (DC - External)    Discharge Destination                                 Attending Provider: Corrina Mckeon MD    Allergies: Sertraline Hcl    Isolation: Spore   Infection: C.difficile (25)   Code Status: CPR    Ht: 162.6 cm (64.02\")   Wt: 70.5 kg (155 lb 6.8 oz)    Admission Cmt: None   Principal Problem: Bacteremia due to Klebsiella pneumoniae [R78.81,B96.1]                   Active Insurance as of 2025       Primary Coverage       Payor Plan Insurance Group Employer/Plan Group    [employer] [place] [employer] [place]ICA[place]        Payor Plan Address Payor Plan Phone Number Payor Plan Fax Number Effective Dates    [address] [address] [phone]  4/10/2016 - None Entered    [place] [address]         Subscriber Name Subscriber Birth Date Member ID       FRACISCO PATTERSON 1965 [id number]                     Emergency Contacts        (Rel.) Home Phone Work Phone Mobile Phone    IVAN FRANK (Partner) [phone] -- [phone]                   Discharge Summary        Corrina Mckeon MD at 25 0547              [facility] Medicine Services  DISCHARGE SUMMARY    Patient Name: Fracisco Patterson  : 1965  MRN: [id number]    Date of Admission: 2025 11:24 PM  Date of Discharge:  25    Primary Care Physician: Brandy Roberson MD    Consults       Date and Time Order Name Status Description "    6/30/2025 12:24 PM Inpatient Urology Consult      6/29/2025 11:23 AM Inpatient Infectious Diseases Consult Completed     6/25/2025  6:50 AM Inpatient Urology Consult Completed             Hospital Course     Presenting Problem: DKA    Active Hospital Problems    Diagnosis  POA    **Bacteremia due to Klebsiella pneumoniae [R78.81, B96.1]  Yes    Clostridium difficile diarrhea [A04.72]  No    Severe protein-calorie malnutrition [E43]  Yes    Tobacco use [Z72.0]  Yes    RLS (restless legs syndrome) [G25.81]  Yes    GERD (gastroesophageal reflux disease) [K21.9]  Yes    Hyperlipidemia [E78.5]  Yes    Type 2 diabetes mellitus, with long-term current use of insulin [E11.9, Z79.4]  Not Applicable    Peripheral neuropathy [G62.9]  Yes    HTN (hypertension) [I10]  Yes      Resolved Hospital Problems    Diagnosis Date Resolved POA    DKA (diabetic ketoacidosis) [E11.10] 07/01/2025 Yes          Hospital Course:  Fracisco Mullen is a 59 y.o. 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 Has also struggled with BPH with LUTS / urinary retention (discharged to [facility] on 12/5/24 with reported removal at [facility] and no subsequent replacement).      Since his amputation, he has been effectively bedbound but can get in wheelchair/motorized scooter. He resides with his girlfriend and 22yo daughter. Presented to [facility] ED on 6/24/2025 for evaluation of altered mental status. Found to be in DKA on arrival. Nursing unable to place Bustos catheter - urology had to place. UA was concerning for UTI. Blood cultures returned positive for Klebsiela and E. Coli. Urine culture NGTD but urine sample delayed due to difficult catheter placement - patient received IV antibiotics prior to urine sample collection.      Severe sepsis, resolved   E. coli and Klebsiella bacteremia  New  leukocytosis  Likely urinary source in the setting of UTI due to Klebsiella particularly with blood cultures being positive for E. coli and Klebsiella  ID following - on IV Ceftriaxone 2g daily (plan 10 day course)  TTE this admission showed EF of 66 to 70%, saline agitated test was negative, no evidence of vegetation, trace TR.     C. difficile colitis  Started vancomycin 7/2/2025  Discontinued fecal maintenance system 7/3, symptoms improved     BPH with urinary retention/Balanitis/Posthitis  Admitted to [facility] 11/15 - failed voiding trial to discharged to [facility] with Ledesma and on Finasteride/Tamsulosin. Appears has not filled Rx for Tamsulosin/Finasteride since February 2025  He reports Ledesma was removed at [facility] and had not ever been replaced   Continue Ledesma catheter, bladder training started 7-2 but pt reports he does not feel any urge to void while ledesma is clamped  Restarted Tamsulosin and Finasteride this admission   Did not attend urology follow up - will re-refer  Ledesma insertion complicated by periurethral edema and uncircumcised, complicated by balanitis and posthitis requiring Urology to place ledesma here.     DKA  T2DM  A1c 14.2%  Doing better on long acting, mealtime and sliding scale     Pressure wounds  Wound care following  Will need close glucose control to allow for wound healing     PAD s/p left BKA and right second toe amputation  Continue ASA / statin      Restless leg syndrome  Continue home meds with ropinirole     Peripheral neuropathy     HLD     HTN     Poor living conditions   / case management on board      Discharge Follow Up Recommendations for outpatient labs/diagnostics:   Followup with pcp 2 weeks to review meds and check blood sugar  Followup with ID 2 weeks for the bacteremia    Day of Discharge     HPI:   No new concerns, going to [facility] today    Review of Systems  Only 1 BM yesterday after starting vanc    Vital Signs:   Temp:  [97.6 °F (36.4 °C)-98 °F (36.7 °C)]  97.6 °F (36.4 °C)  Heart Rate:  [85-98] 98  Resp:  [18] 18  BP: (101-119)/(65-71) 115/65      Physical Exam:  Constitutional: No acute distress, awake, alert  HENT: NCAT, mucous membranes moist  Respiratory: Clear to auscultation bilat, respiratory effort normal   Cardiovascular: RRR  Gastrointestinal: Positive bowel sounds, soft, nontender, nondistended  Musculoskeletal: Left BKA noted  Psychiatric: Appropriate affect, cooperative  Neurologic: Oriented x 3, strength symmetric in all extremities, Cranial Nerves grossly intact to confrontation, speech clear  Skin: No rashes       Pertinent  and/or Most Recent Results     LAB RESULTS:      Lab 07/04/25  0505 07/03/25  0604 07/02/25  0708 07/01/25  0525 06/30/25  0437 06/29/25  0743 06/28/25  1159   WBC 11.12* 11.15* 11.17* 8.73 13.32*   < > 9.48   HEMOGLOBIN 9.6* 9.9* 10.4* 9.7* 9.8*   < > 12.1*   HEMATOCRIT 31.7* 32.2* 34.1* 32.1* 32.3*   < > 38.6   PLATELETS 396 390 435 366 371   < > 416   NEUTROS ABS  --   --   --   --   --   --  5.76   IMMATURE GRANS (ABS)  --   --   --   --   --   --  0.08*   LYMPHS ABS  --   --   --   --   --   --  2.33   MONOS ABS  --   --   --   --   --   --  1.04*   EOS ABS  --   --   --   --   --   --  0.19   MCV 84.3 83.6 84.4 84.0 84.3   < > 82.0    < > = values in this interval not displayed.         Lab 07/04/25  0505 07/03/25  0604 07/02/25  0708 07/01/25  0525 06/30/25  0437   SODIUM 138 138 136 135* 135*   POTASSIUM 4.2 4.3 4.4 4.2 4.7   CHLORIDE 103 103 104 103 101   CO2 27.0 26.0 21.6* 26.0 25.3   ANION GAP 8.0 9.0 10.4 6.0 8.7   BUN 23.3* 17.4 18.6 17.8 15.9   CREATININE 0.70* 0.49* 0.56* 0.45* 0.73*   EGFR 104.8 116.8 112.1 119.8 103.5   GLUCOSE 190* 164* 111* 123* 133*   CALCIUM 8.5* 8.1* 8.4* 7.9* 8.0*   MAGNESIUM 1.8 1.9 2.1 1.7 1.8                         Brief Urine Lab Results  (Last result in the past 365 days)        Color   Clarity   Blood   Leuk Est   Nitrite   Protein   CREAT   Urine HCG        06/25/25 1015 Yellow    Turbid   Large (3+)   Moderate (2+)   Negative   >=300 mg/dL (3+)                 Microbiology Results (last 10 days)       Procedure Component Value - Date/Time    Clostridioides difficile Toxin - Stool, Per Rectum [[id number]]  (Abnormal) Collected: 07/01/25 2109    Lab Status: Final result Specimen: Stool from Per Rectum Updated: 07/02/25 0906    Narrative:      The following orders were created for panel order Clostridioides difficile Toxin - Stool, Per Rectum.  Procedure                               Abnormality         Status                     ---------                               -----------         ------                     Clostridioides difficile...[[id number]]  Abnormal            Final result                 Please view results for these tests on the individual orders.    Clostridioides difficile Toxin, PCR - Stool, Per Rectum [[id number]]  (Abnormal) Collected: 07/01/25 2109    Lab Status: Final result Specimen: Stool from Per Rectum Updated: 07/02/25 0906     Toxigenic C. difficile by PCR Detected    Narrative:      DNA from a toxigenic strain of C.difficile has been detected.    Clostridioides difficile toxin Ag, Reflex - Stool, Per Rectum [[id number]]  (Normal) Collected: 07/01/25 2109    Lab Status: Final result Specimen: Stool from Per Rectum Updated: 07/02/25 1033     C.diff Toxin Ag Negative    Narrative:      DNA from a toxigenic strain of C.difficile was detected, although the free toxin itself was not detected. These findings are consistent with C.difficile colonization and may not reflect actual C.difficile infection. Clinical correlation needed.    Urine Culture - Urine, Urine, Catheter [[id number]]  (Normal) Collected: 06/25/25 1015    Lab Status: Final result Specimen: Urine, Catheter Updated: 06/26/25 1228     Urine Culture No growth    Respiratory Panel PCR w/COVID-19(SARS-CoV-2) ALBINA/LION/VALERI/PAD/COR/MATHEW In-House, NP Swab in UTM/VTM, 2 HR TAT - Swab, Nasopharynx [[id number]]  (Normal)  Collected: 06/25/25 0810    Lab Status: Final result Specimen: Swab from Nasopharynx Updated: 06/25/25 1009     ADENOVIRUS, PCR Not Detected     Coronavirus 229E Not Detected     Coronavirus HKU1 Not Detected     Coronavirus NL63 Not Detected     Coronavirus OC43 Not Detected     COVID19 Not Detected     Human Metapneumovirus Not Detected     Human Rhinovirus/Enterovirus Not Detected     Influenza A PCR Not Detected     Influenza B PCR Not Detected     Parainfluenza Virus 1 Not Detected     Parainfluenza Virus 2 Not Detected     Parainfluenza Virus 3 Not Detected     Parainfluenza Virus 4 Not Detected     RSV, PCR Not Detected     Bordetella pertussis pcr Not Detected     Bordetella parapertussis PCR Not Detected     Chlamydophila pneumoniae PCR Not Detected     Mycoplasma pneumo by PCR Not Detected    Narrative:      In the setting of a positive respiratory panel with a viral infection PLUS a negative procalcitonin without other underlying concern for bacterial infection, consider observing off antibiotics or discontinuation of antibiotics and continue supportive care. If the respiratory panel is positive for atypical bacterial infection (Bordetella pertussis, Chlamydophila pneumoniae, or Mycoplasma pneumoniae), consider antibiotic de-escalation to target atypical bacterial infection.    MRSA Screen, PCR (Inpatient) - Swab, Nares [[id number]]  (Normal) Collected: 06/25/25 0810    Lab Status: Final result Specimen: Swab from Nares Updated: 06/25/25 1043     MRSA PCR Negative    Narrative:      The negative predictive value of this diagnostic test is high and should only be used to consider de-escalating anti-MRSA therapy. A positive result may indicate colonization with MRSA and must be correlated clinically.  MRSA Negative    COVID PRE-OP / PRE-PROCEDURE SCREENING ORDER (NO ISOLATION) - Swab, Nasopharynx [[id number]]  (Normal) Collected: 06/25/25 0040    Lab Status: Final result Specimen: Swab from Nasopharynx  Updated: 06/25/25 0119    Narrative:      The following orders were created for panel order COVID PRE-OP / PRE-PROCEDURE SCREENING ORDER (NO ISOLATION) - Swab, Nasopharynx.  Procedure                               Abnormality         Status                     ---------                               -----------         ------                     COVID-19 and FLU A/B PCR...[[id number]]  Normal              Final result                 Please view results for these tests on the individual orders.    COVID-19 and FLU A/B PCR, 1 HR TAT - Swab, Nasopharynx [[id number]]  (Normal) Collected: 06/25/25 0040    Lab Status: Final result Specimen: Swab from Nasopharynx Updated: 06/25/25 0119     COVID19 Not Detected     Influenza A PCR Not Detected     Influenza B PCR Not Detected    Narrative:      Fact sheet for providers: https://www.fda.gov/media/544154/download    Fact sheet for patients: https://www.fda.gov/media/129971/download    Test performed by PCR.    Blood Culture - Blood, Wrist, Left [[id number]]  (Abnormal)  (Susceptibility) Collected: 06/24/25 2348    Lab Status: Final result Specimen: Blood from Wrist, Left Updated: 06/29/25 0633     Blood Culture Escherichia coli     Isolated from Aerobic and Anaerobic Bottles     Blood Culture Staphylococcus hominis ssp hominis     Isolated from Aerobic Bottle     Gram Stain Aerobic Bottle Gram negative bacilli      Anaerobic Bottle Gram negative bacilli      Aerobic Bottle Gram positive cocci    Narrative:      Less than seven (7) mL's of blood was collected.  Insufficient quantity may yield false negative results.    Staphylococcus hominis ssp hominis: Probable contaminant requires clinical correlation, susceptibility not performed unless requested by physician.      Susceptibility        Escherichia coli      RONAK      Amoxicillin + Clavulanate Susceptible      Ampicillin Susceptible      Ampicillin + Sulbactam Susceptible      Cefazolin (Non Urine) Susceptible      Cefepime  Susceptible      Ceftazidime Susceptible      Ceftriaxone Susceptible      Cefuroxime axetil Susceptible      Gentamicin Susceptible      Levofloxacin Susceptible      Piperacillin + Tazobactam Susceptible      Trimethoprim + Sulfamethoxazole Susceptible                       Susceptibility Comments       Escherichia coli    With the exception of urinary-sourced infections, aminoglycosides should not be used as monotherapy.               Blood Culture - Blood, Arm, Left [[id number]]  (Abnormal) Collected: 06/24/25 2348    Lab Status: Final result Specimen: Blood from Arm, Left Updated: 06/29/25 0633     Blood Culture Staphylococcus epidermidis     Isolated from Aerobic and Anaerobic Bottles     Gram Stain Anaerobic Bottle Gram positive cocci in groups      Aerobic Bottle Gram positive cocci in groups    Narrative:      Less than seven (7) mL's of blood was collected.  Insufficient quantity may yield false negative results.    Probable contaminant requires clinical correlation, susceptibility not performed unless requested by physician.      Blood Culture ID, PCR - Blood, Wrist, Left [[id number]]  (Abnormal) Collected: 06/24/25 2348    Lab Status: Final result Specimen: Blood from Wrist, Left Updated: 06/25/25 1603     BCID, PCR Staph spp, not aureus or lugdunensis. Identification by BCID2 PCR.     BCID, PCR 2 Klebsiella pneumoniae group. Identification by BCID2 PCR.     BOTTLE TYPE Aerobic Bottle    Narrative:      No resistance genes detected.    Blood Culture ID, PCR - Blood, Arm, Left [[id number]]  (Abnormal) Collected: 06/24/25 2348    Lab Status: Final result Specimen: Blood from Arm, Left Updated: 06/27/25 0423     BCID, PCR Staph spp, not aureus or lugdunensis. Identification by BCID2 PCR.     BOTTLE TYPE Aerobic Bottle            CT Head Without Contrast  Result Date: 6/25/2025  CT HEAD WO CONTRAST Date of Exam: 6/25/2025 4:53 AM EDT Indication: AMS, found down at home. Comparison: CT scan of the head dated  November 25, 2024 Technique: Axial CT images were obtained of the head without contrast administration.  Automated exposure control and iterative construction methods were used. Findings: There is moderate diffuse generalized atrophy. There are low-attenuation areas in the periventricular white matter consistent with chronic microvascular ischemic change. There is no mass, mass effect or midline shift. There are no abnormal extra-axial fluid collections or areas of acute hemorrhage. The paranasal sinuses are clear. The mastoid air cells are clear.     Impression: Atrophy and chronic microvascular ischemic change. No acute intracranial process. Electronically Signed: Horacio Bailey MD  6/25/2025 5:19 AM EDT  Workstation ID: [id number]    XR Chest 1 View  Result Date: 6/25/2025  XR CHEST 1 VW Date of Exam: 6/24/2025 11:46 PM EDT Indication: ams Comparison: 11/25/2024 Findings: Cardiac and mediastinal contours are normal. Pulmonary vascularity is normal. The lungs are clear. No pneumothorax.     No active disease. Electronically Signed: Atul Shrestha MD  6/25/2025 12:11 AM EDT  Workstation ID: [id number]      Results for orders placed during the hospital encounter of 11/15/24    Doppler Arterial Multi Level Lower Extremity - Bilateral CAR 11/19/2024  4:35 PM    Interpretation Summary    Right Conclusion: The right ANNY is normal. Unable to assess digital insufficiency.    Left Conclusion: The left ANNY is normal. Normal digital pressures.      Results for orders placed during the hospital encounter of 11/15/24    Doppler Arterial Multi Level Lower Extremity - Bilateral CAR 11/19/2024  4:35 PM    Interpretation Summary    Right Conclusion: The right ANNY is normal. Unable to assess digital insufficiency.    Left Conclusion: The left ANNY is normal. Normal digital pressures.      Results for orders placed during the hospital encounter of 06/24/25    Adult Transthoracic Echo Complete W/ Cont if Necessary Per Protocol 06/28/2025   6:13 PM    Interpretation Summary    Left ventricular systolic function is normal. Left ventricular ejection fraction appears to be 66 - 70%.    Saline test results are negative.      Plan for Follow-up of Pending Labs/Results: nothing pending      Discharge Details        Discharge Medications        New Medications        Instructions Start Date   cefTRIAXone 2,000 mg in sodium chloride 0.9 % 100 mL IVPB   2,000 mg, Intravenous, Every 24 Hours      insulin glargine 100 UNIT/ML injection  Commonly known as: LANTUS, SEMGLEE   18 Units, Subcutaneous, Every 12 Hours Scheduled      Insulin Lispro 100 UNIT/ML injection  Commonly known as: humaLOG   2-7 Units, Subcutaneous, 4 Times Daily Before Meals & Nightly      Insulin Lispro 100 UNIT/ML injection  Commonly known as: humaLOG   8 Units, Subcutaneous, 3 Times Daily With Meals      lactobacillus acidophilus capsule capsule   1 capsule, Oral, Daily      Lidocaine 4 %   1 patch, Transdermal, Every 24 Hours Scheduled, Remove & Discard patch within 12 hours or as directed by MD      vancomycin 125 MG capsule  Commonly known as: VANCOCIN   125 mg, Oral, Every 6 Hours Scheduled             Changes to Medications        Instructions Start Date   metoprolol succinate XL 25 MG 24 hr tablet  Commonly known as: TOPROL-XL  What changed:   medication strength  how much to take   25 mg, Oral, Daily             Continue These Medications        Instructions Start Date   acetaminophen 325 MG tablet  Commonly known as: TYLENOL   650 mg, Oral, Every 4 Hours PRN      aspirin 81 MG EC tablet   81 mg, Oral, Daily      B-D UF III MINI PEN NEEDLES 31G X 5 MM misc  Generic drug: Insulin Pen Needle   Use as directed 2 times daily      B-D UF III MINI PEN NEEDLES 31G X 5 MM misc  Generic drug: Insulin Pen Needle   Use 1 pen needle as directed 2 (Two) Times a Day.      calcium carbonate 500 MG chewable tablet  Commonly known as: TUMS   2 tablets, Oral, 3 Times Daily PRN      cyclobenzaprine 5 MG  tablet  Commonly known as: FLEXERIL   5 mg, 3 Times Daily PRN      DULoxetine 60 MG capsule  Commonly known as: CYMBALTA   60 mg, Daily      finasteride 5 MG tablet  Commonly known as: PROSCAR   5 mg, Oral, Nightly      fluticasone 50 MCG/ACT nasal spray  Commonly known as: Flonase   2 sprays, Nasal, 2 Times Daily PRN      levocetirizine 5 MG tablet  Commonly known as: XYZAL   1 tablet, Daily      omeprazole 40 MG capsule  Commonly known as: priLOSEC   40 mg, Oral, Every Morning Before Breakfast      OneTouch Delica Plus Ceaihh26V misc   Use as directed to test blood sugar 3 (Three) Times a Day.      OneTouch Verio Flex System w/Device kit   Use as directed to test blood sugar 3 (Three) Times a Day.      OneTouch Verio test strip  Generic drug: glucose blood   Use as directed to test blood sugar 3 times daily      pregabalin 225 MG capsule  Commonly known as: Lyrica   225 mg, Oral, 2 Times Daily      rOPINIRole 0.25 MG tablet  Commonly known as: REQUIP   Take 1 tablet by mouth Every Night, 1 hour before bedtime.      simvastatin 20 MG tablet  Commonly known as: ZOCOR   1 tablet, Oral, Daily      tamsulosin 0.4 MG capsule 24 hr capsule  Commonly known as: FLOMAX   0.8 mg, Oral, Nightly             Stop These Medications      NovoLOG Mix 70/30 FlexPen (70-30) 100 UNIT/ML suspension pen-injector injection  Generic drug: insulin aspart prot & aspart              Allergies   Allergen Reactions    Sertraline Hcl Hives     Zoloft         Discharge Disposition:  Rehab Facility or Unit (DC - External)    Diet:  Hospital:  Diet Order   Procedures    Diet: Cardiac, Diabetic; Healthy Heart (2-3 Na+); Consistent Carbohydrate; Fluid Consistency: Thin (IDDSI 0)       Diet Instructions       Diet: Diabetic Diets, Cardiac Diets; Healthy Heart (2-3 Na+); Thin (IDDSI 0); Consistent Carbohydrate      Discharge Diet:  Diabetic Diets  Cardiac Diets       Cardiac Diet: Healthy Heart (2-3 Na+)    Fluid Consistency: Thin (IDDSI 0)     Diabetic Diet: Consistent Carbohydrate             Activity:  Activity Instructions       Activity as Tolerated              Restrictions or Other Recommendations:  none       CODE STATUS:    Code Status and Medical Interventions: CPR (Attempt to Resuscitate); Full Support   Ordered at: 06/25/25 0129     Code Status (Patient has no pulse and is not breathing):    CPR (Attempt to Resuscitate)     Medical Interventions (Patient has pulse or is breathing):    Full Support       No future appointments.    Additional Instructions for the Follow-ups that You Need to Schedule       Discharge Follow-up with PCP   As directed       Currently Documented PCP:    Brandy Roberson MD    PCP Phone Number:    [phone]     Follow Up Details: 2 weeks review meds, check blood sugars        Discharge Follow-up with Specified Provider: Infectious Disease followup bacteremia; 2 Weeks   As directed      To: Infectious Disease followup bacteremia   Follow Up: 2 Weeks                      Corrina Mckeon MD  07/04/25      Time Spent on Discharge:  I spent  42  minutes on this discharge activity which included: face-to-face encounter with the patient, reviewing the data in the system, coordination of the care with the nursing staff as well as consultants, documentation, and entering orders.            Electronically signed by Corrina Mckeon MD at 07/04/25 0875

## 2025-07-04 NOTE — PLAN OF CARE
"Goal Outcome Evaluation:  Plan of Care Reviewed With: patient        Progress: improving  Outcome Evaluation: Patient AOX4, RA, VSS, Blood sugar stable this am, no c/o pain during shift. Patient very eager about going to [facility] today. RN tried calling report several times to Spinal Cord unit [phone] no answer. Per liasion with [facility] states \" send patient and staff will call once patient arrives\". EMS here to transport patient, all belongings including posthetic leg in bag for transportation. All meds given per MAR.                             "

## 2025-07-09 ENCOUNTER — HOSPITAL ENCOUNTER (EMERGENCY)
Facility: HOSPITAL | Age: 60
Discharge: HOME | End: 2025-07-10
Attending: EMERGENCY MEDICINE
Payer: COMMERCIAL

## 2025-07-09 DIAGNOSIS — N32.0 BLADDER OUTLET OBSTRUCTION: ICD-10-CM

## 2025-07-09 DIAGNOSIS — R10.2 SUPRAPUBIC ABDOMINAL PAIN: ICD-10-CM

## 2025-07-09 DIAGNOSIS — N39.0 CHRONIC UTI: ICD-10-CM

## 2025-07-09 DIAGNOSIS — R33.9 ACUTE ON CHRONIC URINARY RETENTION: Primary | ICD-10-CM

## 2025-07-09 PROCEDURE — 99283 EMERGENCY DEPT VISIT LOW MDM: CPT

## 2025-07-10 VITALS
TEMPERATURE: 98.3 F | HEART RATE: 94 BPM | WEIGHT: 180 LBS | SYSTOLIC BLOOD PRESSURE: 109 MMHG | BODY MASS INDEX: 30.73 KG/M2 | DIASTOLIC BLOOD PRESSURE: 78 MMHG | HEIGHT: 64 IN | RESPIRATION RATE: 20 BRPM | OXYGEN SATURATION: 98 %

## 2025-07-10 LAB
ALBUMIN SERPL-MCNC: 2.7 G/DL (ref 3.5–5.2)
ALBUMIN/GLOB SERPL: 1 G/DL
ALP SERPL-CCNC: 97 U/L (ref 39–117)
ALT SERPL W P-5'-P-CCNC: 19 U/L (ref 1–41)
ANION GAP SERPL CALCULATED.3IONS-SCNC: 7 MMOL/L (ref 5–15)
AST SERPL-CCNC: 26 U/L (ref 1–40)
BACTERIA UR QL AUTO: ABNORMAL /HPF
BASOPHILS # BLD AUTO: 0.09 10*3/MM3 (ref 0–0.2)
BASOPHILS NFR BLD AUTO: 0.6 % (ref 0–1.5)
BILIRUB SERPL-MCNC: <0.2 MG/DL (ref 0–1.2)
BILIRUB UR QL STRIP: NEGATIVE
BUN SERPL-MCNC: 16.2 MG/DL (ref 8–23)
BUN/CREAT SERPL: 23.8 (ref 7–25)
CALCIUM SPEC-SCNC: 8.1 MG/DL (ref 8.6–10.5)
CHLORIDE SERPL-SCNC: 103 MMOL/L (ref 98–107)
CLARITY UR: CLEAR
CO2 SERPL-SCNC: 28 MMOL/L (ref 22–29)
COD CRY URNS QL: ABNORMAL /HPF
COLOR UR: YELLOW
CREAT SERPL-MCNC: 0.68 MG/DL (ref 0.76–1.27)
DEPRECATED RDW RBC AUTO: 45.3 FL (ref 37–54)
EGFRCR SERPLBLD CKD-EPI 2021: 105.8 ML/MIN/1.73
EOSINOPHIL # BLD AUTO: 0.09 10*3/MM3 (ref 0–0.4)
EOSINOPHIL NFR BLD AUTO: 0.6 % (ref 0.3–6.2)
ERYTHROCYTE [DISTWIDTH] IN BLOOD BY AUTOMATED COUNT: 14.9 % (ref 12.3–15.4)
GLOBULIN UR ELPH-MCNC: 2.8 GM/DL
GLUCOSE SERPL-MCNC: 284 MG/DL (ref 65–99)
GLUCOSE UR STRIP-MCNC: ABNORMAL MG/DL
HCT VFR BLD AUTO: 32.6 % (ref 37.5–51)
HGB BLD-MCNC: 9.7 G/DL (ref 13–17.7)
HGB UR QL STRIP.AUTO: ABNORMAL
HYALINE CASTS UR QL AUTO: ABNORMAL /LPF
IMM GRANULOCYTES # BLD AUTO: 0.16 10*3/MM3 (ref 0–0.05)
IMM GRANULOCYTES NFR BLD AUTO: 1 % (ref 0–0.5)
KETONES UR QL STRIP: ABNORMAL
LEUKOCYTE ESTERASE UR QL STRIP.AUTO: ABNORMAL
LYMPHOCYTES # BLD AUTO: 1.38 10*3/MM3 (ref 0.7–3.1)
LYMPHOCYTES NFR BLD AUTO: 8.6 % (ref 19.6–45.3)
MCH RBC QN AUTO: 25.2 PG (ref 26.6–33)
MCHC RBC AUTO-ENTMCNC: 29.8 G/DL (ref 31.5–35.7)
MCV RBC AUTO: 84.7 FL (ref 79–97)
MONOCYTES # BLD AUTO: 0.91 10*3/MM3 (ref 0.1–0.9)
MONOCYTES NFR BLD AUTO: 5.7 % (ref 5–12)
NEUTROPHILS NFR BLD AUTO: 13.35 10*3/MM3 (ref 1.7–7)
NEUTROPHILS NFR BLD AUTO: 83.5 % (ref 42.7–76)
NITRITE UR QL STRIP: NEGATIVE
NRBC BLD AUTO-RTO: 0 /100 WBC (ref 0–0.2)
PH UR STRIP.AUTO: 5.5 [PH] (ref 5–8)
PLATELET # BLD AUTO: 379 10*3/MM3 (ref 140–450)
PMV BLD AUTO: 9.9 FL (ref 6–12)
POTASSIUM SERPL-SCNC: 4.8 MMOL/L (ref 3.5–5.2)
PROT SERPL-MCNC: 5.5 G/DL (ref 6–8.5)
PROT UR QL STRIP: ABNORMAL
RBC # BLD AUTO: 3.85 10*6/MM3 (ref 4.14–5.8)
RBC # UR STRIP: ABNORMAL /HPF
REF LAB TEST METHOD: ABNORMAL
SODIUM SERPL-SCNC: 138 MMOL/L (ref 136–145)
SP GR UR STRIP: 1.03 (ref 1–1.03)
SQUAMOUS #/AREA URNS HPF: ABNORMAL /HPF
UROBILINOGEN UR QL STRIP: ABNORMAL
WBC # UR STRIP: ABNORMAL /HPF
WBC NRBC COR # BLD AUTO: 15.98 10*3/MM3 (ref 3.4–10.8)
YEAST URNS QL MICRO: ABNORMAL /HPF

## 2025-07-10 PROCEDURE — 36415 COLL VENOUS BLD VENIPUNCTURE: CPT

## 2025-07-10 PROCEDURE — 51702 INSERT TEMP BLADDER CATH: CPT

## 2025-07-10 PROCEDURE — 80053 COMPREHEN METABOLIC PANEL: CPT | Performed by: EMERGENCY MEDICINE

## 2025-07-10 PROCEDURE — 96374 THER/PROPH/DIAG INJ IV PUSH: CPT

## 2025-07-10 PROCEDURE — 85025 COMPLETE CBC W/AUTO DIFF WBC: CPT | Performed by: EMERGENCY MEDICINE

## 2025-07-10 PROCEDURE — 81001 URINALYSIS AUTO W/SCOPE: CPT | Performed by: EMERGENCY MEDICINE

## 2025-07-10 PROCEDURE — 25010000002 HYDROMORPHONE 1 MG/ML SOLUTION: Performed by: EMERGENCY MEDICINE

## 2025-07-10 PROCEDURE — 87086 URINE CULTURE/COLONY COUNT: CPT | Performed by: EMERGENCY MEDICINE

## 2025-07-10 RX ORDER — HYDROMORPHONE HYDROCHLORIDE 1 MG/ML
0.5 INJECTION, SOLUTION INTRAMUSCULAR; INTRAVENOUS; SUBCUTANEOUS ONCE
Refills: 0 | Status: COMPLETED | OUTPATIENT
Start: 2025-07-10 | End: 2025-07-10

## 2025-07-10 RX ORDER — LIDOCAINE HYDROCHLORIDE 20 MG/ML
JELLY TOPICAL AS NEEDED
Status: DISCONTINUED | OUTPATIENT
Start: 2025-07-10 | End: 2025-07-10 | Stop reason: HOSPADM

## 2025-07-10 RX ADMIN — HYDROMORPHONE HYDROCHLORIDE 0.5 MG: 1 INJECTION, SOLUTION INTRAMUSCULAR; INTRAVENOUS; SUBCUTANEOUS at 01:04

## 2025-07-10 RX ADMIN — LIDOCAINE HYDROCHLORIDE: 20 JELLY TOPICAL at 01:01

## 2025-07-10 NOTE — DISCHARGE INSTRUCTIONS
Continue antibiotics as previously prescribed.  We are running a urine culture and will notify you if changes in antibiotics are necessary.  Please ask your care provider at your facility to also follow these cultures to ensure prompt treatment changes if they become necessary.    If you have any concern of worsening condition please return emergency department.

## 2025-07-10 NOTE — ED PROVIDER NOTES
EMERGENCY DEPARTMENT ENCOUNTER    Pt Name: Fracisco Mullen  MRN: [id number]  Pt :   1965  Room Number:    Date of encounter:  2025  PCP: Brandy Roberson MD  ED Provider: Jeremy Campos MD    Historian: Paramedics and patient      HPI:  Chief Complaint: Urinary retention        Context: Fracisco Mullen is a 59 y.o. male who presents to the ED c/o urinary retention since 3 PM when his Bustos catheter was removed.  The the patient reports having no urine output since that time.  The patient feels fullness in his bladder but otherwise no pain.  He denies dyspnea.  Paramedics found him to be afebrile with oxygen saturation 91% on room air.  And transported him in no acute distress.      PAST MEDICAL HISTORY  Past Medical History:   Diagnosis Date    Acute renal failure     Hx of    Obesity     Hx of    Sleep apnea     Type 2 diabetes mellitus          PAST SURGICAL HISTORY  Past Surgical History:   Procedure Laterality Date    BACK SURGERY      lower back    BELOW KNEE AMPUTATION Left 2024    Procedure: BELOW-KNEE AMPUTATION LEFT;  Surgeon: Dru Gan Jr., MD;  Location: [facility] OR;  Service: Orthopedics;  Laterality: Left;    CHOLECYSTECTOMY WITH INTRAOPERATIVE CHOLANGIOGRAM N/A 2021    Procedure: CHOLECYSTECTOMY LAPAROSCOPIC INTRAOPERATIVE CHOLANGIOGRAM;  Surgeon: Juventino Hooker MD;  Location: [facility] OR;  Service: General;  Laterality: N/A;    ERCP N/A 2021    Procedure: ENDOSCOPIC RETROGRADE CHOLANGIOPANCREATOGRAPHY;  Surgeon: Jeremy Dowell MD;  Location: [facility] ENDOSCOPY;  Service: Gastroenterology;  Laterality: N/A;  with sphiincterotomy and balloon sweep with 9mm-12mm balloon    INCISION AND DRAINAGE FOOT Left 8/3/2024    Procedure: HEAL IRRIGATION AND DEBRIDEMENT LEFT;  Surgeon: Dru Gan Jr., MD;  Location: [facility] OR;  Service: Orthopedics;  Laterality: Left;    PLACEMENT OF WOUND VAC Left 8/3/2024    Procedure: PLACEMENT OF WOUND VAC;  Surgeon:  Dru Gan Jr., MD;  Location: [facility];  Service: Orthopedics;  Laterality: Left;         FAMILY HISTORY  Family History   Problem Relation Age of Onset    Diabetes Other     Hypertension Other     Cancer Other         Pancreatic cancer-first cousin    Heart attack Other         MI    Obesity Other     Hyperlipidemia Other         High blood cholesterol    Hyperlipidemia Mother     Hypertension Mother     Cancer Mother         lung cancer    Heart disease Father         MI at 75 yo    Cancer Brother         esophageal cancer with mets    Heart disease Brother         several MIs earlies in 30s    Cancer Maternal Grandmother          SOCIAL HISTORY  Social History     Socioeconomic History    Marital status:    Tobacco Use    Smoking status: Former     Types: Cigars    Smokeless tobacco: Never    Tobacco comments:     3 per month. Former cig smoker   Vaping Use    Vaping status: Never Used   Substance and Sexual Activity    Alcohol use: No     Comment: Past drank about a pint per month for 2-3 years    Drug use: No    Sexual activity: Defer         ALLERGIES  Sertraline hcl        REVIEW OF SYSTEMS  Review of Systems       All systems reviewed and negative except for those discussed in HPI.       PHYSICAL EXAM    I have reviewed the triage vital signs and nursing notes.    ED Triage Vitals   Temp Pulse Resp BP SpO2   -- -- -- -- --      Temp src Heart Rate Source Patient Position BP Location FiO2 (%)   -- -- -- -- --       Physical Exam  GENERAL:   Appears nontoxic but older than stated age appearing.  HENT: Nares patent.  EYES: No scleral icterus.  CV: Regular rhythm, regular rate.  No murmurs gallops rubs  RESPIRATORY: Normal effort.  Coarse rhonchi scattered.  Able to speak in full sentences.  ABDOMEN: Soft, minimally tender in the suprapubic region where there is some fullness.  Adipose deposits preclude more thorough evaluation of the bladder.  No upper abdominal tenderness.  MUSCULOSKELETAL: No  deformities.   NEURO: Alert, moves all extremities, follows commands.  SKIN: Warm, dry, no rash visualized.      LAB RESULTS  Recent Results (from the past 24 hours)   Comprehensive Metabolic Panel    Collection Time: 07/10/25 12:51 AM    Specimen: Blood   Result Value Ref Range    Glucose 284 (H) 65 - 99 mg/dL    BUN 16.2 8.0 - 23.0 mg/dL    Creatinine 0.68 (L) 0.76 - 1.27 mg/dL    Sodium 138 136 - 145 mmol/L    Potassium 4.8 3.5 - 5.2 mmol/L    Chloride 103 98 - 107 mmol/L    CO2 28.0 22.0 - 29.0 mmol/L    Calcium 8.1 (L) 8.6 - 10.5 mg/dL    Total Protein 5.5 (L) 6.0 - 8.5 g/dL    Albumin 2.7 (L) 3.5 - 5.2 g/dL    ALT (SGPT) 19 1 - 41 U/L    AST (SGOT) 26 1 - 40 U/L    Alkaline Phosphatase 97 39 - 117 U/L    Total Bilirubin <0.2 0.0 - 1.2 mg/dL    Globulin 2.8 gm/dL    A/G Ratio 1.0 g/dL    BUN/Creatinine Ratio 23.8 7.0 - 25.0    Anion Gap 7.0 5.0 - 15.0 mmol/L    eGFR 105.8 >60.0 mL/min/1.73   CBC Auto Differential    Collection Time: 07/10/25 12:51 AM    Specimen: Blood   Result Value Ref Range    WBC 15.98 (H) 3.40 - 10.80 10*3/mm3    RBC 3.85 (L) 4.14 - 5.80 10*6/mm3    Hemoglobin 9.7 (L) 13.0 - 17.7 g/dL    Hematocrit 32.6 (L) 37.5 - 51.0 %    MCV 84.7 79.0 - 97.0 fL    MCH 25.2 (L) 26.6 - 33.0 pg    MCHC 29.8 (L) 31.5 - 35.7 g/dL    RDW 14.9 12.3 - 15.4 %    RDW-SD 45.3 37.0 - 54.0 fl    MPV 9.9 6.0 - 12.0 fL    Platelets 379 140 - 450 10*3/mm3    Neutrophil % 83.5 (H) 42.7 - 76.0 %    Lymphocyte % 8.6 (L) 19.6 - 45.3 %    Monocyte % 5.7 5.0 - 12.0 %    Eosinophil % 0.6 0.3 - 6.2 %    Basophil % 0.6 0.0 - 1.5 %    Immature Grans % 1.0 (H) 0.0 - 0.5 %    Neutrophils, Absolute 13.35 (H) 1.70 - 7.00 10*3/mm3    Lymphocytes, Absolute 1.38 0.70 - 3.10 10*3/mm3    Monocytes, Absolute 0.91 (H) 0.10 - 0.90 10*3/mm3    Eosinophils, Absolute 0.09 0.00 - 0.40 10*3/mm3    Basophils, Absolute 0.09 0.00 - 0.20 10*3/mm3    Immature Grans, Absolute 0.16 (H) 0.00 - 0.05 10*3/mm3    nRBC 0.0 0.0 - 0.2 /100 WBC   Urinalysis  With Microscopic If Indicated (No Culture) - Indwelling Urethral Catheter    Collection Time: 07/10/25  1:25 AM    Specimen: Indwelling Urethral Catheter; Urine   Result Value Ref Range    Color, UA Yellow Yellow, Straw    Appearance, UA Clear Clear    pH, UA 5.5 5.0 - 8.0    Specific Gravity, UA 1.027 1.005 - 1.030    Glucose, UA >=1000 mg/dL (3+) (A) Negative    Ketones, UA Trace (A) Negative    Bilirubin, UA Negative Negative    Blood, UA Trace (A) Negative    Protein,  mg/dL (2+) (A) Negative    Leuk Esterase, UA Small (1+) (A) Negative    Nitrite, UA Negative Negative    Urobilinogen, UA 0.2 E.U./dL 0.2 - 1.0 E.U./dL   Urinalysis, Microscopic Only - Indwelling Urethral Catheter    Collection Time: 07/10/25  1:25 AM    Specimen: Indwelling Urethral Catheter; Urine   Result Value Ref Range    RBC, UA 3-5 (A) None Seen, 0-2 /HPF    WBC, UA Too Numerous to Count (A) None Seen, 0-2 /HPF    Bacteria, UA Trace (A) None Seen /HPF    Squamous Epithelial Cells, UA 0-2 None Seen, 0-2 /HPF    Yeast, UA Small/1+ Yeast (A) None Seen /HPF    Hyaline Casts, UA None Seen None Seen /LPF    Calcium Oxalate Crystals, UA Moderate/2+ None Seen /HPF    Methodology Manual Light Microscopy        If labs were ordered, I independently reviewed the results and considered them in treating the patient.        RADIOLOGY  No Radiology Exams Resulted Within Past 24 Hours      PROCEDURES    Bladder Catheterization    Date/Time: 7/10/2025 1:26 AM    Performed by: Jeremy Campos MD  Authorized by: Jeremy Campos MD    Consent:     Consent obtained:  Verbal    Consent given by:  Patient    Risks, benefits, and alternatives were discussed: yes      Risks discussed:  False passage, urethral injury, incomplete procedure and pain  Universal protocol:     Procedure explained and questions answered to patient or proxy's satisfaction: yes      Patient identity confirmed:  Verbally with patient  Pre-procedure details:     Procedure purpose:   Therapeutic    Preparation: Patient was prepped and draped in usual sterile fashion    Anesthesia:     Anesthesia method:  Topical application    Topical anesthetic:  Lidocaine gel  Procedure details:     Provider performed due to:  Nurse unable to complete    Catheter insertion:  Indwelling    Catheter type:  Bustos    Catheter size:  14 Fr    Number of attempts:  1    Urine characteristics:  Yellow  Post-procedure details:     Procedure completion:  Tolerated well, no immediate complications  Comments:      Size 14 Brazilian Bustos catheter was inserted as this was the listed catheter that had been removed.  The previous catheter required placement by urology.      No orders to display       MEDICATIONS GIVEN IN ER    Medications   Lidocaine HCl gel (XYLOCAINE) urethral/mucosal syringe ( Urethral Given 7/10/25 0101)   HYDROmorphone (DILAUDID) injection 0.5 mg (0.5 mg Intravenous Given 7/10/25 0104)         MEDICAL DECISION MAKING, PROGRESS, and CONSULTS    All labs, if obtained, have been independently reviewed by me.  All radiology studies, if obtained, have been reviewed by me and the radiologist dictating the report.  All EKGs, if obtained, have been independently viewed and interpreted by me/my attending physician.      Discussion below represents my analysis of pertinent findings related to patient's condition, differential diagnosis, treatment plan and final disposition.                                          Differential diagnosis:    Urine outlet obstruction      Additional sources:    - Discussed/ obtained information from independent historians: Paramedics gave report directly to me.    - External (non-ED) record review: I reviewed last discharge summary dated 7/4/2025 when the patient had been admitted for bacteremia secondary to Klebsiella pneumonia, C. difficile diarrhea, etc.    - Chronic or social conditions impacting care: Former smoker        Orders placed during this visit:  Orders Placed This  Encounter   Procedures    Bladder Catheterization    Comprehensive Metabolic Panel    Urinalysis With Microscopic If Indicated (No Culture) - Urine, Catheter    CBC Auto Differential    Urinalysis, Microscopic Only - Urine, Clean Catch    Urinalysis With Culture If Indicated -    Insert Indwelling Urinary Catheter    Assess Need for Indwelling Urinary Catheter - Follow Removal Protocol    Urinary Catheter Care    Bladder scan and notify me of results  Misc Nursing Order (Specify)    CBC & Differential         Additional orders considered but not ordered:  CT abdomen pelvis    ED Course:    Consultants:      ED Course as of 07/10/25 0218   Wed Jul 09, 2025 2328 I saw the patient immediately upon arrival and spoke with paramedics in the hallway.  We are unable to put the patient directly into a room because of capacity constraints. [MS]   2328 I have ordered an indwelling urinary catheter to be placed by nursing staff if possible.  Will also BladderScan him and check routine labs. [MS]   u Jul 10, 2025   0126 Nursing staff unable to place urinary catheter and therefore I placed the catheter.  See my procedure note. [MS]      ED Course User Index  [MS] Jeremy Campos MD              Shared Decision Making:  After my consideration of clinical presentation and any laboratory/radiology studies obtained, I discussed the findings with the patient/patient representative who is in agreement with the treatment plan and the final disposition.   Risks and benefits of discharge and/or observation/admission were discussed.       AS OF 02:18 EDT VITALS:    BP - 110/73  HR - 100  TEMP - 98.3 °F (36.8 °C) (Oral)  O2 SATS - 96%                  DIAGNOSIS  Final diagnoses:   Acute on chronic urinary retention   Bladder outlet obstruction   Suprapubic abdominal pain   Chronic UTI         DISPOSITION  DISCHARGE    Patient discharged in stable condition.    Reviewed implications of results, diagnosis, meds, responsibility to follow  up, warning signs and symptoms of possible worsening, potential complications and reasons to return to ER.    Patient/Family voiced understanding of above instructions.    Discussed plan for discharge, as there is no emergent indication for admission.  Pt/family is agreeable and understands need for follow up and possible repeat testing.  Pt/family is aware that discharge does not mean that nothing is wrong but that it indicates no emergency is currently present that requires admission and they must continue care with follow-up as given below or with a physician of their choice.     FOLLOW-UP  Brandy Roberson MD  [address] [facility]   [facility] [address]  [phone]      NEXT AVAILABLE APPOINTMENT - RECHECK OF CONDITION    [facility] EMERGENCY DEPARTMENT  [address] [facility] [address] [phone]    IF YOU HAVE ANY CONCERN OF WORSENING CONDITION         Medication List        Stop      cefTRIAXone 2,000 mg in sodium chloride 0.9 % 100 mL IVPB                  Please note that portions of this document were completed with voice recognition software.        Jeremy Campos MD  07/10/25 0223

## 2025-07-11 LAB — BACTERIA SPEC AEROBE CULT: NO GROWTH

## (undated) DEVICE — THE BITE BLOCK MAXI, LATEX FREE STRAP IS USED TO PROTECT THE ENDOSCOPE INSERTION TUBE FROM BEING BITTEN BY THE PATIENT.

## (undated) DEVICE — TRAP FLD MINIVAC MEGADYNE 100ML

## (undated) DEVICE — BNDG ELAS W/CLIP 6IN 10YD LF STRL

## (undated) DEVICE — DRSNG GZ CURAD XEROFORM NONADHR OVERWRAP 5X9IN

## (undated) DEVICE — CONTN GRAD MEAS TRIANG 32OZ BLK

## (undated) DEVICE — SUT SILK 0 TIES 30IN A306H

## (undated) DEVICE — LUBE GEL ENDOGLIDE 1.1OZ

## (undated) DEVICE — KT CANSTR VAC WND W/ISOLYSER SENSATRAC 500CC 5CS

## (undated) DEVICE — SOL IRR H2O BTL 1000ML STRL

## (undated) DEVICE — SYR LUERLOK 20CC BX/50

## (undated) DEVICE — SYR LUERLOK 30CC

## (undated) DEVICE — DRSNG WND VAC GRANUFOAM SENSATRAC LG

## (undated) DEVICE — SUT PDS 2/0 CT2 27IN VIL PDP333H

## (undated) DEVICE — SYR LUERLOK 50ML

## (undated) DEVICE — CULT ANAERB

## (undated) DEVICE — BLANKT WARM UPPR/BDY ARM/OUT 57X196CM

## (undated) DEVICE — PK EXTREM LOWR 10

## (undated) DEVICE — SUT VIC 0 UR6 27IN VCP603H

## (undated) DEVICE — PENCL SMOKE/EVAC MEGADYNE TELESCP 10FT

## (undated) DEVICE — 450 ML BOTTLE OF 0.05% CHLORHEXIDINE GLUCONATE IN 99.95% STERILE WATER FOR IRRIGATION, USP AND APPLICATOR.: Brand: IRRISEPT ANTIMICROBIAL WOUND LAVAGE

## (undated) DEVICE — SNAP KOVER: Brand: UNBRANDED

## (undated) DEVICE — TUBING, SUCTION, 1/4" X 10', STRAIGHT: Brand: MEDLINE

## (undated) DEVICE — CVR HNDL LIGHT RIGID

## (undated) DEVICE — ERBE NESSY®PLATE 170 SPLIT; 168CM²; CABLE 3M: Brand: ERBE

## (undated) DEVICE — PATIENT RETURN ELECTRODE, SINGLE-USE, CONTACT QUALITY MONITORING, ADULT, WITH 9FT CORD, FOR PATIENTS WEIGING OVER 33LBS. (15KG): Brand: MEGADYNE

## (undated) DEVICE — SUT ETHLN 2/0 PS 18IN 585H

## (undated) DEVICE — DRSNG PAD ABD 8X10IN STRL

## (undated) DEVICE — ENDOPOUCH RETRIEVER SPECIMEN RETRIEVAL BAGS: Brand: ENDOPOUCH RETRIEVER

## (undated) DEVICE — SPNG GZ WOVN 4X4IN 12PLY 10/BX STRL

## (undated) DEVICE — GOWN,PREVENTION PLUS,XXLARGE,STERILE: Brand: MEDLINE

## (undated) DEVICE — KT PUMP INFUBLOCK MDL 2100 PMKITSOLIS

## (undated) DEVICE — ENDOPATH XCEL UNIVERSAL TROCAR STABLILITY SLEEVES: Brand: ENDOPATH XCEL

## (undated) DEVICE — BNDG ELAS CO-FLEX SLF ADHR 4IN5YD LF STRL

## (undated) DEVICE — JP PERF DRN SIL FLT 10MM FULL: Brand: CARDINAL HEALTH

## (undated) DEVICE — HYBRID CO2 TUBING/CAP SET FOR OLYMPUS® SCOPES & CO2 SOURCE: Brand: ERBE

## (undated) DEVICE — SUT PDS 2 0 CT1 27IN CLR Z259H

## (undated) DEVICE — RETRIEVAL BALLOON CATHETER: Brand: EXTRACTOR™ PRO RX

## (undated) DEVICE — [HIGH FLOW INSUFFLATOR,  DO NOT USE IF PACKAGE IS DAMAGED,  KEEP DRY,  KEEP AWAY FROM SUNLIGHT,  PROTECT FROM HEAT AND RADIOACTIVE SOURCES.]: Brand: PNEUMOSURE

## (undated) DEVICE — ENDOPATH XCEL BLADELESS TROCARS WITH STABILITY SLEEVES: Brand: ENDOPATH XCEL

## (undated) DEVICE — KT ORCA ORCAPOD DISP STRL

## (undated) DEVICE — X-LARGE COTTON GLOVE: Brand: DEROYAL

## (undated) DEVICE — PK LAP LASR CHOLE 10

## (undated) DEVICE — STRYKER PERFORMANCE SERIES SAGITTAL BLADE: Brand: STRYKER PERFORMANCE SERIES

## (undated) DEVICE — BOWL UTIL STRL 32OZ

## (undated) DEVICE — BNDG,ELSTC,MATRIX,STRL,4"X5YD,LF,HOOK&LP: Brand: MEDLINE

## (undated) DEVICE — CULT AERO SGL

## (undated) DEVICE — UNDERCAST PADDING: Brand: DEROYAL

## (undated) DEVICE — PROXIMATE RH ROTATING HEAD SKIN STAPLERS (35 WIDE) CONTAINS 35 STAINLESS STEEL STAPLES: Brand: PROXIMATE

## (undated) DEVICE — GOWN,SIRUS,POLYRNF,XLN/3XL,18/CS: Brand: MEDLINE

## (undated) DEVICE — SUT SILK 0 CT1 18IN 424H

## (undated) DEVICE — PAD,ARMBOARD,CONV,FOAM,2X8X20",12PR/CS: Brand: MEDLINE

## (undated) DEVICE — BANDAGE,GAUZE,BULKEE II,4.5"X4.1YD,STRL: Brand: MEDLINE

## (undated) DEVICE — Device

## (undated) DEVICE — ENDOCUT SCISSOR TIP, DISPOSABLE: Brand: RENEW

## (undated) DEVICE — GLV SURG SENSICARE PI MIC PF SZ9 LF STRL

## (undated) DEVICE — HYPODERMIC SAFETY NEEDLE: Brand: MONOJECT

## (undated) DEVICE — VISUALIZATION SYSTEM: Brand: CLEARIFY

## (undated) DEVICE — ANTIBACTERIAL UNDYED BRAIDED (POLYGLACTIN 910), SYNTHETIC ABSORBABLE SUTURE: Brand: COATED VICRYL

## (undated) DEVICE — GLV SURG SENSICARE PI MIC PF SZ7 LF STRL

## (undated) DEVICE — INTRO ACCSR BLNT TP

## (undated) DEVICE — SPNG VERSALON 4X4 4PLY NONSTRL LF BG/200

## (undated) DEVICE — SUT SILK 2/0 TIES 18IN A185H

## (undated) DEVICE — ST EXT IV TBG W SECUR LK 20IN

## (undated) DEVICE — JACKSON-PRATT 100CC BULB RESERVOIR: Brand: CARDINAL HEALTH

## (undated) DEVICE — ADHS LIQ MASTISOL 2/3ML

## (undated) DEVICE — GLV SURG SENSICARE PI MIC PF SZ7.5 LF STRL

## (undated) DEVICE — CANNULATING SPHINCTEROTOME: Brand: JAGTOME™ REVOLUTION RX

## (undated) DEVICE — DRAPE,TOP,102X53,STERILE: Brand: MEDLINE

## (undated) DEVICE — LUER-LOK 360°: Brand: CONNECTA, LUER-LOK

## (undated) DEVICE — DEV LK WIREGUIDE FUSN OLYMP SCP

## (undated) DEVICE — ADAPTER,CATHETER/SYRINGE/LUER,STERILE: Brand: MEDLINE

## (undated) DEVICE — SUT PDS MONO 0 36 CT1 VIL PDP346H

## (undated) DEVICE — SYR LL TP 10ML STRL

## (undated) DEVICE — GOWN SURG ORBIS LVL3 2XL STRL

## (undated) DEVICE — SUCTION CANISTER, 1000CC,SAFELINER: Brand: DEROYAL

## (undated) DEVICE — SUT ETHLN 3/0 PC5 18IN 1893G